# Patient Record
Sex: MALE | Race: WHITE | Employment: OTHER | URBAN - METROPOLITAN AREA
[De-identification: names, ages, dates, MRNs, and addresses within clinical notes are randomized per-mention and may not be internally consistent; named-entity substitution may affect disease eponyms.]

---

## 2017-01-02 ENCOUNTER — TELEPHONE (OUTPATIENT)
Dept: INTERNAL MEDICINE CLINIC | Age: 62
End: 2017-01-02

## 2017-01-02 NOTE — TELEPHONE ENCOUNTER
Daughter called to say that another lesion on his back is appearuing and is painful- pt is in [place]- they have started Bactrim today.   If no improvement he will see me Dyana Ochoa

## 2017-01-05 ENCOUNTER — HOSPITAL ENCOUNTER (OUTPATIENT)
Dept: LAB | Age: 62
Discharge: HOME | End: 2017-01-05
Payer: MEDICARE

## 2017-01-05 ENCOUNTER — OFFICE VISIT (OUTPATIENT)
Dept: SURGERY | Age: 62
End: 2017-01-05

## 2017-01-05 ENCOUNTER — OFFICE VISIT (OUTPATIENT)
Dept: INTERNAL MEDICINE CLINIC | Age: 62
End: 2017-01-05

## 2017-01-05 VITALS
HEIGHT: 70 IN | RESPIRATION RATE: 20 BRPM | BODY MASS INDEX: 27.63 KG/M2 | HEART RATE: 68 BPM | SYSTOLIC BLOOD PRESSURE: 128 MMHG | DIASTOLIC BLOOD PRESSURE: 74 MMHG | TEMPERATURE: 98.2 F | WEIGHT: 193 LBS | OXYGEN SATURATION: 93 %

## 2017-01-05 VITALS
HEIGHT: 70 IN | DIASTOLIC BLOOD PRESSURE: 76 MMHG | HEART RATE: 85 BPM | RESPIRATION RATE: 24 BRPM | BODY MASS INDEX: 27.63 KG/M2 | SYSTOLIC BLOOD PRESSURE: 117 MMHG | WEIGHT: 193 LBS | TEMPERATURE: 98.9 F | OXYGEN SATURATION: 98 %

## 2017-01-05 DIAGNOSIS — L02.91 ABSCESS: Primary | ICD-10-CM

## 2017-01-05 DIAGNOSIS — L02.212 BACK ABSCESS: Primary | ICD-10-CM

## 2017-01-05 PROCEDURE — 82784 ASSAY IGA/IGD/IGG/IGM EACH: CPT

## 2017-01-05 PROCEDURE — 36415 COLL VENOUS BLD VENIPUNCTURE: CPT

## 2017-01-05 PROCEDURE — 80053 COMPREHEN METABOLIC PANEL: CPT

## 2017-01-05 PROCEDURE — 85025 COMPLETE CBC W/AUTO DIFF WBC: CPT

## 2017-01-05 NOTE — PROGRESS NOTES
Chief Complaint   Patient presents with    Skin Problem     1. Have you been to the ER, urgent care clinic since your last visit? Hospitalized since your last visit? [facility]- ER--for seizures    2. Have you seen or consulted any other health care providers outside of the [facility] since your last visit? Include any pap smears or colon screening.  Neuro--    Used 2 patient I. [facility]
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
## 2017-01-05 NOTE — PROGRESS NOTES
1. Have you been to the ER, urgent care clinic since your last visit? Hospitalized since your last visit?  no    2. Have you seen or consulted any other health care providers outside of the [address] [place] [address] since your last visit? Include any pap smears or colon screening. No      Culture pick-up number for [facility] is .
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
## 2017-01-05 NOTE — PROCEDURES
Procedure Note    Date - 1/5/17    Preoperative Diagnosis - Back abscess    Postoperative Diagnosis - Back abscess    Operation Performed - incision and drainage of Back abscess    Indication - Cleveland Phalen. is a 60 y. o.yr old male with Back abscess    Surgeon - Felicia Padilla M.D. Description of operation - The patient was consented prior to starting the procedure. All risks and benefits were explained in full detail. A time out was performed to identify the patient location and procedure. I then prepped and draped the area in standard sterile fashion. I then injected lidocaine into the operative field to anesthetize the area. I then made a 1cm incision over the abscess and drainage out purulent material.  The wound was washed out with saline irrigation and packed with 1/2 in packing strip. A dry sterile dressing was applied. Dr Yesenia Harley performed the entire procedure and was present the entire time.       Anesthesia - 1% lidocaine    Findings - Back abscess    Blood loss - minimal    Complications - none    Specimens/cultures - yes, sent    Disposition - SD home

## 2017-01-06 LAB
ALBUMIN SERPL-MCNC: 3.6 G/DL (ref 3.6–4.8)
ALBUMIN/GLOB SERPL: 1.2 {RATIO} (ref 1.1–2.5)
ALP SERPL-CCNC: 75 IU/L (ref 39–117)
ALT SERPL-CCNC: 14 IU/L (ref 0–44)
AST SERPL-CCNC: 12 IU/L (ref 0–40)
BASOPHILS # BLD AUTO: 0 X10E3/UL (ref 0–0.2)
BASOPHILS NFR BLD AUTO: 0 %
BILIRUB SERPL-MCNC: 0.5 MG/DL (ref 0–1.2)
BUN SERPL-MCNC: 22 MG/DL (ref 8–27)
BUN/CREAT SERPL: 13 (ref 10–22)
CALCIUM SERPL-MCNC: 8.7 MG/DL (ref 8.6–10.2)
CHLORIDE SERPL-SCNC: 96 MMOL/L (ref 96–106)
CO2 SERPL-SCNC: 21 MMOL/L (ref 18–29)
CREAT SERPL-MCNC: 1.69 MG/DL (ref 0.76–1.27)
EOSINOPHIL # BLD AUTO: 0.3 X10E3/UL (ref 0–0.4)
EOSINOPHIL NFR BLD AUTO: 2 %
ERYTHROCYTE [DISTWIDTH] IN BLOOD BY AUTOMATED COUNT: 14.8 % (ref 12.3–15.4)
GLOBULIN SER CALC-MCNC: 3.1 G/DL (ref 1.5–4.5)
GLUCOSE SERPL-MCNC: 105 MG/DL (ref 65–99)
HCT VFR BLD AUTO: 37 % (ref 37.5–51)
HGB BLD-MCNC: 12.2 G/DL (ref 12.6–17.7)
IGA SERPL-MCNC: 333 MG/DL (ref 61–437)
IGG SERPL-MCNC: 916 MG/DL (ref 700–1600)
IGM SERPL-MCNC: 85 MG/DL (ref 20–172)
IMM GRANULOCYTES # BLD: 0 X10E3/UL (ref 0–0.1)
IMM GRANULOCYTES NFR BLD: 0 %
INTERPRETATION: NORMAL
LYMPHOCYTES # BLD AUTO: 1.5 X10E3/UL (ref 0.7–3.1)
LYMPHOCYTES NFR BLD AUTO: 11 %
MCH RBC QN AUTO: 27.2 PG (ref 26.6–33)
MCHC RBC AUTO-ENTMCNC: 33 G/DL (ref 31.5–35.7)
MCV RBC AUTO: 83 FL (ref 79–97)
MONOCYTES # BLD AUTO: 0.8 X10E3/UL (ref 0.1–0.9)
MONOCYTES NFR BLD AUTO: 6 %
NEUTROPHILS # BLD AUTO: 11.1 X10E3/UL (ref 1.4–7)
NEUTROPHILS NFR BLD AUTO: 81 %
PLATELET # BLD AUTO: 196 X10E3/UL (ref 150–379)
POTASSIUM SERPL-SCNC: 5.5 MMOL/L (ref 3.5–5.2)
PROT SERPL-MCNC: 6.7 G/DL (ref 6–8.5)
RBC # BLD AUTO: 4.48 X10E6/UL (ref 4.14–5.8)
SODIUM SERPL-SCNC: 134 MMOL/L (ref 134–144)
WBC # BLD AUTO: 13.8 X10E3/UL (ref 3.4–10.8)

## 2017-01-07 LAB — BACTERIA SPEC AEROBE CULT: ABNORMAL

## 2017-01-10 ENCOUNTER — OFFICE VISIT (OUTPATIENT)
Dept: SURGERY | Age: 62
End: 2017-01-10

## 2017-01-10 VITALS
SYSTOLIC BLOOD PRESSURE: 124 MMHG | OXYGEN SATURATION: 94 % | RESPIRATION RATE: 18 BRPM | TEMPERATURE: 98.6 F | BODY MASS INDEX: 26.77 KG/M2 | DIASTOLIC BLOOD PRESSURE: 78 MMHG | HEIGHT: 70 IN | WEIGHT: 187 LBS | HEART RATE: 73 BPM

## 2017-01-10 DIAGNOSIS — L02.91 ABSCESS: Primary | ICD-10-CM

## 2017-01-10 NOTE — PROGRESS NOTES
1. Have you been to the ER, urgent care clinic since your last visit? Hospitalized since your last visit?  no    2. Have you seen or consulted any other health care providers outside of the [address] [place] [address] since your last visit? Include any pap smears or colon screening.    no

## 2017-01-11 NOTE — PROGRESS NOTES
Subjective:      Owen Chowdhury is a 60 y.o. male presents for postop care 5 days following  Incision and drainage of back abscess. He has a Hx of multiple back abscesses with MRSA. He is at a rehab currently and is getting the wound packed daily there. He is having much less pain currently. Cultures - MRSA    Objective:     Visit Vitals    /78    Pulse 73    Temp 98.6 °F (37 °C)    Resp 18    Ht 5' 10\" (1.778 m)    Wt 187 lb (84.8 kg)    SpO2 94%    BMI 26.83 kg/m2       General:  alert, cooperative, no distress, appears stated age       Back wound :  I+D site on the right upper back with decreased erythema, minimal purulence, mild TTP, decreased induration. Other back wounds in various stages of healing but healing well     Assessment:     1. Owen Chowdhury is a 60 y.o. male who is s/p I+D of right upper back abscess      Plan:     1. The wound is is healing and infection clearing. He will continue packing the wound at rehab and home. 2. Cont abx per ID and follow up with Dr Debi Roland as well  3. Follow-up in 2 week(s) for wound check    Mr. Shirlene Whelan has a reminder for a \"due or due soon\" health maintenance. I have asked that he contact his primary care provider for follow-up on this health maintenance.

## 2017-01-16 ENCOUNTER — TELEPHONE (OUTPATIENT)
Dept: INTERNAL MEDICINE CLINIC | Age: 62
End: 2017-01-16

## 2017-01-16 RX ORDER — DOXYCYCLINE HYCLATE 100 MG
100 TABLET ORAL 2 TIMES DAILY
Qty: 30 TAB | Refills: 1 | Status: SHIPPED | OUTPATIENT
Start: 2017-01-16 | End: 2017-02-17 | Stop reason: SDUPTHER

## 2017-01-19 ENCOUNTER — TELEPHONE (OUTPATIENT)
Dept: NEUROLOGY | Age: 62
End: 2017-01-19

## 2017-01-19 NOTE — TELEPHONE ENCOUNTER
Spoke with wife, states patient has enough Vimpat to last until tomorrow. Would to come  samples for until the end of the month, she says refill would cost over $400.

## 2017-01-26 DIAGNOSIS — G40.209 PARTIAL SYMPTOMATIC EPILEPSY WITH COMPLEX PARTIAL SEIZURES, NOT INTRACTABLE, WITHOUT STATUS EPILEPTICUS (HCC): ICD-10-CM

## 2017-02-01 ENCOUNTER — OFFICE VISIT (OUTPATIENT)
Dept: SURGERY | Age: 62
End: 2017-02-01

## 2017-02-01 ENCOUNTER — TELEPHONE (OUTPATIENT)
Dept: NEUROLOGY | Age: 62
End: 2017-02-01

## 2017-02-01 ENCOUNTER — OFFICE VISIT (OUTPATIENT)
Dept: NEUROLOGY | Age: 62
End: 2017-02-01

## 2017-02-01 VITALS
HEART RATE: 84 BPM | RESPIRATION RATE: 18 BRPM | OXYGEN SATURATION: 97 % | SYSTOLIC BLOOD PRESSURE: 140 MMHG | DIASTOLIC BLOOD PRESSURE: 80 MMHG

## 2017-02-01 VITALS
DIASTOLIC BLOOD PRESSURE: 80 MMHG | RESPIRATION RATE: 18 BRPM | BODY MASS INDEX: 28.06 KG/M2 | HEART RATE: 82 BPM | SYSTOLIC BLOOD PRESSURE: 130 MMHG | HEIGHT: 70 IN | TEMPERATURE: 98.6 F | WEIGHT: 196 LBS | OXYGEN SATURATION: 98 %

## 2017-02-01 DIAGNOSIS — G47.33 OSA ON CPAP: ICD-10-CM

## 2017-02-01 DIAGNOSIS — Z87.820 HISTORY OF MULTIPLE CONCUSSIONS: ICD-10-CM

## 2017-02-01 DIAGNOSIS — Z99.89 OSA ON CPAP: ICD-10-CM

## 2017-02-01 DIAGNOSIS — G40.209 PARTIAL SYMPTOMATIC EPILEPSY WITH COMPLEX PARTIAL SEIZURES, NOT INTRACTABLE, WITHOUT STATUS EPILEPTICUS (HCC): Primary | ICD-10-CM

## 2017-02-01 DIAGNOSIS — Z86.14 HX MRSA INFECTION: ICD-10-CM

## 2017-02-01 DIAGNOSIS — L02.91 ABSCESS: Primary | ICD-10-CM

## 2017-02-01 RX ORDER — LACOSAMIDE 100 MG/1
100 TABLET ORAL 2 TIMES DAILY
Qty: 60 TAB | Refills: 3 | Status: SHIPPED | OUTPATIENT
Start: 2017-02-01 | End: 2017-06-23 | Stop reason: SDUPTHER

## 2017-02-01 RX ORDER — LEVETIRACETAM 750 MG/1
750 TABLET ORAL 2 TIMES DAILY
Qty: 60 TAB | Refills: 3 | Status: SHIPPED | OUTPATIENT
Start: 2017-02-01 | End: 2017-09-28 | Stop reason: SDUPTHER

## 2017-02-01 NOTE — PROGRESS NOTES
1. Have you been to the ER, urgent care clinic since your last visit? Hospitalized since your last visit? no    2. Have you seen or consulted any other health care providers outside of the [facility] since your last visit? Include any pap smears or colon screening.    PCP
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
## 2017-02-01 NOTE — PATIENT INSTRUCTIONS
[facility]   Statement to Patients  April 1, 2014      In an effort to ensure the large volume of patient prescription refills is processed in the most efficient and expeditious manner, we are asking our patients to assist us by calling your Pharmacy for all prescription refills, this will include also your  Mail Order Pharmacy. The pharmacy will contact our office electronically to continue the refill process. Please do not wait until the last minute to call your pharmacy. We need at least 48 hours (2days) to fill prescriptions. We also encourage you to call your pharmacy before going to  your prescription to make sure it is ready. With regard to controlled substance prescription refill requests (narcotic refills) that need to be picked up at our office, we ask your cooperation by providing us with at least 72 hours (3days) notice that you will need a refill. We will not refill narcotic prescription refill requests after 4:00pm on any weekday, Monday through Thursday, or after 2:00pm on Fridays, or on the weekends. We encourage everyone to explore another way of getting your prescription refill request processed using [employer], our patient web portal through our electronic medical record system. [employer] is an efficient and effective way to communicate your medication request directly to the office and  downloadable as an waldo on your smart phone . [employer] also features a review functionality that allows you to view your medication list as well as leave messages for your physician. Are you ready to get connected? If so please review the attatched instructions or speak to any of our staff to get you set up right away! Thank you so much for your cooperation. Should you have any questions please contact our Practice Administrator.     The Physicians and Staff,  [facility]     Thank you for choosing [facility] and [facility] for your     care. You may receive a survey about your visit. We appreciate you taking time     to complete this survey as we use your feedback to improve our services. We     realize we are not perfect, but we strive to provide excellent care.
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
## 2017-02-01 NOTE — MR AVS SNAPSHOT
Visit Information Date & Time Provider Department Dept. Phone Encounter #  
 2/1/2017  1:40 PM DO Michell [facility] [phone] Follow-up Instructions Return in about 3 months (around 5/1/2017). Your Appointments 2/1/2017  3:00 PM  
ESTABLISHED PATIENT with MD Walter Hamm [phone] ([facility]) Appt Note: follow up [address] [id number]  
[address] Upcoming Health Maintenance Date Due  
 LIPID PANEL Q1 1955 FOOT EXAM Q1 1/2/1964 MICROALBUMIN Q1 1/2/1964 EYE EXAM RETINAL OR DILATED Q1 1/2/1964 Pneumococcal 19-64 Medium Risk (1 of 1 - PPSV23) 1/2/1973 DTaP/Tdap/Td series (1 - Tdap) 1/2/1975 FOBT Q 1 YEAR AGE 50-75 1/2/2004 ZOSTER VACCINE AGE 60> 1/2/2014 INFLUENZA AGE 9 TO ADULT 8/1/2016 HEMOGLOBIN A1C Q6M 5/8/2017 Allergies as of 2/1/2017  Review Complete On: 2/1/2017 By: Kalyan Pineda LPN Severity Noted Reaction Type Reactions Codeine High 09/13/2010    Anaphylaxis Tolerated fentanyl previously Demerol [Meperidine] High 09/13/2010    Anaphylaxis Tolerated fentanyl previously Wellbutrin [Bupropion Hcl]  09/13/2010    Anaphylaxis Current Immunizations  Never Reviewed No immunizations on file. Not reviewed this visit You Were Diagnosed With   
  
 Codes Comments Partial symptomatic epilepsy with complex partial seizures, not intractable, without status epilepticus ([facility]    -  Primary ICD-10-CM: N09.295 ICD-9-CM: 345.40 History of multiple concussions     ICD-10-CM: Z87.820 ICD-9-CM: V15.52   
 ROSELIA on CPAP     ICD-10-CM: G47.33 
ICD-9-CM: 327.23 Hx MRSA infection     ICD-10-CM: Z86.14 
ICD-9-CM: V12.04 Vitals BP Pulse Resp SpO2 Smoking Status 140/80 84 18 97% Former Smoker Vitals History Preferred Pharmacy Pharmacy Name Phone Katelyn Vargas [phone] Your Updated Medication List  
  
   
This list is accurate as of: 2/1/17  1:57 PM.  Always use your most recent med list.  
  
  
  
  
 AMARYL 4 mg tablet Generic drug:  glimepiride Take 4 mg by mouth two (2) times a day. aspirin 81 mg chewable tablet Take 1 Tab by mouth daily. atorvastatin 80 mg tablet Commonly known as:  LIPITOR Take 1 Tab by mouth Daily (before dinner). * carvedilol 12.5 mg tablet Commonly known as:  Kathlean Due Take 1 Tab by mouth daily (with breakfast). * carvedilol 6.25 mg tablet Commonly known as:  Kathlean Due Take 1 Tab by mouth daily (with dinner). clonazePAM 0.5 mg tablet Commonly known as:  Urbano Flores Take 1 tablet by mouth twice daily as needed for anxiety or agitation  
  
 doxycycline 100 mg tablet Commonly known as:  VIBRA-TABS Take 1 Tab by mouth two (2) times a day. Indications: SKIN AND SKIN STRUCTURE INFECTION  
  
 eplerenone 25 mg tablet Commonly known as:  Minh  Take 1 Tab by mouth daily. lacosamide 100 mg Tab tablet Commonly known as:  VIMPAT Take 1 Tab by mouth two (2) times a day. Max Daily Amount: 200 mg.  
  
 lactulose 10 gram/15 mL solution Commonly known as:  Jim Meagan Take 45 mL by mouth three (3) times daily. Adjust dosage so that you have 2-3 bowel movements per day. levETIRAcetam 750 mg tablet Commonly known as:  KEPPRA Take 1 Tab by mouth two (2) times a day. pantoprazole 40 mg tablet Commonly known as:  PROTONIX Take 1 Tab by mouth Before breakfast and dinner. Before Breakfast and in the afternoon (also takes Zantac at same time) potassium chloride 10 mEq tablet Commonly known as:  K-DUR, KLOR-CON Take 1 Tab by mouth Daily (before dinner). 20 mEq in AM, 10 mEq in afternoon pregabalin 50 mg capsule Commonly known as:  González Flash Take 1 Cap by mouth three (3) times daily. Max Daily Amount: 150 mg.  
  
 promethazine 25 mg tablet Commonly known as:  PHENERGAN Take 1 Tab by mouth every six (6) hours as needed. QUEtiapine 50 mg tablet Commonly known as:  SEROquel Take 3 Tabs by mouth nightly. tamsulosin 0.4 mg capsule Commonly known as:  FLOMAX Take 1 Cap by mouth Before breakfast and dinner. Before Breakfast and in the afternoon * venlafaxine-SR 75 mg capsule Commonly known as:  EFFEXOR XR Take 2 Caps by mouth daily. * venlafaxine-SR 75 mg capsule Commonly known as:  EFFEXOR XR Take 1 Cap by mouth Daily (before dinner). ZANTAC 150 mg tablet Generic drug:  raNITIdine Take 150 mg by mouth two (2) times a day. * Notice: This list has 4 medication(s) that are the same as other medications prescribed for you. Read the directions carefully, and ask your doctor or other care provider to review them with you. Prescriptions Printed Refills  
 lacosamide (VIMPAT) 100 mg tab tablet 3 Sig: Take 1 Tab by mouth two (2) times a day. Max Daily Amount: 200 mg. Class: Print Route: Oral  
  
Prescriptions Sent to Pharmacy Refills  
 levETIRAcetam (KEPPRA) 750 mg tablet 3 Sig: Take 1 Tab by mouth two (2) times a day. Class: Normal  
 Pharmacy: [facility] [place] [address],[address] #: [phone] Route: Oral  
  
Follow-up Instructions Return in about 3 months (around 5/1/2017). Patient Instructions PRESCRIPTION REFILL POLICY [facility] Statement to Patients April 1, 2014 In an effort to ensure the large volume of patient prescription refills is processed in the most efficient and expeditious manner, we are asking our patients to assist us by calling your Pharmacy for all prescription refills, this will include also your  Mail Order Pharmacy. The pharmacy will contact our office electronically to continue the refill process. Please do not wait until the last minute to call your pharmacy. We need at least 48 hours (2days) to fill prescriptions. We also encourage you to call your pharmacy before going to  your prescription to make sure it is ready. With regard to controlled substance prescription refill requests (narcotic refills) that need to be picked up at our office, we ask your cooperation by providing us with at least 72 hours (3days) notice that you will need a refill. We will not refill narcotic prescription refill requests after 4:00pm on any weekday, Monday through Thursday, or after 2:00pm on Fridays, or on the weekends. We encourage everyone to explore another way of getting your prescription refill request processed using [employer], our patient web portal through our electronic medical record system. [employer] is an efficient and effective way to communicate your medication request directly to the office and  downloadable as an waldo on your smart phone . [employer] also features a review functionality that allows you to view your medication list as well as leave messages for your physician. Are you ready to get connected? If so please review the attatched instructions or speak to any of our staff to get you set up right away! Thank you so much for your cooperation. Should you have any questions please contact our Practice Administrator. The Physicians and Staff,  [facility] [place] Thank you for choosing [facility] [place] and [facility] [place] for your  
 
care. You may receive a survey about your visit. We appreciate you taking time  
 
to complete this survey as we use your feedback to improve our services. We  
 
realize we are not perfect, but we strive to provide excellent care. Please provide this summary of care documentation to your next provider. Your primary care clinician is listed as Lauro Pierre. If you have any questions after today's visit, please call [phone].

## 2017-02-03 ENCOUNTER — HOSPITAL ENCOUNTER (EMERGENCY)
Age: 62
Discharge: HOME | End: 2017-02-03
Attending: EMERGENCY MEDICINE
Payer: MEDICARE

## 2017-02-03 VITALS
HEART RATE: 74 BPM | SYSTOLIC BLOOD PRESSURE: 149 MMHG | RESPIRATION RATE: 14 BRPM | HEIGHT: 70 IN | DIASTOLIC BLOOD PRESSURE: 79 MMHG | BODY MASS INDEX: 29.2 KG/M2 | OXYGEN SATURATION: 98 % | WEIGHT: 204 LBS | TEMPERATURE: 97.5 F

## 2017-02-03 DIAGNOSIS — R40.4 TRANSIENT ALTERATION OF AWARENESS: Primary | ICD-10-CM

## 2017-02-03 LAB
ALBUMIN SERPL BCP-MCNC: 3.3 G/DL (ref 3.5–5)
ALBUMIN/GLOB SERPL: 0.9 {RATIO} (ref 1.1–2.2)
ALP SERPL-CCNC: 92 U/L (ref 45–117)
ALT SERPL-CCNC: 18 U/L (ref 12–78)
AMMONIA PLAS-SCNC: 14 UMOL/L
ANION GAP BLD CALC-SCNC: 9 MMOL/L (ref 5–15)
AST SERPL W P-5'-P-CCNC: 12 U/L (ref 15–37)
BASOPHILS # BLD AUTO: 0 K/UL (ref 0–0.1)
BASOPHILS # BLD: 0 % (ref 0–1)
BILIRUB SERPL-MCNC: 0.5 MG/DL (ref 0.2–1)
BUN SERPL-MCNC: 20 MG/DL (ref 6–20)
BUN/CREAT SERPL: 16 (ref 12–20)
CALCIUM SERPL-MCNC: 8.5 MG/DL (ref 8.5–10.1)
CHLORIDE SERPL-SCNC: 108 MMOL/L (ref 97–108)
CO2 SERPL-SCNC: 25 MMOL/L (ref 21–32)
CREAT SERPL-MCNC: 1.29 MG/DL (ref 0.7–1.3)
EOSINOPHIL # BLD: 0.3 K/UL (ref 0–0.4)
EOSINOPHIL NFR BLD: 5 % (ref 0–7)
ERYTHROCYTE [DISTWIDTH] IN BLOOD BY AUTOMATED COUNT: 15.7 % (ref 11.5–14.5)
GLOBULIN SER CALC-MCNC: 3.7 G/DL (ref 2–4)
GLUCOSE SERPL-MCNC: 190 MG/DL (ref 65–100)
HCT VFR BLD AUTO: 39.4 % (ref 36.6–50.3)
HGB BLD-MCNC: 12.8 G/DL (ref 12.1–17)
LYMPHOCYTES # BLD AUTO: 24 % (ref 12–49)
LYMPHOCYTES # BLD: 1.8 K/UL (ref 0.8–3.5)
MCH RBC QN AUTO: 26.1 PG (ref 26–34)
MCHC RBC AUTO-ENTMCNC: 32.5 G/DL (ref 30–36.5)
MCV RBC AUTO: 80.4 FL (ref 80–99)
MONOCYTES # BLD: 0.2 K/UL (ref 0–1)
MONOCYTES NFR BLD AUTO: 3 % (ref 5–13)
NEUTS SEG # BLD: 5.1 K/UL (ref 1.8–8)
NEUTS SEG NFR BLD AUTO: 68 % (ref 32–75)
PLATELET # BLD AUTO: 166 K/UL (ref 150–400)
POTASSIUM SERPL-SCNC: 4.1 MMOL/L (ref 3.5–5.1)
PROT SERPL-MCNC: 7 G/DL (ref 6.4–8.2)
RBC # BLD AUTO: 4.9 M/UL (ref 4.1–5.7)
SODIUM SERPL-SCNC: 142 MMOL/L (ref 136–145)
TROPONIN I SERPL-MCNC: <0.04 NG/ML
WBC # BLD AUTO: 7.5 K/UL (ref 4.1–11.1)

## 2017-02-03 PROCEDURE — 85025 COMPLETE CBC W/AUTO DIFF WBC: CPT | Performed by: EMERGENCY MEDICINE

## 2017-02-03 PROCEDURE — 87040 BLOOD CULTURE FOR BACTERIA: CPT | Performed by: EMERGENCY MEDICINE

## 2017-02-03 PROCEDURE — 36415 COLL VENOUS BLD VENIPUNCTURE: CPT | Performed by: EMERGENCY MEDICINE

## 2017-02-03 PROCEDURE — 99284 EMERGENCY DEPT VISIT MOD MDM: CPT

## 2017-02-03 PROCEDURE — 93005 ELECTROCARDIOGRAM TRACING: CPT

## 2017-02-03 PROCEDURE — 84484 ASSAY OF TROPONIN QUANT: CPT | Performed by: EMERGENCY MEDICINE

## 2017-02-03 PROCEDURE — 80053 COMPREHEN METABOLIC PANEL: CPT | Performed by: EMERGENCY MEDICINE

## 2017-02-03 PROCEDURE — 82140 ASSAY OF AMMONIA: CPT | Performed by: EMERGENCY MEDICINE

## 2017-02-04 LAB
ATRIAL RATE: 69 BPM
CALCULATED P AXIS, ECG09: 40 DEGREES
CALCULATED R AXIS, ECG10: 38 DEGREES
CALCULATED T AXIS, ECG11: 122 DEGREES
DIAGNOSIS, 93000: NORMAL
P-R INTERVAL, ECG05: 166 MS
Q-T INTERVAL, ECG07: 422 MS
QRS DURATION, ECG06: 112 MS
QTC CALCULATION (BEZET), ECG08: 452 MS
VENTRICULAR RATE, ECG03: 69 BPM

## 2017-02-04 NOTE — DISCHARGE INSTRUCTIONS
We hope that we have addressed all of your medical concerns. The examination and treatment you received in the Emergency Department were for an emergent problem and were not intended as complete care. It is important that you follow up with your healthcare provider(s) for ongoing care. If your symptoms worsen or do not improve as expected, and you are unable to reach your usual health care provider(s), you should return to the Emergency Department. Today's healthcare is undergoing tremendous change, and patient satisfaction surveys are one of the many tools to assess the quality of medical care. You may receive a survey from the [employer] regarding your experience in the Emergency Department. I hope that your experience has been completely positive, particularly the medical care that I provided. As such, please participate in the survey; anything less than excellent does not meet my expectations or intentions. Formerly [facility], later [facility] and [facility] participate in nationally recognized quality of care measures. If your blood pressure is greater than 120/80, as reported below, we urge that you seek medical care to address the potential of high blood pressure, commonly known as hypertension. Hypertension can be hereditary or can be caused by certain medical conditions, pain, stress, or \"white coat syndrome. \"       Please make an appointment with your health care provider(s) for follow up of your Emergency Department visit. VITALS:   Patient Vitals for the past 8 hrs:   Temp Pulse Resp BP SpO2   02/03/17 2015 - 74 14 149/79 98 %   02/03/17 1833 97.5 °F (36.4 °C) 62 16 (!) 187/92 100 %          Thank you for allowing us to provide you with medical care today. We realize that you have many choices for your emergency care needs. Please choose us in the future for any continued health care needs. Kye Stringer, MD ANUSHKA [address]: [phone]            Recent Results (from the past 24 hour(s))   CBC WITH AUTOMATED DIFF    Collection Time: 02/03/17  8:12 PM   Result Value Ref Range    WBC 7.5 4.1 - 11.1 K/uL    RBC 4.90 4. 10 - 5.70 M/uL    HGB 12.8 12.1 - 17.0 g/dL    HCT 39.4 36.6 - 50.3 %    MCV 80.4 80.0 - 99.0 FL    MCH 26.1 26.0 - 34.0 PG    MCHC 32.5 30.0 - 36.5 g/dL    RDW 15.7 (H) 11.5 - 14.5 %    PLATELET 268 399 - 772 K/uL    NEUTROPHILS 68 32 - 75 %    LYMPHOCYTES 24 12 - 49 %    MONOCYTES 3 (L) 5 - 13 %    EOSINOPHILS 5 0 - 7 %    BASOPHILS 0 0 - 1 %    ABS. NEUTROPHILS 5.1 1.8 - 8.0 K/UL    ABS. LYMPHOCYTES 1.8 0.8 - 3.5 K/UL    ABS. MONOCYTES 0.2 0.0 - 1.0 K/UL    ABS. EOSINOPHILS 0.3 0.0 - 0.4 K/UL    ABS. BASOPHILS 0.0 0.0 - 0.1 K/UL   METABOLIC PANEL, COMPREHENSIVE    Collection Time: 02/03/17  8:12 PM   Result Value Ref Range    Sodium 142 136 - 145 mmol/L    Potassium 4.1 3.5 - 5.1 mmol/L    Chloride 108 97 - 108 mmol/L    CO2 25 21 - 32 mmol/L    Anion gap 9 5 - 15 mmol/L    Glucose 190 (H) 65 - 100 mg/dL    BUN 20 6 - 20 MG/DL    Creatinine 1.29 0.70 - 1.30 MG/DL    BUN/Creatinine ratio 16 12 - 20      GFR est AA >60 >60 ml/min/1.73m2    GFR est non-AA 56 (L) >60 ml/min/1.73m2    Calcium 8.5 8.5 - 10.1 MG/DL    Bilirubin, total 0.5 0.2 - 1.0 MG/DL    ALT (SGPT) 18 12 - 78 U/L    AST (SGOT) 12 (L) 15 - 37 U/L    Alk. phosphatase 92 45 - 117 U/L    Protein, total 7.0 6.4 - 8.2 g/dL    Albumin 3.3 (L) 3.5 - 5.0 g/dL    Globulin 3.7 2.0 - 4.0 g/dL    A-G Ratio 0.9 (L) 1.1 - 2.2     TROPONIN I    Collection Time: 02/03/17  8:12 PM   Result Value Ref Range    Troponin-I, Qt. <0.04 <0.05 ng/mL   AMMONIA    Collection Time: 02/03/17  8:12 PM   Result Value Ref Range    Ammonia 14 <32 UMOL/L       No results found.
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
## 2017-02-04 NOTE — ED NOTES
Pt A&O, not vomiting, respirations unlabored, skin warm and dry. No apparent distress.  Discharge instructions explained

## 2017-02-07 RX ORDER — LACOSAMIDE 200 MG/1
200 TABLET ORAL 2 TIMES DAILY
Qty: 28 TAB | Refills: 0 | Status: SHIPPED | COMMUNITY
Start: 2017-02-07 | End: 2017-03-02

## 2017-02-07 NOTE — PROGRESS NOTES
Subjective:      Nadja Garcia is a 60 y.o. male presents for postop care  following  I+D of abscess of the back. The wound has been packed by his daughter daily at home and is healing well. The wound is healing well. He has no drainage and minimal pain from the wound. Objective:     Visit Vitals    /80    Pulse 82    Temp 98.6 °F (37 °C)    Resp 18    Ht 5' 10\" (1.778 m)    Wt 196 lb (88.9 kg)    SpO2 98%    BMI 28.12 kg/m2       General:  alert, cooperative, no distress, appears stated age       Back Incision:   healing well, no drainage, no erythema, open wound on R upper back 1x2cm shallow, good granulation tissue present     Assessment:     1. Nadja Garcia is a 60 y.o. male who is s/p I+D of back abscess      Plan:     1. The wound is healing well and much smaller than before. 2. Continue to pack the wound till it is small enough the packing will not fit in  3. Follow-up prn    Mr. Anup High has a reminder for a \"due or due soon\" health maintenance. I have asked that he contact his primary care provider for follow-up on this health maintenance.

## 2017-02-08 LAB
BACTERIA SPEC CULT: NORMAL
SERVICE CMNT-IMP: NORMAL

## 2017-02-10 RX ORDER — LACOSAMIDE 100 MG/1
100 TABLET ORAL 2 TIMES DAILY
Qty: 21 TAB | Refills: 0 | Status: SHIPPED | COMMUNITY
Start: 2017-02-10 | End: 2017-03-02

## 2017-02-23 RX ORDER — DOXYCYCLINE HYCLATE 100 MG
TABLET ORAL
Qty: 30 TAB | Refills: 1 | Status: SHIPPED | OUTPATIENT
Start: 2017-02-23 | End: 2017-07-24

## 2017-03-01 ENCOUNTER — APPOINTMENT (OUTPATIENT)
Dept: GENERAL RADIOLOGY | Age: 62
DRG: 194 | End: 2017-03-01
Attending: PHYSICIAN ASSISTANT
Payer: MEDICARE

## 2017-03-01 ENCOUNTER — HOSPITAL ENCOUNTER (INPATIENT)
Age: 62
LOS: 1 days | Discharge: HOME | DRG: 194 | End: 2017-03-03
Attending: EMERGENCY MEDICINE | Admitting: FAMILY MEDICINE
Payer: MEDICARE

## 2017-03-01 ENCOUNTER — APPOINTMENT (OUTPATIENT)
Dept: CT IMAGING | Age: 62
DRG: 194 | End: 2017-03-01
Attending: PHYSICIAN ASSISTANT
Payer: MEDICARE

## 2017-03-01 DIAGNOSIS — E86.0 DEHYDRATION: ICD-10-CM

## 2017-03-01 DIAGNOSIS — R50.9 FEVER OF UNKNOWN ORIGIN: Primary | ICD-10-CM

## 2017-03-01 DIAGNOSIS — E83.42 HYPOMAGNESEMIA: ICD-10-CM

## 2017-03-01 LAB
ALBUMIN SERPL BCP-MCNC: 3.4 G/DL (ref 3.5–5)
ALBUMIN/GLOB SERPL: 0.9 {RATIO} (ref 1.1–2.2)
ALP SERPL-CCNC: 90 U/L (ref 45–117)
ALT SERPL-CCNC: 17 U/L (ref 12–78)
AMMONIA PLAS-SCNC: 13 UMOL/L
ANION GAP BLD CALC-SCNC: 8 MMOL/L (ref 5–15)
APPEARANCE UR: CLEAR
AST SERPL W P-5'-P-CCNC: 8 U/L (ref 15–37)
BACTERIA URNS QL MICRO: NEGATIVE /HPF
BASOPHILS # BLD AUTO: 0 K/UL (ref 0–0.1)
BASOPHILS # BLD: 0 % (ref 0–1)
BILIRUB SERPL-MCNC: 1 MG/DL (ref 0.2–1)
BILIRUB UR QL: NEGATIVE
BUN SERPL-MCNC: 15 MG/DL (ref 6–20)
BUN/CREAT SERPL: 9 (ref 12–20)
CALCIUM SERPL-MCNC: 8.2 MG/DL (ref 8.5–10.1)
CHLORIDE SERPL-SCNC: 102 MMOL/L (ref 97–108)
CO2 SERPL-SCNC: 25 MMOL/L (ref 21–32)
COLOR UR: ABNORMAL
CREAT SERPL-MCNC: 1.58 MG/DL (ref 0.7–1.3)
EOSINOPHIL # BLD: 0.1 K/UL (ref 0–0.4)
EOSINOPHIL NFR BLD: 1 % (ref 0–7)
EPITH CASTS URNS QL MICRO: ABNORMAL /LPF
ERYTHROCYTE [DISTWIDTH] IN BLOOD BY AUTOMATED COUNT: 15.9 % (ref 11.5–14.5)
FLUAV AG NPH QL IA: NEGATIVE
FLUBV AG NOSE QL IA: NEGATIVE
GLOBULIN SER CALC-MCNC: 4 G/DL (ref 2–4)
GLUCOSE BLD STRIP.AUTO-MCNC: 120 MG/DL (ref 65–100)
GLUCOSE SERPL-MCNC: 128 MG/DL (ref 65–100)
GLUCOSE UR STRIP.AUTO-MCNC: NEGATIVE MG/DL
HCT VFR BLD AUTO: 38.6 % (ref 36.6–50.3)
HGB BLD-MCNC: 12.8 G/DL (ref 12.1–17)
HGB UR QL STRIP: NEGATIVE
HYALINE CASTS URNS QL MICRO: ABNORMAL /LPF (ref 0–5)
KETONES UR QL STRIP.AUTO: NEGATIVE MG/DL
LACTATE SERPL-SCNC: 1.4 MMOL/L (ref 0.4–2)
LEUKOCYTE ESTERASE UR QL STRIP.AUTO: NEGATIVE
LYMPHOCYTES # BLD AUTO: 6 % (ref 12–49)
LYMPHOCYTES # BLD: 0.9 K/UL (ref 0.8–3.5)
MAGNESIUM SERPL-MCNC: 1.1 MG/DL (ref 1.6–2.4)
MCH RBC QN AUTO: 25.5 PG (ref 26–34)
MCHC RBC AUTO-ENTMCNC: 33.2 G/DL (ref 30–36.5)
MCV RBC AUTO: 77 FL (ref 80–99)
MONOCYTES # BLD: 0.5 K/UL (ref 0–1)
MONOCYTES NFR BLD AUTO: 4 % (ref 5–13)
NEUTS SEG # BLD: 12.8 K/UL (ref 1.8–8)
NEUTS SEG NFR BLD AUTO: 89 % (ref 32–75)
NITRITE UR QL STRIP.AUTO: NEGATIVE
PH UR STRIP: 6 [PH] (ref 5–8)
PHOSPHATE SERPL-MCNC: 1.7 MG/DL (ref 2.6–4.7)
PLATELET # BLD AUTO: 147 K/UL (ref 150–400)
POTASSIUM SERPL-SCNC: 3.5 MMOL/L (ref 3.5–5.1)
PROT SERPL-MCNC: 7.4 G/DL (ref 6.4–8.2)
PROT UR STRIP-MCNC: ABNORMAL MG/DL
RBC # BLD AUTO: 5.01 M/UL (ref 4.1–5.7)
RBC #/AREA URNS HPF: ABNORMAL /HPF (ref 0–5)
SERVICE CMNT-IMP: ABNORMAL
SODIUM SERPL-SCNC: 135 MMOL/L (ref 136–145)
SP GR UR REFRACTOMETRY: 1.01 (ref 1–1.03)
TROPONIN I SERPL-MCNC: <0.04 NG/ML
UA: UC IF INDICATED,UAUC: ABNORMAL
UROBILINOGEN UR QL STRIP.AUTO: 1 EU/DL (ref 0.2–1)
WBC # BLD AUTO: 14.3 K/UL (ref 4.1–11.1)
WBC URNS QL MICRO: ABNORMAL /HPF (ref 0–4)

## 2017-03-01 PROCEDURE — 80053 COMPREHEN METABOLIC PANEL: CPT | Performed by: PHYSICIAN ASSISTANT

## 2017-03-01 PROCEDURE — 96361 HYDRATE IV INFUSION ADD-ON: CPT

## 2017-03-01 PROCEDURE — 85025 COMPLETE CBC W/AUTO DIFF WBC: CPT | Performed by: PHYSICIAN ASSISTANT

## 2017-03-01 PROCEDURE — 74011250636 HC RX REV CODE- 250/636: Performed by: PHYSICIAN ASSISTANT

## 2017-03-01 PROCEDURE — 71020 XR CHEST PA LAT: CPT

## 2017-03-01 PROCEDURE — 82962 GLUCOSE BLOOD TEST: CPT

## 2017-03-01 PROCEDURE — 70450 CT HEAD/BRAIN W/O DYE: CPT

## 2017-03-01 PROCEDURE — 84100 ASSAY OF PHOSPHORUS: CPT | Performed by: PHYSICIAN ASSISTANT

## 2017-03-01 PROCEDURE — 87040 BLOOD CULTURE FOR BACTERIA: CPT | Performed by: PHYSICIAN ASSISTANT

## 2017-03-01 PROCEDURE — 96365 THER/PROPH/DIAG IV INF INIT: CPT

## 2017-03-01 PROCEDURE — 87804 INFLUENZA ASSAY W/OPTIC: CPT | Performed by: PHYSICIAN ASSISTANT

## 2017-03-01 PROCEDURE — 99285 EMERGENCY DEPT VISIT HI MDM: CPT

## 2017-03-01 PROCEDURE — 74011250637 HC RX REV CODE- 250/637: Performed by: PHYSICIAN ASSISTANT

## 2017-03-01 PROCEDURE — 83735 ASSAY OF MAGNESIUM: CPT | Performed by: PHYSICIAN ASSISTANT

## 2017-03-01 PROCEDURE — 87086 URINE CULTURE/COLONY COUNT: CPT | Performed by: PHYSICIAN ASSISTANT

## 2017-03-01 PROCEDURE — 36415 COLL VENOUS BLD VENIPUNCTURE: CPT | Performed by: PHYSICIAN ASSISTANT

## 2017-03-01 PROCEDURE — 83605 ASSAY OF LACTIC ACID: CPT | Performed by: PHYSICIAN ASSISTANT

## 2017-03-01 PROCEDURE — 81001 URINALYSIS AUTO W/SCOPE: CPT | Performed by: PHYSICIAN ASSISTANT

## 2017-03-01 PROCEDURE — 82140 ASSAY OF AMMONIA: CPT | Performed by: PHYSICIAN ASSISTANT

## 2017-03-01 PROCEDURE — 84484 ASSAY OF TROPONIN QUANT: CPT | Performed by: PHYSICIAN ASSISTANT

## 2017-03-01 RX ORDER — ACETAMINOPHEN 325 MG/1
975 TABLET ORAL
Status: COMPLETED | OUTPATIENT
Start: 2017-03-01 | End: 2017-03-01

## 2017-03-01 RX ORDER — MAGNESIUM SULFATE 1 G/100ML
1 INJECTION INTRAVENOUS
Status: COMPLETED | OUTPATIENT
Start: 2017-03-01 | End: 2017-03-02

## 2017-03-01 RX ADMIN — ACETAMINOPHEN 975 MG: 325 TABLET, FILM COATED ORAL at 21:46

## 2017-03-01 RX ADMIN — SODIUM CHLORIDE 1000 ML: 900 INJECTION, SOLUTION INTRAVENOUS at 21:43

## 2017-03-01 NOTE — IP AVS SNAPSHOT
[address] 
[phone] Patient: Harjit Dior. MRN: [id number] VANESA:0/0[birth year]0817 You are allergic to the following Allergen Reactions Codeine Anaphylaxis Tolerated fentanyl previously Demerol (Meperidine) Anaphylaxis Tolerated fentanyl previously Wellbutrin (Bupropion Hcl) Anaphylaxis Recent Documentation Weight BMI Smoking Status 90.2 kg 28.53 kg/m2 Former Smoker Unresulted Labs Order Current Status CULTURE, BLOOD, PAIRED Preliminary result CULTURE, MRSA Preliminary result CULTURE, THROAT Preliminary result Emergency Contacts  (Rel.) Home Phone Work Phone Mobile Phone Ludwin Hoffman (Daughter) [phone] -- [phone] About your hospitalization You were admitted on:  March 2, 2017 You last received care in the:  [facility] You were discharged on:  March 3, 2017 Why you were hospitalized Your primary diagnosis was:  Fever Your diagnoses also included:  Pneumonia Providers Seen During Your Hospitalizations Provider Role Specialty Primary office phone Kristi Holloway MD Attending Provider Emergency Medicine [phone] Ginger Diaz MD Attending Provider Emergency Medicine [phone] Gabe Durbin MD Attending Provider [facility] [phone] Rocío Gunn MD Attending Provider Internal Medicine [phone] Your Primary Care Physician (PCP) Primary Care Physician Office Phone Office Fax [facility], [address] [place] [address], [address] [phone] Follow-up Information Follow up With Details Comments Contact Info Julia Holguin MD In 1 week discharge follow up  Kellen Duron [address] [place] [address] [place] [address] 
[phone] Current Discharge Medication List  
  
START taking these medications Dose & Instructions Dispensing Information Comments Morning Noon Evening Bedtime  
 amoxicillin-clavulanate 875-125 mg per tablet Commonly known as:  AUGMENTIN Your next dose is: Today, Tomorrow Other:  _________ Dose:  1 Tab Take 1 Tab by mouth every twelve (12) hours. Quantity:  13 Tab Refills:  0 CONTINUE these medications which have NOT CHANGED Dose & Instructions Dispensing Information Comments Morning Noon Evening Bedtime AMARYL 4 mg tablet Generic drug:  glimepiride Your next dose is: Today, Tomorrow Other:  _________ Dose:  4 mg Take 4 mg by mouth two (2) times a day. Refills:  0  
     
   
   
   
  
 aspirin 81 mg chewable tablet Your next dose is: Today, Tomorrow Other:  _________ Dose:  81 mg Take 1 Tab by mouth daily. Quantity:  30 Tab Refills:  0  
     
   
   
   
  
 atorvastatin 80 mg tablet Commonly known as:  LIPITOR Your next dose is: Today, Tomorrow Other:  _________ Dose:  80 mg Take 1 Tab by mouth Daily (before dinner). Quantity:  30 Tab Refills:  0  
     
   
   
   
  
 * carvedilol 12.5 mg tablet Commonly known as:  Cori Sheela Your next dose is: Today, Tomorrow Other:  _________ Dose:  12.5 mg Take 1 Tab by mouth daily (with breakfast). Quantity:  30 Tab Refills:  0  
     
   
   
   
  
 * carvedilol 6.25 mg tablet Commonly known as:  Cori Sheela Your next dose is: Today, Tomorrow Other:  _________ Dose:  6.25 mg Take 1 Tab by mouth daily (with dinner). Quantity:  30 Tab Refills:  0  
     
   
   
   
  
 clonazePAM 0.5 mg tablet Commonly known as:  Suman Aver Your next dose is: Today, Tomorrow Other:  _________ Take 1 tablet by mouth twice daily as needed for anxiety or agitation Quantity:  30 Tab Refills:  0 doxycycline 100 mg tablet Commonly known as:  VIBRA-TABS Your next dose is: Today, Tomorrow Other:  _________  
   
   
 take 1 tablet by mouth twice a day Quantity:  30 Tab Refills:  1  
     
   
   
   
  
 eplerenone 25 mg tablet Commonly known as:  Alisa Li Your next dose is: Today, Tomorrow Other:  _________ Dose:  25 mg Take 1 Tab by mouth daily. Quantity:  30 Tab Refills:  0  
     
   
   
   
  
 lacosamide 100 mg Tab tablet Commonly known as:  VIMPAT Your next dose is: Today, Tomorrow Other:  _________ Dose:  100 mg Take 1 Tab by mouth two (2) times a day. Max Daily Amount: 200 mg. Quantity:  60 Tab Refills:  3  
     
   
   
   
  
 lactulose 10 gram/15 mL solution Commonly known as:  Storm [place] Your next dose is: Today, Tomorrow Other:  _________ Dose:  30 g Take 45 mL by mouth three (3) times daily. Adjust dosage so that you have 2-3 bowel movements per day. Quantity:  1 Bottle Refills:  0  
     
   
   
   
  
 levETIRAcetam 750 mg tablet Commonly known as:  KEPPRA Your next dose is: Today, Tomorrow Other:  _________ Dose:  750 mg Take 1 Tab by mouth two (2) times a day. Quantity:  60 Tab Refills:  3  
     
   
   
   
  
 pantoprazole 40 mg tablet Commonly known as:  PROTONIX Your next dose is: Today, Tomorrow Other:  _________ Dose:  40 mg Take 1 Tab by mouth Before breakfast and dinner. Before Breakfast and in the afternoon (also takes Zantac at same time) Quantity:  60 Tab Refills:  0  
     
   
   
   
  
 potassium chloride 10 mEq tablet Commonly known as:  K-DUR, KLOR-CON Your next dose is: Today, Tomorrow Other:  _________ Dose:  10 mEq Take 1 Tab by mouth Daily (before dinner). 20 mEq in AM, 10 mEq in afternoon Quantity:  30 Tab Refills:  0 pregabalin 50 mg capsule Commonly known as:  Herb Bustle Your next dose is: Today, Tomorrow Other:  _________ Dose:  50 mg Take 1 Cap by mouth three (3) times daily. Max Daily Amount: 150 mg.  
 Quantity:  90 Cap Refills:  0  
     
   
   
   
  
 promethazine 25 mg tablet Commonly known as:  PHENERGAN Your next dose is: Today, Tomorrow Other:  _________ Dose:  25 mg Take 1 Tab by mouth every six (6) hours as needed. Quantity:  30 Tab Refills:  0 QUEtiapine 50 mg tablet Commonly known as:  SEROquel Your next dose is: Today, Tomorrow Other:  _________ Dose:  150 mg Take 3 Tabs by mouth nightly. Quantity:  90 Tab Refills:  0  
     
   
   
   
  
 tamsulosin 0.4 mg capsule Commonly known as:  FLOMAX Your next dose is: Today, Tomorrow Other:  _________ Dose:  0.4 mg Take 1 Cap by mouth Before breakfast and dinner. Before Breakfast and in the afternoon Quantity:  30 Cap Refills:  0  
     
   
   
   
  
 * venlafaxine-SR 75 mg capsule Commonly known as:  EFFEXOR XR Your next dose is: Today, Tomorrow Other:  _________ Dose:  150 mg Take 2 Caps by mouth daily. Quantity:  30 Cap Refills:  0  
     
   
   
   
  
 * venlafaxine-SR 75 mg capsule Commonly known as:  EFFEXOR XR Your next dose is: Today, Tomorrow Other:  _________ Dose:  75 mg Take 1 Cap by mouth Daily (before dinner). Quantity:  30 Cap Refills:  0  
     
   
   
   
  
 ZANTAC 150 mg tablet Generic drug:  raNITIdine Your next dose is: Today, Tomorrow Other:  _________ Dose:  150 mg Take 150 mg by mouth two (2) times a day. Refills:  0  
     
   
   
   
  
 * Notice: This list has 4 medication(s) that are the same as other medications prescribed for you.  Read the directions carefully, and ask your doctor or other care provider to review them with you. Where to Get Your Medications Information on where to get these meds will be given to you by the nurse or doctor. ! Ask your nurse or doctor about these medications  
  amoxicillin-clavulanate 875-125 mg per tablet Discharge Instructions Please bring this form with you to show your primary care provider at your follow-up appointment. Primary care provider:  Dr. Russ Johnston MD 
 
Discharging provider:  Jarrod Ortega MD 
 
You have been admitted to the hospital with the following diagnoses: · Fever · Pneumonia FOLLOW-UP CARE RECOMMENDATIONS: 
 
APPOINTMENTS: 
Follow-up Information Follow up With Details Comments Contact Info Russ Johnston MD In 1 week discharge follow up  [place] [address] [address] [place] [address] 
[phone] FOLLOW-UP TESTS recommended: none PENDING TEST RESULTS: 
At the time of your discharge the following test results are still pending: none Please make sure you review these results with your outpatient follow-up provider(s). SYMPTOMS to watch for: chest pain, shortness of breath, fever, chills, nausea, vomiting, diarrhea, change in mentation, falling, weakness, bleeding. DIET/what to eat:  Cardiac Diet ACTIVITY:  Activity as tolerated WOUND CARE: none EQUIPMENT needed:  none What to do if new or unexpected symptoms occur? If you experience any of the above symptoms (or should other concerns or questions arise after discharge) please call your primary care physician. Return to the emergency room if you cannot get hold of your doctor. · It is very important that you keep your follow-up appointment(s). · Please bring discharge papers, medication list (and/or medication bottles) to your follow-up appointments for review by your outpatient provider(s).  
· Please check the list of medications and be sure it includes every medication (even non-prescription medications) that your provider wants you to take. · It is important that you take the medication exactly as they are prescribed. · Keep your medication in the bottles provided by the pharmacist and keep a list of the medication names, dosages, and times to be taken in your wallet. · Do not take other medications without consulting your doctor. · If you have any questions about your medications or other instructions, please talk to your nurse or care provider before you leave the hospital. 
 
I understand that if any problems occur once I am at home I am to contact my physician. These instructions were explained to me and I had the opportunity to ask questions. Discharge Orders None AGELON ?hart Announcement We are excited to announce that we are making your provider's discharge notes available to you in bitmovin. You will see these notes when they are completed and signed by the physician that discharged you from your recent hospital stay. If you have any questions or concerns about any information you see in AGELON ?hart, please call the Health Information Department where you were seen or reach out to your Primary Care Provider for more information about your plan of care. General Information Please provide this summary of care documentation to your next provider. Patient Signature:  ____________________________________________________________ Date:  ____________________________________________________________  
  
Cheryl Lila Provider Signature:  ____________________________________________________________ Date:  ____________________________________________________________

## 2017-03-02 PROBLEM — R50.9 FEVER: Status: ACTIVE | Noted: 2017-03-02

## 2017-03-02 LAB
ANION GAP BLD CALC-SCNC: 12 MMOL/L (ref 5–15)
BASOPHILS # BLD AUTO: 0 K/UL (ref 0–0.1)
BASOPHILS # BLD: 0 % (ref 0–1)
BUN SERPL-MCNC: 13 MG/DL (ref 6–20)
BUN/CREAT SERPL: 10 (ref 12–20)
CALCIUM SERPL-MCNC: 7.2 MG/DL (ref 8.5–10.1)
CHLORIDE SERPL-SCNC: 106 MMOL/L (ref 97–108)
CO2 SERPL-SCNC: 20 MMOL/L (ref 21–32)
CREAT SERPL-MCNC: 1.26 MG/DL (ref 0.7–1.3)
DEPRECATED S PYO AG THROAT QL EIA: NEGATIVE
DIFFERENTIAL METHOD BLD: ABNORMAL
EOSINOPHIL # BLD: 0.1 K/UL (ref 0–0.4)
EOSINOPHIL NFR BLD: 1 % (ref 0–7)
ERYTHROCYTE [DISTWIDTH] IN BLOOD BY AUTOMATED COUNT: 15.9 % (ref 11.5–14.5)
EST. AVERAGE GLUCOSE BLD GHB EST-MCNC: 140 MG/DL
GLUCOSE BLD STRIP.AUTO-MCNC: 70 MG/DL (ref 65–100)
GLUCOSE BLD STRIP.AUTO-MCNC: 74 MG/DL (ref 65–100)
GLUCOSE BLD STRIP.AUTO-MCNC: 77 MG/DL (ref 65–100)
GLUCOSE BLD STRIP.AUTO-MCNC: 93 MG/DL (ref 65–100)
GLUCOSE BLD STRIP.AUTO-MCNC: 93 MG/DL (ref 65–100)
GLUCOSE BLD STRIP.AUTO-MCNC: 94 MG/DL (ref 65–100)
GLUCOSE BLD STRIP.AUTO-MCNC: 94 MG/DL (ref 65–100)
GLUCOSE SERPL-MCNC: 79 MG/DL (ref 65–100)
HBA1C MFR BLD: 6.5 % (ref 4.2–6.3)
HCT VFR BLD AUTO: 34.2 % (ref 36.6–50.3)
HGB BLD-MCNC: 11.4 G/DL (ref 12.1–17)
LYMPHOCYTES # BLD AUTO: 6 % (ref 12–49)
LYMPHOCYTES # BLD: 0.6 K/UL (ref 0.8–3.5)
MCH RBC QN AUTO: 25.6 PG (ref 26–34)
MCHC RBC AUTO-ENTMCNC: 33.3 G/DL (ref 30–36.5)
MCV RBC AUTO: 76.7 FL (ref 80–99)
MONOCYTES # BLD: 0.4 K/UL (ref 0–1)
MONOCYTES NFR BLD AUTO: 4 % (ref 5–13)
NEUTS SEG # BLD: 9.4 K/UL (ref 1.8–8)
NEUTS SEG NFR BLD AUTO: 89 % (ref 32–75)
PLATELET # BLD AUTO: 116 K/UL (ref 150–400)
POTASSIUM SERPL-SCNC: 3.1 MMOL/L (ref 3.5–5.1)
RBC # BLD AUTO: 4.46 M/UL (ref 4.1–5.7)
RBC MORPH BLD: ABNORMAL
SERVICE CMNT-IMP: NORMAL
SODIUM SERPL-SCNC: 138 MMOL/L (ref 136–145)
WBC # BLD AUTO: 10.5 K/UL (ref 4.1–11.1)

## 2017-03-02 PROCEDURE — 74011250637 HC RX REV CODE- 250/637: Performed by: HOSPITALIST

## 2017-03-02 PROCEDURE — 65270000029 HC RM PRIVATE

## 2017-03-02 PROCEDURE — 87070 CULTURE OTHR SPECIMN AEROBIC: CPT | Performed by: HOSPITALIST

## 2017-03-02 PROCEDURE — 74011250636 HC RX REV CODE- 250/636: Performed by: HOSPITALIST

## 2017-03-02 PROCEDURE — 74011250637 HC RX REV CODE- 250/637: Performed by: FAMILY MEDICINE

## 2017-03-02 PROCEDURE — 85025 COMPLETE CBC W/AUTO DIFF WBC: CPT | Performed by: FAMILY MEDICINE

## 2017-03-02 PROCEDURE — 74011250636 HC RX REV CODE- 250/636: Performed by: PHYSICIAN ASSISTANT

## 2017-03-02 PROCEDURE — 82962 GLUCOSE BLOOD TEST: CPT

## 2017-03-02 PROCEDURE — 87880 STREP A ASSAY W/OPTIC: CPT | Performed by: HOSPITALIST

## 2017-03-02 PROCEDURE — 83036 HEMOGLOBIN GLYCOSYLATED A1C: CPT | Performed by: FAMILY MEDICINE

## 2017-03-02 PROCEDURE — 80048 BASIC METABOLIC PNL TOTAL CA: CPT | Performed by: FAMILY MEDICINE

## 2017-03-02 PROCEDURE — 36415 COLL VENOUS BLD VENIPUNCTURE: CPT | Performed by: FAMILY MEDICINE

## 2017-03-02 PROCEDURE — 74011250636 HC RX REV CODE- 250/636: Performed by: FAMILY MEDICINE

## 2017-03-02 RX ORDER — SODIUM CHLORIDE 0.9 % (FLUSH) 0.9 %
5-10 SYRINGE (ML) INJECTION EVERY 8 HOURS
Status: DISCONTINUED | OUTPATIENT
Start: 2017-03-02 | End: 2017-03-03 | Stop reason: HOSPADM

## 2017-03-02 RX ORDER — CARVEDILOL 6.25 MG/1
6.25 TABLET ORAL
Status: DISCONTINUED | OUTPATIENT
Start: 2017-03-02 | End: 2017-03-03 | Stop reason: HOSPADM

## 2017-03-02 RX ORDER — VENLAFAXINE HYDROCHLORIDE 37.5 MG/1
75 CAPSULE, EXTENDED RELEASE ORAL
Status: DISCONTINUED | OUTPATIENT
Start: 2017-03-02 | End: 2017-03-03 | Stop reason: HOSPADM

## 2017-03-02 RX ORDER — VENLAFAXINE HYDROCHLORIDE 37.5 MG/1
150 CAPSULE, EXTENDED RELEASE ORAL DAILY
Status: DISCONTINUED | OUTPATIENT
Start: 2017-03-02 | End: 2017-03-03 | Stop reason: HOSPADM

## 2017-03-02 RX ORDER — CLONAZEPAM 1 MG/1
0.5 TABLET ORAL
Status: DISCONTINUED | OUTPATIENT
Start: 2017-03-02 | End: 2017-03-03 | Stop reason: HOSPADM

## 2017-03-02 RX ORDER — ATORVASTATIN CALCIUM 40 MG/1
80 TABLET, FILM COATED ORAL
Status: DISCONTINUED | OUTPATIENT
Start: 2017-03-02 | End: 2017-03-03 | Stop reason: HOSPADM

## 2017-03-02 RX ORDER — MAGNESIUM SULFATE 100 %
4 CRYSTALS MISCELLANEOUS AS NEEDED
Status: DISCONTINUED | OUTPATIENT
Start: 2017-03-02 | End: 2017-03-03 | Stop reason: HOSPADM

## 2017-03-02 RX ORDER — DEXTROSE 50 % IN WATER (D50W) INTRAVENOUS SYRINGE
12.5-25 AS NEEDED
Status: DISCONTINUED | OUTPATIENT
Start: 2017-03-02 | End: 2017-03-03 | Stop reason: HOSPADM

## 2017-03-02 RX ORDER — CARVEDILOL 12.5 MG/1
12.5 TABLET ORAL
Status: DISCONTINUED | OUTPATIENT
Start: 2017-03-02 | End: 2017-03-03 | Stop reason: HOSPADM

## 2017-03-02 RX ORDER — FAMOTIDINE 20 MG/1
20 TABLET, FILM COATED ORAL 2 TIMES DAILY
Status: DISCONTINUED | OUTPATIENT
Start: 2017-03-02 | End: 2017-03-03 | Stop reason: HOSPADM

## 2017-03-02 RX ORDER — INSULIN LISPRO 100 [IU]/ML
INJECTION, SOLUTION INTRAVENOUS; SUBCUTANEOUS
Status: DISCONTINUED | OUTPATIENT
Start: 2017-03-02 | End: 2017-03-03 | Stop reason: HOSPADM

## 2017-03-02 RX ORDER — LACOSAMIDE 50 MG/1
100 TABLET ORAL 2 TIMES DAILY
Status: DISCONTINUED | OUTPATIENT
Start: 2017-03-02 | End: 2017-03-03 | Stop reason: HOSPADM

## 2017-03-02 RX ORDER — ONDANSETRON 2 MG/ML
4 INJECTION INTRAMUSCULAR; INTRAVENOUS
Status: DISCONTINUED | OUTPATIENT
Start: 2017-03-02 | End: 2017-03-03 | Stop reason: HOSPADM

## 2017-03-02 RX ORDER — POTASSIUM CHLORIDE 750 MG/1
40 TABLET, FILM COATED, EXTENDED RELEASE ORAL EVERY 6 HOURS
Status: COMPLETED | OUTPATIENT
Start: 2017-03-02 | End: 2017-03-02

## 2017-03-02 RX ORDER — PREGABALIN 25 MG/1
50 CAPSULE ORAL 3 TIMES DAILY
Status: DISCONTINUED | OUTPATIENT
Start: 2017-03-02 | End: 2017-03-03 | Stop reason: HOSPADM

## 2017-03-02 RX ORDER — DOXYCYCLINE HYCLATE 100 MG
100 TABLET ORAL EVERY 12 HOURS
Status: DISCONTINUED | OUTPATIENT
Start: 2017-03-02 | End: 2017-03-03 | Stop reason: HOSPADM

## 2017-03-02 RX ORDER — SODIUM CHLORIDE 9 MG/ML
125 INJECTION, SOLUTION INTRAVENOUS CONTINUOUS
Status: DISCONTINUED | OUTPATIENT
Start: 2017-03-02 | End: 2017-03-03

## 2017-03-02 RX ORDER — SODIUM CHLORIDE 0.9 % (FLUSH) 0.9 %
5-10 SYRINGE (ML) INJECTION AS NEEDED
Status: DISCONTINUED | OUTPATIENT
Start: 2017-03-02 | End: 2017-03-03 | Stop reason: HOSPADM

## 2017-03-02 RX ORDER — PANTOPRAZOLE SODIUM 40 MG/1
40 TABLET, DELAYED RELEASE ORAL
Status: DISCONTINUED | OUTPATIENT
Start: 2017-03-02 | End: 2017-03-03 | Stop reason: HOSPADM

## 2017-03-02 RX ORDER — GUAIFENESIN 100 MG/5ML
81 LIQUID (ML) ORAL DAILY
Status: DISCONTINUED | OUTPATIENT
Start: 2017-03-02 | End: 2017-03-03 | Stop reason: HOSPADM

## 2017-03-02 RX ORDER — ACETAMINOPHEN 325 MG/1
650 TABLET ORAL
Status: DISCONTINUED | OUTPATIENT
Start: 2017-03-02 | End: 2017-03-03 | Stop reason: HOSPADM

## 2017-03-02 RX ORDER — TAMSULOSIN HYDROCHLORIDE 0.4 MG/1
0.4 CAPSULE ORAL DAILY
Status: DISCONTINUED | OUTPATIENT
Start: 2017-03-02 | End: 2017-03-03 | Stop reason: HOSPADM

## 2017-03-02 RX ADMIN — LEVETIRACETAM 750 MG: 500 TABLET ORAL at 18:00

## 2017-03-02 RX ADMIN — ONDANSETRON 4 MG: 2 INJECTION INTRAMUSCULAR; INTRAVENOUS at 09:05

## 2017-03-02 RX ADMIN — MAGNESIUM SULFATE HEPTAHYDRATE 1 G: 1 INJECTION, SOLUTION INTRAVENOUS at 00:20

## 2017-03-02 RX ADMIN — POTASSIUM CHLORIDE 40 MEQ: 750 TABLET, FILM COATED, EXTENDED RELEASE ORAL at 14:35

## 2017-03-02 RX ADMIN — PREGABALIN 50 MG: 25 CAPSULE ORAL at 21:59

## 2017-03-02 RX ADMIN — LEVETIRACETAM 750 MG: 500 TABLET ORAL at 09:05

## 2017-03-02 RX ADMIN — DOXYCYCLINE HYCLATE 100 MG: 100 TABLET, COATED ORAL at 22:51

## 2017-03-02 RX ADMIN — LACOSAMIDE 100 MG: 50 TABLET, FILM COATED ORAL at 09:05

## 2017-03-02 RX ADMIN — FAMOTIDINE 20 MG: 20 TABLET ORAL at 09:05

## 2017-03-02 RX ADMIN — VENLAFAXINE HYDROCHLORIDE 150 MG: 150 CAPSULE, EXTENDED RELEASE ORAL at 09:05

## 2017-03-02 RX ADMIN — CARVEDILOL 6.25 MG: 6.25 TABLET, FILM COATED ORAL at 18:00

## 2017-03-02 RX ADMIN — ATORVASTATIN CALCIUM 80 MG: 40 TABLET, FILM COATED ORAL at 18:00

## 2017-03-02 RX ADMIN — ONDANSETRON 4 MG: 2 INJECTION INTRAMUSCULAR; INTRAVENOUS at 14:35

## 2017-03-02 RX ADMIN — CARVEDILOL 12.5 MG: 12.5 TABLET, FILM COATED ORAL at 07:33

## 2017-03-02 RX ADMIN — POTASSIUM CHLORIDE 40 MEQ: 750 TABLET, FILM COATED, EXTENDED RELEASE ORAL at 09:05

## 2017-03-02 RX ADMIN — SODIUM CHLORIDE 125 ML/HR: 900 INJECTION, SOLUTION INTRAVENOUS at 22:51

## 2017-03-02 RX ADMIN — QUETIAPINE FUMARATE 150 MG: 25 TABLET, FILM COATED ORAL at 22:00

## 2017-03-02 RX ADMIN — Medication 10 ML: at 22:01

## 2017-03-02 RX ADMIN — ACETAMINOPHEN 650 MG: 325 TABLET, FILM COATED ORAL at 18:03

## 2017-03-02 RX ADMIN — PANTOPRAZOLE SODIUM 40 MG: 40 TABLET, DELAYED RELEASE ORAL at 07:33

## 2017-03-02 RX ADMIN — ASPIRIN 81 MG CHEWABLE TABLET 81 MG: 81 TABLET CHEWABLE at 09:05

## 2017-03-02 RX ADMIN — ACETAMINOPHEN 650 MG: 325 TABLET, FILM COATED ORAL at 09:11

## 2017-03-02 RX ADMIN — VENLAFAXINE HYDROCHLORIDE 75 MG: 37.5 CAPSULE, EXTENDED RELEASE ORAL at 18:00

## 2017-03-02 RX ADMIN — TAMSULOSIN HYDROCHLORIDE 0.4 MG: 0.4 CAPSULE ORAL at 09:05

## 2017-03-02 RX ADMIN — CLONAZEPAM 0.5 MG: 1 TABLET ORAL at 07:33

## 2017-03-02 RX ADMIN — PREGABALIN 50 MG: 25 CAPSULE ORAL at 18:00

## 2017-03-02 RX ADMIN — Medication 10 ML: at 14:00

## 2017-03-02 RX ADMIN — PANTOPRAZOLE SODIUM 40 MG: 40 TABLET, DELAYED RELEASE ORAL at 18:00

## 2017-03-02 RX ADMIN — PREGABALIN 50 MG: 25 CAPSULE ORAL at 09:05

## 2017-03-02 RX ADMIN — LACTULOSE 30 G: 10 SOLUTION ORAL at 09:05

## 2017-03-02 RX ADMIN — LACOSAMIDE 100 MG: 50 TABLET, FILM COATED ORAL at 18:00

## 2017-03-02 RX ADMIN — FAMOTIDINE 20 MG: 20 TABLET ORAL at 18:00

## 2017-03-02 RX ADMIN — SODIUM CHLORIDE 1000 ML: 900 INJECTION, SOLUTION INTRAVENOUS at 00:19

## 2017-03-02 RX ADMIN — LACTULOSE 30 G: 10 SOLUTION ORAL at 17:59

## 2017-03-02 NOTE — ED TRIAGE NOTES
TRIAGE NOTE:  Patient arrives with c/o head injury after falling out of bed. Patient reports \"I was reaching for something\".

## 2017-03-02 NOTE — ED NOTES
Pt's daughter came out to nurses station to report that she would like to take pt home if we do not have hospital beds available for pt. PA aware.

## 2017-03-02 NOTE — PROGRESS NOTES
TRANSFER - IN REPORT:    Verbal report received from Sonja Jamie RN(name) on Kenneth Ten.  being received from ED(unit) for routine progression of care      Report consisted of patients Situation, Background, Assessment and   Recommendations(SBAR). Information from the following report(s) SBAR, MAR, Recent Results and Med Rec Status was reviewed with the receiving nurse. Opportunity for questions and clarification was provided. Assessment completed upon patients arrival to unit and care assumed.
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
## 2017-03-02 NOTE — PROGRESS NOTES
Hospitalist    Pt seen and examined  Admitted by Pearlene Merlin earlier today    59 yo male with PMHx of DM, GERD, CAD s/p CABG, HTN, ROSELIA, Seizures, Peripheral Neuropathy, MRSA skin infection, admitted for fall and cough. Pt's fall was mechanical and fell out of bed.  In Er pt with fever 103, cough, sore throat    A/p  S/p  fall  -PT eval    Fever, r/o strep   - flu negative  - likely Viral illness  - Supportive care    Seizure d/o : c/w Keppra and Vimpat    Chronic Systolic CHF NYHA I on admission  - c/w coreg,   - not on ACEI/ARBs 2/2 CKD     CKD 3 - stable    SIRS, due to URI POA  - no sign of sepsis noted  - no abx   - supportive care    Domo Guaman MD  Internal Medicine  Mobile/text: [phone]
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
## 2017-03-02 NOTE — ED NOTES
This nurse to bedside to check pt's BG. Pt is noted to be shaking and uncomfortable. Pt's BG noted to be 77. Pt given juice. Will recheck pt shortly.

## 2017-03-02 NOTE — ED NOTES
Patient had an episode of incontinence of stool \" I was sleeping and unable to make it to the bathroom, my stomach is upset\".

## 2017-03-02 NOTE — ED NOTES
Pt asking about something for his neuropathy and AM meds. Dr Isabelle Mcfarland paged as pt has no orders for daily medications.

## 2017-03-02 NOTE — ROUTINE PROCESS
TRANSFER - OUT REPORT:    Verbal report given to Nani RN(name) on Tiffanie Weinstein.  being transferred to [facility](unit) for routine progression of care       Report consisted of patients Situation, Background, Assessment and   Recommendations(SBAR). Information from the following report(s) ED Summary was reviewed with the receiving nurse. Lines:   Peripheral IV 03/01/17 Left Antecubital (Active)   Site Assessment Clean, dry, & intact 3/2/2017 12:23 PM   Phlebitis Assessment 0 3/2/2017 12:23 PM   Infiltration Assessment 0 3/2/2017 12:23 PM   Dressing Status Clean, dry, & intact 3/2/2017 12:23 PM   Dressing Type Transparent 3/2/2017 12:23 PM   Hub Color/Line Status Pink;Capped 3/2/2017 12:23 PM       Peripheral IV 03/01/17 Right Antecubital (Active)   Site Assessment Clean, dry, & intact 3/2/2017 12:23 PM   Phlebitis Assessment 0 3/2/2017 12:23 PM   Infiltration Assessment 0 3/2/2017 12:23 PM   Dressing Status Clean, dry, & intact 3/2/2017 12:23 PM   Dressing Type Transparent 3/2/2017 12:23 PM   Hub Color/Line Status Pink;Capped 3/2/2017 12:23 PM        Opportunity for questions and clarification was provided.       Patient transported with:   [facility] [employer]

## 2017-03-02 NOTE — ED NOTES
Hospitalist at bedside with patient. Patient has complaints of nausea verbal orders received for Zofran 4mg IV every 4hrs. Patient also complaining of sore throat verbal orders received for Cepacol Lozenges PRN.

## 2017-03-02 NOTE — ED NOTES
Rechecked patient's glucose and it was 74 4 more oz of juice given along with peanut better crackers.

## 2017-03-03 ENCOUNTER — APPOINTMENT (OUTPATIENT)
Dept: GENERAL RADIOLOGY | Age: 62
DRG: 194 | End: 2017-03-03
Attending: HOSPITALIST
Payer: MEDICARE

## 2017-03-03 VITALS
WEIGHT: 198.85 LBS | SYSTOLIC BLOOD PRESSURE: 130 MMHG | RESPIRATION RATE: 18 BRPM | TEMPERATURE: 98.8 F | DIASTOLIC BLOOD PRESSURE: 86 MMHG | BODY MASS INDEX: 28.53 KG/M2 | OXYGEN SATURATION: 95 % | HEART RATE: 73 BPM

## 2017-03-03 PROBLEM — J18.9 PNEUMONIA: Status: ACTIVE | Noted: 2017-03-03

## 2017-03-03 LAB
ANION GAP BLD CALC-SCNC: 10 MMOL/L (ref 5–15)
ATRIAL RATE: 80 BPM
BACTERIA SPEC CULT: NORMAL
BUN SERPL-MCNC: 12 MG/DL (ref 6–20)
BUN/CREAT SERPL: 9 (ref 12–20)
CALCIUM SERPL-MCNC: 8 MG/DL (ref 8.5–10.1)
CALCULATED P AXIS, ECG09: 48 DEGREES
CALCULATED R AXIS, ECG10: 60 DEGREES
CALCULATED T AXIS, ECG11: 107 DEGREES
CC UR VC: NORMAL
CHLORIDE SERPL-SCNC: 105 MMOL/L (ref 97–108)
CO2 SERPL-SCNC: 19 MMOL/L (ref 21–32)
CREAT SERPL-MCNC: 1.29 MG/DL (ref 0.7–1.3)
DIAGNOSIS, 93000: NORMAL
GLUCOSE BLD STRIP.AUTO-MCNC: 105 MG/DL (ref 65–100)
GLUCOSE BLD STRIP.AUTO-MCNC: 125 MG/DL (ref 65–100)
GLUCOSE BLD STRIP.AUTO-MCNC: 98 MG/DL (ref 65–100)
GLUCOSE SERPL-MCNC: 90 MG/DL (ref 65–100)
P-R INTERVAL, ECG05: 178 MS
POTASSIUM SERPL-SCNC: 3.7 MMOL/L (ref 3.5–5.1)
Q-T INTERVAL, ECG07: 386 MS
QRS DURATION, ECG06: 110 MS
QTC CALCULATION (BEZET), ECG08: 445 MS
SERVICE CMNT-IMP: ABNORMAL
SERVICE CMNT-IMP: ABNORMAL
SERVICE CMNT-IMP: NORMAL
SODIUM SERPL-SCNC: 134 MMOL/L (ref 136–145)
VENTRICULAR RATE, ECG03: 80 BPM

## 2017-03-03 PROCEDURE — 74011250637 HC RX REV CODE- 250/637: Performed by: FAMILY MEDICINE

## 2017-03-03 PROCEDURE — 97161 PT EVAL LOW COMPLEX 20 MIN: CPT

## 2017-03-03 PROCEDURE — 74011250636 HC RX REV CODE- 250/636: Performed by: FAMILY MEDICINE

## 2017-03-03 PROCEDURE — 93005 ELECTROCARDIOGRAM TRACING: CPT

## 2017-03-03 PROCEDURE — 71020 XR CHEST PA LAT: CPT

## 2017-03-03 PROCEDURE — 74011250637 HC RX REV CODE- 250/637: Performed by: HOSPITALIST

## 2017-03-03 PROCEDURE — 82962 GLUCOSE BLOOD TEST: CPT

## 2017-03-03 RX ORDER — AMOXICILLIN AND CLAVULANATE POTASSIUM 875; 125 MG/1; MG/1
1 TABLET, FILM COATED ORAL EVERY 12 HOURS
Qty: 13 TAB | Refills: 0 | Status: SHIPPED | OUTPATIENT
Start: 2017-03-03 | End: 2017-04-03

## 2017-03-03 RX ORDER — AMOXICILLIN AND CLAVULANATE POTASSIUM 875; 125 MG/1; MG/1
1 TABLET, FILM COATED ORAL EVERY 12 HOURS
Status: DISCONTINUED | OUTPATIENT
Start: 2017-03-03 | End: 2017-03-03 | Stop reason: HOSPADM

## 2017-03-03 RX ADMIN — LACOSAMIDE 100 MG: 50 TABLET, FILM COATED ORAL at 09:37

## 2017-03-03 RX ADMIN — ATORVASTATIN CALCIUM 80 MG: 40 TABLET, FILM COATED ORAL at 16:31

## 2017-03-03 RX ADMIN — Medication 10 ML: at 15:03

## 2017-03-03 RX ADMIN — PANTOPRAZOLE SODIUM 40 MG: 40 TABLET, DELAYED RELEASE ORAL at 07:09

## 2017-03-03 RX ADMIN — AMOXICILLIN AND CLAVULANATE POTASSIUM 1 TABLET: 875; 125 TABLET, FILM COATED ORAL at 16:31

## 2017-03-03 RX ADMIN — LACTULOSE 30 G: 10 SOLUTION ORAL at 15:02

## 2017-03-03 RX ADMIN — PANTOPRAZOLE SODIUM 40 MG: 40 TABLET, DELAYED RELEASE ORAL at 16:32

## 2017-03-03 RX ADMIN — PREGABALIN 50 MG: 25 CAPSULE ORAL at 09:38

## 2017-03-03 RX ADMIN — ASPIRIN 81 MG CHEWABLE TABLET 81 MG: 81 TABLET CHEWABLE at 09:39

## 2017-03-03 RX ADMIN — DOXYCYCLINE HYCLATE 100 MG: 100 TABLET, COATED ORAL at 09:39

## 2017-03-03 RX ADMIN — VENLAFAXINE HYDROCHLORIDE 150 MG: 150 CAPSULE, EXTENDED RELEASE ORAL at 09:38

## 2017-03-03 RX ADMIN — TAMSULOSIN HYDROCHLORIDE 0.4 MG: 0.4 CAPSULE ORAL at 09:38

## 2017-03-03 RX ADMIN — Medication 10 ML: at 07:34

## 2017-03-03 RX ADMIN — FAMOTIDINE 20 MG: 20 TABLET ORAL at 09:39

## 2017-03-03 RX ADMIN — CARVEDILOL 12.5 MG: 12.5 TABLET, FILM COATED ORAL at 07:34

## 2017-03-03 RX ADMIN — VENLAFAXINE HYDROCHLORIDE 75 MG: 37.5 CAPSULE, EXTENDED RELEASE ORAL at 16:32

## 2017-03-03 RX ADMIN — PREGABALIN 50 MG: 25 CAPSULE ORAL at 15:02

## 2017-03-03 RX ADMIN — LEVETIRACETAM 750 MG: 500 TABLET ORAL at 09:38

## 2017-03-03 RX ADMIN — SODIUM CHLORIDE 125 ML/HR: 900 INJECTION, SOLUTION INTRAVENOUS at 07:09

## 2017-03-03 RX ADMIN — CARVEDILOL 6.25 MG: 6.25 TABLET, FILM COATED ORAL at 16:31

## 2017-03-03 NOTE — DISCHARGE INSTRUCTIONS
Please bring this form with you to show your primary care provider at your follow-up appointment. Primary care provider:  Dr. Smith Rose MD    Discharging provider:  Kia Montgomery MD    You have been admitted to the hospital with the following diagnoses:  · Fever   · Pneumonia  FOLLOW-UP CARE RECOMMENDATIONS:    APPOINTMENTS:  Follow-up Information     Follow up With Details Comments Jony Gaitan MD In 1 week discharge follow up  [address] [facility] [place] [facility]  [phone]              FOLLOW-UP TESTS recommended: none    PENDING TEST RESULTS:  At the time of your discharge the following test results are still pending: none  Please make sure you review these results with your outpatient follow-up provider(s). SYMPTOMS to watch for: chest pain, shortness of breath, fever, chills, nausea, vomiting, diarrhea, change in mentation, falling, weakness, bleeding. DIET/what to eat:  Cardiac Diet    ACTIVITY:  Activity as tolerated    WOUND CARE: none    EQUIPMENT needed:  none      What to do if new or unexpected symptoms occur? If you experience any of the above symptoms (or should other concerns or questions arise after discharge) please call your primary care physician. Return to the emergency room if you cannot get hold of your doctor. · It is very important that you keep your follow-up appointment(s). · Please bring discharge papers, medication list (and/or medication bottles) to your follow-up appointments for review by your outpatient provider(s). · Please check the list of medications and be sure it includes every medication (even non-prescription medications) that your provider wants you to take. · It is important that you take the medication exactly as they are prescribed. · Keep your medication in the bottles provided by the pharmacist and keep a list of the medication names, dosages, and times to be taken in your wallet.    · Do not take other medications without consulting your doctor. · If you have any questions about your medications or other instructions, please talk to your nurse or care provider before you leave the hospital.    I understand that if any problems occur once I am at home I am to contact my physician. These instructions were explained to me and I had the opportunity to ask questions.

## 2017-03-03 NOTE — DISCHARGE SUMMARY
Discharge Summary     Patient:  Trina Morales MRN: [id number]       YOB: 1955       Age: 60 y.o. Date of admission:  3/1/2017    Date of discharge:  3/3/2017    Primary care provider: Dr. Hyacinth Prater MD    Admitting provider:  Kerri Lee MD    Discharging provider:  Annika Roberts MD - [phone]  If unavailable, call [phone] and ask the  to page the triage hospitalist.    Consultations  · IP CONSULT TO HOSPITALIST    Procedures  · * No surgery found *    Discharge destination: HOME. The patient is stable for discharge. Admission diagnosis  · Fever    Current Discharge Medication List      START taking these medications    Details   amoxicillin-clavulanate (AUGMENTIN) 875-125 mg per tablet Take 1 Tab by mouth every twelve (12) hours. Qty: 13 Tab, Refills: 0         CONTINUE these medications which have NOT CHANGED    Details   doxycycline (VIBRA-TABS) 100 mg tablet take 1 tablet by mouth twice a day  Qty: 30 Tab, Refills: 1      levETIRAcetam (KEPPRA) 750 mg tablet Take 1 Tab by mouth two (2) times a day. Qty: 60 Tab, Refills: 3      lacosamide (VIMPAT) 100 mg tab tablet Take 1 Tab by mouth two (2) times a day. Max Daily Amount: 200 mg. Qty: 60 Tab, Refills: 3      clonazePAM (KLONOPIN) 0.5 mg tablet Take 1 tablet by mouth twice daily as needed for anxiety or agitation  Qty: 30 Tab, Refills: 0      raNITIdine (ZANTAC) 150 mg tablet Take 150 mg by mouth two (2) times a day. glimepiride (AMARYL) 4 mg tablet Take 4 mg by mouth two (2) times a day. aspirin 81 mg chewable tablet Take 1 Tab by mouth daily. Qty: 30 Tab, Refills: 0      !! carvedilol (COREG) 12.5 mg tablet Take 1 Tab by mouth daily (with breakfast). Qty: 30 Tab, Refills: 0      !! carvedilol (COREG) 6.25 mg tablet Take 1 Tab by mouth daily (with dinner).   Qty: 30 Tab, Refills: 0      eplerenone (INSPRA) 25 mg tablet Take 1 Tab by mouth daily. Qty: 30 Tab, Refills: 0      atorvastatin (LIPITOR) 80 mg tablet Take 1 Tab by mouth Daily (before dinner). Qty: 30 Tab, Refills: 0      pantoprazole (PROTONIX) 40 mg tablet Take 1 Tab by mouth Before breakfast and dinner. Before Breakfast and in the afternoon (also takes Zantac at same time)  Qty: 60 Tab, Refills: 0      potassium chloride (K-DUR, KLOR-CON) 10 mEq tablet Take 1 Tab by mouth Daily (before dinner). 20 mEq in AM, 10 mEq in afternoon  Qty: 30 Tab, Refills: 0      pregabalin (LYRICA) 50 mg capsule Take 1 Cap by mouth three (3) times daily. Max Daily Amount: 150 mg.  Qty: 90 Cap, Refills: 0      promethazine (PHENERGAN) 25 mg tablet Take 1 Tab by mouth every six (6) hours as needed. Qty: 30 Tab, Refills: 0      QUEtiapine (SEROQUEL) 50 mg tablet Take 3 Tabs by mouth nightly. Qty: 90 Tab, Refills: 0      tamsulosin (FLOMAX) 0.4 mg capsule Take 1 Cap by mouth Before breakfast and dinner. Before Breakfast and in the afternoon  Qty: 30 Cap, Refills: 0      !! venlafaxine-SR (EFFEXOR XR) 75 mg capsule Take 2 Caps by mouth daily. Qty: 30 Cap, Refills: 0      !! venlafaxine-SR (EFFEXOR XR) 75 mg capsule Take 1 Cap by mouth Daily (before dinner). Qty: 30 Cap, Refills: 0      lactulose (CHRONULAC) 10 gram/15 mL solution Take 45 mL by mouth three (3) times daily. Adjust dosage so that you have 2-3 bowel movements per day. Qty: 1 Bottle, Refills: 0       !! - Potential duplicate medications found. Please discuss with provider.           Follow-up Information     Follow up With Details Comments Jony Gaitan MD In 1 week discharge follow up  [address] [place] [address]  [phone]            Final discharge diagnoses and brief hospital course  Please also refer to the admission H&P for details on the presenting problem.       61 yo male with PMHx of DM, GERD, CAD s/p CABG, HTN, ROSELIA, Seizures, Peripheral Neuropathy, MRSA skin infection, admitted for fall and cough. Pt's fall was mechanical and fell out of bed. In Er pt with fever 103, cough. S/p  fall  -PT eval    SIRS, due to Pneumopnia  - no sign of sepsis noted  - Augmentin 7 days, c/w Doxy for skin infection  - supportive care     Seizure d/o : c/w Keppra and Vimpat     Chronic Systolic CHF NYHA I on admission  - c/w coreg,   - not on ACEI/ARBs 2/2 CKD      CKD 3 - stable     Elevated Cr on CKD 3, due to dehydration  - Improved with IVF  - back to baseline        FOLLOW-UP TESTS recommended: none    PENDING TEST RESULTS:  At the time of your discharge the following test results are still pending: none  Please make sure you review these results with your outpatient follow-up provider(s). SYMPTOMS to watch for: chest pain, shortness of breath, fever, chills, nausea, vomiting, diarrhea, change in mentation, falling, weakness, bleeding. DIET/what to eat:  Cardiac Diet    ACTIVITY:  Activity as tolerated    WOUND CARE: none    EQUIPMENT needed:  none    Physical examination at discharge  Visit Vitals    /79 (BP 1 Location: Right arm, BP Patient Position: At rest)    Pulse 60    Temp 98.8 °F (37.1 °C)    Resp 18    Wt 90.2 kg (198 lb 13.7 oz)    SpO2 95%    BMI 28.53 kg/m2     AO3  PERRLA  EOMI  Lung: CTA  CVS: RRR  ABd: soft NT ND  Ext: no edema      Pertinent imaging studies:    CXR:IMPRESSION:   Patch of left infrahilar airspace disease.       Recent Labs      03/02/17 0449 03/01/17 2142   WBC  10.5  14.3*   HGB  11.4*  12.8   HCT  34.2*  38.6   PLT  116*  147*     Recent Labs      03/02/17   2323  03/02/17 0449 03/01/17 2142   NA  134*  138  135*   K  3.7  3.1*  3.5   CL  105  106  102   CO2  19*  20*  25   BUN  12  13  15   CREA  1.29  1.26  1.58*   GLU  90  79  128*   CA  8.0*  7.2*  8.2*   MG   --    --   1.1*   PHOS   --    --   1.7*     Recent Labs      03/01/17   2142   SGOT  8*   AP  90   TP  7.4   ALB  3.4*   GLOB  4.0     No results for input(s): INR, PTP, APTT in the last 72 hours. No lab exists for component: INREXT   No results for input(s): FE, TIBC, PSAT, FERR in the last 72 hours. No results for input(s): PH, PCO2, PO2 in the last 72 hours. No results for input(s): CPK, CKMB in the last 72 hours.     No lab exists for component: TROPONINI  No components found for: [place]    Chronic Diagnoses:    Problem List as of 3/3/2017  Date Reviewed: 3/3/2017          Codes Class Noted - Resolved    Pneumonia ICD-10-CM: J18.9  ICD-9-CM: 486  3/3/2017 - Present        * (Principal)Fever ICD-10-CM: R50.9  ICD-9-CM: 780.60  3/2/2017 - Present        Abscess ICD-10-CM: L02.91  ICD-9-CM: 682.9  1/5/2017 - Present        Altered mental status ICD-10-CM: R41.82  ICD-9-CM: 780.97  12/19/2016 - Present        Metabolic encephalopathy CIQ-07-CG: G93.41  ICD-9-CM: 348.31  12/19/2016 - Present        Debility ICD-10-CM: R53.81  ICD-9-CM: 799.3  11/22/2016 - Present        Seizure ([address] ICD-10-CM: R56.9  ICD-9-CM: 780.39  11/21/2016 - Present        Cellulitis ICD-10-CM: L03.90  ICD-9-CM: 682.9  11/7/2016 - Present        Snoring ICD-10-CM: R06.83  ICD-9-CM: 786.09  Unknown - Present        Sinusitis ICD-10-CM: J32.9  ICD-9-CM: 473.9  Unknown - Present        Night sweats ICD-10-CM: R61  ICD-9-CM: 780.8  Unknown - Present        Chest pain ICD-10-CM: 786.5  ICD-9-CM: 786.5  Unknown - Present        Sore throat ICD-10-CM: J02.9  ICD-9-CM: 903  Unknown - Present        Dysphagia ICD-10-CM: 787.2  ICD-9-CM: 787.2  Unknown - Present        Joint pain ICD-10-CM: M25.50  ICD-9-CM: 719.40  Unknown - Present        Tingling sensation ICD-10-CM: R20.2  ICD-9-CM: 782.0  Unknown - Present        Low back pain ICD-10-CM: M54.5  ICD-9-CM: 724.2  Unknown - Present        Nausea ICD-10-CM: R11.0  ICD-9-CM: 787.02  Unknown - Present        Diarrhea ICD-10-CM: R19.7  ICD-9-CM: 787.91  Unknown - Present        Heartburn ICD-10-CM: R12  ICD-9-CM: 787.1  Unknown - Present Snoring ICD-10-CM: R06.83  ICD-9-CM: 786.09  Unknown - Present        Diabetes ([facility] [address] [facility] ICD-10-CM: E11.9  ICD-9-CM: 250.00  Unknown - Present        Abdominal pain, epigastric ICD-10-CM: R10.13  ICD-9-CM: 789.06  9/13/2010 - Present              Time spent on discharge related activities today greater than 30 minutes.       Signed:  Lyn Ibrahim MD                 Hospitalist, Internal Medicine      Cc: Russ Johnston MD

## 2017-03-03 NOTE — PROGRESS NOTES
NUTRITION       Nutrition screening referral was triggered based on results obtained during nursing admission assessment. Chart reviewed, discussed with RN and team during interdisciplinary rounds. Nutrition assessment is not indicated at this time. Minimal nutrition risk identified at this time. Will plan to see patient for rescreen as indicated. Thank you.      Wt Readings from Last 10 Encounters:   03/03/17 90.2 kg (198 lb 13.7 oz)   02/03/17 92.5 kg (204 lb)   02/01/17 88.9 kg (196 lb)   01/10/17 84.8 kg (187 lb)   01/05/17 87.5 kg (193 lb)   01/05/17 87.5 kg (193 lb)   12/21/16 84.5 kg (186 lb 3.2 oz)   12/05/16 76.7 kg (169 lb)   12/01/16 88 kg (194 lb 0.1 oz)   11/29/16 86.6 kg (191 lb)     Cherie De León, [address]

## 2017-03-03 NOTE — CDMP QUERY
Dear Hospitalists,    Please clarify if this patient is being treated/managed for:    =>SIRS (POA) due to non-infectious process requiring IVF resuscitation and lab monitoring  =>Other Explanation of clinical findings  =>Unable to Determine (no explanation of clinical findings)    The medical record reflects the following clinical findings, treatment, and risk factors:    Risk Factors: 61yo with fall, sore throat and cough  Clinical Indicators: wbc 14.3, neuts 12.8, temp 99.3--103, HR 90, dehydrated  Treatment: NS bolus, IV Mag sulfate, tylenol, lab monitoring    Please clarify and document your clinical opinion in the progress notes and discharge summary including the definitive and/or presumptive diagnosis, (suspected or probable), related to the above clinical findings. Please include clinical findings supporting your diagnosis.     Thank You  Abhilash Apodaca,MSN,BSN,RN,[facility]  [phone]

## 2017-03-03 NOTE — PROGRESS NOTES
Primary Nurse Arlene Flores RN performed a dual skin assessment on this patient No impairment noted  Iván score is 20    - Scars noted to back (old) POA

## 2017-03-03 NOTE — PROGRESS NOTES
Bedside shift change report [facility] (oncoming nurse) by Kel Casillas (offgoing nurse). Report included the following information SBAR and Kardex.

## 2017-03-03 NOTE — PROGRESS NOTES
Discharge instructions given to patient and daughter. Patient A&Ox4. Daughter POA. Prescriptions given to daughter. Follow up appointment noted with PCP. Hard copy of discharge instructions signed and placed on chart.
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
## 2017-03-03 NOTE — DIABETES MGMT
DTC Progress Note    Recommendations/ Comments: Chart note for hypoglycemia; on 3/2/2017 BG's 77 mg/dL at 0704 and then 70 mg/dL at 1225. May have been due to residual effects of Amaryl taken at home. Also, PO intake is poor. If appropriate, please consider  Encourage po intake  Offer nutrition supplements  A1c is 6.5%. May be having hypoglycemia at home - consider decrease in Amaryl upon discharge     Chart reviewed on [address] .    Patient is a 60 y.o. male with known DM on Amaryl 4 mg BID PTA. A1c:   Lab Results   Component Value Date/Time    Hemoglobin A1c 6.5 03/02/2017 04:49 AM    Hemoglobin A1c 5.5 11/08/2016 03:04 AM       Recent Glucose Results: Lab Results   Component Value Date/Time    GLU 90 03/02/2017 11:23 PM    GLUCPOC 125 (H) 03/03/2017 11:35 AM    GLUCPOC 98 03/03/2017 07:27 AM    GLUCPOC 93 03/02/2017 10:02 PM        Lab Results   Component Value Date/Time    Creatinine 1.29 03/02/2017 11:23 PM       Active Orders   Diet    DIET DIABETIC WITH OPTIONS Consistent Carb 2000kcal; Regular; AHA-LOW-CHOL FAT        PO intake: Patient Vitals for the past 72 hrs:   % Diet Eaten   03/02/17 1831 20 %       Current hospital DM medication: None    Will continue to follow as needed.     Thank you  Kye Berry RN, CDE

## 2017-03-03 NOTE — PROGRESS NOTES
Bedside shift change report given to Charley Winter RN (oncoming nurse) by Charley Thompson RN (offgoing nurse). Report included the following information SBAR and MAR.     12- Notified Dr. Martir Alberto that chest xray, ekg had resulted. Per Dr. Mcmahan Logdave, would discuss with patient's daughter around 64 and enter discharge instructions after discussion.

## 2017-03-04 LAB
BACTERIA SPEC CULT: NORMAL
SERVICE CMNT-IMP: NORMAL

## 2017-03-06 LAB
BACTERIA SPEC CULT: NORMAL
SERVICE CMNT-IMP: NORMAL

## 2017-03-21 ENCOUNTER — APPOINTMENT (OUTPATIENT)
Dept: GENERAL RADIOLOGY | Age: 62
End: 2017-03-21
Attending: EMERGENCY MEDICINE
Payer: MEDICARE

## 2017-03-21 ENCOUNTER — HOSPITAL ENCOUNTER (EMERGENCY)
Age: 62
Discharge: HOME | End: 2017-03-22
Attending: EMERGENCY MEDICINE
Payer: MEDICARE

## 2017-03-21 ENCOUNTER — APPOINTMENT (OUTPATIENT)
Dept: CT IMAGING | Age: 62
End: 2017-03-21
Attending: EMERGENCY MEDICINE
Payer: MEDICARE

## 2017-03-21 VITALS
BODY MASS INDEX: 28.2 KG/M2 | HEART RATE: 72 BPM | SYSTOLIC BLOOD PRESSURE: 150 MMHG | TEMPERATURE: 98.7 F | WEIGHT: 197 LBS | HEIGHT: 70 IN | DIASTOLIC BLOOD PRESSURE: 77 MMHG | OXYGEN SATURATION: 99 % | RESPIRATION RATE: 16 BRPM

## 2017-03-21 DIAGNOSIS — F03.90 DEMENTIA WITHOUT BEHAVIORAL DISTURBANCE, UNSPECIFIED DEMENTIA TYPE: ICD-10-CM

## 2017-03-21 DIAGNOSIS — E86.0 DEHYDRATION: Primary | ICD-10-CM

## 2017-03-21 LAB
ALBUMIN SERPL BCP-MCNC: 3.6 G/DL (ref 3.5–5)
ALBUMIN/GLOB SERPL: 1 {RATIO} (ref 1.1–2.2)
ALP SERPL-CCNC: 92 U/L (ref 45–117)
ALT SERPL-CCNC: 18 U/L (ref 12–78)
AMMONIA PLAS-SCNC: 34 UMOL/L
ANION GAP BLD CALC-SCNC: 7 MMOL/L (ref 5–15)
APPEARANCE UR: CLEAR
AST SERPL W P-5'-P-CCNC: 9 U/L (ref 15–37)
BACTERIA URNS QL MICRO: NEGATIVE /HPF
BILIRUB SERPL-MCNC: 0.4 MG/DL (ref 0.2–1)
BILIRUB UR QL: NEGATIVE
BUN SERPL-MCNC: 22 MG/DL (ref 6–20)
BUN/CREAT SERPL: 15 (ref 12–20)
CALCIUM SERPL-MCNC: 8.2 MG/DL (ref 8.5–10.1)
CHLORIDE SERPL-SCNC: 110 MMOL/L (ref 97–108)
CO2 SERPL-SCNC: 24 MMOL/L (ref 21–32)
COLOR UR: ABNORMAL
CREAT SERPL-MCNC: 1.47 MG/DL (ref 0.7–1.3)
EPITH CASTS URNS QL MICRO: ABNORMAL /LPF
ERYTHROCYTE [DISTWIDTH] IN BLOOD BY AUTOMATED COUNT: 16.4 % (ref 11.5–14.5)
ETHANOL SERPL-MCNC: <10 MG/DL
GLOBULIN SER CALC-MCNC: 3.5 G/DL (ref 2–4)
GLUCOSE SERPL-MCNC: 143 MG/DL (ref 65–100)
GLUCOSE UR STRIP.AUTO-MCNC: NEGATIVE MG/DL
HCT VFR BLD AUTO: 41 % (ref 36.6–50.3)
HGB BLD-MCNC: 13.2 G/DL (ref 12.1–17)
HGB UR QL STRIP: NEGATIVE
HYALINE CASTS URNS QL MICRO: ABNORMAL /LPF (ref 0–5)
KETONES UR QL STRIP.AUTO: NEGATIVE MG/DL
LEUKOCYTE ESTERASE UR QL STRIP.AUTO: NEGATIVE
MCH RBC QN AUTO: 25.1 PG (ref 26–34)
MCHC RBC AUTO-ENTMCNC: 32.2 G/DL (ref 30–36.5)
MCV RBC AUTO: 78.1 FL (ref 80–99)
NITRITE UR QL STRIP.AUTO: NEGATIVE
PH UR STRIP: 5.5 [PH] (ref 5–8)
PLATELET # BLD AUTO: 144 K/UL (ref 150–400)
POTASSIUM SERPL-SCNC: 4.3 MMOL/L (ref 3.5–5.1)
PROT SERPL-MCNC: 7.1 G/DL (ref 6.4–8.2)
PROT UR STRIP-MCNC: ABNORMAL MG/DL
RBC # BLD AUTO: 5.25 M/UL (ref 4.1–5.7)
RBC #/AREA URNS HPF: ABNORMAL /HPF (ref 0–5)
SODIUM SERPL-SCNC: 141 MMOL/L (ref 136–145)
SP GR UR REFRACTOMETRY: 1.02 (ref 1–1.03)
UROBILINOGEN UR QL STRIP.AUTO: 0.2 EU/DL (ref 0.2–1)
WBC # BLD AUTO: 6.7 K/UL (ref 4.1–11.1)
WBC URNS QL MICRO: ABNORMAL /HPF (ref 0–4)

## 2017-03-21 PROCEDURE — 80053 COMPREHEN METABOLIC PANEL: CPT | Performed by: STUDENT IN AN ORGANIZED HEALTH CARE EDUCATION/TRAINING PROGRAM

## 2017-03-21 PROCEDURE — 82140 ASSAY OF AMMONIA: CPT | Performed by: STUDENT IN AN ORGANIZED HEALTH CARE EDUCATION/TRAINING PROGRAM

## 2017-03-21 PROCEDURE — 85027 COMPLETE CBC AUTOMATED: CPT | Performed by: STUDENT IN AN ORGANIZED HEALTH CARE EDUCATION/TRAINING PROGRAM

## 2017-03-21 PROCEDURE — 74011250636 HC RX REV CODE- 250/636: Performed by: EMERGENCY MEDICINE

## 2017-03-21 PROCEDURE — 70450 CT HEAD/BRAIN W/O DYE: CPT

## 2017-03-21 PROCEDURE — 80177 DRUG SCRN QUAN LEVETIRACETAM: CPT | Performed by: EMERGENCY MEDICINE

## 2017-03-21 PROCEDURE — 36415 COLL VENOUS BLD VENIPUNCTURE: CPT | Performed by: STUDENT IN AN ORGANIZED HEALTH CARE EDUCATION/TRAINING PROGRAM

## 2017-03-21 PROCEDURE — 96360 HYDRATION IV INFUSION INIT: CPT

## 2017-03-21 PROCEDURE — 99284 EMERGENCY DEPT VISIT MOD MDM: CPT

## 2017-03-21 PROCEDURE — 81001 URINALYSIS AUTO W/SCOPE: CPT | Performed by: EMERGENCY MEDICINE

## 2017-03-21 PROCEDURE — 80307 DRUG TEST PRSMV CHEM ANLYZR: CPT | Performed by: STUDENT IN AN ORGANIZED HEALTH CARE EDUCATION/TRAINING PROGRAM

## 2017-03-21 PROCEDURE — 71010 XR CHEST PORT: CPT

## 2017-03-21 RX ADMIN — SODIUM CHLORIDE 1000 ML: 900 INJECTION, SOLUTION INTRAVENOUS at 21:11

## 2017-03-21 NOTE — ED TRIAGE NOTES
C/o increased confusion that started last week. Was prescribed Lactulos but doesn't want to take it after 1 dose r/t diarrhea.

## 2017-03-22 NOTE — ED NOTES
Patient updated on status verbalized understanding. Denies any needs at this time. Will continue to monitor.

## 2017-03-22 NOTE — ED NOTES
Pt resting comfortably on stretcher. Family at bedside. No s/s of acute distress. Call bell within reach. Will continue to monitor.
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
## 2017-03-22 NOTE — DISCHARGE INSTRUCTIONS
Dehydration: Care Instructions  Your Care Instructions  Dehydration happens when your body loses too much fluid. This might happen when you do not drink enough water or you lose large amounts of fluids from your body because of diarrhea, vomiting, or sweating. Severe dehydration can be life-threatening. Water and minerals called electrolytes help put your body fluids back in balance. Learn the early signs of fluid loss, and drink more fluids to prevent dehydration. Follow-up care is a key part of your treatment and safety. Be sure to make and go to all appointments, and call your doctor if you are having problems. It's also a good idea to know your test results and keep a list of the medicines you take. How can you care for yourself at home? · To prevent dehydration, drink plenty of fluids, enough so that your urine is light yellow or clear like water. Choose water and other caffeine-free clear liquids until you feel better. If you have kidney, heart, or liver disease and have to limit fluids, talk with your doctor before you increase the amount of fluids you drink. · If you do not feel like eating or drinking, try taking small sips of water, sports drinks, or other rehydration drinks. · Get plenty of rest.  To prevent dehydration  · Add more fluids to your diet and daily routine, unless your doctor has told you not to. · During hot weather, drink more fluids. Drink even more fluids if you exercise a lot. Stay away from drinks with alcohol or caffeine. · Watch for the symptoms of dehydration. These include:  ¨ A dry, sticky mouth. ¨ Dark yellow urine, and not much of it. ¨ Dry and sunken eyes. ¨ Feeling very tired. · Learn what problems can lead to dehydration. These include:  ¨ Diarrhea, fever, and vomiting. ¨ Any illness with a fever, such as pneumonia or the flu. ¨ Activities that cause heavy sweating, such as endurance races and heavy outdoor work in hot or humid weather.   ¨ Alcohol or drug abuse or withdrawal.  ¨ Certain medicines, such as cold and allergy pills (antihistamines), diet pills (diuretics), and laxatives. ¨ Certain diseases, such as diabetes, cancer, and heart or kidney disease. When should you call for help? Call 911 anytime you think you may need emergency care. For example, call if:  · You passed out (lost consciousness). Call your doctor now or seek immediate medical care if:  · You are confused and cannot think clearly. · You are dizzy or lightheaded, or you feel like you may faint. · You have signs of needing more fluids. You have sunken eyes and a dry mouth, and you pass only a little dark urine. · You cannot keep fluids down. Watch closely for changes in your health, and be sure to contact your doctor if:  · You are not making tears. · Your skin is very dry and sags slowly back into place after you pinch it. · Your mouth and eyes are very dry. Where can you learn more? Go to http://gordo-melinda.info/. Enter H996 in the search box to learn more about \"Dehydration: Care Instructions. \"  Current as of: May 27, 2016  Content Version: 11.1  © 6966-2927 [employer]. Care instructions adapted under license by [employer] (which disclaims liability or warranty for this information). If you have questions about a medical condition or this instruction, always ask your healthcare professional. [place] [address] any warranty or liability for your use of this information. We hope that we have addressed all of your medical concerns. The examination and treatment you received in the Emergency Department were for an emergent problem and were not intended as complete care. It is important that you follow up with your healthcare provider(s) for ongoing care.  If your symptoms worsen or do not improve as expected, and you are unable to reach your usual health care provider(s), you should return to the Emergency Department. Today's healthcare is undergoing tremendous change, and patient satisfaction surveys are one of the many tools to assess the quality of medical care. You may receive a survey from the [employer] regarding your experience in the Emergency Department. I hope that your experience has been completely positive, particularly the medical care that I provided. As such, please participate in the survey; anything less than excellent does not meet my expectations or intentions. Thank you for allowing us to provide you with medical care today. We realize that you have many choices for your emergency care needs. Please choose us in the future for any continued health care needs. Tressa Zacariass, [address] [place], [place] [address]y [address].   Office: [phone]            Recent Results (from the past 24 hour(s))   CBC W/O DIFF    Collection Time: 03/21/17  5:34 PM   Result Value Ref Range    WBC 6.7 4.1 - 11.1 K/uL    RBC 5.25 4. 10 - 5.70 M/uL    HGB 13.2 12.1 - 17.0 g/dL    HCT 41.0 36.6 - 50.3 %    MCV 78.1 (L) 80.0 - 99.0 FL    MCH 25.1 (L) 26.0 - 34.0 PG    MCHC 32.2 30.0 - 36.5 g/dL    RDW 16.4 (H) 11.5 - 14.5 %    PLATELET 281 (L) 190 - 744 K/uL   METABOLIC PANEL, COMPREHENSIVE    Collection Time: 03/21/17  5:34 PM   Result Value Ref Range    Sodium 141 136 - 145 mmol/L    Potassium 4.3 3.5 - 5.1 mmol/L    Chloride 110 (H) 97 - 108 mmol/L    CO2 24 21 - 32 mmol/L    Anion gap 7 5 - 15 mmol/L    Glucose 143 (H) 65 - 100 mg/dL    BUN 22 (H) 6 - 20 MG/DL    Creatinine 1.47 (H) 0.70 - 1.30 MG/DL    BUN/Creatinine ratio 15 12 - 20      GFR est AA 59 (L) >60 ml/min/1.73m2    GFR est non-AA 48 (L) >60 ml/min/1.73m2    Calcium 8.2 (L) 8.5 - 10.1 MG/DL    Bilirubin, total 0.4 0.2 - 1.0 MG/DL    ALT (SGPT) 18 12 - 78 U/L    AST (SGOT) 9 (L) 15 - 37 U/L    Alk.  phosphatase 92 45 - 117 U/L    Protein, total 7.1 6.4 - 8.2 g/dL    Albumin 3.6 3.5 - 5.0 g/dL    Globulin 3.5 2.0 - 4.0 g/dL    A-G Ratio 1.0 (L) 1.1 - 2.2     AMMONIA    Collection Time: 03/21/17  5:34 PM   Result Value Ref Range    Ammonia 34 (H) <32 UMOL/L   ETHYL ALCOHOL    Collection Time: 03/21/17  5:34 PM   Result Value Ref Range    ALCOHOL(ETHYL),SERUM <10 <10 MG/DL   URINALYSIS W/MICROSCOPIC    Collection Time: 03/21/17 10:08 PM   Result Value Ref Range    Color YELLOW/STRAW      Appearance CLEAR CLEAR      Specific gravity 1.020 1.003 - 1.030      pH (UA) 5.5 5.0 - 8.0      Protein TRACE (A) NEG mg/dL    Glucose NEGATIVE  NEG mg/dL    Ketone NEGATIVE  NEG mg/dL    Bilirubin NEGATIVE  NEG      Blood NEGATIVE  NEG      Urobilinogen 0.2 0.2 - 1.0 EU/dL    Nitrites NEGATIVE  NEG      Leukocyte Esterase NEGATIVE  NEG      WBC 0-4 0 - 4 /hpf    RBC 0-5 0 - 5 /hpf    Epithelial cells FEW FEW /lpf    Bacteria NEGATIVE  NEG /hpf    Hyaline cast 0-2 0 - 5 /lpf       Xr Chest Port    Result Date: 3/21/2017  EXAM:  XR CHEST PORT INDICATION:  Cough COMPARISON:  March 3, 2017 FINDINGS: A portable AP radiograph of the chest was obtained at 2106 hours. The patient is on a cardiac monitor. The patient is status post median sternotomy and CABG. The lungs are clear. The cardiac and mediastinal contours and pulmonary vascularity are normal.  The bones and soft tissues are grossly within normal limits. IMPRESSION: No acute findings.

## 2017-03-22 NOTE — ED NOTES
The documentation for this period is being entered following the guidelines as defined in the [facility] policy by Mary Stoner RN.

## 2017-03-23 LAB — LEVETIRACETAM SERPL-MCNC: 39.6 UG/ML (ref 10–40)

## 2017-03-28 ENCOUNTER — TELEPHONE (OUTPATIENT)
Dept: INTERNAL MEDICINE CLINIC | Age: 62
End: 2017-03-28

## 2017-03-28 NOTE — TELEPHONE ENCOUNTER
Spoke to patient's daughter and asked her to take him to his PCP for possible increase in ammonia and confusion- pt was already seen in ED on 3/21  He has not had any new skin lesions due to MRSA since jan 2017 so asked her to stop Doxy which he has been taking for the past 2 months

## 2017-04-03 ENCOUNTER — HOSPITAL ENCOUNTER (EMERGENCY)
Age: 62
Discharge: HOME | End: 2017-04-03
Attending: EMERGENCY MEDICINE
Payer: MEDICARE

## 2017-04-03 ENCOUNTER — APPOINTMENT (OUTPATIENT)
Dept: GENERAL RADIOLOGY | Age: 62
End: 2017-04-03
Attending: EMERGENCY MEDICINE
Payer: MEDICARE

## 2017-04-03 ENCOUNTER — APPOINTMENT (OUTPATIENT)
Dept: CT IMAGING | Age: 62
End: 2017-04-03
Attending: EMERGENCY MEDICINE
Payer: MEDICARE

## 2017-04-03 VITALS
RESPIRATION RATE: 16 BRPM | WEIGHT: 197 LBS | SYSTOLIC BLOOD PRESSURE: 171 MMHG | HEART RATE: 76 BPM | BODY MASS INDEX: 28.2 KG/M2 | OXYGEN SATURATION: 98 % | DIASTOLIC BLOOD PRESSURE: 77 MMHG | TEMPERATURE: 98.2 F | HEIGHT: 70 IN

## 2017-04-03 DIAGNOSIS — R41.82 MENTAL STATUS, DECREASED: Primary | ICD-10-CM

## 2017-04-03 LAB
ALBUMIN SERPL BCP-MCNC: 3 G/DL (ref 3.5–5)
ALBUMIN/GLOB SERPL: 0.8 {RATIO} (ref 1.1–2.2)
ALP SERPL-CCNC: 81 U/L (ref 45–117)
ALT SERPL-CCNC: 18 U/L (ref 12–78)
AMMONIA PLAS-SCNC: 37 UMOL/L
ANION GAP BLD CALC-SCNC: 8 MMOL/L (ref 5–15)
APPEARANCE UR: CLEAR
AST SERPL W P-5'-P-CCNC: 10 U/L (ref 15–37)
BACTERIA URNS QL MICRO: NEGATIVE /HPF
BASOPHILS # BLD AUTO: 0 K/UL (ref 0–0.1)
BASOPHILS # BLD: 0 % (ref 0–1)
BILIRUB SERPL-MCNC: 0.6 MG/DL (ref 0.2–1)
BILIRUB UR QL: NEGATIVE
BUN SERPL-MCNC: 18 MG/DL (ref 6–20)
BUN/CREAT SERPL: 14 (ref 12–20)
CALCIUM SERPL-MCNC: 8.2 MG/DL (ref 8.5–10.1)
CHLORIDE SERPL-SCNC: 110 MMOL/L (ref 97–108)
CO2 SERPL-SCNC: 24 MMOL/L (ref 21–32)
COLOR UR: NORMAL
CREAT SERPL-MCNC: 1.33 MG/DL (ref 0.7–1.3)
EOSINOPHIL # BLD: 0.3 K/UL (ref 0–0.4)
EOSINOPHIL NFR BLD: 4 % (ref 0–7)
EPITH CASTS URNS QL MICRO: NORMAL /LPF
ERYTHROCYTE [DISTWIDTH] IN BLOOD BY AUTOMATED COUNT: 15.8 % (ref 11.5–14.5)
GLOBULIN SER CALC-MCNC: 3.6 G/DL (ref 2–4)
GLUCOSE SERPL-MCNC: 134 MG/DL (ref 65–100)
GLUCOSE UR STRIP.AUTO-MCNC: NEGATIVE MG/DL
HCT VFR BLD AUTO: 37.8 % (ref 36.6–50.3)
HGB BLD-MCNC: 12.6 G/DL (ref 12.1–17)
HGB UR QL STRIP: NEGATIVE
HYALINE CASTS URNS QL MICRO: NORMAL /LPF (ref 0–5)
KETONES UR QL STRIP.AUTO: NEGATIVE MG/DL
LEUKOCYTE ESTERASE UR QL STRIP.AUTO: NEGATIVE
LYMPHOCYTES # BLD AUTO: 25 % (ref 12–49)
LYMPHOCYTES # BLD: 1.9 K/UL (ref 0.8–3.5)
MCH RBC QN AUTO: 25.7 PG (ref 26–34)
MCHC RBC AUTO-ENTMCNC: 33.3 G/DL (ref 30–36.5)
MCV RBC AUTO: 77.1 FL (ref 80–99)
MONOCYTES # BLD: 0.5 K/UL (ref 0–1)
MONOCYTES NFR BLD AUTO: 7 % (ref 5–13)
NEUTS SEG # BLD: 4.8 K/UL (ref 1.8–8)
NEUTS SEG NFR BLD AUTO: 64 % (ref 32–75)
NITRITE UR QL STRIP.AUTO: NEGATIVE
PH UR STRIP: 6 [PH] (ref 5–8)
PLATELET # BLD AUTO: 127 K/UL (ref 150–400)
POTASSIUM SERPL-SCNC: 3.6 MMOL/L (ref 3.5–5.1)
PROT SERPL-MCNC: 6.6 G/DL (ref 6.4–8.2)
PROT UR STRIP-MCNC: NEGATIVE MG/DL
RBC # BLD AUTO: 4.9 M/UL (ref 4.1–5.7)
RBC #/AREA URNS HPF: NORMAL /HPF (ref 0–5)
SODIUM SERPL-SCNC: 142 MMOL/L (ref 136–145)
SP GR UR REFRACTOMETRY: 1.01 (ref 1–1.03)
UA: UC IF INDICATED,UAUC: NORMAL
UROBILINOGEN UR QL STRIP.AUTO: 1 EU/DL (ref 0.2–1)
WBC # BLD AUTO: 7.5 K/UL (ref 4.1–11.1)
WBC URNS QL MICRO: NORMAL /HPF (ref 0–4)

## 2017-04-03 PROCEDURE — 70450 CT HEAD/BRAIN W/O DYE: CPT

## 2017-04-03 PROCEDURE — 85025 COMPLETE CBC W/AUTO DIFF WBC: CPT | Performed by: EMERGENCY MEDICINE

## 2017-04-03 PROCEDURE — 36415 COLL VENOUS BLD VENIPUNCTURE: CPT | Performed by: EMERGENCY MEDICINE

## 2017-04-03 PROCEDURE — G8980 MOBILITY D/C STATUS: HCPCS

## 2017-04-03 PROCEDURE — G8979 MOBILITY GOAL STATUS: HCPCS

## 2017-04-03 PROCEDURE — 97161 PT EVAL LOW COMPLEX 20 MIN: CPT

## 2017-04-03 PROCEDURE — 74011250636 HC RX REV CODE- 250/636: Performed by: EMERGENCY MEDICINE

## 2017-04-03 PROCEDURE — G8978 MOBILITY CURRENT STATUS: HCPCS

## 2017-04-03 PROCEDURE — 81001 URINALYSIS AUTO W/SCOPE: CPT | Performed by: EMERGENCY MEDICINE

## 2017-04-03 PROCEDURE — 80053 COMPREHEN METABOLIC PANEL: CPT | Performed by: EMERGENCY MEDICINE

## 2017-04-03 PROCEDURE — 93005 ELECTROCARDIOGRAM TRACING: CPT

## 2017-04-03 PROCEDURE — 97116 GAIT TRAINING THERAPY: CPT

## 2017-04-03 PROCEDURE — 96360 HYDRATION IV INFUSION INIT: CPT

## 2017-04-03 PROCEDURE — 82140 ASSAY OF AMMONIA: CPT | Performed by: EMERGENCY MEDICINE

## 2017-04-03 PROCEDURE — 99285 EMERGENCY DEPT VISIT HI MDM: CPT

## 2017-04-03 PROCEDURE — 71010 XR CHEST PORT: CPT

## 2017-04-03 RX ORDER — IBUPROFEN 800 MG/1
800 TABLET ORAL 3 TIMES DAILY
COMMUNITY
End: 2017-07-24

## 2017-04-03 RX ORDER — FLUOXETINE 20 MG/1
20 TABLET ORAL DAILY
COMMUNITY
End: 2018-01-10 | Stop reason: SDUPTHER

## 2017-04-03 RX ORDER — ALPRAZOLAM 1 MG/1
1 TABLET ORAL 2 TIMES DAILY
COMMUNITY
End: 2019-06-17

## 2017-04-03 RX ADMIN — SODIUM CHLORIDE 1000 ML: 900 INJECTION, SOLUTION INTRAVENOUS at 13:23

## 2017-04-03 NOTE — ED NOTES
Patient has received discharge instructions from ER MD, verbalizes understanding. Discharge instructions explained to daughter, all questions and concerns addressed.   Discharged via wheelchair with daughter

## 2017-04-03 NOTE — DISCHARGE INSTRUCTIONS
Altered Mental Status: Care Instructions  Your Care Instructions  Altered mental status is a change in how well your brain is working. As a result, you may be confused, be less alert than usual, or act in odd ways. This may include seeing or hearing things that aren't really there (hallucinations). A mental status change has many possible causes. For example, it may be the result of an infection, an imbalance of chemicals in the body, or a chronic disease such as diabetes or COPD. It can also be caused by things such as a head injury, taking certain medicines, or using alcohol or drugs. The doctor may do tests to look for the cause. These tests may include urine tests, blood tests, and imaging tests such as a CT scan. Sometimes a clear cause isn't found. But tests can help the doctor rule out a serious cause of your symptoms. A change in mental status can be scary. But mental status will often return to normal when the cause is treated. So it is important to get any follow-up testing or treatment the doctor has suggested. The doctor has checked you carefully, but problems can develop later. If you notice any problems or new symptoms, get medical treatment right away. Follow-up care is a key part of your treatment and safety. Be sure to make and go to all appointments, and call your doctor if you are having problems. It's also a good idea to know your test results and keep a list of the medicines you take. How can you care for yourself at home? · Be safe with medicines. Take your medicines exactly as prescribed. Call your doctor if you think you are having a problem with your medicine. · Have another adult stay with you until you are better. This can help keep you safe. Ask that person to watch for signs that your mental status is getting worse. When should you call for help? Call 911 anytime you think you may need emergency care. For example, call if:  · You passed out (lost consciousness).   Call your doctor now or seek immediate medical care if:  · Your mental status is getting worse. · You have new symptoms, such as a fever, chills, or shortness of breath. · You do not feel safe. Watch closely for changes in your health, and be sure to contact your doctor if:  · You do not get better as expected. Where can you learn more? Go to http://gordo-melinda.info/. Enter W214 in the search box to learn more about \"Altered Mental Status: Care Instructions. \"  Current as of: October 14, 2016  Content Version: 11.2  © 3398-9075 [employer]. Care instructions adapted under license by [employer] (which disclaims liability or warranty for this information). If you have questions about a medical condition or this instruction, always ask your healthcare professional. [address] any warranty or liability for your use of this information. We hope that we have addressed all of your medical concerns. The examination and treatment you received in the Emergency Department were for an emergent problem and were not intended as complete care. It is important that you follow up with your healthcare provider(s) for ongoing care. If your symptoms worsen or do not improve as expected, and you are unable to reach your usual health care provider(s), you should return to the Emergency Department. Today's healthcare is undergoing tremendous change, and patient satisfaction surveys are one of the many tools to assess the quality of medical care. You may receive a survey from the [employer] organization regarding your experience in the Emergency Department. I hope that your experience has been completely positive, particularly the medical care that I provided. As such, please participate in the survey; anything less than excellent does not meet my expectations or intentions.         [address] and [facility] participate in nationally recognized quality of care measures. If your blood pressure is greater than 120/80, as reported below, we urge that you seek medical care to address the potential of high blood pressure, commonly known as hypertension. Hypertension can be hereditary or can be caused by certain medical conditions, pain, stress, or \"white coat syndrome. \"       Please make an appointment with your health care provider(s) for follow up of your Emergency Department visit. VITALS:   Patient Vitals for the past 8 hrs:   Temp Pulse Resp BP SpO2   04/03/17 1400 - 76 16 150/73 95 %   04/03/17 1312 97.8 °F (36.6 °C) 82 15 146/75 96 %          Thank you for allowing us to provide you with medical care today. We realize that you have many choices for your emergency care needs. Please choose us in the future for any continued health care needs. [facility], [address] [place] [address] 20.   Office: [phone]            Recent Results (from the past 24 hour(s))   CBC WITH AUTOMATED DIFF    Collection Time: 04/03/17  1:21 PM   Result Value Ref Range    WBC 7.5 4.1 - 11.1 K/uL    RBC 4.90 4. 10 - 5.70 M/uL    HGB 12.6 12.1 - 17.0 g/dL    HCT 37.8 36.6 - 50.3 %    MCV 77.1 (L) 80.0 - 99.0 FL    MCH 25.7 (L) 26.0 - 34.0 PG    MCHC 33.3 30.0 - 36.5 g/dL    RDW 15.8 (H) 11.5 - 14.5 %    PLATELET 655 (L) 343 - 400 K/uL    NEUTROPHILS 64 32 - 75 %    LYMPHOCYTES 25 12 - 49 %    MONOCYTES 7 5 - 13 %    EOSINOPHILS 4 0 - 7 %    BASOPHILS 0 0 - 1 %    ABS. NEUTROPHILS 4.8 1.8 - 8.0 K/UL    ABS. LYMPHOCYTES 1.9 0.8 - 3.5 K/UL    ABS. MONOCYTES 0.5 0.0 - 1.0 K/UL    ABS. EOSINOPHILS 0.3 0.0 - 0.4 K/UL    ABS.  BASOPHILS 0.0 0.0 - 0.1 K/UL   METABOLIC PANEL, COMPREHENSIVE    Collection Time: 04/03/17  1:21 PM   Result Value Ref Range    Sodium 142 136 - 145 mmol/L    Potassium 3.6 3.5 - 5.1 mmol/L    Chloride 110 (H) 97 - 108 mmol/L    CO2 24 21 - 32 mmol/L    Anion gap 8 5 - 15 mmol/L    Glucose 134 (H) 65 - 100 mg/dL    BUN 18 6 - 20 MG/DL    Creatinine 1.33 (H) 0.70 - 1.30 MG/DL    BUN/Creatinine ratio 14 12 - 20      GFR est AA >60 >60 ml/min/1.73m2    GFR est non-AA 54 (L) >60 ml/min/1.73m2    Calcium 8.2 (L) 8.5 - 10.1 MG/DL    Bilirubin, total 0.6 0.2 - 1.0 MG/DL    ALT (SGPT) 18 12 - 78 U/L    AST (SGOT) 10 (L) 15 - 37 U/L    Alk. phosphatase 81 45 - 117 U/L    Protein, total 6.6 6.4 - 8.2 g/dL    Albumin 3.0 (L) 3.5 - 5.0 g/dL    Globulin 3.6 2.0 - 4.0 g/dL    A-G Ratio 0.8 (L) 1.1 - 2.2     AMMONIA    Collection Time: 04/03/17  2:11 PM   Result Value Ref Range    Ammonia 37 (H) <32 UMOL/L   URINALYSIS W/ REFLEX CULTURE    Collection Time: 04/03/17  3:08 PM   Result Value Ref Range    Color YELLOW/STRAW      Appearance CLEAR CLEAR      Specific gravity 1.015 1.003 - 1.030      pH (UA) 6.0 5.0 - 8.0      Protein NEGATIVE  NEG mg/dL    Glucose NEGATIVE  NEG mg/dL    Ketone NEGATIVE  NEG mg/dL    Bilirubin NEGATIVE  NEG      Blood NEGATIVE  NEG      Urobilinogen 1.0 0.2 - 1.0 EU/dL    Nitrites NEGATIVE  NEG      Leukocyte Esterase NEGATIVE  NEG      WBC 0-4 0 - 4 /hpf    RBC 0-5 0 - 5 /hpf    Epithelial cells FEW FEW /lpf    Bacteria NEGATIVE  NEG /hpf    UA:UC IF INDICATED CULTURE NOT INDICATED BY UA RESULT CNI      Hyaline cast 0-2 0 - 5 /lpf       Ct Head Wo Cont    Result Date: 4/3/2017  EXAM:  CT HEAD WO CONT INDICATION:   Decreased alertness; Dementia, Alzheimer's probable; recent fall with head injury. Confusion for 2 weeks. COMPARISON: 3/21/2017. TECHNIQUE: Unenhanced CT of the head was performed using 5 mm images. Brain and bone windows were generated. CT dose reduction was achieved through use of a standardized protocol tailored for this examination and automatic exposure control for dose modulation. FINDINGS: The ventricles and sulci are normal in size, shape and configuration and midline. There is no significant white matter disease.  There is no intracranial hemorrhage, extra-axial collection, mass, mass effect or midline shift. The basilar cisterns are open. No acute infarct is identified. The bone windows demonstrate no abnormalities. The visualized portions of the paranasal sinuses and mastoid air cells are clear. Bilateral vertebral artery calcification is seen. IMPRESSION: No acute intracranial process identified     Chest X-ray, 2 view:  Chest xray, PA and Lat, shows no signs of acute disease. This CXR was interpreted by Adolfo Winters MD, ED Provider.
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
## 2017-04-03 NOTE — ED TRIAGE NOTES
Patient arrives via EMS from home, daughter called 911 due to increased confusion x2 weeks, hx dementia.   VS stable en route per EMS, denies CP, denies fever

## 2017-04-04 LAB
ATRIAL RATE: 76 BPM
CALCULATED P AXIS, ECG09: 66 DEGREES
CALCULATED R AXIS, ECG10: 46 DEGREES
CALCULATED T AXIS, ECG11: 125 DEGREES
DIAGNOSIS, 93000: NORMAL
P-R INTERVAL, ECG05: 186 MS
Q-T INTERVAL, ECG07: 418 MS
QRS DURATION, ECG06: 116 MS
QTC CALCULATION (BEZET), ECG08: 470 MS
VENTRICULAR RATE, ECG03: 76 BPM

## 2017-04-11 ENCOUNTER — TELEPHONE (OUTPATIENT)
Dept: HEMATOLOGY | Age: 62
End: 2017-04-11

## 2017-04-11 NOTE — TELEPHONE ENCOUNTER
Berto Mcghee called in concerning Mr. Preston state right now. She stated their is severe change in patients mobility. Adv patient has a new patient appt on 4/14/17. Transferred call to Cj Alfaro (Dr. Yuridia Park) nurse in [facility].

## 2017-04-14 ENCOUNTER — OFFICE VISIT (OUTPATIENT)
Dept: HEMATOLOGY | Age: 62
End: 2017-04-14

## 2017-04-14 VITALS
BODY MASS INDEX: 29.26 KG/M2 | HEIGHT: 70 IN | SYSTOLIC BLOOD PRESSURE: 128 MMHG | HEART RATE: 59 BPM | DIASTOLIC BLOOD PRESSURE: 75 MMHG | WEIGHT: 204.38 LBS | TEMPERATURE: 95.4 F | OXYGEN SATURATION: 100 %

## 2017-04-14 DIAGNOSIS — K74.60 CIRRHOSIS OF LIVER WITHOUT ASCITES, UNSPECIFIED HEPATIC CIRRHOSIS TYPE (HCC): Primary | ICD-10-CM

## 2017-04-14 PROBLEM — I25.10 CAD (CORONARY ARTERY DISEASE): Status: ACTIVE | Noted: 2017-04-14

## 2017-04-14 PROBLEM — G62.9 NEUROPATHY: Status: ACTIVE | Noted: 2017-04-14

## 2017-04-14 PROBLEM — Z90.49 S/P CHOLECYSTECTOMY: Status: ACTIVE | Noted: 2017-04-14

## 2017-04-14 PROBLEM — D69.6 THROMBOCYTOPENIA (HCC): Status: ACTIVE | Noted: 2017-04-14

## 2017-04-14 PROBLEM — L02.91 ABSCESS: Status: RESOLVED | Noted: 2017-01-05 | Resolved: 2017-04-14

## 2017-04-14 PROBLEM — Z95.5 S/P CORONARY ARTERY STENT PLACEMENT: Status: ACTIVE | Noted: 2017-04-14

## 2017-04-14 PROBLEM — J18.9 PNEUMONIA: Status: RESOLVED | Noted: 2017-03-03 | Resolved: 2017-04-14

## 2017-04-14 PROBLEM — A49.02 MRSA INFECTION: Status: ACTIVE | Noted: 2017-04-14

## 2017-04-14 PROBLEM — Z95.1 S/P CABG (CORONARY ARTERY BYPASS GRAFT): Status: ACTIVE | Noted: 2017-04-14

## 2017-04-14 PROBLEM — R50.9 FEVER: Status: RESOLVED | Noted: 2017-03-02 | Resolved: 2017-04-14

## 2017-04-14 NOTE — MR AVS SNAPSHOT
Visit Information Date & Time Provider Department Dept. Phone Encounter #  
 4/14/2017 10:45 AM Tiff Carreon MD [facility] [address] [place] [address] [id number] Follow-up Instructions Return for FS with NP. Your Appointments 5/9/2017  2:15 PM  
Follow Up with 2 [facility] DO Gaye Brito [facility] at [facility] [address] [place], [place] [id number]  
[phone]  
  
   
 [address] [place] [address] Upcoming Health Maintenance Date Due  
 LIPID PANEL Q1 1955 FOOT EXAM Q1 1/2/1964 MICROALBUMIN Q1 1/2/1964 EYE EXAM RETINAL OR DILATED Q1 1/2/1964 Pneumococcal 19-64 Medium Risk (1 of 1 - PPSV23) 1/2/1973 DTaP/Tdap/Td series (1 - Tdap) 1/2/1975 FOBT Q 1 YEAR AGE 50-75 1/2/2004 ZOSTER VACCINE AGE 60> 1/2/2014 INFLUENZA AGE 9 TO ADULT 8/1/2016 HEMOGLOBIN A1C Q6M 9/2/2017 Allergies as of 4/14/2017  Review Complete On: 4/14/2017 By: Gabe Downey LPN Severity Noted Reaction Type Reactions Codeine High 09/13/2010    Anaphylaxis Tolerated fentanyl previously Demerol [Meperidine] High 09/13/2010    Anaphylaxis Tolerated fentanyl previously Metformin  04/14/2017    Diarrhea And dehydration Wellbutrin [Bupropion Hcl]  09/13/2010    Anaphylaxis Current Immunizations  Never Reviewed No immunizations on file. Not reviewed this visit You Were Diagnosed With   
  
 Codes Comments Cirrhosis of liver without ascites, unspecified hepatic cirrhosis type ([facility]    -  Primary ICD-10-CM: K74.60 ICD-9-CM: 571.5 Vitals BP Pulse Temp Height(growth percentile) Weight(growth percentile) SpO2  
 128/75 (!) 59 95.4 °F (35.2 °C) (Tympanic) 5' 9.5\" (1.765 m) 204 lb 6 oz (92.7 kg) 100% BMI Smoking Status 29.75 kg/m2 Former Smoker BMI and BSA Data Body Mass Index Body Surface Area  
 29.75 kg/m 2 2.13 m 2 Preferred Pharmacy Pharmacy Name [facility] [phone] Your Updated Medication List  
  
   
This list is accurate as of: 4/14/17 12:01 PM.  Always use your most recent med list.  
  
  
  
  
 ALPRAZolam 1 mg tablet Commonly known as:  Honey Gallery Take 1 mg by mouth every six (6) hours as needed for Anxiety. AMARYL 4 mg tablet Generic drug:  glimepiride Take 4 mg by mouth two (2) times a day. aspirin 81 mg chewable tablet Take 1 Tab by mouth daily. atorvastatin 80 mg tablet Commonly known as:  LIPITOR Take 1 Tab by mouth Daily (before dinner). * carvedilol 12.5 mg tablet Commonly known as:  Jeanella Meline Take 1 Tab by mouth daily (with breakfast). * carvedilol 6.25 mg tablet Commonly known as:  Jeanella Meline Take 1 Tab by mouth daily (with dinner). clonazePAM 0.5 mg tablet Commonly known as:  Paulina Puff Take 1 tablet by mouth twice daily as needed for anxiety or agitation  
  
 doxycycline 100 mg tablet Commonly known as:  VIBRA-TABS  
take 1 tablet by mouth twice a day  
  
 eplerenone 25 mg tablet Commonly known as:  Milton Like Take 1 Tab by mouth daily. FLUoxetine 20 mg tablet Commonly known as:  PROzac Take 20 mg by mouth daily. ibuprofen 800 mg tablet Commonly known as:  MOTRIN Take 800 mg by mouth three (3) times daily. lacosamide 100 mg Tab tablet Commonly known as:  VIMPAT Take 1 Tab by mouth two (2) times a day. Max Daily Amount: 200 mg.  
  
 lactulose 10 gram/15 mL solution Commonly known as:  Jorge Breann Take 45 mL by mouth three (3) times daily. Adjust dosage so that you have 2-3 bowel movements per day. levETIRAcetam 750 mg tablet Commonly known as:  KEPPRA Take 1 Tab by mouth two (2) times a day. pantoprazole 40 mg tablet Commonly known as:  PROTONIX Take 1 Tab by mouth Before breakfast and dinner. Before Breakfast and in the afternoon (also takes Zantac at same time) potassium chloride 10 mEq tablet Commonly known as:  K-DUR, KLOR-CON Take 1 Tab by mouth Daily (before dinner). 20 mEq in AM, 10 mEq in afternoon  
  
 pregabalin 50 mg capsule Commonly known as:  Garcia Diesel Take 1 Cap by mouth three (3) times daily. Max Daily Amount: 150 mg.  
  
 promethazine 25 mg tablet Commonly known as:  PHENERGAN Take 1 Tab by mouth every six (6) hours as needed. QUEtiapine 50 mg tablet Commonly known as:  SEROquel Take 3 Tabs by mouth nightly. rifAXIMin 550 mg tablet Commonly known as:  Carliss Gambles Take 1 Tab by mouth two (2) times a day. tamsulosin 0.4 mg capsule Commonly known as:  FLOMAX Take 1 Cap by mouth Before breakfast and dinner. Before Breakfast and in the afternoon * venlafaxine-SR 75 mg capsule Commonly known as:  EFFEXOR XR Take 2 Caps by mouth daily. * venlafaxine-SR 75 mg capsule Commonly known as:  EFFEXOR XR Take 1 Cap by mouth Daily (before dinner). ZANTAC 150 mg tablet Generic drug:  raNITIdine Take 150 mg by mouth two (2) times a day. * Notice: This list has 4 medication(s) that are the same as other medications prescribed for you. Read the directions carefully, and ask your doctor or other care provider to review them with you. Prescriptions Sent to Pharmacy Refills  
 rifAXIMin (XIFAXAN) 550 mg tablet 3 Sig: Take 1 Tab by mouth two (2) times a day. Class: Normal  
 Pharmacy: [facility] [id number] [address] [place] [address],[address], [address] Ph #: [phone] Route: Oral  
  
We Performed the Following AFP WITH AFP-L3% [[id number] Custom] ALPHA-1-ANTITRYPSIN, TOTAL [81104 CPT(R)] AMMONIA [id number] CPT(R)] CBC WITH AUTOMATED DIFF [05467 CPT(R)] FERRITIN [66999 CPT(R)] HEP A AB, TOTAL T800390 CPT(R)] HEP B SURFACE AB [id number] CPT(R)] HEP B SURFACE AG [id number] CPT(R)] HEPATIC FUNCTION PANEL [87491 CPT(R)] HEPATITIS B CORE AB, TOTAL [id number] CPT(R)] IRON PROFILE [id number] CPT(R)] METABOLIC PANEL, BASIC [22051 CPT(R)] PROTHROMBIN TIME + INR [70821 CPT(R)] Follow-up Instructions Return for FS with NP. To-Do List   
 04/14/2017 Imaging:  US ABD COMP   
  
 05/14/2017 GI:  EGD   
  
 05/16/2017 11:00 AM  
  Appointment with Chioma Palacios NP at [address] ([phone]) Please provide this summary of care documentation to your next provider. Your primary care clinician is listed as Libertad Campos. If you have any questions after today's visit, please call [phone].

## 2017-04-14 NOTE — PROGRESS NOTES
93 Thu Brito MD, Benito Brittle, NP Nicanor Dikes, PA-C Karma Ready, MD, MD Delores Feliz, NP     Ivan Johnson, NP        [facility], [address] [address]  22.     [phone]     FAX: [address], [address] [address]     [facility], [address]     [phone]     FAX: [phone]       Patient Care Team:  William Fajardo MD as PCP - General (Family Practice)  Kristin Colin MD as Surgeon (General Surgery)  Luis Eduardo Rodriguez MD (Infectious Diseases)  Carranza Caroline,  (Neurology)      Problem List  Date Reviewed: 4/14/2017          Codes Class Noted    Neuropathy ICD-10-CM: G62.9  ICD-9-CM: 355.9  4/14/2017        Cirrhosis ([facility] ICD-10-CM: K74.60  ICD-9-CM: 571.5  4/14/2017        CAD (coronary artery disease) ICD-10-CM: I25.10  ICD-9-CM: 414.00  4/14/2017        S/P coronary artery stent placement ICD-10-CM: Z95.5  ICD-9-CM: V45.82  4/14/2017        S/P CABG (coronary artery bypass graft) ICD-10-CM: Z95.1  ICD-9-CM: V45.81  4/14/2017    Overview Signed 4/14/2017 11:27 AM by Isatu Ayala MD     2002 and 2013             [facility]) ICD-10-CM: D69.6  ICD-9-CM: 287.5  4/14/2017        MRSA infection ICD-10-CM: A49.02  ICD-9-CM: 041.12  4/14/2017    Overview Signed 4/14/2017 11:28 AM by Isatu Ayala MD     2016             S/P cholecystectomy ICD-10-CM: Z90.49  ICD-9-CM: V45.79  7/12/6547        Metabolic encephalopathy CDK-56-HJ: G93.41  ICD-9-CM: 348.31  12/19/2016        Seizure ([facility] ICD-10-CM: R56.9  ICD-9-CM: 780.39  11/21/2016        Sinusitis ICD-10-CM: J32.9  ICD-9-CM: 473.9  Unknown        Joint pain ICD-10-CM: M25.50  ICD-9-CM: 719.40  Unknown        Low back pain ICD-10-CM: M54.5  ICD-9-CM: 724.2  Unknown        GERD (gastroesophageal reflux disease) ICD-10-CM: K21.9  ICD-9-CM: 530.81  Unknown        Diabetes mellitus, type 2 ([facility] ICD-10-CM: E11.9  ICD-9-CM: 250.00  Unknown                The physicians listed above have asked me to see Toussaint BibianaMonica in consultation regarding management of cirrhosis of undefined cause. All medical records sent by the referring physicians were reviewed including imaging studies     The patient is a 60 y.o.  male who was first found to have chronic liver disease and cirrhosis when he was noted to have thrombocytopenia in 2016. An assessment of liver fibrosis with biopsy or elastography has not been performed. The patient has not developed ascites. The patient has not developed edema. Hepatic encephalopathy was first noted in 3/2016 when he was treated for MRSA. Hepatic encephalopathy persists despite treatment with lactulose   The patient is having significant diarrhea when taking lactulose. The patient has not had been evaluated for varices. The most recent laboratory studies indicate that the liver transaminases are normal, ALP is normal, total bilirubin is normal, albumin is depressed, and the platelet count is depressed. The patient notes fatigue, problems concentrating,     The patient has limitations in functional activities secondary to these symptoms. The patient has not experienced swelling of the abdomen, swelling of the lower extremities, hematemesis, hematochezia. ALLERGIES  Allergies   Allergen Reactions    Codeine Anaphylaxis     Tolerated fentanyl previously      Demerol [Meperidine] Anaphylaxis     Tolerated fentanyl previously      Metformin Diarrhea     And dehydration    Wellbutrin [Bupropion Hcl] Anaphylaxis       MEDICATIONS  Current Outpatient Prescriptions   Medication Sig    ALPRAZolam (XANAX) 1 mg tablet Take 1 mg by mouth every six (6) hours as needed for Anxiety.  FLUoxetine (PROZAC) 20 mg tablet Take 20 mg by mouth daily.     ibuprofen (MOTRIN) 800 mg tablet Take 800 mg by mouth three (3) times daily.  doxycycline (VIBRA-TABS) 100 mg tablet take 1 tablet by mouth twice a day    levETIRAcetam (KEPPRA) 750 mg tablet Take 1 Tab by mouth two (2) times a day.  lacosamide (VIMPAT) 100 mg tab tablet Take 1 Tab by mouth two (2) times a day. Max Daily Amount: 200 mg.  clonazePAM (KLONOPIN) 0.5 mg tablet Take 1 tablet by mouth twice daily as needed for anxiety or agitation    lactulose (CHRONULAC) 10 gram/15 mL solution Take 45 mL by mouth three (3) times daily. Adjust dosage so that you have 2-3 bowel movements per day.  raNITIdine (ZANTAC) 150 mg tablet Take 150 mg by mouth two (2) times a day.  glimepiride (AMARYL) 4 mg tablet Take 4 mg by mouth two (2) times a day.  aspirin 81 mg chewable tablet Take 1 Tab by mouth daily.  carvedilol (COREG) 12.5 mg tablet Take 1 Tab by mouth daily (with breakfast).  carvedilol (COREG) 6.25 mg tablet Take 1 Tab by mouth daily (with dinner).  eplerenone (INSPRA) 25 mg tablet Take 1 Tab by mouth daily.  atorvastatin (LIPITOR) 80 mg tablet Take 1 Tab by mouth Daily (before dinner).  pantoprazole (PROTONIX) 40 mg tablet Take 1 Tab by mouth Before breakfast and dinner. Before Breakfast and in the afternoon (also takes Zantac at same time)    potassium chloride (K-DUR, KLOR-CON) 10 mEq tablet Take 1 Tab by mouth Daily (before dinner). 20 mEq in AM, 10 mEq in afternoon    pregabalin (LYRICA) 50 mg capsule Take 1 Cap by mouth three (3) times daily. Max Daily Amount: 150 mg.  promethazine (PHENERGAN) 25 mg tablet Take 1 Tab by mouth every six (6) hours as needed.  QUEtiapine (SEROQUEL) 50 mg tablet Take 3 Tabs by mouth nightly.  tamsulosin (FLOMAX) 0.4 mg capsule Take 1 Cap by mouth Before breakfast and dinner. Before Breakfast and in the afternoon    venlafaxine-SR (EFFEXOR XR) 75 mg capsule Take 2 Caps by mouth daily.     venlafaxine-SR (EFFEXOR XR) 75 mg capsule Take 1 Cap by mouth Daily (before dinner). No current facility-administered medications for this visit. SYSTEM REVIEW NOT RELATED TO LIVER DISEASE OR REVIEWED ABOVE:  Constitution systems: Negative for fever, chills, weight gain, weight loss. Eyes: Negative for visual changes. ENT: Negative for sore throat, painful swallowing. Respiratory: Negative for cough, hemoptysis, SOB. Cardiology: Negative for chest pain, palpitations. GI:  Negative for constipation or diarrhea. : Negative for urinary frequency, dysuria, hematuria, nocturia. Skin: Negative for rash. Hematology: Negative for easy bruising, blood clots. Musculo-skelatal: Negative for back pain, muscle pain, weakness. Neurologic: Negative for headaches, dizziness, vertigo, memory problems not related to HE. Psychology: Negative for anxiety, depression. FAMILY HISTORY:  The father  of heart disease. The mother has the following chronic diseases: dementia. There is no family history of liver disease. SOCIAL HISTORY:  The patient is . The patient has 2 children,   The patient stopped using tobacco products in 2016. The patient has never consumed significant amounts of alcohol. The patient used to work [employer]. PHYSICAL EXAMINATION:  /75  Pulse (!) 59  Temp 95.4 °F (35.2 °C) (Tympanic)   Ht 5' 9.5\" (1.765 m)  Wt 204 lb 6 oz (92.7 kg)  SpO2 100%  BMI 29.75 kg/m2  General: No acute distress. Eyes: Sclera anicteric. ENT: No oral lesions. Thyroid normal.  Nodes: No adenopathy. Skin: No spider angiomata. No jaundice. No palmar erythema. Respiratory: Lungs clear to auscultation. Cardiovascular: Regular heart rate. No murmurs. No JVD. Abdomen: Soft non-tender. Liver size normal to percussion/palpation. Spleen not palpable. No obvious ascites. Extremities: No edema. No muscle wasting. No gross arthritic changes. Neurologic: Alert and oriented.   Cranial nerves grossly intact. No asterixis. LABORATORY STUDIES:  [facility] Ref Rng & Units 4/3/2017 3/21/2017   WBC 4.1 - 11.1 K/uL 7.5 6.7   ANC 1.8 - 8.0 K/UL 4.8    HGB 12.1 - 17.0 g/dL 12.6 13.2    - 400 K/uL 127 (L) 144 (L)   AST 15 - 37 U/L 10 (L) 9 (L)   ALT 12 - 78 U/L 18 18   Alk Phos 45 - 117 U/L 81 92   Bili, Total 0.2 - 1.0 MG/DL 0.6 0.4   Albumin 3.5 - 5.0 g/dL 3.0 (L) 3.6   BUN 6 - 20 MG/DL 18 22 (H)   Creat 0.70 - 1.30 MG/DL 1.33 (H) 1.47 (H)   Na 136 - 145 mmol/L 142 141   K 3.5 - 5.1 mmol/L 3.6 4.3   Cl 97 - 108 mmol/L 110 (H) 110 (H)   CO2 21 - 32 mmol/L 24 24   Glucose 65 - 100 mg/dL 134 (H) 143 (H)   Magnesium 1.6 - 2.4 mg/dL     Ammonia <32 UMOL/L 37 (H) 34 (H)     SEROLOGIES:  Serologies Latest Ref Rng & Units 11/17/2016   Hep C Ab NR   NONREACTIVE     LIVER HISTOLOGY:  Not available or performed    ENDOSCOPIC PROCEDURES:  Not available or performed    RADIOLOGY:  Not available or performed    OTHER TESTING:  Not available or performed    ASSESSMENT AND PLAN:  Cirrhosis of unclear etiology    Factors favoring cirrhosis are the low platelet count and HE. The patient has depressed but stable liver function. The patient has never developed any complications of cirrhosis to date. The CTP is 5. Child class A. The MELD score is 16. The patient is not a candidate for liver transplantation because of significant cardiac disease. Hepatic encephalopathy is not well controlled on current doses of lactulose. Will add Xifaxan 550 mg BID. The patient is on several psychiatric medications with similar modes of action. This may be contributing to lethargy and confusion. He is to talk to his psychiatrist to se if he can come off some of these medications which may now have a prolonged half-life in the setting of cirrhosis. The patient was instructed not to operate a motor vehicle because of HE which poses an increased risk for MVA.       Will schedule for EGD to assess for esopahgeal varices and need for banding. The need to perform an assessment of liver fibrosis was discussed with the patient. The Fibroscan can assess liver fibrosis and determine if a patient has advanced fibrosis or cirrhosis without the need for liver biopsy. The Fibroscan is currently available at [facility] [place]. This will be performed at the next office visit. I suspect the Fibroscan will show increased liver stiffness and confirm he has cirrhosis. If the EGD shows esophageal varices he would then be a candidate for enrolling in a [employer] clinical trial for treatment of cirrhosis with portal hypertension. The patient was directed to continue all current medications at the current dosages. There are no contraindications for the patient to take any medications that are necessary for treatment of other medical issues. The patient was counseled regarding alcohol consumption. Thrombocytopenia secondary to cirrhosis. There is no evidence of overt bleeding. There is no indication for platelet transfusions or pharmacologic treatment to increase the platelet count. The need for vaccination against viral hepatitis A and B will be assessed with serologic and instituted as appropriate. [facility] screening will be performed. AFP was ordered today. Ultrasound will be scheduled prior to or the same day as the next office visit. All of the above issues were discussed with the patient. All questions were answered. The patient expressed a clear understanding of the above.     [address] [place] [address] in 4 weeks to review data and determine if he is a candidate for enrollment into the [employer] clincial trial.    Chivo Mcbride MD  [facility] [place] of [address] [place] [address] [address] 7  [address] [facility] 22.  [phone]

## 2017-04-15 LAB
A1AT SERPL-MCNC: 164 MG/DL (ref 90–200)
ALBUMIN SERPL-MCNC: 4.2 G/DL (ref 3.6–4.8)
ALP SERPL-CCNC: 92 IU/L (ref 39–117)
ALT SERPL-CCNC: 15 IU/L (ref 0–44)
AMMONIA PLAS-MCNC: 50 UG/DL (ref 27–102)
AST SERPL-CCNC: 18 IU/L (ref 0–40)
BASOPHILS # BLD AUTO: 0 X10E3/UL (ref 0–0.2)
BASOPHILS NFR BLD AUTO: 0 %
BILIRUB DIRECT SERPL-MCNC: 0.15 MG/DL (ref 0–0.4)
BILIRUB SERPL-MCNC: 0.5 MG/DL (ref 0–1.2)
BUN SERPL-MCNC: 15 MG/DL (ref 8–27)
BUN/CREAT SERPL: 11 (ref 10–24)
CALCIUM SERPL-MCNC: 9.4 MG/DL (ref 8.6–10.2)
CHLORIDE SERPL-SCNC: 99 MMOL/L (ref 96–106)
CO2 SERPL-SCNC: 24 MMOL/L (ref 18–29)
CREAT SERPL-MCNC: 1.31 MG/DL (ref 0.76–1.27)
EOSINOPHIL # BLD AUTO: 0.4 X10E3/UL (ref 0–0.4)
EOSINOPHIL NFR BLD AUTO: 4 %
ERYTHROCYTE [DISTWIDTH] IN BLOOD BY AUTOMATED COUNT: 16.7 % (ref 12.3–15.4)
FERRITIN SERPL-MCNC: 17 NG/ML (ref 30–400)
GLUCOSE SERPL-MCNC: 148 MG/DL (ref 65–99)
HAV AB SER QL IA: NEGATIVE
HBV CORE AB SERPL QL IA: NEGATIVE
HBV SURFACE AB SER QL: NON REACTIVE
HBV SURFACE AG SERPL QL IA: NEGATIVE
HCT VFR BLD AUTO: 41.3 % (ref 37.5–51)
HGB BLD-MCNC: 13.5 G/DL (ref 12.6–17.7)
IMM GRANULOCYTES # BLD: 0 X10E3/UL (ref 0–0.1)
IMM GRANULOCYTES NFR BLD: 0 %
INR PPP: 1 (ref 0.8–1.2)
IRON SATN MFR SERPL: 12 % (ref 15–55)
IRON SERPL-MCNC: 45 UG/DL (ref 38–169)
LYMPHOCYTES # BLD AUTO: 1.7 X10E3/UL (ref 0.7–3.1)
LYMPHOCYTES NFR BLD AUTO: 18 %
MCH RBC QN AUTO: 25 PG (ref 26.6–33)
MCHC RBC AUTO-ENTMCNC: 32.7 G/DL (ref 31.5–35.7)
MCV RBC AUTO: 76 FL (ref 79–97)
MONOCYTES # BLD AUTO: 0.7 X10E3/UL (ref 0.1–0.9)
MONOCYTES NFR BLD AUTO: 7 %
NEUTROPHILS # BLD AUTO: 7 X10E3/UL (ref 1.4–7)
NEUTROPHILS NFR BLD AUTO: 71 %
PLATELET # BLD AUTO: 149 X10E3/UL (ref 150–379)
POTASSIUM SERPL-SCNC: 5 MMOL/L (ref 3.5–5.2)
PROT SERPL-MCNC: 7 G/DL (ref 6–8.5)
PROTHROMBIN TIME: 10.8 SEC (ref 9.1–12)
RBC # BLD AUTO: 5.41 X10E6/UL (ref 4.14–5.8)
SODIUM SERPL-SCNC: 138 MMOL/L (ref 134–144)
TIBC SERPL-MCNC: 370 UG/DL (ref 250–450)
UIBC SERPL-MCNC: 325 UG/DL (ref 111–343)
WBC # BLD AUTO: 9.8 X10E3/UL (ref 3.4–10.8)

## 2017-04-17 LAB
AFP L3 MFR SERPL: NORMAL % (ref 0–9.9)
AFP SERPL-MCNC: 0.7 NG/ML (ref 0–8)

## 2017-05-01 ENCOUNTER — HOSPITAL ENCOUNTER (OUTPATIENT)
Dept: ULTRASOUND IMAGING | Age: 62
Discharge: HOME | End: 2017-05-01
Attending: INTERNAL MEDICINE
Payer: MEDICARE

## 2017-05-01 DIAGNOSIS — K74.60 CIRRHOSIS OF LIVER WITHOUT ASCITES, UNSPECIFIED HEPATIC CIRRHOSIS TYPE (HCC): ICD-10-CM

## 2017-05-01 PROCEDURE — 76700 US EXAM ABDOM COMPLETE: CPT

## 2017-05-12 ENCOUNTER — ANESTHESIA EVENT (OUTPATIENT)
Dept: ENDOSCOPY | Age: 62
End: 2017-05-12
Payer: MEDICARE

## 2017-05-12 ENCOUNTER — ANESTHESIA (OUTPATIENT)
Dept: ENDOSCOPY | Age: 62
End: 2017-05-12
Payer: MEDICARE

## 2017-05-12 ENCOUNTER — HOSPITAL ENCOUNTER (OUTPATIENT)
Age: 62
Setting detail: OUTPATIENT SURGERY
Discharge: HOME | End: 2017-05-12
Attending: INTERNAL MEDICINE | Admitting: INTERNAL MEDICINE
Payer: MEDICARE

## 2017-05-12 VITALS
TEMPERATURE: 98 F | DIASTOLIC BLOOD PRESSURE: 84 MMHG | HEIGHT: 70 IN | WEIGHT: 208 LBS | SYSTOLIC BLOOD PRESSURE: 164 MMHG | RESPIRATION RATE: 14 BRPM | BODY MASS INDEX: 29.78 KG/M2 | HEART RATE: 77 BPM | OXYGEN SATURATION: 99 %

## 2017-05-12 LAB
GLUCOSE BLD STRIP.AUTO-MCNC: 164 MG/DL (ref 65–100)
SERVICE CMNT-IMP: ABNORMAL

## 2017-05-12 PROCEDURE — 82962 GLUCOSE BLOOD TEST: CPT

## 2017-05-12 PROCEDURE — 74011250636 HC RX REV CODE- 250/636

## 2017-05-12 PROCEDURE — 76040000019: Performed by: INTERNAL MEDICINE

## 2017-05-12 PROCEDURE — 74011000250 HC RX REV CODE- 250

## 2017-05-12 PROCEDURE — 76060000031 HC ANESTHESIA FIRST 0.5 HR: Performed by: INTERNAL MEDICINE

## 2017-05-12 RX ORDER — SODIUM CHLORIDE 0.9 % (FLUSH) 0.9 %
5-10 SYRINGE (ML) INJECTION EVERY 8 HOURS
Status: DISCONTINUED | OUTPATIENT
Start: 2017-05-12 | End: 2017-05-12 | Stop reason: HOSPADM

## 2017-05-12 RX ORDER — PROPOFOL 10 MG/ML
INJECTION, EMULSION INTRAVENOUS AS NEEDED
Status: DISCONTINUED | OUTPATIENT
Start: 2017-05-12 | End: 2017-05-12 | Stop reason: HOSPADM

## 2017-05-12 RX ORDER — SODIUM CHLORIDE 9 MG/ML
INJECTION, SOLUTION INTRAVENOUS
Status: DISCONTINUED | OUTPATIENT
Start: 2017-05-12 | End: 2017-05-12 | Stop reason: HOSPADM

## 2017-05-12 RX ORDER — LIDOCAINE HYDROCHLORIDE 20 MG/ML
INJECTION, SOLUTION EPIDURAL; INFILTRATION; INTRACAUDAL; PERINEURAL AS NEEDED
Status: DISCONTINUED | OUTPATIENT
Start: 2017-05-12 | End: 2017-05-12 | Stop reason: HOSPADM

## 2017-05-12 RX ORDER — FENTANYL CITRATE 50 UG/ML
50-200 INJECTION, SOLUTION INTRAMUSCULAR; INTRAVENOUS
Status: DISCONTINUED | OUTPATIENT
Start: 2017-05-12 | End: 2017-05-12 | Stop reason: HOSPADM

## 2017-05-12 RX ORDER — NALOXONE HYDROCHLORIDE 0.4 MG/ML
0.4 INJECTION, SOLUTION INTRAMUSCULAR; INTRAVENOUS; SUBCUTANEOUS
Status: DISCONTINUED | OUTPATIENT
Start: 2017-05-12 | End: 2017-05-12 | Stop reason: HOSPADM

## 2017-05-12 RX ORDER — SODIUM CHLORIDE 9 MG/ML
50 INJECTION, SOLUTION INTRAVENOUS CONTINUOUS
Status: DISCONTINUED | OUTPATIENT
Start: 2017-05-12 | End: 2017-05-12 | Stop reason: HOSPADM

## 2017-05-12 RX ORDER — SODIUM CHLORIDE 0.9 % (FLUSH) 0.9 %
5-10 SYRINGE (ML) INJECTION AS NEEDED
Status: DISCONTINUED | OUTPATIENT
Start: 2017-05-12 | End: 2017-05-12 | Stop reason: HOSPADM

## 2017-05-12 RX ORDER — ATROPINE SULFATE 0.1 MG/ML
0.5 INJECTION INTRAVENOUS
Status: DISCONTINUED | OUTPATIENT
Start: 2017-05-12 | End: 2017-05-12 | Stop reason: HOSPADM

## 2017-05-12 RX ORDER — DEXTROMETHORPHAN/PSEUDOEPHED 2.5-7.5/.8
1.2 DROPS ORAL
Status: DISCONTINUED | OUTPATIENT
Start: 2017-05-12 | End: 2017-05-12 | Stop reason: HOSPADM

## 2017-05-12 RX ORDER — EPINEPHRINE 0.1 MG/ML
1 INJECTION INTRACARDIAC; INTRAVENOUS
Status: DISCONTINUED | OUTPATIENT
Start: 2017-05-12 | End: 2017-05-12 | Stop reason: HOSPADM

## 2017-05-12 RX ORDER — FLUMAZENIL 0.1 MG/ML
0.2 INJECTION INTRAVENOUS
Status: DISCONTINUED | OUTPATIENT
Start: 2017-05-12 | End: 2017-05-12 | Stop reason: HOSPADM

## 2017-05-12 RX ORDER — MIDAZOLAM HYDROCHLORIDE 1 MG/ML
5-10 INJECTION, SOLUTION INTRAMUSCULAR; INTRAVENOUS
Status: DISCONTINUED | OUTPATIENT
Start: 2017-05-12 | End: 2017-05-12 | Stop reason: HOSPADM

## 2017-05-12 RX ADMIN — LIDOCAINE HYDROCHLORIDE 60 MG: 20 INJECTION, SOLUTION EPIDURAL; INFILTRATION; INTRACAUDAL; PERINEURAL at 07:46

## 2017-05-12 RX ADMIN — SODIUM CHLORIDE: 9 INJECTION, SOLUTION INTRAVENOUS at 07:30

## 2017-05-12 RX ADMIN — PROPOFOL 100 MG: 10 INJECTION, EMULSION INTRAVENOUS at 07:46

## 2017-05-12 RX ADMIN — PROPOFOL 30 MG: 10 INJECTION, EMULSION INTRAVENOUS at 07:48

## 2017-05-12 NOTE — DISCHARGE INSTRUCTIONS
93 Thu Brito MD, [address]     April Goodridge Yojana, NING Bueno MD, MD Delores Mora, AKOSUA Olguin NP        [address] [facility], [address] [address]  22.     [phone]     FAX: [address] [place] [address], [address] [facility], [address]     [phone]     FAX: [phone]         ENDOSCOPY DISCHARGE INSTRUCTIONS      Domitila Avery.  1955  Date: 5/12/2017    DISCOMFORT:  Use lozenges or warm salt water gargle for sore thoat  Apply warm compress to IV site if red. If redness or soreness persists call the office. You may experience gas and bloating. Walking and belching will help relieve this. You may experience chest discomfort or pressure especially if banding of esophageal varices was performed. DIET:  You may resume your normal diet. ACTIVITY:  Spend the remainder of the day resting. Avoid any strenuous activity. You may not operate a vehicle for 12 hours. You may not engage in an occupation involving machinery or appliances for rest of today. Avoid making any critical or financial decisions for at least 24 hour. Call the [address] [employer] [employer] if you have any of the following:  Increasing chest or abdominal pain, nausea, vomiting, vomiting blood, abdominal distension or swelling, fever or chills, bloody discharge from nose or mouth or shortness of breath. Follow-up Instructions:  Call Dr. Chadwick Brown for any questions or problems at the phone number listed above. If a biopsy was performed, you will be contacted by the office staff or Dr Chadwick Brown within 1 week. If you have not heard from us by then you may call the office at the phone number listed above to inquire about the results.     ENDOSCOPY FINDINGS:  Your endoscopy demonstrated no findings related to cirrhosis. Evidence of esophageal reflux    DISCHARGE SUMMARY from the Nurse:   The following personal items collected during your admission are returned to you:   Dental Appliance: Dental Appliances: None  Vision: Visual Aid: Glasses  Hearing Aid:    Jewelry:    Clothing:    Other Valuables:    Valuables sent to safe:

## 2017-05-12 NOTE — ANESTHESIA PREPROCEDURE EVALUATION
Anesthetic History   No history of anesthetic complications            Review of Systems / Medical History  Patient summary reviewed, nursing notes reviewed and pertinent labs reviewed    Pulmonary  Within defined limits                 Neuro/Psych     seizures         Cardiovascular    Hypertension          CAD and cardiac stents    Exercise tolerance: <4 METS     GI/Hepatic/Renal     GERD      Liver disease     Endo/Other    Diabetes: type 2         Other Findings   Comments: Cirrhosis ([facility]     CAD (coronary artery disease)     S/P coronary artery stent placement  S/P CABG (coronary artery bypass graft)       Thrombocytopenia (HCC)     MRSA infection           S/P cholecystectomy    Metabolic encephalopathy     Seizure (HCC)            GERD (gastroesophageal reflux disease)     Diabetes mellitus, type 2 (HCC)                Physical Exam    Airway  Mallampati: II  TM Distance: > 6 cm  Neck ROM: normal range of motion   Mouth opening: Normal     Cardiovascular          Murmur: Grade 2, Mitral area     Dental    Dentition: Edentulous     Pulmonary  Breath sounds clear to auscultation               Abdominal  GI exam deferred       Other Findings            Anesthetic Plan    ASA: 3  Anesthesia type: general          Induction: Intravenous  Anesthetic plan and risks discussed with: Patient

## 2017-05-12 NOTE — ROUTINE PROCESS
[facility].  1955  [id number]    Situation:  Verbal report received from: Goldy Sharp  Procedure: Procedure(s):  ESOPHAGOGASTRODUODENOSCOPY (EGD)    Background:    Preoperative diagnosis: CIRRHOSIS  Postoperative diagnosis: 1. Normal EGD      :  Dr. Adrienne Galindo  Assistant(s): Endoscopy Technician-1: Gabrielle Queen  Endoscopy RN-1: Suly Arvizu RN    Specimens: * No specimens in log *  H. Pylori  no    Assessment:  Intra-procedure medications     Anesthesia gave intra-procedure sedation and medications, see anesthesia flow sheet yes    Intravenous fluids: NS@ KVO     Vital signs stable yes    Abdominal assessment: round and soft yes    Recommendation:  Discharge patient per MD order yes .   Return to floor na   Family or Friend fam  Permission to share finding with family or friend yes

## 2017-05-12 NOTE — PROCEDURES
93 Thu Brito MD, AKOSUA Selby PA-C Nada Finger, MD, Cornell Aragon, MD Delores Welch NP Wray Ogle, NP        [address] [facility], [address] [address]  22.     [phone]     FAX: [address] [place] [address], [address] [place] [address], [address] [facility] [address]     [phone]     FAX: [phone]         UPPER ENDOSCOPY PROCEDURE NOTE    Nir Beltre.  1955    INDICATION: Cirrhosis. Screening for esophageal varices with variceal ligation if  indicated. : Tina Giron MD    ANESTHESIA/SEDATION: propofol per anesthesia    PROCEDURE DESCRIPTION:  [employer] consent was obtained from the patient for the procedure. All risks and benefits of the procedure explained. The patient was taken to the endoscopy suite and placed in the left lateral decubitus position. Following sequential administration of sedation to doses as indicated above the endoscope was inserted into the mouth and advanced under direct vision to the second portion of the duodenum. Careful inspection of upper gastrointestinal tract was made as the endoscope was inserted and withdrawn. Retroflexion of the endoscope to view of the cardia of the stomach was performed. The findings and interventions are described below. FINDINGS:  Esophagus:    Normal mucosa. No esophageal varices. Active reflux of material in stomach. Stomach:   Retained material in stomach from taking medications this AM.  No apparent portal gastropathy. No gastric varices identified. Duodenum:   Normal bulb and second portion    INTERVENTION:   None    COMPLICATIONS: None. The patient tolerated the procedure well. EBL: Negligible. RECOMMENDATIONS:  Observe until discharge parameters are achieved.   May resume previous diet.  Repeat endoscopy to reassess varices and need for banding in 2 years. Follow-up [facility] office as scheduled.       Jerry Escamilla MD  5/12/2017  8:01 AM

## 2017-05-12 NOTE — ANESTHESIA POSTPROCEDURE EVALUATION
Post-Anesthesia Evaluation and Assessment    Patient: Maria Isabel Mills MRN: [id number]  SSN: [id number]    YOB: 1955  Age: 60 y.o. Sex: male       Cardiovascular Function/Vital Signs  Visit Vitals    /84    Pulse 77    Temp 36.7 °C (98 °F)    Resp 14    Ht 5' 9.5\" (1.765 m)    Wt 94.3 kg (208 lb)    SpO2 99%    BMI 30.28 kg/m2       Patient is status post general anesthesia for Procedure(s):  ESOPHAGOGASTRODUODENOSCOPY (EGD). Nausea/Vomiting: None    Postoperative hydration reviewed and adequate. Pain:  Pain Scale 1: Numeric (0 - 10) (05/12/17 0840)  Pain Intensity 1: 0 (05/12/17 0840)   Managed    Neurological Status: At baseline    Mental Status and Level of Consciousness: Arousable    Pulmonary Status:   O2 Device: Room air (05/12/17 0840)   Adequate oxygenation and airway patent    Complications related to anesthesia: None    Post-anesthesia assessment completed.  No concerns    Signed By: Marlon Loaiza MD     May 12, 2017

## 2017-05-12 NOTE — H&P
[address], MD, FACP, [facility]     April Ranjana Eugene, NING Kim MD, MD Delores Ozuna NP Joyice Daring, NP        [address] [facility], [address] [address]     [phone]     FAX: [address] [place] [address], [address] [place] [address]     [phone]     FAX: [phone]         PRE-PROCEDURE NOTE - EGD    H and P from last office visit reviewed. Allergies reviewed. Out-patient medicaton list reviewed. Patient Active Problem List   Diagnosis Code    Sinusitis J32.9    Joint pain M25.50    Low back pain M54.5    GERD (gastroesophageal reflux disease) K21.9    Diabetes mellitus, type 2 ([address] E11.9    Seizure ([facility] [address] C63.7    Metabolic encephalopathy H58.45    Neuropathy G62.9    Cirrhosis ([facility] [address] K74.60    CAD (coronary artery disease) I25.10    S/P coronary artery stent placement Z95.5    S/P CABG (coronary artery bypass graft) Z95.1    Thrombocytopenia (HCC) D69.6    MRSA infection A49.02    S/P cholecystectomy Z90.49       Allergies   Allergen Reactions    Codeine Anaphylaxis     Tolerated fentanyl previously      Demerol [Meperidine] Anaphylaxis     Tolerated fentanyl previously      Metformin Diarrhea     And dehydration    Wellbutrin [Bupropion Hcl] Anaphylaxis       No current facility-administered medications on file prior to encounter. Current Outpatient Prescriptions on File Prior to Encounter   Medication Sig Dispense Refill    rifAXIMin (XIFAXAN) 550 mg tablet Take 1 Tab by mouth two (2) times a day. 180 Tab 3    ALPRAZolam (XANAX) 1 mg tablet Take 1 mg by mouth every six (6) hours as needed for Anxiety.  FLUoxetine (PROZAC) 20 mg tablet Take 20 mg by mouth daily.  levETIRAcetam (KEPPRA) 750 mg tablet Take 1 Tab by mouth two (2) times a day. 60 Tab 3    lacosamide (VIMPAT) 100 mg tab tablet Take 1 Tab by mouth two (2) times a day. Max Daily Amount: 200 mg. 60 Tab 3    clonazePAM (KLONOPIN) 0.5 mg tablet Take 1 tablet by mouth twice daily as needed for anxiety or agitation 30 Tab 0    lactulose (CHRONULAC) 10 gram/15 mL solution Take 45 mL by mouth three (3) times daily. Adjust dosage so that you have 2-3 bowel movements per day. 1 Bottle 0    raNITIdine (ZANTAC) 150 mg tablet Take 150 mg by mouth two (2) times a day.  glimepiride (AMARYL) 4 mg tablet Take 4 mg by mouth two (2) times a day.  aspirin 81 mg chewable tablet Take 1 Tab by mouth daily. 30 Tab 0    carvedilol (COREG) 12.5 mg tablet Take 1 Tab by mouth daily (with breakfast). 30 Tab 0    carvedilol (COREG) 6.25 mg tablet Take 1 Tab by mouth daily (with dinner). 30 Tab 0    eplerenone (INSPRA) 25 mg tablet Take 1 Tab by mouth daily. 30 Tab 0    atorvastatin (LIPITOR) 80 mg tablet Take 1 Tab by mouth Daily (before dinner). 30 Tab 0    pantoprazole (PROTONIX) 40 mg tablet Take 1 Tab by mouth Before breakfast and dinner. Before Breakfast and in the afternoon (also takes Zantac at same time) 60 Tab 0    potassium chloride (K-DUR, KLOR-CON) 10 mEq tablet Take 1 Tab by mouth Daily (before dinner). 20 mEq in AM, 10 mEq in afternoon 30 Tab 0    pregabalin (LYRICA) 50 mg capsule Take 1 Cap by mouth three (3) times daily. Max Daily Amount: 150 mg. 90 Cap 0    QUEtiapine (SEROQUEL) 50 mg tablet Take 3 Tabs by mouth nightly. 90 Tab 0    tamsulosin (FLOMAX) 0.4 mg capsule Take 1 Cap by mouth Before breakfast and dinner. Before Breakfast and in the afternoon 30 Cap 0    venlafaxine-SR (EFFEXOR XR) 75 mg capsule Take 2 Caps by mouth daily. 30 Cap 0    venlafaxine-SR (EFFEXOR XR) 75 mg capsule Take 1 Cap by mouth Daily (before dinner). 30 Cap 0    ibuprofen (MOTRIN) 800 mg tablet Take 800 mg by mouth three (3) times daily.       doxycycline (VIBRA-TABS) 100 mg tablet take 1 tablet by mouth twice a day 30 Tab 1    promethazine (PHENERGAN) 25 mg tablet Take 1 Tab by mouth every six (6) hours as needed. 30 Tab 0       For EGD to assess for esophageal and gastric varices. Plan to perform banding if indicated based upon variceal size and appearance. The risks of the procedure were discussed with the patient. These included reaction to anesthesia, pain, perforation and bleeding. All questions were answered. The patient wishes to proceed with the procedure. PHYSICAL EXAMINATION:  VS: per nursing note  General: No acute distress. Eyes: Sclera anicteric. ENT: No oral lesions. Thyroid normal.  Nodes: No adenopathy. Skin: No spider angiomata. No jaundice. No palmar erythema. Respiratory: Lungs clear to auscultation. Cardiovascular: Regular heart rate. No murmurs. No JVD. Abdomen: Soft non-tender, liver size normal to percussion/palpation. Spleen not palpable. No obvious ascites. Extremities: No edema. No muscle wasting. No gross arthritic changes. Neurologic: Alert and oriented. Cranial nerves grossly intact. No asterixis. MOST RECENT LABORATORY STUDIES:  Lab Results   Component Value Date/Time    WBC 9.8 04/14/2017 12:00 PM    HGB 13.5 04/14/2017 12:00 PM    HCT 41.3 04/14/2017 12:00 PM    PLATELET 899 35/71/9882 12:00 PM    MCV 76 04/14/2017 12:00 PM     Lab Results   Component Value Date/Time    INR 1.0 04/14/2017 12:00 PM    Prothrombin time 10.8 04/14/2017 12:00 PM       ASSESSMENT AND PLAN:  EGD to assess for esophageal and/or gastric varices. Conscious sedation with fentanyl and versed.     Michael Singh MD  [facility] [address]  [phone]

## 2017-05-12 NOTE — PROGRESS NOTES
Assumed care of the patient at the time of this note from Whitley Alaniz CRNA. Patient transported to recovery by Endoscopy Procedure RN. SBAR report given to recovery RN.

## 2017-05-12 NOTE — IP AVS SNAPSHOT
[address] [facility] [address] 
[phone] Patient: Domitila Coto. MRN: [id number] AFY:1/7/1846 You are allergic to the following Allergen Reactions Codeine Anaphylaxis Tolerated fentanyl previously Demerol (Meperidine) Anaphylaxis Tolerated fentanyl previously Metformin Diarrhea And dehydration Wellbutrin (Bupropion Hcl) Anaphylaxis Recent Documentation Height Weight BMI Smoking Status 1.765 m 94.3 kg 30.28 kg/m2 Former Smoker Emergency Contacts Name Discharge Info Relation Home Work Mobile Karolyn Thomas DISCHARGE CAREGIVER [3] Daughter [21]   [phone] About your hospitalization You were admitted on:  May 12, 2017 You last received care in the:  [facility] ENDOSCOPY You were discharged on:  May 12, 2017 Unit phone number:  [phone] Why you were hospitalized Your primary diagnosis was:  Not on File Providers Seen During Your Hospitalizations Provider Role Specialty Primary office phone Maria Elena Bryant MD Attending Provider Hepatology [phone] Your Primary Care Physician (PCP) Primary Care Physician Office Phone Office Fax [facility], [address] [place] [address], [address] [phone] Follow-up Information Follow up With Details Comments Contact Info Vlad Metzger MD   [facility] [address] 
[phone] Your Appointments Monday May 15, 2017  2:15 PM EDT Follow Up with DO Edil [facility] at [address]  
[phone] Tuesday May 16, 2017 11:00 AM EDT Fibroscan with Chioma Tse NP [facility] ([address]) [address] [place] [address] [phone] [address]  
[phone] Current Discharge Medication List  
  
CONTINUE these medications which have NOT CHANGED Dose & Instructions Dispensing Information Comments Morning Noon Evening Bedtime ALPRAZolam 1 mg tablet Commonly known as:  Ilana Carranza Your last dose was: Your next dose is:    
   
   
 Dose:  1 mg Take 1 mg by mouth every six (6) hours as needed for Anxiety. Refills:  0 AMARYL 4 mg tablet Generic drug:  glimepiride Your last dose was: Your next dose is:    
   
   
 Dose:  4 mg Take 4 mg by mouth two (2) times a day. Refills:  0  
     
   
   
   
  
 aspirin 81 mg chewable tablet Your last dose was: Your next dose is:    
   
   
 Dose:  81 mg Take 1 Tab by mouth daily. Quantity:  30 Tab Refills:  0  
     
   
   
   
  
 atorvastatin 80 mg tablet Commonly known as:  LIPITOR Your last dose was: Your next dose is:    
   
   
 Dose:  80 mg Take 1 Tab by mouth Daily (before dinner). Quantity:  30 Tab Refills:  0  
     
   
   
   
  
 * carvedilol 12.5 mg tablet Commonly known as:  Rolanda Arjun Your last dose was: Your next dose is:    
   
   
 Dose:  12.5 mg Take 1 Tab by mouth daily (with breakfast). Quantity:  30 Tab Refills:  0  
     
   
   
   
  
 * carvedilol 6.25 mg tablet Commonly known as:  Rolanda Arjun Your last dose was: Your next dose is:    
   
   
 Dose:  6.25 mg Take 1 Tab by mouth daily (with dinner). Quantity:  30 Tab Refills:  0  
     
   
   
   
  
 clonazePAM 0.5 mg tablet Commonly known as:  Dayanarabess Alberto Your last dose was: Your next dose is: Take 1 tablet by mouth twice daily as needed for anxiety or agitation Quantity:  30 Tab Refills:  0  
     
   
   
   
  
 doxycycline 100 mg tablet Commonly known as:  VIBRA-TABS Your last dose was: Your next dose is: take 1 tablet by mouth twice a day Quantity:  30 Tab Refills:  1  
     
   
   
   
  
 eplerenone 25 mg tablet Commonly known as:  Gayjhon Leory Your last dose was: Your next dose is:    
   
   
 Dose:  25 mg Take 1 Tab by mouth daily. Quantity:  30 Tab Refills:  0 FLUoxetine 20 mg tablet Commonly known as:  PROzac Your last dose was: Your next dose is:    
   
   
 Dose:  20 mg Take 20 mg by mouth daily. Refills:  0  
     
   
   
   
  
 ibuprofen 800 mg tablet Commonly known as:  MOTRIN Your last dose was: Your next dose is:    
   
   
 Dose:  800 mg Take 800 mg by mouth three (3) times daily. Refills:  0  
     
   
   
   
  
 lacosamide 100 mg Tab tablet Commonly known as:  VIMPAT Your last dose was: Your next dose is:    
   
   
 Dose:  100 mg Take 1 Tab by mouth two (2) times a day. Max Daily Amount: 200 mg. Quantity:  60 Tab Refills:  3  
     
   
   
   
  
 lactulose 10 gram/15 mL solution Commonly known as:  Jacinda Chen Your last dose was: Your next dose is:    
   
   
 Dose:  30 g Take 45 mL by mouth three (3) times daily. Adjust dosage so that you have 2-3 bowel movements per day. Quantity:  1 Bottle Refills:  0  
     
   
   
   
  
 levETIRAcetam 750 mg tablet Commonly known as:  KEPPRA Your last dose was: Your next dose is:    
   
   
 Dose:  750 mg Take 1 Tab by mouth two (2) times a day. Quantity:  60 Tab Refills:  3  
     
   
   
   
  
 pantoprazole 40 mg tablet Commonly known as:  PROTONIX Your last dose was: Your next dose is:    
   
   
 Dose:  40 mg Take 1 Tab by mouth Before breakfast and dinner. Before Breakfast and in the afternoon (also takes Zantac at same time) Quantity:  60 Tab Refills:  0  
     
   
   
   
  
 potassium chloride 10 mEq tablet Commonly known as:  KLOR-CON  
   
 Your last dose was: Your next dose is:    
   
   
 Dose:  10 mEq Take 1 Tab by mouth Daily (before dinner). 20 mEq in AM, 10 mEq in afternoon Quantity:  30 Tab Refills:  0  
     
   
   
   
  
 pregabalin 50 mg capsule Commonly known as:  Wilnette Elizabeth Your last dose was: Your next dose is:    
   
   
 Dose:  50 mg Take 1 Cap by mouth three (3) times daily. Max Daily Amount: 150 mg.  
 Quantity:  90 Cap Refills:  0  
     
   
   
   
  
 promethazine 25 mg tablet Commonly known as:  PHENERGAN Your last dose was: Your next dose is:    
   
   
 Dose:  25 mg Take 1 Tab by mouth every six (6) hours as needed. Quantity:  30 Tab Refills:  0 QUEtiapine 50 mg tablet Commonly known as:  SEROquel Your last dose was: Your next dose is:    
   
   
 Dose:  150 mg Take 3 Tabs by mouth nightly. Quantity:  90 Tab Refills:  0  
     
   
   
   
  
 rifAXIMin 550 mg tablet Commonly known as:  Felix Lion Your last dose was: Your next dose is:    
   
   
 Dose:  550 mg Take 1 Tab by mouth two (2) times a day. Quantity:  180 Tab Refills:  3  
     
   
   
   
  
 tamsulosin 0.4 mg capsule Commonly known as:  FLOMAX Your last dose was: Your next dose is:    
   
   
 Dose:  0.4 mg Take 1 Cap by mouth Before breakfast and dinner. Before Breakfast and in the afternoon Quantity:  30 Cap Refills:  0  
     
   
   
   
  
 * venlafaxine-SR 75 mg capsule Commonly known as:  EFFEXOR XR Your last dose was: Your next dose is:    
   
   
 Dose:  150 mg Take 2 Caps by mouth daily. Quantity:  30 Cap Refills:  0  
     
   
   
   
  
 * venlafaxine-SR 75 mg capsule Commonly known as:  EFFEXOR XR Your last dose was: Your next dose is:    
   
   
 Dose:  75 mg Take 1 Cap by mouth Daily (before dinner). Quantity:  30 Cap Refills:  0 ZANTAC 150 mg tablet Generic drug:  raNITIdine Your last dose was: Your next dose is:    
   
   
 Dose:  150 mg Take 150 mg by mouth two (2) times a day. Refills:  0  
     
   
   
   
  
 * Notice: This list has 4 medication(s) that are the same as other medications prescribed for you. Read the directions carefully, and ask your doctor or other care provider to review them with you. Discharge Instructions [address] [place] [address] Bette Sutton MD, AKOSUA Fonseca PA-C Florina Rooks, MD, Jaime Berrios, AKOSUA Jimenez NP [address] [facility], [address]  [address]. 
   [phone] FAX: [address] [place] [address], [address] [facility], [address] [place] [address] Street [address] 
   [phone] FAX: [phone] ENDOSCOPY DISCHARGE INSTRUCTIONS Donnamae Show. 
1955 Date: 5/12/2017 DISCOMFORT: 
Use lozenges or warm salt water gargle for sore thoat Apply warm compress to IV site if red. If redness or soreness persists call the office. You may experience gas and bloating. Walking and belching will help relieve this. You may experience chest discomfort or pressure especially if banding of esophageal varices was performed. DIET: 
You may resume your normal diet. ACTIVITY: 
Spend the remainder of the day resting. Avoid any strenuous activity. You may not operate a vehicle for 12 hours. You may not engage in an occupation involving machinery or appliances for rest of today. Avoid making any critical or financial decisions for at least 24 hour.  
 
Call the [address] [address] of [facility] if you have any of the following: 
Increasing chest or abdominal pain, nausea, vomiting, vomiting blood, abdominal distension or swelling, fever or chills, bloody discharge from nose or mouth or shortness of breath. Follow-up Instructions: 
Call Dr. Donna Chong for any questions or problems at the phone number listed above. If a biopsy was performed, you will be contacted by the office staff or Dr Donna Chong within 1 week. If you have not heard from us by then you may call the office at the phone number listed above to inquire about the results. ENDOSCOPY FINDINGS: 
Your endoscopy demonstrated no findings related to cirrhosis. Evidence of esophageal reflux DISCHARGE SUMMARY from the Nurse: The following personal items collected during your admission are returned to you:  
Dental Appliance: Dental Appliances: None Vision: Visual Aid: Glasses Hearing Aid:   
Jewelry:   
Clothing:   
Other Valuables:   
Valuables sent to safe:   
 
 
Discharge Orders None General Information Please provide this summary of care documentation to your next provider. Patient Signature:  ____________________________________________________________ Date:  ____________________________________________________________  
  
[place] Provider Signature:  ____________________________________________________________ Date:  ____________________________________________________________

## 2017-06-23 RX ORDER — LACOSAMIDE 100 MG/1
TABLET, FILM COATED ORAL
Qty: 60 TAB | Refills: 0 | Status: SHIPPED | OUTPATIENT
Start: 2017-06-23 | End: 2017-11-27 | Stop reason: SDUPTHER

## 2017-06-27 NOTE — TELEPHONE ENCOUNTER
Attempted to contact patient for follow up appointment, no answer, left message to return call to office.

## 2017-07-17 ENCOUNTER — APPOINTMENT (OUTPATIENT)
Dept: CT IMAGING | Age: 62
DRG: 442 | End: 2017-07-17
Attending: EMERGENCY MEDICINE
Payer: MEDICARE

## 2017-07-17 ENCOUNTER — HOSPITAL ENCOUNTER (INPATIENT)
Age: 62
LOS: 7 days | Discharge: NURSING, REHAB OR LONG-TERM CARE | DRG: 442 | End: 2017-07-24
Attending: EMERGENCY MEDICINE | Admitting: HOSPITALIST
Payer: MEDICARE

## 2017-07-17 DIAGNOSIS — K74.60 CIRRHOSIS OF LIVER WITHOUT ASCITES, UNSPECIFIED HEPATIC CIRRHOSIS TYPE (HCC): ICD-10-CM

## 2017-07-17 DIAGNOSIS — D69.6 THROMBOCYTOPENIA (HCC): ICD-10-CM

## 2017-07-17 DIAGNOSIS — K76.82 HEPATIC ENCEPHALOPATHY: ICD-10-CM

## 2017-07-17 DIAGNOSIS — R56.9 SEIZURE (HCC): ICD-10-CM

## 2017-07-17 DIAGNOSIS — R41.82 ALTERED MENTAL STATUS, UNSPECIFIED ALTERED MENTAL STATUS TYPE: Primary | ICD-10-CM

## 2017-07-17 LAB
ALBUMIN SERPL BCP-MCNC: 3.5 G/DL (ref 3.5–5)
ALBUMIN/GLOB SERPL: 0.9 {RATIO} (ref 1.1–2.2)
ALP SERPL-CCNC: 119 U/L (ref 45–117)
ALT SERPL-CCNC: 24 U/L (ref 12–78)
AMMONIA PLAS-SCNC: 41 UMOL/L
ANION GAP BLD CALC-SCNC: 8 MMOL/L (ref 5–15)
APPEARANCE UR: CLEAR
AST SERPL W P-5'-P-CCNC: 33 U/L (ref 15–37)
BACTERIA URNS QL MICRO: NEGATIVE /HPF
BASOPHILS # BLD AUTO: 0 K/UL (ref 0–0.1)
BASOPHILS # BLD: 0 % (ref 0–1)
BILIRUB SERPL-MCNC: 0.7 MG/DL (ref 0.2–1)
BILIRUB UR QL: NEGATIVE
BUN SERPL-MCNC: 29 MG/DL (ref 6–20)
BUN/CREAT SERPL: 17 (ref 12–20)
CALCIUM SERPL-MCNC: 8.4 MG/DL (ref 8.5–10.1)
CHLORIDE SERPL-SCNC: 107 MMOL/L (ref 97–108)
CO2 SERPL-SCNC: 19 MMOL/L (ref 21–32)
COLOR UR: NORMAL
CREAT SERPL-MCNC: 1.71 MG/DL (ref 0.7–1.3)
EOSINOPHIL # BLD: 0.3 K/UL (ref 0–0.4)
EOSINOPHIL NFR BLD: 3 % (ref 0–7)
EPITH CASTS URNS QL MICRO: NORMAL /LPF
ERYTHROCYTE [DISTWIDTH] IN BLOOD BY AUTOMATED COUNT: 16 % (ref 11.5–14.5)
GLOBULIN SER CALC-MCNC: 4 G/DL (ref 2–4)
GLUCOSE SERPL-MCNC: 89 MG/DL (ref 65–100)
GLUCOSE UR STRIP.AUTO-MCNC: NEGATIVE MG/DL
HCT VFR BLD AUTO: 39.8 % (ref 36.6–50.3)
HGB BLD-MCNC: 12.9 G/DL (ref 12.1–17)
HGB UR QL STRIP: NEGATIVE
HYALINE CASTS URNS QL MICRO: NORMAL /LPF (ref 0–5)
KETONES UR QL STRIP.AUTO: NEGATIVE MG/DL
LACTATE SERPL-SCNC: 1.1 MMOL/L (ref 0.4–2)
LEUKOCYTE ESTERASE UR QL STRIP.AUTO: NEGATIVE
LYMPHOCYTES # BLD AUTO: 22 % (ref 12–49)
LYMPHOCYTES # BLD: 1.7 K/UL (ref 0.8–3.5)
MCH RBC QN AUTO: 24.7 PG (ref 26–34)
MCHC RBC AUTO-ENTMCNC: 32.4 G/DL (ref 30–36.5)
MCV RBC AUTO: 76.1 FL (ref 80–99)
MONOCYTES # BLD: 0.6 K/UL (ref 0–1)
MONOCYTES NFR BLD AUTO: 8 % (ref 5–13)
NEUTS SEG # BLD: 5.4 K/UL (ref 1.8–8)
NEUTS SEG NFR BLD AUTO: 67 % (ref 32–75)
NITRITE UR QL STRIP.AUTO: NEGATIVE
PH UR STRIP: 5.5 [PH] (ref 5–8)
PLATELET # BLD AUTO: 140 K/UL (ref 150–400)
POTASSIUM SERPL-SCNC: 4.6 MMOL/L (ref 3.5–5.1)
PROT SERPL-MCNC: 7.5 G/DL (ref 6.4–8.2)
PROT UR STRIP-MCNC: NEGATIVE MG/DL
RBC # BLD AUTO: 5.23 M/UL (ref 4.1–5.7)
RBC #/AREA URNS HPF: NORMAL /HPF (ref 0–5)
SODIUM SERPL-SCNC: 134 MMOL/L (ref 136–145)
SP GR UR REFRACTOMETRY: 1.02 (ref 1–1.03)
TROPONIN I SERPL-MCNC: <0.04 NG/ML
UROBILINOGEN UR QL STRIP.AUTO: 1 EU/DL (ref 0.2–1)
WBC # BLD AUTO: 8 K/UL (ref 4.1–11.1)
WBC URNS QL MICRO: NORMAL /HPF (ref 0–4)

## 2017-07-17 PROCEDURE — 36415 COLL VENOUS BLD VENIPUNCTURE: CPT | Performed by: EMERGENCY MEDICINE

## 2017-07-17 PROCEDURE — 83735 ASSAY OF MAGNESIUM: CPT | Performed by: HOSPITALIST

## 2017-07-17 PROCEDURE — 85025 COMPLETE CBC W/AUTO DIFF WBC: CPT | Performed by: EMERGENCY MEDICINE

## 2017-07-17 PROCEDURE — 74011250637 HC RX REV CODE- 250/637: Performed by: EMERGENCY MEDICINE

## 2017-07-17 PROCEDURE — 84484 ASSAY OF TROPONIN QUANT: CPT | Performed by: EMERGENCY MEDICINE

## 2017-07-17 PROCEDURE — 83605 ASSAY OF LACTIC ACID: CPT | Performed by: EMERGENCY MEDICINE

## 2017-07-17 PROCEDURE — 84100 ASSAY OF PHOSPHORUS: CPT | Performed by: HOSPITALIST

## 2017-07-17 PROCEDURE — 87040 BLOOD CULTURE FOR BACTERIA: CPT | Performed by: EMERGENCY MEDICINE

## 2017-07-17 PROCEDURE — 93005 ELECTROCARDIOGRAM TRACING: CPT

## 2017-07-17 PROCEDURE — 80053 COMPREHEN METABOLIC PANEL: CPT | Performed by: EMERGENCY MEDICINE

## 2017-07-17 PROCEDURE — 70450 CT HEAD/BRAIN W/O DYE: CPT

## 2017-07-17 PROCEDURE — 82140 ASSAY OF AMMONIA: CPT | Performed by: EMERGENCY MEDICINE

## 2017-07-17 PROCEDURE — 65660000000 HC RM CCU STEPDOWN

## 2017-07-17 PROCEDURE — 81001 URINALYSIS AUTO W/SCOPE: CPT | Performed by: EMERGENCY MEDICINE

## 2017-07-17 PROCEDURE — 99285 EMERGENCY DEPT VISIT HI MDM: CPT

## 2017-07-17 RX ADMIN — LACTULOSE 10 G: 20 SOLUTION ORAL at 23:21

## 2017-07-17 NOTE — IP AVS SNAPSHOT
[address] [facility] [address] [place] [address] 
[phone] Patient: Mahendra Jules MRN: [id number] DLM:6/8/2145 You are allergic to the following Allergen Reactions Codeine Anaphylaxis Tolerated fentanyl previously Demerol (Meperidine) Anaphylaxis Tolerated fentanyl previously Metformin Diarrhea And dehydration Wellbutrin (Bupropion Hcl) Anaphylaxis Recent Documentation Height Weight BMI Smoking Status 1.765 m 94.3 kg 30.26 kg/m2 Former Smoker Emergency Contacts Name Discharge Info Relation Home [facility] [place] DISCHARGE CAREGIVER [3] Daughter [21]   [phone] About your hospitalization You were admitted on:  July 17, 2017 You last received care in the:  [place] [address] You were discharged on:  July 24, 2017 Unit phone number:  [phone] Why you were hospitalized Your primary diagnosis was:  Hepatic Encephalopathy (Hcc) Providers Seen During Your Hospitalizations Provider Role Specialty Primary office phone Jaime Dominguez MD Attending Provider Emergency Medicine [phone] Raven Kumar MD Attending Provider Internal Medicine [phone] Des Bella MD Attending Provider Internal Medicine [phone] Your Primary Care Physician (PCP) Primary Care Physician Office Phone Office Fax [facility], [address] [place] [address] Road, [address] [phone] Follow-up Information Follow up With Details Comments Contact Info MD Kellen George [address] 
[phone] [facility] 1 day [address] 
[phone] Current Discharge Medication List  
  
CONTINUE these medications which have CHANGED Dose & Instructions Dispensing Information Comments Morning Noon Evening Bedtime  
 carvedilol 12.5 mg tablet Commonly known as:  Carrolyn Peabody What changed:  Another medication with the same name was removed. Continue taking this medication, and follow the directions you see here. Your last dose was: Your next dose is:    
   
   
 Dose:  12.5 mg Take 1 Tab by mouth daily (with breakfast). Quantity:  30 Tab Refills:  0  
     
   
   
   
  
 glimepiride 4 mg tablet Commonly known as:  AMARYL What changed:   
- how much to take - when to take this Your last dose was: Your next dose is:    
   
   
 Dose:  2 mg Take 0.5 Tabs by mouth every morning. Quantity:  60 Tab Refills:  0 CONTINUE these medications which have NOT CHANGED Dose & Instructions Dispensing Information Comments Morning Noon Evening Bedtime ALPRAZolam 1 mg tablet Commonly known as:  Emerson Giless Your last dose was: Your next dose is:    
   
   
 Dose:  1 mg Take 1 mg by mouth every six (6) hours as needed for Anxiety. Refills:  0  
     
   
   
   
  
 aspirin 81 mg chewable tablet Your last dose was: Your next dose is:    
   
   
 Dose:  81 mg Take 1 Tab by mouth daily. Quantity:  30 Tab Refills:  0  
     
   
   
   
  
 atorvastatin 80 mg tablet Commonly known as:  LIPITOR Your last dose was: Your next dose is:    
   
   
 Dose:  80 mg Take 1 Tab by mouth Daily (before dinner). Quantity:  30 Tab Refills:  0  
     
   
   
   
  
 clonazePAM 0.5 mg tablet Commonly known as:  Emilio Rajput Your last dose was: Your next dose is: Take 1 tablet by mouth twice daily as needed for anxiety or agitation Quantity:  30 Tab Refills:  0  
     
   
   
   
  
 eplerenone 25 mg tablet Commonly known as:  Daviddave Marshall Your last dose was: Your next dose is:    
   
   
 Dose:  25 mg Take 1 Tab by mouth daily. Quantity:  30 Tab Refills:  0 FLUoxetine 20 mg tablet Commonly known as:  PROzac Your last dose was: Your next dose is:    
   
   
 Dose:  20 mg Take 20 mg by mouth daily. Refills:  0  
     
   
   
   
  
 lactulose 10 gram/15 mL solution Commonly known as:  Richrd Shadow Your last dose was: Your next dose is:    
   
   
 Dose:  30 g Take 45 mL by mouth three (3) times daily. Adjust dosage so that you have 2-3 bowel movements per day. Quantity:  1 Bottle Refills:  0  
     
   
   
   
  
 levETIRAcetam 750 mg tablet Commonly known as:  KEPPRA Your last dose was: Your next dose is:    
   
   
 Dose:  750 mg Take 1 Tab by mouth two (2) times a day. Quantity:  60 Tab Refills:  3  
     
   
   
   
  
 pantoprazole 40 mg tablet Commonly known as:  PROTONIX Your last dose was: Your next dose is:    
   
   
 Dose:  40 mg Take 1 Tab by mouth Before breakfast and dinner. Before Breakfast and in the afternoon (also takes Zantac at same time) Quantity:  60 Tab Refills:  0  
     
   
   
   
  
 pregabalin 50 mg capsule Commonly known as:  Smith Majestic Your last dose was: Your next dose is:    
   
   
 Dose:  50 mg Take 1 Cap by mouth three (3) times daily. Max Daily Amount: 150 mg.  
 Quantity:  90 Cap Refills:  0 QUEtiapine 50 mg tablet Commonly known as:  SEROquel Your last dose was: Your next dose is:    
   
   
 Dose:  150 mg Take 3 Tabs by mouth nightly. Quantity:  90 Tab Refills:  0  
     
   
   
   
  
 rifAXIMin 550 mg tablet Commonly known as:  Velta Canavan Your last dose was: Your next dose is:    
   
   
 Dose:  550 mg Take 1 Tab by mouth two (2) times a day. Quantity:  180 Tab Refills:  3  
     
   
   
   
  
 tamsulosin 0.4 mg capsule Commonly known as:  FLOMAX Your last dose was: Your next dose is:    
   
   
 Dose:  0.4 mg Take 1 Cap by mouth Before breakfast and dinner. Before Breakfast and in the afternoon Quantity:  30 Cap Refills:  0  
     
   
   
   
  
 * venlafaxine-SR 75 mg capsule Commonly known as:  EFFEXOR XR Your last dose was: Your next dose is:    
   
   
 Dose:  150 mg Take 2 Caps by mouth daily. Quantity:  30 Cap Refills:  0  
     
   
   
   
  
 * venlafaxine-SR 75 mg capsule Commonly known as:  EFFEXOR XR Your last dose was: Your next dose is:    
   
   
 Dose:  75 mg Take 1 Cap by mouth Daily (before dinner). Quantity:  30 Cap Refills:  0 VIMPAT 100 mg Tab tablet Generic drug:  lacosamide Your last dose was: Your next dose is:    
   
   
 take 1 tablet by mouth twice a day . MAX DAILY AMOUNT: 200MG Quantity:  60 Tab Refills:  0  
     
   
   
   
  
 ZANTAC 150 mg tablet Generic drug:  raNITIdine Your last dose was: Your next dose is:    
   
   
 Dose:  150 mg Take 150 mg by mouth two (2) times a day. Refills:  0  
     
   
   
   
  
 * Notice: This list has 2 medication(s) that are the same as other medications prescribed for you. Read the directions carefully, and ask your doctor or other care provider to review them with you. STOP taking these medications   
 doxycycline 100 mg tablet Commonly known as:  VIBRA-TABS  
   
  
 ibuprofen 800 mg tablet Commonly known as:  MOTRIN  
   
  
 potassium chloride 10 mEq tablet Commonly known as:  KLOR-CON  
   
  
 promethazine 25 mg tablet Commonly known as:  PHENERGAN Where to Get Your Medications Information on where to get these meds will be given to you by the nurse or doctor. ! Ask your nurse or doctor about these medications  
  glimepiride 4 mg tablet Discharge Instructions DISCHARGE SUMMARY from Nurse The following personal items are in your possession at time of discharge: 
 
  
Visual Aid: None Clothing: Michelle Lob PATIENT INSTRUCTIONS: 
 
 
F-face looks uneven A-arms unable to move or move unevenly S-speech slurred or non-existent T-time-call 911 as soon as signs and symptoms begin-DO NOT go Back to bed or wait to see if you get better-TIME IS BRAIN. Warning Signs of HEART ATTACK Call 911 if you have these symptoms: 
? Chest discomfort. Most heart attacks involve discomfort in the center of the chest that lasts more than a few minutes, or that goes away and comes back. It can feel like uncomfortable pressure, squeezing, fullness, or pain. ? Discomfort in other areas of the upper body. Symptoms can include pain or discomfort in one or both arms, the back, neck, jaw, or stomach. ? Shortness of breath with or without chest discomfort. ? Other signs may include breaking out in a cold sweat, nausea, or lightheadedness. Don't wait more than five minutes to call [address]! Fast action can save your life. Calling 911 is almost always the fastest way to get lifesaving treatment. Emergency Medical Services staff can begin treatment when they arrive  up to an hour sooner than if someone gets to the hospital by car. The discharge information has been reviewed with the daughter. The daughter verbalized understanding. Discharge medications reviewed with the daughter and appropriate educational materials and side effects teaching were provided. Discharge SNF/Rehab Instructions/LTAC  
 
 
PATIENT ID: Jossie Muñoz. MRN: [id number] YOB: 1955 DATE OF ADMISSION: 7/17/2017  8:08 PM   
DATE OF DISCHARGE: 7/24/2017 PRIMARY CARE PROVIDER: Jered Benitez MD  
 
 
ATTENDING PHYSICIAN: Jono Myers MD 
DISCHARGING PROVIDER: Jono Myers MD    
To contact this individual call [phone] and ask the  to page. If unavailable ask to be transferred the Adult Hospitalist Department. CONSULTATIONS: IP CONSULT TO HEPATOLOGY 
IP CONSULT TO NEUROLOGY PROCEDURES/SURGERIES: * No surgery found * [address] COURSE:  
 
A 60year old male with history of BARGER cirrhosis, HTN, CAD s/p CABG, chronic systolic heart failure, GERD, DM-2, seizure disorder, peripheral neuropathy presented on 7/17/17 with reported confusion. Concetta Thompson had previously been on a regimen of lactulose and rifaximin for hepatic encephalopathy, but had only been able to afford his lactulose. Seizure disorder  
-had generalized tonic clonic seizure lasted 1 minute x 2 on 7/21 with post ictal phase, did not received his keppra or vimpat in the morning, no seizure over 48 hours 
-patient conscious and alert, continue po Keppra 750 mg bid and Vimpat 100 mg po bid 
-CT head without contrast 7/21 no acute intracranial processs, EEG pending, ammonia level 32 
-seen by neurologist 
Altered mental status secondary to hepatic encephalopathy 
-suspect secondary to his inability / non-adherence to rifaxamin; also possibly polypharmacy in regards to his psychiatric medications contributing, though seems to be reviewed previously by his psychiatrist 
-CT head 7/17 - No acute intracranial abnormality and no change. 
-continue lactulose and rifaxamin 
-mental status has been improving, continue neuro check and supportive care 
-seen by hepatology Cirrhosis 
-underlying etiology unclear, probable BARGER, child class A,CTP 5, MELD score previously 16 
-not a candidate for liver transplant due to cardiac disease -had undergone endoscopy on 5/12/17 - no esophageal varices, no apparent portal gastropathy, no gastric varices 
-CT abdomen and pelvis show splenomegaly. No varices. Small liver is otherwise within normal limits. Noascites. Has there been tissue diagnosis of cirrhosis? No bowel obstruction. Prostatomegaly. No hydronephrosis. Evidence of osteoporosis.    
Hyponatremia,  
-now resolved 
-continue gentle IV fluids  
CAD s/p CABG 
-no chest pain 
-continue home aspirin, atorvastatin and carvedilol Chronic systolic heart failure NYHA Class I  
-does not appear in acute exacerbation of this diagnosis currently 
-Echo 11/2016 - EF 30% 
-continue home carvedilol, eplerenone, not on ACEi/ARB due to chronic kidney disease  
DM-II with peripheral neuropathy 
-finger stick glucose 122-216/24 hrs, continue sliding scale insulin coverage 
-continue home pregabalin 
   
Chronic thrombocytopenia 
 - likely secondary to cirrhosis  - platelet stable, no evident bleeding on examination 
 - continue to monitor  
CKD stage III 
-likely some slight dehydration as he is above baseline, now improving 
-d/c IVF 
   
  
Code status: Full Code DVT prophylaxis: SCD 
  
 
DISCHARGE DIAGNOSES / PLAN:   
 
Seizure disorder  
-continue po Keppra 750 mg bid and Vimpat 100 mg po bid 
-follow up with neurologist as an outpatient Altered mental status secondary to hepatic encephalopathy 
-mental status has been improving, continue neuro check and supportive care 
-continue lactulose and rifaxamin 
-out patient follow up with hepatology 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 due to BARGER 
-continue lactulose and rifaximin, outpatient follow up with hepatologist 
Hyponatremia,  
-now resolved CAD s/p CABG 
-continue home aspirin, atorvastatin and carvedilol Chronic systolic heart failure NYHA Class I  
-continue home carvedilol, eplerenone, not on ACEi/ARB due to chronic kidney disease  
-monitor I/O and daily weight DM-II with peripheral neuropathy -Dose for Glipizide adjusted to 2 mg before breakfast, continue sliding scale and monitor finger stick glucose Chronic thrombocytopenia secondary to cirrhosis 
 - continue to monitor  
CKD stage III 
-monitor renal functions PENDING TEST RESULTS:  
At the time of discharge the following test results are still pending: none FOLLOW UP APPOINTMENTS:   
Follow-up Information Follow up With Details Comments Contact Info 1. Amor Simons MD In one week  [place] [address] [address] [place] [address] 
[phone] 2. Maryann Cantu MD, Neurologist in 2 weeks 3. Todd Pereira MD, Hepatologist in 2 weeks ADDITIONAL CARE RECOMMENDATIONS:  
 
DIET: Diabetic Diet TUBE FEEDING INSTRUCTIONS: none OXYGEN / BiPAP SETTINGS: as needed oxygen 2 l/m via nasal canula if SaO2 <90% ACTIVITY: Activity as tolerated WOUND CARE: none EQUIPMENT needed:  
 
 
DISCHARGE MEDICATIONS: 
 See Medication Reconciliation Form NOTIFY YOUR PHYSICIAN FOR ANY OF THE FOLLOWING:  
Fever over 101 degrees for 24 hours. Chest pain, shortness of breath, fever, chills, nausea, vomiting, diarrhea, change in mentation, falling, weakness, bleeding. Severe pain or pain not relieved by medications. Or, any other signs or symptoms that you may have questions about. DISPOSITION: 
  Home With: 
 OT  PT  HH  RN  
  
x SNF/Inpatient Rehab/LTAC Independent/assisted living Hospice Other:  
 
 
PATIENT CONDITION AT DISCHARGE:  
 
Functional status  
x Poor Deconditioned Independent Cognition Lucid   
x Forgetful Dementia Catheters/lines (plus indication) [facility] PICC   
 PEG   
x None Code status  
 x Full code DNR   
 
PHYSICAL EXAMINATION AT DISCHARGE: 
 Refer to Progress Note CHRONIC MEDICAL DIAGNOSES: 
Problem List as of 7/24/2017  Date Reviewed: 5/12/2017 Codes Class Noted - Resolved * (Principal)Hepatic encephalopathy ([facility] [address] ICD-10-CM: K72.90 ICD-9-CM: 572.2  7/17/2017 - Present Neuropathy ([place] [address].) ICD-10-CM: G62.9 ICD-9-CM: 355.9  4/14/2017 - Present Cirrhosis ([place] [address].) ICD-10-CM: K74.60 ICD-9-CM: 571.5  4/14/2017 - Present CAD (coronary artery disease) ICD-10-CM: I25.10 ICD-9-CM: 414.00  4/14/2017 - Present S/P coronary artery stent placement ICD-10-CM: Z95.5 ICD-9-CM: V45.82  4/14/2017 - Present S/P CABG (coronary artery bypass graft) ICD-10-CM: Z95.1 ICD-9-CM: V45.81  4/14/2017 - Present Overview Signed 4/14/2017 11:27 AM by Jasper Sosa MD  
  2002 and 2013 Thrombocytopenia ([place] [address].) ICD-10-CM: D69.6 ICD-9-CM: 287.5  4/14/2017 - Present MRSA infection ICD-10-CM: A49.02 
ICD-9-CM: 041.12  4/14/2017 - Present Overview Signed 4/14/2017 11:28 AM by Jasper Sosa MD  
  1654 S/P cholecystectomy ICD-10-CM: Z90.49 ICD-9-CM: V45.79  4/14/2017 - Present Metabolic encephalopathy JYH-69-CP: G93.41 
ICD-9-CM: 348.31  12/19/2016 - Present Seizure ([place] [address].) ICD-10-CM: R56.9 ICD-9-CM: 780.39  11/21/2016 - Present Sinusitis ICD-10-CM: J32.9 ICD-9-CM: 473.9  Unknown - Present Joint pain ICD-10-CM: M25.50 ICD-9-CM: 719.40  Unknown - Present Low back pain ICD-10-CM: M54.5 ICD-9-CM: 724.2  Unknown - Present GERD (gastroesophageal reflux disease) ICD-10-CM: K21.9 ICD-9-CM: 530.81  Unknown - Present Diabetes mellitus, type 2 ([place] [address].) ICD-10-CM: E11.9 ICD-9-CM: 250.00  Unknown - Present RESOLVED: Pneumonia ICD-10-CM: J18.9 ICD-9-CM: 247  3/3/2017 - 4/14/2017 RESOLVED: Fever ICD-10-CM: R50.9 ICD-9-CM: 780.60  3/2/2017 - 4/14/2017 RESOLVED: Abscess ICD-10-CM: L02.91 
ICD-9-CM: 682.9  1/5/2017 - 4/14/2017 RESOLVED: Altered mental status ICD-10-CM: R41.82 
ICD-9-CM: 780.97  12/19/2016 - 4/14/2017 RESOLVED: Debility ICD-10-CM: R53.81 ICD-9-CM: 799.3  11/22/2016 - 4/14/2017 RESOLVED: Cellulitis ICD-10-CM: L03.90 ICD-9-CM: 682.9  11/7/2016 - 4/14/2017 RESOLVED: Snoring ICD-10-CM: R06.83 
ICD-9-CM: 786.09  Unknown - 4/14/2017 RESOLVED: Night sweats ICD-10-CM: R61 
ICD-9-CM: 780.8  Unknown - 4/14/2017 RESOLVED: Chest pain ICD-10-CM: 786.5 ICD-9-CM: 786.5  Unknown - 4/14/2017 RESOLVED: Sore throat ICD-10-CM: J02.9 ICD-9-CM: 330  Unknown - 4/14/2017 RESOLVED: Dysphagia ICD-10-CM: 787.2 ICD-9-CM: 787.2  Unknown - 4/14/2017 RESOLVED: Tingling sensation ICD-10-CM: R20.2 ICD-9-CM: 782.0  Unknown - 4/14/2017 RESOLVED: Nausea ICD-10-CM: R11.0 ICD-9-CM: 787.02  Unknown - 4/14/2017 RESOLVED: Diarrhea ICD-10-CM: R19.7 ICD-9-CM: 787.91  Unknown - 4/14/2017 RESOLVED: Snoring ICD-10-CM: R06.83 
ICD-9-CM: 786.09  Unknown - 4/14/2017 RESOLVED: Abdominal pain, epigastric ICD-10-CM: R10.13 ICD-9-CM: 789.06  9/13/2010 - 4/14/2017 CDMP Checked:  
Yes x PROBLEM LIST Updated: 
Yes x Signed:  
Kory Schwartz MD 
7/24/2017 
11:04 AM 
 
  
 
Discharge Orders None [facility] Announcement We are excited to announce that we are making your provider's discharge notes available to you in [facility] [facility]. You will see these notes when they are completed and signed by the physician that discharged you from your recent hospital stay. If you have any questions or concerns about any information you see in [employer], please call the Health Information Department where you were seen or reach out to your Primary Care Provider for more information about your plan of care. General Information Please provide this summary of care documentation to your next provider. Patient Signature:  ____________________________________________________________ Date:  ____________________________________________________________  
  
Rhiannon Armstrong  Provider Signature: ____________________________________________________________ Date:  ____________________________________________________________

## 2017-07-17 NOTE — IP AVS SNAPSHOT
[address] [place] [address] 
[phone] Patient: Talha Anne. MRN: [id number] CHRISSY:[birth year] Current Discharge Medication List  
  
CONTINUE these medications which have CHANGED Dose & Instructions Dispensing Information Comments Morning Noon Evening Bedtime  
 carvedilol 12.5 mg tablet Commonly known as:  Alida Salts What changed:  Another medication with the same name was removed. Continue taking this medication, and follow the directions you see here. Your last dose was: Your next dose is:    
   
   
 Dose:  12.5 mg Take 1 Tab by mouth daily (with breakfast). Quantity:  30 Tab Refills:  0  
     
   
   
   
  
 glimepiride 4 mg tablet Commonly known as:  AMARYL What changed:   
- how much to take - when to take this Your last dose was: Your next dose is:    
   
   
 Dose:  2 mg Take 0.5 Tabs by mouth every morning. Quantity:  60 Tab Refills:  0 CONTINUE these medications which have NOT CHANGED Dose & Instructions Dispensing Information Comments Morning Noon Evening Bedtime ALPRAZolam 1 mg tablet Commonly known as:  Will Dilshad Your last dose was: Your next dose is:    
   
   
 Dose:  1 mg Take 1 mg by mouth every six (6) hours as needed for Anxiety. Refills:  0  
     
   
   
   
  
 aspirin 81 mg chewable tablet Your last dose was: Your next dose is:    
   
   
 Dose:  81 mg Take 1 Tab by mouth daily. Quantity:  30 Tab Refills:  0  
     
   
   
   
  
 atorvastatin 80 mg tablet Commonly known as:  LIPITOR Your last dose was: Your next dose is:    
   
   
 Dose:  80 mg Take 1 Tab by mouth Daily (before dinner). Quantity:  30 Tab Refills:  0  
     
   
   
   
  
 clonazePAM 0.5 mg tablet Commonly known as:  Nahomy Owens Your last dose was: Your next dose is: Take 1 tablet by mouth twice daily as needed for anxiety or agitation Quantity:  30 Tab Refills:  0  
     
   
   
   
  
 eplerenone 25 mg tablet Commonly known as:  Efrem Coto Your last dose was: Your next dose is:    
   
   
 Dose:  25 mg Take 1 Tab by mouth daily. Quantity:  30 Tab Refills:  0 FLUoxetine 20 mg tablet Commonly known as:  PROzac Your last dose was: Your next dose is:    
   
   
 Dose:  20 mg Take 20 mg by mouth daily. Refills:  0  
     
   
   
   
  
 lactulose 10 gram/15 mL solution Commonly known as:  Loletha Dapper Your last dose was: Your next dose is:    
   
   
 Dose:  30 g Take 45 mL by mouth three (3) times daily. Adjust dosage so that you have 2-3 bowel movements per day. Quantity:  1 Bottle Refills:  0  
     
   
   
   
  
 levETIRAcetam 750 mg tablet Commonly known as:  KEPPRA Your last dose was: Your next dose is:    
   
   
 Dose:  750 mg Take 1 Tab by mouth two (2) times a day. Quantity:  60 Tab Refills:  3  
     
   
   
   
  
 pantoprazole 40 mg tablet Commonly known as:  PROTONIX Your last dose was: Your next dose is:    
   
   
 Dose:  40 mg Take 1 Tab by mouth Before breakfast and dinner. Before Breakfast and in the afternoon (also takes Zantac at same time) Quantity:  60 Tab Refills:  0  
     
   
   
   
  
 pregabalin 50 mg capsule Commonly known as:  Antonio Genin Your last dose was: Your next dose is:    
   
   
 Dose:  50 mg Take 1 Cap by mouth three (3) times daily. Max Daily Amount: 150 mg.  
 Quantity:  90 Cap Refills:  0 QUEtiapine 50 mg tablet Commonly known as:  SEROquel Your last dose was: Your next dose is:    
   
   
 Dose:  150 mg Take 3 Tabs by mouth nightly. Quantity:  90 Tab Refills:  0  
     
   
   
   
  
 rifAXIMin 550 mg tablet Commonly known as:  Da Foy Your last dose was: Your next dose is:    
   
   
 Dose:  550 mg Take 1 Tab by mouth two (2) times a day. Quantity:  180 Tab Refills:  3  
     
   
   
   
  
 tamsulosin 0.4 mg capsule Commonly known as:  FLOMAX Your last dose was: Your next dose is:    
   
   
 Dose:  0.4 mg Take 1 Cap by mouth Before breakfast and dinner. Before Breakfast and in the afternoon Quantity:  30 Cap Refills:  0  
     
   
   
   
  
 * venlafaxine-SR 75 mg capsule Commonly known as:  EFFEXOR XR Your last dose was: Your next dose is:    
   
   
 Dose:  150 mg Take 2 Caps by mouth daily. Quantity:  30 Cap Refills:  0  
     
   
   
   
  
 * venlafaxine-SR 75 mg capsule Commonly known as:  EFFEXOR XR Your last dose was: Your next dose is:    
   
   
 Dose:  75 mg Take 1 Cap by mouth Daily (before dinner). Quantity:  30 Cap Refills:  0 VIMPAT 100 mg Tab tablet Generic drug:  lacosamide Your last dose was: Your next dose is:    
   
   
 take 1 tablet by mouth twice a day . MAX DAILY AMOUNT: 200MG Quantity:  60 Tab Refills:  0  
     
   
   
   
  
 ZANTAC 150 mg tablet Generic drug:  raNITIdine Your last dose was: Your next dose is:    
   
   
 Dose:  150 mg Take 150 mg by mouth two (2) times a day. Refills:  0  
     
   
   
   
  
 * Notice: This list has 2 medication(s) that are the same as other medications prescribed for you. Read the directions carefully, and ask your doctor or other care provider to review them with you. STOP taking these medications   
 doxycycline 100 mg tablet Commonly known as:  VIBRA-TABS  
   
  
 ibuprofen 800 mg tablet Commonly known as:  MOTRIN  
   
  
 potassium chloride 10 mEq tablet Commonly known as:  KLOR-CON  
   
  
 promethazine 25 mg tablet Commonly known as:  PHENERGAN  
   
  
  
  
 Where to Get Your Medications Information on where to get these meds will be given to you by the nurse or doctor. ! Ask your nurse or doctor about these medications  
  glimepiride 4 mg tablet

## 2017-07-18 LAB
ALBUMIN SERPL BCP-MCNC: 3.2 G/DL (ref 3.5–5)
ALBUMIN/GLOB SERPL: 0.9 {RATIO} (ref 1.1–2.2)
ALP SERPL-CCNC: 107 U/L (ref 45–117)
ALT SERPL-CCNC: 21 U/L (ref 12–78)
AMMONIA PLAS-SCNC: 42 UMOL/L
ANION GAP BLD CALC-SCNC: 9 MMOL/L (ref 5–15)
AST SERPL W P-5'-P-CCNC: 20 U/L (ref 15–37)
ATRIAL RATE: 71 BPM
BILIRUB SERPL-MCNC: 0.6 MG/DL (ref 0.2–1)
BUN SERPL-MCNC: 24 MG/DL (ref 6–20)
BUN/CREAT SERPL: 15 (ref 12–20)
CALCIUM SERPL-MCNC: 8.5 MG/DL (ref 8.5–10.1)
CALCULATED P AXIS, ECG09: 13 DEGREES
CALCULATED R AXIS, ECG10: 16 DEGREES
CALCULATED T AXIS, ECG11: 127 DEGREES
CHLORIDE SERPL-SCNC: 109 MMOL/L (ref 97–108)
CO2 SERPL-SCNC: 20 MMOL/L (ref 21–32)
CREAT SERPL-MCNC: 1.57 MG/DL (ref 0.7–1.3)
DIAGNOSIS, 93000: NORMAL
ERYTHROCYTE [DISTWIDTH] IN BLOOD BY AUTOMATED COUNT: 16 % (ref 11.5–14.5)
EST. AVERAGE GLUCOSE BLD GHB EST-MCNC: 146 MG/DL
GLOBULIN SER CALC-MCNC: 3.6 G/DL (ref 2–4)
GLUCOSE BLD STRIP.AUTO-MCNC: 115 MG/DL (ref 65–100)
GLUCOSE BLD STRIP.AUTO-MCNC: 137 MG/DL (ref 65–100)
GLUCOSE BLD STRIP.AUTO-MCNC: 140 MG/DL (ref 65–100)
GLUCOSE BLD STRIP.AUTO-MCNC: 146 MG/DL (ref 65–100)
GLUCOSE BLD STRIP.AUTO-MCNC: 64 MG/DL (ref 65–100)
GLUCOSE SERPL-MCNC: 74 MG/DL (ref 65–100)
HBA1C MFR BLD: 6.7 % (ref 4.2–6.3)
HCT VFR BLD AUTO: 39.2 % (ref 36.6–50.3)
HGB BLD-MCNC: 12.6 G/DL (ref 12.1–17)
MAGNESIUM SERPL-MCNC: 1.8 MG/DL (ref 1.6–2.4)
MCH RBC QN AUTO: 24.1 PG (ref 26–34)
MCHC RBC AUTO-ENTMCNC: 32.1 G/DL (ref 30–36.5)
MCV RBC AUTO: 75.1 FL (ref 80–99)
P-R INTERVAL, ECG05: 180 MS
PHOSPHATE SERPL-MCNC: 2.9 MG/DL (ref 2.6–4.7)
PLATELET # BLD AUTO: 133 K/UL (ref 150–400)
POTASSIUM SERPL-SCNC: 3.8 MMOL/L (ref 3.5–5.1)
PROT SERPL-MCNC: 6.8 G/DL (ref 6.4–8.2)
Q-T INTERVAL, ECG07: 414 MS
QRS DURATION, ECG06: 130 MS
QTC CALCULATION (BEZET), ECG08: 449 MS
RBC # BLD AUTO: 5.22 M/UL (ref 4.1–5.7)
SERVICE CMNT-IMP: ABNORMAL
SODIUM SERPL-SCNC: 138 MMOL/L (ref 136–145)
VENTRICULAR RATE, ECG03: 71 BPM
WBC # BLD AUTO: 8 K/UL (ref 4.1–11.1)

## 2017-07-18 PROCEDURE — 74011250636 HC RX REV CODE- 250/636: Performed by: HOSPITALIST

## 2017-07-18 PROCEDURE — 74011250637 HC RX REV CODE- 250/637: Performed by: HOSPITALIST

## 2017-07-18 PROCEDURE — 65660000000 HC RM CCU STEPDOWN

## 2017-07-18 PROCEDURE — 92610 EVALUATE SWALLOWING FUNCTION: CPT | Performed by: SPEECH-LANGUAGE PATHOLOGIST

## 2017-07-18 PROCEDURE — 74011000250 HC RX REV CODE- 250: Performed by: HOSPITALIST

## 2017-07-18 PROCEDURE — 82962 GLUCOSE BLOOD TEST: CPT

## 2017-07-18 PROCEDURE — 74011636637 HC RX REV CODE- 636/637: Performed by: HOSPITALIST

## 2017-07-18 PROCEDURE — 83036 HEMOGLOBIN GLYCOSYLATED A1C: CPT | Performed by: HOSPITALIST

## 2017-07-18 PROCEDURE — 82140 ASSAY OF AMMONIA: CPT | Performed by: HOSPITALIST

## 2017-07-18 PROCEDURE — 36415 COLL VENOUS BLD VENIPUNCTURE: CPT | Performed by: HOSPITALIST

## 2017-07-18 PROCEDURE — 80053 COMPREHEN METABOLIC PANEL: CPT | Performed by: HOSPITALIST

## 2017-07-18 PROCEDURE — 85027 COMPLETE CBC AUTOMATED: CPT | Performed by: HOSPITALIST

## 2017-07-18 PROCEDURE — 77030032490 HC SLV COMPR SCD KNE COVD -B

## 2017-07-18 RX ORDER — MAGNESIUM SULFATE 100 %
4 CRYSTALS MISCELLANEOUS AS NEEDED
Status: DISCONTINUED | OUTPATIENT
Start: 2017-07-18 | End: 2017-07-24 | Stop reason: HOSPADM

## 2017-07-18 RX ORDER — TAMSULOSIN HYDROCHLORIDE 0.4 MG/1
0.4 CAPSULE ORAL
Status: DISCONTINUED | OUTPATIENT
Start: 2017-07-18 | End: 2017-07-24 | Stop reason: HOSPADM

## 2017-07-18 RX ORDER — QUETIAPINE FUMARATE 100 MG/1
150 TABLET, FILM COATED ORAL
Status: DISCONTINUED | OUTPATIENT
Start: 2017-07-18 | End: 2017-07-24 | Stop reason: HOSPADM

## 2017-07-18 RX ORDER — VENLAFAXINE HYDROCHLORIDE 150 MG/1
150 CAPSULE, EXTENDED RELEASE ORAL DAILY
Status: DISCONTINUED | OUTPATIENT
Start: 2017-07-18 | End: 2017-07-24 | Stop reason: HOSPADM

## 2017-07-18 RX ORDER — CLONAZEPAM 0.5 MG/1
0.5 TABLET ORAL
Status: DISCONTINUED | OUTPATIENT
Start: 2017-07-18 | End: 2017-07-24 | Stop reason: HOSPADM

## 2017-07-18 RX ORDER — LACOSAMIDE 50 MG/1
100 TABLET ORAL 2 TIMES DAILY
Status: DISCONTINUED | OUTPATIENT
Start: 2017-07-18 | End: 2017-07-21

## 2017-07-18 RX ORDER — PREGABALIN 25 MG/1
50 CAPSULE ORAL 3 TIMES DAILY
Status: DISCONTINUED | OUTPATIENT
Start: 2017-07-18 | End: 2017-07-24 | Stop reason: HOSPADM

## 2017-07-18 RX ORDER — DEXTROSE 50 % IN WATER (D50W) INTRAVENOUS SYRINGE
12.5-25 AS NEEDED
Status: DISCONTINUED | OUTPATIENT
Start: 2017-07-18 | End: 2017-07-18 | Stop reason: CLARIF

## 2017-07-18 RX ORDER — SODIUM CHLORIDE 0.9 % (FLUSH) 0.9 %
5-10 SYRINGE (ML) INJECTION EVERY 8 HOURS
Status: DISCONTINUED | OUTPATIENT
Start: 2017-07-18 | End: 2017-07-24 | Stop reason: HOSPADM

## 2017-07-18 RX ORDER — ALPRAZOLAM 1 MG/1
1 TABLET ORAL
Status: DISCONTINUED | OUTPATIENT
Start: 2017-07-18 | End: 2017-07-24 | Stop reason: HOSPADM

## 2017-07-18 RX ORDER — GUAIFENESIN 100 MG/5ML
81 LIQUID (ML) ORAL DAILY
Status: DISCONTINUED | OUTPATIENT
Start: 2017-07-18 | End: 2017-07-24 | Stop reason: HOSPADM

## 2017-07-18 RX ORDER — SODIUM CHLORIDE 9 MG/ML
50 INJECTION, SOLUTION INTRAVENOUS CONTINUOUS
Status: DISCONTINUED | OUTPATIENT
Start: 2017-07-18 | End: 2017-07-20

## 2017-07-18 RX ORDER — FLUOXETINE HYDROCHLORIDE 20 MG/1
20 CAPSULE ORAL DAILY
Status: DISCONTINUED | OUTPATIENT
Start: 2017-07-18 | End: 2017-07-24 | Stop reason: HOSPADM

## 2017-07-18 RX ORDER — ATORVASTATIN CALCIUM 40 MG/1
80 TABLET, FILM COATED ORAL
Status: DISCONTINUED | OUTPATIENT
Start: 2017-07-18 | End: 2017-07-24 | Stop reason: HOSPADM

## 2017-07-18 RX ORDER — VENLAFAXINE HYDROCHLORIDE 37.5 MG/1
75 CAPSULE, EXTENDED RELEASE ORAL
Status: DISCONTINUED | OUTPATIENT
Start: 2017-07-18 | End: 2017-07-24 | Stop reason: HOSPADM

## 2017-07-18 RX ORDER — SODIUM CHLORIDE 0.9 % (FLUSH) 0.9 %
5-10 SYRINGE (ML) INJECTION AS NEEDED
Status: DISCONTINUED | OUTPATIENT
Start: 2017-07-18 | End: 2017-07-24 | Stop reason: HOSPADM

## 2017-07-18 RX ORDER — EPLERENONE 25 MG/1
25 TABLET, FILM COATED ORAL DAILY
Status: DISCONTINUED | OUTPATIENT
Start: 2017-07-18 | End: 2017-07-24 | Stop reason: HOSPADM

## 2017-07-18 RX ORDER — PANTOPRAZOLE SODIUM 40 MG/1
40 TABLET, DELAYED RELEASE ORAL
Status: DISCONTINUED | OUTPATIENT
Start: 2017-07-18 | End: 2017-07-24 | Stop reason: HOSPADM

## 2017-07-18 RX ORDER — CARVEDILOL 12.5 MG/1
12.5 TABLET ORAL
Status: DISCONTINUED | OUTPATIENT
Start: 2017-07-18 | End: 2017-07-24 | Stop reason: HOSPADM

## 2017-07-18 RX ORDER — INSULIN LISPRO 100 [IU]/ML
INJECTION, SOLUTION INTRAVENOUS; SUBCUTANEOUS
Status: DISCONTINUED | OUTPATIENT
Start: 2017-07-18 | End: 2017-07-24 | Stop reason: HOSPADM

## 2017-07-18 RX ADMIN — TAMSULOSIN HYDROCHLORIDE 0.4 MG: 0.4 CAPSULE ORAL at 07:00

## 2017-07-18 RX ADMIN — Medication 10 ML: at 22:05

## 2017-07-18 RX ADMIN — PANTOPRAZOLE SODIUM 40 MG: 40 TABLET, DELAYED RELEASE ORAL at 07:00

## 2017-07-18 RX ADMIN — Medication 10 ML: at 14:00

## 2017-07-18 RX ADMIN — DEXTROSE MONOHYDRATE 125 ML: 10 INJECTION, SOLUTION INTRAVENOUS at 06:55

## 2017-07-18 RX ADMIN — PREGABALIN 50 MG: 25 CAPSULE ORAL at 22:05

## 2017-07-18 RX ADMIN — PREGABALIN 50 MG: 25 CAPSULE ORAL at 16:31

## 2017-07-18 RX ADMIN — FLUOXETINE 20 MG: 20 CAPSULE ORAL at 08:28

## 2017-07-18 RX ADMIN — PANTOPRAZOLE SODIUM 40 MG: 40 TABLET, DELAYED RELEASE ORAL at 16:31

## 2017-07-18 RX ADMIN — ATORVASTATIN CALCIUM 80 MG: 40 TABLET, FILM COATED ORAL at 16:32

## 2017-07-18 RX ADMIN — PREGABALIN 50 MG: 25 CAPSULE ORAL at 08:28

## 2017-07-18 RX ADMIN — LACTULOSE 30 G: 10 SOLUTION ORAL at 22:04

## 2017-07-18 RX ADMIN — LACOSAMIDE 100 MG: 50 TABLET, FILM COATED ORAL at 18:28

## 2017-07-18 RX ADMIN — CARVEDILOL 12.5 MG: 12.5 TABLET, FILM COATED ORAL at 08:28

## 2017-07-18 RX ADMIN — LACOSAMIDE 100 MG: 50 TABLET, FILM COATED ORAL at 08:28

## 2017-07-18 RX ADMIN — LACTULOSE 30 G: 10 SOLUTION ORAL at 16:33

## 2017-07-18 RX ADMIN — LEVETIRACETAM 750 MG: 250 TABLET, FILM COATED ORAL at 18:28

## 2017-07-18 RX ADMIN — QUETIAPINE FUMARATE 150 MG: 25 TABLET, FILM COATED ORAL at 22:05

## 2017-07-18 RX ADMIN — VENLAFAXINE HYDROCHLORIDE 75 MG: 37.5 CAPSULE, EXTENDED RELEASE ORAL at 16:32

## 2017-07-18 RX ADMIN — LACTULOSE 30 G: 10 SOLUTION ORAL at 08:31

## 2017-07-18 RX ADMIN — QUETIAPINE FUMARATE 150 MG: 25 TABLET, FILM COATED ORAL at 00:59

## 2017-07-18 RX ADMIN — TAMSULOSIN HYDROCHLORIDE 0.4 MG: 0.4 CAPSULE ORAL at 16:32

## 2017-07-18 RX ADMIN — ASPIRIN 81 MG 81 MG: 81 TABLET ORAL at 08:27

## 2017-07-18 RX ADMIN — LEVETIRACETAM 750 MG: 250 TABLET, FILM COATED ORAL at 08:28

## 2017-07-18 RX ADMIN — EPLERENONE 25 MG: 25 TABLET ORAL at 08:28

## 2017-07-18 RX ADMIN — RIFAXIMIN 550 MG: 550 TABLET ORAL at 08:27

## 2017-07-18 RX ADMIN — SODIUM CHLORIDE 50 ML/HR: 900 INJECTION, SOLUTION INTRAVENOUS at 00:23

## 2017-07-18 RX ADMIN — Medication 10 ML: at 00:59

## 2017-07-18 RX ADMIN — INSULIN LISPRO 2 UNITS: 100 INJECTION, SOLUTION INTRAVENOUS; SUBCUTANEOUS at 16:34

## 2017-07-18 RX ADMIN — RIFAXIMIN 550 MG: 550 TABLET ORAL at 18:28

## 2017-07-18 RX ADMIN — VENLAFAXINE HYDROCHLORIDE 150 MG: 150 CAPSULE, EXTENDED RELEASE ORAL at 08:28

## 2017-07-18 NOTE — ROUTINE PROCESS
TRANSFER - OUT REPORT:    Verbal report given to Emmett Soriano RN(name) on Priscilla Danielle.  being transferred to 439(unit) for routine progression of care       Report consisted of patients Situation, Background, Assessment and   Recommendations(SBAR). Information from the following report(s) SBAR was reviewed with the receiving nurse. Lines:   Peripheral IV 07/17/17 Left Wrist (Active)   Site Assessment Clean, dry, & intact 7/17/2017  8:24 PM   Phlebitis Assessment 0 7/17/2017  8:24 PM   Infiltration Assessment 0 7/17/2017  8:24 PM   Dressing Status Clean, dry, & intact 7/17/2017  8:24 PM   Dressing Type Transparent 7/17/2017  8:24 PM        Opportunity for questions and clarification was provided.       Patient transported with:   Monitor

## 2017-07-18 NOTE — PROGRESS NOTES
Problem: Falls - Risk of  Goal: *Absence of falls  Outcome: Progressing Towards Goal  Patient free of falls during shift. Goal: *Knowledge of fall prevention  Outcome: Progressing Towards Goal  Patient educated to call when in need of assistance.

## 2017-07-18 NOTE — DIABETES MGMT
DTC Progress Note    Recommendations/ Comments: Chart reviewed due to hypoglycemia. Pt with a blood sugar of 64 mg/dl at 0643 this morning. Pt's diet advanced and patient consumed 100% of tray this morning. Noted liver cirrhosis secondary to fatty liver. Blood sugars are stable at this time. DTC to continue to follow. Chart reviewed on Jossie Conradarun. .    Patient is a 60 y.o. male with known diabetes on Amaryl 4 mg BID at home. A1c:   Lab Results   Component Value Date/Time    Hemoglobin A1c 6.7 07/18/2017 02:22 AM    Hemoglobin A1c 6.5 03/02/2017 04:49 AM       Recent Glucose Results: Lab Results   Component Value Date/Time    GLU 74 07/18/2017 02:22 AM    GLU 89 07/17/2017 09:01 PM    GLUCPOC 146 (H) 07/18/2017 07:18 AM    GLUCPOC 64 (L) 07/18/2017 06:43 AM        Lab Results   Component Value Date/Time    Creatinine 1.57 07/18/2017 02:22 AM     Estimated Creatinine Clearance: 54.2 mL/min (based on Cr of 1.57). Active Orders   Diet    DIET DIABETIC CONSISTENT CARB Regular        PO intake: Patient Vitals for the past 72 hrs:   % Diet Eaten   07/18/17 0833 100 %       Current hospital DM medication: correction scale Humalog, normal sensitivity. Will continue to follow as needed.     Thank you  Ade Martínez RD, CDE

## 2017-07-18 NOTE — PROGRESS NOTES
Hospitalist Progress Note  Philippe Escobar MD  Office: [phone]  Cell: [phone]      Date of Service:  2017  NAME:  Felicitas Parish. :  1955  MRN:  [id number]      Admission Summary:   60year old male with history of BARGER cirrhosis, HTN, CAD s/p CABG, chronic systolic heart failure, GERD, DM-2, seizure disorder, peripheral neuropathy presented on 17 with reported confusion. He had previously been on a regimen of lactulose and rifaximin for hepatic encephalopathy, but had only been able to afford his lactulose. Interval history / Subjective:    Pleasant, but oriented only to self. When asked if he knew where he was or what year it was, he states \"I couldn't even begin to tell you. \"  He knows his children's names. He denies any pain currently. Assessment & Plan:     1. Altered mental status, likely secondary to hepatic encephalopathy   - suspect secondary to his inability / non-adherence to rifaxamin; also possibly polypharmacy in regards to his psychiatric medications contributing, though seems to be reviewed previously by his psychiatrist   - CT head  - No acute intracranial abnormality and no change. - resume home rifaxamin   - hepatology consulted    2. Cirrhosis   - underlying etiology unclear, child class A, CTP 5, MELD score previously 16   - follows with [facility], most recently seen 17   - not a candidate for liver transplant due to cardiac disease   - noted hepatic encephalopathy not well controlled on lactulose alone   - had undergone endoscopy on 17 - no esophageal varices, no apparent portal gastropathy, no gastric varices    3. Hyponatremia, now resolved   - continue gentle IV fluids    4. CAD s/p CABG   - no acute chest complaints   - continue home aspirin, atorvastatin, carvedilol    5.   Chronic systolic heart failure   - does not appear in acute exacerbation of this diagnosis currently   - NYHA class I upon admission   - Echo 11/2016 - EF 30%   - continue home carvedilol, eplerenone    6. DM-2 with peripheral neuropathy   - sliding scale insulin coverage   - continue home pregabalin    7. Seizure disorder   - no reported seizures since December 2016   - continue home Keppra, Vimpat    8. Chronic thrombocytopenia   - likely secondary to cirrhosis   - no evident bleeding on examination   - continue to monitor    9. CKD stage III   - likely some slight dehydration as he is above baseline   - continue gentle IV fluid hydration    Code status: FULL  DVT prophylaxis: SCDs    Care Plan discussed with: Patient/Family and Nurse  Disposition: TBD     Hospital Problems  Date Reviewed: 5/12/2017          Codes Class Noted POA    * (Principal)[facility] ICD-10-CM: K72.90  ICD-9-CM: 572.2  7/17/2017 Unknown                Review of Systems:   A comprehensive review of systems was negative except for that written in the HPI. Vital Signs:    Last 24hrs VS reviewed since prior progress note. Most recent are:  Visit Vitals    /66    Pulse 69    Temp 97.8 °F (36.6 °C)    Resp 12    Ht 5' 9.5\" (1.765 m)    Wt 91.1 kg (200 lb 12.8 oz)    SpO2 97%    BMI 29.23 kg/m2         Intake/Output Summary (Last 24 hours) at 07/18/17 3586  Last data filed at 07/18/17 0445   Gross per 24 hour   Intake           218.33 ml   Output                0 ml   Net           218.33 ml        Physical Examination:             Constitutional:  No acute distress, cooperative, pleasant    ENT:  Oral mucous moist, oropharynx benign. Neck supple,    Resp:  CTA bilaterally. No wheezing/rhonchi/rales. No accessory muscle use   CV:  Regular rhythm, normal rate, no murmurs, gallops, rubs    GI:  Soft, non distended, non tender. normoactive bowel sounds, no hepatosplenomegaly     Musculoskeletal:  No edema, warm, 2+ pulses throughout    Neurologic:  Moves all extremities.   AAOx1-2 (self, partially place after repeated efforts), CN II-XII reviewed     Psych:  Good insight, Not anxious nor agitated. Skin:  Good turgor, no rashes or ulcers       Data Review:    Review and/or order of clinical lab test      Labs:     Recent Labs      07/18/17 0222 07/17/17 2101   WBC  8.0  8.0   HGB  12.6  12.9   HCT  39.2  39.8   PLT  133*  140*     Recent Labs      07/18/17 0222 07/17/17 2101   NA  138  134*   K  3.8  4.6   CL  109*  107   CO2  20*  19*   BUN  24*  29*   CREA  1.57*  1.71*   GLU  74  89   CA  8.5  8.4*   MG   --   1.8   PHOS   --   2.9     Recent Labs      07/18/17 0222 07/17/17 2101   SGOT  20  33   ALT  21  24   AP  107  119*   TBILI  0.6  0.7   TP  6.8  7.5   ALB  3.2*  3.5   GLOB  3.6  4.0     No results for input(s): INR, PTP, APTT in the last 72 hours. No lab exists for component: INREXT   No results for input(s): FE, TIBC, PSAT, FERR in the last 72 hours. No results found for: FOL, RBCF   No results for input(s): PH, PCO2, PO2 in the last 72 hours.   Recent Labs      07/17/17 2101   TROIQ  <0.04     No results found for: CHOL, CHOLX, CHLST, CHOLV, HDL, LDL, LDLC, DLDLP, TGLX, TRIGL, TRIGP, CHHD, CHHDX  Lab Results   Component Value Date/Time    Glucose (POC) 146 07/18/2017 07:18 AM    Glucose (POC) 64 07/18/2017 06:43 AM    Glucose (POC) 164 05/12/2017 07:33 AM    Glucose (POC) 105 03/03/2017 04:59 PM    Glucose (POC) 125 03/03/2017 11:35 AM     Lab Results   Component Value Date/Time    Color YELLOW/STRAW 07/17/2017 09:25 PM    Appearance CLEAR 07/17/2017 09:25 PM    Specific gravity 1.016 07/17/2017 09:25 PM    pH (UA) 5.5 07/17/2017 09:25 PM    Protein NEGATIVE  07/17/2017 09:25 PM    Glucose NEGATIVE  07/17/2017 09:25 PM    Ketone NEGATIVE  07/17/2017 09:25 PM    Bilirubin NEGATIVE  07/17/2017 09:25 PM    Urobilinogen 1.0 07/17/2017 09:25 PM    Nitrites NEGATIVE  07/17/2017 09:25 PM    Leukocyte Esterase NEGATIVE  07/17/2017 09:25 PM    Epithelial cells FEW 07/17/2017 09:25 PM    Bacteria NEGATIVE  07/17/2017 09:25 PM    WBC 0-4 07/17/2017 09:25 PM    RBC 0-5 07/17/2017 09:25 PM         Medications Reviewed:     Current Facility-Administered Medications   Medication Dose Route Frequency    ALPRAZolam (XANAX) tablet 1 mg  1 mg Oral Q6H PRN    aspirin chewable tablet 81 mg  81 mg Oral DAILY    atorvastatin (LIPITOR) tablet 80 mg  80 mg Oral ACD    carvedilol (COREG) tablet 12.5 mg  12.5 mg Oral DAILY WITH BREAKFAST    clonazePAM (KlonoPIN) tablet 0.5 mg  0.5 mg Oral BID PRN    eplerenone (INSPRA) tablet 25 mg  25 mg Oral DAILY    FLUoxetine (PROzac) capsule 20 mg  20 mg Oral DAILY    lactulose (CHRONULAC) solution 30 g  30 g Oral TID    levETIRAcetam (KEPPRA) tablet 750 mg  750 mg Oral BID    pantoprazole (PROTONIX) tablet 40 mg  40 mg Oral ACB&D    pregabalin (LYRICA) capsule 50 mg  50 mg Oral TID    QUEtiapine (SEROquel) tablet 150 mg  150 mg Oral QHS    rifAXIMin (XIFAXAN) tablet 550 mg  550 mg Oral BID    tamsulosin (FLOMAX) capsule 0.4 mg  0.4 mg Oral ACB&D    venlafaxine-SR (EFFEXOR-XR) capsule 150 mg  150 mg Oral DAILY    venlafaxine-SR (EFFEXOR-XR) capsule 75 mg  75 mg Oral ACD    lacosamide (VIMPAT) tablet 100 mg  100 mg Oral BID    0.9% sodium chloride infusion  50 mL/hr IntraVENous CONTINUOUS    insulin lispro (HUMALOG) injection   SubCUTAneous AC&HS    glucose chewable tablet 16 g  4 Tab Oral PRN    glucagon (GLUCAGEN) injection 1 mg  1 mg IntraMUSCular PRN    sodium chloride (NS) flush 5-10 mL  5-10 mL IntraVENous Q8H    sodium chloride (NS) flush 5-10 mL  5-10 mL IntraVENous PRN    dextrose 10 % infusion 125-250 mL  125-250 mL IntraVENous PRN     ______________________________________________________________________  EXPECTED LENGTH OF STAY: - - -  ACTUAL LENGTH OF STAY:          1                 Clarisse Hilliard MD
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
## 2017-07-18 NOTE — ED TRIAGE NOTES
Patient arrives via EMS from home, ambulatory. Since earlier today, patient disoriented, knows name only. Patient hx of incontinence in last 2 days intermittently. Afib en route per EMS, BG76, denies c/o CP/SOB/n/v.  VS stable en route per EMS.

## 2017-07-18 NOTE — PROGRESS NOTES
Bedside shift change report given to Luis Enrique (oncoming nurse) by Cristy Randall (offgoing nurse). Report included the following information SBAR, Intake/Output, MAR, Recent Results and Cardiac Rhythm NSR.

## 2017-07-18 NOTE — PROGRESS NOTES
Primary Nurse Sugey Rubio and Jaclyn Trevino RN performed a dual skin assessment on this patient No impairment noted  Iván score is 22

## 2017-07-18 NOTE — PROGRESS NOTES
Problem: Falls - Risk of  Goal: *Absence of falls  Outcome: Progressing Towards Goal  Bed alarm in place. Pt in room next to nurses station. Call bell and belongings within reach. Problem: Diabetes Self-Management  Goal: *Disease process and treatment process  Define diabetes and identify own type of diabetes; list 3 options for treating diabetes. Outcome: Progressing Towards Goal  Blood sugars checked AC/HS.

## 2017-07-18 NOTE — CONSULTS
[facility] MD Js, AKOSUA Villalobos PA-C Durene Gambler, NP Harrell Duos, NP        at [facility]     [address] [facility], [address] [address]i [address]  [address].     [phone]     FAX: [phone]    at [address], [address] [place] [address], [address]     [phone]     FAX: [phone]       HEPATOLOGY CONSULT NOTE  The patient is well known to me and regularly cared for at [address] [address]. The patient is a 60 y.o.  male who was first found to have chronic liver disease and cirrhosis when he was noted to have thrombocytopenia in 2016. The patient has not developed ascites. The patient has not developed edema. Hepatic encephalopathy was poorly controlled with lactulose but improved with xifaxan. He stopped taking xifaxan because of cost.  He developed confusion and then was brought to ED. He has multiple complications and metabolic syndrome. I have reviewed the Emergency room note, Hospital admission note, Notes by all other physicians who have seen the patient during this hospitalization to date. I have reviewed the problem list and the reason for this hospitalization. I have reviewed the allergies and the medications the patient was taking at home prior to this hospitalization. In the hospital room this AM he is lethargic but awake and responds to simple questions. ASSESSMENT AND PLAN:  Cirrhosis  This is probably secondary to BARGER although this was never formally defined. CTP score is 8, CHild class B, MELD score 15. Hepatic encephalopathy  Restarted on lactulose and xifaxan. Better this AM.  Continue both medications. Thrombocytopenia  This is secondary to cirrhosis. NO bleeding. NO treatment needed. SYSTEM REVIEW:  The patient is confused and this cannot be obtained. FAMILY HISTORY:  The father  of heart disease. The mother has the following chronic diseases: dementia. There is no family history of liver disease.       SOCIAL HISTORY:  The patient is . The patient has 2 children,   The patient stopped using tobacco products in 9/2016. The patient has never consumed significant amounts of alcohol. 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       PHYSICAL EXAMINATION:  VS: per nursing note  General:  No acute distress. Eyes:  Sclera anicteric. ENT:  No oral lesions. Thyroid normal.  Nodes:  No adenopathy. Skin:  Spider angiomata. No jaundice. Respiratory:  Lungs clear to auscultation. Cardiovascular:  Regular heart rate. Abdomen:  Soft non-tender, No obvious ascites. Extremities:  No lower extremity edema. Neurologic:  Lethargic but awake. Cranial nerves grossly intact. Asterixis present. LABORATORY:  Results for Savannah Maxwell (MRN [id number]) as of 7/18/2017 07:09   Ref. Range 7/17/2017 21:01 7/18/2017 02:22   WBC Latest Ref Range: 4.1 - 11.1 K/uL 8.0 8.0   HGB Latest Ref Range: 12.1 - 17.0 g/dL 12.9 12.6   PLATELET Latest Ref Range: 150 - 400 K/uL 140 (L) 133 (L)   Sodium Latest Ref Range: 136 - 145 mmol/L 134 (L) 138   Potassium Latest Ref Range: 3.5 - 5.1 mmol/L 4.6 3.8   Chloride Latest Ref Range: 97 - 108 mmol/L 107 109 (H)   CO2 Latest Ref Range: 21 - 32 mmol/L 19 (L) 20 (L)   Glucose Latest Ref Range: 65 - 100 mg/dL 89 74   BUN Latest Ref Range: 6 - 20 MG/DL 29 (H) 24 (H)   Creatinine Latest Ref Range: 0.70 - 1.30 MG/DL 1.71 (H) 1.57 (H)   Bilirubin, total Latest Ref Range: 0.2 - 1.0 MG/DL 0.7 0.6   Albumin Latest Ref Range: 3.5 - 5.0 g/dL 3.5 3.2 (L)   ALT (SGPT) Latest Ref Range: 12 - 78 U/L 24 21   AST Latest Ref Range: 15 - 37 U/L 33 20   Alk. phosphatase Latest Ref Range: 45 - 117 U/L 119 (H) 107   Ammonia Latest Ref Range: <32 UMOL/L 41 (H) 42 (H)     RADIOLOGY:  5/2017. Ultrasound of liver. Echogenic consistent with cirrhosis. No liver mass lesions.   No dilated bile ducts. No ascites. 7/2017. Head CT engative for acute process.       Ruben Cash MD  [facility] of [address] [place] [address] [address] [facility]  [address] [facility] 22.  [phone]

## 2017-07-18 NOTE — H&P
[address]   [address], [address]   HISTORY AND PHYSICAL       Name:  Luis Miguel Mendiola   MR#:  [id number]   :  1955   Account #:  [de-identified]        Date of Adm:  2017       PRIMARY CARE PHYSICIAN: Jamari Burgess. Anne Valentin MD    SOURCE OF INFORMATION: Daughter at the bedside. CHIEF COMPLAINT: Altered mental status since this morning. HISTORY OF PRESENT ILLNESS: This is a 76-year-old    male with past medical history significant for liver cirrhosis secondary   to fatty liver, hypertension, coronary artery disease, chronic systolic   heart failure, New York Heart Association class 1, coronary artery   disease status post CABG, gastroesophageal reflux disease, diabetes   mellitus type 2, seizure and peripheral neuropathy who is brought to [facility] emergency department for altered mental status. The patient is   a poor historian, he is confused and disoriented, and all of the   information is obtained from his daughter at the bedside. She stated   that since after he visited his cardiologist, his mental status   progressively worsened, and he is unable to talk. The only word he   says \"I don't know. \" Otherwise, no fever, vomiting, or cough. He is   incontinent of urine and bowel once in a week for the last one month. He takes his lactulose 3 times daily, but he could not afford to take his   rifaximin. On questioning, he denied any left sided chest pain,   palpitations, shortness of breath, abdominal pain, right or lower   extremity weakness, headache or neck stiffness. In ER, his vital signs were blood pressure 156/67,   pulse 72, temperature 98.1, saturation of oxygen 99% on room air. CT   scan of the head without contrast was done, no acute intracranial   abnormality, and no change. His chemistry, ammonia level was 51. The patient was 10 grams of Lactulose, and referred to hospitalist   service.      REVIEW OF SYSTEMS: Pertinent positive findings mentioned in HPI. The patient is disoriented, and unable to obtain full information from   the patient. PAST MEDICAL HISTORY    1. Liver cirrhosis secondary to fatty liver. 2. Hypertension. 3. Coronary artery disease, status post CABG. 4. History of chronic systolic heart failure, New York Heart Association   class 1.    5. Diabetes mellitus type 2.    6. Gastroesophageal reflux disease. 7. Seizure. 8. Neuropathy. PRIOR TO ADMISSION MEDICATION INCLUDE    1. Vimpat 100 mg p.o. b.i.d.   2. Rifaximin 550 mg p.o. b.i.d.   3. Xanax 1 mg every 6 hours as needed. 4. Prozac 20 mg p.o. daily. 5. Morphine 800 mg three times a day. 6. Doxycycline 100 mg p.o. b.i.d.    7. Keppra 750 mg p.o. b.i.d.    8. Clonazepam 0.5 mg p.o. daily as needed. 9. Lactulose 45 mL three times a day. 10. Zantac 150 mg p.o. daily. 11. Glimepiride 4 mg p.o. b.i.d.    12. Aspirin 81 mg p.o. daily. 13. Coreg 12.5 mg p.o. daily. 14. Protonix 40 mg p.o. daily. 15. Phenergan 25 mg every 6 hours as needed. 16. Seroquel 50 mg p.o. at bedtime. 17. Flomax 0.4 mg p.o. before breakfast.    18. Effexor 75 mg p.o. daily. ALLERGIES    1. CODEINE. 2. DEMEROL. 3. METFORMIN. [facility]. SOCIAL HISTORY: He lives with his daughter. Stopped smoking 6   months ago. He used to smoke 2-3 packs per day. He ambulates with   a walker. CODE STATUS: FULL CODE. FAMILY HISTORY: Father had history of pancreatic cancer, and heart   disease, and neuropathy. Mother had history of stroke. PHYSICAL EXAMINATION   VITAL SIGNS: Blood pressure 150/84, pulse 70, temperature 98.1,   respiratory rate 16, saturation of oxygen 99%. GENERAL: The patient is alert, cooperative, not in distress. He   appears his stated age. HEENT: Pink conjunctivae. Sclerae anicteric. Moist tongue and buccal   mucosa. LUNGS: Clear to auscultation bilaterally. CHEST WALL: No tenderness or deformity. CARDIOVASCULAR: Regular rate and rhythm, S1 and S2 normal. No   murmur or gallop. ABDOMEN: Soft, nontender, bowel sounds normal, no mass or   organomegaly. EXTREMITIES: No cyanosis or edema. SKIN: No rash or lesions. CENTRAL NERVOUS SYSTEM: The patient is conscious, alert,   disoriented to place and time. Motor 5/5. Sensation intact. Cranial   nerves 2 through 12 grossly intact. LABORATORY DATA: EKG, normal sinus rhythm, ventricular rate 71   beats per minute. Nonspecific ST-T wave abnormalities. Comparing to   EKG of 06/03/2017, no significant change. White blood cell count 8, hemoglobin 12.9, MCV 76.1, platelet count   654. Urinalysis unremarkable. Chemistry: Sodium 134, potassium 4.3,   chloride 107, CO2 19, anion gap 8, glucose 89, BUN 38, creatinine   1.74. Total bilateral 0.7, total protein 7.5, ALT 24, AST 36, alkaline   phosphatase 190. Lactic acid 1.1. Troponin less than 0.04. Ammonia   level 41. CT scan of the head without contrast, no acute intracranial   abnormalities. ASSESSMENT AND PLAN: This is a 79-year-old  male with   past medical history significant for hypertension, coronary artery   disease, liver cirrhosis, chronic systolic heart failure, coronary artery   disease status post coronary artery bypass graft, gastroesophageal   reflux disease, diabetes mellitus type 2, history of seizure, history of   neuropathy, who is brought to [address] [place] [address] emergency department for   altered mental status. 1. Hepatic encephalopathy. 2. Chronic thrombocytopenia. 3. Hyponatremia. 4. Chronic kidney disease stage III. 5. NonanionGap metabolic acidosis. 6. Coronary artery disease, status post coronary artery bypass graft. 7. Diabetes mellitus type 2.    8. History of chronic systolic congestive heart failure, New York Heart   Association class 1.    9. History of seizure. 10. History of depression. PLAN    1.  Hepatic encephalopathy: Admit the patient to telemetry service. Will give lactulose 30 mg three times a day, rifaximin 550 b.i.d. Fall   precaution, seizure precautions, neuro checks, and consult   hepatologist.    2. Chronic thrombocytopenia. Monitor platelet count,  no indication   for platelet transfusion at this time. 3. Hyponatremia, unclear etiology. Will give him normal saline at 50 mL   per hour for 12 hours, check TSH, and repeat BMP in a.m.    4. Chronic kidney disease stage III. Monitor renal function. 5. Nonanion gap metabolic acidosis, likely due to chronic kidney   disease. Repeat BMP in a.m.    6. History of coronary artery disease, status post CABG. No chest   pain, troponin less than 0.04. Continue aspirin, Lipitor, and Coreg. 7. Diabetes mellitus type 2. Will hold his home glimepiride, check   hemoglobin A1c, and monitor finger stick glucose on sliding scale. When is stable, will resume his home glimepiride  8. Chronic systolic congestive heart failure, New [place] Heart   Association class 1. Compensated. Continue Coreg. No ACE inhibitor or   ARB because of chronic kidney disease. Monitor daily weight,  intake and output   9. Seizure disorder. No seizure activity since December. Continue   home medications, Keppra 500 mg b.i.d. and Vimpat 100 mg b.i.d. Seizure precaution   10. History of depression. Continue home Prozac. Deep venous thrombosis prophylaxis, sequential compression   device.          MD MICHAEL Arroyo / Alexa.Ele   D:  07/17/2017   23:13   T:  07/17/2017   23:59   Job #:  [id number]

## 2017-07-18 NOTE — PROGRESS NOTES
Spiritual Care Partner Volunteer visited patient in [facility] on 7/18/17. Documented by:  Nickie Shafer M.Div.    Paging Service [phone])
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
## 2017-07-18 NOTE — PROGRESS NOTES
CM reviewed chart. CM called patient's daughter Shannon Mccullough ([phone]), explained role and discussed discharge planning. Patient lives with his daughters in their rented house. He is a retired . Patient needs assistance with his ADL's and IADL's, has dementia and uses a cane for safe ambulation. Daughter takes patient to his appointments and manages his medications. Per daughter, patient fell when he forgot to use his cane when he got out of bed in the middle of night a few days ago. Daughter said her sister and some friends also assisted with the care of her father. Patient has been to [facility] in the past. Daughter said if patient needs a SNF placement , she would prefer [facility]. Patient will need PT/OT evaluation to determine appropriate disposition. CM will continue to follow for any discharge needs. Dara Cooper MSA, RN, CRM. Care Management Interventions  PCP Verified by CM: Yes (Dr. Rakan Miller)  Mode of Transport at Discharge:  Other (see comment)  Transition of Care Consult (CM Consult): Discharge Planning  MyChart Signup: No  Discharge Durable Medical Equipment: No  Health Maintenance Reviewed: Yes  Physical Therapy Consult: No  Occupational Therapy Consult: No  Speech Therapy Consult: Yes  Current Support Network: Lives with Caregiver  Confirm Follow Up Transport: Family  Plan discussed with Pt/Family/Caregiver: Yes  Discharge Location  Discharge Placement: Home with family assistance

## 2017-07-19 ENCOUNTER — APPOINTMENT (OUTPATIENT)
Dept: CT IMAGING | Age: 62
DRG: 442 | End: 2017-07-19
Attending: HOSPITALIST
Payer: MEDICARE

## 2017-07-19 LAB
ALBUMIN SERPL BCP-MCNC: 3.2 G/DL (ref 3.5–5)
ALBUMIN/GLOB SERPL: 0.9 {RATIO} (ref 1.1–2.2)
ALP SERPL-CCNC: 110 U/L (ref 45–117)
ALT SERPL-CCNC: 23 U/L (ref 12–78)
ANION GAP BLD CALC-SCNC: 8 MMOL/L (ref 5–15)
AST SERPL W P-5'-P-CCNC: 21 U/L (ref 15–37)
BASOPHILS # BLD AUTO: 0 K/UL (ref 0–0.1)
BASOPHILS # BLD: 0 % (ref 0–1)
BILIRUB SERPL-MCNC: 0.6 MG/DL (ref 0.2–1)
BUN SERPL-MCNC: 17 MG/DL (ref 6–20)
BUN/CREAT SERPL: 11 (ref 12–20)
CALCIUM SERPL-MCNC: 8.2 MG/DL (ref 8.5–10.1)
CHLORIDE SERPL-SCNC: 107 MMOL/L (ref 97–108)
CO2 SERPL-SCNC: 20 MMOL/L (ref 21–32)
CREAT SERPL-MCNC: 1.49 MG/DL (ref 0.7–1.3)
EOSINOPHIL # BLD: 0.2 K/UL (ref 0–0.4)
EOSINOPHIL NFR BLD: 3 % (ref 0–7)
ERYTHROCYTE [DISTWIDTH] IN BLOOD BY AUTOMATED COUNT: 16 % (ref 11.5–14.5)
GLOBULIN SER CALC-MCNC: 3.6 G/DL (ref 2–4)
GLUCOSE BLD STRIP.AUTO-MCNC: 118 MG/DL (ref 65–100)
GLUCOSE BLD STRIP.AUTO-MCNC: 123 MG/DL (ref 65–100)
GLUCOSE BLD STRIP.AUTO-MCNC: 149 MG/DL (ref 65–100)
GLUCOSE BLD STRIP.AUTO-MCNC: 181 MG/DL (ref 65–100)
GLUCOSE SERPL-MCNC: 127 MG/DL (ref 65–100)
HCT VFR BLD AUTO: 39.2 % (ref 36.6–50.3)
HGB BLD-MCNC: 12.9 G/DL (ref 12.1–17)
LYMPHOCYTES # BLD AUTO: 25 % (ref 12–49)
LYMPHOCYTES # BLD: 2 K/UL (ref 0.8–3.5)
MCH RBC QN AUTO: 24.9 PG (ref 26–34)
MCHC RBC AUTO-ENTMCNC: 32.9 G/DL (ref 30–36.5)
MCV RBC AUTO: 75.7 FL (ref 80–99)
MONOCYTES # BLD: 0.5 K/UL (ref 0–1)
MONOCYTES NFR BLD AUTO: 6 % (ref 5–13)
NEUTS SEG # BLD: 5.5 K/UL (ref 1.8–8)
NEUTS SEG NFR BLD AUTO: 66 % (ref 32–75)
PLATELET # BLD AUTO: 136 K/UL (ref 150–400)
POTASSIUM SERPL-SCNC: 3.9 MMOL/L (ref 3.5–5.1)
PROT SERPL-MCNC: 6.8 G/DL (ref 6.4–8.2)
RBC # BLD AUTO: 5.18 M/UL (ref 4.1–5.7)
SERVICE CMNT-IMP: ABNORMAL
SODIUM SERPL-SCNC: 135 MMOL/L (ref 136–145)
WBC # BLD AUTO: 8.3 K/UL (ref 4.1–11.1)

## 2017-07-19 PROCEDURE — 74011250637 HC RX REV CODE- 250/637: Performed by: HOSPITALIST

## 2017-07-19 PROCEDURE — 74011636637 HC RX REV CODE- 636/637: Performed by: HOSPITALIST

## 2017-07-19 PROCEDURE — 97161 PT EVAL LOW COMPLEX 20 MIN: CPT

## 2017-07-19 PROCEDURE — 74177 CT ABD & PELVIS W/CONTRAST: CPT

## 2017-07-19 PROCEDURE — 97116 GAIT TRAINING THERAPY: CPT

## 2017-07-19 PROCEDURE — 80053 COMPREHEN METABOLIC PANEL: CPT | Performed by: STUDENT IN AN ORGANIZED HEALTH CARE EDUCATION/TRAINING PROGRAM

## 2017-07-19 PROCEDURE — 74011636320 HC RX REV CODE- 636/320: Performed by: HOSPITALIST

## 2017-07-19 PROCEDURE — 74011250636 HC RX REV CODE- 250/636: Performed by: HOSPITALIST

## 2017-07-19 PROCEDURE — 65660000000 HC RM CCU STEPDOWN

## 2017-07-19 PROCEDURE — 74011000258 HC RX REV CODE- 258: Performed by: HOSPITALIST

## 2017-07-19 PROCEDURE — 82962 GLUCOSE BLOOD TEST: CPT

## 2017-07-19 PROCEDURE — 85025 COMPLETE CBC W/AUTO DIFF WBC: CPT | Performed by: STUDENT IN AN ORGANIZED HEALTH CARE EDUCATION/TRAINING PROGRAM

## 2017-07-19 PROCEDURE — 36415 COLL VENOUS BLD VENIPUNCTURE: CPT | Performed by: STUDENT IN AN ORGANIZED HEALTH CARE EDUCATION/TRAINING PROGRAM

## 2017-07-19 RX ORDER — SODIUM CHLORIDE 0.9 % (FLUSH) 0.9 %
10 SYRINGE (ML) INJECTION
Status: COMPLETED | OUTPATIENT
Start: 2017-07-19 | End: 2017-07-19

## 2017-07-19 RX ORDER — ONDANSETRON 2 MG/ML
4 INJECTION INTRAMUSCULAR; INTRAVENOUS
Status: DISCONTINUED | OUTPATIENT
Start: 2017-07-19 | End: 2017-07-24 | Stop reason: HOSPADM

## 2017-07-19 RX ADMIN — EPLERENONE 25 MG: 25 TABLET ORAL at 09:29

## 2017-07-19 RX ADMIN — PREGABALIN 50 MG: 25 CAPSULE ORAL at 09:28

## 2017-07-19 RX ADMIN — Medication 10 ML: at 21:49

## 2017-07-19 RX ADMIN — LEVETIRACETAM 750 MG: 250 TABLET, FILM COATED ORAL at 18:04

## 2017-07-19 RX ADMIN — VENLAFAXINE HYDROCHLORIDE 75 MG: 37.5 CAPSULE, EXTENDED RELEASE ORAL at 15:35

## 2017-07-19 RX ADMIN — SODIUM CHLORIDE 50 ML/HR: 900 INJECTION, SOLUTION INTRAVENOUS at 17:25

## 2017-07-19 RX ADMIN — PANTOPRAZOLE SODIUM 40 MG: 40 TABLET, DELAYED RELEASE ORAL at 07:29

## 2017-07-19 RX ADMIN — LACTULOSE 30 G: 10 SOLUTION ORAL at 15:36

## 2017-07-19 RX ADMIN — INSULIN LISPRO 2 UNITS: 100 INJECTION, SOLUTION INTRAVENOUS; SUBCUTANEOUS at 17:27

## 2017-07-19 RX ADMIN — ONDANSETRON 4 MG: 2 INJECTION INTRAMUSCULAR; INTRAVENOUS at 12:04

## 2017-07-19 RX ADMIN — LACTULOSE 30 G: 10 SOLUTION ORAL at 09:28

## 2017-07-19 RX ADMIN — IOPAMIDOL 80 ML: 755 INJECTION, SOLUTION INTRAVENOUS at 18:16

## 2017-07-19 RX ADMIN — PREGABALIN 50 MG: 25 CAPSULE ORAL at 21:49

## 2017-07-19 RX ADMIN — PREGABALIN 50 MG: 25 CAPSULE ORAL at 15:35

## 2017-07-19 RX ADMIN — LACTULOSE 30 G: 10 SOLUTION ORAL at 21:48

## 2017-07-19 RX ADMIN — VENLAFAXINE HYDROCHLORIDE 150 MG: 150 CAPSULE, EXTENDED RELEASE ORAL at 09:29

## 2017-07-19 RX ADMIN — FLUOXETINE 20 MG: 20 CAPSULE ORAL at 09:29

## 2017-07-19 RX ADMIN — LACOSAMIDE 100 MG: 50 TABLET, FILM COATED ORAL at 18:04

## 2017-07-19 RX ADMIN — Medication 10 ML: at 14:00

## 2017-07-19 RX ADMIN — RIFAXIMIN 550 MG: 550 TABLET ORAL at 09:28

## 2017-07-19 RX ADMIN — ASPIRIN 81 MG 81 MG: 81 TABLET ORAL at 09:28

## 2017-07-19 RX ADMIN — LEVETIRACETAM 750 MG: 250 TABLET, FILM COATED ORAL at 09:28

## 2017-07-19 RX ADMIN — Medication 10 ML: at 18:16

## 2017-07-19 RX ADMIN — RIFAXIMIN 550 MG: 550 TABLET ORAL at 18:04

## 2017-07-19 RX ADMIN — SODIUM CHLORIDE 100 ML: 900 INJECTION, SOLUTION INTRAVENOUS at 18:16

## 2017-07-19 RX ADMIN — INSULIN LISPRO 2 UNITS: 100 INJECTION, SOLUTION INTRAVENOUS; SUBCUTANEOUS at 12:03

## 2017-07-19 RX ADMIN — QUETIAPINE FUMARATE 150 MG: 25 TABLET, FILM COATED ORAL at 21:49

## 2017-07-19 RX ADMIN — LACOSAMIDE 100 MG: 50 TABLET, FILM COATED ORAL at 09:29

## 2017-07-19 RX ADMIN — PANTOPRAZOLE SODIUM 40 MG: 40 TABLET, DELAYED RELEASE ORAL at 15:34

## 2017-07-19 RX ADMIN — IOHEXOL 50 ML: 240 INJECTION, SOLUTION INTRATHECAL; INTRAVASCULAR; INTRAVENOUS; ORAL at 15:59

## 2017-07-19 RX ADMIN — ATORVASTATIN CALCIUM 80 MG: 40 TABLET, FILM COATED ORAL at 15:34

## 2017-07-19 RX ADMIN — TAMSULOSIN HYDROCHLORIDE 0.4 MG: 0.4 CAPSULE ORAL at 15:34

## 2017-07-19 RX ADMIN — TAMSULOSIN HYDROCHLORIDE 0.4 MG: 0.4 CAPSULE ORAL at 07:29

## 2017-07-19 RX ADMIN — CARVEDILOL 12.5 MG: 12.5 TABLET, FILM COATED ORAL at 07:29

## 2017-07-19 NOTE — PROGRESS NOTES
Problem: Falls - Risk of  Goal: *Absence of falls  Outcome: Progressing Towards Goal  Patient free of falls during shift. Goal: *Knowledge of fall prevention  Outcome: Progressing Towards Goal  Patient educated to call when in need of assistance. Problem: Pressure Injury - Risk of  Goal: *Prevention of pressure ulcer  Outcome: Progressing Towards Goal  Patient repositioned from bed to chair to prevent skin breakdown.

## 2017-07-19 NOTE — PROGRESS NOTES
Bedside and Verbal shift change report given to bill (oncoming nurse) by Tanvir Durant (offgoing nurse). Report included the following information SBAR, Kardex, Intake/Output, Recent Results and Cardiac Rhythm sinus arrhythmia.

## 2017-07-19 NOTE — PROGRESS NOTES
11:45 Called Dr. Dyana Villarreal to inform him of patient stating he feels bad, nauseous, and thinks he stomach is going to explore. Patient has normal bowel sounds and his stomach is distended. MD ordered prn zofran and stated he will come up soon to assess him before adding anymore orders. 20:00 Bedside shift change report given to Varun Combs RN (oncoming nurse) by Maribell Barraza RN (offgoing nurse). Report included the following information SBAR, Kardex and Recent Results.
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
## 2017-07-19 NOTE — PROGRESS NOTES
Hospitalist Progress Note  Audra Thayer MD  Office: [phone]  Cell: [phone]      Date of Service:  2017  NAME:  Christian Best. :  1955  MRN:  [id number]      Admission Summary:   A 60year old male with history of BARGER cirrhosis, HTN, CAD s/p CABG, chronic systolic heart failure, GERD, DM-2, seizure disorder, peripheral neuropathy presented on 17 with reported confusion. He had previously been on a regimen of lactulose and rifaximin for hepatic encephalopathy, but had only been able to afford his lactulose. Interval history / Subjective:   Abdominal discomfort, he said he had loss stool bowel movement today     Assessment & Plan:      Altered mental status secondary to hepatic encephalopathy  -suspect secondary to his inability / non-adherence to rifaxamin; also possibly polypharmacy in regards to his psychiatric medications contributing, though seems to be reviewed previously by his psychiatrist  -CT head  - No acute intracranial abnormality and no change.  -continue lactulose and rifaxamin  -continue neuro check and supportive care  -seen by hepatology     Cirrhosis  -probable secondary to BARGER  -underlying etiology unclear, child class A, CTP 5, MELD score previously 16  -not a candidate for liver transplant due to cardiac disease  -noted hepatic encephalopathy not well controlled on lactulose alone  -had undergone endoscopy on 17 - no esophageal varices, no apparent portal gastropathy, no gastric varices     Hyponatremia, now resolved   - continue gentle IV fluids     CAD s/p CABG  -no chest  -continue home aspirin, atorvastatin and carvedilol     Chronic systolic heart failure NYHA Class I   -does not appear in acute exacerbation of this diagnosis currently  -NYHA class I upon admission  -Echo 2016 - EF 30%  -continue home carvedilol, eplerenone     DM-II with peripheral neuropathy  -sliding scale insulin coverage  -continue home pregabalin     Seizure disorder  -no reported seizures since December 2016  -continue home Keppra and Vimpat  -seizure precaution     Chronic thrombocytopenia   - likely secondary to cirrhosis   - no evident bleeding on examination   - continue to monitor     CKD stage III  -likely some slight dehydration as he is above baseline  -continue gentle IV fluid hydration       Code status: Full Code  DVT prophylaxis: SCD    Care Plan discussed with: Patient/Family, Nurse and   Disposition: TBD     Hospital Problems  Date Reviewed: 5/12/2017          Codes Class Noted POA    * (Principal)Hepatic encephalopathy ([facility] ICD-10-CM: K72.90  ICD-9-CM: 572.2  7/17/2017 Unknown                Vital Signs:    Last 24hrs VS reviewed since prior progress note. Most recent are:  Visit Vitals    /75 (BP 1 Location: Left arm, BP Patient Position: At rest)    Pulse 82    Temp 98.2 °F (36.8 °C)    Resp 18    Ht 5' 9.5\" (1.765 m)    Wt 90.5 kg (199 lb 8 oz)    SpO2 98%    BMI 29.04 kg/m2         Intake/Output Summary (Last 24 hours) at 07/19/17 1452  Last data filed at 07/19/17 4897   Gross per 24 hour   Intake              805 ml   Output              375 ml   Net              430 ml        Physical Examination:             Constitutional:  No acute distress, cooperative, pleasant    ENT:  Oral mucous moist, oropharynx benign. Neck supple,    Resp:  CTA bilaterally. No wheezing/rhonchi/rales. No accessory muscle use   CV:  Regular rhythm, normal rate, no murmurs, gallops, rubs    GI:  Soft, abdomen distended, non tender. normoactive bowel sounds, no hepatosplenomegaly     Musculoskeletal:  No edema    Neurologic:  Conscious and alert, sitting on the side of bed, oriented to person, moves all extremities.   CN II-XII reviewed     Skin:  Good turgor, no rashes or ulcers       Data Review:    Review and/or order of clinical lab test      Labs:     Recent Labs 07/19/17 0055 07/18/17 0222   WBC  8.3  8.0   HGB  12.9  12.6   HCT  39.2  39.2   PLT  136*  133*     Recent Labs      07/19/17 0055 07/18/17 0222 07/17/17 2101   NA  135*  138  134*   K  3.9  3.8  4.6   CL  107  109*  107   CO2  20*  20*  19*   BUN  17  24*  29*   CREA  1.49*  1.57*  1.71*   GLU  127*  74  89   CA  8.2*  8.5  8.4*   MG   --    --   1.8   PHOS   --    --   2.9     Recent Labs      07/19/17 0055 07/18/17 0222 07/17/17 2101   SGOT  21  20  33   ALT  23  21  24   AP  110  107  119*   TBILI  0.6  0.6  0.7   TP  6.8  6.8  7.5   ALB  3.2*  3.2*  3.5   GLOB  3.6  3.6  4.0     No results for input(s): INR, PTP, APTT in the last 72 hours. No lab exists for component: INREXT   No results for input(s): FE, TIBC, PSAT, FERR in the last 72 hours. No results found for: FOL, RBCF   No results for input(s): PH, PCO2, PO2 in the last 72 hours.   Recent Labs      07/17/17 2101   TROIQ  <0.04     No results found for: CHOL, CHOLX, CHLST, CHOLV, HDL, LDL, LDLC, DLDLP, TGLX, TRIGL, TRIGP, CHHD, CHHDX  Lab Results   Component Value Date/Time    Glucose (POC) 181 07/19/2017 11:48 AM    Glucose (POC) 118 07/19/2017 07:06 AM    Glucose (POC) 137 07/18/2017 09:31 PM    Glucose (POC) 140 07/18/2017 04:23 PM    Glucose (POC) 115 07/18/2017 11:01 AM     Lab Results   Component Value Date/Time    Color YELLOW/STRAW 07/17/2017 09:25 PM    Appearance CLEAR 07/17/2017 09:25 PM    Specific gravity 1.016 07/17/2017 09:25 PM    pH (UA) 5.5 07/17/2017 09:25 PM    Protein NEGATIVE  07/17/2017 09:25 PM    Glucose NEGATIVE  07/17/2017 09:25 PM    Ketone NEGATIVE  07/17/2017 09:25 PM    Bilirubin NEGATIVE  07/17/2017 09:25 PM    Urobilinogen 1.0 07/17/2017 09:25 PM    Nitrites NEGATIVE  07/17/2017 09:25 PM    Leukocyte Esterase NEGATIVE  07/17/2017 09:25 PM    Epithelial cells FEW 07/17/2017 09:25 PM    Bacteria NEGATIVE  07/17/2017 09:25 PM    WBC 0-4 07/17/2017 09:25 PM    RBC 0-5 07/17/2017 09:25 PM Medications Reviewed:     Current Facility-Administered Medications   Medication Dose Route Frequency    ondansetron (ZOFRAN) injection 4 mg  4 mg IntraVENous Q6H PRN    ALPRAZolam (XANAX) tablet 1 mg  1 mg Oral Q6H PRN    aspirin chewable tablet 81 mg  81 mg Oral DAILY    atorvastatin (LIPITOR) tablet 80 mg  80 mg Oral ACD    carvedilol (COREG) tablet 12.5 mg  12.5 mg Oral DAILY WITH BREAKFAST    clonazePAM (KlonoPIN) tablet 0.5 mg  0.5 mg Oral BID PRN    eplerenone (INSPRA) tablet 25 mg  25 mg Oral DAILY    FLUoxetine (PROzac) capsule 20 mg  20 mg Oral DAILY    lactulose (CHRONULAC) solution 30 g  30 g Oral TID    levETIRAcetam (KEPPRA) tablet 750 mg  750 mg Oral BID    pantoprazole (PROTONIX) tablet 40 mg  40 mg Oral ACB&D    pregabalin (LYRICA) capsule 50 mg  50 mg Oral TID    QUEtiapine (SEROquel) tablet 150 mg  150 mg Oral QHS    rifAXIMin (XIFAXAN) tablet 550 mg  550 mg Oral BID    tamsulosin (FLOMAX) capsule 0.4 mg  0.4 mg Oral ACB&D    venlafaxine-SR (EFFEXOR-XR) capsule 150 mg  150 mg Oral DAILY    venlafaxine-SR (EFFEXOR-XR) capsule 75 mg  75 mg Oral ACD    lacosamide (VIMPAT) tablet 100 mg  100 mg Oral BID    0.9% sodium chloride infusion  50 mL/hr IntraVENous CONTINUOUS    insulin lispro (HUMALOG) injection   SubCUTAneous AC&HS    glucose chewable tablet 16 g  4 Tab Oral PRN    glucagon (GLUCAGEN) injection 1 mg  1 mg IntraMUSCular PRN    sodium chloride (NS) flush 5-10 mL  5-10 mL IntraVENous Q8H    sodium chloride (NS) flush 5-10 mL  5-10 mL IntraVENous PRN    dextrose 10 % infusion 125-250 mL  125-250 mL IntraVENous PRN     ______________________________________________________________________  EXPECTED LENGTH OF STAY: 3d 7h  ACTUAL LENGTH OF STAY:          2                 Beti Reyes MD

## 2017-07-19 NOTE — PROGRESS NOTES
Bedside shift change report given to [facility] (oncoming nurse) by Maria Elena Harrell RN (offgoing nurse). Report included the following information SBAR, Kardex and Recent Results.

## 2017-07-20 LAB
ALBUMIN SERPL BCP-MCNC: 3 G/DL (ref 3.5–5)
ALBUMIN/GLOB SERPL: 0.8 {RATIO} (ref 1.1–2.2)
ALP SERPL-CCNC: 109 U/L (ref 45–117)
ALT SERPL-CCNC: 21 U/L (ref 12–78)
ANION GAP BLD CALC-SCNC: 9 MMOL/L (ref 5–15)
AST SERPL W P-5'-P-CCNC: 18 U/L (ref 15–37)
BILIRUB SERPL-MCNC: 0.8 MG/DL (ref 0.2–1)
BUN SERPL-MCNC: 14 MG/DL (ref 6–20)
BUN/CREAT SERPL: 10 (ref 12–20)
CALCIUM SERPL-MCNC: 8.3 MG/DL (ref 8.5–10.1)
CHLORIDE SERPL-SCNC: 107 MMOL/L (ref 97–108)
CO2 SERPL-SCNC: 21 MMOL/L (ref 21–32)
CREAT SERPL-MCNC: 1.35 MG/DL (ref 0.7–1.3)
ERYTHROCYTE [DISTWIDTH] IN BLOOD BY AUTOMATED COUNT: 16.2 % (ref 11.5–14.5)
GLOBULIN SER CALC-MCNC: 3.7 G/DL (ref 2–4)
GLUCOSE BLD STRIP.AUTO-MCNC: 123 MG/DL (ref 65–100)
GLUCOSE BLD STRIP.AUTO-MCNC: 137 MG/DL (ref 65–100)
GLUCOSE BLD STRIP.AUTO-MCNC: 139 MG/DL (ref 65–100)
GLUCOSE BLD STRIP.AUTO-MCNC: 216 MG/DL (ref 65–100)
GLUCOSE SERPL-MCNC: 99 MG/DL (ref 65–100)
HCT VFR BLD AUTO: 39.4 % (ref 36.6–50.3)
HGB BLD-MCNC: 12.8 G/DL (ref 12.1–17)
MCH RBC QN AUTO: 24.5 PG (ref 26–34)
MCHC RBC AUTO-ENTMCNC: 32.5 G/DL (ref 30–36.5)
MCV RBC AUTO: 75.3 FL (ref 80–99)
PLATELET # BLD AUTO: 131 K/UL (ref 150–400)
POTASSIUM SERPL-SCNC: 3.8 MMOL/L (ref 3.5–5.1)
PROT SERPL-MCNC: 6.7 G/DL (ref 6.4–8.2)
RBC # BLD AUTO: 5.23 M/UL (ref 4.1–5.7)
SERVICE CMNT-IMP: ABNORMAL
SODIUM SERPL-SCNC: 137 MMOL/L (ref 136–145)
WBC # BLD AUTO: 9.1 K/UL (ref 4.1–11.1)

## 2017-07-20 PROCEDURE — 36415 COLL VENOUS BLD VENIPUNCTURE: CPT | Performed by: HOSPITALIST

## 2017-07-20 PROCEDURE — 74011250637 HC RX REV CODE- 250/637: Performed by: HOSPITALIST

## 2017-07-20 PROCEDURE — 74011636637 HC RX REV CODE- 636/637: Performed by: HOSPITALIST

## 2017-07-20 PROCEDURE — 80053 COMPREHEN METABOLIC PANEL: CPT | Performed by: HOSPITALIST

## 2017-07-20 PROCEDURE — 97116 GAIT TRAINING THERAPY: CPT

## 2017-07-20 PROCEDURE — 82962 GLUCOSE BLOOD TEST: CPT

## 2017-07-20 PROCEDURE — 85027 COMPLETE CBC AUTOMATED: CPT | Performed by: HOSPITALIST

## 2017-07-20 PROCEDURE — 65270000032 HC RM SEMIPRIVATE

## 2017-07-20 RX ORDER — SODIUM CHLORIDE 0.9 % (FLUSH) 0.9 %
5-10 SYRINGE (ML) INJECTION EVERY 8 HOURS
Status: DISCONTINUED | OUTPATIENT
Start: 2017-07-20 | End: 2017-07-24 | Stop reason: HOSPADM

## 2017-07-20 RX ORDER — SODIUM CHLORIDE 0.9 % (FLUSH) 0.9 %
5-10 SYRINGE (ML) INJECTION AS NEEDED
Status: DISCONTINUED | OUTPATIENT
Start: 2017-07-20 | End: 2017-07-24 | Stop reason: HOSPADM

## 2017-07-20 RX ADMIN — PREGABALIN 50 MG: 25 CAPSULE ORAL at 15:13

## 2017-07-20 RX ADMIN — ASPIRIN 81 MG 81 MG: 81 TABLET ORAL at 10:50

## 2017-07-20 RX ADMIN — RIFAXIMIN 550 MG: 550 TABLET ORAL at 17:27

## 2017-07-20 RX ADMIN — LACTULOSE 30 G: 10 SOLUTION ORAL at 22:45

## 2017-07-20 RX ADMIN — TAMSULOSIN HYDROCHLORIDE 0.4 MG: 0.4 CAPSULE ORAL at 07:02

## 2017-07-20 RX ADMIN — TAMSULOSIN HYDROCHLORIDE 0.4 MG: 0.4 CAPSULE ORAL at 17:27

## 2017-07-20 RX ADMIN — PREGABALIN 50 MG: 25 CAPSULE ORAL at 22:45

## 2017-07-20 RX ADMIN — RIFAXIMIN 550 MG: 550 TABLET ORAL at 10:48

## 2017-07-20 RX ADMIN — VENLAFAXINE HYDROCHLORIDE 150 MG: 150 CAPSULE, EXTENDED RELEASE ORAL at 10:50

## 2017-07-20 RX ADMIN — CARVEDILOL 12.5 MG: 12.5 TABLET, FILM COATED ORAL at 10:50

## 2017-07-20 RX ADMIN — PREGABALIN 50 MG: 25 CAPSULE ORAL at 10:48

## 2017-07-20 RX ADMIN — LACTULOSE 30 G: 10 SOLUTION ORAL at 10:51

## 2017-07-20 RX ADMIN — FLUOXETINE 20 MG: 20 CAPSULE ORAL at 10:49

## 2017-07-20 RX ADMIN — Medication 10 ML: at 07:01

## 2017-07-20 RX ADMIN — INSULIN LISPRO 3 UNITS: 100 INJECTION, SOLUTION INTRAVENOUS; SUBCUTANEOUS at 12:57

## 2017-07-20 RX ADMIN — PANTOPRAZOLE SODIUM 40 MG: 40 TABLET, DELAYED RELEASE ORAL at 17:26

## 2017-07-20 RX ADMIN — ATORVASTATIN CALCIUM 80 MG: 40 TABLET, FILM COATED ORAL at 17:25

## 2017-07-20 RX ADMIN — LEVETIRACETAM 750 MG: 250 TABLET, FILM COATED ORAL at 17:26

## 2017-07-20 RX ADMIN — Medication 10 ML: at 22:46

## 2017-07-20 RX ADMIN — VENLAFAXINE HYDROCHLORIDE 75 MG: 37.5 CAPSULE, EXTENDED RELEASE ORAL at 17:27

## 2017-07-20 RX ADMIN — QUETIAPINE FUMARATE 150 MG: 25 TABLET, FILM COATED ORAL at 22:45

## 2017-07-20 RX ADMIN — EPLERENONE 25 MG: 25 TABLET ORAL at 10:48

## 2017-07-20 RX ADMIN — LACTULOSE 30 G: 10 SOLUTION ORAL at 15:13

## 2017-07-20 RX ADMIN — LACOSAMIDE 100 MG: 50 TABLET, FILM COATED ORAL at 10:48

## 2017-07-20 RX ADMIN — LACOSAMIDE 100 MG: 50 TABLET, FILM COATED ORAL at 17:26

## 2017-07-20 RX ADMIN — LEVETIRACETAM 750 MG: 250 TABLET, FILM COATED ORAL at 10:49

## 2017-07-20 RX ADMIN — PANTOPRAZOLE SODIUM 40 MG: 40 TABLET, DELAYED RELEASE ORAL at 07:02

## 2017-07-20 NOTE — PROGRESS NOTES
Bedside and Verbal shift change report given to [employer] (oncoming nurse) by FAWN Orellana RN (offgoing nurse). Report given with SBAR, Kardex, Intake/Output, MAR and Recent Results.

## 2017-07-20 NOTE — PROGRESS NOTES
[address] Anil Weinstein MD, AKOSUA Villalobos PA-C Durene Gambler, NP Harrell Duos, NP         at [address], [address] Kristi Shukla  22.     [phone]     FAX: [phone]    at [address], [address] [facility], [address]     [phone]     FAX: [phone]         HEPATOLOGY PROGRESS NOTE  The patient is a 60 y.o.  male who was first found to have chronic liver disease and cirrhosis when he was noted to have thrombocytopenia in 2016. The patient has not developed ascites. The patient has not developed edema.  Hepatic encephalopathy was poorly controlled with lactulose but improved with xifaxan. He stopped taking xifaxan because of cost.  He developed confusion and then was brought to ED. Mental status appears to be back to baseline. I am okay with him going to rehab in AM.    ASSESSMENT AND PLAN:  Cirrhosis  This is probably secondary to BARGER although this was never formally defined. CTP score is 8, CHild class B, MELD score 15.     Hepatic encephalopathy  Restarted on lactulose and xifaxan. Better this AM.  Continue both medications.     Thrombocytopenia  This is secondary to cirrhosis. NO bleeding. NO treatment needed.      PHYSICAL EXAMINATION:  VS: per nursing note  General:  No acute distress. Eyes:  Sclera anicteric. ENT:  No oral lesions. Thyroid normal.  Nodes:  No adenopathy. Skin:  Spider angiomata. No jaundice. Respiratory:  Lungs clear to auscultation. Cardiovascular:  Regular heart rate. Abdomen:  Soft non-tender, No obvious ascites. Extremities:  No lower extremity edema. Neurologic:  Lethargic but awake. Cranial nerves grossly intact. Asterixis present.     LABORATORY:  Results for Dinorah Sigala (MRN [id number]) as of 7/20/2017 18:50   Ref.  Range 7/17/2017 21:01 7/18/2017 02:22 7/19/2017 00:55 7/20/2017 04:14   WBC Latest Ref Range: 4.1 - 11.1 K/uL 8.0 8.0 8.3 9.1   HGB Latest Ref Range: 12.1 - 17.0 g/dL 12.9 12.6 12.9 12.8   PLATELET Latest Ref Range: 150 - 400 K/uL 140 (L) 133 (L) 136 (L) 131 (L)   Sodium Latest Ref Range: 136 - 145 mmol/L 134 (L) 138 135 (L) 137   Potassium Latest Ref Range: 3.5 - 5.1 mmol/L 4.6 3.8 3.9 3.8   Chloride Latest Ref Range: 97 - 108 mmol/L 107 109 (H) 107 107   CO2 Latest Ref Range: 21 - 32 mmol/L 19 (L) 20 (L) 20 (L) 21   Glucose Latest Ref Range: 65 - 100 mg/dL 89 74 127 (H) 99   BUN Latest Ref Range: 6 - 20 MG/DL 29 (H) 24 (H) 17 14   Creatinine Latest Ref Range: 0.70 - 1.30 MG/DL 1.71 (H) 1.57 (H) 1.49 (H) 1.35 (H)   Bilirubin, total Latest Ref Range: 0.2 - 1.0 MG/DL 0.7 0.6 0.6 0.8   Albumin Latest Ref Range: 3.5 - 5.0 g/dL 3.5 3.2 (L) 3.2 (L) 3.0 (L)   ALT (SGPT) Latest Ref Range: 12 - 78 U/L 24 21 23 21   AST Latest Ref Range: 15 - 37 U/L 33 20 21 18   Alk.  phosphatase Latest Ref Range: 45 - 117 U/L 119 (H) 107 110 109   Ammonia Latest Ref Range: <32 UMOL/L 41 (H) 42 (H)       Vi Fry MD  [facility] [place] of [address] [place] [address] Drive [address] [address] [place] [address], [facility] 22. [phone]

## 2017-07-20 NOTE — PROGRESS NOTES
CM noted PT recommendation for SNF placement for rehab. CM called daughter Ramonita Mora ([phone]) to discuss transition of care, she told this CM that she preferred [facility] from her past experience. Referral sent via [facility] to [facility]. Patient's daughter or a family friend who assists with patient's care will provide transportation to SNF. Daughter would like to be informed of discharge as soon as possible tomorrow because she has two medical appointments for her sister from 15 pm-3:30 pm. CM will continue to follow for transition of care and awaiting to hear from [facility].  Mahsa Whittaker MSA, RN, CRM

## 2017-07-20 NOTE — PROGRESS NOTES
Interdisciplinary team rounds were held 7/20/2017 with the following team members:Care Management, Nurse Practitioner, Occupational Therapy and Physician's Assistant. Needs OT evaluation in prep for SNF discharge. Plan of care discussed. See clinical pathway and/or care plan for interventions and desired outcomes. Per rounds: baseline dementia, O x2, RW, daughter A pm, wife passed in Dec. From ALS. Daughter stating patient with increased weakness recently, stating rehab to increase independence and safety in prep for discharge back home.

## 2017-07-20 NOTE — ROUTINE PROCESS
TRANSFER - IN REPORT:    Verbal report received from [facility] RN(name) on Eric Nielsen.  being received from [facility](unit) for routine progression of care      Report consisted of patients Situation, Background, Assessment and   Recommendations(SBAR). Information from the following report(s) SBAR was reviewed with the receiving nurse. Opportunity for questions and clarification was provided. Assessment completed upon patients arrival to unit and care assumed.

## 2017-07-20 NOTE — PROGRESS NOTES
Problem: Mobility Impaired (Adult and Pediatric)  Goal: *Acute Goals and Plan of Care (Insert Text)  Physical Therapy Goals  Initiated 7/19/2017  1. Patient will move from supine to sit and sit to supine in bed with supervision/set-up within 5 day(s). 2. Patient will transfer from bed to chair and chair to bed with supervision/set-up using the least restrictive device within 5 day(s). 3. Patient will perform sit to stand with supervision/set-up within 5 day(s). 4. Patient will ambulate with supervision/set-up for 125 feet with the least restrictive device within 5 day(s). 5. Patient will ascend/descend 3 stairs with handrail(s) per home needs with minimal assistance/contact guard assist within 5 day(s). PHYSICAL THERAPY TREATMENT  Patient: Cinthia Weeks (63 y.o. male)  Date: 7/20/2017  Diagnosis: Hepatic encephalopathy (HCC) Hepatic encephalopathy (HCC)       Precautions: Fall, Bed Alarm (AMS)      ASSESSMENT:  Pt received in bed pending transfer to general medical floor. He came to sitting at EOB with supervision and came to standing to a rolling walker with contact guard. He amb using the rolling walker 100 feet initially with contact guard ( for about first 20 feet) and then min assist, generally unsteady. At this point would recommend snf for rehab, especially considering his lack of self awareness, not aware of balance impairments during amb. Progression toward goals:  [ ]    Improving appropriately and progressing toward goals  [X]    Improving slowly and progressing toward goals  [ ]    Not making progress toward goals and plan of care will be adjusted       PLAN:  Patient continues to benefit from skilled intervention to address the above impairments. Continue treatment per established plan of care. Discharge Recommendations:  Skilled Nursing Facility  Further Equipment Recommendations for Discharge:  None if snf       SUBJECTIVE:   Patient stated It's been a rough weekend.  OBJECTIVE DATA SUMMARY:   Chart checked, pt cleared by nursing. Critical Behavior:  Neurologic State: Alert  Orientation Level: Oriented to person, Oriented to place  Cognition: Decreased attention/concentration, Follows commands, Memory loss, Recognition of people/places  Safety/Judgement: Fall prevention  Functional Mobility Training:  Bed Mobility:     Supine to Sit: Supervision  Sit to Supine: Supervision           Transfers:  Sit to Stand: Contact guard assistance  Stand to Sit: Contact guard assistance                             Balance:  Sitting: Intact; Without support  Standing: Impaired; With support  Ambulation/Gait Training:  Distance (ft): 100 Feet (ft)  Assistive Device: Gait belt;Walker, rolling  Ambulation - Level of Assistance: Minimal assistance        Gait Abnormalities: Decreased step clearance;Trunk sway increased              Speed/Usha: Slow  Step Length: Left shortened;Right shortened                               Stairs:                       Neuro Re-Education:     Therapeutic Exercises:      Pain:  Pain Scale 1: Numeric (0 - 10)  Pain Intensity 1: 0              Activity Tolerance:   Pt in NAD     After treatment:   [ ]    Patient left in no apparent distress sitting up in chair  [X]    Patient left in no apparent distress in bed  [X]    Call bell left within reach  [X]    Nursing notified  [ ]    Caregiver present  [X]    Bed alarm activated      COMMUNICATION/COLLABORATION:   The patients plan of care was discussed with: Registered Nurse     Elva Young   Time Calculation: 9 mins

## 2017-07-20 NOTE — PROGRESS NOTES
Problem: Pressure Injury - Risk of  Goal: *Prevention of pressure ulcer  Outcome: Progressing Towards Goal  Patient will not acquire any hospital related skin breakdown. Patient encouraged to change positions frequently to alleviate pressure to sacrum. Patient turns self. Patient ambulatory. No signs of skin breakdown present. Patient skin will be monitored throughout shift for changes. Patient skin remains clean, dry, and intact. Patient continent of bowel and bladder. Color appropriate for ethnicity.

## 2017-07-20 NOTE — PROGRESS NOTES
Problem: Falls - Risk of  Goal: *Absence of falls  Outcome: Progressing Towards Goal  Patient will not fall during hospitalization. Patient encouraged to call for needed assistance. Vitals signs remain stable and telemetry intact. Bed in lowest locked position. Bed alarm active and audible. Call bell within reach. Will continue to monitor.

## 2017-07-20 NOTE — PROGRESS NOTES
Hospitalist Progress Note  Monica Grier MD  Office: [phone]  Cell: [phone]      Date of Service:  2017  NAME:  Maty Jimenez. :  1955  MRN:  [id number]      Admission Summary:   A 60year old male with history of BARGER cirrhosis, HTN, CAD s/p CABG, chronic systolic heart failure, GERD, DM-2, seizure disorder, peripheral neuropathy presented on 17 with reported confusion. He had previously been on a regimen of lactulose and rifaximin for hepatic encephalopathy, but had only been able to afford his lactulose. Interval history / Subjective:   Abdominal discomfort, he said he had loss stool bowel movement      Assessment & Plan: Altered mental status secondary to hepatic encephalopathy  -suspect secondary to his inability / non-adherence to rifaxamin; also possibly polypharmacy in regards to his psychiatric medications contributing, though seems to be reviewed previously by his psychiatrist  -CT head  - No acute intracranial abnormality and no change.  -continue lactulose and rifaxamin  -continue neuro check and supportive care  -seen by hepatology     Cirrhosis  -probable secondary to BARGER  -underlying etiology unclear, child class A, CTP 5, MELD score previously 16  -not a candidate for liver transplant due to cardiac disease  -noted hepatic encephalopathy not well controlled on lactulose alone  -had undergone endoscopy on 17 - no esophageal varices, no apparent portal gastropathy, no gastric varices  -CT abdomen and pelvis show splenomegaly. No varices. Small liver is otherwise within normal limits. Noascites. Has there been tissue diagnosis of cirrhosis? No bowel obstruction. Prostatomegaly. No hydronephrosis. Evidence of osteoporosis.     Hyponatremia,   -now resolved  -continue gentle IV fluids     CAD s/p CABG  -no chest  -continue home aspirin, atorvastatin and carvedilol     Chronic systolic heart failure NYHA Class I   -does not appear in acute exacerbation of this diagnosis currently  -Echo 11/2016 - EF 30%  -continue home carvedilol, eplerenone     DM-II with peripheral neuropathy  -sliding scale insulin coverage  -continue home pregabalin     Seizure disorder  -no reported seizures since December 2016  -continue home Keppra and Vimpat  -seizure precaution     Chronic thrombocytopenia   - likely secondary to cirrhosis   - no evident bleeding on examination   - continue to monitor     CKD stage III  -likely some slight dehydration as he is above baseline, now improving  -d/c IVF       Code status: Full Code  DVT prophylaxis: SCD    Care Plan discussed with: Patient/Family, Nurse and   Disposition: TBD   Transfer to medical floor     Hospital Problems  Date Reviewed: 5/12/2017          Codes Class Noted POA    * (Principal)Hepatic encephalopathy ([facility] [address] ICD-10-CM: K72.90  ICD-9-CM: 572.2  7/17/2017 Unknown                Vital Signs:    Last 24hrs VS reviewed since prior progress note. Most recent are:  Visit Vitals    /69 (BP 1 Location: Right arm, BP Patient Position: Supine)    Pulse 68    Temp 97.6 °F (36.4 °C)    Resp 16    Ht 5' 9.5\" (1.765 m)    Wt 92.9 kg (204 lb 12.9 oz)    SpO2 96%    BMI 29.81 kg/m2         Intake/Output Summary (Last 24 hours) at 07/20/17 1037  Last data filed at 07/20/17 0404   Gross per 24 hour   Intake          1924.17 ml   Output              450 ml   Net          1474.17 ml        Physical Examination:             Constitutional:  No acute distress, cooperative, pleasant    ENT:  Oral mucous moist, oropharynx benign. Neck supple,    Resp:  CTA bilaterally. No wheezing/rhonchi/rales. No accessory muscle use   CV:  Regular rhythm, normal rate, no murmurs, gallops, rubs    GI:  Soft, abdomen distended, non tender.  normoactive bowel sounds, no hepatosplenomegaly     Musculoskeletal:  No edema    Neurologic:  Conscious and alert, well oriented,  moves all extremities, CN II-XII grossly intact. CN II-XII reviewed     Skin:  Good turgor, no rashes or ulcers       Data Review:    Review and/or order of clinical lab test      Labs:     Recent Labs      07/20/17 0414 07/19/17 0055   WBC  9.1  8.3   HGB  12.8  12.9   HCT  39.4  39.2   PLT  131*  136*     Recent Labs      07/20/17 0414 07/19/17 0055 07/18/17 0222 07/17/17 2101   NA  137  135*  138  134*   K  3.8  3.9  3.8  4.6   CL  107  107  109*  107   CO2  21  20*  20*  19*   BUN  14  17  24*  29*   CREA  1.35*  1.49*  1.57*  1.71*   GLU  99  127*  74  89   CA  8.3*  8.2*  8.5  8.4*   MG   --    --    --   1.8   PHOS   --    --    --   2.9     Recent Labs      07/20/17 0414 07/19/17 0055 07/18/17 0222   SGOT  18  21  20   ALT  21  23  21   AP  109  110  107   TBILI  0.8  0.6  0.6   TP  6.7  6.8  6.8   ALB  3.0*  3.2*  3.2*   GLOB  3.7  3.6  3.6     No results for input(s): INR, PTP, APTT in the last 72 hours. No lab exists for component: INREXT, INREXT   No results for input(s): FE, TIBC, PSAT, FERR in the last 72 hours. No results found for: FOL, RBCF   No results for input(s): PH, PCO2, PO2 in the last 72 hours.   Recent Labs      07/17/17   2101   TROIQ  <0.04     No results found for: CHOL, CHOLX, CHLST, CHOLV, HDL, LDL, LDLC, DLDLP, TGLX, TRIGL, TRIGP, CHHD, CHHDX  Lab Results   Component Value Date/Time    Glucose (POC) 123 07/20/2017 06:51 AM    Glucose (POC) 123 07/19/2017 09:33 PM    Glucose (POC) 149 07/19/2017 04:32 PM    Glucose (POC) 181 07/19/2017 11:48 AM    Glucose (POC) 118 07/19/2017 07:06 AM     Lab Results   Component Value Date/Time    Color YELLOW/STRAW 07/17/2017 09:25 PM    Appearance CLEAR 07/17/2017 09:25 PM    Specific gravity 1.016 07/17/2017 09:25 PM    pH (UA) 5.5 07/17/2017 09:25 PM    Protein NEGATIVE  07/17/2017 09:25 PM    Glucose NEGATIVE  07/17/2017 09:25 PM    Ketone NEGATIVE  07/17/2017 09:25 PM    Bilirubin NEGATIVE  07/17/2017 09:25 PM    Urobilinogen 1.0 07/17/2017 09:25 PM    Nitrites NEGATIVE  07/17/2017 09:25 PM    Leukocyte Esterase NEGATIVE  07/17/2017 09:25 PM    Epithelial cells FEW 07/17/2017 09:25 PM    Bacteria NEGATIVE  07/17/2017 09:25 PM    WBC 0-4 07/17/2017 09:25 PM    RBC 0-5 07/17/2017 09:25 PM         Medications Reviewed:     Current Facility-Administered Medications   Medication Dose Route Frequency    ondansetron (ZOFRAN) injection 4 mg  4 mg IntraVENous Q6H PRN    ALPRAZolam (XANAX) tablet 1 mg  1 mg Oral Q6H PRN    aspirin chewable tablet 81 mg  81 mg Oral DAILY    atorvastatin (LIPITOR) tablet 80 mg  80 mg Oral ACD    carvedilol (COREG) tablet 12.5 mg  12.5 mg Oral DAILY WITH BREAKFAST    clonazePAM (KlonoPIN) tablet 0.5 mg  0.5 mg Oral BID PRN    eplerenone (INSPRA) tablet 25 mg  25 mg Oral DAILY    FLUoxetine (PROzac) capsule 20 mg  20 mg Oral DAILY    lactulose (CHRONULAC) solution 30 g  30 g Oral TID    levETIRAcetam (KEPPRA) tablet 750 mg  750 mg Oral BID    pantoprazole (PROTONIX) tablet 40 mg  40 mg Oral ACB&D    pregabalin (LYRICA) capsule 50 mg  50 mg Oral TID    QUEtiapine (SEROquel) tablet 150 mg  150 mg Oral QHS    rifAXIMin (XIFAXAN) tablet 550 mg  550 mg Oral BID    tamsulosin (FLOMAX) capsule 0.4 mg  0.4 mg Oral ACB&D    venlafaxine-SR (EFFEXOR-XR) capsule 150 mg  150 mg Oral DAILY    venlafaxine-SR (EFFEXOR-XR) capsule 75 mg  75 mg Oral ACD    lacosamide (VIMPAT) tablet 100 mg  100 mg Oral BID    0.9% sodium chloride infusion  50 mL/hr IntraVENous CONTINUOUS    insulin lispro (HUMALOG) injection   SubCUTAneous AC&HS    glucose chewable tablet 16 g  4 Tab Oral PRN    glucagon (GLUCAGEN) injection 1 mg  1 mg IntraMUSCular PRN    sodium chloride (NS) flush 5-10 mL  5-10 mL IntraVENous Q8H    sodium chloride (NS) flush 5-10 mL  5-10 mL IntraVENous PRN    dextrose 10 % infusion 125-250 mL  125-250 mL IntraVENous PRN ______________________________________________________________________  EXPECTED LENGTH OF STAY: 3d 7h  ACTUAL LENGTH OF STAY:          3                 Alexandra Koch MD

## 2017-07-20 NOTE — INTERDISCIPLINARY ROUNDS
Interdisciplinary team rounds were held 7/20/2017 with the following team members:Care Management, Nursing, Nutrition and Physical Therapy and the patient. Plan of care discussed. See clinical pathway and/or care plan for interventions and desired outcomes.

## 2017-07-20 NOTE — PROGRESS NOTES
Primary Nurse Behzad Whyte RN and Debbie Godfrey RN performed a dual skin assessment on this patient Impairment noted- see wound doc flow sheet  Iván score is 20

## 2017-07-20 NOTE — PROGRESS NOTES
TRANSFER - OUT REPORT:    Verbal report given to Aby Nguyen on Evelyn Cowan.  being transferred to [facility] for routine progression of care       Report consisted of patients Situation, Background, Assessment and   Recommendations(SBAR). Information from the following report(s) SBAR, Kardex, Intake/Output, MAR and Cardiac Rhythm NSR BBB was reviewed with the receiving nurse. Lines:   Peripheral IV 07/17/17 Left Wrist (Active)   Site Assessment Clean, dry, & intact 7/20/2017 12:00 PM   Phlebitis Assessment 0 7/20/2017 12:00 PM   Infiltration Assessment 0 7/20/2017 12:00 PM   Dressing Status Clean, dry, & intact 7/20/2017 12:00 PM   Dressing Type Transparent;Tape 7/20/2017 12:00 PM   Hub Color/Line Status Pink;Capped 7/20/2017 12:00 PM   Action Taken Open ports on tubing capped 7/20/2017 12:00 PM   Alcohol Cap Used Yes 7/20/2017 12:00 PM        Opportunity for questions and clarification was provided.

## 2017-07-20 NOTE — ROUTINE PROCESS
Bedside and Verbal shift change report given to [address] (oncoming nurse) by Janie Phillips (offgoing nurse). Report included the following information SBAR, Kardex, Intake/Output, MAR, Accordion and Recent Results. All opportunity for clarification/questions given.

## 2017-07-21 ENCOUNTER — APPOINTMENT (OUTPATIENT)
Dept: CT IMAGING | Age: 62
DRG: 442 | End: 2017-07-21
Attending: HOSPITALIST
Payer: MEDICARE

## 2017-07-21 ENCOUNTER — TELEPHONE (OUTPATIENT)
Dept: NEUROLOGY | Age: 62
End: 2017-07-21

## 2017-07-21 LAB
ALBUMIN SERPL BCP-MCNC: 3.2 G/DL (ref 3.5–5)
ALBUMIN/GLOB SERPL: 0.8 {RATIO} (ref 1.1–2.2)
ALP SERPL-CCNC: 108 U/L (ref 45–117)
ALT SERPL-CCNC: 20 U/L (ref 12–78)
AMMONIA PLAS-SCNC: 32 UMOL/L
ANION GAP BLD CALC-SCNC: 14 MMOL/L (ref 5–15)
AST SERPL W P-5'-P-CCNC: 13 U/L (ref 15–37)
BILIRUB SERPL-MCNC: 0.6 MG/DL (ref 0.2–1)
BUN SERPL-MCNC: 19 MG/DL (ref 6–20)
BUN/CREAT SERPL: 11 (ref 12–20)
CALCIUM SERPL-MCNC: 8.6 MG/DL (ref 8.5–10.1)
CHLORIDE SERPL-SCNC: 105 MMOL/L (ref 97–108)
CO2 SERPL-SCNC: 17 MMOL/L (ref 21–32)
CREAT SERPL-MCNC: 1.78 MG/DL (ref 0.7–1.3)
ERYTHROCYTE [DISTWIDTH] IN BLOOD BY AUTOMATED COUNT: 16.6 % (ref 11.5–14.5)
GLOBULIN SER CALC-MCNC: 4 G/DL (ref 2–4)
GLUCOSE BLD STRIP.AUTO-MCNC: 115 MG/DL (ref 65–100)
GLUCOSE BLD STRIP.AUTO-MCNC: 122 MG/DL (ref 65–100)
GLUCOSE BLD STRIP.AUTO-MCNC: 135 MG/DL (ref 65–100)
GLUCOSE BLD STRIP.AUTO-MCNC: 139 MG/DL (ref 65–100)
GLUCOSE SERPL-MCNC: 181 MG/DL (ref 65–100)
HCT VFR BLD AUTO: 41.6 % (ref 36.6–50.3)
HGB BLD-MCNC: 13.4 G/DL (ref 12.1–17)
MAGNESIUM SERPL-MCNC: 2.1 MG/DL (ref 1.6–2.4)
MCH RBC QN AUTO: 24.6 PG (ref 26–34)
MCHC RBC AUTO-ENTMCNC: 32.2 G/DL (ref 30–36.5)
MCV RBC AUTO: 76.3 FL (ref 80–99)
PHOSPHATE SERPL-MCNC: 1.9 MG/DL (ref 2.6–4.7)
PLATELET # BLD AUTO: 140 K/UL (ref 150–400)
POTASSIUM SERPL-SCNC: 4.3 MMOL/L (ref 3.5–5.1)
PROT SERPL-MCNC: 7.2 G/DL (ref 6.4–8.2)
RBC # BLD AUTO: 5.45 M/UL (ref 4.1–5.7)
SERVICE CMNT-IMP: ABNORMAL
SODIUM SERPL-SCNC: 136 MMOL/L (ref 136–145)
WBC # BLD AUTO: 13.8 K/UL (ref 4.1–11.1)

## 2017-07-21 PROCEDURE — 82962 GLUCOSE BLOOD TEST: CPT

## 2017-07-21 PROCEDURE — 95816 EEG AWAKE AND DROWSY: CPT | Performed by: HOSPITALIST

## 2017-07-21 PROCEDURE — 85027 COMPLETE CBC AUTOMATED: CPT | Performed by: HOSPITALIST

## 2017-07-21 PROCEDURE — 74011250636 HC RX REV CODE- 250/636

## 2017-07-21 PROCEDURE — 80053 COMPREHEN METABOLIC PANEL: CPT | Performed by: HOSPITALIST

## 2017-07-21 PROCEDURE — 51798 US URINE CAPACITY MEASURE: CPT

## 2017-07-21 PROCEDURE — 74011250636 HC RX REV CODE- 250/636: Performed by: HOSPITALIST

## 2017-07-21 PROCEDURE — 82140 ASSAY OF AMMONIA: CPT | Performed by: HOSPITALIST

## 2017-07-21 PROCEDURE — 65270000032 HC RM SEMIPRIVATE

## 2017-07-21 PROCEDURE — 74011250637 HC RX REV CODE- 250/637: Performed by: HOSPITALIST

## 2017-07-21 PROCEDURE — 74011000258 HC RX REV CODE- 258: Performed by: HOSPITALIST

## 2017-07-21 PROCEDURE — 83735 ASSAY OF MAGNESIUM: CPT | Performed by: HOSPITALIST

## 2017-07-21 PROCEDURE — 84100 ASSAY OF PHOSPHORUS: CPT | Performed by: HOSPITALIST

## 2017-07-21 PROCEDURE — 36415 COLL VENOUS BLD VENIPUNCTURE: CPT | Performed by: HOSPITALIST

## 2017-07-21 PROCEDURE — C9254 INJECTION, LACOSAMIDE: HCPCS | Performed by: HOSPITALIST

## 2017-07-21 PROCEDURE — 70450 CT HEAD/BRAIN W/O DYE: CPT

## 2017-07-21 RX ORDER — HYDRALAZINE HYDROCHLORIDE 20 MG/ML
20 INJECTION INTRAMUSCULAR; INTRAVENOUS
Status: DISCONTINUED | OUTPATIENT
Start: 2017-07-21 | End: 2017-07-24 | Stop reason: HOSPADM

## 2017-07-21 RX ORDER — LORAZEPAM 2 MG/ML
INJECTION INTRAMUSCULAR
Status: COMPLETED
Start: 2017-07-21 | End: 2017-07-21

## 2017-07-21 RX ORDER — LORAZEPAM 2 MG/ML
2 INJECTION INTRAMUSCULAR AS NEEDED
Status: DISCONTINUED | OUTPATIENT
Start: 2017-07-21 | End: 2017-07-24 | Stop reason: HOSPADM

## 2017-07-21 RX ADMIN — QUETIAPINE FUMARATE 150 MG: 25 TABLET, FILM COATED ORAL at 22:00

## 2017-07-21 RX ADMIN — ATORVASTATIN CALCIUM 80 MG: 40 TABLET, FILM COATED ORAL at 17:38

## 2017-07-21 RX ADMIN — VENLAFAXINE HYDROCHLORIDE 75 MG: 37.5 CAPSULE, EXTENDED RELEASE ORAL at 17:42

## 2017-07-21 RX ADMIN — SODIUM CHLORIDE 100 MG: 900 INJECTION, SOLUTION INTRAVENOUS at 20:33

## 2017-07-21 RX ADMIN — SODIUM CHLORIDE 750 MG: 900 INJECTION, SOLUTION INTRAVENOUS at 09:46

## 2017-07-21 RX ADMIN — SODIUM CHLORIDE 750 MG: 900 INJECTION, SOLUTION INTRAVENOUS at 21:58

## 2017-07-21 RX ADMIN — PANTOPRAZOLE SODIUM 40 MG: 40 TABLET, DELAYED RELEASE ORAL at 07:05

## 2017-07-21 RX ADMIN — LORAZEPAM 2 MG: 2 INJECTION INTRAMUSCULAR; INTRAVENOUS at 10:10

## 2017-07-21 RX ADMIN — LACTULOSE 30 G: 10 SOLUTION ORAL at 17:40

## 2017-07-21 RX ADMIN — CARVEDILOL 12.5 MG: 12.5 TABLET, FILM COATED ORAL at 07:06

## 2017-07-21 RX ADMIN — Medication 2 MG: at 10:10

## 2017-07-21 RX ADMIN — PANTOPRAZOLE SODIUM 40 MG: 40 TABLET, DELAYED RELEASE ORAL at 17:40

## 2017-07-21 RX ADMIN — LACTULOSE 30 G: 10 SOLUTION ORAL at 22:01

## 2017-07-21 RX ADMIN — Medication 10 ML: at 17:41

## 2017-07-21 RX ADMIN — TAMSULOSIN HYDROCHLORIDE 0.4 MG: 0.4 CAPSULE ORAL at 17:41

## 2017-07-21 RX ADMIN — Medication 10 ML: at 22:02

## 2017-07-21 RX ADMIN — PREGABALIN 50 MG: 25 CAPSULE ORAL at 17:40

## 2017-07-21 RX ADMIN — TAMSULOSIN HYDROCHLORIDE 0.4 MG: 0.4 CAPSULE ORAL at 07:06

## 2017-07-21 RX ADMIN — PREGABALIN 50 MG: 25 CAPSULE ORAL at 22:00

## 2017-07-21 RX ADMIN — Medication 10 ML: at 07:06

## 2017-07-21 RX ADMIN — SODIUM CHLORIDE 100 MG: 900 INJECTION, SOLUTION INTRAVENOUS at 10:38

## 2017-07-21 RX ADMIN — RIFAXIMIN 550 MG: 550 TABLET ORAL at 17:41

## 2017-07-21 NOTE — CONSULTS
[address] [facility] [place] [address]   [facility]       Name:  Ap Langley   MR#:  [id number]   :  1955   Account #:  [de-identified]    Date of Consultation:  2017   Date of Adm:  2017       REQUESTING PHYSICIAN: Dr. Fabiana Rhoades. REASON FOR EVALUATION: Seizure. HISTORY OF PRESENT ILLNESS: The patient is a 62-year-old male   with a history of seizures that were diagnosed in young adulthood. He   sees Dr. Candi Torres as an outpatient. He has been doing well on   Keppra and Vimpat. His last seizure was about 7 months ago. He is   currently admitted with altered mental status. This was believed to be   related to hepatic encephalopathy. He was being treated with lactulose   and his mentation was improving. This morning at shift change he had   a brief 1 minute generalized tonic-clonic seizure. He had   another one about an hour ago. This lasted about 2 minutes. He   is now coming around and is starting to follow commands. He is   supposed to be on Keppra and Vimpat, but apparently has only been   getting Keppra. Vimpat has now been restarted. REVIEW OF SYSTEMS   Difficult due to postictal state. PAST MEDICAL HISTORY: As mentioned above. He also has history   of liver cirrhosis, hypertension, type 2 diabetes. HOME MEDICATIONS:    1. Vimpat 100 mg twice a day. 2. Keppra 750 mg twice a day. 3. Prozac. 4. Morphine. 5. Lactulose. 6. Glimepiride. 7. Aspirin. 8. Coreg. 9. Seroquel. 10. Flomax. 11. Effexor. ALLERGIES:   1. CODEINE. 2. DEMEROL. 3. METFORMIN. [address] SOCIAL HISTORY: Lives with his daughter, stopped smoking about 6   months ago, ambulates with a walker. FAMILY HISTORY: Noncontributory. PHYSICAL EXAMINATION   GENERAL: The patient is lying in bed in no acute distress. VITAL SIGNS: Blood pressure 147/75, temperature 97.5, pulse is 89.6. NEUROLOGIC: He is drowsy but easily aroused.  He nods appropriately. When asked is he in the hospital, he nods yes. He was   able to follow simple commands. Pupils are equal and reactive. Face is   symmetric. Tongue is midline. Muscle tone and bulk is normal. He was   able to squeeze with both hands and moves both legs when asked. Deep tendon reflexes are hypoactive and symmetric. Toes are   downgoing. Sensory, cerebellar and gait exam was deferred. HEART: Regular rate. CHEST: Clear. ABDOMEN: Soft. EXTREMITIES: No edema. LABORATORY DATA: CBC with WBC 13.8, hemoglobin 13.4,   hematocrit 41.6, platelets 954. Chemistries: Sodium 136, potassium   4.3, BUN 19, creatinine 1.78. Ammonia 32. CT scan of the brain done   earlier today did not show any acute abnormality. ASSESSMENT AND PLAN: A 48-year-old male with known history of   seizure disorder, who is currently admitted with hepatic   encephalopathy. He had 2 brief witnessed generalized seizures earlier   this morning. I believe this is most likely from him not getting his   routine doses of Vimpat. This has been now restarted. Continue   Keppra and Vimpat as both of these can be given IV even if the patient   is n.p.o. May use Ativan as needed if there is a seizure recurrence. No   additional workup indicated at this time. Thank you for this consultation.          Kenzie Perez MD      AS / AMI   D:  07/21/2017   12:12   T:  07/21/2017   14:22   Job #:  [id number]

## 2017-07-21 NOTE — PROGRESS NOTES
Hospitalist Progress Note  Jennifer Lawrence MD  Office: [phone]  Cell: [phone]      Date of Service:  2017  NAME:  Chelsie Weinstein. :  1955  MRN:  [id number]      Admission Summary:   A 60year old male with history of BARGER cirrhosis, HTN, CAD s/p CABG, chronic systolic heart failure, GERD, DM-2, seizure disorder, peripheral neuropathy presented on 17 with reported confusion. He had previously been on a regimen of lactulose and rifaximin for hepatic encephalopathy, but had only been able to afford his lactulose. Interval history / Subjective:   Seizure lasted for about 1 and 1/2 minutes      Assessment & Plan:     RRT for seizure on  at 7:54 am, with hx of Seizure disorder on antiepileptic meds  -had generalized tonic clonic seizure lasted 1 minute,   -post ictal state  -not received his keppra or vimpat this morning, change Keppra and Vimpat to IV  -CT head without contrast, EEG, cmp, cbc and ammonia level  -no reported seizure since 2016  -consult neurologist    Altered mental status secondary to hepatic encephalopathy  -suspect secondary to his inability / non-adherence to rifaxamin; also possibly polypharmacy in regards to his psychiatric medications contributing, though seems to be reviewed previously by his psychiatrist  -CT head  - No acute intracranial abnormality and no change.  -continue lactulose and rifaxamin  -mental status has been improving until this morning when he had seizure, continue neuro check and supportive care  -seen by hepatology     Cirrhosis  -underlying etiology unclear, probable BARGER, child class A,CTP 5, MELD score previously 16  -not a candidate for liver transplant due to cardiac disease  -had undergone endoscopy on 17 - no esophageal varices, no apparent portal gastropathy, no gastric varices  -CT abdomen and pelvis show splenomegaly. No varices.  Small liver is otherwise within normal limits. Noascites. Has there been tissue diagnosis of cirrhosis? No bowel obstruction. Prostatomegaly. No hydronephrosis. Evidence of osteoporosis.     Hyponatremia,   -now resolved  -continue gentle IV fluids     CAD s/p CABG  -no chest pain  -continue home aspirin, atorvastatin and carvedilol     Chronic systolic heart failure NYHA Class I   -does not appear in acute exacerbation of this diagnosis currently  -Echo 11/2016 - EF 30%  -continue home carvedilol, eplerenone     DM-II with peripheral neuropathy  -finger stick glucose 122-216/24 hrs, continue sliding scale insulin coverage  -continue home pregabalin     Chronic thrombocytopenia   - likely secondary to cirrhosis   - platelet stable, no evident bleeding on examination   - continue to monitor     CKD stage III  -likely some slight dehydration as he is above baseline, now improving  -d/c IVF       Code status: Full Code  DVT prophylaxis: SCD    Care Plan discussed with: Patient/Family, Nurse and   Disposition: TBD   Transfer to medical floor     Hospital Problems  Date Reviewed: 5/12/2017          Codes Class Noted POA    * (Principal)Hepatic encephalopathy ([facility] ICD-10-CM: K72.90  ICD-9-CM: 572.2  7/17/2017 Unknown                Vital Signs:    Last 24hrs VS reviewed since prior progress note. Most recent are:  Visit Vitals    BP (!) 199/96    Pulse (!) 116    Temp 98.2 °F (36.8 °C)    Resp 20    Ht 5' 9.5\" (1.765 m)    Wt 91.6 kg (202 lb)    SpO2 100%    BMI 29.4 kg/m2         Intake/Output Summary (Last 24 hours) at 07/21/17 0803  Last data filed at 07/21/17 0454   Gross per 24 hour   Intake              750 ml   Output             1850 ml   Net            -1100 ml        Physical Examination:             Constitutional:  No acute distress, cooperative, pleasant    ENT:  Oral mucous moist, oropharynx benign. Neck supple,    Resp:  CTA bilaterally. No wheezing/rhonchi/rales.  No accessory muscle use   CV: Regular rhythm, normal rate, no murmurs, gallops, rubs    GI:  Soft, abdomen distended, non tender. normoactive bowel sounds, no hepatosplenomegaly     Musculoskeletal:  No edema    Neurologic:  unresponsive, post ictal state,     Skin:  Good turgor, no rashes or ulcers       Data Review:    Review and/or order of clinical lab test      Labs:     Recent Labs      07/20/17 0414 07/19/17 0055   WBC  9.1  8.3   HGB  12.8  12.9   HCT  39.4  39.2   PLT  131*  136*     Recent Labs      07/20/17 0414 07/19/17 0055   NA  137  135*   K  3.8  3.9   CL  107  107   CO2  21  20*   BUN  14  17   CREA  1.35*  1.49*   GLU  99  127*   CA  8.3*  8.2*     Recent Labs      07/20/17 0414 07/19/17 0055   SGOT  18  21   ALT  21  23   AP  109  110   TBILI  0.8  0.6   TP  6.7  6.8   ALB  3.0*  3.2*   GLOB  3.7  3.6     No results for input(s): INR, PTP, APTT in the last 72 hours. No lab exists for component: INREXT, INREXT   No results for input(s): FE, TIBC, PSAT, FERR in the last 72 hours. No results found for: FOL, RBCF   No results for input(s): PH, PCO2, PO2 in the last 72 hours. No results for input(s): CPK, CKNDX, TROIQ in the last 72 hours.     No lab exists for component: CPKMB  No results found for: CHOL, CHOLX, CHLST, CHOLV, HDL, LDL, LDLC, DLDLP, TGLX, TRIGL, TRIGP, CHHD, CHHDX  Lab Results   Component Value Date/Time    Glucose (POC) 122 07/21/2017 06:32 AM    Glucose (POC) 137 07/20/2017 09:19 PM    Glucose (POC) 139 07/20/2017 04:43 PM    Glucose (POC) 216 07/20/2017 12:10 PM    Glucose (POC) 123 07/20/2017 06:51 AM     Lab Results   Component Value Date/Time    Color YELLOW/STRAW 07/17/2017 09:25 PM    Appearance CLEAR 07/17/2017 09:25 PM    Specific gravity 1.016 07/17/2017 09:25 PM    pH (UA) 5.5 07/17/2017 09:25 PM    Protein NEGATIVE  07/17/2017 09:25 PM    Glucose NEGATIVE  07/17/2017 09:25 PM    Ketone NEGATIVE  07/17/2017 09:25 PM    Bilirubin NEGATIVE  07/17/2017 09:25 PM    Urobilinogen 1.0 07/17/2017 09:25 PM    Nitrites NEGATIVE  07/17/2017 09:25 PM    Leukocyte Esterase NEGATIVE  07/17/2017 09:25 PM    Epithelial cells FEW 07/17/2017 09:25 PM    Bacteria NEGATIVE  07/17/2017 09:25 PM    WBC 0-4 07/17/2017 09:25 PM    RBC 0-5 07/17/2017 09:25 PM         Medications Reviewed:     Current Facility-Administered Medications   Medication Dose Route Frequency    levETIRAcetam (KEPPRA) 750 mg in 0.9% sodium chloride IVPB  750 mg IntraVENous Q12H    lacosamide (VIMPAT) 100 mg in 0.9% sodium chloride IVPB  100 mg IntraVENous Q12H    hydrALAZINE (APRESOLINE) 20 mg/mL injection 20 mg  20 mg IntraVENous Q6H PRN    sodium chloride (NS) flush 5-10 mL  5-10 mL IntraVENous Q8H    sodium chloride (NS) flush 5-10 mL  5-10 mL IntraVENous PRN    ondansetron (ZOFRAN) injection 4 mg  4 mg IntraVENous Q6H PRN    ALPRAZolam (XANAX) tablet 1 mg  1 mg Oral Q6H PRN    aspirin chewable tablet 81 mg  81 mg Oral DAILY    atorvastatin (LIPITOR) tablet 80 mg  80 mg Oral ACD    carvedilol (COREG) tablet 12.5 mg  12.5 mg Oral DAILY WITH BREAKFAST    clonazePAM (KlonoPIN) tablet 0.5 mg  0.5 mg Oral BID PRN    eplerenone (INSPRA) tablet 25 mg  25 mg Oral DAILY    FLUoxetine (PROzac) capsule 20 mg  20 mg Oral DAILY    lactulose (CHRONULAC) solution 30 g  30 g Oral TID    pantoprazole (PROTONIX) tablet 40 mg  40 mg Oral ACB&D    pregabalin (LYRICA) capsule 50 mg  50 mg Oral TID    QUEtiapine (SEROquel) tablet 150 mg  150 mg Oral QHS    rifAXIMin (XIFAXAN) tablet 550 mg  550 mg Oral BID    tamsulosin (FLOMAX) capsule 0.4 mg  0.4 mg Oral ACB&D    venlafaxine-SR (EFFEXOR-XR) capsule 150 mg  150 mg Oral DAILY    venlafaxine-SR (EFFEXOR-XR) capsule 75 mg  75 mg Oral ACD    insulin lispro (HUMALOG) injection   SubCUTAneous AC&HS    glucose chewable tablet 16 g  4 Tab Oral PRN    glucagon (GLUCAGEN) injection 1 mg  1 mg IntraMUSCular PRN    sodium chloride (NS) flush 5-10 mL  5-10 mL IntraVENous Q8H    sodium chloride (NS) flush 5-10 mL  5-10 mL IntraVENous PRN    dextrose 10 % infusion 125-250 mL  125-250 mL IntraVENous PRN     ______________________________________________________________________  EXPECTED LENGTH OF STAY: 3d 7h  ACTUAL LENGTH OF STAY:          4                 Ibeth Borjas MD

## 2017-07-21 NOTE — PROGRESS NOTES
Patient observed seizing, patient turned on side and rapid response called. MD at bedside, orders given/received. Patient to STAT Head CT.

## 2017-07-21 NOTE — CONSULTS
Consult dictated. Had 2 brief witnessed GTC seizure this morning. Has not been receiving Vimpat. Only Keppra. Now both restarted.   Chava Puckett MD

## 2017-07-21 NOTE — TELEPHONE ENCOUNTER
Pt wife calling to let you know that pt is currently in hospital. Informed pt wife that Dr. Lamont Robles is currently the neurologist on call and would be consulted if needed.

## 2017-07-21 NOTE — PROGRESS NOTES
Occupational Therapy 1246    Chart reviewed in preparation for OT evaluation. Noted pt with two seizures this morning (first seizure at 7:54 then second seizure at 1010). Neurology has been consulted and plan to adjust medications noted; also noted EEG completed but not read. Will defer patient at this time and follow up as appropriate for evaluation. Noted PT recommendation for SNF placement.        Tamika Busby OTR/L

## 2017-07-21 NOTE — PROGRESS NOTES
Patient had an episode of seizure again while RN was drawing blood. Started at 10:10 which lasted for about 2 min. MD called, Ativan given.

## 2017-07-21 NOTE — PROGRESS NOTES
Spiritual Care Assessment/Progress Notes    Fulton Yomi [id number]  [id number]    1955  60 y.o.  male    Patient Telephone Number: [phone] (home)   Episcopal Affiliation: [employer]   Language: English   Extended Emergency Contact Information  Primary Emergency Contact: [address]  Mobile Phone: [phone]  Relation: Daughter   Patient Active Problem List    Diagnosis Date Noted    Hepatic encephalopathy ([facility] 07/17/2017    Neuropathy ([facility] 04/14/2017    Cirrhosis ([facility] 04/14/2017    CAD (coronary artery disease) 04/14/2017    S/P coronary artery stent placement 04/14/2017    S/P CABG (coronary artery bypass graft) 04/14/2017    Thrombocytopenia ([facility] 04/14/2017    MRSA infection 04/14/2017    S/P cholecystectomy 24/96/7889    Metabolic encephalopathy 15/26/0402    Seizure ([facility] 11/21/2016    Sinusitis     Joint pain     Low back pain     GERD (gastroesophageal reflux disease)     Diabetes mellitus, type 2 ([facility]         Date: 7/21/2017       Level of Episcopal/Spiritual Activity:  []         Involved in dipika tradition/spiritual practice    []         Not involved in dipika tradition/spiritual practice  []         Spiritually oriented    []         Claims no spiritual orientation    []         seeking spiritual identity  []         Feels alienated from Protestant practice/tradition  []         Feels angry about Protestant practice/tradition  []         Spirituality/Protestant tradition a resource for coping at this time.   [x]         Not able to assess due to medical condition    Services Provided Today:  []         crisis intervention    []         reading Scriptures  []         spiritual assessment    []         prayer  []         empathic listening/emotional support  []         rites and rituals (cite in comments)  []         life review     []         Protestant support  []         theological development   []         advocacy  [] ethical dialog     []         blessing  []         bereavement support    []         support to family  []         anticipatory grief support   []         help with AMD  []         spiritual guidance    []         meditation      Spiritual Care Needs  []         Emotional Support  []         Spiritual/Restorationism Care  []         Loss/Adjustment  []         Advocacy/Referral                /Ethics  []         No needs expressed at               this time  []         Other: (note in               comments)  [address]  []         Follow up visits with               pt/family  []         Provide materials  []         Schedule sacraments  []         Contact Community               Clergy  [x]         Follow up as needed  []         Other: (note in               comments)   Responded to RRT in 508/2. Unable to assess due to clinical activity. No family present at this time. Will follow as needed. AARON Mccurdy. Juan Miguel Berg

## 2017-07-21 NOTE — PROGRESS NOTES
Bedside shift change report given to Lizeth Mahoney RN (oncoming nurse) by Allie Roy RN (offgoing nurse). Report included the following information SBAR, Kardex, Intake/Output, MAR and Recent Results.

## 2017-07-22 LAB
ALBUMIN SERPL BCP-MCNC: 3.1 G/DL (ref 3.5–5)
ALBUMIN/GLOB SERPL: 0.9 {RATIO} (ref 1.1–2.2)
ALP SERPL-CCNC: 101 U/L (ref 45–117)
ALT SERPL-CCNC: 17 U/L (ref 12–78)
ANION GAP BLD CALC-SCNC: 7 MMOL/L (ref 5–15)
AST SERPL W P-5'-P-CCNC: 10 U/L (ref 15–37)
BACTERIA SPEC CULT: NORMAL
BILIRUB SERPL-MCNC: 0.8 MG/DL (ref 0.2–1)
BUN SERPL-MCNC: 18 MG/DL (ref 6–20)
BUN/CREAT SERPL: 13 (ref 12–20)
CALCIUM SERPL-MCNC: 7.9 MG/DL (ref 8.5–10.1)
CHLORIDE SERPL-SCNC: 108 MMOL/L (ref 97–108)
CO2 SERPL-SCNC: 24 MMOL/L (ref 21–32)
CREAT SERPL-MCNC: 1.43 MG/DL (ref 0.7–1.3)
ERYTHROCYTE [DISTWIDTH] IN BLOOD BY AUTOMATED COUNT: 16.3 % (ref 11.5–14.5)
GLOBULIN SER CALC-MCNC: 3.5 G/DL (ref 2–4)
GLUCOSE BLD STRIP.AUTO-MCNC: 141 MG/DL (ref 65–100)
GLUCOSE BLD STRIP.AUTO-MCNC: 158 MG/DL (ref 65–100)
GLUCOSE BLD STRIP.AUTO-MCNC: 171 MG/DL (ref 65–100)
GLUCOSE BLD STRIP.AUTO-MCNC: 90 MG/DL (ref 65–100)
GLUCOSE SERPL-MCNC: 89 MG/DL (ref 65–100)
HCT VFR BLD AUTO: 39.9 % (ref 36.6–50.3)
HGB BLD-MCNC: 13.1 G/DL (ref 12.1–17)
MCH RBC QN AUTO: 25 PG (ref 26–34)
MCHC RBC AUTO-ENTMCNC: 32.8 G/DL (ref 30–36.5)
MCV RBC AUTO: 76.3 FL (ref 80–99)
PLATELET # BLD AUTO: 132 K/UL (ref 150–400)
POTASSIUM SERPL-SCNC: 4 MMOL/L (ref 3.5–5.1)
PROT SERPL-MCNC: 6.6 G/DL (ref 6.4–8.2)
RBC # BLD AUTO: 5.23 M/UL (ref 4.1–5.7)
SERVICE CMNT-IMP: ABNORMAL
SERVICE CMNT-IMP: NORMAL
SERVICE CMNT-IMP: NORMAL
SODIUM SERPL-SCNC: 139 MMOL/L (ref 136–145)
WBC # BLD AUTO: 13.3 K/UL (ref 4.1–11.1)

## 2017-07-22 PROCEDURE — 65270000032 HC RM SEMIPRIVATE

## 2017-07-22 PROCEDURE — 36415 COLL VENOUS BLD VENIPUNCTURE: CPT | Performed by: HOSPITALIST

## 2017-07-22 PROCEDURE — 74011250637 HC RX REV CODE- 250/637: Performed by: HOSPITALIST

## 2017-07-22 PROCEDURE — 82962 GLUCOSE BLOOD TEST: CPT

## 2017-07-22 PROCEDURE — 85027 COMPLETE CBC AUTOMATED: CPT | Performed by: HOSPITALIST

## 2017-07-22 PROCEDURE — 51798 US URINE CAPACITY MEASURE: CPT

## 2017-07-22 PROCEDURE — 74011000258 HC RX REV CODE- 258: Performed by: HOSPITALIST

## 2017-07-22 PROCEDURE — 74011250636 HC RX REV CODE- 250/636: Performed by: HOSPITALIST

## 2017-07-22 PROCEDURE — 74011636637 HC RX REV CODE- 636/637: Performed by: HOSPITALIST

## 2017-07-22 PROCEDURE — C9254 INJECTION, LACOSAMIDE: HCPCS | Performed by: HOSPITALIST

## 2017-07-22 PROCEDURE — 80053 COMPREHEN METABOLIC PANEL: CPT | Performed by: HOSPITALIST

## 2017-07-22 RX ORDER — LACOSAMIDE 50 MG/1
100 TABLET ORAL EVERY 12 HOURS
Status: DISCONTINUED | OUTPATIENT
Start: 2017-07-22 | End: 2017-07-24 | Stop reason: HOSPADM

## 2017-07-22 RX ADMIN — EPLERENONE 25 MG: 25 TABLET ORAL at 09:11

## 2017-07-22 RX ADMIN — PANTOPRAZOLE SODIUM 40 MG: 40 TABLET, DELAYED RELEASE ORAL at 15:54

## 2017-07-22 RX ADMIN — INSULIN LISPRO 2 UNITS: 100 INJECTION, SOLUTION INTRAVENOUS; SUBCUTANEOUS at 17:56

## 2017-07-22 RX ADMIN — RIFAXIMIN 550 MG: 550 TABLET ORAL at 09:12

## 2017-07-22 RX ADMIN — ASPIRIN 81 MG 81 MG: 81 TABLET ORAL at 09:12

## 2017-07-22 RX ADMIN — SODIUM CHLORIDE 750 MG: 900 INJECTION, SOLUTION INTRAVENOUS at 21:12

## 2017-07-22 RX ADMIN — SODIUM CHLORIDE 750 MG: 900 INJECTION, SOLUTION INTRAVENOUS at 10:54

## 2017-07-22 RX ADMIN — RIFAXIMIN 550 MG: 550 TABLET ORAL at 17:56

## 2017-07-22 RX ADMIN — SODIUM CHLORIDE 100 MG: 900 INJECTION, SOLUTION INTRAVENOUS at 09:13

## 2017-07-22 RX ADMIN — CARVEDILOL 12.5 MG: 12.5 TABLET, FILM COATED ORAL at 07:18

## 2017-07-22 RX ADMIN — ALPRAZOLAM 1 MG: 1 TABLET ORAL at 15:55

## 2017-07-22 RX ADMIN — FLUOXETINE 20 MG: 20 CAPSULE ORAL at 09:11

## 2017-07-22 RX ADMIN — PREGABALIN 50 MG: 25 CAPSULE ORAL at 21:04

## 2017-07-22 RX ADMIN — Medication 10 ML: at 21:00

## 2017-07-22 RX ADMIN — LEVETIRACETAM 750 MG: 500 TABLET, FILM COATED ORAL at 11:00

## 2017-07-22 RX ADMIN — PREGABALIN 50 MG: 25 CAPSULE ORAL at 09:12

## 2017-07-22 RX ADMIN — ATORVASTATIN CALCIUM 80 MG: 40 TABLET, FILM COATED ORAL at 15:55

## 2017-07-22 RX ADMIN — LACTULOSE 30 G: 10 SOLUTION ORAL at 15:08

## 2017-07-22 RX ADMIN — VENLAFAXINE HYDROCHLORIDE 150 MG: 150 CAPSULE, EXTENDED RELEASE ORAL at 09:12

## 2017-07-22 RX ADMIN — TAMSULOSIN HYDROCHLORIDE 0.4 MG: 0.4 CAPSULE ORAL at 07:18

## 2017-07-22 RX ADMIN — INSULIN LISPRO 4 UNITS: 100 INJECTION, SOLUTION INTRAVENOUS; SUBCUTANEOUS at 12:36

## 2017-07-22 RX ADMIN — PREGABALIN 50 MG: 25 CAPSULE ORAL at 15:08

## 2017-07-22 RX ADMIN — VENLAFAXINE HYDROCHLORIDE 75 MG: 37.5 CAPSULE, EXTENDED RELEASE ORAL at 15:54

## 2017-07-22 RX ADMIN — CLONAZEPAM 0.5 MG: 0.5 TABLET ORAL at 17:56

## 2017-07-22 RX ADMIN — QUETIAPINE FUMARATE 150 MG: 25 TABLET, FILM COATED ORAL at 21:02

## 2017-07-22 RX ADMIN — TAMSULOSIN HYDROCHLORIDE 0.4 MG: 0.4 CAPSULE ORAL at 15:55

## 2017-07-22 RX ADMIN — LEVETIRACETAM 750 MG: 500 TABLET, FILM COATED ORAL at 20:59

## 2017-07-22 RX ADMIN — Medication 10 ML: at 07:19

## 2017-07-22 RX ADMIN — PANTOPRAZOLE SODIUM 40 MG: 40 TABLET, DELAYED RELEASE ORAL at 07:19

## 2017-07-22 RX ADMIN — LACTULOSE 30 G: 10 SOLUTION ORAL at 21:06

## 2017-07-22 RX ADMIN — LACOSAMIDE 100 MG: 50 TABLET, FILM COATED ORAL at 20:57

## 2017-07-22 RX ADMIN — LACTULOSE 30 G: 10 SOLUTION ORAL at 09:12

## 2017-07-22 NOTE — PROGRESS NOTES
Bedside and Verbal shift change report given to Diaz Gonzalez (oncoming nurse) by Murali Amaya (offgoing nurse). Report included the following information SBAR, Meds, Night shift issues.

## 2017-07-22 NOTE — PROGRESS NOTES
Bedside shift change report given to Diaz Mares RN  (oncoming nurse) by Alex Lyles RN  (offgoing nurse). Report included the following information SBAR and MAR.

## 2017-07-22 NOTE — PROGRESS NOTES
Hospitalist Progress Note  Salma Caal MD  Office: [phone]  Cell: [phone]      Date of Service:  2017  NAME:  Whit Peña. :  1955  MRN:  [id number]      Admission Summary:   A 60year old male with history of BARGER cirrhosis, HTN, CAD s/p CABG, chronic systolic heart failure, GERD, DM-2, seizure disorder, peripheral neuropathy presented on 17 with reported confusion. He had previously been on a regimen of lactulose and rifaximin for hepatic encephalopathy, but had only been able to afford his lactulose.     Interval history / Subjective:   He feels nausea, no seizure overnight, no headache, chest pain or vomiting     Assessment & Plan:     RRT for seizure on  at 7:54 am, with hx of Seizure disorder on antiepileptic meds  -had generalized tonic clonic seizure lasted 1 minute x 2 on  with post ictal phase  -not received his keppra or vimpat in the morning,  -patient conscious and alert, switch IV Keppra and Vimpat to po   -CT head without contrast  no acute intracranial processs, EEG pending, ammonia level 32  -no reported seizure since 2016  -seen by neurologist    Altered mental status secondary to hepatic encephalopathy  -suspect secondary to his inability / non-adherence to rifaxamin; also possibly polypharmacy in regards to his psychiatric medications contributing, though seems to be reviewed previously by his psychiatrist  -CT head  - No acute intracranial abnormality and no change.  -continue lactulose and rifaxamin  -mental status has been improving, continue neuro check and supportive care  -seen by hepatology     Cirrhosis  -underlying etiology unclear, probable BARGER, child class A,CTP 5, MELD score previously 16  -not a candidate for liver transplant due to cardiac disease  -had undergone endoscopy on 17 - no esophageal varices, no apparent portal gastropathy, no gastric varices  -CT abdomen and pelvis show splenomegaly. No varices. Small liver is otherwise within normal limits. Noascites. Has there been tissue diagnosis of cirrhosis? No bowel obstruction. Prostatomegaly. No hydronephrosis. Evidence of osteoporosis.     Hyponatremia,   -now resolved  -continue gentle IV fluids     CAD s/p CABG  -no chest pain  -continue home aspirin, atorvastatin and carvedilol     Chronic systolic heart failure NYHA Class I   -does not appear in acute exacerbation of this diagnosis currently  -Echo 11/2016 - EF 30%  -continue home carvedilol, eplerenone     DM-II with peripheral neuropathy  -finger stick glucose 122-216/24 hrs, continue sliding scale insulin coverage  -continue home pregabalin     Chronic thrombocytopenia   - likely secondary to cirrhosis   - platelet stable, no evident bleeding on examination   - continue to monitor     CKD stage III  -likely some slight dehydration as he is above baseline, now improving  -d/c IVF       Code status: Full Code  DVT prophylaxis: SCD    Care Plan discussed with: Patient/Family, Nurse and   Disposition: SNF  Tried to update his daughter, Nisa Wayne on the phone , it was a voice mail, will try later     Hospital Problems  Date Reviewed: 5/12/2017          Codes Class Noted POA    * (Principal)Hepatic encephalopathy ([facility] ICD-10-CM: K72.90  ICD-9-CM: 572.2  7/17/2017 Unknown                Vital Signs:    Last 24hrs VS reviewed since prior progress note.  Most recent are:  Visit Vitals    /76 (BP 1 Location: Left arm, BP Patient Position: At rest)    Pulse 80    Temp 98.7 °F (37.1 °C)    Resp 18    Ht 5' 9.5\" (1.765 m)    Wt 94.3 kg (207 lb 14.3 oz)    SpO2 97%    BMI 30.26 kg/m2         Intake/Output Summary (Last 24 hours) at 07/22/17 1006  Last data filed at 07/22/17 0622   Gross per 24 hour   Intake              760 ml   Output             1000 ml   Net             -240 ml        Physical Examination: Constitutional:  No acute distress, cooperative, pleasant    ENT:  Oral mucous moist, oropharynx benign. Neck supple,    Resp:  CTA bilaterally. No wheezing/rhonchi/rales. No accessory muscle use   CV:  Regular rhythm, normal rate, no murmurs, gallops, rubs    GI:  Soft, abdomen distended, non tender. normoactive bowel sounds, no hepatosplenomegaly     Musculoskeletal:  No edema    Neurologic:  conscious and alert, answer simple questions, orient to person, moves all extremities     Skin:  Good turgor, no rashes or ulcers       Data Review:    Review and/or order of clinical lab test      Labs:     Recent Labs      07/22/17   0608 07/21/17   1030   WBC  13.3*  13.8*   HGB  13.1  13.4   HCT  39.9  41.6   PLT  132*  140*     Recent Labs      07/22/17   0608  07/21/17   1030  07/20/17   0414   NA  139  136  137   K  4.0  4.3  3.8   CL  108  105  107   CO2  24  17*  21   BUN  18  19  14   CREA  1.43*  1.78*  1.35*   GLU  89  181*  99   CA  7.9*  8.6  8.3*   MG   --   2.1   --    PHOS   --   1.9*   --      Recent Labs      07/22/17   0608  07/21/17   1030  07/20/17   0414   SGOT  10*  13*  18   ALT  17  20  21   AP  101  108  109   TBILI  0.8  0.6  0.8   TP  6.6  7.2  6.7   ALB  3.1*  3.2*  3.0*   GLOB  3.5  4.0  3.7     No results for input(s): INR, PTP, APTT in the last 72 hours. No lab exists for component: INREXT, INREXT   No results for input(s): FE, TIBC, PSAT, FERR in the last 72 hours. No results found for: FOL, RBCF   No results for input(s): PH, PCO2, PO2 in the last 72 hours. No results for input(s): CPK, CKNDX, TROIQ in the last 72 hours.     No lab exists for component: CPKMB  No results found for: CHOL, CHOLX, CHLST, CHOLV, HDL, LDL, LDLC, DLDLP, TGLX, TRIGL, TRIGP, CHHD, CHHDX  Lab Results   Component Value Date/Time    Glucose (POC) 90 07/22/2017 06:12 AM    Glucose (POC) 135 07/21/2017 09:43 PM    Glucose (POC) 115 07/21/2017 04:52 PM    Glucose (POC) 139 07/21/2017 01:22 PM    Glucose (POC) 122 07/21/2017 06:32 AM     Lab Results   Component Value Date/Time    Color YELLOW/STRAW 07/17/2017 09:25 PM    Appearance CLEAR 07/17/2017 09:25 PM    Specific gravity 1.016 07/17/2017 09:25 PM    pH (UA) 5.5 07/17/2017 09:25 PM    Protein NEGATIVE  07/17/2017 09:25 PM    Glucose NEGATIVE  07/17/2017 09:25 PM    Ketone NEGATIVE  07/17/2017 09:25 PM    Bilirubin NEGATIVE  07/17/2017 09:25 PM    Urobilinogen 1.0 07/17/2017 09:25 PM    Nitrites NEGATIVE  07/17/2017 09:25 PM    Leukocyte Esterase NEGATIVE  07/17/2017 09:25 PM    Epithelial cells FEW 07/17/2017 09:25 PM    Bacteria NEGATIVE  07/17/2017 09:25 PM    WBC 0-4 07/17/2017 09:25 PM    RBC 0-5 07/17/2017 09:25 PM         Medications Reviewed:     Current Facility-Administered Medications   Medication Dose Route Frequency    levETIRAcetam (KEPPRA) 750 mg in 0.9% sodium chloride IVPB  750 mg IntraVENous Q12H    lacosamide (VIMPAT) 100 mg in 0.9% sodium chloride IVPB  100 mg IntraVENous Q12H    hydrALAZINE (APRESOLINE) 20 mg/mL injection 20 mg  20 mg IntraVENous Q6H PRN    LORazepam (ATIVAN) injection 2 mg  2 mg IntraVENous PRN    sodium chloride (NS) flush 5-10 mL  5-10 mL IntraVENous Q8H    sodium chloride (NS) flush 5-10 mL  5-10 mL IntraVENous PRN    ondansetron (ZOFRAN) injection 4 mg  4 mg IntraVENous Q6H PRN    ALPRAZolam (XANAX) tablet 1 mg  1 mg Oral Q6H PRN    aspirin chewable tablet 81 mg  81 mg Oral DAILY    atorvastatin (LIPITOR) tablet 80 mg  80 mg Oral ACD    carvedilol (COREG) tablet 12.5 mg  12.5 mg Oral DAILY WITH BREAKFAST    clonazePAM (KlonoPIN) tablet 0.5 mg  0.5 mg Oral BID PRN    eplerenone (INSPRA) tablet 25 mg  25 mg Oral DAILY    FLUoxetine (PROzac) capsule 20 mg  20 mg Oral DAILY    lactulose (CHRONULAC) solution 30 g  30 g Oral TID    pantoprazole (PROTONIX) tablet 40 mg  40 mg Oral ACB&D    pregabalin (LYRICA) capsule 50 mg  50 mg Oral TID    QUEtiapine (SEROquel) tablet 150 mg  150 mg Oral QHS    rifAXIMin Ayo Hernandez) tablet 550 mg  550 mg Oral BID    tamsulosin (FLOMAX) capsule 0.4 mg  0.4 mg Oral ACB&D    venlafaxine-SR (EFFEXOR-XR) capsule 150 mg  150 mg Oral DAILY    venlafaxine-SR (EFFEXOR-XR) capsule 75 mg  75 mg Oral ACD    insulin lispro (HUMALOG) injection   SubCUTAneous AC&HS    glucose chewable tablet 16 g  4 Tab Oral PRN    glucagon (GLUCAGEN) injection 1 mg  1 mg IntraMUSCular PRN    sodium chloride (NS) flush 5-10 mL  5-10 mL IntraVENous Q8H    sodium chloride (NS) flush 5-10 mL  5-10 mL IntraVENous PRN    dextrose 10 % infusion 125-250 mL  125-250 mL IntraVENous PRN     ______________________________________________________________________  EXPECTED LENGTH OF STAY: 3d 7h  ACTUAL LENGTH OF STAY:          5                 Jennifer Lawrence MD

## 2017-07-22 NOTE — PROGRESS NOTES
Spiritual Care Partner Volunteer visited patient in [address] [place] [address] on 7/22/17. Documented by:  CURRY Sanchez

## 2017-07-22 NOTE — PROGRESS NOTES
Bedside shift change report given to Jill Sims RN (oncoming nurse) by Mirella Landa RN (offgoing nurse). Report included the following information SBAR, Kardex, Intake/Output and MAR.

## 2017-07-23 LAB
GLUCOSE BLD STRIP.AUTO-MCNC: 101 MG/DL (ref 65–100)
GLUCOSE BLD STRIP.AUTO-MCNC: 144 MG/DL (ref 65–100)
GLUCOSE BLD STRIP.AUTO-MCNC: 156 MG/DL (ref 65–100)
GLUCOSE BLD STRIP.AUTO-MCNC: 159 MG/DL (ref 65–100)
SERVICE CMNT-IMP: ABNORMAL

## 2017-07-23 PROCEDURE — 74011250637 HC RX REV CODE- 250/637: Performed by: NURSE PRACTITIONER

## 2017-07-23 PROCEDURE — 74011250637 HC RX REV CODE- 250/637: Performed by: HOSPITALIST

## 2017-07-23 PROCEDURE — 77010033678 HC OXYGEN DAILY

## 2017-07-23 PROCEDURE — 65270000032 HC RM SEMIPRIVATE

## 2017-07-23 PROCEDURE — 82962 GLUCOSE BLOOD TEST: CPT

## 2017-07-23 PROCEDURE — 74011636637 HC RX REV CODE- 636/637: Performed by: HOSPITALIST

## 2017-07-23 RX ORDER — ACETAMINOPHEN 325 MG/1
650 TABLET ORAL
Status: DISCONTINUED | OUTPATIENT
Start: 2017-07-23 | End: 2017-07-24 | Stop reason: HOSPADM

## 2017-07-23 RX ADMIN — Medication 10 ML: at 06:23

## 2017-07-23 RX ADMIN — PANTOPRAZOLE SODIUM 40 MG: 40 TABLET, DELAYED RELEASE ORAL at 16:34

## 2017-07-23 RX ADMIN — INSULIN LISPRO 2 UNITS: 100 INJECTION, SOLUTION INTRAVENOUS; SUBCUTANEOUS at 07:10

## 2017-07-23 RX ADMIN — PREGABALIN 50 MG: 25 CAPSULE ORAL at 15:00

## 2017-07-23 RX ADMIN — FLUOXETINE 20 MG: 20 CAPSULE ORAL at 08:34

## 2017-07-23 RX ADMIN — ALPRAZOLAM 1 MG: 1 TABLET ORAL at 14:51

## 2017-07-23 RX ADMIN — Medication 10 ML: at 12:16

## 2017-07-23 RX ADMIN — VENLAFAXINE HYDROCHLORIDE 75 MG: 37.5 CAPSULE, EXTENDED RELEASE ORAL at 17:20

## 2017-07-23 RX ADMIN — ASPIRIN 81 MG 81 MG: 81 TABLET ORAL at 08:33

## 2017-07-23 RX ADMIN — INSULIN LISPRO 2 UNITS: 100 INJECTION, SOLUTION INTRAVENOUS; SUBCUTANEOUS at 12:14

## 2017-07-23 RX ADMIN — ATORVASTATIN CALCIUM 40 MG: 40 TABLET, FILM COATED ORAL at 16:33

## 2017-07-23 RX ADMIN — RIFAXIMIN 550 MG: 550 TABLET ORAL at 17:26

## 2017-07-23 RX ADMIN — LACOSAMIDE 100 MG: 50 TABLET, FILM COATED ORAL at 08:34

## 2017-07-23 RX ADMIN — VENLAFAXINE HYDROCHLORIDE 150 MG: 150 CAPSULE, EXTENDED RELEASE ORAL at 08:32

## 2017-07-23 RX ADMIN — PANTOPRAZOLE SODIUM 40 MG: 40 TABLET, DELAYED RELEASE ORAL at 06:51

## 2017-07-23 RX ADMIN — PREGABALIN 50 MG: 25 CAPSULE ORAL at 21:46

## 2017-07-23 RX ADMIN — TAMSULOSIN HYDROCHLORIDE 0.4 MG: 0.4 CAPSULE ORAL at 17:20

## 2017-07-23 RX ADMIN — LACTULOSE 30 G: 10 SOLUTION ORAL at 21:45

## 2017-07-23 RX ADMIN — TAMSULOSIN HYDROCHLORIDE 0.4 MG: 0.4 CAPSULE ORAL at 06:54

## 2017-07-23 RX ADMIN — LEVETIRACETAM 750 MG: 500 TABLET, FILM COATED ORAL at 21:46

## 2017-07-23 RX ADMIN — INSULIN LISPRO 2 UNITS: 100 INJECTION, SOLUTION INTRAVENOUS; SUBCUTANEOUS at 17:18

## 2017-07-23 RX ADMIN — LEVETIRACETAM 750 MG: 500 TABLET, FILM COATED ORAL at 08:32

## 2017-07-23 RX ADMIN — ACETAMINOPHEN 650 MG: 325 TABLET, FILM COATED ORAL at 20:30

## 2017-07-23 RX ADMIN — EPLERENONE 25 MG: 25 TABLET ORAL at 08:33

## 2017-07-23 RX ADMIN — QUETIAPINE FUMARATE 150 MG: 25 TABLET, FILM COATED ORAL at 21:46

## 2017-07-23 RX ADMIN — CARVEDILOL 12.5 MG: 12.5 TABLET, FILM COATED ORAL at 07:08

## 2017-07-23 RX ADMIN — LACOSAMIDE 100 MG: 50 TABLET, FILM COATED ORAL at 21:47

## 2017-07-23 RX ADMIN — RIFAXIMIN 550 MG: 550 TABLET ORAL at 08:33

## 2017-07-23 RX ADMIN — CLONAZEPAM 0.5 MG: 0.5 TABLET ORAL at 10:53

## 2017-07-23 RX ADMIN — ALPRAZOLAM 1 MG: 1 TABLET ORAL at 08:32

## 2017-07-23 RX ADMIN — PREGABALIN 50 MG: 25 CAPSULE ORAL at 08:33

## 2017-07-23 RX ADMIN — LACTULOSE 30 G: 10 SOLUTION ORAL at 08:34

## 2017-07-23 RX ADMIN — LACTULOSE 30 G: 10 SOLUTION ORAL at 15:00

## 2017-07-23 NOTE — ROUTINE PROCESS
Bedside and Verbal shift change report given to Tobi (oncoming nurse) by An Smiley (offgoing nurse). Report included the following information SBAR, Kardex, ED Summary, Intake/Output, MAR, Accordion and Recent Results.

## 2017-07-23 NOTE — PROGRESS NOTES
Hospitalist Progress Note  Marc Smith MD  Office: [phone]  Cell: [phone]      Date of Service:  2017  NAME:  Evelyn Cowan. :  1955  MRN:  [id number]      Admission Summary:   A 60year old male with history of BARGER cirrhosis, HTN, CAD s/p CABG, chronic systolic heart failure, GERD, DM-2, seizure disorder, peripheral neuropathy presented on 17 with reported confusion. He had previously been on a regimen of lactulose and rifaximin for hepatic encephalopathy, but had only been able to afford his lactulose.     Interval history / Subjective:   He feels nausea, no seizure overnight, no headache, chest pain or vomiting     Assessment & Plan:     RRT for seizure on  at 7:54 am, with hx of Seizure disorder on antiepileptic meds  -had generalized tonic clonic seizure lasted 1 minute x 2 on  with post ictal phase, not received his keppra or vimpat in the morning,  -improved, patient conscious and alert, switch IV Keppra and Vimpat to po   -CT head without contrast  no acute intracranial processs, EEG pending, ammonia level 32  -no reported seizure since 2016  -seen by neurologist    Altered mental status secondary to hepatic encephalopathy  -suspect secondary to his inability / non-adherence to rifaxamin; also possibly polypharmacy in regards to his psychiatric medications contributing, though seems to be reviewed previously by his psychiatrist  -CT head  - No acute intracranial abnormality and no change.  -continue lactulose and rifaxamin  -mental status has been improving, continue neuro check and supportive care  -seen by hepatology     Cirrhosis  -underlying etiology unclear, probable BARGER, child class A,CTP 5, MELD score previously 16  -not a candidate for liver transplant due to cardiac disease  -had undergone endoscopy on 17 - no esophageal varices, no apparent portal gastropathy, no gastric varices  -CT abdomen and pelvis show splenomegaly. No varices. Small liver is otherwise within normal limits. Noascites. Has there been tissue diagnosis of cirrhosis? No bowel obstruction. Prostatomegaly. No hydronephrosis. Evidence of osteoporosis.     Hyponatremia,   -now resolved  -continue gentle IV fluids     CAD s/p CABG  -no chest pain  -continue home aspirin, atorvastatin and carvedilol     Chronic systolic heart failure NYHA Class I   -does not appear in acute exacerbation of this diagnosis currently  -Echo 11/2016 - EF 30%  -continue home carvedilol, eplerenone, not on ACEi/ARB due to chronic kidney disease     DM-II with peripheral neuropathy  -finger stick glucose 122-216/24 hrs, continue sliding scale insulin coverage  -continue home pregabalin     Chronic thrombocytopenia   - likely secondary to cirrhosis   - platelet stable, no evident bleeding on examination   - continue to monitor     CKD stage III  -likely some slight dehydration as he is above baseline, now improving  -d/c IVF       Code status: Full Code  DVT prophylaxis: SCD    Care Plan discussed with: Patient/Family, Nurse and   Disposition: SNF     Hospital Problems  Date Reviewed: 5/12/2017          Codes Class Noted POA    * (Principal)Hepatic encephalopathy ([facility] [address] ICD-10-CM: K72.90  ICD-9-CM: 572.2  7/17/2017 Unknown                Vital Signs:    Last 24hrs VS reviewed since prior progress note.  Most recent are:  Visit Vitals    /70 (BP 1 Location: Right arm, BP Patient Position: At rest)    Pulse 80    Temp 98.6 °F (37 °C)    Resp 18    Ht 5' 9.5\" (1.765 m)    Wt 94.3 kg (207 lb 14.3 oz)    SpO2 97%    BMI 30.26 kg/m2         Intake/Output Summary (Last 24 hours) at 07/23/17 1600  Last data filed at 07/23/17 0924   Gross per 24 hour   Intake              240 ml   Output              300 ml   Net              -60 ml        Physical Examination:             Constitutional:  No acute distress, cooperative, pleasant    ENT:  Oral mucous moist, oropharynx benign. Neck supple,    Resp:  CTA bilaterally. No wheezing/rhonchi/rales. No accessory muscle use   CV:  Regular rhythm, normal rate, no murmurs, gallops, rubs    GI:  Soft, abdomen distended, non tender. normoactive bowel sounds, no hepatosplenomegaly     Musculoskeletal:  No edema    Neurologic:  conscious and alert, answer simple questions, orient to person, moves all extremities     Skin:  Good turgor, no rashes or ulcers       Data Review:    Review and/or order of clinical lab test      Labs:     Recent Labs      07/22/17   0608  07/21/17   1030   WBC  13.3*  13.8*   HGB  13.1  13.4   HCT  39.9  41.6   PLT  132*  140*     Recent Labs      07/22/17   0608  07/21/17   1030   NA  139  136   K  4.0  4.3   CL  108  105   CO2  24  17*   BUN  18  19   CREA  1.43*  1.78*   GLU  89  181*   CA  7.9*  8.6   MG   --   2.1   PHOS   --   1.9*     Recent Labs      07/22/17   0608  07/21/17   1030   SGOT  10*  13*   ALT  17  20   AP  101  108   TBILI  0.8  0.6   TP  6.6  7.2   ALB  3.1*  3.2*   GLOB  3.5  4.0     No results for input(s): INR, PTP, APTT in the last 72 hours. No lab exists for component: INREXT, INREXT   No results for input(s): FE, TIBC, PSAT, FERR in the last 72 hours. No results found for: FOL, RBCF   No results for input(s): PH, PCO2, PO2 in the last 72 hours. No results for input(s): CPK, CKNDX, TROIQ in the last 72 hours.     No lab exists for component: CPKMB  No results found for: CHOL, CHOLX, CHLST, CHOLV, HDL, LDL, LDLC, DLDLP, TGLX, TRIGL, TRIGP, CHHD, CHHDX  Lab Results   Component Value Date/Time    Glucose (POC) 144 07/23/2017 11:45 AM    Glucose (POC) 159 07/23/2017 06:33 AM    Glucose (POC) 141 07/22/2017 10:15 PM    Glucose (POC) 158 07/22/2017 04:55 PM    Glucose (POC) 171 07/22/2017 12:01 PM     Lab Results   Component Value Date/Time    Color YELLOW/STRAW 07/17/2017 09:25 PM    Appearance CLEAR 07/17/2017 09:25 PM    Specific gravity 1.016 07/17/2017 09:25 PM    pH (UA) 5.5 07/17/2017 09:25 PM    Protein NEGATIVE  07/17/2017 09:25 PM    Glucose NEGATIVE  07/17/2017 09:25 PM    Ketone NEGATIVE  07/17/2017 09:25 PM    Bilirubin NEGATIVE  07/17/2017 09:25 PM    Urobilinogen 1.0 07/17/2017 09:25 PM    Nitrites NEGATIVE  07/17/2017 09:25 PM    Leukocyte Esterase NEGATIVE  07/17/2017 09:25 PM    Epithelial cells FEW 07/17/2017 09:25 PM    Bacteria NEGATIVE  07/17/2017 09:25 PM    WBC 0-4 07/17/2017 09:25 PM    RBC 0-5 07/17/2017 09:25 PM         Medications Reviewed:     Current Facility-Administered Medications   Medication Dose Route Frequency    lacosamide (VIMPAT) tablet 100 mg  100 mg Oral Q12H    levETIRAcetam (KEPPRA) tablet 750 mg  750 mg Oral Q12H    hydrALAZINE (APRESOLINE) 20 mg/mL injection 20 mg  20 mg IntraVENous Q6H PRN    LORazepam (ATIVAN) injection 2 mg  2 mg IntraVENous PRN    sodium chloride (NS) flush 5-10 mL  5-10 mL IntraVENous Q8H    sodium chloride (NS) flush 5-10 mL  5-10 mL IntraVENous PRN    ondansetron (ZOFRAN) injection 4 mg  4 mg IntraVENous Q6H PRN    ALPRAZolam (XANAX) tablet 1 mg  1 mg Oral Q6H PRN    aspirin chewable tablet 81 mg  81 mg Oral DAILY    atorvastatin (LIPITOR) tablet 80 mg  80 mg Oral ACD    carvedilol (COREG) tablet 12.5 mg  12.5 mg Oral DAILY WITH BREAKFAST    clonazePAM (KlonoPIN) tablet 0.5 mg  0.5 mg Oral BID PRN    eplerenone (INSPRA) tablet 25 mg  25 mg Oral DAILY    FLUoxetine (PROzac) capsule 20 mg  20 mg Oral DAILY    lactulose (CHRONULAC) solution 30 g  30 g Oral TID    pantoprazole (PROTONIX) tablet 40 mg  40 mg Oral ACB&D    pregabalin (LYRICA) capsule 50 mg  50 mg Oral TID    QUEtiapine (SEROquel) tablet 150 mg  150 mg Oral QHS    rifAXIMin (XIFAXAN) tablet 550 mg  550 mg Oral BID    tamsulosin (FLOMAX) capsule 0.4 mg  0.4 mg Oral ACB&D    venlafaxine-SR (EFFEXOR-XR) capsule 150 mg  150 mg Oral DAILY    venlafaxine-SR (EFFEXOR-XR) capsule 75 mg  75 mg Oral ACD  insulin lispro (HUMALOG) injection   SubCUTAneous AC&HS    glucose chewable tablet 16 g  4 Tab Oral PRN    glucagon (GLUCAGEN) injection 1 mg  1 mg IntraMUSCular PRN    sodium chloride (NS) flush 5-10 mL  5-10 mL IntraVENous Q8H    sodium chloride (NS) flush 5-10 mL  5-10 mL IntraVENous PRN    dextrose 10 % infusion 125-250 mL  125-250 mL IntraVENous PRN     ______________________________________________________________________  EXPECTED LENGTH OF STAY: 3d 7h  ACTUAL LENGTH OF STAY:          6                 Qiana Carr MD

## 2017-07-24 VITALS
RESPIRATION RATE: 20 BRPM | SYSTOLIC BLOOD PRESSURE: 105 MMHG | WEIGHT: 207.89 LBS | DIASTOLIC BLOOD PRESSURE: 64 MMHG | HEART RATE: 81 BPM | OXYGEN SATURATION: 97 % | TEMPERATURE: 98.7 F | HEIGHT: 70 IN | BODY MASS INDEX: 29.76 KG/M2

## 2017-07-24 LAB
GLUCOSE BLD STRIP.AUTO-MCNC: 134 MG/DL (ref 65–100)
GLUCOSE BLD STRIP.AUTO-MCNC: 213 MG/DL (ref 65–100)
SERVICE CMNT-IMP: ABNORMAL
SERVICE CMNT-IMP: ABNORMAL

## 2017-07-24 PROCEDURE — 97165 OT EVAL LOW COMPLEX 30 MIN: CPT

## 2017-07-24 PROCEDURE — 74011250637 HC RX REV CODE- 250/637: Performed by: HOSPITALIST

## 2017-07-24 PROCEDURE — G8988 SELF CARE GOAL STATUS: HCPCS

## 2017-07-24 PROCEDURE — 97530 THERAPEUTIC ACTIVITIES: CPT

## 2017-07-24 PROCEDURE — G8987 SELF CARE CURRENT STATUS: HCPCS

## 2017-07-24 PROCEDURE — 82962 GLUCOSE BLOOD TEST: CPT

## 2017-07-24 PROCEDURE — 77010033678 HC OXYGEN DAILY

## 2017-07-24 PROCEDURE — 74011636637 HC RX REV CODE- 636/637: Performed by: HOSPITALIST

## 2017-07-24 RX ORDER — GLIMEPIRIDE 4 MG/1
2 TABLET ORAL
Qty: 60 TAB | Refills: 0 | Status: SHIPPED
Start: 2017-07-24 | End: 2018-01-10 | Stop reason: DRUGHIGH

## 2017-07-24 RX ADMIN — PANTOPRAZOLE SODIUM 40 MG: 40 TABLET, DELAYED RELEASE ORAL at 07:21

## 2017-07-24 RX ADMIN — VENLAFAXINE HYDROCHLORIDE 150 MG: 150 CAPSULE, EXTENDED RELEASE ORAL at 09:09

## 2017-07-24 RX ADMIN — TAMSULOSIN HYDROCHLORIDE 0.4 MG: 0.4 CAPSULE ORAL at 07:21

## 2017-07-24 RX ADMIN — INSULIN LISPRO 3 UNITS: 100 INJECTION, SOLUTION INTRAVENOUS; SUBCUTANEOUS at 13:19

## 2017-07-24 RX ADMIN — Medication 10 ML: at 06:00

## 2017-07-24 RX ADMIN — RIFAXIMIN 550 MG: 550 TABLET ORAL at 09:08

## 2017-07-24 RX ADMIN — LACOSAMIDE 100 MG: 50 TABLET, FILM COATED ORAL at 09:09

## 2017-07-24 RX ADMIN — CARVEDILOL 12.5 MG: 12.5 TABLET, FILM COATED ORAL at 07:21

## 2017-07-24 RX ADMIN — ASPIRIN 81 MG 81 MG: 81 TABLET ORAL at 09:09

## 2017-07-24 RX ADMIN — PREGABALIN 50 MG: 25 CAPSULE ORAL at 09:08

## 2017-07-24 RX ADMIN — LACTULOSE 30 G: 10 SOLUTION ORAL at 09:19

## 2017-07-24 RX ADMIN — FLUOXETINE 20 MG: 20 CAPSULE ORAL at 09:09

## 2017-07-24 RX ADMIN — EPLERENONE 25 MG: 25 TABLET ORAL at 09:10

## 2017-07-24 RX ADMIN — LEVETIRACETAM 750 MG: 500 TABLET, FILM COATED ORAL at 09:08

## 2017-07-24 NOTE — PROGRESS NOTES
Problem: Mobility Impaired (Adult and Pediatric)  Goal: *Acute Goals and Plan of Care (Insert Text)  Physical Therapy Goals  Initiated 7/19/2017  1. Patient will move from supine to sit and sit to supine in bed with supervision/set-up within 5 day(s). 2. Patient will transfer from bed to chair and chair to bed with supervision/set-up using the least restrictive device within 5 day(s). 3. Patient will perform sit to stand with supervision/set-up within 5 day(s). 4. Patient will ambulate with supervision/set-up for 125 feet with the least restrictive device within 5 day(s). 5. Patient will ascend/descend 3 stairs with handrail(s) per home needs with minimal assistance/contact guard assist within 5 day(s). PHYSICAL THERAPY TREATMENT  Patient: Jay Vargas (63 y.o. male)  Date: 7/24/2017  Diagnosis: Hepatic encephalopathy (HCC) Hepatic encephalopathy (HCC)       Precautions: Fall, Bed Alarm (AMS)  Chart, physical therapy assessment, plan of care and goals were reviewed. ASSESSMENT:  RN reported pt preparing for d/c to SNF (daughter to transport pt). RN asking for assist to  transfer pt to transport w/c. Pt received sitting in chair w/ daughter and older grandchildren present. Pt reported need to void, able to stand from chair w/ CGA (verbal cues for hand placement on arm rest vs pull to stand w/RW). Pt demonstrated slow, shuffled like gait using RW (flexed posture, rounded shoulders cues to keep head up). Pt able stand to void w/ Min A, pt w/ one hand on sink for additional support. Pt able to take steps backwards (RW) to exit bathroom however significant posterior lean noted and walker too far from pt BERNARDO (Mod A/verbal cues required to maintain upright posture and for RW management). Pt reported fatigue in LE's as pt remained standing for donning of brief prior to sitting in transport chair (Mod A for steps backward and RW management). Pt remained in RN care.    Progression toward goals:  [X] Improving appropriately and progressing toward goals  [ ]      Improving slowly and progressing toward goals  [ ]      Not making progress toward goals and plan of care will be adjusted       PLAN:  Patient continues to benefit from skilled intervention to address the above impairments. Continue treatment per established plan of care. Discharge Recommendations:  Morgan Florian  Further Equipment Recommendations for Discharge:  TBD       SUBJECTIVE:   Patient stated My legs are tired.       OBJECTIVE DATA SUMMARY:   Critical Behavior:  Neurologic State: Alert  Orientation Level: Oriented to person, Oriented to place, Oriented to time, Disoriented to situation  Cognition: Decreased attention/concentration  Safety/Judgement: Fall prevention  Functional Mobility Training:  Bed Mobility:     Supine to Sit:  (pt received in chair)  Sit to Supine:  (pt returned to transport w/c)                       Transfers:  Sit to Stand: Contact guard assistance (verbal cues x1 for proper hand placement)  Stand to Sit: Minimum assistance; Additional time;Assist x2 (RW management, backing up to transport chair)        Bed to Chair: Contact guard assistance                    Balance:  Sitting: Intact  Standing: Intact; With support  Ambulation/Gait Training:  Distance (ft): 10 Feet (ft)  Assistive Device: Gait belt;Walker, rolling  Ambulation - Level of Assistance: Contact guard assistance;Minimal assistance;Assist x1        Gait Abnormalities: Decreased step clearance;Shuffling gait; Other (flexed posture, rounded shoulders; walker far from BERNARDO)              Speed/Usha: Shuffled; Slow  Step Length: Left shortened;Right shortened                                              Pain:  Pain Scale 1: Numeric (0 - 10)  Pain Intensity 1: 0 (denies pain)              Activity Tolerance:   Fair  Please refer to the flowsheet for vital signs taken during this treatment.   After treatment:   [X] Patient left in no apparent distress sitting up in chair (ttransport w/c)  [ ] Patient left in no apparent distress in bed  [X] Call bell left within reach  [X] Nursing notified  [ ] Caregiver present  [ ] Bed alarm activated      COMMUNICATION/COLLABORATION:   The patients plan of care was discussed with: Registered Nurse Cristal Tai PTA    Time Calculation: 15 mins

## 2017-07-24 NOTE — PROGRESS NOTES
TRANSFER - OUT REPORT:    Verbal report given to  Jenny(name) on Eric Nielsen.  being transferred to [facility](unit) for routine progression of care       Report consisted of patients Situation, Background, Assessment and   Recommendations(SBAR). Information from the following report(s) SBAR was reviewed with the receiving nurse. Lines:       Opportunity for questions and clarification was provided. Patient transported with: Belongings. Packed by family).

## 2017-07-24 NOTE — DISCHARGE SUMMARY
Discharge Summary       PATIENT ID: Jay Vargas MRN: [id number]   YOB: 1955    DATE OF ADMISSION: 7/17/2017  8:08 PM    DATE OF DISCHARGE: 7/24/2017   PRIMARY CARE PROVIDER: Yamile Knight MD     ATTENDING PHYSICIAN: Yonathan Scherer mD  DISCHARGING PROVIDER: Ruben Baig MD    To contact this individual call [phone] and ask the  to page. If unavailable ask to be transferred the Adult Hospitalist Department. CONSULTATIONS: IP CONSULT TO HEPATOLOGY  IP CONSULT TO NEUROLOGY    PROCEDURES/SURGERIES: * No surgery found *    ADMITTING DIAGNOSES & HOSPITAL COURSE:     A 60year old male with history of BARGER cirrhosis, HTN, CAD s/p CABG, chronic systolic heart failure, GERD, DM-2, seizure disorder, peripheral neuropathy presented on 7/17/17 with reported confusion. Mann Bhatt had previously been on a regimen of lactulose and rifaximin for hepatic encephalopathy, but had only been able to afford his lactulose.     Seizure disorder   -had generalized tonic clonic seizure lasted 1 minute x 2 on 7/21 with post ictal phase, did not received his keppra or vimpat in the morning, no seizure over 48 hours  -patient conscious and alert, continue po Keppra 750 mg bid and Vimpat 100 mg po bid  -CT head without contrast 7/21 no acute intracranial processs, EEG pending, ammonia level 32  -seen by neurologist  Altered mental status secondary to hepatic encephalopathy  -suspect secondary to his inability / non-adherence to rifaxamin; also possibly polypharmacy in regards to his psychiatric medications contributing, though seems to be reviewed previously by his psychiatrist  -CT head 7/17 - No acute intracranial abnormality and no change.  -continue lactulose and rifaxamin  -mental status has been improving, continue neuro check and supportive care  -seen by hepatology   Cirrhosis  -underlying etiology unclear, probable BARGER, child class A,CTP 5, MELD score previously 16  -not a candidate for liver transplant due to cardiac disease  -had undergone endoscopy on 5/12/17 - no esophageal varices, no apparent portal gastropathy, no gastric varices  -CT abdomen and pelvis show splenomegaly. No varices. Small liver is otherwise within normal limits. Noascites. Has there been tissue diagnosis of cirrhosis? No bowel obstruction. Prostatomegaly. No hydronephrosis. Evidence of osteoporosis.     Hyponatremia,   -now resolved  -continue gentle IV fluids   CAD s/p CABG  -no chest pain  -continue home aspirin, atorvastatin and carvedilol  Chronic systolic heart failure NYHA Class I   -does not appear in acute exacerbation of this diagnosis currently  -Echo 11/2016 - EF 30%  -continue home carvedilol, eplerenone, not on ACEi/ARB due to chronic kidney disease   DM-II with peripheral neuropathy  -finger stick glucose 122-216/24 hrs, continue sliding scale insulin coverage  -continue home pregabalin      Chronic thrombocytopenia   - likely secondary to cirrhosis   - platelet stable, no evident bleeding on examination   - continue to monitor   CKD stage III  -likely some slight dehydration as he is above baseline, now improving  -d/c IVF         Code status: Full Code  DVT prophylaxis: SCD       DISCHARGE DIAGNOSES / PLAN:      Seizure disorder   -continue po Keppra 750 mg bid and Vimpat 100 mg po bid  -follow up with neurologist as an outpatient  Altered mental status secondary to hepatic encephalopathy  -mental status has been improving, continue neuro check and supportive care  -continue lactulose and rifaxamin  -out patient follow up with hepatology   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 due to BARGER  -continue lactulose and rifaximin, outpatient follow up with hepatologist  Hyponatremia,   -now resolved  CAD s/p CABG  -continue home aspirin, atorvastatin and carvedilol  Chronic systolic heart failure NYHA Class I   -continue home carvedilol, eplerenone, not on ACEi/ARB due to chronic kidney disease   -monitor I/O and daily weight  DM-II with peripheral neuropathy  -Dose for Glipizide adjusted to 2 mg before breakfast, continue sliding scale and monitor finger stick glucose  Chronic thrombocytopenia secondary to cirrhosis   - continue to monitor   CKD stage III  -monitor renal functions      PENDING TEST RESULTS:   At the time of discharge the following test results are still pending:     FOLLOW UP APPOINTMENTS:    Follow-up Information     Follow up With Details Comments Contact Info    Jodie Lee MD In one week   [address]  [phone]       2. Chris Hare MD, Neurologist in 2 weeks  3. Katt Holly MD, Hepatologist in 2 weeks    ADDITIONAL CARE RECOMMENDATIONS:     DIET: Diabetic Diet    ACTIVITY: Activity as tolerated    WOUND CARE: none    EQUIPMENT needed: none      DISCHARGE MEDICATIONS:  Current Discharge Medication List      CONTINUE these medications which have CHANGED    Details   glimepiride (AMARYL) 4 mg tablet Take 0.5 Tabs by mouth every morning. Qty: 60 Tab, Refills: 0         CONTINUE these medications which have NOT CHANGED    Details   VIMPAT 100 mg tab tablet take 1 tablet by mouth twice a day . MAX DAILY AMOUNT: 200MG  Qty: 60 Tab, Refills: 0      rifAXIMin (XIFAXAN) 550 mg tablet Take 1 Tab by mouth two (2) times a day. Qty: 180 Tab, Refills: 3      ALPRAZolam (XANAX) 1 mg tablet Take 1 mg by mouth every six (6) hours as needed for Anxiety. FLUoxetine (PROZAC) 20 mg tablet Take 20 mg by mouth daily. levETIRAcetam (KEPPRA) 750 mg tablet Take 1 Tab by mouth two (2) times a day. Qty: 60 Tab, Refills: 3      clonazePAM (KLONOPIN) 0.5 mg tablet Take 1 tablet by mouth twice daily as needed for anxiety or agitation  Qty: 30 Tab, Refills: 0      lactulose (CHRONULAC) 10 gram/15 mL solution Take 45 mL by mouth three (3) times daily. Adjust dosage so that you have 2-3 bowel movements per day.   Qty: 1 Bottle, Refills: 0      aspirin 81 mg chewable tablet Take 1 Tab by mouth daily. Qty: 30 Tab, Refills: 0      carvedilol (COREG) 12.5 mg tablet Take 1 Tab by mouth daily (with breakfast). Qty: 30 Tab, Refills: 0      eplerenone (INSPRA) 25 mg tablet Take 1 Tab by mouth daily. Qty: 30 Tab, Refills: 0      atorvastatin (LIPITOR) 80 mg tablet Take 1 Tab by mouth Daily (before dinner). Qty: 30 Tab, Refills: 0      pantoprazole (PROTONIX) 40 mg tablet Take 1 Tab by mouth Before breakfast and dinner. Before Breakfast and in the afternoon (also takes Zantac at same time)  Qty: 60 Tab, Refills: 0      pregabalin (LYRICA) 50 mg capsule Take 1 Cap by mouth three (3) times daily. Max Daily Amount: 150 mg.  Qty: 90 Cap, Refills: 0      QUEtiapine (SEROQUEL) 50 mg tablet Take 3 Tabs by mouth nightly. Qty: 90 Tab, Refills: 0      tamsulosin (FLOMAX) 0.4 mg capsule Take 1 Cap by mouth Before breakfast and dinner. Before Breakfast and in the afternoon  Qty: 30 Cap, Refills: 0      !! venlafaxine-SR (EFFEXOR XR) 75 mg capsule Take 2 Caps by mouth daily. Qty: 30 Cap, Refills: 0      !! venlafaxine-SR (EFFEXOR XR) 75 mg capsule Take 1 Cap by mouth Daily (before dinner). Qty: 30 Cap, Refills: 0       !! - Potential duplicate medications found. Please discuss with provider. STOP taking these medications       ibuprofen (MOTRIN) 800 mg tablet Comments:   Reason for Stopping:         doxycycline (VIBRA-TABS) 100 mg tablet Comments:   Reason for Stopping:         raNITIdine (ZANTAC) 150 mg tablet Comments:   Reason for Stopping:         potassium chloride (K-DUR, KLOR-CON) 10 mEq tablet Comments:   Reason for Stopping:         promethazine (PHENERGAN) 25 mg tablet Comments:   Reason for Stopping:                 NOTIFY YOUR PHYSICIAN FOR ANY OF THE FOLLOWING:   Fever over 101 degrees for 24 hours. Chest pain, shortness of breath, fever, chills, nausea, vomiting, diarrhea, change in mentation, falling, weakness, bleeding. Severe pain or pain not relieved by medications.   Or, any other signs or symptoms that you may have questions about.     DISPOSITION:    Home With:   OT  PT  HH  RN      x Long term SNF/Inpatient Rehab    Independent/assisted living    Hospice    Other:       PATIENT CONDITION AT DISCHARGE:     Functional status   x Poor     Deconditioned     Independent      Cognition     Lucid    x Forgetful     Dementia      Catheters/lines (plus indication)    [place]     PICC     PEG    x None      Code status   x  Full code     DNR      PHYSICAL EXAMINATION AT DISCHARGE:   Refer to Progress Note      CHRONIC MEDICAL DIAGNOSES:  Problem List as of 7/24/2017  Date Reviewed: 5/12/2017          Codes Class Noted - Resolved    * (Principal)Hepatic encephalopathy ([facility] ICD-10-CM: K72.90  ICD-9-CM: 572.2  7/17/2017 - Present        Neuropathy ([facility] ICD-10-CM: G62.9  ICD-9-CM: 355.9  4/14/2017 - Present        Cirrhosis ([facility] ICD-10-CM: K74.60  ICD-9-CM: 571.5  4/14/2017 - Present        CAD (coronary artery disease) ICD-10-CM: I25.10  ICD-9-CM: 414.00  4/14/2017 - Present        S/P coronary artery stent placement ICD-10-CM: Z95.5  ICD-9-CM: V45.82  4/14/2017 - Present        S/P CABG (coronary artery bypass graft) ICD-10-CM: Z95.1  ICD-9-CM: V45.81  4/14/2017 - Present    Overview Signed 4/14/2017 11:27 AM by Breanne Sequeira MD     2002 and 2013             Thrombocytopenia ([facility] ICD-10-CM: D69.6  ICD-9-CM: 287.5  4/14/2017 - Present        MRSA infection ICD-10-CM: H46.53  ICD-9-CM: 041.12  4/14/2017 - Present    Overview Signed 4/14/2017 11:28 AM by Breanne Sequeira MD     2016             S/P cholecystectomy ICD-10-CM: Z90.49  ICD-9-CM: V45.79  4/14/2017 - Present        Metabolic encephalopathy PRS-62-GR: G93.41  ICD-9-CM: 348.31  12/19/2016 - Present        Seizure ([facility] ICD-10-CM: R56.9  ICD-9-CM: 780.39  11/21/2016 - Present        Sinusitis ICD-10-CM: J32.9  ICD-9-CM: 473.9  Unknown - Present        Joint pain ICD-10-CM: M25.50  ICD-9-CM: 719.40  Unknown - Present        Low back pain ICD-10-CM: M54.5  ICD-9-CM: 724.2  Unknown - Present        GERD (gastroesophageal reflux disease) ICD-10-CM: K21.9  ICD-9-CM: 530.81  Unknown - Present        Diabetes mellitus, type 2 ([facility] ICD-10-CM: E11.9  ICD-9-CM: 250.00  Unknown - Present        RESOLVED: Pneumonia ICD-10-CM: J18.9  ICD-9-CM: 232  3/3/2017 - 4/14/2017        RESOLVED: Fever ICD-10-CM: R50.9  ICD-9-CM: 780.60  3/2/2017 - 4/14/2017        RESOLVED: Abscess ICD-10-CM: L02.91  ICD-9-CM: 682.9  1/5/2017 - 4/14/2017        RESOLVED: Altered mental status ICD-10-CM: R41.82  ICD-9-CM: 780.97  12/19/2016 - 4/14/2017        RESOLVED: Debility ICD-10-CM: R53.81  ICD-9-CM: 799.3  11/22/2016 - 4/14/2017        RESOLVED: Cellulitis ICD-10-CM: L03.90  ICD-9-CM: 682.9  11/7/2016 - 4/14/2017        RESOLVED: Snoring ICD-10-CM: R06.83  ICD-9-CM: 786.09  Unknown - 4/14/2017        RESOLVED: Night sweats ICD-10-CM: R61  ICD-9-CM: 780.8  Unknown - 4/14/2017        RESOLVED: Chest pain ICD-10-CM: 786.5  ICD-9-CM: 786.5  Unknown - 4/14/2017        RESOLVED: Sore throat ICD-10-CM: J02.9  ICD-9-CM: 207  Unknown - 4/14/2017        RESOLVED: Dysphagia ICD-10-CM: 787.2  ICD-9-CM: 787.2  Unknown - 4/14/2017        RESOLVED: Tingling sensation ICD-10-CM: R20.2  ICD-9-CM: 782.0  Unknown - 4/14/2017        RESOLVED: Nausea ICD-10-CM: R11.0  ICD-9-CM: 787.02  Unknown - 4/14/2017        RESOLVED: Diarrhea ICD-10-CM: R19.7  ICD-9-CM: 787.91  Unknown - 4/14/2017        RESOLVED: Snoring ICD-10-CM: R06.83  ICD-9-CM: 786.09  Unknown - 4/14/2017        RESOLVED: Abdominal pain, epigastric ICD-10-CM: R10.13  ICD-9-CM: 789.06  9/13/2010 - 4/14/2017              Greater than 35 minutes were spent with the patient on counseling and coordination of care    Signed:   Mandi Conde MD  7/24/2017  11:17 AM

## 2017-07-24 NOTE — PROCEDURES
[address], [address] [place] [address] Av   EEG       Name:  Reggie Cohen   MR#:  [id number]   :  1955   Account #:  [de-identified]    Date of Procedure:  2017   Date of Adm:  2017       REQUESTING PHYSICIAN: Maribeth Reed MD    HISTORY: The patient is a 29-year-old male who is being evaluated   after recent seizure-like activity. DESCRIPTION: This is an 18-channel EEG performed on a   predominantly drowsy and asleep patient. There is no clear dominant   background rhythm. The background activity consists of medium   voltage rhythms in the 5 to 7 Hz frequency range. There were medium   to high voltage, at times rhythmic appearing, delta frequencies seen   during most of this recording. Drowsiness and sleep architecture was   not clearly seen. Photic stimulation did not elicit a driving response. Hyperventilation was not performed. ELECTROENCEPHALOGRAM SUMMARY: Abnormal EEG due to   moderate slowing of the background rhythms. CLINICAL INTERPRETATION: This electroencephalogram is   suggestive of a mild, moderate generalized encephalopathic process,   nonspecific in type. This may be due to a post-ictal state, paroxysmal   metabolic abnormality or a structural brain injury. No ongoing   electroclincal seizures were record. Please correlate clinically.         Timothy Savage MD      AS / Kai Vilchis   D:  2017   15:35   T:  2017   12:56   Job #:  [id number]

## 2017-07-24 NOTE — PROGRESS NOTES
1:12 AM  Patient's daughter Tamara Galvan called asking for updates, stated she would like an ammonia level drawn before discharge. 8:03 AM  Bedside shift change report given to Pedro Garner (oncoming nurse) by Maye Goltz RN (offgoing nurse). Report included the following information SBAR, Kardex, Intake/Output, MAR, Accordion and Recent Results.

## 2017-07-24 NOTE — PROGRESS NOTES
Reviewed medical chart; met with the patient at the bedside. Patient remains confused. Spoke with his daughter, Yumiko Nielson ([phone]), via phone; she will pick him up at 1:30PM.  Discharge envelope is on the hard chart. The nurse will need to add the [facility], Kardex, hosptosnf note, and call report P#[phone]. Discharge instructions were sent via [employer]. Care Management will continue to follow his disposition.    CHRISTIANE Puckett

## 2017-07-24 NOTE — PROGRESS NOTES
Hospitalist Progress Note  Jennifer Lawrence MD  Office: [phone]  Cell: [phone]      Date of Service:  2017  NAME:  Chelsie Weinstein. :  1955  MRN:  [id number]      Admission Summary:   A 60year old male with history of BARGER cirrhosis, HTN, CAD s/p CABG, chronic systolic heart failure, GERD, DM-2, seizure disorder, peripheral neuropathy presented on 17 with reported confusion. He had previously been on a regimen of lactulose and rifaximin for hepatic encephalopathy, but had only been able to afford his lactulose.     Interval history / Subjective:   He feels nausea, no seizure overnight, no headache, chest pain or vomiting     Assessment & Plan:     RRT for seizure on  at 7:54 am, with hx of Seizure disorder on antiepileptic meds  -had generalized tonic clonic seizure lasted 1 minute x 2 on  with post ictal phase, not received his keppra or vimpat in the morning,  -improved, patient conscious and alert, switch IV Keppra and Vimpat to po   -CT head without contrast  no acute intracranial processs, EEG pending, ammonia level 32  -no reported seizure since 2016  -seen by neurologist    Altered mental status secondary to hepatic encephalopathy  -suspect secondary to his inability / non-adherence to rifaxamin; also possibly polypharmacy in regards to his psychiatric medications contributing, though seems to be reviewed previously by his psychiatrist  -CT head  - No acute intracranial abnormality and no change.  -continue lactulose and rifaxamin  -mental status has been improving, continue neuro check and supportive care  -seen by hepatology     Cirrhosis  -underlying etiology unclear, probable BARGER, child class A,CTP 5, MELD score previously 16  -not a candidate for liver transplant due to cardiac disease  -had undergone endoscopy on 17 - no esophageal varices, no apparent portal gastropathy, no gastric varices  -CT abdomen and pelvis show splenomegaly. No varices. Small liver is otherwise within normal limits. Noascites. Has there been tissue diagnosis of cirrhosis? No bowel obstruction. Prostatomegaly. No hydronephrosis. Evidence of osteoporosis.     Hyponatremia,   -now resolved  -continue gentle IV fluids     CAD s/p CABG  -no chest pain  -continue home aspirin, atorvastatin and carvedilol     Chronic systolic heart failure NYHA Class I   -does not appear in acute exacerbation of this diagnosis currently  -Echo 11/2016 - EF 30%  -continue home carvedilol, eplerenone, not on ACEi/ARB due to chronic kidney disease     DM-II with peripheral neuropathy  -finger stick glucose 122-216/24 hrs, continue sliding scale insulin coverage  -continue home pregabalin     Chronic thrombocytopenia   - likely secondary to cirrhosis   - platelet stable, no evident bleeding on examination   - continue to monitor     CKD stage III  -likely some slight dehydration as he is above baseline, now improving  -d/c IVF       Code status: Full Code  DVT prophylaxis: SCD    Care Plan discussed with: Patient/Family, Nurse and   Disposition: SNF     Hospital Problems  Date Reviewed: 5/12/2017          Codes Class Noted POA    * (Principal)Hepatic encephalopathy ([facility] ICD-10-CM: K72.90  ICD-9-CM: 572.2  7/17/2017 Unknown                Vital Signs:    Last 24hrs VS reviewed since prior progress note.  Most recent are:  Visit Vitals    /63    Pulse 78    Temp 98.3 °F (36.8 °C)    Resp 20    Ht 5' 9.5\" (1.765 m)    Wt 94.3 kg (207 lb 14.3 oz)    SpO2 95%    BMI 30.26 kg/m2         Intake/Output Summary (Last 24 hours) at 07/24/17 1033  Last data filed at 07/23/17 1735   Gross per 24 hour   Intake              120 ml   Output                0 ml   Net              120 ml        Physical Examination:             Constitutional:  No acute distress, cooperative, pleasant    ENT:  Oral mucous moist, oropharynx benign. Neck supple,    Resp:  CTA bilaterally. No wheezing/rhonchi/rales. No accessory muscle use   CV:  Regular rhythm, normal rate, no murmurs, gallops, rubs    GI:  Soft, abdomen distended, non tender. normoactive bowel sounds, no hepatosplenomegaly     Musculoskeletal:  No edema    Neurologic:  conscious and alert, answer simple questions, orient to person, moves all extremities     Skin:  Good turgor, no rashes or ulcers       Data Review:    Review and/or order of clinical lab test      Labs:     Recent Labs      07/22/17   0608   WBC  13.3*   HGB  13.1   HCT  39.9   PLT  132*     Recent Labs      07/22/17   0608   NA  139   K  4.0   CL  108   CO2  24   BUN  18   CREA  1.43*   GLU  89   CA  7.9*     Recent Labs      07/22/17   0608   SGOT  10*   ALT  17   AP  101   TBILI  0.8   TP  6.6   ALB  3.1*   GLOB  3.5     No results for input(s): INR, PTP, APTT in the last 72 hours. No lab exists for component: INREXT, INREXT   No results for input(s): FE, TIBC, PSAT, FERR in the last 72 hours. No results found for: FOL, RBCF   No results for input(s): PH, PCO2, PO2 in the last 72 hours. No results for input(s): CPK, CKNDX, TROIQ in the last 72 hours.     No lab exists for component: CPKMB  No results found for: CHOL, CHOLX, CHLST, CHOLV, HDL, LDL, LDLC, DLDLP, TGLX, TRIGL, TRIGP, CHHD, CHHDX  Lab Results   Component Value Date/Time    Glucose (POC) 134 07/24/2017 05:58 AM    Glucose (POC) 101 07/23/2017 09:38 PM    Glucose (POC) 156 07/23/2017 04:13 PM    Glucose (POC) 144 07/23/2017 11:45 AM    Glucose (POC) 159 07/23/2017 06:33 AM     Lab Results   Component Value Date/Time    Color YELLOW/STRAW 07/17/2017 09:25 PM    Appearance CLEAR 07/17/2017 09:25 PM    Specific gravity 1.016 07/17/2017 09:25 PM    pH (UA) 5.5 07/17/2017 09:25 PM    Protein NEGATIVE  07/17/2017 09:25 PM    Glucose NEGATIVE  07/17/2017 09:25 PM    Ketone NEGATIVE  07/17/2017 09:25 PM    Bilirubin NEGATIVE  07/17/2017 09:25 PM Urobilinogen 1.0 07/17/2017 09:25 PM    Nitrites NEGATIVE  07/17/2017 09:25 PM    Leukocyte Esterase NEGATIVE  07/17/2017 09:25 PM    Epithelial cells FEW 07/17/2017 09:25 PM    Bacteria NEGATIVE  07/17/2017 09:25 PM    WBC 0-4 07/17/2017 09:25 PM    RBC 0-5 07/17/2017 09:25 PM         Medications Reviewed:     Current Facility-Administered Medications   Medication Dose Route Frequency    acetaminophen (TYLENOL) tablet 650 mg  650 mg Oral Q6H PRN    lacosamide (VIMPAT) tablet 100 mg  100 mg Oral Q12H    levETIRAcetam (KEPPRA) tablet 750 mg  750 mg Oral Q12H    hydrALAZINE (APRESOLINE) 20 mg/mL injection 20 mg  20 mg IntraVENous Q6H PRN    LORazepam (ATIVAN) injection 2 mg  2 mg IntraVENous PRN    sodium chloride (NS) flush 5-10 mL  5-10 mL IntraVENous Q8H    sodium chloride (NS) flush 5-10 mL  5-10 mL IntraVENous PRN    ondansetron (ZOFRAN) injection 4 mg  4 mg IntraVENous Q6H PRN    ALPRAZolam (XANAX) tablet 1 mg  1 mg Oral Q6H PRN    aspirin chewable tablet 81 mg  81 mg Oral DAILY    atorvastatin (LIPITOR) tablet 80 mg  80 mg Oral ACD    carvedilol (COREG) tablet 12.5 mg  12.5 mg Oral DAILY WITH BREAKFAST    clonazePAM (KlonoPIN) tablet 0.5 mg  0.5 mg Oral BID PRN    eplerenone (INSPRA) tablet 25 mg  25 mg Oral DAILY    FLUoxetine (PROzac) capsule 20 mg  20 mg Oral DAILY    lactulose (CHRONULAC) solution 30 g  30 g Oral TID    pantoprazole (PROTONIX) tablet 40 mg  40 mg Oral ACB&D    pregabalin (LYRICA) capsule 50 mg  50 mg Oral TID    QUEtiapine (SEROquel) tablet 150 mg  150 mg Oral QHS    rifAXIMin (XIFAXAN) tablet 550 mg  550 mg Oral BID    tamsulosin (FLOMAX) capsule 0.4 mg  0.4 mg Oral ACB&D    venlafaxine-SR (EFFEXOR-XR) capsule 150 mg  150 mg Oral DAILY    venlafaxine-SR (EFFEXOR-XR) capsule 75 mg  75 mg Oral ACD    insulin lispro (HUMALOG) injection   SubCUTAneous AC&HS    glucose chewable tablet 16 g  4 Tab Oral PRN    glucagon (GLUCAGEN) injection 1 mg  1 mg IntraMUSCular PRN  sodium chloride (NS) flush 5-10 mL  5-10 mL IntraVENous Q8H    sodium chloride (NS) flush 5-10 mL  5-10 mL IntraVENous PRN    dextrose 10 % infusion 125-250 mL  125-250 mL IntraVENous PRN     ______________________________________________________________________  EXPECTED LENGTH OF STAY: 3d 7h  ACTUAL LENGTH OF STAY:          7                 Magaly Toure MD

## 2017-07-24 NOTE — DISCHARGE INSTRUCTIONS
DISCHARGE SUMMARY from Nurse    The following personal items are in your possession at time of discharge:       Visual Aid: None           Clothing: Pants, Shirt                PATIENT INSTRUCTIONS:    After general anesthesia or intravenous sedation, for 24 hours or while taking prescription Narcotics:  · Limit your activities  · Do not drive and operate hazardous machinery  · Do not make important personal or business decisions  · Do  not drink alcoholic beverages  · If you have not urinated within 8 hours after discharge, please contact your surgeon on call. Report the following to your surgeon:  · Excessive pain, swelling, redness or odor of or around the surgical area  · Temperature over 100.5  · Nausea and vomiting lasting longer than 4 hours or if unable to take medications  · Any signs of decreased circulation or nerve impairment to extremity: change in color, persistent  numbness, tingling, coldness or increase pain  · Any questions        What to do at Home:  Recommended activity: per instructions    If you experience any of the following symptoms per instructions, please follow up with per instructions. *  Please give a list of your current medications to your Primary Care Provider. *  Please update this list whenever your medications are discontinued, doses are      changed, or new medications (including over-the-counter products) are added. *  Please carry medication information at all times in case of emergency situations. These are general instructions for a healthy lifestyle:    No smoking/ No tobacco products/ Avoid exposure to second hand smoke    Surgeon General's Warning:  Quitting smoking now greatly reduces serious risk to your health.     Obesity, smoking, and sedentary lifestyle greatly increases your risk for illness    A healthy diet, regular physical exercise & weight monitoring are important for maintaining a healthy lifestyle    You may be retaining fluid if you have a history of heart failure or if you experience any of the following symptoms:  Weight gain of 3 pounds or more overnight or 5 pounds in a week, increased swelling in our hands or feet or shortness of breath while lying flat in bed. Please call your doctor as soon as you notice any of these symptoms; do not wait until your next office visit. Recognize signs and symptoms of STROKE:    F-face looks uneven    A-arms unable to move or move unevenly    S-speech slurred or non-existent    T-time-call 911 as soon as signs and symptoms begin-DO NOT go       Back to bed or wait to see if you get better-TIME IS BRAIN. Warning Signs of HEART ATTACK     Call 911 if you have these symptoms:   Chest discomfort. Most heart attacks involve discomfort in the center of the chest that lasts more than a few minutes, or that goes away and comes back. It can feel like uncomfortable pressure, squeezing, fullness, or pain.  Discomfort in other areas of the upper body. Symptoms can include pain or discomfort in one or both arms, the back, neck, jaw, or stomach.  Shortness of breath with or without chest discomfort.  Other signs may include breaking out in a cold sweat, nausea, or lightheadedness. Don't wait more than five minutes to call 911 - MINUTES MATTER! Fast action can save your life. Calling 911 is almost always the fastest way to get lifesaving treatment. Emergency Medical Services staff can begin treatment when they arrive -- up to an hour sooner than if someone gets to the hospital by car. The discharge information has been reviewed with the daughter. The daughter verbalized understanding. Discharge medications reviewed with the daughter and appropriate educational materials and side effects teaching were provided. Discharge SNF/Rehab Instructions/LTAC       PATIENT ID: Evelia Mckeon   MRN: [id number]   YOB: 1955    DATE OF ADMISSION: 7/17/2017  8:08 PM    DATE OF DISCHARGE: 7/24/2017    PRIMARY CARE PROVIDER: Richard Dela Cruz MD       ATTENDING PHYSICIAN: Lorna Reyes MD  DISCHARGING PROVIDER: Lorna Reyes MD     To contact this individual call [phone] and ask the  to page. If unavailable ask to be transferred the Adult Hospitalist Department. CONSULTATIONS: IP CONSULT TO HEPATOLOGY  IP CONSULT TO NEUROLOGY    PROCEDURES/SURGERIES: * No surgery found *    ADMITTING DIAGNOSES & HOSPITAL COURSE:     A 60year old male with history of BARGER cirrhosis, HTN, CAD s/p CABG, chronic systolic heart failure, GERD, DM-2, seizure disorder, peripheral neuropathy presented on 7/17/17 with reported confusion. Estephania Mantilla had previously been on a regimen of lactulose and rifaximin for hepatic encephalopathy, but had only been able to afford his lactulose.     Seizure disorder   -had generalized tonic clonic seizure lasted 1 minute x 2 on 7/21 with post ictal phase, did not received his keppra or vimpat in the morning, no seizure over 48 hours  -patient conscious and alert, continue po Keppra 750 mg bid and Vimpat 100 mg po bid  -CT head without contrast 7/21 no acute intracranial processs, EEG pending, ammonia level 32  -seen by neurologist  Altered mental status secondary to hepatic encephalopathy  -suspect secondary to his inability / non-adherence to rifaxamin; also possibly polypharmacy in regards to his psychiatric medications contributing, though seems to be reviewed previously by his psychiatrist  -CT head 7/17 - No acute intracranial abnormality and no change.  -continue lactulose and rifaxamin  -mental status has been improving, continue neuro check and supportive care  -seen by hepatology   Cirrhosis  -underlying etiology unclear, probable BARGER, child class A,CTP 5, MELD score previously 16  -not a candidate for liver transplant due to cardiac disease  -had undergone endoscopy on 5/12/17 - no esophageal varices, no apparent portal gastropathy, no gastric varices  -CT abdomen and pelvis show splenomegaly. No varices. Small liver is otherwise within normal limits. Noascites. Has there been tissue diagnosis of cirrhosis? No bowel obstruction. Prostatomegaly. No hydronephrosis. Evidence of osteoporosis.     Hyponatremia,   -now resolved  -continue gentle IV fluids   CAD s/p CABG  -no chest pain  -continue home aspirin, atorvastatin and carvedilol  Chronic systolic heart failure NYHA Class I   -does not appear in acute exacerbation of this diagnosis currently  -Echo 11/2016 - EF 30%  -continue home carvedilol, eplerenone, not on ACEi/ARB due to chronic kidney disease   DM-II with peripheral neuropathy  -finger stick glucose 122-216/24 hrs, continue sliding scale insulin coverage  -continue home pregabalin      Chronic thrombocytopenia   - likely secondary to cirrhosis   - platelet stable, no evident bleeding on examination   - continue to monitor   CKD stage III  -likely some slight dehydration as he is above baseline, now improving  -d/c IVF         Code status: Full Code  DVT prophylaxis: SCD       DISCHARGE DIAGNOSES / PLAN:      Seizure disorder   -continue po Keppra 750 mg bid and Vimpat 100 mg po bid  -follow up with neurologist as an outpatient  Altered mental status secondary to hepatic encephalopathy  -mental status has been improving, continue neuro check and supportive care  -continue lactulose and rifaxamin  -out patient follow up with hepatology   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 due to BARGER  -continue lactulose and rifaximin, outpatient follow up with hepatologist  Hyponatremia,   -now resolved  CAD s/p CABG  -continue home aspirin, atorvastatin and carvedilol  Chronic systolic heart failure NYHA Class I   -continue home carvedilol, eplerenone, not on ACEi/ARB due to chronic kidney disease   -monitor I/O and daily weight  DM-II with peripheral neuropathy  -Dose for Glipizide adjusted to 2 mg before breakfast, continue sliding scale and monitor finger stick glucose  Chronic thrombocytopenia secondary to cirrhosis   - continue to monitor   CKD stage III  -monitor renal functions    PENDING TEST RESULTS:   At the time of discharge the following test results are still pending: none     FOLLOW UP APPOINTMENTS:    Follow-up Information     Follow up With Details Comments Contact Info   1. Chivo Gould MD In one week  [address] [facility]  [phone]       2. Kole Fairbanks MD, Neurologist in 2 weeks  3. Hansa Taylor MD, Hepatologist in 2 weeks    ADDITIONAL CARE RECOMMENDATIONS:     DIET: Diabetic Diet    TUBE FEEDING INSTRUCTIONS: none    OXYGEN / BiPAP SETTINGS: as needed oxygen 2 l/m via nasal canula if SaO2 <90%    ACTIVITY: Activity as tolerated    WOUND CARE: none    EQUIPMENT needed:       DISCHARGE MEDICATIONS:   See Medication Reconciliation Form      NOTIFY YOUR PHYSICIAN FOR ANY OF THE FOLLOWING:   Fever over 101 degrees for 24 hours. Chest pain, shortness of breath, fever, chills, nausea, vomiting, diarrhea, change in mentation, falling, weakness, bleeding. Severe pain or pain not relieved by medications. Or, any other signs or symptoms that you may have questions about.     DISPOSITION:    Home With:   OT  PT  HH  RN      x SNF/Inpatient Rehab/LTAC    Independent/assisted living    Hospice    Other:       PATIENT CONDITION AT DISCHARGE:     Functional status   x Poor     Deconditioned     Independent      Cognition     Lucid    x Forgetful     Dementia      Catheters/lines (plus indication)    [place]     PICC     PEG    x None      Code status    x Full code     DNR      PHYSICAL EXAMINATION AT DISCHARGE:   Refer to Progress Note      CHRONIC MEDICAL DIAGNOSES:  Problem List as of 7/24/2017  Date Reviewed: 5/12/2017          Codes Class Noted - Resolved    * (Principal)Hepatic encephalopathy ([facility] ICD-10-CM: K72.90  ICD-9-CM: 572.2  7/17/2017 - Present        Neuropathy ([facility] ICD-10-CM: G62.9  ICD-9-CM: 355.9  4/14/2017 - Present Cirrhosis ([facility] ICD-10-CM: K74.60  ICD-9-CM: 571.5  4/14/2017 - Present        CAD (coronary artery disease) ICD-10-CM: I25.10  ICD-9-CM: 414.00  4/14/2017 - Present        S/P coronary artery stent placement ICD-10-CM: Z95.5  ICD-9-CM: V45.82  4/14/2017 - Present        S/P CABG (coronary artery bypass graft) ICD-10-CM: Z95.1  ICD-9-CM: V45.81  4/14/2017 - Present    Overview Signed 4/14/2017 11:27 AM by Aura Romberg, MD     2002 and 2013             Thrombocytopenia [facility]) ICD-10-CM: D69.6  ICD-9-CM: 287.5  4/14/2017 - Present        MRSA infection ICD-10-CM: A49.02  ICD-9-CM: 041.12  4/14/2017 - Present    Overview Signed 4/14/2017 11:28 AM by Aura Romberg, MD     2016             S/P cholecystectomy ICD-10-CM: Z90.49  ICD-9-CM: V45.79  4/14/2017 - Present        Metabolic encephalopathy RQX-75-DB: G93.41  ICD-9-CM: 348.31  12/19/2016 - Present        Seizure ([facility] ICD-10-CM: R56.9  ICD-9-CM: 780.39  11/21/2016 - Present        Sinusitis ICD-10-CM: J32.9  ICD-9-CM: 473.9  Unknown - Present        Joint pain ICD-10-CM: M25.50  ICD-9-CM: 719.40  Unknown - Present        Low back pain ICD-10-CM: M54.5  ICD-9-CM: 724.2  Unknown - Present        GERD (gastroesophageal reflux disease) ICD-10-CM: K21.9  ICD-9-CM: 530.81  Unknown - Present        Diabetes mellitus, type 2 ([facility] ICD-10-CM: E11.9  ICD-9-CM: 250.00  Unknown - Present        RESOLVED: Pneumonia ICD-10-CM: J18.9  ICD-9-CM: 133  3/3/2017 - 4/14/2017        RESOLVED: Fever ICD-10-CM: R50.9  ICD-9-CM: 780.60  3/2/2017 - 4/14/2017        RESOLVED: Abscess ICD-10-CM: L02.91  ICD-9-CM: 682.9  1/5/2017 - 4/14/2017        RESOLVED: Altered mental status ICD-10-CM: R41.82  ICD-9-CM: 780.97  12/19/2016 - 4/14/2017        RESOLVED: Debility ICD-10-CM: R53.81  ICD-9-CM: 799.3  11/22/2016 - 4/14/2017        RESOLVED: Cellulitis ICD-10-CM: L03.90  ICD-9-CM: 682.9  11/7/2016 - 4/14/2017        RESOLVED: Snoring ICD-10-CM: R06.83  ICD-9-CM: 786.09  Unknown - 4/14/2017        RESOLVED: Night sweats ICD-10-CM: R61  ICD-9-CM: 780.8  Unknown - 4/14/2017        RESOLVED: Chest pain ICD-10-CM: 786.5  ICD-9-CM: 786.5  Unknown - 4/14/2017        RESOLVED: Sore throat ICD-10-CM: J02.9  ICD-9-CM: 462  Unknown - 4/14/2017        RESOLVED: Dysphagia ICD-10-CM: 787.2  ICD-9-CM: 787.2  Unknown - 4/14/2017        RESOLVED: Tingling sensation ICD-10-CM: R20.2  ICD-9-CM: 782.0  Unknown - 4/14/2017        RESOLVED: Nausea ICD-10-CM: R11.0  ICD-9-CM: 787.02  Unknown - 4/14/2017        RESOLVED: Diarrhea ICD-10-CM: R19.7  ICD-9-CM: 787.91  Unknown - 4/14/2017        RESOLVED: Snoring ICD-10-CM: R06.83  ICD-9-CM: 786.09  Unknown - 4/14/2017        RESOLVED: Abdominal pain, epigastric ICD-10-CM: R10.13  ICD-9-CM: 789.06  9/13/2010 - 4/14/2017                CDMP Checked:   Yes x     PROBLEM LIST Updated:  Yes x         Signed:   Allison Milton MD  7/24/2017  11:04 AM

## 2017-07-24 NOTE — PROGRESS NOTES
2000 Patient temp 100.1 oral. Danish ARMIREZ to notify. 2005 Danial Vallejo NP returned page and gave order for tylenol 650mg PO Q6H PRN for fever.
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
## 2017-07-31 ENCOUNTER — HOSPITAL ENCOUNTER (EMERGENCY)
Age: 62
Discharge: HOME | End: 2017-08-01
Attending: EMERGENCY MEDICINE | Admitting: EMERGENCY MEDICINE
Payer: MEDICARE

## 2017-07-31 VITALS
HEART RATE: 77 BPM | TEMPERATURE: 98.2 F | BODY MASS INDEX: 31.39 KG/M2 | RESPIRATION RATE: 16 BRPM | WEIGHT: 200 LBS | OXYGEN SATURATION: 97 % | SYSTOLIC BLOOD PRESSURE: 128 MMHG | HEIGHT: 67 IN | DIASTOLIC BLOOD PRESSURE: 70 MMHG

## 2017-07-31 DIAGNOSIS — K76.82 HEPATIC ENCEPHALOPATHY: Primary | ICD-10-CM

## 2017-07-31 DIAGNOSIS — N39.0 URINARY TRACT INFECTION WITHOUT HEMATURIA, SITE UNSPECIFIED: ICD-10-CM

## 2017-07-31 DIAGNOSIS — R41.3 MEMORY CHANGES: ICD-10-CM

## 2017-07-31 LAB
ALBUMIN SERPL BCP-MCNC: 2.9 G/DL (ref 3.5–5)
ALBUMIN/GLOB SERPL: 0.7 {RATIO} (ref 1.1–2.2)
ALP SERPL-CCNC: 78 U/L (ref 45–117)
ALT SERPL-CCNC: 29 U/L (ref 12–78)
AMMONIA PLAS-SCNC: 38 UMOL/L
ANION GAP BLD CALC-SCNC: 7 MMOL/L (ref 5–15)
APPEARANCE UR: CLEAR
AST SERPL W P-5'-P-CCNC: 16 U/L (ref 15–37)
BACTERIA URNS QL MICRO: NEGATIVE /HPF
BASOPHILS # BLD AUTO: 0 K/UL (ref 0–0.1)
BASOPHILS # BLD: 0 % (ref 0–1)
BILIRUB SERPL-MCNC: 0.3 MG/DL (ref 0.2–1)
BILIRUB UR QL: NEGATIVE
BUN SERPL-MCNC: 25 MG/DL (ref 6–20)
BUN/CREAT SERPL: 18 (ref 12–20)
CALCIUM SERPL-MCNC: 8.5 MG/DL (ref 8.5–10.1)
CHLORIDE SERPL-SCNC: 105 MMOL/L (ref 97–108)
CO2 SERPL-SCNC: 24 MMOL/L (ref 21–32)
COLOR UR: ABNORMAL
CREAT SERPL-MCNC: 1.42 MG/DL (ref 0.7–1.3)
DIFFERENTIAL METHOD BLD: ABNORMAL
EOSINOPHIL # BLD: 0.3 K/UL (ref 0–0.4)
EOSINOPHIL NFR BLD: 3 % (ref 0–7)
EPITH CASTS URNS QL MICRO: ABNORMAL /LPF
ERYTHROCYTE [DISTWIDTH] IN BLOOD BY AUTOMATED COUNT: 16.3 % (ref 11.5–14.5)
GLOBULIN SER CALC-MCNC: 4.4 G/DL (ref 2–4)
GLUCOSE SERPL-MCNC: 139 MG/DL (ref 65–100)
GLUCOSE UR STRIP.AUTO-MCNC: NEGATIVE MG/DL
HCT VFR BLD AUTO: 39.3 % (ref 36.6–50.3)
HGB BLD-MCNC: 12.7 G/DL (ref 12.1–17)
HGB UR QL STRIP: NEGATIVE
HYALINE CASTS URNS QL MICRO: ABNORMAL /LPF (ref 0–5)
KETONES UR QL STRIP.AUTO: NEGATIVE MG/DL
LEUKOCYTE ESTERASE UR QL STRIP.AUTO: NEGATIVE
LYMPHOCYTES # BLD AUTO: 8 % (ref 12–49)
LYMPHOCYTES # BLD: 0.7 K/UL (ref 0.8–3.5)
MCH RBC QN AUTO: 24.6 PG (ref 26–34)
MCHC RBC AUTO-ENTMCNC: 32.3 G/DL (ref 30–36.5)
MCV RBC AUTO: 76.2 FL (ref 80–99)
MONOCYTES # BLD: 0.5 K/UL (ref 0–1)
MONOCYTES NFR BLD AUTO: 5 % (ref 5–13)
NEUTS SEG # BLD: 7.8 K/UL (ref 1.8–8)
NEUTS SEG NFR BLD AUTO: 84 % (ref 32–75)
NITRITE UR QL STRIP.AUTO: POSITIVE
PH UR STRIP: 6 [PH] (ref 5–8)
PLATELET # BLD AUTO: 247 K/UL (ref 150–400)
POTASSIUM SERPL-SCNC: 4.2 MMOL/L (ref 3.5–5.1)
PROT SERPL-MCNC: 7.3 G/DL (ref 6.4–8.2)
PROT UR STRIP-MCNC: NEGATIVE MG/DL
RBC # BLD AUTO: 5.16 M/UL (ref 4.1–5.7)
RBC #/AREA URNS HPF: ABNORMAL /HPF (ref 0–5)
RBC MORPH BLD: ABNORMAL
SODIUM SERPL-SCNC: 136 MMOL/L (ref 136–145)
SP GR UR REFRACTOMETRY: 1.02 (ref 1–1.03)
UROBILINOGEN UR QL STRIP.AUTO: 1 EU/DL (ref 0.2–1)
WBC # BLD AUTO: 9.3 K/UL (ref 4.1–11.1)
WBC URNS QL MICRO: ABNORMAL /HPF (ref 0–4)

## 2017-07-31 PROCEDURE — 99285 EMERGENCY DEPT VISIT HI MDM: CPT

## 2017-07-31 PROCEDURE — 85025 COMPLETE CBC W/AUTO DIFF WBC: CPT | Performed by: EMERGENCY MEDICINE

## 2017-07-31 PROCEDURE — 80177 DRUG SCRN QUAN LEVETIRACETAM: CPT | Performed by: EMERGENCY MEDICINE

## 2017-07-31 PROCEDURE — 80339 ANTIEPILEPTICS NOS 1-3: CPT | Performed by: EMERGENCY MEDICINE

## 2017-07-31 PROCEDURE — 82140 ASSAY OF AMMONIA: CPT | Performed by: EMERGENCY MEDICINE

## 2017-07-31 PROCEDURE — 36415 COLL VENOUS BLD VENIPUNCTURE: CPT | Performed by: EMERGENCY MEDICINE

## 2017-07-31 PROCEDURE — 80053 COMPREHEN METABOLIC PANEL: CPT | Performed by: EMERGENCY MEDICINE

## 2017-07-31 PROCEDURE — 81001 URINALYSIS AUTO W/SCOPE: CPT | Performed by: EMERGENCY MEDICINE
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
## 2017-08-01 NOTE — ED NOTES
Report called to Mickey Ortiz at [facility] about plan of care, and discharge instructions as well as pending results. Transport set up with [facility]. Discharge instructions given to patient by MD and nurse. Pt has been given counseling on medication use and verbalizes understanding. IV d/c.

## 2017-08-01 NOTE — DISCHARGE INSTRUCTIONS
Urinary Tract Infections in Men: Care Instructions  Your Care Instructions    A urinary tract infection, or UTI, is a general term for an infection anywhere between the kidneys and the tip of the penis. UTIs can also be a result of a prostate problem. Most cause pain or burning when you urinate. Most UTIs are caused by bacteria and can be cured with antibiotics. It is important to complete your treatment so that the infection does not get worse. Follow-up care is a key part of your treatment and safety. Be sure to make and go to all appointments, and call your doctor if you are having problems. It's also a good idea to know your test results and keep a list of the medicines you take. How can you care for yourself at home? · Take your antibiotics as prescribed. Do not stop taking them just because you feel better. You need to take the full course of antibiotics. · Take your medicines exactly as prescribed. Your doctor may have prescribed a medicine, such as phenazopyridine (Pyridium), to help relieve pain when you urinate. This turns your urine orange. You may stop taking it when your symptoms get better. But be sure to take all of your antibiotics, which treat the infection. · Drink extra water for the next day or two. This will help make the urine less concentrated and help wash out the bacteria causing the infection. (If you have kidney, heart, or liver disease and have to limit your fluids, talk with your doctor before you increase your fluid intake.)  · Avoid drinks that are carbonated or have caffeine. They can irritate the bladder. · Urinate often. Try to empty your bladder each time. · To relieve pain, take a hot bath or lay a heating pad (set on low) over your lower belly or genital area. Never go to sleep with a heating pad in place. To help prevent UTIs  · Drink plenty of fluids, enough so that your urine is light yellow or clear like water.  If you have kidney, heart, or liver disease and have to limit fluids, talk with your doctor before you increase the amount of fluids you drink. · Urinate when you have the urge. Do not hold your urine for a long time. Urinate before you go to sleep. · Keep your penis clean. Catheter care  If you have a drainage tube (catheter) in place, the following steps will help you care for it. · Always wash your hands before and after touching your catheter. · Check the area around the urethra for inflammation or signs of infection. Signs of infection include irritated, swollen, red, or tender skin, or pus around the catheter. · Clean the area around the catheter with soap and water two times a day. Dry with a clean towel afterward. · Do not apply powder or lotion to the skin around the catheter. To empty the urine collection bag  · Wash your hands with soap and water. · Without touching the drain spout, remove the spout from its sleeve at the bottom of the collection bag. Open the valve on the spout. · Let the urine flow out of the bag and into the toilet or a container. Do not let the tubing or drain spout touch anything. · After you empty the bag, clean the end of the drain spout with tissue and water. Close the valve and put the drain spout back into its sleeve at the bottom of the collection bag. · Wash your hands with soap and water. When should you call for help? Call your doctor now or seek immediate medical care if:  · Symptoms such as a fever, chills, nausea, or vomiting get worse or happen for the first time. · You have new pain in your back just below your rib cage. This is called flank pain. · There is new blood or pus in your urine. · You are not able to take or keep down your antibiotics. Watch closely for changes in your health, and be sure to contact your doctor if:  · You are not getting better after taking an antibiotic for 2 days. · Your symptoms go away but then come back. Where can you learn more?   Go to http://gordo-melinda.info/. Enter Q004 in the search box to learn more about \"Urinary Tract Infections in Men: Care Instructions. \"  Current as of: November 28, 2016  Content Version: 11.3  © 6742-3357 [employer], [employer]. Care instructions adapted under license by [employer] (which disclaims liability or warranty for this information). If you have questions about a medical condition or this instruction, always ask your healthcare professional. [address] any warranty or liability for your use of this information.

## 2017-08-01 NOTE — ED NOTES
Went over discharge instructions with pt. Pt verbalized understanding. Waiting for EMS transport back to [facility]. VSS.

## 2017-08-01 NOTE — ED TRIAGE NOTES
Pt came in by EMS with complaints of seizure activity that was not witnessed. Daughter said that when pt get \"forgetful\" this means he has had a seizure.

## 2017-08-02 LAB
LACOSAMIDE SERPL-MCNC: 5.4 UG/ML (ref 5–10)
LEVETIRACETAM SERPL-MCNC: 41.1 UG/ML (ref 10–40)

## 2017-08-09 ENCOUNTER — TELEPHONE (OUTPATIENT)
Dept: HEMATOLOGY | Age: 62
End: 2017-08-09

## 2017-08-09 NOTE — TELEPHONE ENCOUNTER
The patients xifaxan is not covered and the med is $400.00 monthly and they are trying to get another option for medication.  Or a PA if possible

## 2017-08-17 ENCOUNTER — OFFICE VISIT (OUTPATIENT)
Dept: HEMATOLOGY | Age: 62
End: 2017-08-17

## 2017-08-17 VITALS
BODY MASS INDEX: 31.92 KG/M2 | OXYGEN SATURATION: 96 % | SYSTOLIC BLOOD PRESSURE: 122 MMHG | DIASTOLIC BLOOD PRESSURE: 61 MMHG | TEMPERATURE: 98.9 F | HEART RATE: 77 BPM | WEIGHT: 203.8 LBS

## 2017-08-17 DIAGNOSIS — K74.60 CIRRHOSIS OF LIVER WITHOUT ASCITES, UNSPECIFIED HEPATIC CIRRHOSIS TYPE (HCC): Primary | ICD-10-CM

## 2017-08-17 RX ORDER — SUCRALFATE 1 G/10ML
SUSPENSION ORAL 4 TIMES DAILY
COMMUNITY
End: 2019-01-10

## 2017-08-17 NOTE — MR AVS SNAPSHOT
Visit Information Date & Time Provider Department Dept. Phone Encounter #  
 8/17/2017  4:30 PM Todd Pereira MD [facility] [address] [place] [address] [id number] Follow-up Instructions Return in about 3 months (around 11/17/2017) for NP. Your Appointments 11/16/2017  2:00 PM  
Follow Up with HUNTER Ackerman [facility] Appt Note: Follow up [facility] [phone] [facility] [id number]  
[address] [facility] [address] Upcoming Health Maintenance Date Due  
 LIPID PANEL Q1 1955 FOOT EXAM Q1 1/2/1964 MICROALBUMIN Q1 1/2/1964 EYE EXAM RETINAL OR DILATED Q1 1/2/1964 Pneumococcal 19-64 Medium Risk (1 of 1 - PPSV23) 1/2/1973 DTaP/Tdap/Td series (1 - Tdap) 1/2/1975 FOBT Q 1 YEAR AGE 50-75 1/2/2004 ZOSTER VACCINE AGE 60> 11/2/2013 INFLUENZA AGE 9 TO ADULT 8/1/2017 HEMOGLOBIN A1C Q6M 1/18/2018 Allergies as of 8/17/2017  Review Complete On: 8/17/2017 By: Marcelina Gifford Severity Noted Reaction Type Reactions Codeine High 09/13/2010    Anaphylaxis Tolerated fentanyl previously Demerol [Meperidine] High 09/13/2010    Anaphylaxis Tolerated fentanyl previously Metformin  04/14/2017    Diarrhea And dehydration Wellbutrin [Bupropion Hcl]  09/13/2010    Anaphylaxis Current Immunizations  Never Reviewed No immunizations on file. Not reviewed this visit Vitals BP Pulse Temp Weight(growth percentile) SpO2 BMI  
 122/61 77 98.9 °F (37.2 °C) (Oral) 203 lb 12.8 oz (92.4 kg) 96% 31.92 kg/m2 Smoking Status Former Smoker Vitals History BMI and BSA Data Body Mass Index Body Surface Area 31.92 kg/m 2 2.09 m 2 Preferred Pharmacy Pharmacy Name Phone [phone] Your Updated Medication List  
  
   
This list is accurate as of: 8/17/17  5:09 PM.  Always use your most recent med list.  
  
  
  
  
 ALPRAZolam 1 mg tablet Commonly known as:  Shermon Collie Take 1 mg by mouth every six (6) hours as needed for Anxiety. aspirin 81 mg chewable tablet Take 1 Tab by mouth daily. atorvastatin 80 mg tablet Commonly known as:  LIPITOR Take 1 Tab by mouth Daily (before dinner). CARAFATE 100 mg/mL suspension Generic drug:  sucralfate Take  by mouth four (4) times daily. carvedilol 12.5 mg tablet Commonly known as:  Gonzalez Mood Take 1 Tab by mouth daily (with breakfast). clonazePAM 0.5 mg tablet Commonly known as:  Don Gibes Take 1 tablet by mouth twice daily as needed for anxiety or agitation  
  
 eplerenone 25 mg tablet Commonly known as:  Nery File Take 1 Tab by mouth daily. FLUoxetine 20 mg tablet Commonly known as:  PROzac Take 20 mg by mouth daily. glimepiride 4 mg tablet Commonly known as:  AMARYL Take 0.5 Tabs by mouth every morning. lactulose 10 gram/15 mL solution Commonly known as:  Emilia Basket Take 45 mL by mouth three (3) times daily. Adjust dosage so that you have 2-3 bowel movements per day. levETIRAcetam 750 mg tablet Commonly known as:  KEPPRA Take 1 Tab by mouth two (2) times a day. pantoprazole 40 mg tablet Commonly known as:  PROTONIX Take 1 Tab by mouth Before breakfast and dinner. Before Breakfast and in the afternoon (also takes Zantac at same time) pregabalin 50 mg capsule Commonly known as:  Dia Carmencita Take 1 Cap by mouth three (3) times daily. Max Daily Amount: 150 mg. QUEtiapine 50 mg tablet Commonly known as:  SEROquel Take 3 Tabs by mouth nightly. rifAXIMin 550 mg tablet Commonly known as:  Adaline Ask Take 1 Tab by mouth two (2) times a day. tamsulosin 0.4 mg capsule Commonly known as:  FLOMAX Take 1 Cap by mouth Before breakfast and dinner. Before Breakfast and in the afternoon  
  
 venlafaxine-SR 75 mg capsule Commonly known as:  EFFEXOR XR Take 2 Caps by mouth daily. VIMPAT 100 mg Tab tablet Generic drug:  lacosamide  
take 1 tablet by mouth twice a day . MAX DAILY AMOUNT: 200MG ZANTAC 150 mg tablet Generic drug:  raNITIdine Take 150 mg by mouth two (2) times a day. Follow-up Instructions Return in about 3 months (around 11/17/2017) for NP. Please provide this summary of care documentation to your next provider. Your primary care clinician is listed as Nena Leblanc. If you have any questions after today's visit, please call [phone].
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
## 2017-08-23 ENCOUNTER — TELEPHONE (OUTPATIENT)
Dept: HEMATOLOGY | Age: 62
End: 2017-08-23

## 2017-08-23 NOTE — TELEPHONE ENCOUNTER
The patients daughter advised home health that she wants the patient to have his ammonia checked on a regular basis. If you are okay with this they will need a order. Also the office provided the family with a card to pay for the patients xifaxan and the discounted co pay card was exp over 1 year ago and the pt is not able to afford the medication. Even with a PA the pt would still have to come out of pocket roughly 400.

## 2017-08-25 NOTE — TELEPHONE ENCOUNTER
Left voice mail for patient to call about discount for Xifaxan and to obtain more information about  ammonia levels.

## 2017-09-14 ENCOUNTER — TELEPHONE (OUTPATIENT)
Dept: HEMATOLOGY | Age: 62
End: 2017-09-14

## 2017-09-29 RX ORDER — LEVETIRACETAM 750 MG/1
TABLET ORAL
Qty: 60 TAB | Refills: 1 | Status: SHIPPED | OUTPATIENT
Start: 2017-09-29 | End: 2017-11-27 | Stop reason: SDUPTHER

## 2017-11-27 ENCOUNTER — OFFICE VISIT (OUTPATIENT)
Dept: NEUROLOGY | Age: 62
End: 2017-11-27

## 2017-11-27 VITALS
OXYGEN SATURATION: 98 % | HEART RATE: 69 BPM | WEIGHT: 208 LBS | TEMPERATURE: 97 F | BODY MASS INDEX: 32.65 KG/M2 | SYSTOLIC BLOOD PRESSURE: 141 MMHG | HEIGHT: 67 IN | DIASTOLIC BLOOD PRESSURE: 75 MMHG | RESPIRATION RATE: 16 BRPM

## 2017-11-27 DIAGNOSIS — G40.209 PARTIAL SYMPTOMATIC EPILEPSY WITH COMPLEX PARTIAL SEIZURES, NOT INTRACTABLE, WITHOUT STATUS EPILEPTICUS (HCC): Primary | ICD-10-CM

## 2017-11-27 DIAGNOSIS — Z87.820 HISTORY OF MULTIPLE CONCUSSIONS: ICD-10-CM

## 2017-11-27 DIAGNOSIS — K76.82 HEPATIC ENCEPHALOPATHY: ICD-10-CM

## 2017-11-27 RX ORDER — LACOSAMIDE 100 MG/1
TABLET ORAL
Qty: 60 TAB | Refills: 6 | Status: SHIPPED | OUTPATIENT
Start: 2017-11-27 | End: 2018-01-10

## 2017-11-27 RX ORDER — LEVETIRACETAM 750 MG/1
TABLET ORAL
Qty: 60 TAB | Refills: 6 | Status: SHIPPED | OUTPATIENT
Start: 2017-11-27 | End: 2018-01-04

## 2017-11-27 NOTE — PROGRESS NOTES
Chief Complaint   Patient presents with    Epilepsy     follow up    Cirrhosis Of Liver    Dementia
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
## 2017-11-27 NOTE — MR AVS SNAPSHOT
Visit Information Date & Time Provider Department Dept. Phone Encounter #  
 11/27/2017  2:40  [facility], [address] [facility] at [address] [id number] Follow-up Instructions Return in about 6 months (around 5/27/2018). Your Appointments 1/24/2018  2:30 PM  
Follow Up with HUN[address] ([facility]) Appt Note: Follow up; R/S FU 11.16.17 [address] [place] [id number] [facility] [id number]  
[address] Upcoming Health Maintenance Date Due  
 LIPID PANEL Q1 1955 FOOT EXAM Q1 1/2/1964 MICROALBUMIN Q1 1/2/1964 EYE EXAM RETINAL OR DILATED Q1 1/2/1964 Pneumococcal 19-64 Medium Risk (1 of 1 - PPSV23) 1/2/1973 DTaP/Tdap/Td series (1 - Tdap) 1/2/1975 FOBT Q 1 YEAR AGE 50-75 1/2/2004 ZOSTER VACCINE AGE 60> 11/2/2013 Influenza Age 5 to Adult 8/1/2017 HEMOGLOBIN A1C Q6M 1/18/2018 Allergies as of 11/27/2017  Review Complete On: 11/27/2017 By: Sendy Willard LPN Severity Noted Reaction Type Reactions Latex, Natural Rubber  11/27/2017    Hives Codeine High 09/13/2010    Anaphylaxis Tolerated fentanyl previously Demerol [Meperidine] High 09/13/2010    Anaphylaxis Tolerated fentanyl previously Mushroom High 11/27/2017    Anaphylaxis Metformin  04/14/2017    Diarrhea And dehydration Wellbutrin [Bupropion Hcl]  09/13/2010    Anaphylaxis Current Immunizations  Never Reviewed No immunizations on file. Not reviewed this visit You Were Diagnosed With   
  
 Codes Comments Partial symptomatic epilepsy with complex partial seizures, not intractable, without status epilepticus ([facility]    -  Primary ICD-10-CM: Y15.650 ICD-9-CM: 345.40 History of multiple concussions     ICD-10-CM: Z87.820 ICD-9-CM: V15.52 Hepatic encephalopathy ([facility]     ICD-10-CM: K72.90 ICD-9-CM: 572.2 Vitals BP Pulse Temp Resp Height(growth percentile) Weight(growth percentile) 141/75 69 97 °F (36.1 °C) (Oral) 16 5' 7\" (1.702 m) 208 lb (94.3 kg) SpO2 BMI Smoking Status 98% 32.58 kg/m2 Former Smoker Vitals History BMI and BSA Data Body Mass Index Body Surface Area 32.58 kg/m 2 2.11 m 2 Preferred Pharmacy Pharmacy Name Phone [phone] [phone] Your Updated Medication List  
  
   
This list is accurate as of: 11/27/17  3:04 PM.  Always use your most recent med list.  
  
  
  
  
 ALPRAZolam 1 mg tablet Commonly known as:  Mosetta Harp Take 1 mg by mouth every six (6) hours as needed for Anxiety. aspirin 81 mg chewable tablet Take 1 Tab by mouth daily. atorvastatin 80 mg tablet Commonly known as:  LIPITOR Take 1 Tab by mouth Daily (before dinner). CARAFATE 100 mg/mL suspension Generic drug:  sucralfate Take  by mouth four (4) times daily. carvedilol 12.5 mg tablet Commonly known as:  Belle Golas Take 1 Tab by mouth daily (with breakfast). clonazePAM 0.5 mg tablet Commonly known as:  Guzman Altes Take 1 tablet by mouth twice daily as needed for anxiety or agitation  
  
 eplerenone 25 mg tablet Commonly known as:  Theadore Daily Take 1 Tab by mouth daily. FLUoxetine 20 mg tablet Commonly known as:  PROzac Take 20 mg by mouth daily. glimepiride 4 mg tablet Commonly known as:  AMARYL Take 0.5 Tabs by mouth every morning. lacosamide 100 mg Tab tablet Commonly known as:  VIMPAT  
take 1 tablet by mouth twice a day . MAX DAILY AMOUNT: 200MG  
  
 lactulose 10 gram/15 mL solution Commonly known as:  Cortez Minks Take 45 mL by mouth three (3) times daily. Adjust dosage so that you have 2-3 bowel movements per day. levETIRAcetam 750 mg tablet Commonly known as:  KEPPRA  
take 1 tablet by mouth twice a day pantoprazole 40 mg tablet Commonly known as:  PROTONIX Take 1 Tab by mouth Before breakfast and dinner. Before Breakfast and in the afternoon (also takes Zantac at same time) pregabalin 50 mg capsule Commonly known as:  Edman Speaker Take 1 Cap by mouth three (3) times daily. Max Daily Amount: 150 mg. QUEtiapine 50 mg tablet Commonly known as:  SEROquel Take 3 Tabs by mouth nightly. rifAXIMin 550 mg tablet Commonly known as:  Laray Nanas Take 1 Tab by mouth two (2) times a day. tamsulosin 0.4 mg capsule Commonly known as:  FLOMAX Take 1 Cap by mouth Before breakfast and dinner. Before Breakfast and in the afternoon  
  
 venlafaxine-SR 75 mg capsule Commonly known as:  EFFEXOR XR Take 2 Caps by mouth daily. ZANTAC 150 mg tablet Generic drug:  raNITIdine Take 150 mg by mouth two (2) times a day. Prescriptions Printed Refills  
 lacosamide (VIMPAT) 100 mg tab tablet 6 Sig: take 1 tablet by mouth twice a day . MAX DAILY AMOUNT: 200MG Class: Print Prescriptions Sent to Pharmacy Refills  
 levETIRAcetam (KEPPRA) 750 mg tablet 6 Sig: take 1 tablet by mouth twice a day Class: Normal  
 Pharmacy: [id number] [address] [place] [address],[address] #: [phone] Follow-up Instructions Return in about 6 months (around 5/27/2018). Please provide this summary of care documentation to your next provider. Your primary care clinician is listed as Amina Arreola. If you have any questions after today's visit, please call [phone].

## 2018-01-03 NOTE — TELEPHONE ENCOUNTER
Jetty Antes calling, is having a problem with patient's medication. Pt stated that it is imparative that nurse returns the phone calls soon. No further details given to [facility].

## 2018-01-04 RX ORDER — DIVALPROEX SODIUM 500 MG/1
500 TABLET, DELAYED RELEASE ORAL 2 TIMES DAILY
Qty: 60 TAB | Refills: 3 | Status: SHIPPED | OUTPATIENT
Start: 2018-01-04 | End: 2018-05-02 | Stop reason: SDUPTHER

## 2018-01-10 ENCOUNTER — APPOINTMENT (OUTPATIENT)
Dept: GENERAL RADIOLOGY | Age: 63
DRG: 281 | End: 2018-01-10
Attending: EMERGENCY MEDICINE
Payer: MEDICARE

## 2018-01-10 ENCOUNTER — APPOINTMENT (OUTPATIENT)
Dept: NUCLEAR MEDICINE | Age: 63
DRG: 281 | End: 2018-01-10
Attending: FAMILY MEDICINE
Payer: MEDICARE

## 2018-01-10 ENCOUNTER — HOSPITAL ENCOUNTER (INPATIENT)
Age: 63
LOS: 1 days | Discharge: HOME | DRG: 281 | End: 2018-01-12
Attending: EMERGENCY MEDICINE | Admitting: INTERNAL MEDICINE
Payer: MEDICARE

## 2018-01-10 DIAGNOSIS — R07.9 ACUTE CHEST PAIN: Primary | ICD-10-CM

## 2018-01-10 LAB
ALBUMIN SERPL-MCNC: 2.8 G/DL (ref 3.5–5)
ALBUMIN/GLOB SERPL: 0.8 {RATIO} (ref 1.1–2.2)
ALP SERPL-CCNC: 94 U/L (ref 45–117)
ALT SERPL-CCNC: 13 U/L (ref 12–78)
AMMONIA PLAS-SCNC: 47 UMOL/L
ANION GAP SERPL CALC-SCNC: 9 MMOL/L (ref 5–15)
APTT PPP: 34 SEC (ref 22.1–32.5)
AST SERPL-CCNC: 10 U/L (ref 15–37)
ATRIAL RATE: 76 BPM
BASOPHILS # BLD: 0 K/UL (ref 0–0.1)
BASOPHILS NFR BLD: 0 % (ref 0–1)
BILIRUB SERPL-MCNC: 0.4 MG/DL (ref 0.2–1)
BNP SERPL-MCNC: 1382 PG/ML (ref 0–125)
BUN SERPL-MCNC: 13 MG/DL (ref 6–20)
BUN/CREAT SERPL: 10 (ref 12–20)
CALCIUM SERPL-MCNC: 7.8 MG/DL (ref 8.5–10.1)
CALCULATED P AXIS, ECG09: 64 DEGREES
CALCULATED R AXIS, ECG10: 38 DEGREES
CALCULATED T AXIS, ECG11: 120 DEGREES
CHLORIDE SERPL-SCNC: 104 MMOL/L (ref 97–108)
CK SERPL-CCNC: 82 U/L (ref 39–308)
CK SERPL-CCNC: 82 U/L (ref 39–308)
CK SERPL-CCNC: 83 U/L (ref 39–308)
CK SERPL-CCNC: 84 U/L (ref 39–308)
CK SERPL-CCNC: 84 U/L (ref 39–308)
CO2 SERPL-SCNC: 25 MMOL/L (ref 21–32)
CREAT SERPL-MCNC: 1.33 MG/DL (ref 0.7–1.3)
DIAGNOSIS, 93000: NORMAL
DIFFERENTIAL METHOD BLD: ABNORMAL
EOSINOPHIL # BLD: 0.3 K/UL (ref 0–0.4)
EOSINOPHIL NFR BLD: 4 % (ref 0–7)
ERYTHROCYTE [DISTWIDTH] IN BLOOD BY AUTOMATED COUNT: 15 % (ref 11.5–14.5)
GLOBULIN SER CALC-MCNC: 3.3 G/DL (ref 2–4)
GLUCOSE BLD STRIP.AUTO-MCNC: 156 MG/DL (ref 65–100)
GLUCOSE BLD STRIP.AUTO-MCNC: 174 MG/DL (ref 65–100)
GLUCOSE SERPL-MCNC: 219 MG/DL (ref 65–100)
HCT VFR BLD AUTO: 37.8 % (ref 36.6–50.3)
HGB BLD-MCNC: 12.1 G/DL (ref 12.1–17)
INR PPP: 1.1 (ref 0.9–1.1)
LYMPHOCYTES # BLD: 1.7 K/UL (ref 0.8–3.5)
LYMPHOCYTES NFR BLD: 23 % (ref 12–49)
MAGNESIUM SERPL-MCNC: 1.6 MG/DL (ref 1.6–2.4)
MCH RBC QN AUTO: 23.4 PG (ref 26–34)
MCHC RBC AUTO-ENTMCNC: 32 G/DL (ref 30–36.5)
MCV RBC AUTO: 73.1 FL (ref 80–99)
MONOCYTES # BLD: 0.4 K/UL (ref 0–1)
MONOCYTES NFR BLD: 5 % (ref 5–13)
NEUTS SEG # BLD: 4.8 K/UL (ref 1.8–8)
NEUTS SEG NFR BLD: 68 % (ref 32–75)
P-R INTERVAL, ECG05: 176 MS
PHOSPHATE SERPL-MCNC: 2.3 MG/DL (ref 2.6–4.7)
PLATELET # BLD AUTO: 139 K/UL (ref 150–400)
POTASSIUM SERPL-SCNC: 3.1 MMOL/L (ref 3.5–5.1)
PROT SERPL-MCNC: 6.1 G/DL (ref 6.4–8.2)
PROTHROMBIN TIME: 11.2 SEC (ref 9–11.1)
Q-T INTERVAL, ECG07: 424 MS
QRS DURATION, ECG06: 118 MS
QTC CALCULATION (BEZET), ECG08: 477 MS
RBC # BLD AUTO: 5.17 M/UL (ref 4.1–5.7)
RBC MORPH BLD: ABNORMAL
SERVICE CMNT-IMP: ABNORMAL
SERVICE CMNT-IMP: ABNORMAL
SODIUM SERPL-SCNC: 138 MMOL/L (ref 136–145)
THERAPEUTIC RANGE,PTTT: ABNORMAL SECS (ref 58–77)
TROPONIN I SERPL-MCNC: 0.05 NG/ML
TROPONIN I SERPL-MCNC: 0.7 NG/ML
TROPONIN I SERPL-MCNC: 0.72 NG/ML
VENTRICULAR RATE, ECG03: 76 BPM
WBC # BLD AUTO: 7.2 K/UL (ref 4.1–11.1)
WBC MORPH BLD: ABNORMAL

## 2018-01-10 PROCEDURE — 83880 ASSAY OF NATRIURETIC PEPTIDE: CPT | Performed by: EMERGENCY MEDICINE

## 2018-01-10 PROCEDURE — 85025 COMPLETE CBC W/AUTO DIFF WBC: CPT | Performed by: EMERGENCY MEDICINE

## 2018-01-10 PROCEDURE — 82550 ASSAY OF CK (CPK): CPT | Performed by: EMERGENCY MEDICINE

## 2018-01-10 PROCEDURE — 78451 HT MUSCLE IMAGE SPECT SING: CPT

## 2018-01-10 PROCEDURE — 80053 COMPREHEN METABOLIC PANEL: CPT | Performed by: EMERGENCY MEDICINE

## 2018-01-10 PROCEDURE — 82140 ASSAY OF AMMONIA: CPT | Performed by: FAMILY MEDICINE

## 2018-01-10 PROCEDURE — 74011636637 HC RX REV CODE- 636/637: Performed by: NURSE PRACTITIONER

## 2018-01-10 PROCEDURE — 74011250637 HC RX REV CODE- 250/637: Performed by: FAMILY MEDICINE

## 2018-01-10 PROCEDURE — 99218 HC RM OBSERVATION: CPT

## 2018-01-10 PROCEDURE — 83735 ASSAY OF MAGNESIUM: CPT | Performed by: EMERGENCY MEDICINE

## 2018-01-10 PROCEDURE — 74011250637 HC RX REV CODE- 250/637: Performed by: INTERNAL MEDICINE

## 2018-01-10 PROCEDURE — 84484 ASSAY OF TROPONIN QUANT: CPT | Performed by: EMERGENCY MEDICINE

## 2018-01-10 PROCEDURE — 82962 GLUCOSE BLOOD TEST: CPT

## 2018-01-10 PROCEDURE — 93005 ELECTROCARDIOGRAM TRACING: CPT

## 2018-01-10 PROCEDURE — 74011250637 HC RX REV CODE- 250/637: Performed by: NURSE PRACTITIONER

## 2018-01-10 PROCEDURE — 71045 X-RAY EXAM CHEST 1 VIEW: CPT

## 2018-01-10 PROCEDURE — 99284 EMERGENCY DEPT VISIT MOD MDM: CPT

## 2018-01-10 PROCEDURE — 36415 COLL VENOUS BLD VENIPUNCTURE: CPT | Performed by: FAMILY MEDICINE

## 2018-01-10 PROCEDURE — 74011250636 HC RX REV CODE- 250/636: Performed by: INTERNAL MEDICINE

## 2018-01-10 PROCEDURE — 85610 PROTHROMBIN TIME: CPT | Performed by: EMERGENCY MEDICINE

## 2018-01-10 PROCEDURE — 85730 THROMBOPLASTIN TIME PARTIAL: CPT | Performed by: EMERGENCY MEDICINE

## 2018-01-10 PROCEDURE — 93306 TTE W/DOPPLER COMPLETE: CPT

## 2018-01-10 PROCEDURE — 84100 ASSAY OF PHOSPHORUS: CPT | Performed by: EMERGENCY MEDICINE

## 2018-01-10 RX ORDER — CLOPIDOGREL BISULFATE 75 MG/1
75 TABLET ORAL DAILY
Status: DISCONTINUED | OUTPATIENT
Start: 2018-01-11 | End: 2018-01-12 | Stop reason: HOSPADM

## 2018-01-10 RX ORDER — CLOPIDOGREL 300 MG/1
600 TABLET, FILM COATED ORAL
Status: COMPLETED | OUTPATIENT
Start: 2018-01-10 | End: 2018-01-10

## 2018-01-10 RX ORDER — GLIMEPIRIDE 4 MG/1
4 TABLET ORAL
COMMUNITY
End: 2018-01-12

## 2018-01-10 RX ORDER — VENLAFAXINE HYDROCHLORIDE 75 MG/1
75 CAPSULE, EXTENDED RELEASE ORAL DAILY
COMMUNITY
End: 2019-01-10

## 2018-01-10 RX ORDER — TAMSULOSIN HYDROCHLORIDE 0.4 MG/1
0.4 CAPSULE ORAL
Status: DISCONTINUED | OUTPATIENT
Start: 2018-01-10 | End: 2018-01-12 | Stop reason: HOSPADM

## 2018-01-10 RX ORDER — SODIUM CHLORIDE 0.9 % (FLUSH) 0.9 %
5-10 SYRINGE (ML) INJECTION EVERY 8 HOURS
Status: DISCONTINUED | OUTPATIENT
Start: 2018-01-10 | End: 2018-01-12 | Stop reason: HOSPADM

## 2018-01-10 RX ORDER — SODIUM CHLORIDE 0.9 % (FLUSH) 0.9 %
5-10 SYRINGE (ML) INJECTION AS NEEDED
Status: DISCONTINUED | OUTPATIENT
Start: 2018-01-10 | End: 2018-01-12 | Stop reason: HOSPADM

## 2018-01-10 RX ORDER — INSULIN LISPRO 100 [IU]/ML
INJECTION, SOLUTION INTRAVENOUS; SUBCUTANEOUS
Status: DISCONTINUED | OUTPATIENT
Start: 2018-01-10 | End: 2018-01-12 | Stop reason: HOSPADM

## 2018-01-10 RX ORDER — GUAIFENESIN 100 MG/5ML
81 LIQUID (ML) ORAL DAILY
Status: DISCONTINUED | OUTPATIENT
Start: 2018-01-10 | End: 2018-01-10 | Stop reason: SDUPTHER

## 2018-01-10 RX ORDER — DEXTROSE 50 % IN WATER (D50W) INTRAVENOUS SYRINGE
12.5-25 AS NEEDED
Status: DISCONTINUED | OUTPATIENT
Start: 2018-01-10 | End: 2018-01-12 | Stop reason: HOSPADM

## 2018-01-10 RX ORDER — FAMOTIDINE 20 MG/1
20 TABLET, FILM COATED ORAL 2 TIMES DAILY
Status: DISCONTINUED | OUTPATIENT
Start: 2018-01-10 | End: 2018-01-12 | Stop reason: HOSPADM

## 2018-01-10 RX ORDER — FLUOXETINE HYDROCHLORIDE 20 MG/1
20 CAPSULE ORAL DAILY
Status: DISCONTINUED | OUTPATIENT
Start: 2018-01-10 | End: 2018-01-12 | Stop reason: HOSPADM

## 2018-01-10 RX ORDER — FLUOXETINE HYDROCHLORIDE 20 MG/1
20 CAPSULE ORAL DAILY
COMMUNITY
End: 2019-09-03

## 2018-01-10 RX ORDER — DIVALPROEX SODIUM 500 MG/1
500 TABLET, DELAYED RELEASE ORAL 2 TIMES DAILY
Status: DISCONTINUED | OUTPATIENT
Start: 2018-01-10 | End: 2018-01-12 | Stop reason: HOSPADM

## 2018-01-10 RX ORDER — LEVETIRACETAM 750 MG/1
750 TABLET ORAL 2 TIMES DAILY
COMMUNITY
End: 2019-01-10

## 2018-01-10 RX ORDER — ATORVASTATIN CALCIUM 40 MG/1
80 TABLET, FILM COATED ORAL
Status: DISCONTINUED | OUTPATIENT
Start: 2018-01-10 | End: 2018-01-12 | Stop reason: HOSPADM

## 2018-01-10 RX ORDER — PREGABALIN 50 MG/1
50 CAPSULE ORAL 3 TIMES DAILY
Status: DISCONTINUED | OUTPATIENT
Start: 2018-01-10 | End: 2018-01-12 | Stop reason: HOSPADM

## 2018-01-10 RX ORDER — ENOXAPARIN SODIUM 100 MG/ML
1 INJECTION SUBCUTANEOUS EVERY 12 HOURS
Status: COMPLETED | OUTPATIENT
Start: 2018-01-10 | End: 2018-01-12

## 2018-01-10 RX ORDER — CLONAZEPAM 1 MG/1
0.5 TABLET ORAL
Status: ON HOLD | COMMUNITY
End: 2019-01-16 | Stop reason: SDUPTHER

## 2018-01-10 RX ORDER — MAGNESIUM SULFATE 100 %
4 CRYSTALS MISCELLANEOUS AS NEEDED
Status: DISCONTINUED | OUTPATIENT
Start: 2018-01-10 | End: 2018-01-12 | Stop reason: HOSPADM

## 2018-01-10 RX ORDER — NITROGLYCERIN 0.4 MG/1
0.4 TABLET SUBLINGUAL
COMMUNITY

## 2018-01-10 RX ORDER — POTASSIUM CHLORIDE 750 MG/1
40 TABLET, FILM COATED, EXTENDED RELEASE ORAL
Status: COMPLETED | OUTPATIENT
Start: 2018-01-10 | End: 2018-01-10

## 2018-01-10 RX ORDER — PANTOPRAZOLE SODIUM 40 MG/1
40 TABLET, DELAYED RELEASE ORAL
Status: DISCONTINUED | OUTPATIENT
Start: 2018-01-10 | End: 2018-01-12 | Stop reason: HOSPADM

## 2018-01-10 RX ORDER — CARVEDILOL 12.5 MG/1
12.5 TABLET ORAL
Status: DISCONTINUED | OUTPATIENT
Start: 2018-01-10 | End: 2018-01-12 | Stop reason: HOSPADM

## 2018-01-10 RX ORDER — QUETIAPINE FUMARATE 100 MG/1
200 TABLET, FILM COATED ORAL
Status: DISCONTINUED | OUTPATIENT
Start: 2018-01-10 | End: 2018-01-12 | Stop reason: HOSPADM

## 2018-01-10 RX ORDER — ISOSORBIDE MONONITRATE 30 MG/1
30 TABLET, EXTENDED RELEASE ORAL DAILY
Status: DISCONTINUED | OUTPATIENT
Start: 2018-01-10 | End: 2018-01-12 | Stop reason: HOSPADM

## 2018-01-10 RX ORDER — SUCRALFATE 1 G/10ML
1 SUSPENSION ORAL
Status: DISCONTINUED | OUTPATIENT
Start: 2018-01-10 | End: 2018-01-12 | Stop reason: HOSPADM

## 2018-01-10 RX ORDER — QUETIAPINE FUMARATE 100 MG/1
25 TABLET, FILM COATED ORAL
Status: ON HOLD | COMMUNITY
End: 2020-07-14

## 2018-01-10 RX ORDER — CLONAZEPAM 0.5 MG/1
0.5 TABLET ORAL 2 TIMES DAILY
Status: DISCONTINUED | OUTPATIENT
Start: 2018-01-10 | End: 2018-01-12 | Stop reason: HOSPADM

## 2018-01-10 RX ORDER — ASPIRIN 81 MG/1
81 TABLET ORAL DAILY
Status: DISCONTINUED | OUTPATIENT
Start: 2018-01-10 | End: 2018-01-12 | Stop reason: HOSPADM

## 2018-01-10 RX ORDER — SODIUM CHLORIDE 0.9 % (FLUSH) 0.9 %
20 SYRINGE (ML) INJECTION
Status: ACTIVE | OUTPATIENT
Start: 2018-01-10 | End: 2018-01-10

## 2018-01-10 RX ADMIN — SUCRALFATE 1 G: 1 SUSPENSION ORAL at 21:40

## 2018-01-10 RX ADMIN — RIFAXIMIN 550 MG: 550 TABLET ORAL at 17:52

## 2018-01-10 RX ADMIN — DIVALPROEX SODIUM 500 MG: 500 TABLET, DELAYED RELEASE ORAL at 10:22

## 2018-01-10 RX ADMIN — CARVEDILOL 12.5 MG: 12.5 TABLET, FILM COATED ORAL at 10:21

## 2018-01-10 RX ADMIN — ATORVASTATIN CALCIUM 80 MG: 40 TABLET, FILM COATED ORAL at 17:53

## 2018-01-10 RX ADMIN — FAMOTIDINE 20 MG: 20 TABLET, FILM COATED ORAL at 10:22

## 2018-01-10 RX ADMIN — CLONAZEPAM 0.5 MG: 1 TABLET ORAL at 10:22

## 2018-01-10 RX ADMIN — LACTULOSE 30 G: 20 SOLUTION ORAL at 10:23

## 2018-01-10 RX ADMIN — LEVETIRACETAM 750 MG: 250 TABLET, FILM COATED ORAL at 17:52

## 2018-01-10 RX ADMIN — PANTOPRAZOLE SODIUM 40 MG: 40 TABLET, DELAYED RELEASE ORAL at 17:53

## 2018-01-10 RX ADMIN — SUCRALFATE 1 G: 1 SUSPENSION ORAL at 20:53

## 2018-01-10 RX ADMIN — CLONAZEPAM 0.5 MG: 1 TABLET ORAL at 17:52

## 2018-01-10 RX ADMIN — QUETIAPINE FUMARATE 200 MG: 100 TABLET ORAL at 21:40

## 2018-01-10 RX ADMIN — FAMOTIDINE 20 MG: 20 TABLET, FILM COATED ORAL at 17:53

## 2018-01-10 RX ADMIN — FLUOXETINE 20 MG: 20 CAPSULE ORAL at 10:21

## 2018-01-10 RX ADMIN — ISOSORBIDE MONONITRATE 30 MG: 30 TABLET, EXTENDED RELEASE ORAL at 13:31

## 2018-01-10 RX ADMIN — INSULIN LISPRO 2 UNITS: 100 INJECTION, SOLUTION INTRAVENOUS; SUBCUTANEOUS at 21:40

## 2018-01-10 RX ADMIN — Medication 10 ML: at 21:41

## 2018-01-10 RX ADMIN — SUCRALFATE 1 G: 1 SUSPENSION ORAL at 13:31

## 2018-01-10 RX ADMIN — POTASSIUM CHLORIDE 40 MEQ: 750 TABLET, FILM COATED, EXTENDED RELEASE ORAL at 09:30

## 2018-01-10 RX ADMIN — PREGABALIN 50 MG: 50 CAPSULE ORAL at 17:52

## 2018-01-10 RX ADMIN — PREGABALIN 50 MG: 50 CAPSULE ORAL at 10:21

## 2018-01-10 RX ADMIN — LEVETIRACETAM 750 MG: 250 TABLET, FILM COATED ORAL at 10:20

## 2018-01-10 RX ADMIN — Medication 10 ML: at 13:32

## 2018-01-10 RX ADMIN — DIVALPROEX SODIUM 500 MG: 500 TABLET, DELAYED RELEASE ORAL at 18:00

## 2018-01-10 RX ADMIN — VENLAFAXINE HYDROCHLORIDE 225 MG: 150 CAPSULE, EXTENDED RELEASE ORAL at 10:21

## 2018-01-10 RX ADMIN — PREGABALIN 50 MG: 50 CAPSULE ORAL at 21:40

## 2018-01-10 RX ADMIN — LACTULOSE 30 G: 20 SOLUTION ORAL at 18:02

## 2018-01-10 RX ADMIN — CLOPIDOGREL BISULFATE 600 MG: 300 TABLET, FILM COATED ORAL at 14:09

## 2018-01-10 RX ADMIN — TAMSULOSIN HYDROCHLORIDE 0.4 MG: 0.4 CAPSULE ORAL at 17:53

## 2018-01-10 RX ADMIN — RIFAXIMIN 550 MG: 550 TABLET ORAL at 10:21

## 2018-01-10 RX ADMIN — ASPIRIN 81 MG: 81 TABLET, COATED ORAL at 10:22

## 2018-01-10 RX ADMIN — INSULIN LISPRO 2 UNITS: 100 INJECTION, SOLUTION INTRAVENOUS; SUBCUTANEOUS at 17:54

## 2018-01-10 RX ADMIN — LACTULOSE 30 G: 20 SOLUTION ORAL at 21:49

## 2018-01-10 RX ADMIN — ENOXAPARIN SODIUM 90 MG: 100 INJECTION SUBCUTANEOUS at 13:30
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
## 2018-01-10 NOTE — ED TRIAGE NOTES
Patient arrived via EMS from home reporting chest pain onset about an hour ago. Patient self-administered one nitro prior to EMS arrival, and EMS administered 325 of ASA en route. Patient is complaining of no pain at this time. EMS reports patient was in a-fib en route, but patient and family have never heard of a-fib.

## 2018-01-10 NOTE — IP AVS SNAPSHOT
2700 [facility] [address] [place] [address] 
[phone] Patient: Dayan Hernandez. MRN: [id number] LFQ:[id number] About your hospitalization You were admitted on:  January 10, 2018 You last received care in the:  [facility] [place] 5 OBSERVATION You were discharged on:  January 12, 2018 Why you were hospitalized Your primary diagnosis was:  Not on File Your diagnoses also included:  Acute Chest Pain, Chest Pain Follow-up Information Follow up With Details Comments Contact Info Carrol Ponce MD In 3 days  [address] [facility] 57 
[phone] Lendia Eisenmenger, MD On 1/16/2018 Heart failure follow up [facility] [address] [place] [address] 
[phone] Your Scheduled Appointments Wednesday January 24, 2018  2:30 PM EST Follow Up with HUNTER [address] ([facility]) [address] [place] [address] At [facility] [phone] [address] [place] [address]  
[phone] Discharge Orders None A check hilary indicates which time of day the medication should be taken. My Medications START taking these medications Instructions Each Dose to Equal  
 Morning Noon Evening Bedtime  
 clopidogrel 75 mg Tab Commonly known as:  PLAVIX Start taking on:  1/13/2018 Your last dose was: Your next dose is: Take 1 Tab by mouth daily. 75 mg  
    
   
   
   
  
 isosorbide mononitrate ER 30 mg tablet Commonly known as:  IMDUR Start taking on:  1/13/2018 Your last dose was: Your next dose is: Take 1 Tab by mouth daily. 30 mg  
    
   
   
   
  
 lisinopril 10 mg tablet Commonly known as:  Rennis Douse Start taking on:  1/13/2018 Your last dose was: Your next dose is: Take 1 Tab by mouth daily. 10 mg CHANGE how you take these medications Instructions Each Dose to Equal  
 Morning Noon Evening Bedtime  
 carvedilol 6.25 mg tablet Commonly known as:  Rosangela Steen What changed:   
- medication strength 
- how much to take - when to take this Your last dose was: Your next dose is: Take 1 Tab by mouth daily (with dinner). 6.25 mg  
    
   
   
   
  
 clonazePAM 1 mg tablet Commonly known as:  Kesha Bear What changed:  Another medication with the same name was removed. Continue taking this medication, and follow the directions you see here. Your last dose was: Your next dose is: Take 0.5 mg by mouth two (2) times a day. 0.5 mg  
    
   
   
   
  
 EFFEXOR XR 75 mg capsule Generic drug:  venlafaxine-SR What changed:  Another medication with the same name was removed. Continue taking this medication, and follow the directions you see here. Your last dose was: Your next dose is: Take 225 mg by mouth daily. 225 mg FLUoxetine 20 mg capsule Commonly known as:  PROzac What changed:  Another medication with the same name was removed. Continue taking this medication, and follow the directions you see here. Your last dose was: Your next dose is: Take 20 mg by mouth daily. 20 mg  
    
   
   
   
  
 QUEtiapine 200 mg tablet Commonly known as:  SEROquel What changed:  Another medication with the same name was removed. Continue taking this medication, and follow the directions you see here. Your last dose was: Your next dose is: Take 200 mg by mouth nightly. 200 mg CONTINUE taking these medications Instructions Each Dose to Equal  
 Morning Noon Evening Bedtime ALPRAZolam 1 mg tablet Commonly known as:  Nery Ralston Your last dose was: Your next dose is: Take 1 mg by mouth every six (6) hours as needed for Anxiety. 1 mg  
    
   
   
   
  
 aspirin 81 mg chewable tablet Your last dose was: Your next dose is: Take 1 Tab by mouth daily. 81 mg  
    
   
   
   
  
 atorvastatin 80 mg tablet Commonly known as:  LIPITOR Your last dose was: Your next dose is: Take 1 Tab by mouth Daily (before dinner). 80 mg  
    
   
   
   
  
 CARAFATE 100 mg/mL suspension Generic drug:  sucralfate Your last dose was: Your next dose is: Take  by mouth four (4) times daily. divalproex  mg tablet Commonly known as:  DEPAKOTE Your last dose was: Your next dose is: Take 1 Tab by mouth two (2) times a day. 500 mg  
    
   
   
   
  
 eplerenone 25 mg tablet Commonly known as:  Baron Danay Your last dose was: Your next dose is: Take 1 Tab by mouth daily. 25 mg  
    
   
   
   
  
 KEPPRA 750 mg tablet Generic drug:  levETIRAcetam  
   
Your last dose was: Your next dose is: Take 750 mg by mouth two (2) times a day. Replaces Vimpat per patient's daughter 750 mg  
    
   
   
   
  
 lactulose 10 gram/15 mL solution Commonly known as:  Hardy Pallas Your last dose was: Your next dose is: Take 45 mL by mouth three (3) times daily. Adjust dosage so that you have 2-3 bowel movements per day. 30 g  
    
   
   
   
  
 nitroglycerin 0.4 mg SL tablet Commonly known as:  NITROSTAT Your last dose was: Your next dose is: 0.4 mg by SubLINGual route every five (5) minutes as needed for Chest Pain. May repeat every 5 minutes for a maximum of 3 doses if chest pain not relieved call MD  
 0.4 mg  
    
   
   
   
  
 pantoprazole 40 mg tablet Commonly known as:  PROTONIX Your last dose was: Your next dose is: Take 1 Tab by mouth Before breakfast and dinner. Before Breakfast and in the afternoon (also takes Zantac at same time) 40 mg  
    
   
   
   
  
 pregabalin 50 mg capsule Commonly known as:  Ronnie Hathaway Pines Your last dose was: Your next dose is: Take 1 Cap by mouth three (3) times daily. Max Daily Amount: 150 mg.  
 50 mg  
    
   
   
   
  
 rifAXIMin 550 mg tablet Commonly known as:  Tawnya Dennis Your last dose was: Your next dose is: Take 1 Tab by mouth two (2) times a day. 550 mg  
    
   
   
   
  
 tamsulosin 0.4 mg capsule Commonly known as:  FLOMAX Your last dose was: Your next dose is: Take 1 Cap by mouth Before breakfast and dinner. Before Breakfast and in the afternoon 0.4 mg  
    
   
   
   
  
 ZANTAC 150 mg tablet Generic drug:  raNITIdine Your last dose was: Your next dose is: Take 150 mg by mouth two (2) times a day. Before Breakfast and in the afternoon (also takes Protonix at same time) 150 mg  
    
   
   
   
  
  
STOP taking these medications   
 glimepiride 4 mg tablet Commonly known as:  AMARYL Where to Get Your Medications Information on where to get these meds will be given to you by the nurse or doctor. ! Ask your nurse or doctor about these medications  
  carvedilol 6.25 mg tablet  
 clopidogrel 75 mg Tab  
 isosorbide mononitrate ER 30 mg tablet  
 lisinopril 10 mg tablet Discharge Instructions Discharge Instructions PATIENT ID: Beverely Shone. MRN: [id number] YOB: 1955 DATE OF ADMISSION: 1/10/2018  3:17 AM   
DATE OF DISCHARGE: 1/12/2018 PRIMARY CARE PROVIDER: Lizzy Hamm MD  
 
ATTENDING PHYSICIAN: Jesus Alberto Stephens MD 
DISCHARGING PROVIDER: Jeni Griffith NP To contact this individual call [phone] and ask the  to page. If unavailable ask to be transferred the Adult Hospitalist Department. DISCHARGE DIAGNOSES Chest Pain Uncontrolled Blood Pressure CONSULTATIONS: IP CONSULT TO HOSPITALIST 
IP CONSULT TO CARDIOLOGY PROCEDURES/SURGERIES: * No surgery found * PENDING TEST RESULTS:  
At the time of discharge the following test results are still pending: none FOLLOW UP APPOINTMENTS:  
Follow-up Information Follow up With Details Comments Contact Info Phylicia Mayes MD In 3 days  [address] [place] 
[phone] Ace Chaudhary MD In 2 weeks please call to schedule follow up appointment  [facility] [address] [place] 
[phone] ADDITIONAL CARE RECOMMENDATIONS:  
 
1. Please follow up at scheduled appointment as indicated above. 2. I recommend that you stop taking Amaryl based on your hemoglobin A1C of 6.7 and you have been having some blood sugar \"lows\" here in the hospital. Please discuss restarting this medication with your primary care provider prior starting it. 3. Decrease your Coreg dose to 6.25 mg once daily as prescribed. 4. Start taking Imdur 30 mg daily for blood pressure control. Start taking Lisinopril 10 mg daily for blood pressure control. 5. Start taking Plavix 75 mg daily in addition to your daily aspirin dose for treatment of heart attack/heart failure as prescribed by Dr. Michelle Ray. 6. Adhere to a heart healthy diet , low sodium and watch how much fluid you are taking in every day. You should not be drinking more than 2L daily in all fluids. Weigh yourself daily, if you gain more than 3 lbs in 2 days or 5 pounds in a week please call the heart doctor. DIET: Cardiac Diet ACTIVITY: Activity as tolerated DISCHARGE MEDICATIONS: 
 See Medication Reconciliation Form · It is important that you take the medication exactly as they are prescribed. · Keep your medication in the bottles provided by the pharmacist and keep a list of the medication names, dosages, and times to be taken in your wallet. · Do not take other medications without consulting your doctor. NOTIFY YOUR PHYSICIAN FOR ANY OF THE FOLLOWING:  
Fever over 101 degrees for 24 hours. Chest pain, shortness of breath, fever, chills, nausea, vomiting, diarrhea, change in mentation, falling, weakness, bleeding. Severe pain or pain not relieved by medications. Or, any other signs or symptoms that you may have questions about. DISPOSITION: 
 X Home With: none OT  PT  [facility]  RN  
  
 SNF/Inpatient Rehab/LTAC Independent/assisted living Hospice Other: CDMP Checked: Yes X PROBLEM LIST Updated: Yes X Signed:  
Demarcus Boss NP 
1/12/2018 
9:59 AM 
 
 
 
  
  
  
Introducing [facility]! Dear Molly Asher: Thank you for requesting a [facility] account. Our records indicate that you have previously registered for a [facility] account but its currently inactive. Please call our [facility] support line at [phone]. Additional Information If you have questions, please visit the Frequently Asked Questions section of the [facility] website at [email or web address] Remember, [employer] is NOT to be used for urgent needs. For medical emergencies, dial 911. Now available from your iPhone and Android! Providers Seen During Your Hospitalization Provider Specialty Primary office phone Senait Mackenzie MD Emergency Medicine [phone] Tressa Sosa MD Family Practice [phone] Víctor Dixon MD Internal Medicine [phone] Your Primary Care Physician (PCP) Primary Care Physician Office Phone Office Fax [facility], [address], [address] [phone] You are allergic to the following Allergen Reactions Latex, Natural Rubber Hives Codeine Anaphylaxis Tolerated fentanyl previously Demerol (Meperidine) Anaphylaxis Tolerated fentanyl previously Mushroom Anaphylaxis Metformin Diarrhea And dehydration Wellbutrin (Bupropion Hcl) Anaphylaxis Recent Documentation Height Weight BMI Smoking Status 1.753 m 89.6 kg 29.17 kg/m2 Former Smoker Emergency Contacts Name Discharge Info Relation Home Work Mobile Real Dela Cruz DISCHARGE CAREGIVER [3] Daughter [21]   [phone] Anais Ross  Child [2] [phone] Patient Belongings The following personal items are in your possession at time of discharge: 
     Visual Aid: None Discharge Instructions Attachments/References ACE INHIBITORS: HEART FAILURE: GENERAL INFO (ENGLISH) HEART FAILURE (ENGLISH) Patient Handouts Learning About ACE Inhibitors for Heart Failure Introduction ACE (angiotensin-converting enzyme) inhibitors block an enzyme that makes blood vessels narrow. As a result, the blood vessels relax and widen. This lowers blood pressure. These medicines also put more water and salt into the urine. This also lowers blood pressure. In heart failure, your heart does not pump as much blood as your body needs. These medicines: · Make it easier for your heart to pump. · Help reduce symptoms. · Make a heart attack or stroke less likely. Examples These medicines include: · Benazepril (Lotensin). · Lisinopril (Prinivil, Zestril). · Ramipril (Altace). This is not a complete list. 
Possible side effects Side effects may include: · Cough. · Low blood pressure. You may feel dizzy and weak. · High potassium levels. · An allergic reaction. You may have other side effects or reactions not listed here. Check the information that comes with your medicine. What to know about taking this medicine · ACE inhibitors: ¨ Are often used to treat heart failure.  They may be the only medicine used if your only symptoms are feeling tired and mildly short of breath with no fluid buildup (edema). But in most other cases, they are used with other medicines. ¨ Can cause a dry cough. If the cough is bad, talk to your doctor. You may need to try a different medicine. ¨ Can relieve symptoms, improve how you feel, and make it less likely you will need to stay in a hospital. And they can reduce the risk of early death. ¨ Can cause an allergic reaction of the skin. Symptoms may range from mild swelling to painful welts. It is rare to have severe swelling that makes it hard to breathe. · Take your medicines exactly as prescribed. Call your doctor if you think you are having a problem with your medicine. · Check with your doctor or pharmacist before you use any other medicines. This includes over-the-counter medicines. Make sure your doctor knows all of the medicines, vitamins, herbal products, and supplements you take. Taking some medicines together can cause problems. · You should not take an ACE inhibitor if you are pregnant or planning to become pregnant. · You may need regular blood tests. Where can you learn more? Go to http://gordo-melinda.info/. Enter W408 in the search box to learn more about \"Learning About ACE Inhibitors for Heart Failure. \" Current as of: September 21, 2016 Content Version: 11.4 © 8644-7919 [employer]. Care instructions adapted under license by [employer] (which disclaims liability or warranty for this information). If you have questions about a medical condition or this instruction, always ask your healthcare professional. [place] [address] any warranty or liability for your use of this information. Heart Failure: Care Instructions Your Care Instructions Heart failure occurs when your heart does not pump as much blood as the body needs.  Failure does not mean that the heart has stopped pumping but rather that it is not pumping as well as it should. Over time, this causes fluid buildup in your lungs and other parts of your body. Fluid buildup can cause shortness of breath, fatigue, swollen ankles, and other problems. By taking medicines regularly, reducing sodium (salt) in your diet, checking your weight every day, and making lifestyle changes, you can feel better and live longer. Follow-up care is a key part of your treatment and safety. Be sure to make and go to all appointments, and call your doctor if you are having problems. It's also a good idea to know your test results and keep a list of the medicines you take. How can you care for yourself at home? Medicines ? · Be safe with medicines. Take your medicines exactly as prescribed. Call your doctor if you think you are having a problem with your medicine. ? · Do not take any vitamins, over-the-counter medicine, or herbal products without talking to your doctor first. [place] not take ibuprofen (Advil or Motrin) and naproxen (Aleve) without talking to your doctor first. They could make your heart failure worse. ? · You may be taking some of the following medicine. ¨ Beta-blockers can slow heart rate, decrease blood pressure, and improve your condition. Taking a beta-blocker may lower your chance of needing to be hospitalized. ¨ Angiotensin-converting enzyme inhibitors (ACEIs) reduce the heart's workload, lower blood pressure, and reduce swelling. Taking an ACEI may lower your chance of needing to be hospitalized again. ¨ Angiotensin II receptor blockers (ARBs) work like ACEIs. Your doctor may prescribe them instead of ACEIs. ¨ Diuretics, also called water pills, reduce swelling. ¨ Potassium supplements replace this important mineral, which is sometimes lost with diuretics. ¨ Aspirin and other blood thinners prevent blood clots, which can cause a stroke or heart attack. ? You will get more details on the specific medicines your doctor prescribes. Diet 
? · Your doctor may suggest that you limit sodium to 2,000 milligrams (mg) a day or less. That is less than 1 teaspoon of salt a day, including all the salt you eat in cooking or in packaged foods. People get most of their sodium from processed foods. Fast food and restaurant meals also tend to be very high in sodium. ? · Ask your doctor how much liquid you can drink each day. You may have to limit liquids. ?Weight ? · Weigh yourself without clothing at the same time each day. Record your weight. Call your doctor if you have a sudden weight gain, such as more than 2 to 3 pounds in a day or 5 pounds in a week. (Your doctor may suggest a different range of weight gain.) A sudden weight gain may mean that your heart failure is getting worse. ? Activity level ? · Start light exercise (if your doctor says it is okay). Even if you can only do a small amount, exercise will help you get stronger, have more energy, and manage your weight and your stress. Walking is an easy way to get exercise. Start out by walking a little more than you did before. Bit by bit, increase the amount you walk. ? · When you exercise, watch for signs that your heart is working too hard. You are pushing yourself too hard if you cannot talk while you are exercising. If you become short of breath or dizzy or have chest pain, stop, sit down, and rest.  
? · If you feel \"wiped out\" the day after you exercise, walk slower or for a shorter distance until you can work up to a better pace. ? · Get enough rest at night. Sleeping with 1 or 2 pillows under your upper body and head may help you breathe easier. ? Lifestyle changes ? · Do not smoke. Smoking can make a heart condition worse. If you need help quitting, talk to your doctor about stop-smoking programs and medicines. These can increase your chances of quitting for good. Quitting smoking may be the most important step you can take to protect your heart. ? · Limit alcohol to 2 drinks a day for men and 1 drink a day for women. Too much alcohol can cause health problems. ? · Avoid getting sick from colds and the flu. Get a pneumococcal vaccine shot. If you have had one before, ask your doctor whether you need another dose. Get a flu shot each year. If you must be around people with colds or the flu, wash your hands often. When should you call for help? Call 911 if you have symptoms of sudden heart failure such as: 
? · You have severe trouble breathing. ? · You cough up pink, foamy mucus. ? · You have a new irregular or rapid heartbeat. ?Call your doctor now or seek immediate medical care if: 
? · You have new or increased shortness of breath. ? · You are dizzy or lightheaded, or you feel like you may faint. ? · You have sudden weight gain, such as more than 2 to 3 pounds in a day or 5 pounds in a week. (Your doctor may suggest a different range of weight gain.) ? · You have increased swelling in your legs, ankles, or feet. ? · You are suddenly so tired or weak that you cannot do your usual activities. ? Watch closely for changes in your health, and be sure to contact your doctor if you develop new symptoms. Where can you learn more? Go to http://gordo-melinda.info/. Enter G974 in the search box to learn more about \"Heart Failure: Care Instructions. \" Current as of: September 21, 2016 Content Version: 11.4 © 7962-0131 [employer]. Care instructions adapted under license by [employer] (which disclaims liability or warranty for this information). If you have questions about a medical condition or this instruction, always ask your healthcare professional. [address] any warranty or liability for your use of this information. Please provide this summary of care documentation to your next provider. Signatures-by signing, you are acknowledging that this After Visit Summary has been reviewed with you and you have received a copy. Patient Signature:  ____________________________________________________________ Date:  ____________________________________________________________  
  
Cliffton Du Provider Signature:  ____________________________________________________________ Date:  ____________________________________________________________

## 2018-01-10 NOTE — PROGRESS NOTES
Hospitalist Progress Note  [place] NP  Answering service: [phone] from in house phone  Cell: [phone]      Date of Service:  1/10/2018  NAME:  Bernice Whelan. :  1955  MRN:  [id number]      Admission Summary:   58year old male with PMH for CAD with CABG, GERD, Liver Disease, and Peripheral Neuropathy who presented with chief complaint of chest pain. He had an onset of symptoms last evening which included radiating arm pain bilaterally. He received Nitro which improved his symptoms but with his extensive history came in for further evaluation. Interval history / Subjective:     He had troponin peak today and Cards feels based on his dementia that we should medically manage  He is a poor historian and family is not at bedside. I discussed with his RN Eral Case.      Assessment & Plan:     NSTEMI in the setting of CAD and previous CABG(POA)  ASA, BB, Lipitor, Plavix and therapeutic Lovenox  Cards cancelled stress and feels medical management is most appropriate with dementia  Echo repeated and pending  EKG reviewed  Troponin peaked and trending down    Type 2 DM with complications  Hgb S4L ordered, last 6.7  Holding oral Amaryl  SSI    Hypertension  Stable on current meds    Hypokalemia  repleted  BMP    Chronic Liver Disease and Cirrhosis  Patient of [facility]  Continue home meds    Dementia  Lives with two daughters  Need to addressCode Status at some point    Seizure Disorder  Stopped Vimpat due to cost  Keppra    Code status: Full  DVT prophylaxis: therapeutic Lovenox    Care Plan discussed with: Patient/Family and Nurse  Disposition: Home w/Family and TBD     Hospital Problems  Date Reviewed: 2017          Codes Class Noted POA    Acute chest pain ICD-10-CM: R07.9  ICD-9-CM: 786.50  1/10/2018 Unknown                Review of Systems:   A comprehensive review of systems was negative except for that written in the HPI. Vital Signs:    Last 24hrs VS reviewed since prior progress note. Most recent are:  Visit Vitals    /85 (BP 1 Location: Right arm)    Pulse 67    Temp 97.5 °F (36.4 °C)    Resp 17    Ht 5' 9\" (1.753 m)    Wt 89.6 kg (197 lb 8.5 oz)    SpO2 97%    BMI 29.17 kg/m2         Intake/Output Summary (Last 24 hours) at 01/10/18 1601  Last data filed at 01/10/18 1515   Gross per 24 hour   Intake                0 ml   Output              450 ml   Net             -450 ml        Physical Examination:             Constitutional:  No acute distress, cooperative, pleasant    ENT:  Oral mucous moist, oropharynx benign. Neck supple,    Resp:  CTA bilaterally. No wheezing/rhonchi/rales. No accessory muscle use   CV:  Regular rhythm, normal rate, no murmurs, gallops, rubs    GI:  Soft, non distended, non tender. normoactive bowel sounds, no hepatosplenomegaly     Musculoskeletal:  No edema, warm, 2+ pulses throughout    Neurologic:  Moves all extremities. Alert and oriented to name and some current situations, baseline dementia     Psych:  poor insight, Not anxious nor agitated. Skin:  Good turgor, no rashes or ulcers  Eyes:  EOMI. Anicteric sclerae,       Data Review:    Review and/or order of clinical lab test  Review and/or order of tests in the radiology section of CPT      Labs:     Recent Labs      01/10/18   0336   WBC  7.2   HGB  12.1   HCT  37.8   PLT  139*     Recent Labs      01/10/18   0336   NA  138   K  3.1*   CL  104   CO2  25   BUN  13   CREA  1.33*   GLU  219*   CA  7.8*   MG  1.6   PHOS  2.3*     Recent Labs      01/10/18   0336   SGOT  10*   ALT  13   AP  94   TBILI  0.4   TP  6.1*   ALB  2.8*   GLOB  3.3     Recent Labs      01/10/18   0336   INR  1.1   PTP  11.2*   APTT  34.0*      No results for input(s): FE, TIBC, PSAT, FERR in the last 72 hours. No results found for: FOL, RBCF   No results for input(s): PH, PCO2, PO2 in the last 72 hours.   Recent Labs      01/10/18   1301 01/10/18   0934  01/10/18   0336   CPK  84  82   --    TROIQ  0.70*  0.72*  0.05*     No results found for: CHOL, CHOLX, CHLST, CHOLV, HDL, LDL, LDLC, DLDLP, TGLX, TRIGL, TRIGP, CHHD, CHHDX  Lab Results   Component Value Date/Time    Glucose (POC) 213 07/24/2017 12:01 PM    Glucose (POC) 134 07/24/2017 05:58 AM    Glucose (POC) 101 07/23/2017 09:38 PM    Glucose (POC) 156 07/23/2017 04:13 PM    Glucose (POC) 144 07/23/2017 11:45 AM     Lab Results   Component Value Date/Time    Color DARK YELLOW 07/31/2017 09:59 PM    Appearance CLEAR 07/31/2017 09:59 PM    Specific gravity 1.017 07/31/2017 09:59 PM    pH (UA) 6.0 07/31/2017 09:59 PM    Protein NEGATIVE  07/31/2017 09:59 PM    Glucose NEGATIVE  07/31/2017 09:59 PM    Ketone NEGATIVE  07/31/2017 09:59 PM    Bilirubin NEGATIVE  07/31/2017 09:59 PM    Urobilinogen 1.0 07/31/2017 09:59 PM    Nitrites POSITIVE 07/31/2017 09:59 PM    Leukocyte Esterase NEGATIVE  07/31/2017 09:59 PM    Epithelial cells FEW 07/31/2017 09:59 PM    Bacteria NEGATIVE  07/31/2017 09:59 PM    WBC 0-4 07/31/2017 09:59 PM    RBC 0-5 07/31/2017 09:59 PM         Medications Reviewed:     Current Facility-Administered Medications   Medication Dose Route Frequency    sodium chloride (NS) flush 5-10 mL  5-10 mL IntraVENous Q8H    sodium chloride (NS) flush 5-10 mL  5-10 mL IntraVENous PRN    aspirin delayed-release tablet 81 mg  81 mg Oral DAILY    atorvastatin (LIPITOR) tablet 80 mg  80 mg Oral ACD    carvedilol (COREG) tablet 12.5 mg  12.5 mg Oral DAILY WITH BREAKFAST    clonazePAM (KlonoPIN) tablet 0.5 mg  0.5 mg Oral BID    divalproex DR (DEPAKOTE) tablet 500 mg  500 mg Oral BID    FLUoxetine (PROzac) capsule 20 mg  20 mg Oral DAILY    lactulose (CHRONULAC) solution 30 g  30 g Oral TID    levETIRAcetam (KEPPRA) tablet 750 mg  750 mg Oral BID    tamsulosin (FLOMAX) capsule 0.4 mg  0.4 mg Oral ACB&D    sucralfate (CARAFATE) 100 mg/mL oral suspension 1 g  1 g Oral AC&HS    rifAXIMin (XIFAXAN) tablet 550 mg  550 mg Oral BID    pantoprazole (PROTONIX) tablet 40 mg  40 mg Oral ACB&D    pregabalin (LYRICA) capsule 50 mg  50 mg Oral TID    QUEtiapine (SEROquel) tablet 200 mg  200 mg Oral QHS    famotidine (PEPCID) tablet 20 mg  20 mg Oral BID    venlafaxine-SR (EFFEXOR-XR) capsule 225 mg  225 mg Oral DAILY    regadenoson (LEXISCAN) injection 0.4 mg  0.4 mg IntraVENous RAD ONCE    saline peripheral flush soln 20 mL  20 mL InterCATHeter RAD ONCE    [START ON 1/11/2018] clopidogrel (PLAVIX) tablet 75 mg  75 mg Oral DAILY    enoxaparin (LOVENOX) injection 90 mg  1 mg/kg SubCUTAneous Q12H    isosorbide mononitrate ER (IMDUR) tablet 30 mg  30 mg Oral DAILY     ______________________________________________________________________  EXPECTED LENGTH OF STAY: - - -  ACTUAL LENGTH OF STAY:          0                 Yakov Johnson, NP

## 2018-01-10 NOTE — ED NOTES
TRANSFER - OUT REPORT:    Verbal report given to Chiquis(name) on Leesa Mcburney.  being transferred to [facility](unit) for routine progression of care       Report consisted of patients Situation, Background, Assessment and   Recommendations(SBAR). Information from the following report(s) SBAR, Kardex, ED Summary, [facility] and Recent Results was reviewed with the receiving nurse. Lines:   Peripheral IV 01/10/18 Left Antecubital (Active)   Site Assessment Clean 1/10/2018  3:46 AM   Dressing Status Clean 1/10/2018  3:46 AM   Dressing Type Transparent 1/10/2018  3:46 AM   Hub Color/Line Status Green 1/10/2018  3:46 AM        Opportunity for questions and clarification was provided.       Patient transported with:   Jeana Parker RN

## 2018-01-10 NOTE — IP AVS SNAPSHOT
[address] 
[phone] Patient: Esequiel Lujan. MRN: [id number] CCD:0/6/1989 A check hilary indicates which time of day the medication should be taken. My Medications START taking these medications Instructions Each Dose to Equal  
 Morning Noon Evening Bedtime  
 clopidogrel 75 mg Tab Commonly known as:  PLAVIX Start taking on:  1/13/2018 Your last dose was: Your next dose is: Take 1 Tab by mouth daily. 75 mg  
    
   
   
   
  
 isosorbide mononitrate ER 30 mg tablet Commonly known as:  IMDUR Start taking on:  1/13/2018 Your last dose was: Your next dose is: Take 1 Tab by mouth daily. 30 mg  
    
   
   
   
  
 lisinopril 10 mg tablet Commonly known as:  Darline Cockayne Start taking on:  1/13/2018 Your last dose was: Your next dose is: Take 1 Tab by mouth daily. 10 mg CHANGE how you take these medications Instructions Each Dose to Equal  
 Morning Noon Evening Bedtime  
 carvedilol 6.25 mg tablet Commonly known as:  Jose Manuel Forbes What changed:   
- medication strength 
- how much to take - when to take this Your last dose was: Your next dose is: Take 1 Tab by mouth daily (with dinner). 6.25 mg  
    
   
   
   
  
 clonazePAM 1 mg tablet Commonly known as:  Gabbie Mobley What changed:  Another medication with the same name was removed. Continue taking this medication, and follow the directions you see here. Your last dose was: Your next dose is: Take 0.5 mg by mouth two (2) times a day. 0.5 mg  
    
   
   
   
  
 EFFEXOR XR 75 mg capsule Generic drug:  venlafaxine-SR What changed:  Another medication with the same name was removed. Continue taking this medication, and follow the directions you see here. Your last dose was: Your next dose is: Take 225 mg by mouth daily. 225 mg FLUoxetine 20 mg capsule Commonly known as:  PROzac What changed:  Another medication with the same name was removed. Continue taking this medication, and follow the directions you see here. Your last dose was: Your next dose is: Take 20 mg by mouth daily. 20 mg  
    
   
   
   
  
 QUEtiapine 200 mg tablet Commonly known as:  SEROquel What changed:  Another medication with the same name was removed. Continue taking this medication, and follow the directions you see here. Your last dose was: Your next dose is: Take 200 mg by mouth nightly. 200 mg CONTINUE taking these medications Instructions Each Dose to Equal  
 Morning Noon Evening Bedtime ALPRAZolam 1 mg tablet Commonly known as:  Luvenia Both Your last dose was: Your next dose is: Take 1 mg by mouth every six (6) hours as needed for Anxiety. 1 mg  
    
   
   
   
  
 aspirin 81 mg chewable tablet Your last dose was: Your next dose is: Take 1 Tab by mouth daily. 81 mg  
    
   
   
   
  
 atorvastatin 80 mg tablet Commonly known as:  LIPITOR Your last dose was: Your next dose is: Take 1 Tab by mouth Daily (before dinner). 80 mg  
    
   
   
   
  
 CARAFATE 100 mg/mL suspension Generic drug:  sucralfate Your last dose was: Your next dose is: Take  by mouth four (4) times daily. divalproex  mg tablet Commonly known as:  DEPAKOTE Your last dose was: Your next dose is: Take 1 Tab by mouth two (2) times a day. 500 mg  
    
   
   
   
  
 eplerenone 25 mg tablet Commonly known as:  Roderick Palomino Your last dose was: Your next dose is: Take 1 Tab by mouth daily. 25 mg  
    
   
   
   
  
 KEPPRA 750 mg tablet Generic drug:  levETIRAcetam  
   
Your last dose was: Your next dose is: Take 750 mg by mouth two (2) times a day. Replaces Vimpat per patient's daughter 750 mg  
    
   
   
   
  
 lactulose 10 gram/15 mL solution Commonly known as:  Druscilla Maker Your last dose was: Your next dose is: Take 45 mL by mouth three (3) times daily. Adjust dosage so that you have 2-3 bowel movements per day. 30 g  
    
   
   
   
  
 nitroglycerin 0.4 mg SL tablet Commonly known as:  NITROSTAT Your last dose was: Your next dose is: 0.4 mg by SubLINGual route every five (5) minutes as needed for Chest Pain. May repeat every 5 minutes for a maximum of 3 doses if chest pain not relieved call MD  
 0.4 mg  
    
   
   
   
  
 pantoprazole 40 mg tablet Commonly known as:  PROTONIX Your last dose was: Your next dose is: Take 1 Tab by mouth Before breakfast and dinner. Before Breakfast and in the afternoon (also takes Zantac at same time) 40 mg  
    
   
   
   
  
 pregabalin 50 mg capsule Commonly known as:  Yisel Hobart Your last dose was: Your next dose is: Take 1 Cap by mouth three (3) times daily. Max Daily Amount: 150 mg.  
 50 mg  
    
   
   
   
  
 rifAXIMin 550 mg tablet Commonly known as:  Garcia Lemmings Your last dose was: Your next dose is: Take 1 Tab by mouth two (2) times a day. 550 mg  
    
   
   
   
  
 tamsulosin 0.4 mg capsule Commonly known as:  FLOMAX Your last dose was: Your next dose is: Take 1 Cap by mouth Before breakfast and dinner. Before Breakfast and in the afternoon 0.4 mg  
    
   
   
   
  
 ZANTAC 150 mg tablet Generic drug:  raNITIdine Your last dose was: Your next dose is: Take 150 mg by mouth two (2) times a day. Before Breakfast and in the afternoon (also takes Protonix at same time) 150 mg  
    
   
   
   
  
  
STOP taking these medications   
 glimepiride 4 mg tablet Commonly known as:  AMARYL Where to Get Your Medications Information on where to get these meds will be given to you by the nurse or doctor. ! Ask your nurse or doctor about these medications  
  carvedilol 6.25 mg tablet  
 clopidogrel 75 mg Tab  
 isosorbide mononitrate ER 30 mg tablet  
 lisinopril 10 mg tablet
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
## 2018-01-10 NOTE — ED NOTES
Bedside shift change report given to Deedee Buitrago RN (oncoming nurse) by Zoila Cobb RN (offgoing nurse). Report included the following information SBAR, ED Summary, MAR and Recent Results.

## 2018-01-11 PROBLEM — R07.9 CHEST PAIN: Status: ACTIVE | Noted: 2018-01-11

## 2018-01-11 LAB
ANION GAP SERPL CALC-SCNC: 9 MMOL/L (ref 5–15)
BUN SERPL-MCNC: 12 MG/DL (ref 6–20)
BUN/CREAT SERPL: 9 (ref 12–20)
CALCIUM SERPL-MCNC: 7.9 MG/DL (ref 8.5–10.1)
CHLORIDE SERPL-SCNC: 107 MMOL/L (ref 97–108)
CO2 SERPL-SCNC: 24 MMOL/L (ref 21–32)
CREAT SERPL-MCNC: 1.34 MG/DL (ref 0.7–1.3)
EST. AVERAGE GLUCOSE BLD GHB EST-MCNC: 146 MG/DL
GLUCOSE BLD STRIP.AUTO-MCNC: 110 MG/DL (ref 65–100)
GLUCOSE BLD STRIP.AUTO-MCNC: 112 MG/DL (ref 65–100)
GLUCOSE BLD STRIP.AUTO-MCNC: 199 MG/DL (ref 65–100)
GLUCOSE BLD STRIP.AUTO-MCNC: 65 MG/DL (ref 65–100)
GLUCOSE BLD STRIP.AUTO-MCNC: 75 MG/DL (ref 65–100)
GLUCOSE BLD STRIP.AUTO-MCNC: 78 MG/DL (ref 65–100)
GLUCOSE BLD STRIP.AUTO-MCNC: 84 MG/DL (ref 65–100)
GLUCOSE SERPL-MCNC: 74 MG/DL (ref 65–100)
HBA1C MFR BLD: 6.7 % (ref 4.2–6.3)
MAGNESIUM SERPL-MCNC: 1.7 MG/DL (ref 1.6–2.4)
POTASSIUM SERPL-SCNC: 3.1 MMOL/L (ref 3.5–5.1)
SERVICE CMNT-IMP: ABNORMAL
SERVICE CMNT-IMP: NORMAL
SODIUM SERPL-SCNC: 140 MMOL/L (ref 136–145)
TROPONIN I SERPL-MCNC: 0.36 NG/ML

## 2018-01-11 PROCEDURE — 80048 BASIC METABOLIC PNL TOTAL CA: CPT | Performed by: NURSE PRACTITIONER

## 2018-01-11 PROCEDURE — 99218 HC RM OBSERVATION: CPT

## 2018-01-11 PROCEDURE — 82962 GLUCOSE BLOOD TEST: CPT

## 2018-01-11 PROCEDURE — 74011250637 HC RX REV CODE- 250/637: Performed by: INTERNAL MEDICINE

## 2018-01-11 PROCEDURE — 83735 ASSAY OF MAGNESIUM: CPT | Performed by: NURSE PRACTITIONER

## 2018-01-11 PROCEDURE — 74011636637 HC RX REV CODE- 636/637: Performed by: NURSE PRACTITIONER

## 2018-01-11 PROCEDURE — 74011250637 HC RX REV CODE- 250/637: Performed by: FAMILY MEDICINE

## 2018-01-11 PROCEDURE — 65660000000 HC RM CCU STEPDOWN

## 2018-01-11 PROCEDURE — 84484 ASSAY OF TROPONIN QUANT: CPT | Performed by: NURSE PRACTITIONER

## 2018-01-11 PROCEDURE — 74011250636 HC RX REV CODE- 250/636: Performed by: INTERNAL MEDICINE

## 2018-01-11 PROCEDURE — 83036 HEMOGLOBIN GLYCOSYLATED A1C: CPT | Performed by: NURSE PRACTITIONER

## 2018-01-11 PROCEDURE — 36415 COLL VENOUS BLD VENIPUNCTURE: CPT | Performed by: NURSE PRACTITIONER

## 2018-01-11 PROCEDURE — 74011250637 HC RX REV CODE- 250/637: Performed by: NURSE PRACTITIONER

## 2018-01-11 RX ORDER — HYDROCODONE BITARTRATE AND ACETAMINOPHEN 5; 325 MG/1; MG/1
1 TABLET ORAL
Status: DISCONTINUED | OUTPATIENT
Start: 2018-01-11 | End: 2018-01-12

## 2018-01-11 RX ORDER — LISINOPRIL 5 MG/1
5 TABLET ORAL DAILY
Status: DISCONTINUED | OUTPATIENT
Start: 2018-01-11 | End: 2018-01-12

## 2018-01-11 RX ORDER — POTASSIUM CHLORIDE 750 MG/1
40 TABLET, FILM COATED, EXTENDED RELEASE ORAL
Status: COMPLETED | OUTPATIENT
Start: 2018-01-11 | End: 2018-01-11

## 2018-01-11 RX ORDER — CARVEDILOL 6.25 MG/1
6.25 TABLET ORAL
Status: DISCONTINUED | OUTPATIENT
Start: 2018-01-11 | End: 2018-01-12 | Stop reason: HOSPADM

## 2018-01-11 RX ADMIN — PREGABALIN 50 MG: 50 CAPSULE ORAL at 22:01

## 2018-01-11 RX ADMIN — INSULIN LISPRO 2 UNITS: 100 INJECTION, SOLUTION INTRAVENOUS; SUBCUTANEOUS at 11:57

## 2018-01-11 RX ADMIN — FAMOTIDINE 20 MG: 20 TABLET, FILM COATED ORAL at 17:15

## 2018-01-11 RX ADMIN — Medication 5 ML: at 16:00

## 2018-01-11 RX ADMIN — PREGABALIN 50 MG: 50 CAPSULE ORAL at 08:47

## 2018-01-11 RX ADMIN — PREGABALIN 50 MG: 50 CAPSULE ORAL at 17:15

## 2018-01-11 RX ADMIN — SUCRALFATE 1 G: 1 SUSPENSION ORAL at 11:57

## 2018-01-11 RX ADMIN — DIVALPROEX SODIUM 500 MG: 500 TABLET, DELAYED RELEASE ORAL at 17:16

## 2018-01-11 RX ADMIN — ISOSORBIDE MONONITRATE 30 MG: 30 TABLET, EXTENDED RELEASE ORAL at 08:47

## 2018-01-11 RX ADMIN — CLOPIDOGREL BISULFATE 75 MG: 75 TABLET ORAL at 08:48

## 2018-01-11 RX ADMIN — POTASSIUM CHLORIDE 40 MEQ: 750 TABLET, FILM COATED, EXTENDED RELEASE ORAL at 06:50

## 2018-01-11 RX ADMIN — RIFAXIMIN 550 MG: 550 TABLET ORAL at 08:48

## 2018-01-11 RX ADMIN — Medication 16 G: at 07:12

## 2018-01-11 RX ADMIN — TAMSULOSIN HYDROCHLORIDE 0.4 MG: 0.4 CAPSULE ORAL at 17:15

## 2018-01-11 RX ADMIN — SUCRALFATE 1 G: 1 SUSPENSION ORAL at 17:16

## 2018-01-11 RX ADMIN — VENLAFAXINE HYDROCHLORIDE 225 MG: 150 CAPSULE, EXTENDED RELEASE ORAL at 08:45

## 2018-01-11 RX ADMIN — LISINOPRIL 5 MG: 5 TABLET ORAL at 08:49

## 2018-01-11 RX ADMIN — ATORVASTATIN CALCIUM 80 MG: 40 TABLET, FILM COATED ORAL at 17:16

## 2018-01-11 RX ADMIN — CLONAZEPAM 0.5 MG: 1 TABLET ORAL at 17:16

## 2018-01-11 RX ADMIN — RIFAXIMIN 550 MG: 550 TABLET ORAL at 17:15

## 2018-01-11 RX ADMIN — Medication 16 G: at 21:30

## 2018-01-11 RX ADMIN — ENOXAPARIN SODIUM 90 MG: 100 INJECTION SUBCUTANEOUS at 13:36

## 2018-01-11 RX ADMIN — SUCRALFATE 1 G: 1 SUSPENSION ORAL at 06:50

## 2018-01-11 RX ADMIN — PANTOPRAZOLE SODIUM 40 MG: 40 TABLET, DELAYED RELEASE ORAL at 17:15

## 2018-01-11 RX ADMIN — CLONAZEPAM 0.5 MG: 1 TABLET ORAL at 08:48

## 2018-01-11 RX ADMIN — QUETIAPINE FUMARATE 200 MG: 100 TABLET ORAL at 22:01

## 2018-01-11 RX ADMIN — FLUOXETINE 20 MG: 20 CAPSULE ORAL at 08:48

## 2018-01-11 RX ADMIN — ENOXAPARIN SODIUM 90 MG: 100 INJECTION SUBCUTANEOUS at 02:45

## 2018-01-11 RX ADMIN — Medication 10 ML: at 22:01

## 2018-01-11 RX ADMIN — PANTOPRAZOLE SODIUM 40 MG: 40 TABLET, DELAYED RELEASE ORAL at 06:51

## 2018-01-11 RX ADMIN — SUCRALFATE 1 G: 1 SUSPENSION ORAL at 22:00

## 2018-01-11 RX ADMIN — FAMOTIDINE 20 MG: 20 TABLET, FILM COATED ORAL at 08:48

## 2018-01-11 RX ADMIN — LACTULOSE 30 G: 20 SOLUTION ORAL at 08:50

## 2018-01-11 RX ADMIN — LACTULOSE 30 G: 20 SOLUTION ORAL at 17:16

## 2018-01-11 RX ADMIN — LEVETIRACETAM 750 MG: 250 TABLET, FILM COATED ORAL at 08:48

## 2018-01-11 RX ADMIN — ASPIRIN 81 MG: 81 TABLET, COATED ORAL at 08:44

## 2018-01-11 RX ADMIN — Medication 5 ML: at 06:00

## 2018-01-11 RX ADMIN — CARVEDILOL 6.25 MG: 6.25 TABLET, FILM COATED ORAL at 17:16

## 2018-01-11 RX ADMIN — LACTULOSE 30 G: 20 SOLUTION ORAL at 22:01

## 2018-01-11 RX ADMIN — DIVALPROEX SODIUM 500 MG: 500 TABLET, DELAYED RELEASE ORAL at 09:00

## 2018-01-11 RX ADMIN — TAMSULOSIN HYDROCHLORIDE 0.4 MG: 0.4 CAPSULE ORAL at 06:51

## 2018-01-11 RX ADMIN — LEVETIRACETAM 750 MG: 250 TABLET, FILM COATED ORAL at 17:16

## 2018-01-11 NOTE — PHYSICIAN ADVISORY
Letter of Status Determination:   Recommend hospitalization status upgraded from   OBSERVATION  to INPATIENT  Status     Pt Name:  Franck Way MR#   MICHAEL # [id number] /  [id number]   [facility]#  [id number]   Room and Hospital  525/01  @ [facility]   Hospitalization date  1/10/2018  3:17 AM   Current Attending Physician  Brightonasif Muller MD   Principal diagnosis  <principal problem not specified>   Chest pain    Clinicals  58 y.o. y.o  male hospitalized with above diagnosis   The pt is known to have complex cardiac history. He presented with chest pain and workup here has revealed a rise and fall of troponin. No significant EKG changes noted. Medical management of his condition is being done. On the basis of latest ACC MI criteria this pt is suffering from T2MI (NSTEMI)      [facility] criteria   Does  NOT apply    STATUS DETERMINATION  This patient is at high risk of adverse events and deterioration based on documented clinical data, comorbid conditions and current acute care course. Mr. Franck Way is expected to meet Inpatient Admission status criteria in accordance with CMS regulation Section 43 .3. Specifically, due to medical necessity the patient's stay is expected to exceed Two Midnights. It is our recommendation that this patient's hospitalization status should be upgraded from  OBSERVATION to INPATIENT status. The final decision of the patient's hospitalization status depends on the attending physician's judgment.          Additional comments     Payor: [employer] / Plan: [facility] / Product Type: Medicare /         Ashlie Toure MD MPH [id number] [facility]    [address] [facility]   President Medical Staff, [address] [place] [address] [facility]  [phone]        [phone]    .

## 2018-01-11 NOTE — DIABETES MGMT
DTC Progress Note    Recommendations/ Comments:Chart reviewed due to variable blood sugars. Pt with hypoglycemia this morning but was 199 mg/dl pre-lunch. If appropriate, please consider:  1. Changing correction scale to high sensitivity  2. Adding carb consistent to diet order and documenting po intake. Chart reviewed on Windy Caceres .    Patient is a 58 y.o. male with known diabetes on Amaryl 4 mg BID at home. A1c:   Lab Results   Component Value Date/Time    Hemoglobin A1c 6.7 01/11/2018 05:04 AM    Hemoglobin A1c 6.7 07/18/2017 02:22 AM       Recent Glucose Results:   Lab Results   Component Value Date/Time    GLU 74 01/11/2018 05:04 AM    GLUCPOC 199 (H) 01/11/2018 11:18 AM    GLUCPOC 112 (H) 01/11/2018 07:25 AM    GLUCPOC 75 01/11/2018 07:09 AM        Lab Results   Component Value Date/Time    Creatinine 1.34 01/11/2018 05:04 AM     Estimated Creatinine Clearance: 61.7 mL/min (based on Cr of 1.34). Active Orders   Diet    DIET CARDIAC Regular        PO intake: No data found. Current hospital DM medication: correction scale Humalog, normal sensitivity. Will continue to follow as needed.     Thank you  Jasbir Marcum RD, CDE

## 2018-01-11 NOTE — PROGRESS NOTES
HYPOGLYCEMIC EPISODE DOCUMENTATION    Patient with hypoglycemic episode(s) at 0652(time) on 01-11-18(date). BG value(s) pre-treatment 72    Was patient symptomatic?  [] yes, [x] no  Patient was treated with the following rescue medications/treatments: [] D50                [] Glucose tablets                [] Glucagon                [x] 4oz juice                [] 6oz reg soda                [] 8oz low fat milk  BG value post-treatment: 75  Once BG treated and value greater than 80mg/dl, pt was provided with the following:  [] snack  [] meal  Name of MD notified: Hospitalist   The following orders were received: Hypoglycemic Protocol

## 2018-01-11 NOTE — PROGRESS NOTES
Bedside shift change report given to [facility] GENET (oncoming nurse) by Brea Riojas (offgoing nurse). Report included the following information SBAR.

## 2018-01-11 NOTE — CARDIO/PULMONARY
Cardiac Rehab: MI education folder, with catheterization brochure, to bedside of Franck Yip. Tamiko Gilmore He has a long standing history of CAD. This MI to be treated medically per cardiology. Educated using teach back method. Reviewed MI diagnosis definition and purpose of intervention. Discussed risk factors for CAD to include the following: family history, elevated BMI, hyperlipidemia, hypertension, diabetes, stress, and smoking. Discussed Heart Healthy/Low Sodium (2000 mg) diet. Reviewed the importance of medication compliance, the purpose of coreg, and potential side effects. Discussed follow up appointments with cardiologist, signs and symptoms of angina, and what to report to physician after discharge. Emphasized the value of cardiac rehab. Discussed Cardiac Rehab Program format, benefits, and encouraged enrollment to assist with risk modification and management. Franck Yip. has history of early onset dementia but is interested in the program. His daughter will need to be involved in driving him to the appointments so he would like us to call after discharge to schedule. Added HF education folder and discussed this diagnosis. Franck Yip. is unaware of \"weak heart muscle\" but previous echocardiogram here at [facility] show EF 30%. Cardiac Rehab: Heart Failure education folder, with Low Sodium brochure, given to Franck Yip. .      Educated using teach back method. Discussed diagnosis definition and assessed patient understanding. Reviewed importance of daily weight monitoring and Low Sodium diet (8913-7119 mg. Daily). Stressed the need to check weight daily and to read all food labels. Franck Yip lives with his daughter who does the cooking so he will share education with her. Encouraged activity and rest periods within symptom limitations and as ordered by physician.     Discussed importance of reporting signs and symptoms of exacerbation, and when to report them to the doctor, to prevent re-hospitalization. Tere Rowe was encouraged to keep all appointments with doctor. Smoking history assessed. Patient is a FORMER smoker. Smoking Cessation Program link added to the AVS.         JANELL Obrien could benefit from further education on the following HF topics: reinforcement of all HF concepts. coco Rowe verbalized understanding with questions answered.

## 2018-01-11 NOTE — PROGRESS NOTES
Cardiology Progress Note  2018     Admit Date: 1/10/2018  Admit Diagnosis: Acute chest pain  CC: none currently    Assessment:   Active Problems:    Acute chest pain (1/10/2018)      Plan:     Agree with medical management  Lovenox for total of x4 doses, cont plavix, ASA, ISMN  TTE noted -> added low-dose lisinopril  Corrected PM dose of carvedilol which was supposed to be 6.25 for PM dose  BMP tomorrow    Subjective:      Elena Owen has no c/o this AM. No issues per RN staff. Objective:    Physical Exam:  Overall VSSAF;    Visit Vitals    /63 (BP 1 Location: Right arm, BP Patient Position: At rest)    Pulse 75    Temp 97.9 °F (36.6 °C)    Resp 18    Ht 5' 9\" (1.753 m)    Wt 89.6 kg (197 lb 8.5 oz)    SpO2 95%    BMI 29.17 kg/m2     Temp (24hrs), Av.7 °F (36.5 °C), Min:97.4 °F (36.3 °C), Max:98.1 °F (36.7 °C)    Patient Vitals for the past 8 hrs:   Pulse   18 0816 75   18 0447 68    Patient Vitals for the past 8 hrs:   Resp   18 0816 18   187 18    Patient Vitals for the past 8 hrs:   BP   18 0816 129/63   18 0447 138/75      / 1901 -  0700  In: 0   Out: 450 [Urine:450]      General Appearance: Well developed, well nourished, no acute distress. Ears/Nose/Mouth/Throat:   Normal MM; anicteric. JVP: WNL   Resp:   Lungs clear to auscultation bilaterally. Nl resp effort. Cardiovascular:  RRR, S1, S2 normal, no new murmur. No gallop or rub. Abdomen:   Soft, non-tender, bowel sounds are present. Extremities: No edema bilaterally. Skin:  Neuro: Warm and dry.   Alert                         Data Review:     Telemetry independently reviewed :   normal sinus rhythm       ECG independently reviewed: NSR  Labs:   Recent Results (from the past 24 hour(s))   AMMONIA    Collection Time: 01/10/18  9:34 AM   Result Value Ref Range    Ammonia 47 (H) <32 UMOL/L   TROPONIN I    Collection Time: 01/10/18  9:34 AM   Result Value Ref Range    Troponin-I, Qt. 0.72 (H) <0.05 ng/mL   CK W/ REFLX CKMB    Collection Time: 01/10/18  9:34 AM   Result Value Ref Range    CK 82 39 - 308 U/L   CK    Collection Time: 01/10/18  9:34 AM   Result Value Ref Range    CK 82 39 - 308 U/L   CK W/ REFLX CKMB    Collection Time: 01/10/18  1:01 PM   Result Value Ref Range    CK 84 39 - 308 U/L   CK    Collection Time: 01/10/18  1:01 PM   Result Value Ref Range    CK 84 39 - 308 U/L   TROPONIN I    Collection Time: 01/10/18  1:01 PM   Result Value Ref Range    Troponin-I, Qt. 0.70 (H) <0.05 ng/mL   GLUCOSE, POC    Collection Time: 01/10/18  5:05 PM   Result Value Ref Range    Glucose (POC) 156 (H) 65 - 100 mg/dL    Performed by [address] [place] [address], POC    Collection Time: 01/10/18  9:33 PM   Result Value Ref Range    Glucose (POC) 174 (H) 65 - 100 mg/dL    Performed by Abigail Ruiz    HEMOGLOBIN A1C WITH EAG    Collection Time: 01/11/18  5:04 AM   Result Value Ref Range    Hemoglobin A1c 6.7 (H) 4.2 - 6.3 %    Est. average glucose 148 mg/dL   METABOLIC PANEL, BASIC    Collection Time: 01/11/18  5:04 AM   Result Value Ref Range    Sodium 140 136 - 145 mmol/L    Potassium 3.1 (L) 3.5 - 5.1 mmol/L    Chloride 107 97 - 108 mmol/L    CO2 24 21 - 32 mmol/L    Anion gap 9 5 - 15 mmol/L    Glucose 74 65 - 100 mg/dL    BUN 12 6 - 20 MG/DL    Creatinine 1.34 (H) 0.70 - 1.30 MG/DL    BUN/Creatinine ratio 9 (L) 12 - 20      GFR est AA >60 >60 ml/min/1.73m2    GFR est non-AA 54 (L) >60 ml/min/1.73m2    Calcium 7.9 (L) 8.5 - 10.1 MG/DL   MAGNESIUM    Collection Time: 01/11/18  5:04 AM   Result Value Ref Range    Magnesium 1.7 1.6 - 2.4 mg/dL   TROPONIN I    Collection Time: 01/11/18  5:04 AM   Result Value Ref Range    Troponin-I, Qt. 0.36 (H) <0.05 ng/mL   GLUCOSE, POC    Collection Time: 01/11/18  6:52 AM   Result Value Ref Range    Glucose (POC) 65 65 - 100 mg/dL    Performed by [employer] [facility], POC    Collection Time: 01/11/18  7:09 AM   Result Value Ref Range Glucose (POC) 75 65 - 100 mg/dL    Performed by Krystyna Aviles    GLUCOSE, POC    Collection Time: 01/11/18  7:25 AM   Result Value Ref Range    Glucose (POC) 112 (H) 65 - 100 mg/dL    Performed by Krystyna Aviles       Current medications reviewed       Nelson Shah MD

## 2018-01-11 NOTE — PROGRESS NOTES
CM reviewed chart. Met with patient, explained role and discussed discharge planning. Patient, alert, oriented answered most of the questions. Patient said he lives with his 2 daughters and that Bhavna Cornejo ([phone]) is the primary contact. He said his daughters assisted him with his ADL's and IADL's and transported him to his appointments. He said he uses a walker outside of the home for safe ambulation. Patient has been to [facility] in the past. His daughter will provide transportation home and he did not voice any discharge barriers. CM discussed Observation Status with patient, he did not ask any questions. Patient signed the Medicare and State Obs Letters, original letters placed in patient's chart and copies given to patient. CM also called patient's daughter Bhavna Cornejo to discuss Obs. Status, she agreed. Daughter confirmed the above information provided by patient but said she uses [facility] for his prescriptions. CM will follow as needed. Melecio Jacobson MSA, RN.

## 2018-01-11 NOTE — PROGRESS NOTES
HYPOGLYCEMIC EPISODE DOCUMENTATION    Patient with hypoglycemic episode(s) at 0709(time) on 1-11-18(date). BG value(s) pre-treatment 76    Was patient symptomatic?  [] yes, [x] no  Patient was treated with the following rescue medications/treatments: [] D50                [x] Glucose tablets                [] Glucagon                [] 4oz juice                [] 6oz reg soda                [] 8oz low fat milk  BG value post-treatment: 112  Once BG treated and value greater than 80mg/dl, pt was provided with the following:  [] snack  [] meal  Name of MD notified:Hospitalist   The following orders were received: None

## 2018-01-11 NOTE — PROGRESS NOTES
Hospitalist Progress Note  Nick Nolan NP  Answering service: [phone] OR 36 from in house phone  Cell: [phone]      Date of Service:  2018  NAME:  Beverely Shone. :  1955  MRN:  [id number]      Admission Summary:   58year old male with PMH for CAD with CABG, GERD, Liver Disease, and Peripheral Neuropathy who presented with chief complaint of chest pain. He had an onset of symptoms last evening which included radiating arm pain bilaterally. He received Nitro which improved his symptoms but with his extensive history came in for further evaluation. Interval history / Subjective:     Mr. Rosy Yuen is doing well.  I rounded with RN and discussed plan     Assessment & Plan:     NSTEMI in the setting of CAD and previous CABG(POA)  ASA, BB, Lipitor, Plavix and therapeutic Lovenox  Cards cancelled stress and feels medical management is most appropriate with dementia  Echo repeated and EF 25-30%, severe hypokinesis, mild MR  EKG reviewed  Troponin peaked and trending down  Discussed with Dr. Joan Villa, will dc in AM if continues to do well    Chronic Systolic Heart Failure  No acute exacerbation  Lisinopril added by Cards  Weight up    Type 2 DM with complications  Hgb T8Y ordered, last 6.7  Holding oral Amaryl  SSI    Hypertension  Stable on current meds    Hypokalemia  repleted  BMP    Chronic Liver Disease and Cirrhosis  Patient of [facility]  Continue home meds    Dementia  Lives with two daughters  Need to addressCode Status at some point    Seizure Disorder  Stopped Vimpat due to cost  Keppra    Code status: Full  DVT prophylaxis: therapeutic Lovenox    Care Plan discussed with: Patient/Family and Nurse  Disposition: Home w/Family and TBD/ Dc in AM     Hospital Problems  Date Reviewed: 2017          Codes Class Noted POA    Acute chest pain ICD-10-CM: R07.9  ICD-9-CM: 786.50  1/10/2018 Unknown Review of Systems:   A comprehensive review of systems was negative except for that written in the HPI. Vital Signs:    Last 24hrs VS reviewed since prior progress note. Most recent are:  Visit Vitals    /63 (BP 1 Location: Right arm, BP Patient Position: At rest)    Pulse 75    Temp 97.9 °F (36.6 °C)    Resp 18    Ht 5' 9\" (1.753 m)    Wt 89.6 kg (197 lb 8.5 oz)    SpO2 95%    BMI 29.17 kg/m2         Intake/Output Summary (Last 24 hours) at 01/11/18 0904  Last data filed at 01/10/18 1515   Gross per 24 hour   Intake                0 ml   Output              450 ml   Net             -450 ml        Physical Examination:             Constitutional:  No acute distress, cooperative, pleasant    ENT:  Oral mucous moist, oropharynx benign. Neck supple,    Resp:  CTA bilaterally. No wheezing/rhonchi/rales. No accessory muscle use   CV:  Regular rhythm, normal rate, no murmurs, gallops, rubs    GI:  Soft, non distended, non tender. normoactive bowel sounds, no hepatosplenomegaly     Musculoskeletal:  No edema, warm, 2+ pulses throughout    Neurologic:  Moves all extremities. Alert and oriented to name and some current situations, baseline dementia     Psych:  poor insight, Not anxious nor agitated. Skin:  Good turgor, no rashes or ulcers  Eyes:  EOMI.  Anicteric sclerae,       Data Review:    Review and/or order of clinical lab test  Review and/or order of tests in the radiology section of CPT      Labs:     Recent Labs      01/10/18   0336   WBC  7.2   HGB  12.1   HCT  37.8   PLT  139*     Recent Labs      01/11/18   0504  01/10/18   0336   NA  140  138   K  3.1*  3.1*   CL  107  104   CO2  24  25   BUN  12  13   CREA  1.34*  1.33*   GLU  74  219*   CA  7.9*  7.8*   MG  1.7  1.6   PHOS   --   2.3*     Recent Labs      01/10/18   0336   SGOT  10*   ALT  13   AP  94   TBILI  0.4   TP  6.1*   ALB  2.8*   GLOB  3.3     Recent Labs      01/10/18   0336   INR  1.1   PTP  11.2*   APTT  34.0*      No results for input(s): FE, TIBC, PSAT, FERR in the last 72 hours. No results found for: FOL, RBCF   No results for input(s): PH, PCO2, PO2 in the last 72 hours.   Recent Labs      01/11/18   0504  01/10/18   1301  01/10/18   0934   CPK   --   84  82   TROIQ  0.36*  0.70*  0.72*     No results found for: CHOL, CHOLX, CHLST, CHOLV, HDL, LDL, LDLC, DLDLP, TGLX, TRIGL, TRIGP, CHHD, CHHDX  Lab Results   Component Value Date/Time    Glucose (POC) 112 01/11/2018 07:25 AM    Glucose (POC) 75 01/11/2018 07:09 AM    Glucose (POC) 65 01/11/2018 06:52 AM    Glucose (POC) 174 01/10/2018 09:33 PM    Glucose (POC) 156 01/10/2018 05:05 PM     Lab Results   Component Value Date/Time    Color DARK YELLOW 07/31/2017 09:59 PM    Appearance CLEAR 07/31/2017 09:59 PM    Specific gravity 1.017 07/31/2017 09:59 PM    pH (UA) 6.0 07/31/2017 09:59 PM    Protein NEGATIVE  07/31/2017 09:59 PM    Glucose NEGATIVE  07/31/2017 09:59 PM    Ketone NEGATIVE  07/31/2017 09:59 PM    Bilirubin NEGATIVE  07/31/2017 09:59 PM    Urobilinogen 1.0 07/31/2017 09:59 PM    Nitrites POSITIVE 07/31/2017 09:59 PM    Leukocyte Esterase NEGATIVE  07/31/2017 09:59 PM    Epithelial cells FEW 07/31/2017 09:59 PM    Bacteria NEGATIVE  07/31/2017 09:59 PM    WBC 0-4 07/31/2017 09:59 PM    RBC 0-5 07/31/2017 09:59 PM         Medications Reviewed:     Current Facility-Administered Medications   Medication Dose Route Frequency    lisinopril (PRINIVIL, ZESTRIL) tablet 5 mg  5 mg Oral DAILY    carvedilol (COREG) tablet 6.25 mg  6.25 mg Oral DAILY WITH DINNER    sodium chloride (NS) flush 5-10 mL  5-10 mL IntraVENous Q8H    sodium chloride (NS) flush 5-10 mL  5-10 mL IntraVENous PRN    aspirin delayed-release tablet 81 mg  81 mg Oral DAILY    atorvastatin (LIPITOR) tablet 80 mg  80 mg Oral ACD    carvedilol (COREG) tablet 12.5 mg  12.5 mg Oral DAILY WITH BREAKFAST    clonazePAM (KlonoPIN) tablet 0.5 mg  0.5 mg Oral BID    divalproex DR (DEPAKOTE) tablet 500 mg  500 mg Oral BID    FLUoxetine (PROzac) capsule 20 mg  20 mg Oral DAILY    lactulose (CHRONULAC) solution 30 g  30 g Oral TID    levETIRAcetam (KEPPRA) tablet 750 mg  750 mg Oral BID    tamsulosin (FLOMAX) capsule 0.4 mg  0.4 mg Oral ACB&D    sucralfate (CARAFATE) 100 mg/mL oral suspension 1 g  1 g Oral AC&HS    rifAXIMin (XIFAXAN) tablet 550 mg  550 mg Oral BID    pantoprazole (PROTONIX) tablet 40 mg  40 mg Oral ACB&D    pregabalin (LYRICA) capsule 50 mg  50 mg Oral TID    QUEtiapine (SEROquel) tablet 200 mg  200 mg Oral QHS    famotidine (PEPCID) tablet 20 mg  20 mg Oral BID    venlafaxine-SR (EFFEXOR-XR) capsule 225 mg  225 mg Oral DAILY    clopidogrel (PLAVIX) tablet 75 mg  75 mg Oral DAILY    enoxaparin (LOVENOX) injection 90 mg  1 mg/kg SubCUTAneous Q12H    isosorbide mononitrate ER (IMDUR) tablet 30 mg  30 mg Oral DAILY    insulin lispro (HUMALOG) injection   SubCUTAneous AC&HS    glucose chewable tablet 16 g  4 Tab Oral PRN    dextrose (D50W) injection syrg 12.5-25 g  12.5-25 g IntraVENous PRN    glucagon (GLUCAGEN) injection 1 mg  1 mg IntraMUSCular PRN     ______________________________________________________________________  EXPECTED LENGTH OF STAY: - - -  ACTUAL LENGTH OF STAY:          0                 Sapphire Duran NP

## 2018-01-12 VITALS
WEIGHT: 197.53 LBS | TEMPERATURE: 97.2 F | HEART RATE: 97 BPM | BODY MASS INDEX: 29.26 KG/M2 | DIASTOLIC BLOOD PRESSURE: 82 MMHG | RESPIRATION RATE: 16 BRPM | HEIGHT: 69 IN | OXYGEN SATURATION: 97 % | SYSTOLIC BLOOD PRESSURE: 146 MMHG

## 2018-01-12 LAB
AMMONIA PLAS-SCNC: 60 UMOL/L
ANION GAP SERPL CALC-SCNC: 8 MMOL/L (ref 5–15)
BUN SERPL-MCNC: 11 MG/DL (ref 6–20)
BUN/CREAT SERPL: 8 (ref 12–20)
CALCIUM SERPL-MCNC: 8.1 MG/DL (ref 8.5–10.1)
CHLORIDE SERPL-SCNC: 105 MMOL/L (ref 97–108)
CO2 SERPL-SCNC: 25 MMOL/L (ref 21–32)
CREAT SERPL-MCNC: 1.33 MG/DL (ref 0.7–1.3)
GLUCOSE BLD STRIP.AUTO-MCNC: 87 MG/DL (ref 65–100)
GLUCOSE SERPL-MCNC: 102 MG/DL (ref 65–100)
POTASSIUM SERPL-SCNC: 3.1 MMOL/L (ref 3.5–5.1)
SERVICE CMNT-IMP: NORMAL
SODIUM SERPL-SCNC: 138 MMOL/L (ref 136–145)

## 2018-01-12 PROCEDURE — 82140 ASSAY OF AMMONIA: CPT | Performed by: NURSE PRACTITIONER

## 2018-01-12 PROCEDURE — 74011250637 HC RX REV CODE- 250/637: Performed by: INTERNAL MEDICINE

## 2018-01-12 PROCEDURE — 82962 GLUCOSE BLOOD TEST: CPT

## 2018-01-12 PROCEDURE — 74011250636 HC RX REV CODE- 250/636: Performed by: INTERNAL MEDICINE

## 2018-01-12 PROCEDURE — 80048 BASIC METABOLIC PNL TOTAL CA: CPT | Performed by: INTERNAL MEDICINE

## 2018-01-12 PROCEDURE — 36415 COLL VENOUS BLD VENIPUNCTURE: CPT | Performed by: INTERNAL MEDICINE

## 2018-01-12 PROCEDURE — 74011250637 HC RX REV CODE- 250/637: Performed by: FAMILY MEDICINE

## 2018-01-12 RX ORDER — CARVEDILOL 6.25 MG/1
6.25 TABLET ORAL
Qty: 30 TAB | Refills: 0 | Status: SHIPPED | OUTPATIENT
Start: 2018-01-12 | End: 2019-01-10

## 2018-01-12 RX ORDER — ISOSORBIDE MONONITRATE 30 MG/1
30 TABLET, EXTENDED RELEASE ORAL DAILY
Qty: 30 TAB | Refills: 2 | Status: SHIPPED | OUTPATIENT
Start: 2018-01-13 | End: 2020-05-24

## 2018-01-12 RX ORDER — CLOPIDOGREL BISULFATE 75 MG/1
75 TABLET ORAL DAILY
Qty: 30 TAB | Refills: 2 | Status: SHIPPED | OUTPATIENT
Start: 2018-01-13

## 2018-01-12 RX ORDER — LISINOPRIL 10 MG/1
10 TABLET ORAL DAILY
Qty: 30 TAB | Refills: 2 | Status: SHIPPED | OUTPATIENT
Start: 2018-01-13 | End: 2019-09-03

## 2018-01-12 RX ORDER — LISINOPRIL 10 MG/1
10 TABLET ORAL DAILY
Status: DISCONTINUED | OUTPATIENT
Start: 2018-01-12 | End: 2018-01-12 | Stop reason: HOSPADM

## 2018-01-12 RX ADMIN — CLONAZEPAM 0.5 MG: 1 TABLET ORAL at 08:27

## 2018-01-12 RX ADMIN — DIVALPROEX SODIUM 500 MG: 500 TABLET, DELAYED RELEASE ORAL at 08:27

## 2018-01-12 RX ADMIN — TAMSULOSIN HYDROCHLORIDE 0.4 MG: 0.4 CAPSULE ORAL at 06:42

## 2018-01-12 RX ADMIN — VENLAFAXINE HYDROCHLORIDE 225 MG: 150 CAPSULE, EXTENDED RELEASE ORAL at 08:27

## 2018-01-12 RX ADMIN — Medication 10 ML: at 06:42

## 2018-01-12 RX ADMIN — FLUOXETINE 20 MG: 20 CAPSULE ORAL at 08:27

## 2018-01-12 RX ADMIN — PANTOPRAZOLE SODIUM 40 MG: 40 TABLET, DELAYED RELEASE ORAL at 06:42

## 2018-01-12 RX ADMIN — ISOSORBIDE MONONITRATE 30 MG: 30 TABLET, EXTENDED RELEASE ORAL at 08:27

## 2018-01-12 RX ADMIN — ASPIRIN 81 MG: 81 TABLET, COATED ORAL at 08:28

## 2018-01-12 RX ADMIN — LEVETIRACETAM 750 MG: 250 TABLET, FILM COATED ORAL at 08:27

## 2018-01-12 RX ADMIN — ENOXAPARIN SODIUM 90 MG: 100 INJECTION SUBCUTANEOUS at 01:19

## 2018-01-12 RX ADMIN — PREGABALIN 50 MG: 50 CAPSULE ORAL at 08:27

## 2018-01-12 RX ADMIN — FAMOTIDINE 20 MG: 20 TABLET, FILM COATED ORAL at 08:27

## 2018-01-12 RX ADMIN — CARVEDILOL 12.5 MG: 12.5 TABLET, FILM COATED ORAL at 08:28

## 2018-01-12 RX ADMIN — RIFAXIMIN 550 MG: 550 TABLET ORAL at 08:27

## 2018-01-12 RX ADMIN — LISINOPRIL 10 MG: 10 TABLET ORAL at 08:53

## 2018-01-12 RX ADMIN — LACTULOSE 30 G: 20 SOLUTION ORAL at 08:27

## 2018-01-12 RX ADMIN — CLOPIDOGREL BISULFATE 75 MG: 75 TABLET ORAL at 08:27

## 2018-01-12 RX ADMIN — SUCRALFATE 1 G: 1 SUSPENSION ORAL at 06:42

## 2018-01-12 NOTE — PROGRESS NOTES
Cardiology Progress Note  2018     Admit Date: 1/10/2018  Admit Diagnosis: Acute chest pain  Chest pain  CC: none currently    Assessment:   Active Problems:    Acute chest pain (1/10/2018)      Chest pain (2018)      Plan:     Increased lisinopril dose to 10 mg  Cont other cardiac meds, including current regimen of carvedilol and added medications  Can DC from cardiac perspective with follow up    Subjective:      Esequiel Apontetle. doing well this AM. No more CP. JAVED o/n. Objective:    Physical Exam:  Overall VSSAF;    Visit Vitals    /83 (BP 1 Location: Right arm, BP Patient Position: At rest)    Pulse 65    Temp 98.1 °F (36.7 °C)    Resp 18    Ht 5' 9\" (1.753 m)    Wt 89.6 kg (197 lb 8.5 oz)    SpO2 96%    BMI 29.17 kg/m2     Temp (24hrs), Av.7 °F (36.5 °C), Min:97.4 °F (36.3 °C), Max:98.1 °F (36.7 °C)    Patient Vitals for the past 8 hrs:   Pulse   18 65    Patient Vitals for the past 8 hrs:   Resp   186 18    Patient Vitals for the past 8 hrs:   BP   18 0426 148/83             General Appearance: Well developed, well nourished, no acute distress. Ears/Nose/Mouth/Throat:   Normal MM; anicteric. JVP: WNL   Resp:   Lungs clear to auscultation bilaterally. Nl resp effort. Cardiovascular:  RRR, S1, S2 normal, no new murmur. No gallop or rub. Abdomen:   Soft, non-tender, bowel sounds are present. Extremities: No edema bilaterally. Skin:  Neuro: Warm and dry.   Alert                         Data Review:     Telemetry independently reviewed :   normal sinus rhythm       ECG independently reviewed: NSR  Labs:   Recent Results (from the past 24 hour(s))   GLUCOSE, POC    Collection Time: 18 11:18 AM   Result Value Ref Range    Glucose (POC) 199 (H) 65 - 100 mg/dL    Performed by Grady Stevens, POC    Collection Time: 18  4:38 PM   Result Value Ref Range    Glucose (POC) 110 (H) 65 - 100 mg/dL    Performed by Christian Yousif    GLUCOSE, POC    Collection Time: 01/11/18  9:28 PM   Result Value Ref Range    Glucose (POC) 78 65 - 100 mg/dL    Performed by Christian Yousif    GLUCOSE, POC    Collection Time: 01/11/18  9:51 PM   Result Value Ref Range    Glucose (POC) 84 65 - 100 mg/dL    Performed by Christian Yousif    METABOLIC PANEL, BASIC    Collection Time: 01/12/18  4:33 AM   Result Value Ref Range    Sodium 138 136 - 145 mmol/L    Potassium 3.1 (L) 3.5 - 5.1 mmol/L    Chloride 105 97 - 108 mmol/L    CO2 25 21 - 32 mmol/L    Anion gap 8 5 - 15 mmol/L    Glucose 102 (H) 65 - 100 mg/dL    BUN 11 6 - 20 MG/DL    Creatinine 1.33 (H) 0.70 - 1.30 MG/DL    BUN/Creatinine ratio 8 (L) 12 - 20      GFR est AA >60 >60 ml/min/1.73m2    GFR est non-AA 54 (L) >60 ml/min/1.73m2    Calcium 8.1 (L) 8.5 - 10.1 MG/DL   AMMONIA    Collection Time: 01/12/18  4:33 AM   Result Value Ref Range    Ammonia 60 (H) <32 UMOL/L   GLUCOSE, POC    Collection Time: 01/12/18  6:46 AM   Result Value Ref Range    Glucose (POC) 87 65 - 100 mg/dL    Performed by Kayleen Portillo       Current medications reviewed       Melonie Harmon MD

## 2018-01-12 NOTE — DISCHARGE INSTRUCTIONS
Discharge Instructions       PATIENT ID: Ronel Danielle MRN: [id number]   YOB: 1955    DATE OF ADMISSION: 1/10/2018  3:17 AM    DATE OF DISCHARGE: 1/12/2018    PRIMARY CARE PROVIDER: Jaci Jean-Baptiste MD     ATTENDING PHYSICIAN: Windy Lantigua MD  DISCHARGING PROVIDER: Fitz Parra NP    To contact this individual call [phone] and ask the  to page. If unavailable ask to be transferred the Adult Hospitalist Department. DISCHARGE DIAGNOSES     Chest Pain   Uncontrolled Blood Pressure     CONSULTATIONS: IP CONSULT TO HOSPITALIST  IP CONSULT TO CARDIOLOGY    PROCEDURES/SURGERIES: * No surgery found *    PENDING TEST RESULTS:   At the time of discharge the following test results are still pending: none    FOLLOW UP APPOINTMENTS:   Follow-up Information     Follow up With Details Comments Contact Info    Jaci Jean-Baptiste MD In 3 days  [address] [facility]  Javier Mitchell MD In 2 weeks please call to schedule follow up appointment  [address] [place] [address]  [phone]             ADDITIONAL CARE RECOMMENDATIONS:     1. Please follow up at scheduled appointment as indicated above. 2. I recommend that you stop taking Amaryl based on your hemoglobin A1C of 6.7 and you have been having some blood sugar \"lows\" here in the hospital. Please discuss restarting this medication with your primary care provider prior starting it. 3. Decrease your Coreg dose to 6.25 mg once daily as prescribed. 4. Start taking Imdur 30 mg daily for blood pressure control. Start taking Lisinopril 10 mg daily for blood pressure control. 5. Start taking Plavix 75 mg daily in addition to your daily aspirin dose for treatment of heart attack/heart failure as prescribed by Dr. Karel Sidhu. 6. Adhere to a heart healthy diet , low sodium and watch how much fluid you are taking in every day.  You should not be drinking more than 2L daily in all fluids. Weigh yourself daily, if you gain more than 3 lbs in 2 days or 5 pounds in a week please call the heart doctor. DIET: Cardiac Diet      ACTIVITY: Activity as tolerated      DISCHARGE MEDICATIONS:   See Medication Reconciliation Form    · It is important that you take the medication exactly as they are prescribed. · Keep your medication in the bottles provided by the pharmacist and keep a list of the medication names, dosages, and times to be taken in your wallet. · Do not take other medications without consulting your doctor. NOTIFY YOUR PHYSICIAN FOR ANY OF THE FOLLOWING:   Fever over 101 degrees for 24 hours. Chest pain, shortness of breath, fever, chills, nausea, vomiting, diarrhea, change in mentation, falling, weakness, bleeding. Severe pain or pain not relieved by medications. Or, any other signs or symptoms that you may have questions about. DISPOSITION:   X Home With: none   OT  PT  HH  RN       SNF/Inpatient Rehab/LTAC    Independent/assisted living    Hospice    Other:     CDMP Checked: Yes X     PROBLEM LIST Updated:   Yes X       Signed:   Padilla Gallardo NP  1/12/2018  9:59 AM

## 2018-01-12 NOTE — PROGRESS NOTES
Bedside shift change report given to Hai(oncoming nurse) by Jerardo Wu (offgoing nurse). Report included the following information SBAR, Kardex, Intake/Output, Recent Results and Cardiac Rhythm .

## 2018-01-12 NOTE — PROGRESS NOTES
0000: Bedside and Verbal shift change report given to Germania Schwartz (oncoming nurse) by Steven Moreno (offgoing nurse). Report included the following information SBAR, Kardex, Intake/Output and Recent Results. 0745: Bedside and Verbal shift change report given to Miguel (oncoming nurse) by Germania Schwartz (offgoing nurse). Report included the following information SBAR.

## 2018-01-12 NOTE — DISCHARGE SUMMARY
Discharge Summary       PATIENT ID: Sebastien March MRN: [id number]   YOB: 1955    DATE OF ADMISSION: 1/10/2018  3:17 AM    DATE OF DISCHARGE: 1/11/2017  PRIMARY CARE PROVIDER: Monie Franco MD       DISCHARGING PHYSICIAN: Kaylen Angulo NP    To contact this individual call [phone] and ask the  to page. If unavailable ask to be transferred the Adult Hospitalist Department. CONSULTATIONS: IP CONSULT TO HOSPITALIST  IP CONSULT TO CARDIOLOGY    PROCEDURES/SURGERIES: * No surgery found *    ADMITTING DIAGNOSES & HOSPITAL COURSE:     58year old male with PMH for CAD with CABG, GERD, Liver Disease, and Peripheral Neuropathy who presented with chief complaint of chest pain. He had an onset of symptoms last evening which included radiating arm pain bilaterally. He received Nitro which improved his symptoms but with his extensive history came in for further evaluation. Patient with last cath in 2015 showing almost all occluded grafts, he has had CABG redo in the past as well, known severe multivessel CAD, medical management at this time, would not likely benefit from cath or stress. I discussed this at length with his daughter who is also his caretaker.      See below for further discharge plans     DISCHARGE DIAGNOSES / PLAN:      NSTEMI in the setting of CAD and previous CABG(POA)  ASA, BB, Lipitor, Plavix and therapeutic Lovenox  Cards cancelled stress and feels medical management is most appropriate with dementia  Echo repeated and EF 25-30%, severe hypokinesis, mild MR  EKG reviewed  Troponin peaked and trending down       Chronic Systolic Heart Failure  No acute exacerbation  Lisinopril added by Cards- increased to 10 mg today per Dr. Iva Hawley  Added Imdur 30 mg daily   Decreased Coreg to 6.25 mg daily   Added 75 mg Plavix to baby aspirin daily   Weight up     Type 2 DM with complications  Hgb B2M ordered, last 6.7  Holding oral Amaryl- restart at discharge as patients daughter said he will eat a large amount of carbs and sweets when at home  SSI     Hypertension  Stable on current meds     Hypokalemia  repleted  BMP     Chronic Liver Disease and Cirrhosis  Patient of Mario  Continue home meds     Dementia  Lives with two daughters  Need to addressCode Status at some point     Seizure Disorder  Stopped Vimpat due to cost  Keppra       PENDING TEST RESULTS:   At the time of discharge the following test results are still pending: none    FOLLOW UP APPOINTMENTS:    Follow-up Information     Follow up With Details Comments Contact Info    Ashanti Payne MD In 3 days  [address] [facility]      Tristan Bradshaw MD On 1/16/2018 Heart failure follow up 2020 [address] [place] [address] and [place] [address]  [phone]             ADDITIONAL CARE RECOMMENDATIONS:     1. Please follow up at scheduled appointment as indicated above. 2. I recommend that you stop taking Amaryl based on your hemoglobin A1C of 6.7 and you have been having some blood sugar \"lows\" here in the hospital. Please discuss restarting this medication with your primary care provider prior starting it. 3. Decrease your Coreg dose to 6.25 mg once daily as prescribed. 4. Start taking Imdur 30 mg daily for blood pressure control. Start taking Lisinopril 10 mg daily for blood pressure control. 5. Start taking Plavix 75 mg daily in addition to your daily aspirin dose for treatment of heart attack/heart failure as prescribed by Dr. Kaylie Sainz. 6. Adhere to a heart healthy diet , low sodium and watch how much fluid you are taking in every day. You should not be drinking more than 2L daily in all fluids. Weigh yourself daily, if you gain more than 3 lbs in 2 days or 5 pounds in a week please call the heart doctor.       DIET: Regular Diet    ACTIVITY: Activity as tolerated        DISCHARGE MEDICATIONS:  Current Discharge Medication List      START taking these medications    Details   clopidogrel (PLAVIX) 75 mg tab Take 1 Tab by mouth daily. Qty: 30 Tab, Refills: 2      isosorbide mononitrate ER (IMDUR) 30 mg tablet Take 1 Tab by mouth daily. Qty: 30 Tab, Refills: 2      lisinopril (PRINIVIL, ZESTRIL) 10 mg tablet Take 1 Tab by mouth daily. Qty: 30 Tab, Refills: 2         CONTINUE these medications which have CHANGED    Details   carvedilol (COREG) 6.25 mg tablet Take 1 Tab by mouth daily (with dinner). Qty: 30 Tab, Refills: 0         CONTINUE these medications which have NOT CHANGED    Details   FLUoxetine (PROZAC) 20 mg capsule Take 20 mg by mouth daily. QUEtiapine (SEROQUEL) 200 mg tablet Take 200 mg by mouth nightly. venlafaxine-SR (EFFEXOR XR) 75 mg capsule Take 225 mg by mouth daily. clonazePAM (KLONOPIN) 1 mg tablet Take 0.5 mg by mouth two (2) times a day. levETIRAcetam (KEPPRA) 750 mg tablet Take 750 mg by mouth two (2) times a day. Replaces Vimpat per patient's daughter      nitroglycerin (NITROSTAT) 0.4 mg SL tablet 0.4 mg by SubLINGual route every five (5) minutes as needed for Chest Pain. May repeat every 5 minutes for a maximum of 3 doses if chest pain not relieved call MD belinda DE LA PAZ (DEPAKOTE) 500 mg tablet Take 1 Tab by mouth two (2) times a day. Qty: 60 Tab, Refills: 3      sucralfate (CARAFATE) 100 mg/mL suspension Take  by mouth four (4) times daily. rifAXIMin (XIFAXAN) 550 mg tablet Take 1 Tab by mouth two (2) times a day. Qty: 180 Tab, Refills: 3      ALPRAZolam (XANAX) 1 mg tablet Take 1 mg by mouth every six (6) hours as needed for Anxiety. lactulose (CHRONULAC) 10 gram/15 mL solution Take 45 mL by mouth three (3) times daily. Adjust dosage so that you have 2-3 bowel movements per day. Qty: 1 Bottle, Refills: 0      raNITIdine (ZANTAC) 150 mg tablet Take 150 mg by mouth two (2) times a day.  Before Breakfast and in the afternoon (also takes Protonix at same time)      aspirin 81 mg chewable tablet Take 1 Tab by mouth daily. Qty: 30 Tab, Refills: 0      eplerenone (INSPRA) 25 mg tablet Take 1 Tab by mouth daily. Qty: 30 Tab, Refills: 0      atorvastatin (LIPITOR) 80 mg tablet Take 1 Tab by mouth Daily (before dinner). Qty: 30 Tab, Refills: 0      pantoprazole (PROTONIX) 40 mg tablet Take 1 Tab by mouth Before breakfast and dinner. Before Breakfast and in the afternoon (also takes Zantac at same time)  Qty: 60 Tab, Refills: 0      pregabalin (LYRICA) 50 mg capsule Take 1 Cap by mouth three (3) times daily. Max Daily Amount: 150 mg.  Qty: 90 Cap, Refills: 0      tamsulosin (FLOMAX) 0.4 mg capsule Take 1 Cap by mouth Before breakfast and dinner. Before Breakfast and in the afternoon  Qty: 30 Cap, Refills: 0         STOP taking these medications       glimepiride (AMARYL) 4 mg tablet Comments:   Reason for Stopping:         glimepiride (AMARYL) 4 mg tablet Comments:   Reason for Stopping:         FLUoxetine (PROZAC) 20 mg tablet Comments:   Reason for Stopping:                 NOTIFY YOUR PHYSICIAN FOR ANY OF THE FOLLOWING:   Fever over 101 degrees for 24 hours. Chest pain, shortness of breath, fever, chills, nausea, vomiting, diarrhea, change in mentation, falling, weakness, bleeding. Severe pain or pain not relieved by medications. Or, any other signs or symptoms that you may have questions about.     DISPOSITION:   X Home With: none   OT  PT  HH  RN       Long term SNF/Inpatient Rehab    Independent/assisted living    Hospice    Other:       PATIENT CONDITION AT DISCHARGE:     Functional status    Poor    X Deconditioned     Independent      Cognition     Lucid    X Forgetful     Dementia      Catheters/lines (plus indication)    [place]     PICC     PEG    X None      Code status   X  Full code     DNR        CHRONIC MEDICAL DIAGNOSES:  Problem List as of 1/12/2018  Date Reviewed: 11/27/2017          Codes Class Noted - Resolved    Chest pain ICD-10-CM: R07.9  ICD-9-CM: 786.50  1/11/2018 - Present        Acute chest pain ICD-10-CM: R07.9  ICD-9-CM: 786.50  1/10/2018 - Present        Hepatic encephalopathy ([facility] ICD-10-CM: K72.90  ICD-9-CM: 572.2  7/17/2017 - Present        Neuropathy ICD-10-CM: G62.9  ICD-9-CM: 355.9  4/14/2017 - Present        Cirrhosis ([facility] ICD-10-CM: K74.60  ICD-9-CM: 571.5  4/14/2017 - Present        CAD (coronary artery disease) ICD-10-CM: I25.10  ICD-9-CM: 414.00  4/14/2017 - Present        S/P coronary artery stent placement ICD-10-CM: Z95.5  ICD-9-CM: V45.82  4/14/2017 - Present        S/P CABG (coronary artery bypass graft) ICD-10-CM: Z95.1  ICD-9-CM: V45.81  4/14/2017 - Present    Overview Signed 4/14/2017 11:27 AM by Shamika Murray MD     2002 and 2013             Thrombocytopenia ([facility] ICD-10-CM: D69.6  ICD-9-CM: 287.5  4/14/2017 - Present        MRSA infection ICD-10-CM: A49.02  ICD-9-CM: 041.12  4/14/2017 - Present    Overview Signed 4/14/2017 11:28 AM by Shamika Murray MD     2016             S/P cholecystectomy ICD-10-CM: Z90.49  ICD-9-CM: V45.79  4/14/2017 - Present        Metabolic encephalopathy [facility]-AO: G93.41  ICD-9-CM: 348.31  12/19/2016 - Present        Seizure ([facility] ICD-10-CM: R56.9  ICD-9-CM: 780.39  11/21/2016 - Present        Sinusitis ICD-10-CM: J32.9  ICD-9-CM: 473.9  Unknown - Present        Joint pain ICD-10-CM: M25.50  ICD-9-CM: 719.40  Unknown - Present        Low back pain ICD-10-CM: M54.5  ICD-9-CM: 724.2  Unknown - Present        GERD (gastroesophageal reflux disease) ICD-10-CM: K21.9  ICD-9-CM: 530.81  Unknown - Present        Diabetes mellitus, type 2 ([facility] ICD-10-CM: E11.9  ICD-9-CM: 250.00  Unknown - Present        RESOLVED: Pneumonia ICD-10-CM: J18.9  ICD-9-CM: 375  3/3/2017 - 4/14/2017        RESOLVED: Fever ICD-10-CM: R50.9  ICD-9-CM: 780.60  3/2/2017 - 4/14/2017        RESOLVED: Abscess ICD-10-CM: L02.91  ICD-9-CM: 682.9  1/5/2017 - 4/14/2017        RESOLVED: Altered mental status ICD-10-CM: R41.82  ICD-9-CM: 780.97  12/19/2016 - 4/14/2017 RESOLVED: Debility ICD-10-CM: R53.81  ICD-9-CM: 799.3  11/22/2016 - 4/14/2017        RESOLVED: Cellulitis ICD-10-CM: L03.90  ICD-9-CM: 682.9  11/7/2016 - 4/14/2017        RESOLVED: Snoring ICD-10-CM: R06.83  ICD-9-CM: 786.09  Unknown - 4/14/2017        RESOLVED: Night sweats ICD-10-CM: R61  ICD-9-CM: 780.8  Unknown - 4/14/2017        RESOLVED: Chest pain ICD-10-CM: 786.5  ICD-9-CM: 786.5  Unknown - 4/14/2017        RESOLVED: Sore throat ICD-10-CM: J02.9  ICD-9-CM: 924  Unknown - 4/14/2017        RESOLVED: Dysphagia ICD-10-CM: 787.2  ICD-9-CM: 787.2  Unknown - 4/14/2017        RESOLVED: Tingling sensation ICD-10-CM: R20.2  ICD-9-CM: 782.0  Unknown - 4/14/2017        RESOLVED: Nausea ICD-10-CM: R11.0  ICD-9-CM: 787.02  Unknown - 4/14/2017        RESOLVED: Diarrhea ICD-10-CM: R19.7  ICD-9-CM: 787.91  Unknown - 4/14/2017        RESOLVED: Snoring ICD-10-CM: R06.83  ICD-9-CM: 786.09  Unknown - 4/14/2017        RESOLVED: Abdominal pain, epigastric ICD-10-CM: R10.13  ICD-9-CM: 789.06  9/13/2010 - 4/14/2017              Greater than 30 minutes were spent with the patient on counseling and coordination of care    Signed:   Jeni Griffith NP  1/12/2018  11:10 AM

## 2018-01-12 NOTE — PROGRESS NOTES
Patient discharged to home. Follow up Heart Failure appointment made with Dr. Elspeth Saint for next week. Reviewed discharge instruction and prescriptions with both patient and his daughter Cas Wyatt. PIV removed. Volunteer took patient down to discharge in wheelchair.

## 2018-01-15 ENCOUNTER — PATIENT OUTREACH (OUTPATIENT)
Dept: INTERNAL MEDICINE CLINIC | Age: 63
End: 2018-01-15

## 2018-01-15 ENCOUNTER — TELEPHONE (OUTPATIENT)
Dept: CARDIAC REHAB | Age: 63
End: 2018-01-15
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
## 2018-01-16 ENCOUNTER — TELEPHONE (OUTPATIENT)
Dept: CARDIAC REHAB | Age: 63
End: 2018-01-16

## 2018-01-16 NOTE — TELEPHONE ENCOUNTER
1/16/2018 Cardiac Rehab: Called Mr. Wyatt Rowe.  to discuss participation in the Cardiac Rehab Program . Spoke with his  Daughter and intake appt. is scheduled. Maddi Lui RN      1/15/2018 Cardiac Rehab: Alvin Cardoso Mr. Wyatt Rowe.  to discuss participation in the Cardiac Rehab Program following NSTEMI on 1/10/18. Spoke with daughter, Cecil Mays. She is currently at [address] [place] [address] and needs to call us back today or tomorrow.  Annalisa Ruiz

## 2018-01-24 ENCOUNTER — DOCUMENTATION ONLY (OUTPATIENT)
Dept: INTERNAL MEDICINE CLINIC | Age: 63
End: 2018-01-24

## 2018-01-24 ENCOUNTER — PATIENT OUTREACH (OUTPATIENT)
Dept: INTERNAL MEDICINE CLINIC | Age: 63
End: 2018-01-24

## 2018-01-24 ENCOUNTER — OFFICE VISIT (OUTPATIENT)
Dept: HEMATOLOGY | Age: 63
End: 2018-01-24

## 2018-01-24 VITALS
DIASTOLIC BLOOD PRESSURE: 74 MMHG | HEART RATE: 80 BPM | BODY MASS INDEX: 31.01 KG/M2 | TEMPERATURE: 96 F | OXYGEN SATURATION: 100 % | WEIGHT: 210 LBS | SYSTOLIC BLOOD PRESSURE: 128 MMHG

## 2018-01-24 DIAGNOSIS — K74.60 CIRRHOSIS OF LIVER WITHOUT ASCITES, UNSPECIFIED HEPATIC CIRRHOSIS TYPE (HCC): Primary | ICD-10-CM

## 2018-01-24 RX ORDER — GLIMEPIRIDE 4 MG/1
TABLET ORAL
COMMUNITY
Start: 2018-01-12 | End: 2019-01-10

## 2018-01-24 NOTE — PROGRESS NOTES
[address] Bernstein MD, [place], [place], [place]       AKOSUA Jones PA-C Nonie Pancake, AKOSUA Bass NP        at [address], [address], [address]     [phone]     FAX: [phone]    at [place] [address], [address] [place] [address], [address]     [phone]     FAX: [phone]       Patient Care Team:  Carrol Ponce MD as PCP - General (Family Practice)  Candi Harrison MD as Surgeon (General Surgery)  Willy Cooper MD (Infectious Diseases)  Denisse Castillo DO (Neurology)  Kayy Howard MD (Psychiatry)  Roderick Lilly, JANELL as Ambulatory Care Navigator (Internal Medicine)      Problem List  Date Reviewed: 11/27/2017          Codes Class Noted    Chest pain ICD-10-CM: R07.9  ICD-9-CM: 786.50  1/11/2018        Acute chest pain ICD-10-CM: R07.9  ICD-9-CM: 786.50  1/10/2018        Hepatic encephalopathy ([facility] ICD-10-CM: K72.90  ICD-9-CM: 572.2  7/17/2017        Neuropathy ICD-10-CM: G62.9  ICD-9-CM: 355.9  4/14/2017        Cirrhosis ([facility] ICD-10-CM: K74.60  ICD-9-CM: 571.5  4/14/2017        CAD (coronary artery disease) ICD-10-CM: I25.10  ICD-9-CM: 414.00  4/14/2017        S/P coronary artery stent placement ICD-10-CM: Z95.5  ICD-9-CM: V45.82  4/14/2017        S/P CABG (coronary artery bypass graft) ICD-10-CM: Z95.1  ICD-9-CM: V45.81  4/14/2017    Overview Signed 4/14/2017 11:27 AM by Emily Hudson MD     2002 and 2013             Thrombocytopenia ([facility] ICD-10-CM: D69.6  ICD-9-CM: 287.5  4/14/2017        MRSA infection ICD-10-CM: Z16.70  ICD-9-CM: 041.12  4/14/2017    Overview Signed 4/14/2017 11:28 AM by Emily Hudson MD     2016             S/P cholecystectomy ICD-10-CM: Z90.49  ICD-9-CM: V45.79  0/41/9818        Metabolic encephalopathy HFF-41-AE: G93.41  ICD-9-CM: 348.31  12/19/2016        Seizure ([facility] ICD-10-CM: R56.9  ICD-9-CM: 780.39  11/21/2016        Sinusitis ICD-10-CM: J32.9  ICD-9-CM: 473.9  Unknown        Joint pain ICD-10-CM: M25.50  ICD-9-CM: 719.40  Unknown        Low back pain ICD-10-CM: M54.5  ICD-9-CM: 724.2  Unknown        GERD (gastroesophageal reflux disease) ICD-10-CM: K21.9  ICD-9-CM: 530.81  Unknown        Diabetes mellitus, type 2 ([facility] ICD-10-CM: E11.9  ICD-9-CM: 250.00  Unknown              Bernice Jackson returns to the [address] for management of cirrhosis likely secondary to non-alcoholic steatohepatitis (BARGER). The active problem list, all pertinent past medical history, medications, endoscopic studies, radiologic findings and laboratory findings related to the liver disorder were reviewed with the patient. The patient is a 58 y.o.  male who was first found to have chronic liver disease and cirrhosis when he was noted to have thrombocytopenia in 2016. Hepatic encephalopathy was first noted in 3/2016 when he was treated for MRSA. Hepatic encephalopathy persists despite treatment with lactulose and Xifaxan. The patient had been having significant diarrhea when taking lactulose, he is regulating better now, generally having 3-4 BM daily. He is taking 45 mL lactulose 3 time daily. His daughter has noted better stabilization with the start of Xifaxin. The patient has had some worsening of confusion since discharge from hospital 2 weeks ago for chest pain. Patient has most recently been hospitalized for chest pain and NSTEMI. He was released 2 weeks ago and is doing reasonably well at home under the care of his daughter. The patient has not had EGD in 5/2017, there was no evidence of varices. He denies evidence of GI bleeding. The most recent laboratory studies indicate that the liver transaminases are normal, ALP is normal, total bilirubin is normal, albumin is depressed, and the platelet count is depressed.     The patient notes fatigue, problems concentrating, imbalance and weakness. The patient has limitations in functional activities secondary to these symptoms. ALLERGIES  Allergies   Allergen Reactions    Latex, Natural Rubber Hives    Codeine Anaphylaxis     Tolerated fentanyl previously      Demerol [Meperidine] Anaphylaxis     Tolerated fentanyl previously      Mushroom Anaphylaxis    Metformin Diarrhea     And dehydration    Wellbutrin [Bupropion Hcl] Anaphylaxis       MEDICATIONS  Current Outpatient Prescriptions   Medication Sig    carvedilol (COREG) 6.25 mg tablet Take 1 Tab by mouth daily (with dinner).  clopidogrel (PLAVIX) 75 mg tab Take 1 Tab by mouth daily.  isosorbide mononitrate ER (IMDUR) 30 mg tablet Take 1 Tab by mouth daily.  lisinopril (PRINIVIL, ZESTRIL) 10 mg tablet Take 1 Tab by mouth daily.  FLUoxetine (PROZAC) 20 mg capsule Take 20 mg by mouth daily.  QUEtiapine (SEROQUEL) 200 mg tablet Take 50 mg by mouth nightly.  venlafaxine-SR (EFFEXOR XR) 75 mg capsule Take 225 mg by mouth daily.  clonazePAM (KLONOPIN) 1 mg tablet Take 0.5 mg by mouth nightly.  levETIRAcetam (KEPPRA) 750 mg tablet Take 750 mg by mouth two (2) times a day. Replaces Vimpat per patient's daughter    nitroglycerin (NITROSTAT) 0.4 mg SL tablet 0.4 mg by SubLINGual route every five (5) minutes as needed for Chest Pain. May repeat every 5 minutes for a maximum of 3 doses if chest pain not relieved call MD    divalproex DR (DEPAKOTE) 500 mg tablet Take 1 Tab by mouth two (2) times a day.  sucralfate (CARAFATE) 100 mg/mL suspension Take  by mouth four (4) times daily.  rifAXIMin (XIFAXAN) 550 mg tablet Take 1 Tab by mouth two (2) times a day.  ALPRAZolam (XANAX) 1 mg tablet Take 1 mg by mouth every six (6) hours as needed for Anxiety.  lactulose (CHRONULAC) 10 gram/15 mL solution Take 45 mL by mouth three (3) times daily. Adjust dosage so that you have 2-3 bowel movements per day.     raNITIdine (ZANTAC) 150 mg tablet Take 150 mg by mouth two (2) times a day. Before Breakfast and in the afternoon (also takes Protonix at same time)    aspirin 81 mg chewable tablet Take 1 Tab by mouth daily.  eplerenone (INSPRA) 25 mg tablet Take 1 Tab by mouth daily.  atorvastatin (LIPITOR) 80 mg tablet Take 1 Tab by mouth Daily (before dinner).  pantoprazole (PROTONIX) 40 mg tablet Take 1 Tab by mouth Before breakfast and dinner. Before Breakfast and in the afternoon (also takes Zantac at same time)    pregabalin (LYRICA) 50 mg capsule Take 1 Cap by mouth three (3) times daily. Max Daily Amount: 150 mg.    tamsulosin (FLOMAX) 0.4 mg capsule Take 1 Cap by mouth Before breakfast and dinner. Before Breakfast and in the afternoon    glimepiride (AMARYL) 4 mg tablet      No current facility-administered medications for this visit. SYSTEM REVIEW NOT RELATED TO LIVER DISEASE OR REVIEWED ABOVE:  Constitution systems: Negative for fever, chills, weight gain, weight loss. Eyes: Negative for visual changes. ENT: Negative for sore throat, painful swallowing. Respiratory: Negative for cough, hemoptysis, SOB. Cardiology: Negative for chest pain, palpitations. Recent NSTEMI 2018. GI:  Negative for constipation or diarrhea. : Negative for urinary frequency, dysuria, hematuria, nocturia. Skin: Negative for rash. Hematology: Negative for easy bruising, blood clots. Musculo-skeletal: Negative for back pain, muscle pain, weakness. Neurologic: Negative for headaches, dizziness, vertigo, memory problems not related to HE. Psychology: Negative for anxiety, depression. FAMILY HISTORY:  The father  of heart disease. The mother has the following chronic diseases: dementia. There is no family history of liver disease. SOCIAL HISTORY:  The patient is . The patient has 2 children. The patient stopped using tobacco products in 2016.     The patient has never consumed significant amounts of alcohol. He denies current alcohol use. The patient used to work [employer]. Medical senior care in [address] [place] [address]. PHYSICAL EXAMINATION:  /74 (BP 1 Location: Left arm, BP Patient Position: Sitting)  Pulse 80  Temp 96 °F (35.6 °C) (Oral)   Wt 210 lb (95.3 kg)  SpO2 100%  BMI 31.01 kg/m2  General: No acute distress. Somnolent. Eyes: Sclera anicteric. ENT: No oral lesions. Thyroid normal.  Nodes: No adenopathy. Skin: No spider angiomata. No jaundice. No palmar erythema. Small erosion in upper shoulder overlying scar. No surrounding erythema. Respiratory: Lungs clear to auscultation. Cardiovascular: Regular heart rate. No murmurs. No JVD. Abdomen: Soft non-tender. Liver size normal to percussion/palpation. Spleen not palpable. No obvious ascites. Extremities: No edema. No muscle wasting. No gross arthritic changes. Neurologic: Alert and oriented. Cranial nerves grossly intact. No asterixis.     LABORATORY STUDIES:  [facility] of [address] [place] [address] & Units 1/10/2018 7/31/2017   WBC 4.1 - 11.1 K/uL 7.2 9.3   ANC 1.8 - 8.0 K/UL 4.8 7.8   HGB 12.1 - 17.0 g/dL 12.1 12.7    - 400 K/uL 139 (L) 247   INR 0.9 - 1.1   1.1    AST 15 - 37 U/L 10 (L) 16   ALT 12 - 78 U/L 13 29   Alk Phos 45 - 117 U/L 94 78   Bili, Total 0.2 - 1.0 MG/DL 0.4 0.3   Bili, Direct 0.00 - 0.40 mg/dL     Albumin 3.5 - 5.0 g/dL 2.8 (L) 2.9 (L)   BUN 6 - 20 MG/DL 13 25 (H)   Creat 0.70 - 1.30 MG/DL 1.33 (H) 1.42 (H)   Na 136 - 145 mmol/L 138 136   K 3.5 - 5.1 mmol/L 3.1 (L) 4.2   Cl 97 - 108 mmol/L 104 105   CO2 21 - 32 mmol/L 25 24   Glucose 65 - 100 mg/dL 219 (H) 139 (H)   Magnesium 1.6 - 2.4 mg/dL 1.6    Ammonia <32 UMOL/L 47 (H) 38 (H)     [facility] Latest Ref Rng & Units 7/22/2017   WBC 4.1 - 11.1 K/uL 13.3 (H)   ANC 1.8 - 8.0 K/UL    HGB 12.1 - 17.0 g/dL 13.1    - 400 K/uL 132 (L)   INR 0.9 - 1.1      AST 15 - 37 U/L 10 (L)   ALT 12 - 78 U/L 17   Alk Phos 45 - 117 U/L 101   Bili, Total 0.2 - 1.0 MG/DL 0.8   Bili, Direct 0.00 - 0.40 mg/dL    Albumin 3.5 - 5.0 g/dL 3.1 (L)   BUN 6 - 20 MG/DL 18   Creat 0.70 - 1.30 MG/DL 1.43 (H)   Na 136 - 145 mmol/L 139   K 3.5 - 5.1 mmol/L 4.0   Cl 97 - 108 mmol/L 108   CO2 21 - 32 mmol/L 24   Glucose 65 - 100 mg/dL 89   Magnesium 1.6 - 2.4 mg/dL    Ammonia <32 UMOL/L      SEROLOGIES:  Serologies Latest Ref Rng & Units 11/17/2016   Hep C Ab NR   NONREACTIVE     LIVER HISTOLOGY:  Not available or performed    ENDOSCOPIC PROCEDURES:  5/2017. EGD by MLS. No esophageal varices. No portal gastropathy. Normal.    RADIOLOGY:  7/2017. CT scan abdomen with IV contrast.  Changes consistent with cirrhosis. No liver mass lesions. No dilated bile ducts. No ascites. OTHER TESTING:  Not available or performed    ASSESSMENT AND PLAN:  Cirrhosis of unclear etiology, likely BARGER given multiple features of metabolic syndrome. Factors favoring cirrhosis are the low platelet count and HE. The patient has depressed but stable liver function. The CTP is 5. Child class A. The MELD score is 16. The patient is not a candidate for liver transplantation because of significant cardiac disease. Hepatic encephalopathy is not well controlled on current doses of lactulose and Xifaxan 550 mg BID. He has done better in the past several months in general, but since his recent discharge from the hospital, he has had worse confusion and some odd/concerning behaviors as noted by his daughter. Patient presents to the office today with a large flask of alcohol in his breast pocket that he states that he has not been drinking from and has no intention of drinking from. He expresses concern about his inability to drive and that he would like to have full time access to his firearms. I have reiterated to the patient that with impairments in reaction and mentation due to ammonia/encephalopathy, that it is not safe for him to engage in these actions.   The patient was instructed not to operate a motor vehicle because of HE which poses an increased risk for MVA. We have reviewed at length means for his caretaker (daughter) to assess his ammonia impairment by assessing asterixis and making adjustments to lactulose dosing. The patient is on several psychiatric medications with similar modes of action. This may be contributing to lethargy and confusion. He is to talk to his psychiatrist to se if he can come off some of these medications which may now have a prolonged half-life in the setting of cirrhosis. EGD to assess for esopahgeal varices and need for banding should be repeated in 2 years, 5/2019. The need to perform an assessment of liver fibrosis was discussed with the patient. The Fibroscan can assess liver fibrosis and determine if a patient has advanced fibrosis or cirrhosis without the need for liver biopsy. The Fibroscan is currently available at liver [place]. This will be performed at the next office visit. The patient was directed to continue all current medications at the current dosages. There are no contraindications for the patient to take any medications that are necessary for treatment of other medical issues. The patient was counseled regarding alcohol consumption. Thrombocytopenia secondary to cirrhosis. There is no evidence of overt bleeding. There is no indication for platelet transfusions or pharmacologic treatment to increase the platelet count. The need for vaccination against viral hepatitis A and B will be assessed with serologic and instituted as appropriate. [facility]. screening will be performed. Ultrasound will be scheduled in the near future. All of the above issues were discussed with the patient. All questions were answered. The patient expressed a clear understanding of the above. [facility] 87 in 3 months with US of the liver in the meantime.     Vlad Dougherty, NING  [address], [address] [address]  [address].  [phone]

## 2018-01-24 NOTE — MR AVS SNAPSHOT
[address] [place] [address] 
[phone] Patient: Ashton Dancer. MRN: [id number] XPY:[id number] Visit Information Date & Time Provider Department Dept. Phone Encounter #  
 1/24/2018  2:30 PM Eugenio Hairston, [address] of [place] [address] [id number] Your Appointments 5/29/2018  2:40 PM  
Follow Up with [address] [facility], [facility] at [facility] Appt Note: 6 month f/u 11/27/17 tj  
 [address] [place] [address]  
[phone]  
  
   
 [address] [facility]  [address] Upcoming Health Maintenance Date Due  
 LIPID PANEL Q1 1955 FOOT EXAM Q1 1/2/1964 MICROALBUMIN Q1 1/2/1964 EYE EXAM RETINAL OR DILATED Q1 1/2/1964 Pneumococcal 19-64 Medium Risk (1 of 1 - PPSV23) 1/2/1973 DTaP/Tdap/Td series (1 - Tdap) 1/2/1975 FOBT Q 1 YEAR AGE 50-75 1/2/2004 ZOSTER VACCINE AGE 60> 11/2/2013 Influenza Age 5 to Adult 8/1/2017 HEMOGLOBIN A1C Q6M 7/11/2018 Allergies as of 1/24/2018  Review Complete On: 1/24/2018 By: Julius Charles Severity Noted Reaction Type Reactions Latex, Natural Rubber  11/27/2017    Hives Codeine High 09/13/2010    Anaphylaxis Tolerated fentanyl previously Demerol [Meperidine] High 09/13/2010    Anaphylaxis Tolerated fentanyl previously Mushroom High 11/27/2017    Anaphylaxis Metformin  04/14/2017    Diarrhea And dehydration Wellbutrin [Bupropion Hcl]  09/13/2010    Anaphylaxis Current Immunizations  Never Reviewed No immunizations on file. Not reviewed this visit You Were Diagnosed With   
  
 Codes Comments Cirrhosis of liver without ascites, unspecified hepatic cirrhosis type ([facility]    -  Primary ICD-10-CM: K74.60 ICD-9-CM: 571.5 Vitals BP Pulse Temp Weight(growth percentile) SpO2 BMI 128/74 (BP 1 Location: Left arm, BP Patient Position: Sitting) 80 96 °F (35.6 °C) (Oral) 210 lb (95.3 kg) 100% 31.01 kg/m2 Smoking Status Former Smoker BMI and BSA Data Body Mass Index Body Surface Area 31.01 kg/m 2 2.15 m 2 Preferred Pharmacy Pharmacy Name Phone [facility] [phone] [phone] Your Updated Medication List  
  
   
This list is accurate as of: 1/24/18  3:33 PM.  Always use your most recent med list.  
  
  
  
  
 ALPRAZolam 1 mg tablet Commonly known as:  Joline Mcburney Take 1 mg by mouth every six (6) hours as needed for Anxiety. aspirin 81 mg chewable tablet Take 1 Tab by mouth daily. atorvastatin 80 mg tablet Commonly known as:  LIPITOR Take 1 Tab by mouth Daily (before dinner). CARAFATE 100 mg/mL suspension Generic drug:  sucralfate Take  by mouth four (4) times daily. carvedilol 6.25 mg tablet Commonly known as:  [place] Members Take 1 Tab by mouth daily (with dinner). clonazePAM 1 mg tablet Commonly known as:  Cheryl Dunaway Take 0.5 mg by mouth nightly. clopidogrel 75 mg Tab Commonly known as:  PLAVIX Take 1 Tab by mouth daily. divalproex  mg tablet Commonly known as:  DEPAKOTE Take 1 Tab by mouth two (2) times a day. EFFEXOR XR 75 mg capsule Generic drug:  venlafaxine-SR Take 225 mg by mouth daily. eplerenone 25 mg tablet Commonly known as:  Pamla Godwin Take 1 Tab by mouth daily. FLUoxetine 20 mg capsule Commonly known as:  PROzac Take 20 mg by mouth daily. glimepiride 4 mg tablet Commonly known as:  AMARYL  
  
 isosorbide mononitrate ER 30 mg tablet Commonly known as:  IMDUR Take 1 Tab by mouth daily. KEPPRA 750 mg tablet Generic drug:  levETIRAcetam  
Take 750 mg by mouth two (2) times a day. Replaces Vimpat per patient's daughter  
  
 lactulose 10 gram/15 mL solution Commonly known as:  Gabriela Pink Take 45 mL by mouth three (3) times daily. Adjust dosage so that you have 2-3 bowel movements per day. lisinopril 10 mg tablet Commonly known as:  Rheta Chacho Take 1 Tab by mouth daily. nitroglycerin 0.4 mg SL tablet Commonly known as:  NITROSTAT  
0.4 mg by SubLINGual route every five (5) minutes as needed for Chest Pain. May repeat every 5 minutes for a maximum of 3 doses if chest pain not relieved call MD  
  
 pantoprazole 40 mg tablet Commonly known as:  PROTONIX Take 1 Tab by mouth Before breakfast and dinner. Before Breakfast and in the afternoon (also takes Zantac at same time) pregabalin 50 mg capsule Commonly known as:  Jodeane Barters Take 1 Cap by mouth three (3) times daily. Max Daily Amount: 150 mg. QUEtiapine 200 mg tablet Commonly known as:  SEROquel Take 50 mg by mouth nightly. rifAXIMin 550 mg tablet Commonly known as:  Anthony Pointer Take 1 Tab by mouth two (2) times a day. tamsulosin 0.4 mg capsule Commonly known as:  FLOMAX Take 1 Cap by mouth Before breakfast and dinner. Before Breakfast and in the afternoon ZANTAC 150 mg tablet Generic drug:  raNITIdine Take 150 mg by mouth two (2) times a day. Before Breakfast and in the afternoon (also takes Protonix at same time) To-Do List   
 02/01/2018 Imaging:  US ABD COMP   
  
 02/12/2018 3:00 PM  
  Appointment with Kori Segovia RN at [address] [place] [address] ([phone]) [facility]! Dear Juliet Alejandro: Thank you for requesting a [employer] account. Our records indicate that you have previously registered for a [employer] account but its currently inactive. Please call our [employer] support line at [phone]. Additional Information If you have questions, please visit the Frequently Asked Questions section of the [employer] website at [email or web address] [employer]. [employer]/. Remember, [employer] is NOT to be used for urgent needs. For medical emergencies, dial 911. Now available from your iPhone and Android! Please provide this summary of care documentation to your next provider. Your primary care clinician is listed as Maile Escobar. If you have any questions after today's visit, please call [phone].

## 2018-01-24 NOTE — PROGRESS NOTES
1. Have you been to the ER, urgent care clinic since your last visit? Hospitalized since your last visit? Yes Where: [facility] for a mild heart attack     2. Have you seen or consulted any other health care providers outside of the [address] [place] [address] since your last visit? Include any pap smears or colon screening.  No   Chief Complaint   Patient presents with    Follow-up     Visit Vitals    /74 (BP 1 Location: Left arm, BP Patient Position: Sitting)    Pulse 80    Temp 96 °F (35.6 °C) (Oral)    Wt 210 lb (95.3 kg)    SpO2 100%    BMI 31.01 kg/m2     PHQ over the last two weeks 1/24/2018   PHQ Not Done -   Little interest or pleasure in doing things Not at all   Feeling down, depressed or hopeless Not at all   Total Score PHQ 2 0     Learning Assessment 8/17/2017   PRIMARY LEARNER Patient   [facility] NAME -   PRIMARY LANGUAGE ENGLISH   LEARNER PREFERENCE PRIMARY LISTENING   ANSWERED BY patient   RELATIONSHIP SELF

## 2018-01-24 NOTE — PROGRESS NOTES
Asked to review psychiatric medications by Bebe Malcolm RN, NN. Patients current medication list includes multiple psychiatric medications for management of bipolar and anxiety which are prescribed by an outside psychiatrist.  These include quetiapine, venlafaxine, fluoxetine, prn alprazolam, and prn clonazepam.  It is unclear how long patient has been taking these medications as there are no notes from his psychiatrist in the chart. However, patient did have these medications listed with the medical record as far back as 2016. Patient has a diagnosis of cirrhosis and in the past year, patient has presented to the ED for altered mental status attributed to hepatic encephalopathy. From chart review is hard to determine if patient is experiencing adverse effects from this combination of psychiatric medications. However, these medications are extensively metabolized by the liver and the doses of these medications may need to be reduced as his hepatic dysfunction progresses. I would recommend that not only the combination of these medications, but also their doses be reassessed by his psychiatrist to decrease the risk of adverse effects, which could include neurologic effects. Patient also has a diagnosis of seizures for which divalproex was recently started due to patient cost issues. When used in patients with hepatic dysfunction, there is reduced elimination of this drug and increased free valproate concentrations. It has also been associated with hepatoxicity and hyperammonemia. I have sent notification to both neurology and hepatology providers to assess risk vs benefit for continued divalproex therapy.     Марина Heath, PharmD, Noni Diez

## 2018-01-24 NOTE — PROGRESS NOTES
NN Follow Up Note:    Called daughter, Nicloe. She was in the process of looking for her dad's wallet prior to going to the [facility] for his appt. Will call back again later this week.

## 2018-01-25 ENCOUNTER — TELEPHONE (OUTPATIENT)
Dept: NEUROLOGY | Age: 63
End: 2018-01-25

## 2018-01-25 NOTE — TELEPHONE ENCOUNTER
----- Message from MARIAM Myers sent at 1/24/2018  2:32 PM EST -----  Regarding: RE: Medication concern  Thank you for reviewing Dr. Laurie Gamboa. If the patient is having problems affording Keppra (unsure if he was taking brand or generic), [employer] will unfortunately be cost prohibitive as well. If he was taking generic keppra there are coupons available on www.Volley that could help (some around $20 for 1 month supply). If he needs to stay on Depakote to control his seizures then I would recommend close monitoring and consider monitoring free valproic acid levels. Starlyn Schirmer          ----- Message -----     From: Mauri Álvarez DO     Sent: 1/24/2018   2:03 PM       To: Ema Joyce MD, #  Subject: RE: Medication concern                           Hi Dr. Lorenza Beyer,    Thanks for the message. I did struggle with the decision of adding Depakote. I prefer he stay on Keppra, but cost became an issue. Unfortunately, were running out of safe options. If the Depakote is a concern, I can try something else like Briviact, but I am not sure about the cost in this case. SKE  ----- Message -----     From: Evelina Martinez PHARMD     Sent: 1/24/2018  10:19 AM       To: Mauri Álvarez DO  Subject: Medication concern                               Dr. Kenna Meckel,    I was asked to review this patient's chart based on his multiple ED for altered mental status attributed to hepatic encephalopathy. In my review, patient is currently followed by Dr. Yves Parrish for cirrhosis and you for his seizures. Based on cost issues, his lacosamide was changed to divalproex this month. When divalproex is used in patients with hepatic dysfunction, there is reduced elimination of this drug and increased free valproate concentrations. It has also been associated with hepatoxicity and hyperammonemia. Please consider if alternative therapy is appropriate for this patient.      Patient has a hepatology appointment this afternoon and I have sent them notification as well in case they want to discuss this with you. Thank you for your consideration. Deven Hunt. [facility]

## 2018-01-25 NOTE — TELEPHONE ENCOUNTER
Please contact the patient or his family that manages his medications and asked them if they have checked on the good Rx website about looking for a better price of Keppra. I would prefer he remain on Keppra and Depakote but if Keppra is completely cost prohibitive will have to stay on Depakote.

## 2018-01-26 ENCOUNTER — PATIENT OUTREACH (OUTPATIENT)
Dept: INTERNAL MEDICINE CLINIC | Age: 63
End: 2018-01-26

## 2018-01-26 NOTE — PROGRESS NOTES
NN Follow Up Note:    Heron, daughter. Verified her dad with 2 identifiers. Pt did f/u with [facility] on 1/24/18. Dad to have US Abd Q 6 months. One is scheduled for 2/1/18. Pt did f/u with nawaf Canela on 1/16/18. He was put back on Lisinopril. No allergy identified with -pril. Pt hasn't f/u with Dr Elba Arambula. Daughter hasn't had a chance to schedule a f/u appt. Pt needs to get a CPE with Dr Curtis Green for upcoming cataract surgery. Goals      Appropriate Diet            1/26/18 - Pt with low albumin 2.8 on 1/10/18. She will need to restrict fluid and incorporate more protein into pt's diet. Ashley Pink RN, BSN, [facility]  Ambulatory Nurse Navigator  [facility]    1/15/18 - Pt will eat low Na: CHF and needs to monitor Na intake. Daughter states she doesn't use any table salt or add salt to any foods. She has gotten pt to put pepper on his food. Also uses garlic. Low carb: Diabetes. Pt likes to eat sweets and asks for them often. She no longer purchases sweets, baked goods. Low Protein diet b/c pts with liver disease can't process proteins causing pt to get to acidic. Don't see any serum protein levels. Almita Kilpatrick has a food guideline that she refers to when cooking. Ashley Pink RN, BSN, [facility]  Ambulatory Nurse Navigator  [facility]         Decrease # of Medications            1/26/18 - Daughter hasn't had a chance to schedule an appt with Dr Elba Arambula yet. Marvin Scruggs did review his meds and consulted with both Liver and Neuro providers. Discussed affordability of meds. The most she pays for one Rx is about $30/month. She was familiar with Goodrx. com  Ashley Pink RN, BSN, [facility]  Ambulatory Nurse Navigator  [facility]    1/16/18: - Pt will follow up with Psych within 1 month. Review with provider the need to be on: Alprazolam, Clonazepam, Seroquel, Effexor. These medications have similar modes of action. This may be contributing to lethargy and confusion.   He is to talk to his psychiatrist to se if he can come off some of these medications which may now have a prolonged half-life in the setting of cirrhosis. NN will have Jo Mitchell, PharmD look into medications as well. Joslyn Hannah RN, BSN, [facility]  Ambulatory Nurse Navigator  [facility]         Risk for Acute Confusion            1/26/18 - Daughter states she knows how to assess for liver flap and uses this to adjust lactulose dosage. Joslyn Hannah RN, BSN, [facility]  Ambulatory Nurse Navigator  [facility]    1/15/18: Pt's mentation will remain stable x 1 month. Family will monitor for inc lethargy, inc confusion, inc irritability. Daughter will reorient dad periodically PRN. Daughter will call provider with any sxs. Joslyn Hannah RN, BSN, [facility]  Ambulatory Nurse Navigator  [facility]         Risk of Falls            1/16/18: Pt will not fall x 1 month. Pt will neuropathy in hands and feet. Will use DME when ambulating. Pt will ask for assistance when needed.   Joslyn Hannah RN, BSN, [facility]  Ambulatory Nurse Navigator  [facility]

## 2018-01-29 NOTE — TELEPHONE ENCOUNTER
I spoke with Gregoria Mcdonald. There is not an issue with the copay for the [address]. They can afford it and will continue it and the Depakote.

## 2018-02-06 ENCOUNTER — TELEPHONE (OUTPATIENT)
Dept: CARDIAC REHAB | Age: 63
End: 2018-02-06

## 2018-02-08 NOTE — TELEPHONE ENCOUNTER
2/8/2018 Cardiac Rehab:2/6/2018 Called Mr. Ashton Dancer. to remind of intake appointment on Thursday, February 8, 2018. Confirmed appointment with patient's daughter. Provided patient with contact information for [facility]. Also, reminded patient to bring a list of current medications, a personal schedule, and to wear comfortable clothes and shoes.  Reema Archibald

## 2018-02-12 ENCOUNTER — APPOINTMENT (OUTPATIENT)
Dept: CARDIAC REHAB | Age: 63
End: 2018-02-12

## 2018-02-12 ENCOUNTER — PATIENT OUTREACH (OUTPATIENT)
Dept: INTERNAL MEDICINE CLINIC | Age: 63
End: 2018-02-12

## 2018-02-12 NOTE — PROGRESS NOTES
Patient has graduated from the Transitions of Care Coordination  program on 2/12/18. Pt assigned to this NN by the [address] [place] [address]. NN followed pt for 30 days as required. Pt has remained out of the hospital >30 days.

## 2018-03-23 ENCOUNTER — OFFICE VISIT (OUTPATIENT)
Dept: HEMATOLOGY | Age: 63
End: 2018-03-23

## 2018-03-23 VITALS
OXYGEN SATURATION: 100 % | WEIGHT: 210 LBS | HEART RATE: 81 BPM | SYSTOLIC BLOOD PRESSURE: 171 MMHG | TEMPERATURE: 98 F | BODY MASS INDEX: 31.01 KG/M2 | DIASTOLIC BLOOD PRESSURE: 100 MMHG

## 2018-03-23 DIAGNOSIS — K74.60 CIRRHOSIS OF LIVER WITHOUT ASCITES, UNSPECIFIED HEPATIC CIRRHOSIS TYPE (HCC): Primary | ICD-10-CM

## 2018-03-23 DIAGNOSIS — K76.82 HEPATIC ENCEPHALOPATHY: ICD-10-CM

## 2018-03-23 NOTE — PROGRESS NOTES
1. Have you been to the ER, urgent care clinic since your last visit? Hospitalized since your last visit? No    2. Have you seen or consulted any other health care providers outside of the [address] [place] [address] since your last visit? Include any pap smears or colon screening.  No   Chief Complaint   Patient presents with    Follow-up     Visit Vitals    BP (!) 171/100 (BP 1 Location: Left arm, BP Patient Position: Sitting)    Pulse 81    Temp 98 °F (36.7 °C) (Tympanic)    Wt 210 lb (95.3 kg)    SpO2 100%    BMI 31.01 kg/m2     PHQ over the last two weeks 3/23/2018   PHQ Not Done -   Little interest or pleasure in doing things Not at all   Feeling down, depressed or hopeless Not at all   Total Score PHQ 2 0     Learning Assessment 3/23/2018   PRIMARY LEARNER Patient   BARRIERS PRIMARY LEARNER NONE   CO-LEARNER CAREGIVER No   CO-LEARNER NAME -   PRIMARY LANGUAGE ENGLISH   LEARNER PREFERENCE PRIMARY LISTENING   ANSWERED BY patient    RELATIONSHIP SELF

## 2018-03-23 NOTE — MR AVS SNAPSHOT
[address] [facility] [place] [phone] [facility] 
[phone] Patient: Alexis Ceron. MRN: [id number] YGD:[birth year] Visit Information Date & Time Provider Department Dept. Phone Encounter #  
 3/23/2018  2:30 PM Gabrielle Quigley [address] [place] [address] [id number] Follow-up Instructions Return in about 3 months (around 6/23/2018) for Carmen Rincon. Your Appointments 5/29/2018  2:40 PM  
Follow Up with [address] [place], [place] [address] AT [facility]) Appt Note: 6 month f/u 11/27/17 tj  
 [address] [place] [id number]  
[address]  [address]  
  
    
 6/22/2018  2:00 PM  
Follow Up with [facility] [address] ([facility]) Appt Note: Follow up [address] [place] [address] At [place] [phone] [facility] [address]  
[address] [facility] [address] Upcoming Health Maintenance Date Due  
 LIPID PANEL Q1 1955 FOOT EXAM Q1 1/2/1964 MICROALBUMIN Q1 1/2/1964 EYE EXAM RETINAL OR DILATED Q1 1/2/1964 Pneumococcal 19-64 Medium Risk (1 of 1 - PPSV23) 1/2/1973 DTaP/Tdap/Td series (1 - Tdap) 1/2/1975 FOBT Q 1 YEAR AGE 50-75 1/2/2004 ZOSTER VACCINE AGE 60> 11/2/2013 Influenza Age 5 to Adult 8/1/2017 MEDICARE YEARLY EXAM 3/14/2018 HEMOGLOBIN A1C Q6M 7/11/2018 Allergies as of 3/23/2018  Review Complete On: 3/23/2018 By: Caitlyn Fuentes Severity Noted Reaction Type Reactions Latex, Natural Rubber  11/27/2017    Hives Codeine High 09/13/2010    Anaphylaxis Tolerated fentanyl previously Demerol [Meperidine] High 09/13/2010    Anaphylaxis Tolerated fentanyl previously Mushroom High 11/27/2017    Anaphylaxis Metformin  04/14/2017    Diarrhea And dehydration Wellbutrin [Bupropion Hcl]  09/13/2010    Anaphylaxis Current Immunizations  Never Reviewed No immunizations on file. Not reviewed this visit You Were Diagnosed With   
  
 Codes Comments Cirrhosis of liver without ascites, unspecified hepatic cirrhosis type ([facility].)    -  Primary ICD-10-CM: K74.60 ICD-9-CM: 571.5 Hepatic encephalopathy ([facility].)     ICD-10-CM: K72.90 ICD-9-CM: 572.2 Vitals BP Pulse Temp Weight(growth percentile) SpO2 BMI  
 (!) 171/100 (BP 1 Location: Left arm, BP Patient Position: Sitting) 81 98 °F (36.7 °C) (Tympanic) 210 lb (95.3 kg) 100% 31.01 kg/m2 Smoking Status Former Smoker BMI and BSA Data Body Mass Index Body Surface Area 31.01 kg/m 2 2.15 m 2 Preferred Pharmacy Pharmacy Name Phone [phone] [phone] Your Updated Medication List  
  
   
This list is accurate as of 3/23/18  3:02 PM.  Always use your most recent med list.  
  
  
  
  
 ALPRAZolam 1 mg tablet Commonly known as:  Elle Hams Take 1 mg by mouth every six (6) hours as needed for Anxiety. aspirin 81 mg chewable tablet Take 1 Tab by mouth daily. atorvastatin 80 mg tablet Commonly known as:  LIPITOR Take 1 Tab by mouth Daily (before dinner). CARAFATE 100 mg/mL suspension Generic drug:  sucralfate Take  by mouth four (4) times daily. carvedilol 6.25 mg tablet Commonly known as:  Dukes Dunker Take 1 Tab by mouth daily (with dinner). clonazePAM 1 mg tablet Commonly known as:  Lina Courser Take 0.5 mg by mouth nightly. clopidogrel 75 mg Tab Commonly known as:  PLAVIX Take 1 Tab by mouth daily. divalproex  mg tablet Commonly known as:  DEPAKOTE Take 1 Tab by mouth two (2) times a day. EFFEXOR XR 75 mg capsule Generic drug:  venlafaxine-SR Take 75 mg by mouth daily.  Take 2 tablets in the morning and 1 tablet in the afternoon  
  
 eplerenone 25 mg tablet Commonly known as:  Enriqueta Gove Take 1 Tab by mouth daily. FLUoxetine 20 mg capsule Commonly known as:  PROzac Take 20 mg by mouth daily. glimepiride 4 mg tablet Commonly known as:  AMARYL  
  
 isosorbide mononitrate ER 30 mg tablet Commonly known as:  IMDUR Take 1 Tab by mouth daily. KEPPRA 750 mg tablet Generic drug:  levETIRAcetam  
Take 750 mg by mouth two (2) times a day. Replaces Vimpat per patient's daughter  
  
 lactulose 10 gram/15 mL solution Commonly known as:  Druscilla Maker Take 45 mL by mouth three (3) times daily. Adjust dosage so that you have 2-3 bowel movements per day. lisinopril 10 mg tablet Commonly known as:  Rennis Douse Take 1 Tab by mouth daily. nitroglycerin 0.4 mg SL tablet Commonly known as:  NITROSTAT  
0.4 mg by SubLINGual route every five (5) minutes as needed for Chest Pain. May repeat every 5 minutes for a maximum of 3 doses if chest pain not relieved call MD  
  
 pantoprazole 40 mg tablet Commonly known as:  PROTONIX Take 1 Tab by mouth Before breakfast and dinner. Before Breakfast and in the afternoon (also takes Zantac at same time) pregabalin 50 mg capsule Commonly known as:  Yisel Darke Take 1 Cap by mouth three (3) times daily. Max Daily Amount: 150 mg. QUEtiapine 200 mg tablet Commonly known as:  SEROquel Take 100 mg by mouth nightly. rifAXIMin 550 mg tablet Commonly known as:  Garcia Lemmings Take 1 Tab by mouth two (2) times a day. tamsulosin 0.4 mg capsule Commonly known as:  FLOMAX Take 1 Cap by mouth Before breakfast and dinner. Before Breakfast and in the afternoon ZANTAC 150 mg tablet Generic drug:  raNITIdine Take 150 mg by mouth two (2) times a day. Before Breakfast and in the afternoon (also takes Protonix at same time) We Performed the Following AFP WITH AFP-L3% [IEQ10660 Custom] AMMONIA Q4719602 CPT(R)] CBC W/O DIFF [10738 CPT(R)] HEPATIC FUNCTION PANEL (6) [[id number] Custom] METABOLIC PANEL, BASIC [16564 CPT(R)] PROTHROMBIN TIME + INR [38086 CPT(R)] Follow-up Instructions Return in about 3 months (around 6/23/2018) for [place] To-Do List   
 04/04/2018 Imaging:  US ABD COMP Introducing [facility]! Adeola Yang introduces [facility] patient portal. Now you can access parts of your medical record, email your doctor's office, and request medication refills online. 1. In your internet browser, go to [email or web address] 2. Click on the First Time User? Click Here link in the Sign In box. You will see the New Member Sign Up page. 3. Enter your [facility] Access Code exactly as it appears below. You will not need to use this code after youve completed the sign-up process. If you do not sign up before the expiration date, you must request a new code. · [facility] Access Code: [email or web address] Expires: 6/8/2018  6:31 AM 
 
4. Enter the last four digits of your Social Security Number (xxxx) and Date of Birth (mm/dd/yyyy) as indicated and click Submit. You will be taken to the next sign-up page. 5. Create a [facility] ID. This will be your [facility] login ID and cannot be changed, so think of one that is secure and easy to remember. 6. Create a [facility] password. You can change your password at any time. 7. Enter your Password Reset Question and Answer. This can be used at a later time if you forget your password. 8. Enter your e-mail address. You will receive e-mail notification when new information is available in [address] 9. Click Sign Up. You can now view and download portions of your medical record. 10. Click the Download Summary menu link to download a portable copy of your medical information. If you have questions, please visit the Frequently Asked Questions section of the [employer] website.  Remember, [facility] is NOT to be used for urgent needs. For medical emergencies, dial 911. Now available from your iPhone and Android! Please provide this summary of care documentation to your next provider. Your primary care clinician is listed as Pablo Reynaga. If you have any questions after today's visit, please call [phone].
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
## 2018-03-24 LAB
ALBUMIN SERPL-MCNC: 3.6 G/DL (ref 3.6–4.8)
ALP SERPL-CCNC: 76 IU/L (ref 39–117)
ALT SERPL-CCNC: 12 IU/L (ref 0–44)
AMMONIA PLAS-MCNC: 51 UG/DL (ref 27–102)
AST SERPL-CCNC: 12 IU/L (ref 0–40)
BILIRUB DIRECT SERPL-MCNC: 0.14 MG/DL (ref 0–0.4)
BILIRUB SERPL-MCNC: 0.5 MG/DL (ref 0–1.2)
BUN SERPL-MCNC: 19 MG/DL (ref 8–27)
BUN/CREAT SERPL: 13 (ref 10–24)
CALCIUM SERPL-MCNC: 8.4 MG/DL (ref 8.6–10.2)
CHLORIDE SERPL-SCNC: 103 MMOL/L (ref 96–106)
CO2 SERPL-SCNC: 26 MMOL/L (ref 18–29)
CREAT SERPL-MCNC: 1.42 MG/DL (ref 0.76–1.27)
ERYTHROCYTE [DISTWIDTH] IN BLOOD BY AUTOMATED COUNT: 17.8 % (ref 12.3–15.4)
GFR SERPLBLD CREATININE-BSD FMLA CKD-EPI: 52 ML/MIN/1.73
GFR SERPLBLD CREATININE-BSD FMLA CKD-EPI: 60 ML/MIN/1.73
GLUCOSE SERPL-MCNC: 123 MG/DL (ref 65–99)
HCT VFR BLD AUTO: 40.5 % (ref 37.5–51)
HGB BLD-MCNC: 12.9 G/DL (ref 13–17.7)
INR PPP: 1 (ref 0.8–1.2)
MCH RBC QN AUTO: 24 PG (ref 26.6–33)
MCHC RBC AUTO-ENTMCNC: 31.9 G/DL (ref 31.5–35.7)
MCV RBC AUTO: 75 FL (ref 79–97)
PLATELET # BLD AUTO: 178 X10E3/UL (ref 150–379)
POTASSIUM SERPL-SCNC: 4 MMOL/L (ref 3.5–5.2)
PROTHROMBIN TIME: 11.1 SEC (ref 9.1–12)
RBC # BLD AUTO: 5.38 X10E6/UL (ref 4.14–5.8)
SODIUM SERPL-SCNC: 142 MMOL/L (ref 134–144)
WBC # BLD AUTO: 6.9 X10E3/UL (ref 3.4–10.8)

## 2018-03-26 LAB
AFP L3 MFR SERPL: NORMAL % (ref 0–9.9)
AFP SERPL-MCNC: 0.6 NG/ML (ref 0–8)

## 2018-03-30 ENCOUNTER — TELEPHONE (OUTPATIENT)
Dept: HEMATOLOGY | Age: 63
End: 2018-03-30

## 2018-03-30 NOTE — TELEPHONE ENCOUNTER
Spoke with the patients daughter and she stated that the ultrasound for the patient has been scheduled for 04/11/2018.

## 2018-04-12 ENCOUNTER — TELEPHONE (OUTPATIENT)
Dept: HEMATOLOGY | Age: 63
End: 2018-04-12

## 2018-04-12 NOTE — TELEPHONE ENCOUNTER
pts daughter Nicole called and stated that the xifaxan is $500 and they are NOT able to afford this medication she would like a call to discuss options as soon as possible.

## 2018-04-23 ENCOUNTER — HOSPITAL ENCOUNTER (OUTPATIENT)
Dept: ULTRASOUND IMAGING | Age: 63
Discharge: HOME | End: 2018-04-23
Attending: PHYSICIAN ASSISTANT
Payer: MEDICARE

## 2018-04-23 DIAGNOSIS — K74.60 CIRRHOSIS OF LIVER WITHOUT ASCITES, UNSPECIFIED HEPATIC CIRRHOSIS TYPE (HCC): ICD-10-CM

## 2018-04-23 DIAGNOSIS — K76.82 HEPATIC ENCEPHALOPATHY: ICD-10-CM

## 2018-04-23 PROCEDURE — 76700 US EXAM ABDOM COMPLETE: CPT

## 2018-04-26 NOTE — PROGRESS NOTES
Pt notified of stable findings on ultrasound, follow-up as scheduled and will try to apply for assistance with cost of Xifaxan.
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
## 2018-05-07 RX ORDER — DIVALPROEX SODIUM 500 MG/1
TABLET, DELAYED RELEASE ORAL
Qty: 60 TAB | Refills: 3 | Status: SHIPPED | OUTPATIENT
Start: 2018-05-07 | End: 2018-09-10 | Stop reason: SDUPTHER

## 2018-05-31 ENCOUNTER — DOCUMENTATION ONLY (OUTPATIENT)
Dept: NEUROLOGY | Age: 63
End: 2018-05-31

## 2018-05-31 ENCOUNTER — OFFICE VISIT (OUTPATIENT)
Dept: NEUROLOGY | Age: 63
End: 2018-05-31

## 2018-05-31 VITALS
WEIGHT: 212 LBS | BODY MASS INDEX: 31.31 KG/M2 | RESPIRATION RATE: 18 BRPM | HEART RATE: 83 BPM | SYSTOLIC BLOOD PRESSURE: 146 MMHG | DIASTOLIC BLOOD PRESSURE: 88 MMHG | OXYGEN SATURATION: 98 %

## 2018-05-31 DIAGNOSIS — G40.209 PARTIAL SYMPTOMATIC EPILEPSY WITH COMPLEX PARTIAL SEIZURES, NOT INTRACTABLE, WITHOUT STATUS EPILEPTICUS (HCC): Primary | ICD-10-CM
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
## 2018-05-31 NOTE — PATIENT INSTRUCTIONS
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
## 2018-05-31 NOTE — MR AVS SNAPSHOT
[facility] [place] [address] [place] [address] 
[phone] Patient: Veronica Bullock. MRN: [id number] Visit Information Date & Time Provider Department Dept. Phone Encounter #  
 5/31/2018  8:40 AM DO [facility] at [address] [id number] Follow-up Instructions Return in about 6 months (around 11/30/2018). Your Appointments 6/22/2018  2:00 PM  
Follow Up with HUNTER Michele 75 ([facility]) Appt Note: Follow up 200 [facility] [place] [phone] [facility] [address]  
59 [facility] [place] [address] Upcoming Health Maintenance Date Due  
 LIPID PANEL Q1 1955 FOOT EXAM Q1 1/2/1964 MICROALBUMIN Q1 1/2/1964 EYE EXAM RETINAL OR DILATED Q1 1/2/1964 Pneumococcal 19-64 Medium Risk (1 of 1 - PPSV23) 1/2/1973 DTaP/Tdap/Td series (1 - Tdap) 1/2/1975 FOBT Q 1 YEAR AGE 50-75 1/2/2004 ZOSTER VACCINE AGE 60> 11/2/2013 MEDICARE YEARLY EXAM 3/14/2018 HEMOGLOBIN A1C Q6M 7/11/2018 Influenza Age 5 to Adult 8/1/2018 Allergies as of 5/31/2018  Review Complete On: 3/23/2018 By: HUNTER Michele Severity Noted Reaction Type Reactions Latex, Natural Rubber  11/27/2017    Hives Codeine High 09/13/2010    Anaphylaxis Tolerated fentanyl previously Demerol [Meperidine] High 09/13/2010    Anaphylaxis Tolerated fentanyl previously Mushroom High 11/27/2017    Anaphylaxis Metformin  04/14/2017    Diarrhea And dehydration Wellbutrin [Bupropion Hcl]  09/13/2010    Anaphylaxis Current Immunizations  Never Reviewed No immunizations on file. Not reviewed this visit You Were Diagnosed With   
  
 Codes Comments  Partial symptomatic epilepsy with complex partial seizures, not intractable, without status epilepticus ([facility]    -  Primary ICD-10-CM: X13.668 ICD-9-CM: 345.40 Vitals BP Pulse Resp Weight(growth percentile) SpO2 BMI  
 146/88 83 18 212 lb (96.2 kg) 98% 31.31 kg/m2 Smoking Status Former Smoker BMI and BSA Data Body Mass Index Body Surface Area  
 31.31 kg/m 2 2.16 m 2 Preferred Pharmacy Pharmacy Name Phone [place] [address], [address] [phone] Your Updated Medication List  
  
   
This list is accurate as of 5/31/18  8:56 AM.  Always use your most recent med list.  
  
  
  
  
 ALPRAZolam 1 mg tablet Commonly known as:  Shermon Collie Take 1 mg by mouth every six (6) hours as needed for Anxiety. aspirin 81 mg chewable tablet Take 1 Tab by mouth daily. atorvastatin 80 mg tablet Commonly known as:  LIPITOR Take 1 Tab by mouth Daily (before dinner). CARAFATE 100 mg/mL suspension Generic drug:  sucralfate Take  by mouth four (4) times daily. carvedilol 6.25 mg tablet Commonly known as:  Gonzalez Mood Take 1 Tab by mouth daily (with dinner). clonazePAM 1 mg tablet Commonly known as:  Don Gibes Take 0.5 mg by mouth nightly. clopidogrel 75 mg Tab Commonly known as:  PLAVIX Take 1 Tab by mouth daily. divalproex  mg tablet Commonly known as:  DEPAKOTE  
take 1 tablet by mouth twice a day EFFEXOR XR 75 mg capsule Generic drug:  venlafaxine-SR Take 75 mg by mouth daily. Take 2 tablets in the morning and 1 tablet in the afternoon  
  
 eplerenone 25 mg tablet Commonly known as:  Nery File Take 1 Tab by mouth daily. FLUoxetine 20 mg capsule Commonly known as:  PROzac Take 20 mg by mouth daily. glimepiride 4 mg tablet Commonly known as:  AMARYL  
  
 isosorbide mononitrate ER 30 mg tablet Commonly known as:  IMDUR Take 1 Tab by mouth daily. KEPPRA 750 mg tablet Generic drug:  levETIRAcetam  
Take 750 mg by mouth two (2) times a day. Replaces Vimpat per patient's daughter  
  
 lactulose 10 gram/15 mL solution Commonly known as:  Ashrd Shadow Take 45 mL by mouth three (3) times daily. Adjust dosage so that you have 2-3 bowel movements per day. lisinopril 10 mg tablet Commonly known as:  Dorathy Massed Take 1 Tab by mouth daily. nitroglycerin 0.4 mg SL tablet Commonly known as:  NITROSTAT  
0.4 mg by SubLINGual route every five (5) minutes as needed for Chest Pain. May repeat every 5 minutes for a maximum of 3 doses if chest pain not relieved call MD  
  
 pantoprazole 40 mg tablet Commonly known as:  PROTONIX Take 1 Tab by mouth Before breakfast and dinner. Before Breakfast and in the afternoon (also takes Zantac at same time) pregabalin 50 mg capsule Commonly known as:  Smith Majestic Take 1 Cap by mouth three (3) times daily. Max Daily Amount: 150 mg. QUEtiapine 200 mg tablet Commonly known as:  SEROquel Take 100 mg by mouth nightly. rifAXIMin 550 mg tablet Commonly known as:  Velta Canavan Take 1 Tab by mouth two (2) times a day. tamsulosin 0.4 mg capsule Commonly known as:  FLOMAX Take 1 Cap by mouth Before breakfast and dinner. Before Breakfast and in the afternoon ZANTAC 150 mg tablet Generic drug:  raNITIdine Take 150 mg by mouth two (2) times a day. Before Breakfast and in the afternoon (also takes Protonix at same time) We Performed the Following REFERRAL TO OCCUPATIONAL THERAPY [REF53 Custom] Comments:  
 Driving eval  
 VALPROIC ACID [53594 CPT(R)] Follow-up Instructions Return in about 6 months (around 11/30/2018). Referral Information Referral ID Referred By Referred To  
  
 [id number] [facility] Not Available Visits Status Start Date End Date 1 New Request 5/31/18 5/31/19 If your referral has a status of pending review or denied, additional information will be sent to support the outcome of this decision. Patient Instructions A Healthy Lifestyle: Care Instructions Your Care Instructions A healthy lifestyle can help you feel good, stay at a healthy weight, and have plenty of energy for both work and play. A healthy lifestyle is something you can share with your whole family. A healthy lifestyle also can lower your risk for serious health problems, such as high blood pressure, heart disease, and diabetes. You can follow a few steps listed below to improve your health and the health of your family. Follow-up care is a key part of your treatment and safety. Be sure to make and go to all appointments, and call your doctor if you are having problems. It's also a good idea to know your test results and keep a list of the medicines you take. How can you care for yourself at home? · Do not eat too much sugar, fat, or fast foods. You can still have dessert and treats now and then. The goal is moderation. · Start small to improve your eating habits. Pay attention to portion sizes, drink less juice and soda pop, and eat more fruits and vegetables. ¨ Eat a healthy amount of food. A 3-ounce serving of meat, for example, is about the size of a deck of cards. Fill the rest of your plate with vegetables and whole grains. ¨ Limit the amount of soda and sports drinks you have every day. Drink more water when you are thirsty. ¨ Eat at least 5 servings of fruits and vegetables every day. It may seem like a lot, but it is not hard to reach this goal. A serving or helping is 1 piece of fruit, 1 cup of vegetables, or 2 cups of leafy, raw vegetables. Have an apple or some carrot sticks as an afternoon snack instead of a candy bar. Try to have fruits and/or vegetables at every meal. 
· Make exercise part of your daily routine.  You may want to start with simple activities, such as walking, bicycling, or slow swimming. Try to be active 30 to 60 minutes every day. You do not need to do all 30 to 60 minutes all at once. For example, you can exercise 3 times a day for 10 or 20 minutes. Moderate exercise is safe for most people, but it is always a good idea to talk to your doctor before starting an exercise program. 
· Keep moving. Noemy Bi the lawn, work in the garden, or [employer]. Take the stairs instead of the elevator at work. · If you smoke, quit. People who smoke have an increased risk for heart attack, stroke, cancer, and other lung illnesses. Quitting is hard, but there are ways to boost your chance of quitting tobacco for good. ¨ Use nicotine gum, patches, or lozenges. ¨ Ask your doctor about stop-smoking programs and medicines. ¨ Keep trying. In addition to reducing your risk of diseases in the future, you will notice some benefits soon after you stop using tobacco. If you have shortness of breath or asthma symptoms, they will likely get better within a few weeks after you quit. · Limit how much alcohol you drink. Moderate amounts of alcohol (up to 2 drinks a day for men, 1 drink a day for women) are okay. But drinking too much can lead to liver problems, high blood pressure, and other health problems. Family health If you have a family, there are many things you can do together to improve your health. · Eat meals together as a family as often as possible. · Eat healthy foods. This includes fruits, vegetables, lean meats and dairy, and whole grains. · Include your family in your fitness plan. Most people think of activities such as jogging or tennis as the way to fitness, but there are many ways you and your family can be more active. Anything that makes you breathe hard and gets your heart pumping is exercise. Here are some tips: 
¨ Walk to do errands or to take your child to school or the bus. ¨ Go for a family bike ride after dinner instead of watching TV. Where can you learn more? Go to http://gordo-melinda.info/. Enter N396 in the search box to learn more about \"A Healthy Lifestyle: Care Instructions. \" Current as of: May 12, 2017 Content Version: 11.4 © 1580-3394 [employer]. Care instructions adapted under license by [employer] (which disclaims liability or warranty for this information). If you have questions about a medical condition or this instruction, always ask your healthcare professional. [address] any warranty or liability for your use of this information. Introducing [facility]! [facility] introduces [employer] patient portal. Now you can access parts of your medical record, email your doctor's office, and request medication refills online. 1. In your internet browser, go to [email or web address] 2. Click on the First Time User? Click Here link in the Sign In box. You will see the New Member Sign Up page. 3. Enter your [employer] Access Code exactly as it appears below. You will not need to use this code after youve completed the sign-up process. If you do not sign up before the expiration date, you must request a new code. · [employer] Access Code: [email or web address] Expires: 6/8/2018  6:31 AM 
 
4. Enter the last four digits of your Social Security Number (xxxx) and Date of Birth (mm/dd/yyyy) as indicated and click Submit. You will be taken to the next sign-up page. 5. Create a Zouxiut ID. This will be your [employer] login ID and cannot be changed, so think of one that is secure and easy to remember. 6. Create a Z[employer] password. You can change your password at any time. 7. Enter your Password Reset Question and Answer. This can be used at a later time if you forget your password. 8. Enter your e-mail address.  You will receive e-mail notification when new information is available in Vidly. 9. Click Sign Up. You can now view and download portions of your medical record. 10. Click the Download Summary menu link to download a portable copy of your medical information. If you have questions, please visit the Frequently Asked Questions section of the Vidly website. Remember, [employer] is NOT to be used for urgent needs. For medical emergencies, dial 911. Now available from your iPhone and Android! Please provide this summary of care documentation to your next provider. Your primary care clinician is listed as Fabiene Severin. If you have any questions after today's visit, please call [phone].

## 2018-06-01 LAB — VALPROATE SERPL-MCNC: 37 UG/ML (ref 50–100)

## 2018-07-02 RX ORDER — LEVETIRACETAM 750 MG/1
TABLET ORAL
Qty: 60 TAB | Refills: 6 | Status: SHIPPED | OUTPATIENT
Start: 2018-07-02 | End: 2019-01-16

## 2018-09-07 ENCOUNTER — HOSPITAL ENCOUNTER (EMERGENCY)
Age: 63
Discharge: HOME | End: 2018-09-07
Attending: EMERGENCY MEDICINE
Payer: MEDICARE

## 2018-09-07 VITALS
BODY MASS INDEX: 32.53 KG/M2 | TEMPERATURE: 98 F | HEART RATE: 88 BPM | OXYGEN SATURATION: 98 % | DIASTOLIC BLOOD PRESSURE: 79 MMHG | HEIGHT: 69 IN | WEIGHT: 219.6 LBS | RESPIRATION RATE: 16 BRPM | SYSTOLIC BLOOD PRESSURE: 136 MMHG

## 2018-09-07 DIAGNOSIS — N30.00 ACUTE CYSTITIS WITHOUT HEMATURIA: Primary | ICD-10-CM

## 2018-09-07 DIAGNOSIS — R30.0 DYSURIA: ICD-10-CM

## 2018-09-07 LAB
APPEARANCE UR: CLEAR
BACTERIA URNS QL MICRO: NEGATIVE /HPF
BILIRUB UR QL: NEGATIVE
COLOR UR: ABNORMAL
EPITH CASTS URNS QL MICRO: ABNORMAL /LPF
GLUCOSE UR STRIP.AUTO-MCNC: 100 MG/DL
HGB UR QL STRIP: ABNORMAL
HYALINE CASTS URNS QL MICRO: ABNORMAL /LPF (ref 0–5)
KETONES UR QL STRIP.AUTO: NEGATIVE MG/DL
LEUKOCYTE ESTERASE UR QL STRIP.AUTO: ABNORMAL
NITRITE UR QL STRIP.AUTO: NEGATIVE
PH UR STRIP: 7 [PH] (ref 5–8)
PROT UR STRIP-MCNC: 100 MG/DL
RBC #/AREA URNS HPF: ABNORMAL /HPF (ref 0–5)
SP GR UR REFRACTOMETRY: 1.01 (ref 1–1.03)
UR CULT HOLD, URHOLD: NORMAL
UROBILINOGEN UR QL STRIP.AUTO: 1 EU/DL (ref 0.2–1)
WBC URNS QL MICRO: ABNORMAL /HPF (ref 0–4)

## 2018-09-07 PROCEDURE — 99283 EMERGENCY DEPT VISIT LOW MDM: CPT

## 2018-09-07 PROCEDURE — 87086 URINE CULTURE/COLONY COUNT: CPT | Performed by: EMERGENCY MEDICINE

## 2018-09-07 PROCEDURE — 74011250637 HC RX REV CODE- 250/637: Performed by: EMERGENCY MEDICINE

## 2018-09-07 PROCEDURE — 51798 US URINE CAPACITY MEASURE: CPT

## 2018-09-07 PROCEDURE — 81001 URINALYSIS AUTO W/SCOPE: CPT | Performed by: EMERGENCY MEDICINE

## 2018-09-07 PROCEDURE — 87077 CULTURE AEROBIC IDENTIFY: CPT | Performed by: EMERGENCY MEDICINE

## 2018-09-07 PROCEDURE — 87186 SC STD MICRODIL/AGAR DIL: CPT | Performed by: EMERGENCY MEDICINE

## 2018-09-07 RX ORDER — CEPHALEXIN 500 MG/1
500 CAPSULE ORAL 3 TIMES DAILY
Qty: 21 CAP | Refills: 0 | Status: SHIPPED | OUTPATIENT
Start: 2018-09-07 | End: 2018-09-14

## 2018-09-07 RX ORDER — CEFUROXIME AXETIL 250 MG/1
500 TABLET ORAL
Status: COMPLETED | OUTPATIENT
Start: 2018-09-07 | End: 2018-09-07

## 2018-09-07 RX ORDER — PHENAZOPYRIDINE HYDROCHLORIDE 200 MG/1
200 TABLET, FILM COATED ORAL 3 TIMES DAILY
Qty: 6 TAB | Refills: 0 | Status: SHIPPED | OUTPATIENT
Start: 2018-09-07 | End: 2018-09-09

## 2018-09-07 RX ADMIN — CEFUROXIME AXETIL 500 MG: 250 TABLET ORAL at 22:12

## 2018-09-08 NOTE — DISCHARGE INSTRUCTIONS
Painful Urination (Dysuria): Care Instructions  Your Care Instructions  Burning pain with urination (dysuria) is a common symptom of a urinary tract infection or other urinary problems. The bladder may become inflamed. This can cause pain when the bladder fills and empties. You may also feel pain if the tube that carries urine from the bladder to the outside of the body (urethra) gets irritated or infected. Sexually transmitted infections (STIs) also may cause pain when you urinate. Sometimes the pain can be caused by things other than an infection. The urethra can be irritated by soaps, perfumes, or foreign objects in the urethra. Kidney stones can cause pain when they pass through the urethra. The cause may be hard to find. You may need tests. Treatment for painful urination depends on the cause. Follow-up care is a key part of your treatment and safety. Be sure to make and go to all appointments, and call your doctor if you are having problems. It's also a good idea to know your test results and keep a list of the medicines you take. How can you care for yourself at home? · Drink extra water for the next day or two. This will help make the urine less concentrated. (If you have kidney, heart, or liver disease and have to limit fluids, talk with your doctor before you increase the amount of fluids you drink.)  · Avoid drinks that are carbonated or have caffeine. They can irritate the bladder. · Urinate often. Try to empty your bladder each time. For women:  · Urinate right after you have sex. · After going to the bathroom, wipe from front to back. · Avoid douches, bubble baths, and feminine hygiene sprays. And avoid other feminine hygiene products that have deodorants. When should you call for help? Call your doctor now or seek immediate medical care if:    · You have new symptoms, such as fever, nausea, or vomiting.     · You have new or worse symptoms of a urinary problem.  For example:  Kaitlin Knows have blood or pus in your urine. ¨ You have chills or body aches. ¨ It hurts worse to urinate. ¨ You have groin or belly pain. ¨ You have pain in your back just below your rib cage (the flank area).    Watch closely for changes in your health, and be sure to contact your doctor if you have any problems. Where can you learn more? Go to http://gordo-melinda.info/. Enter R113 in the search box to learn more about \"Painful Urination (Dysuria): Care Instructions. \"  Current as of: May 12, 2017  Content Version: 11.7  © 2731-4321 [employer]. Care instructions adapted under license by [employer] (which disclaims liability or warranty for this information). If you have questions about a medical condition or this instruction, always ask your healthcare professional. [place] [address] any warranty or liability for your use of this information. Urinary Tract Infections in Men: Care Instructions  Your Care Instructions    A urinary tract infection, or UTI, is a general term for an infection anywhere between the kidneys and the tip of the penis. UTIs can also be a result of a prostate problem. Most cause pain or burning when you urinate. Most UTIs are caused by bacteria and can be cured with antibiotics. It is important to complete your treatment so that the infection does not get worse. Follow-up care is a key part of your treatment and safety. Be sure to make and go to all appointments, and call your doctor if you are having problems. It's also a good idea to know your test results and keep a list of the medicines you take. How can you care for yourself at home? · Take your antibiotics as prescribed. Do not stop taking them just because you feel better. You need to take the full course of antibiotics. · Take your medicines exactly as prescribed.  Your doctor may have prescribed a medicine, such as phenazopyridine (Pyridium), to help relieve pain when you urinate. This turns your urine orange. You may stop taking it when your symptoms get better. But be sure to take all of your antibiotics, which treat the infection. · Drink extra water for the next day or two. This will help make the urine less concentrated and help wash out the bacteria causing the infection. (If you have kidney, heart, or liver disease and have to limit your fluids, talk with your doctor before you increase your fluid intake.)  · Avoid drinks that are carbonated or have caffeine. They can irritate the bladder. · Urinate often. Try to empty your bladder each time. · To relieve pain, take a hot bath or lay a heating pad (set on low) over your lower belly or genital area. Never go to sleep with a heating pad in place. To help prevent UTIs  · Drink plenty of fluids, enough so that your urine is light yellow or clear like water. If you have kidney, heart, or liver disease and have to limit fluids, talk with your doctor before you increase the amount of fluids you drink. · Urinate when you have the urge. Do not hold your urine for a long time. Urinate before you go to sleep. · Keep your penis clean. Catheter care  If you have a drainage tube (catheter) in place, the following steps will help you care for it. · Always wash your hands before and after touching your catheter. · Check the area around the urethra for inflammation or signs of infection. Signs of infection include irritated, swollen, red, or tender skin, or pus around the catheter. · Clean the area around the catheter with soap and water two times a day. Dry with a clean towel afterward. · Do not apply powder or lotion to the skin around the catheter. To empty the urine collection bag  · Wash your hands with soap and water. · Without touching the drain spout, remove the spout from its sleeve at the bottom of the collection bag. Open the valve on the spout.   · Let the urine flow out of the bag and into the toilet or a container. Do not let the tubing or drain spout touch anything. · After you empty the bag, clean the end of the drain spout with tissue and water. Close the valve and put the drain spout back into its sleeve at the bottom of the collection bag. · Wash your hands with soap and water. When should you call for help? Call your doctor now or seek immediate medical care if:    · Symptoms such as a fever, chills, nausea, or vomiting get worse or happen for the first time.     · You have new pain in your back just below your rib cage. This is called flank pain.     · There is new blood or pus in your urine.     · You are not able to take or keep down your antibiotics.    Watch closely for changes in your health, and be sure to contact your doctor if:    · You are not getting better after taking an antibiotic for 2 days.     · Your symptoms go away but then come back. Where can you learn more? Go to http://gordo-melinda.info/. Enter Z791 in the search box to learn more about \"Urinary Tract Infections in Men: Care Instructions. \"  Current as of: May 12, 2017  Content Version: 11.7  © 2121-2197 [employer]. Care instructions adapted under license by "[employer] (which disclaims liability or warranty for this information). If you have questions about a medical condition or this instruction, always ask your healthcare professional. [address] any warranty or liability for your use of this information.
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
## 2018-09-08 NOTE — ED NOTES
MD reviewed discharge instructions and options with patient and patient verbalized understanding. RN reviewed discharge instructions using teachback method. Pt ambulated to exit without difficulty and in no signs of acute distress escorted by daughter, and she  will drive home. No complaints or needs expressed at this time. Patient was counseled on medications prescribed at discharge. VSS, verbalized relief from most intense pain. Patient to call pcp in the morning for appointment.

## 2018-09-10 LAB
BACTERIA SPEC CULT: ABNORMAL
CC UR VC: ABNORMAL
SERVICE CMNT-IMP: ABNORMAL

## 2018-09-11 RX ORDER — DIVALPROEX SODIUM 500 MG/1
TABLET, DELAYED RELEASE ORAL
Qty: 60 TAB | Refills: 3 | Status: SHIPPED | OUTPATIENT
Start: 2018-09-11 | End: 2018-11-30 | Stop reason: SDUPTHER

## 2018-11-30 RX ORDER — DIVALPROEX SODIUM 500 MG/1
TABLET, DELAYED RELEASE ORAL
Qty: 60 TAB | Refills: 1 | Status: SHIPPED | OUTPATIENT
Start: 2018-11-30 | End: 2019-01-16

## 2018-11-30 NOTE — TELEPHONE ENCOUNTER
Requested Prescriptions     Pending Prescriptions Disp Refills    divalproex DR (DEPAKOTE) 500 mg tablet 60 Tab 3     Pt also needs a refill on his Keppra. Pt's daughter was unable to get the pt here due to her having a migraine.  He is r/s for the next available 1/2/2019

## 2019-01-02 ENCOUNTER — OFFICE VISIT (OUTPATIENT)
Dept: NEUROLOGY | Age: 64
End: 2019-01-02

## 2019-01-02 VITALS
DIASTOLIC BLOOD PRESSURE: 60 MMHG | WEIGHT: 221 LBS | SYSTOLIC BLOOD PRESSURE: 120 MMHG | HEIGHT: 69 IN | RESPIRATION RATE: 18 BRPM | HEART RATE: 66 BPM | BODY MASS INDEX: 32.73 KG/M2 | OXYGEN SATURATION: 95 %

## 2019-01-02 DIAGNOSIS — G40.209 PARTIAL SYMPTOMATIC EPILEPSY WITH COMPLEX PARTIAL SEIZURES, NOT INTRACTABLE, WITHOUT STATUS EPILEPTICUS (HCC): Primary | ICD-10-CM
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
## 2019-01-03 LAB
ALBUMIN SERPL-MCNC: 3.7 G/DL (ref 3.6–4.8)
ALBUMIN/GLOB SERPL: 1.3 {RATIO} (ref 1.2–2.2)
ALP SERPL-CCNC: 65 IU/L (ref 39–117)
ALT SERPL-CCNC: 8 IU/L (ref 0–44)
AST SERPL-CCNC: 8 IU/L (ref 0–40)
BILIRUB SERPL-MCNC: 0.3 MG/DL (ref 0–1.2)
BUN SERPL-MCNC: 28 MG/DL (ref 8–27)
BUN/CREAT SERPL: 19 (ref 10–24)
CALCIUM SERPL-MCNC: 8.6 MG/DL (ref 8.6–10.2)
CHLORIDE SERPL-SCNC: 103 MMOL/L (ref 96–106)
CO2 SERPL-SCNC: 21 MMOL/L (ref 20–29)
CREAT SERPL-MCNC: 1.45 MG/DL (ref 0.76–1.27)
GLOBULIN SER CALC-MCNC: 2.8 G/DL (ref 1.5–4.5)
GLUCOSE SERPL-MCNC: 112 MG/DL (ref 65–99)
POTASSIUM SERPL-SCNC: 3.7 MMOL/L (ref 3.5–5.2)
PROT SERPL-MCNC: 6.5 G/DL (ref 6–8.5)
SODIUM SERPL-SCNC: 139 MMOL/L (ref 134–144)
VALPROATE SERPL-MCNC: 58 UG/ML (ref 50–100)

## 2019-01-10 ENCOUNTER — APPOINTMENT (OUTPATIENT)
Dept: CT IMAGING | Age: 64
DRG: 682 | End: 2019-01-10
Attending: EMERGENCY MEDICINE
Payer: MEDICARE

## 2019-01-10 ENCOUNTER — APPOINTMENT (OUTPATIENT)
Dept: CT IMAGING | Age: 64
DRG: 682 | End: 2019-01-10
Attending: HOSPITALIST
Payer: MEDICARE

## 2019-01-10 ENCOUNTER — APPOINTMENT (OUTPATIENT)
Dept: GENERAL RADIOLOGY | Age: 64
DRG: 682 | End: 2019-01-10
Attending: EMERGENCY MEDICINE
Payer: MEDICARE

## 2019-01-10 ENCOUNTER — HOSPITAL ENCOUNTER (INPATIENT)
Age: 64
LOS: 5 days | Discharge: NURSING, REHAB OR LONG-TERM CARE | DRG: 682 | End: 2019-01-16
Attending: EMERGENCY MEDICINE | Admitting: INTERNAL MEDICINE
Payer: MEDICARE

## 2019-01-10 DIAGNOSIS — N17.9 ACUTE KIDNEY INJURY (HCC): Primary | ICD-10-CM

## 2019-01-10 DIAGNOSIS — E87.29 INCREASED ANION GAP METABOLIC ACIDOSIS: ICD-10-CM

## 2019-01-10 DIAGNOSIS — R56.9 SEIZURE (HCC): ICD-10-CM

## 2019-01-10 DIAGNOSIS — G93.41 METABOLIC ENCEPHALOPATHY: ICD-10-CM
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
## 2019-01-10 LAB
ALBUMIN SERPL-MCNC: 2.7 G/DL (ref 3.5–5)
ALBUMIN/GLOB SERPL: 0.6 {RATIO} (ref 1.1–2.2)
ALP SERPL-CCNC: 70 U/L (ref 45–117)
ALT SERPL-CCNC: 52 U/L (ref 12–78)
AMMONIA PLAS-SCNC: 27 UMOL/L
ANION GAP SERPL CALC-SCNC: 16 MMOL/L (ref 5–15)
APPEARANCE UR: ABNORMAL
ARTERIAL PATENCY WRIST A: ABNORMAL
AST SERPL-CCNC: 250 U/L (ref 15–37)
BACTERIA URNS QL MICRO: NEGATIVE /HPF
BASE DEFICIT BLDV-SCNC: 6 MMOL/L
BASOPHILS # BLD: 0 K/UL (ref 0–0.1)
BASOPHILS NFR BLD: 0 % (ref 0–1)
BDY SITE: ABNORMAL
BILIRUB SERPL-MCNC: 1 MG/DL (ref 0.2–1)
BILIRUB UR QL CFM: NEGATIVE
BUN SERPL-MCNC: 50 MG/DL (ref 6–20)
BUN/CREAT SERPL: 18 (ref 12–20)
CALCIUM SERPL-MCNC: 7.8 MG/DL (ref 8.5–10.1)
CHLORIDE SERPL-SCNC: 102 MMOL/L (ref 97–108)
CK SERPL-CCNC: 8947 U/L (ref 39–308)
CO2 SERPL-SCNC: 17 MMOL/L (ref 21–32)
COLOR UR: ABNORMAL
COMMENT, HOLDF: NORMAL
CREAT SERPL-MCNC: 2.79 MG/DL (ref 0.7–1.3)
DIFFERENTIAL METHOD BLD: ABNORMAL
EOSINOPHIL # BLD: 0 K/UL (ref 0–0.4)
EOSINOPHIL NFR BLD: 0 % (ref 0–7)
EPITH CASTS URNS QL MICRO: ABNORMAL /LPF
ERYTHROCYTE [DISTWIDTH] IN BLOOD BY AUTOMATED COUNT: 17.2 % (ref 11.5–14.5)
GAS FLOW.O2 O2 DELIVERY SYS: ABNORMAL L/MIN
GLOBULIN SER CALC-MCNC: 4.4 G/DL (ref 2–4)
GLUCOSE BLD STRIP.AUTO-MCNC: 145 MG/DL (ref 65–100)
GLUCOSE SERPL-MCNC: 141 MG/DL (ref 65–100)
GLUCOSE UR STRIP.AUTO-MCNC: NEGATIVE MG/DL
HCO3 BLDV-SCNC: 19.5 MMOL/L (ref 23–28)
HCT VFR BLD AUTO: 44.9 % (ref 36.6–50.3)
HGB BLD-MCNC: 14.4 G/DL (ref 12.1–17)
HGB UR QL STRIP: ABNORMAL
IMM GRANULOCYTES # BLD AUTO: 0.1 K/UL (ref 0–0.04)
IMM GRANULOCYTES NFR BLD AUTO: 0 % (ref 0–0.5)
KETONES UR QL STRIP.AUTO: ABNORMAL MG/DL
LEUKOCYTE ESTERASE UR QL STRIP.AUTO: NEGATIVE
LYMPHOCYTES # BLD: 1 K/UL (ref 0.8–3.5)
LYMPHOCYTES NFR BLD: 8 % (ref 12–49)
MCH RBC QN AUTO: 24.2 PG (ref 26–34)
MCHC RBC AUTO-ENTMCNC: 32.1 G/DL (ref 30–36.5)
MCV RBC AUTO: 75.6 FL (ref 80–99)
MONOCYTES # BLD: 0.8 K/UL (ref 0–1)
MONOCYTES NFR BLD: 7 % (ref 5–13)
NEUTS SEG # BLD: 9.7 K/UL (ref 1.8–8)
NEUTS SEG NFR BLD: 84 % (ref 32–75)
NITRITE UR QL STRIP.AUTO: NEGATIVE
NRBC # BLD: 0 K/UL (ref 0–0.01)
NRBC BLD-RTO: 0 PER 100 WBC
PCO2 BLDV: 34.7 MMHG (ref 41–51)
PH BLDV: 7.36 [PH] (ref 7.32–7.42)
PH UR STRIP: 5.5 [PH] (ref 5–8)
PLATELET # BLD AUTO: 161 K/UL (ref 150–400)
PMV BLD AUTO: 10.6 FL (ref 8.9–12.9)
PO2 BLDV: 31 MMHG (ref 25–40)
POTASSIUM SERPL-SCNC: 3.7 MMOL/L (ref 3.5–5.1)
PROT SERPL-MCNC: 7.1 G/DL (ref 6.4–8.2)
PROT UR STRIP-MCNC: 100 MG/DL
RBC # BLD AUTO: 5.94 M/UL (ref 4.1–5.7)
RBC #/AREA URNS HPF: ABNORMAL /HPF (ref 0–5)
SAMPLES BEING HELD,HOLD: NORMAL
SAO2 % BLDV: 57 % (ref 65–88)
SERVICE CMNT-IMP: ABNORMAL
SODIUM SERPL-SCNC: 135 MMOL/L (ref 136–145)
SP GR UR REFRACTOMETRY: 1.02 (ref 1–1.03)
SPECIMEN TYPE: ABNORMAL
UA: UC IF INDICATED,UAUC: ABNORMAL
UROBILINOGEN UR QL STRIP.AUTO: 1 EU/DL (ref 0.2–1)
WBC # BLD AUTO: 11.6 K/UL (ref 4.1–11.1)
WBC URNS QL MICRO: ABNORMAL /HPF (ref 0–4)

## 2019-01-10 PROCEDURE — 85025 COMPLETE CBC W/AUTO DIFF WBC: CPT

## 2019-01-10 PROCEDURE — 82803 BLOOD GASES ANY COMBINATION: CPT

## 2019-01-10 PROCEDURE — 74011250637 HC RX REV CODE- 250/637: Performed by: HOSPITALIST

## 2019-01-10 PROCEDURE — 94660 CPAP INITIATION&MGMT: CPT

## 2019-01-10 PROCEDURE — 96361 HYDRATE IV INFUSION ADD-ON: CPT

## 2019-01-10 PROCEDURE — 99285 EMERGENCY DEPT VISIT HI MDM: CPT

## 2019-01-10 PROCEDURE — 96365 THER/PROPH/DIAG IV INF INIT: CPT

## 2019-01-10 PROCEDURE — 74011250636 HC RX REV CODE- 250/636: Performed by: HOSPITALIST

## 2019-01-10 PROCEDURE — 5A09357 ASSISTANCE WITH RESPIRATORY VENTILATION, LESS THAN 24 CONSECUTIVE HOURS, CONTINUOUS POSITIVE AIRWAY PRESSURE: ICD-10-PCS | Performed by: HOSPITALIST

## 2019-01-10 PROCEDURE — 82140 ASSAY OF AMMONIA: CPT

## 2019-01-10 PROCEDURE — 71046 X-RAY EXAM CHEST 2 VIEWS: CPT

## 2019-01-10 PROCEDURE — 99218 HC RM OBSERVATION: CPT

## 2019-01-10 PROCEDURE — 72125 CT NECK SPINE W/O DYE: CPT

## 2019-01-10 PROCEDURE — 74011250637 HC RX REV CODE- 250/637: Performed by: EMERGENCY MEDICINE

## 2019-01-10 PROCEDURE — 81001 URINALYSIS AUTO W/SCOPE: CPT

## 2019-01-10 PROCEDURE — 74011000258 HC RX REV CODE- 258: Performed by: HOSPITALIST

## 2019-01-10 PROCEDURE — 95816 EEG AWAKE AND DROWSY: CPT | Performed by: HOSPITALIST

## 2019-01-10 PROCEDURE — 36415 COLL VENOUS BLD VENIPUNCTURE: CPT

## 2019-01-10 PROCEDURE — 77030013033 HC MSK BPAP/CPAP MMKA -B

## 2019-01-10 PROCEDURE — 71250 CT THORAX DX C-: CPT

## 2019-01-10 PROCEDURE — 74011250636 HC RX REV CODE- 250/636: Performed by: EMERGENCY MEDICINE

## 2019-01-10 PROCEDURE — 70450 CT HEAD/BRAIN W/O DYE: CPT

## 2019-01-10 PROCEDURE — 80053 COMPREHEN METABOLIC PANEL: CPT

## 2019-01-10 PROCEDURE — 82550 ASSAY OF CK (CPK): CPT

## 2019-01-10 PROCEDURE — 82962 GLUCOSE BLOOD TEST: CPT

## 2019-01-10 RX ORDER — FAMOTIDINE 20 MG/1
20 TABLET, FILM COATED ORAL
Status: DISCONTINUED | OUTPATIENT
Start: 2019-01-11 | End: 2019-01-12

## 2019-01-10 RX ORDER — SODIUM CHLORIDE 0.9 % (FLUSH) 0.9 %
5-40 SYRINGE (ML) INJECTION EVERY 8 HOURS
Status: DISCONTINUED | OUTPATIENT
Start: 2019-01-10 | End: 2019-01-16 | Stop reason: HOSPADM

## 2019-01-10 RX ORDER — VENLAFAXINE HYDROCHLORIDE 75 MG/1
150 TABLET, EXTENDED RELEASE ORAL DAILY
COMMUNITY
End: 2019-01-16

## 2019-01-10 RX ORDER — CARVEDILOL 6.25 MG/1
6.25 TABLET ORAL 2 TIMES DAILY WITH MEALS
Status: DISCONTINUED | OUTPATIENT
Start: 2019-01-10 | End: 2019-01-15

## 2019-01-10 RX ORDER — ACETAMINOPHEN 325 MG/1
650 TABLET ORAL
Status: DISCONTINUED | OUTPATIENT
Start: 2019-01-10 | End: 2019-01-10

## 2019-01-10 RX ORDER — CARVEDILOL 3.12 MG/1
3.12 TABLET ORAL
Status: DISCONTINUED | OUTPATIENT
Start: 2019-01-10 | End: 2019-01-10 | Stop reason: DRUGHIGH

## 2019-01-10 RX ORDER — AZITHROMYCIN 250 MG/1
500 TABLET, FILM COATED ORAL
Status: COMPLETED | OUTPATIENT
Start: 2019-01-10 | End: 2019-01-10

## 2019-01-10 RX ORDER — SODIUM CHLORIDE 0.9 % (FLUSH) 0.9 %
5-40 SYRINGE (ML) INJECTION AS NEEDED
Status: DISCONTINUED | OUTPATIENT
Start: 2019-01-10 | End: 2019-01-16 | Stop reason: HOSPADM

## 2019-01-10 RX ORDER — CARVEDILOL 12.5 MG/1
6.25 TABLET ORAL 2 TIMES DAILY WITH MEALS
COMMUNITY
End: 2019-01-16

## 2019-01-10 RX ORDER — DEXTROSE 50 % IN WATER (D50W) INTRAVENOUS SYRINGE
12.5-25 AS NEEDED
Status: DISCONTINUED | OUTPATIENT
Start: 2019-01-10 | End: 2019-01-16 | Stop reason: HOSPADM

## 2019-01-10 RX ORDER — ATORVASTATIN CALCIUM 40 MG/1
80 TABLET, FILM COATED ORAL
Status: DISCONTINUED | OUTPATIENT
Start: 2019-01-10 | End: 2019-01-10

## 2019-01-10 RX ORDER — GUAIFENESIN 100 MG/5ML
81 LIQUID (ML) ORAL DAILY
Status: DISCONTINUED | OUTPATIENT
Start: 2019-01-11 | End: 2019-01-16 | Stop reason: HOSPADM

## 2019-01-10 RX ORDER — FENTANYL CITRATE 50 UG/ML
25 INJECTION, SOLUTION INTRAMUSCULAR; INTRAVENOUS
Status: DISCONTINUED | OUTPATIENT
Start: 2019-01-10 | End: 2019-01-16 | Stop reason: HOSPADM

## 2019-01-10 RX ORDER — MAGNESIUM SULFATE 100 %
4 CRYSTALS MISCELLANEOUS AS NEEDED
Status: DISCONTINUED | OUTPATIENT
Start: 2019-01-10 | End: 2019-01-16 | Stop reason: HOSPADM

## 2019-01-10 RX ORDER — FLUOXETINE HYDROCHLORIDE 20 MG/1
20 CAPSULE ORAL DAILY
Status: DISCONTINUED | OUTPATIENT
Start: 2019-01-11 | End: 2019-01-16 | Stop reason: HOSPADM

## 2019-01-10 RX ORDER — ISOSORBIDE MONONITRATE 30 MG/1
30 TABLET, EXTENDED RELEASE ORAL DAILY
Status: DISCONTINUED | OUTPATIENT
Start: 2019-01-11 | End: 2019-01-16 | Stop reason: HOSPADM

## 2019-01-10 RX ORDER — PREGABALIN 25 MG/1
50 CAPSULE ORAL 3 TIMES DAILY
Status: DISCONTINUED | OUTPATIENT
Start: 2019-01-10 | End: 2019-01-16 | Stop reason: HOSPADM

## 2019-01-10 RX ORDER — AZITHROMYCIN 250 MG/1
250 TABLET, FILM COATED ORAL DAILY
Status: COMPLETED | OUTPATIENT
Start: 2019-01-11 | End: 2019-01-14

## 2019-01-10 RX ORDER — SODIUM CHLORIDE 9 MG/ML
100 INJECTION, SOLUTION INTRAVENOUS CONTINUOUS
Status: DISPENSED | OUTPATIENT
Start: 2019-01-10 | End: 2019-01-11

## 2019-01-10 RX ORDER — ALPRAZOLAM 0.5 MG/1
1 TABLET ORAL
Status: DISCONTINUED | OUTPATIENT
Start: 2019-01-10 | End: 2019-01-13

## 2019-01-10 RX ORDER — DIVALPROEX SODIUM 500 MG/1
500 TABLET, DELAYED RELEASE ORAL 2 TIMES DAILY
Status: DISCONTINUED | OUTPATIENT
Start: 2019-01-10 | End: 2019-01-11

## 2019-01-10 RX ORDER — INSULIN LISPRO 100 [IU]/ML
INJECTION, SOLUTION INTRAVENOUS; SUBCUTANEOUS
Status: DISCONTINUED | OUTPATIENT
Start: 2019-01-10 | End: 2019-01-16 | Stop reason: HOSPADM

## 2019-01-10 RX ORDER — TAMSULOSIN HYDROCHLORIDE 0.4 MG/1
0.4 CAPSULE ORAL
Status: DISCONTINUED | OUTPATIENT
Start: 2019-01-10 | End: 2019-01-16 | Stop reason: HOSPADM

## 2019-01-10 RX ORDER — GLIMEPIRIDE 4 MG/1
2 TABLET ORAL 2 TIMES DAILY
COMMUNITY
End: 2019-01-16

## 2019-01-10 RX ORDER — CLONAZEPAM 0.5 MG/1
0.5 TABLET ORAL
Status: DISCONTINUED | OUTPATIENT
Start: 2019-01-10 | End: 2019-01-16 | Stop reason: HOSPADM

## 2019-01-10 RX ORDER — NITROGLYCERIN 0.4 MG/1
0.4 TABLET SUBLINGUAL
Status: DISCONTINUED | OUTPATIENT
Start: 2019-01-10 | End: 2019-01-16 | Stop reason: HOSPADM

## 2019-01-10 RX ORDER — QUETIAPINE FUMARATE 25 MG/1
25 TABLET, FILM COATED ORAL
Status: DISCONTINUED | OUTPATIENT
Start: 2019-01-10 | End: 2019-01-16 | Stop reason: HOSPADM

## 2019-01-10 RX ORDER — PANTOPRAZOLE SODIUM 40 MG/1
40 TABLET, DELAYED RELEASE ORAL
Status: DISCONTINUED | OUTPATIENT
Start: 2019-01-10 | End: 2019-01-16 | Stop reason: HOSPADM

## 2019-01-10 RX ORDER — VENLAFAXINE HYDROCHLORIDE 75 MG/1
75 TABLET, EXTENDED RELEASE ORAL EVERY EVENING
COMMUNITY
End: 2019-01-16

## 2019-01-10 RX ORDER — CLOPIDOGREL BISULFATE 75 MG/1
75 TABLET ORAL DAILY
Status: DISCONTINUED | OUTPATIENT
Start: 2019-01-11 | End: 2019-01-16 | Stop reason: HOSPADM

## 2019-01-10 RX ORDER — VENLAFAXINE HYDROCHLORIDE 37.5 MG/1
75 CAPSULE, EXTENDED RELEASE ORAL
Status: DISCONTINUED | OUTPATIENT
Start: 2019-01-11 | End: 2019-01-16 | Stop reason: HOSPADM

## 2019-01-10 RX ADMIN — AZITHROMYCIN 500 MG: 250 TABLET, FILM COATED ORAL at 17:38

## 2019-01-10 RX ADMIN — SODIUM CHLORIDE 1000 ML: 900 INJECTION, SOLUTION INTRAVENOUS at 17:37

## 2019-01-10 RX ADMIN — PREGABALIN 50 MG: 25 CAPSULE ORAL at 21:23

## 2019-01-10 RX ADMIN — CARVEDILOL 6.25 MG: 6.25 TABLET, FILM COATED ORAL at 18:57

## 2019-01-10 RX ADMIN — ACETAMINOPHEN 650 MG: 325 TABLET ORAL at 18:57

## 2019-01-10 RX ADMIN — CLONAZEPAM 0.5 MG: 0.5 TABLET ORAL at 21:23

## 2019-01-10 RX ADMIN — SODIUM CHLORIDE 100 ML/HR: 900 INJECTION, SOLUTION INTRAVENOUS at 21:32

## 2019-01-10 RX ADMIN — QUETIAPINE FUMARATE 25 MG: 25 TABLET ORAL at 21:22

## 2019-01-10 RX ADMIN — LACTULOSE 30 G: 20 SOLUTION ORAL at 21:23

## 2019-01-10 RX ADMIN — TAMSULOSIN HYDROCHLORIDE 0.4 MG: 0.4 CAPSULE ORAL at 21:23

## 2019-01-10 RX ADMIN — PANTOPRAZOLE SODIUM 40 MG: 40 TABLET, DELAYED RELEASE ORAL at 21:23

## 2019-01-10 RX ADMIN — DIVALPROEX SODIUM 500 MG: 500 TABLET, DELAYED RELEASE ORAL at 21:23

## 2019-01-10 RX ADMIN — CEFTRIAXONE 1 G: 1 INJECTION, POWDER, FOR SOLUTION INTRAMUSCULAR; INTRAVENOUS at 18:57

## 2019-01-10 RX ADMIN — Medication 10 ML: at 22:00

## 2019-01-10 RX ADMIN — LEVETIRACETAM 750 MG: 500 TABLET, FILM COATED ORAL at 21:22
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
## 2019-01-10 NOTE — ED TRIAGE NOTES
Patient arrives from home. Complaining of congestion for 3 days. Daughter reports finding patient on floor laying on left side. Abrasions noted to head, left arm and left knee.

## 2019-01-11 ENCOUNTER — APPOINTMENT (OUTPATIENT)
Dept: GENERAL RADIOLOGY | Age: 64
DRG: 682 | End: 2019-01-11
Attending: HOSPITALIST
Payer: MEDICARE

## 2019-01-11 LAB
ALBUMIN SERPL-MCNC: 2.3 G/DL (ref 3.5–5)
ALBUMIN/GLOB SERPL: 0.6 {RATIO} (ref 1.1–2.2)
ALP SERPL-CCNC: 55 U/L (ref 45–117)
ALT SERPL-CCNC: 65 U/L (ref 12–78)
ANION GAP SERPL CALC-SCNC: 12 MMOL/L (ref 5–15)
AST SERPL-CCNC: 245 U/L (ref 15–37)
BASOPHILS # BLD: 0 K/UL (ref 0–0.1)
BASOPHILS NFR BLD: 0 % (ref 0–1)
BILIRUB SERPL-MCNC: 0.6 MG/DL (ref 0.2–1)
BUN SERPL-MCNC: 52 MG/DL (ref 6–20)
BUN/CREAT SERPL: 24 (ref 12–20)
CALCIUM SERPL-MCNC: 7.5 MG/DL (ref 8.5–10.1)
CHLORIDE SERPL-SCNC: 105 MMOL/L (ref 97–108)
CK SERPL-CCNC: 6986 U/L (ref 39–308)
CO2 SERPL-SCNC: 20 MMOL/L (ref 21–32)
CREAT SERPL-MCNC: 2.17 MG/DL (ref 0.7–1.3)
DIFFERENTIAL METHOD BLD: ABNORMAL
EOSINOPHIL # BLD: 0 K/UL (ref 0–0.4)
EOSINOPHIL NFR BLD: 0 % (ref 0–7)
ERYTHROCYTE [DISTWIDTH] IN BLOOD BY AUTOMATED COUNT: 17 % (ref 11.5–14.5)
GLOBULIN SER CALC-MCNC: 4 G/DL (ref 2–4)
GLUCOSE BLD STRIP.AUTO-MCNC: 114 MG/DL (ref 65–100)
GLUCOSE BLD STRIP.AUTO-MCNC: 114 MG/DL (ref 65–100)
GLUCOSE BLD STRIP.AUTO-MCNC: 116 MG/DL (ref 65–100)
GLUCOSE BLD STRIP.AUTO-MCNC: 95 MG/DL (ref 65–100)
GLUCOSE SERPL-MCNC: 102 MG/DL (ref 65–100)
HCT VFR BLD AUTO: 42.7 % (ref 36.6–50.3)
HGB BLD-MCNC: 13.9 G/DL (ref 12.1–17)
IMM GRANULOCYTES # BLD AUTO: 0 K/UL (ref 0–0.04)
IMM GRANULOCYTES NFR BLD AUTO: 0 % (ref 0–0.5)
LYMPHOCYTES # BLD: 1.1 K/UL (ref 0.8–3.5)
LYMPHOCYTES NFR BLD: 11 % (ref 12–49)
MCH RBC QN AUTO: 24.7 PG (ref 26–34)
MCHC RBC AUTO-ENTMCNC: 32.6 G/DL (ref 30–36.5)
MCV RBC AUTO: 75.8 FL (ref 80–99)
MONOCYTES # BLD: 1 K/UL (ref 0–1)
MONOCYTES NFR BLD: 10 % (ref 5–13)
NEUTS SEG # BLD: 8.2 K/UL (ref 1.8–8)
NEUTS SEG NFR BLD: 79 % (ref 32–75)
NRBC # BLD: 0 K/UL (ref 0–0.01)
NRBC BLD-RTO: 0 PER 100 WBC
PLATELET # BLD AUTO: 161 K/UL (ref 150–400)
PMV BLD AUTO: 10.7 FL (ref 8.9–12.9)
POTASSIUM SERPL-SCNC: 3.5 MMOL/L (ref 3.5–5.1)
PROT SERPL-MCNC: 6.3 G/DL (ref 6.4–8.2)
RBC # BLD AUTO: 5.63 M/UL (ref 4.1–5.7)
SERVICE CMNT-IMP: ABNORMAL
SERVICE CMNT-IMP: NORMAL
SODIUM SERPL-SCNC: 137 MMOL/L (ref 136–145)
WBC # BLD AUTO: 10.4 K/UL (ref 4.1–11.1)

## 2019-01-11 PROCEDURE — 99218 HC RM OBSERVATION: CPT

## 2019-01-11 PROCEDURE — 83874 ASSAY OF MYOGLOBIN: CPT

## 2019-01-11 PROCEDURE — 74011250637 HC RX REV CODE- 250/637: Performed by: HOSPITALIST

## 2019-01-11 PROCEDURE — 65660000000 HC RM CCU STEPDOWN

## 2019-01-11 PROCEDURE — 80053 COMPREHEN METABOLIC PANEL: CPT

## 2019-01-11 PROCEDURE — 82962 GLUCOSE BLOOD TEST: CPT

## 2019-01-11 PROCEDURE — 82550 ASSAY OF CK (CPK): CPT

## 2019-01-11 PROCEDURE — 81002 URINALYSIS NONAUTO W/O SCOPE: CPT

## 2019-01-11 PROCEDURE — 36415 COLL VENOUS BLD VENIPUNCTURE: CPT

## 2019-01-11 PROCEDURE — 74011250637 HC RX REV CODE- 250/637: Performed by: PSYCHIATRY & NEUROLOGY

## 2019-01-11 PROCEDURE — 74011250636 HC RX REV CODE- 250/636: Performed by: HOSPITALIST

## 2019-01-11 PROCEDURE — 85025 COMPLETE CBC W/AUTO DIFF WBC: CPT

## 2019-01-11 PROCEDURE — 77030011256 HC DRSG MEPILEX <16IN NO BORD MOLN -A

## 2019-01-11 PROCEDURE — 73030 X-RAY EXAM OF SHOULDER: CPT

## 2019-01-11 PROCEDURE — 74011000258 HC RX REV CODE- 258: Performed by: HOSPITALIST

## 2019-01-11 RX ORDER — SODIUM CHLORIDE 9 MG/ML
100 INJECTION, SOLUTION INTRAVENOUS CONTINUOUS
Status: DISPENSED | OUTPATIENT
Start: 2019-01-11 | End: 2019-01-11

## 2019-01-11 RX ORDER — VENLAFAXINE HYDROCHLORIDE 150 MG/1
150 CAPSULE, EXTENDED RELEASE ORAL
Status: DISCONTINUED | OUTPATIENT
Start: 2019-01-11 | End: 2019-01-16 | Stop reason: HOSPADM

## 2019-01-11 RX ORDER — LEVETIRACETAM 500 MG/1
1000 TABLET ORAL EVERY 12 HOURS
Status: DISCONTINUED | OUTPATIENT
Start: 2019-01-11 | End: 2019-01-16 | Stop reason: HOSPADM

## 2019-01-11 RX ADMIN — CARVEDILOL 6.25 MG: 6.25 TABLET, FILM COATED ORAL at 16:25

## 2019-01-11 RX ADMIN — CLOPIDOGREL BISULFATE 75 MG: 75 TABLET ORAL at 09:52

## 2019-01-11 RX ADMIN — CEFTRIAXONE 1 G: 1 INJECTION, POWDER, FOR SOLUTION INTRAMUSCULAR; INTRAVENOUS at 18:01

## 2019-01-11 RX ADMIN — TAMSULOSIN HYDROCHLORIDE 0.4 MG: 0.4 CAPSULE ORAL at 09:52

## 2019-01-11 RX ADMIN — PREGABALIN 50 MG: 25 CAPSULE ORAL at 21:07

## 2019-01-11 RX ADMIN — PREGABALIN 50 MG: 25 CAPSULE ORAL at 16:25

## 2019-01-11 RX ADMIN — LEVETIRACETAM 750 MG: 500 TABLET, FILM COATED ORAL at 09:52

## 2019-01-11 RX ADMIN — Medication 10 ML: at 16:11

## 2019-01-11 RX ADMIN — FENTANYL CITRATE 25 MCG: 50 INJECTION, SOLUTION INTRAMUSCULAR; INTRAVENOUS at 21:07

## 2019-01-11 RX ADMIN — VENLAFAXINE HYDROCHLORIDE 75 MG: 37.5 CAPSULE, EXTENDED RELEASE ORAL at 21:07

## 2019-01-11 RX ADMIN — FLUOXETINE 20 MG: 20 CAPSULE ORAL at 09:52

## 2019-01-11 RX ADMIN — PANTOPRAZOLE SODIUM 40 MG: 40 TABLET, DELAYED RELEASE ORAL at 16:25

## 2019-01-11 RX ADMIN — DIVALPROEX SODIUM 500 MG: 500 TABLET, DELAYED RELEASE ORAL at 10:36

## 2019-01-11 RX ADMIN — FAMOTIDINE 20 MG: 20 TABLET ORAL at 09:52

## 2019-01-11 RX ADMIN — AZITHROMYCIN 250 MG: 250 TABLET, FILM COATED ORAL at 16:25

## 2019-01-11 RX ADMIN — ISOSORBIDE MONONITRATE 30 MG: 30 TABLET ORAL at 09:52

## 2019-01-11 RX ADMIN — PREGABALIN 50 MG: 25 CAPSULE ORAL at 09:52

## 2019-01-11 RX ADMIN — Medication 10 ML: at 21:08

## 2019-01-11 RX ADMIN — FENTANYL CITRATE 25 MCG: 50 INJECTION, SOLUTION INTRAMUSCULAR; INTRAVENOUS at 09:35

## 2019-01-11 RX ADMIN — LEVETIRACETAM 1000 MG: 500 TABLET, FILM COATED ORAL at 21:07

## 2019-01-11 RX ADMIN — FENTANYL CITRATE 25 MCG: 50 INJECTION, SOLUTION INTRAMUSCULAR; INTRAVENOUS at 17:11

## 2019-01-11 RX ADMIN — ASPIRIN 81 MG CHEWABLE TABLET 81 MG: 81 TABLET CHEWABLE at 09:51

## 2019-01-11 RX ADMIN — SODIUM CHLORIDE 100 ML/HR: 900 INJECTION, SOLUTION INTRAVENOUS at 18:01

## 2019-01-11 RX ADMIN — PANTOPRAZOLE SODIUM 40 MG: 40 TABLET, DELAYED RELEASE ORAL at 09:51

## 2019-01-11 RX ADMIN — CLONAZEPAM 0.5 MG: 0.5 TABLET ORAL at 21:07

## 2019-01-11 RX ADMIN — TAMSULOSIN HYDROCHLORIDE 0.4 MG: 0.4 CAPSULE ORAL at 16:26

## 2019-01-11 RX ADMIN — VENLAFAXINE HYDROCHLORIDE 150 MG: 150 CAPSULE, EXTENDED RELEASE ORAL at 09:52

## 2019-01-11 RX ADMIN — SODIUM CHLORIDE 100 ML/HR: 900 INJECTION, SOLUTION INTRAVENOUS at 10:37

## 2019-01-11 RX ADMIN — CARVEDILOL 6.25 MG: 6.25 TABLET, FILM COATED ORAL at 09:51

## 2019-01-11 RX ADMIN — QUETIAPINE FUMARATE 25 MG: 25 TABLET ORAL at 21:07

## 2019-01-11 RX ADMIN — Medication 10 ML: at 10:37

## 2019-01-11 NOTE — ED NOTES
Reassessed patient at this time. He was woken up for AM labs. Patient is pleasant and fully oriented this morning. He is able to answer all questions without difficulty. States he has some soreness noted to his LEFT side. Patient was told that he would likely be sore on that side due to his fall. Offered pain medication but he declined at this time. Will continue to monitor closely. CM x 3. Call bell within reach of patient.

## 2019-01-11 NOTE — ED NOTES
Verbal shift change report given to Mike Perkins RN (oncoming nurse) by Isaiah Sam RN (offgoing nurse). Report included the following information SBAR, ED Summary, MAR and Recent Results.
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
## 2019-01-11 NOTE — PROCEDURES
ELECTROENCEPHALOGRAM REPORT    HISTORY: Patient is a 76-year-old male who is being evaluated for possible seizure activity. DESCRIPTION: This is an 18-channel EEG performed on an awake   patient. The dominant posterior background rhythm consists of  medium-voltage rhythms in the 6 Hz frequency range out of the posterior  head region. Drowsiness and sleep architecture were not noted. Photic stimulation does not elicit a significant   driving response. Hyperventilation was not performed. ELECTROENCEPHALOGRAM SUMMARY: Mildly abnormal EEG due to mild slowing of  the background rhythms. CLINICAL INTERPRETATION: This EEG is suggestive of some mild generalized  encephalopathic process, nonspecific in type. This may be related to  underlying structural brain injury and/or toxic/metabolic abnormality. No  clearly lateralizing or epileptiform features were seen. Please correlate  clinically.     Roslyn Alvarez DO  01/11/19
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
## 2019-01-11 NOTE — PROGRESS NOTES
Problem: Falls - Risk of 
Goal: *Absence of Falls Document Jane Primes Fall Risk and appropriate interventions in the flowsheet. Outcome: Progressing Towards Goal 
Fall Risk Interventions: 
Mobility Interventions: Communicate number of staff needed for ambulation/transfer Elimination Interventions: Toileting schedule/hourly rounds History of Falls Interventions: Door open when patient unattended, Room close to nurse's station

## 2019-01-11 NOTE — ED NOTES
Bedside and Verbal shift change report given to Katherin Gross RN (oncoming nurse) by Stuart Mak RN (offgoing nurse). Report included the following information SBAR, Kardex, Intake/Output, MAR and Recent Results.

## 2019-01-11 NOTE — CONSULTS
Imp :    TACOS on CKD   · 2/2 dehydration + Acute Rhabdomyolysis   · Cr trending down w/ IVF   · Continue NS at 100 cc/ hr   · Continue to hold lasix and inspra   · No need for Bicarb gtt at this time     Acute Rhabdomyolysis   · W/ ARF + Acute Liver Injury   · triggered by prolonged immobilization, high dose Statin and dehydration   · IV hydration for now  · CPK trending down      AG metabolic acidosis   · Improving w/ IVF   · No need for bicarb gtt at this time     Acute Liver Injury     PNA   Chronic Systolic CHF   Type II DM   HTN   Seizure disorder  Cirrhosis of Liver       Thank you for the consult   Will follow     [facility]  Office :[phone]  Fax: [phone]

## 2019-01-11 NOTE — ED NOTES
Assumed care of patient at this time. He was assisted off of the bedpan and found to have had a moderate size liquid bowel movement. Patient was cleaned up and subsequently transitioned onto a hospital bed. Patient very drowsy but is waking up to voice and tactile stimulation. Currently able to state his name, birthday and that he is in a hospital. Patient unaware of the year or recent events. Vitals are stable. He has no complaints of pain or discomfort at this time. Noted to have multiple areas of abrasions on his head and the upper part of his LEFT arm. Bruising noted throughout LEFT side. Will continue to monitor. CM x 3. Call bell within reach of patient.

## 2019-01-11 NOTE — PROGRESS NOTES
TRANSFER - IN REPORT: 
 
Verbal report received from [facility] RN(name) on Dangelo Yates.  being received from ED(unit) for routine progression of care Report consisted of patients Situation, Background, Assessment and  
Recommendations(SBAR). Information from the following report(s) SBAR was reviewed with the receiving nurse. Opportunity for questions and clarification was provided. Assessment completed upon patients arrival to unit and care assumed.

## 2019-01-11 NOTE — H&P
[id number] Dx/ddx · Fall with right frontal extracranial injury · ?breakthrough seizure · Dehydration · TACOS on CKD · Elevated LFTs which were normal a week ego ,most probably due to dehydration · H/o chronic systolic chf 
· DM on Amaryl · Cirrhosis · Dementia · Full code · Lives with daughters Marques Orozco MD

## 2019-01-11 NOTE — PROGRESS NOTES
TRANSFER - OUT REPORT: 
 
Verbal report given to Arlet Paul RN(name) on Jessica Nicole  being transferred to [facility](unit) for routine progression of care Report consisted of patients Situation, Background, Assessment and  
Recommendations(SBAR). Information from the following report(s) SBAR was reviewed with the receiving nurse. Lines:  
Peripheral IV 01/10/19 Right Antecubital (Active) Site Assessment Clean, dry, & intact 1/11/2019  8:00 AM  
Phlebitis Assessment 0 1/11/2019  8:00 AM  
Infiltration Assessment 0 1/11/2019  8:00 AM  
Dressing Status Clean, dry, & intact 1/11/2019  8:00 AM  
Dressing Type Transparent 1/11/2019  8:00 AM  
Hub Color/Line Status Pink;Capped 1/11/2019  8:00 AM  
Action Taken Open ports on tubing capped 1/11/2019  8:00 AM  
   
Peripheral IV 01/11/19 Right Wrist (Active) Site Assessment Clean, dry, & intact 1/11/2019  8:00 AM  
Phlebitis Assessment 0 1/11/2019  8:00 AM  
Infiltration Assessment 0 1/11/2019  8:00 AM  
Dressing Status Clean, dry, & intact 1/11/2019  8:00 AM  
Dressing Type Transparent 1/11/2019  8:00 AM  
   
Peripheral IV 01/10/19 Right Antecubital (Active) Opportunity for questions and clarification was provided. Patient transported with: 
 [employer]

## 2019-01-11 NOTE — ED NOTES
Patient is resting in bed comfortably. Remains on CM x 3, all vitals are stable. No apparent distress. Call bell within reach of patient at this time.

## 2019-01-11 NOTE — H&P
[address] HISTORY AND PHYSICAL Curtis Boggs 
MR#: [id number] : 1955 ACCOUNT #: [de-identified] ADMIT DATE: 01/10/2019 CHIEF COMPLAINT:  He was brought by his daughters due to fall. History was obtained from the patient, but primarily from his daughters  as well as review of her records. The patient was brought from home. According to the daughters, they found him lying on the floor when they checked on him around 11:00. The daughters think he might have been down since around 3:00. The daughter suspect possible breakthrough seizure, but they  added usually when he had seizures he has an auditory aura, but did not hear anything last night. The patient has been having some cough and congestion for the last 3 days and all of them had what looked like upper respiratory viral infection. Patient does have any recollection of what happened. He did not know if he seized. No fever, nausea or vomiting. He is usually incontinent. No dysuria, no diarrhea. In the emergency room, he was evaluated and the test results show white cell count of 11.6. Urine examination negative for UTI and serum chemistry showed a BUN of 50, creatinine of 2.76. BUN was 28 and creatinine 1.45 eight days ago. Sodium is 145. Anion gap is 16, blood sugar 141, AST is 250, alk phos is 70, ammonia is 27. Blood gas venous pH 7.356. CAT scan of the head showed no acute intracranial process. It showed old lacunar infarct in the right internal capsule and short right frontal extracranial soft tissue swelling. Cervical spine CAT scan showed no acute fracture or subluxation. It showed patchy airspace disease, ground glass opacities in both lungs that may represent pulmonary edema or pneumonia. A portable chest x-ray showed unchanged cardiomegaly. No evidence of acute cardiopulmonary process. Treatment in the ED thus far include Tylenol, Zithromax oral first dose was administered. PAST MEDICAL HISTORY:  Includes: 1. Coronary artery disease. He has had CABG twice. 2.  Chronic systolic heart failure. 3.  Type 2 diabetes. 4.  Hypertension. 5.  Dementia. 6.  Seizure disorder. 7.  Chronic liver disease and cirrhosis. CURRENT MEDICATIONS:  Confirmed per daughter, also reviewed by pharmacy: 1. Carvedilol 6.25 mg b.i.d. 2.  Amaryl 2 mg twice a day. 3.  Venlafaxine 150 mg daily in the morning and 75 in the evening. 4.  Lisinopril 10 mg daily. 5.  Inspra 1 tablet daily. 6.  Rifaximin. He is not taking this because they cannot afford. 7.  Depakote 500 mg twice a day. 8.  Keppra 750 mg twice a day. 9.  Plavix 75 mg daily. 10.  Imdur 30 mg daily. 11.  Prozac 20 mg daily. 12.  Klonopin 0.5 mg at night. 13.  Xanax 1 mg twice a day. 14.  Ranitidine 150 mg twice a day. 15.  Aspirin 81 mg daily. 16.  Lipitor 80 mg daily. 17.  Protonix 50 mg daily. 18.  Flomax 0.4 mg daily. 19.  Seroquel 100 mg prescribed. 20.  They are using 25 mg nitroglycerin as needed. 21.  Lactulose. ALLERGIES:  CODEINE. THEY SAID HE HAS TOLERATED FENTANYL PREVIOUSLY, DEMEROL, MUSHROOM, METFORMIN, WELLBUTRIN. SOCIAL HISTORY:  . He is lives with his daughters. He walks around the house, otherwise limited. He has dementia. REVIEW OF SYSTEMS:  A recent cough and congestion, now fall. The rest of the review of systems is limited due to patient's cognitive impairment. He has pain on the left side. PHYSICAL EXAMINATION: 
GENERAL:  Patient is lying on the exam table comfortably. He has a C collar on. He is not in any cardiorespiratory distress. VITAL SIGNS:  Temperature 97.4, pulse 79, blood pressure 136/92. HEENT:  He is swollen on the right frontal head with a superficial laceration and hilary of his CPAP machine. Pupils are equal and reactive. No pallor or jaundice. NECK:  He is in a C collar. LUNGS:  Clear air entry. CARDIOVASCULAR:  S1, S2 regular. We heard no gallop, murmur or rub. ABDOMEN:  Soft, obese, nontender. EXTREMITIES:  No edema. He has abrasions on the elbow on the left knee. MUSCULOSKELETAL:  He has intact range of motion. No gross deformity. CENTRAL NERVOUS SYSTEM:  The patient is alert, oriented to person and place and focal exam.  
 
CLINICAL DATA AND LAB:  Reviewed. ASSESSMENT AND PLAN:   
1.  Fall. It was not witnessed. There is concern that he might have breakthrough seizure. The last seizure he had was over a year ago. The patient did not demonstrate neuro signs. He will be observed with seizure precautions. His antiepileptics will be continued. We will obtain an EEG. CAT scan is negative showing laceration on forehead 2. Seizure with suspicion of break through. Management as discussed above. The patient is on psychoactive medications with Prozac. He is on pregabalin, Seroquel, Xanax, Klonopin, which he has been taking for a while. This is confirmed with daughters and also pharmacy as reviewed and thus as such is re-ordered as home admission. 3.Elevated liver enzymes, AST significantly. Denied alcohol. This could be from the dehydration. Continue IV fluid. We will check CMP. Patient has cirrhosis. Patient's daughter states from diabetes. They denied history of hepatitis C or chronic alcohol use. His statin will be stopped. 4.  Anion gap metabolic acidosis due to dehydration. PH is quite unremarkable. We will recheck CMP after IV fluid overnight. 5.Acute kidney injury on chronic kidney disease. We will hold his lisinopril. We will continue with IV hydration,  
5. Possible pneumonia as reported by CT of the neck. Chest  x-ray is unremarkable. We will continue on ceftriaxone and Zithromax. We will obtain a dedicated chest CT. 7. Chronic liver disease. Liver function seems to be stable. Isolated AST elevation. His liver enzyme was normal about 8 days ago.   We will hold Lipitor, hydrate, and recheck tomorrow. Patient has encephalopathy in the past.  Ammonia is normal.  He is stated to have dementia. He is at baseline mental status as per family. 8. Prophylaxis. Gastrointestinal:  He is on therapeutic dose. DVT with SCD. 9.Type 2 diabetes. Blood sugar is in the 140s and that is high. It is not DKA. It is most probably due to the dehydration. We will use sliding scale insulin. We will be holding the oral hypoglycemic agent. 10.  Chronic systolic, congestive heart failure. No congestion. His home medications except lisinopril will be continued for now. Gentle hydration overnight due to dehydration. Last echocardiogram on record is from a year agoshowed an EF of 25-30%. 10.  Advanced plan of care discussed with daughters. Full. Discussed with his daughters at bedside. Addendum 
-After H&P was dictated,CK results that was ordered  to be added on came back high,given TACOS on CKD,this is suggestive of rhabdomyolysis. Continyue hydration and monitor renal function and CK level and may consider nephrology consultation if renal function does not improve or worsens. MD ALONDRA Newby/MAGALI 
D: 01/10/2019 19:24    
T: 01/10/2019 20:33 
JOB #: [id number]

## 2019-01-12 ENCOUNTER — APPOINTMENT (OUTPATIENT)
Dept: CT IMAGING | Age: 64
DRG: 682 | End: 2019-01-12
Attending: FAMILY MEDICINE
Payer: MEDICARE

## 2019-01-12 LAB
ALBUMIN SERPL-MCNC: 2.1 G/DL (ref 3.5–5)
ALBUMIN SERPL-MCNC: 2.2 G/DL (ref 3.5–5)
ALBUMIN/GLOB SERPL: 0.6 {RATIO} (ref 1.1–2.2)
ALBUMIN/GLOB SERPL: 0.6 {RATIO} (ref 1.1–2.2)
ALP SERPL-CCNC: 57 U/L (ref 45–117)
ALP SERPL-CCNC: 58 U/L (ref 45–117)
ALT SERPL-CCNC: 118 U/L (ref 12–78)
ALT SERPL-CCNC: 122 U/L (ref 12–78)
ANION GAP SERPL CALC-SCNC: 10 MMOL/L (ref 5–15)
ANION GAP SERPL CALC-SCNC: 9 MMOL/L (ref 5–15)
AST SERPL-CCNC: 121 U/L (ref 15–37)
AST SERPL-CCNC: 157 U/L (ref 15–37)
BASOPHILS # BLD: 0 K/UL (ref 0–0.1)
BASOPHILS NFR BLD: 0 % (ref 0–1)
BILIRUB DIRECT SERPL-MCNC: 0.2 MG/DL (ref 0–0.2)
BILIRUB SERPL-MCNC: 0.5 MG/DL (ref 0.2–1)
BILIRUB SERPL-MCNC: 0.6 MG/DL (ref 0.2–1)
BUN SERPL-MCNC: 40 MG/DL (ref 6–20)
BUN SERPL-MCNC: 41 MG/DL (ref 6–20)
BUN/CREAT SERPL: 25 (ref 12–20)
BUN/CREAT SERPL: 26 (ref 12–20)
CALCIUM SERPL-MCNC: 7.4 MG/DL (ref 8.5–10.1)
CALCIUM SERPL-MCNC: 7.5 MG/DL (ref 8.5–10.1)
CHLORIDE SERPL-SCNC: 108 MMOL/L (ref 97–108)
CHLORIDE SERPL-SCNC: 109 MMOL/L (ref 97–108)
CK SERPL-CCNC: 1586 U/L (ref 39–308)
CO2 SERPL-SCNC: 20 MMOL/L (ref 21–32)
CO2 SERPL-SCNC: 20 MMOL/L (ref 21–32)
CREAT SERPL-MCNC: 1.57 MG/DL (ref 0.7–1.3)
CREAT SERPL-MCNC: 1.59 MG/DL (ref 0.7–1.3)
DIFFERENTIAL METHOD BLD: ABNORMAL
EOSINOPHIL # BLD: 0.1 K/UL (ref 0–0.4)
EOSINOPHIL NFR BLD: 1 % (ref 0–7)
ERYTHROCYTE [DISTWIDTH] IN BLOOD BY AUTOMATED COUNT: 17.1 % (ref 11.5–14.5)
GLOBULIN SER CALC-MCNC: 3.5 G/DL (ref 2–4)
GLOBULIN SER CALC-MCNC: 3.9 G/DL (ref 2–4)
GLUCOSE BLD STRIP.AUTO-MCNC: 106 MG/DL (ref 65–100)
GLUCOSE BLD STRIP.AUTO-MCNC: 136 MG/DL (ref 65–100)
GLUCOSE BLD STRIP.AUTO-MCNC: 139 MG/DL (ref 65–100)
GLUCOSE BLD STRIP.AUTO-MCNC: 141 MG/DL (ref 65–100)
GLUCOSE SERPL-MCNC: 101 MG/DL (ref 65–100)
GLUCOSE SERPL-MCNC: 103 MG/DL (ref 65–100)
HCT VFR BLD AUTO: 43.6 % (ref 36.6–50.3)
HGB BLD-MCNC: 13.5 G/DL (ref 12.1–17)
IMM GRANULOCYTES # BLD AUTO: 0.1 K/UL (ref 0–0.04)
IMM GRANULOCYTES NFR BLD AUTO: 1 % (ref 0–0.5)
LYMPHOCYTES # BLD: 1 K/UL (ref 0.8–3.5)
LYMPHOCYTES NFR BLD: 10 % (ref 12–49)
MCH RBC QN AUTO: 23.8 PG (ref 26–34)
MCHC RBC AUTO-ENTMCNC: 31 G/DL (ref 30–36.5)
MCV RBC AUTO: 76.8 FL (ref 80–99)
MONOCYTES # BLD: 0.9 K/UL (ref 0–1)
MONOCYTES NFR BLD: 9 % (ref 5–13)
NEUTS SEG # BLD: 7.6 K/UL (ref 1.8–8)
NEUTS SEG NFR BLD: 79 % (ref 32–75)
NRBC # BLD: 0 K/UL (ref 0–0.01)
NRBC BLD-RTO: 0 PER 100 WBC
PLATELET # BLD AUTO: 142 K/UL (ref 150–400)
PMV BLD AUTO: 10.5 FL (ref 8.9–12.9)
POTASSIUM SERPL-SCNC: 4.1 MMOL/L (ref 3.5–5.1)
POTASSIUM SERPL-SCNC: 4.2 MMOL/L (ref 3.5–5.1)
PROT SERPL-MCNC: 5.6 G/DL (ref 6.4–8.2)
PROT SERPL-MCNC: 6.1 G/DL (ref 6.4–8.2)
RBC # BLD AUTO: 5.68 M/UL (ref 4.1–5.7)
SERVICE CMNT-IMP: ABNORMAL
SODIUM SERPL-SCNC: 138 MMOL/L (ref 136–145)
SODIUM SERPL-SCNC: 138 MMOL/L (ref 136–145)
WBC # BLD AUTO: 9.7 K/UL (ref 4.1–11.1)

## 2019-01-12 PROCEDURE — 80053 COMPREHEN METABOLIC PANEL: CPT

## 2019-01-12 PROCEDURE — 85025 COMPLETE CBC W/AUTO DIFF WBC: CPT

## 2019-01-12 PROCEDURE — 74011250637 HC RX REV CODE- 250/637: Performed by: HOSPITALIST

## 2019-01-12 PROCEDURE — 73200 CT UPPER EXTREMITY W/O DYE: CPT

## 2019-01-12 PROCEDURE — 74011000258 HC RX REV CODE- 258: Performed by: HOSPITALIST

## 2019-01-12 PROCEDURE — 93971 EXTREMITY STUDY: CPT

## 2019-01-12 PROCEDURE — 82550 ASSAY OF CK (CPK): CPT

## 2019-01-12 PROCEDURE — 36415 COLL VENOUS BLD VENIPUNCTURE: CPT

## 2019-01-12 PROCEDURE — 74011250636 HC RX REV CODE- 250/636: Performed by: HOSPITALIST

## 2019-01-12 PROCEDURE — 80076 HEPATIC FUNCTION PANEL: CPT

## 2019-01-12 PROCEDURE — 82962 GLUCOSE BLOOD TEST: CPT

## 2019-01-12 PROCEDURE — 65660000000 HC RM CCU STEPDOWN

## 2019-01-12 PROCEDURE — 74011636637 HC RX REV CODE- 636/637: Performed by: HOSPITALIST

## 2019-01-12 PROCEDURE — 74011250637 HC RX REV CODE- 250/637: Performed by: FAMILY MEDICINE

## 2019-01-12 PROCEDURE — 74011250637 HC RX REV CODE- 250/637: Performed by: PSYCHIATRY & NEUROLOGY

## 2019-01-12 PROCEDURE — 74011250636 HC RX REV CODE- 250/636: Performed by: INTERNAL MEDICINE

## 2019-01-12 RX ORDER — AMLODIPINE BESYLATE 5 MG/1
2.5 TABLET ORAL DAILY
Status: DISCONTINUED | OUTPATIENT
Start: 2019-01-12 | End: 2019-01-16 | Stop reason: HOSPADM

## 2019-01-12 RX ORDER — FAMOTIDINE 20 MG/1
20 TABLET, FILM COATED ORAL EVERY 12 HOURS
Status: DISCONTINUED | OUTPATIENT
Start: 2019-01-12 | End: 2019-01-16 | Stop reason: HOSPADM

## 2019-01-12 RX ORDER — SODIUM CHLORIDE 9 MG/ML
75 INJECTION, SOLUTION INTRAVENOUS CONTINUOUS
Status: DISCONTINUED | OUTPATIENT
Start: 2019-01-12 | End: 2019-01-15

## 2019-01-12 RX ADMIN — Medication 10 ML: at 05:45

## 2019-01-12 RX ADMIN — LEVETIRACETAM 1000 MG: 500 TABLET, FILM COATED ORAL at 08:05

## 2019-01-12 RX ADMIN — SODIUM CHLORIDE 75 ML/HR: 900 INJECTION, SOLUTION INTRAVENOUS at 22:08

## 2019-01-12 RX ADMIN — PANTOPRAZOLE SODIUM 40 MG: 40 TABLET, DELAYED RELEASE ORAL at 05:44

## 2019-01-12 RX ADMIN — CARVEDILOL 6.25 MG: 6.25 TABLET, FILM COATED ORAL at 08:05

## 2019-01-12 RX ADMIN — Medication 10 ML: at 12:21

## 2019-01-12 RX ADMIN — FAMOTIDINE 20 MG: 20 TABLET ORAL at 18:24

## 2019-01-12 RX ADMIN — PREGABALIN 50 MG: 25 CAPSULE ORAL at 22:01

## 2019-01-12 RX ADMIN — LACTULOSE 30 G: 20 SOLUTION ORAL at 16:28

## 2019-01-12 RX ADMIN — CARVEDILOL 6.25 MG: 6.25 TABLET, FILM COATED ORAL at 16:29

## 2019-01-12 RX ADMIN — INSULIN LISPRO 2 UNITS: 100 INJECTION, SOLUTION INTRAVENOUS; SUBCUTANEOUS at 12:21

## 2019-01-12 RX ADMIN — TAMSULOSIN HYDROCHLORIDE 0.4 MG: 0.4 CAPSULE ORAL at 05:44

## 2019-01-12 RX ADMIN — LACTULOSE 30 G: 20 SOLUTION ORAL at 22:02

## 2019-01-12 RX ADMIN — PANTOPRAZOLE SODIUM 40 MG: 40 TABLET, DELAYED RELEASE ORAL at 16:29

## 2019-01-12 RX ADMIN — CEFTRIAXONE 1 G: 1 INJECTION, POWDER, FOR SOLUTION INTRAMUSCULAR; INTRAVENOUS at 18:24

## 2019-01-12 RX ADMIN — FAMOTIDINE 20 MG: 20 TABLET ORAL at 05:44

## 2019-01-12 RX ADMIN — ISOSORBIDE MONONITRATE 30 MG: 30 TABLET ORAL at 08:05

## 2019-01-12 RX ADMIN — TAMSULOSIN HYDROCHLORIDE 0.4 MG: 0.4 CAPSULE ORAL at 16:30

## 2019-01-12 RX ADMIN — CLONAZEPAM 0.5 MG: 0.5 TABLET ORAL at 22:01

## 2019-01-12 RX ADMIN — LACTULOSE 30 G: 20 SOLUTION ORAL at 08:04

## 2019-01-12 RX ADMIN — FENTANYL CITRATE 25 MCG: 50 INJECTION, SOLUTION INTRAMUSCULAR; INTRAVENOUS at 13:52

## 2019-01-12 RX ADMIN — FENTANYL CITRATE 25 MCG: 50 INJECTION, SOLUTION INTRAMUSCULAR; INTRAVENOUS at 08:04

## 2019-01-12 RX ADMIN — SODIUM CHLORIDE 75 ML/HR: 900 INJECTION, SOLUTION INTRAVENOUS at 10:56

## 2019-01-12 RX ADMIN — VENLAFAXINE HYDROCHLORIDE 150 MG: 150 CAPSULE, EXTENDED RELEASE ORAL at 08:05

## 2019-01-12 RX ADMIN — PREGABALIN 50 MG: 25 CAPSULE ORAL at 16:29

## 2019-01-12 RX ADMIN — CLOPIDOGREL BISULFATE 75 MG: 75 TABLET ORAL at 08:05

## 2019-01-12 RX ADMIN — Medication 10 ML: at 22:01

## 2019-01-12 RX ADMIN — VENLAFAXINE HYDROCHLORIDE 75 MG: 37.5 CAPSULE, EXTENDED RELEASE ORAL at 22:01

## 2019-01-12 RX ADMIN — LEVETIRACETAM 1000 MG: 500 TABLET, FILM COATED ORAL at 22:01

## 2019-01-12 RX ADMIN — QUETIAPINE FUMARATE 25 MG: 25 TABLET ORAL at 22:01

## 2019-01-12 RX ADMIN — PREGABALIN 50 MG: 25 CAPSULE ORAL at 08:04

## 2019-01-12 RX ADMIN — AZITHROMYCIN 250 MG: 250 TABLET, FILM COATED ORAL at 16:29

## 2019-01-12 RX ADMIN — FLUOXETINE 20 MG: 20 CAPSULE ORAL at 08:05

## 2019-01-12 RX ADMIN — ASPIRIN 81 MG CHEWABLE TABLET 81 MG: 81 TABLET CHEWABLE at 08:05

## 2019-01-12 RX ADMIN — FENTANYL CITRATE 25 MCG: 50 INJECTION, SOLUTION INTRAMUSCULAR; INTRAVENOUS at 22:14

## 2019-01-12 RX ADMIN — AMLODIPINE BESYLATE 2.5 MG: 5 TABLET ORAL at 14:37

## 2019-01-12 NOTE — PROCEDURES
[facility]  *** FINAL REPORT ***    Name: Tracy Ricci  MRN: [id number]    Inpatient  : 1955  HIS Order #: [id number]  [id number] [facility] Visit #: [id number]  Date: 2019    TYPE OF TEST: Peripheral Venous Testing    REASON FOR TEST  Limb swelling    Left Arm:-  Deep venous thrombosis:           No  Superficial venous thrombosis:    No      INTERPRETATION/FINDINGS  PROCEDURE:  Color duplex ultrasound imaging of upper extremity veins. FINDINGS:       Right:  The subclavian vein is patent and without evidence of  thrombus. Phasic/pulsatile flow is observed. This side was not  otherwise evaluated. Left:    The internal jugular, subclavian, axillary, brachial,  basilic, and cephalic veins are patent and without evidence of  thrombus;  each is fully compressible and there is no narrowing of the   flow channel on color Doppler imaging. The internal jugular was not  clearly seen due to small vessel size. Phasic/pulsatile flow is  observed in the subclavian vein. IMPRESSION:  No evidence of left upper extremity vein thrombosis where   visualized. ADDITIONAL COMMENTS    I have personally reviewed the data relevant to the interpretation of  this  study.     TECHNOLOGIST: Pauline Woody RVT  Signed: 2019 02:56 PM    PHYSICIAN: Eneida Rutherford MD  Signed: 2019 11:09 AM

## 2019-01-12 NOTE — PROGRESS NOTES
Problem: Pressure Injury - Risk of 
Goal: *Prevention of pressure injury Document Iván Scale and appropriate interventions in the flowsheet. Outcome: Progressing Towards Goal 
Pressure Injury Interventions: 
  
 
Moisture Interventions: Absorbent underpads, Apply protective barrier, creams and emollients, Minimize layers, Maintain skin hydration (lotion/cream) Activity Interventions: Increase time out of bed Mobility Interventions: Float heels, HOB 30 degrees or less, Pressure redistribution bed/mattress (bed type) Nutrition Interventions: Document food/fluid/supplement intake Friction and Shear Interventions: HOB 30 degrees or less, Minimize layers, Apply protective barrier, creams and emollients

## 2019-01-12 NOTE — PROGRESS NOTES
Hospitalist Progress Note Bonnie Jefferson MD 
Answering service: [phone] OR [phone] from in house phone Cell:   
  
Date of Service:  2019 NAME:  Elian Morales. :  1955 MRN:  [id number] Admission Summary:  
The patient was brought from home. According to the daughters, they found him lying on the floor when they checked on him around 11:00. The daughters think he might have been down since around 3:00. The daughter suspect possible breakthrough seizure, but they  added usually when he had seizures he has an auditory aura, but did not hear anything last night. The patient has been having some cough and congestion for the last 3 days and all of them had what looked like upper respiratory viral infection. Interval history / Subjective:  
  
Feels better Left shoulder not able to lift , pain Assessment & Plan: TACOS due to rhabdomyolysis Due to glf , CK 8k , improving with IVF , nephrology on board Continue to trend bmp, CK Nephrology on board Left shoulder pain and immobility 
cxr left shoulder , no fracture  Rotator cuff pathology Consult ortho before discharge Possible PNA Mild bilateral patchy upper lobe groundglass opacities are nonspecific but could 
represent early pneumonia. On antibiotics ,blood culture neg so far Possible seizure Seen by neuro , increased keppra and stopped lamictal due to abnormal LFT Abnormal lft He has underlying BARGER And superimposed rhabdo Coronary artery disease. He has had CABG twice. Chronic systolic heart failure. .  Type 2 diabetes. accuchecks , SSI Annamary Ripa Hypertension. .  Dementia. .  Seizure disorder. Chronic liver disease and cirrhosis. Code status: full DVT prophylaxis:  
 
Care Plan discussed with: Patient/Family Disposition: TBD Hospital Problems  Date Reviewed: 2019 Codes Class Noted POA Fall ICD-10-CM: W19. Sarah Vaughn ICD-9-CM: H109.0  1/10/2019 Unknown * (Principal) [facility] ICD-10-CM: N17.9 ICD-9-CM: 584.9  1/10/2019 Unknown Review of Systems: A comprehensive review of systems was negative except for that written in the HPI. Vital Signs:  
 Last 24hrs VS reviewed since prior progress note. Most recent are: 
Visit Vitals /71 (BP 1 Location: Right arm, BP Patient Position: At rest) Pulse 78 Temp 98.1 °F (36.7 °C) Resp 17 Ht 5' 9\" (1.753 m) Wt 95.3 kg (210 lb) SpO2 94% BMI 31.01 kg/m² Intake/Output Summary (Last 24 hours) at 1/12/2019 0021 Last data filed at 1/11/2019 1635 Gross per 24 hour Intake  Output 400 ml Net -400 ml Physical Examination:  
 
 
     
Constitutional:  No acute distress, cooperative, pleasant , scalp abrasion ENT:  Oral mucous moist, oropharynx benign. Neck supple, Resp:  CTA bilaterally. No wheezing/rhonchi/rales. No accessory muscle use CV:  Regular rhythm, normal rate, no murmurs, gallops, rubs GI:  Soft, non distended, non tender. normoactive bowel sounds, no hepatosplenomegaly Musculoskeletal:  No edema, warm, 2+ pulses throughout Neurologic:  left shoulder not moving. AAOx3, CN II-XII reviewed Psych:  Good insight, Not anxious nor agitated. Data Review:  
 Review and/or order of clinical lab test 
 
 
Labs:  
 
Recent Labs  
  01/11/19 
0410 01/10/19 
1511 WBC 10.4 11.6* HGB 13.9 14.4 HCT 42.7 44.9  161 Recent Labs  
  01/11/19 
0410 01/10/19 
1511  135* K 3.5 3.7  102 CO2 20* 17* BUN 52* 50* CREA 2.17* 2.79* * 141* CA 7.5* 7.8* Recent Labs  
  01/11/19 
0410 01/10/19 
1511 SGOT 245* 250* ALT 65 52 AP 55 70 TBILI 0.6 1.0 TP 6.3* 7.1 ALB 2.3* 2.7*  
GLOB 4.0 4.4* No results for input(s): INR, PTP, APTT in the last 72 hours. No lab exists for component: INREXT No results for input(s): FE, TIBC, PSAT, FERR in the last 72 hours. No results found for: FOL, RBCF No results for input(s): PH, PCO2, PO2 in the last 72 hours. Recent Labs  
  01/11/19 
0410 01/10/19 
1511 CPK 6,986* 8,947* No results found for: CHOL, CHOLX, CHLST, CHOLV, HDL, LDL, LDLC, DLDLP, TGLX, TRIGL, TRIGP, CHHD, CHHDX Lab Results Component Value Date/Time Glucose (POC) 114 (H) 01/11/2019 10:02 PM  
 Glucose (POC) 116 (H) 01/11/2019 04:09 PM  
 Glucose (POC) 114 (H) 01/11/2019 12:52 PM  
 Glucose (POC) 95 01/11/2019 09:34 AM  
 Glucose (POC) 145 (H) 01/10/2019 09:31 PM  
 
Lab Results Component Value Date/Time Color DARK YELLOW 01/10/2019 03:53 PM  
 Appearance CLOUDY (A) 01/10/2019 03:53 PM  
 Specific gravity 1.020 01/10/2019 03:53 PM  
 pH (UA) 5.5 01/10/2019 03:53 PM  
 Protein 100 (A) 01/10/2019 03:53 PM  
 Glucose NEGATIVE  01/10/2019 03:53 PM  
 Ketone TRACE (A) 01/10/2019 03:53 PM  
 Bilirubin NEGATIVE  09/07/2018 09:16 PM  
 Urobilinogen 1.0 01/10/2019 03:53 PM  
 Nitrites NEGATIVE  01/10/2019 03:53 PM  
 Leukocyte Esterase NEGATIVE  01/10/2019 03:53 PM  
 Epithelial cells FEW 01/10/2019 03:53 PM  
 Bacteria NEGATIVE  01/10/2019 03:53 PM  
 WBC 0-4 01/10/2019 03:53 PM  
 RBC 0-5 01/10/2019 03:53 PM  
 
 
 
Medications Reviewed:  
 
Current Facility-Administered Medications Medication Dose Route Frequency  venlafaxine-SR (EFFEXOR-XR) capsule 150 mg  150 mg Oral DAILY AFTER BREAKFAST  levETIRAcetam (KEPPRA) tablet 1,000 mg  1,000 mg Oral Q12H  
 influenza vaccine 2018-19 (6 mos+)(PF) (FLUARIX QUAD/FLULAVAL QUAD) injection 0.5 mL  0.5 mL IntraMUSCular PRIOR TO DISCHARGE  sodium chloride (NS) flush 5-40 mL  5-40 mL IntraVENous Q8H  
 sodium chloride (NS) flush 5-40 mL  5-40 mL IntraVENous PRN  
 ALPRAZolam (XANAX) tablet 1 mg  1 mg Oral Q6H PRN  
 aspirin chewable tablet 81 mg  81 mg Oral DAILY  clonazePAM (KlonoPIN) tablet 0.5 mg  0.5 mg Oral QHS  clopidogrel (PLAVIX) tablet 75 mg  75 mg Oral DAILY  FLUoxetine (PROzac) capsule 20 mg  20 mg Oral DAILY  isosorbide mononitrate ER (IMDUR) tablet 30 mg  30 mg Oral DAILY  lactulose (CHRONULAC) solution 30 g  30 g Oral TID  nitroglycerin (NITROSTAT) tablet 0.4 mg  0.4 mg SubLINGual Q5MIN PRN  pantoprazole (PROTONIX) tablet 40 mg  40 mg Oral ACB&D  pregabalin (LYRICA) capsule 50 mg  50 mg Oral TID  QUEtiapine (SEROquel) tablet 25 mg  25 mg Oral QHS  famotidine (PEPCID) tablet 20 mg  20 mg Oral ACB  tamsulosin (FLOMAX) capsule 0.4 mg  0.4 mg Oral ACB&D  
 venlafaxine-SR (EFFEXOR-XR) capsule 75 mg  75 mg Oral QHS  cefTRIAXone (ROCEPHIN) 1 g in 0.9% sodium chloride (MBP/ADV) 50 mL  1 g IntraVENous Q24H  
 azithromycin (ZITHROMAX) tablet 250 mg  250 mg Oral DAILY  carvedilol (COREG) tablet 6.25 mg  6.25 mg Oral BID WITH MEALS  insulin lispro (HUMALOG) injection   SubCUTAneous AC&HS  
 glucose chewable tablet 16 g  4 Tab Oral PRN  
 dextrose (D50W) injection syrg 12.5-25 g  12.5-25 g IntraVENous PRN  
 glucagon (GLUCAGEN) injection 1 mg  1 mg IntraMUSCular PRN  
 fentaNYL citrate (PF) injection 25 mcg  25 mcg IntraVENous Q4H PRN  
 
______________________________________________________________________ EXPECTED LENGTH OF STAY: - - - 
ACTUAL LENGTH OF STAY:          1 Bethany Greene MD

## 2019-01-12 NOTE — PROGRESS NOTES
Hospitalist Progress Note Suzie Acosta MD 
Answering service: [phone] OR [phone] from in house phone Cell:   
  
Date of Service:  2019 NAME:  Diann Ames. :  1955 MRN:  [id number] Admission Summary:  
The patient was brought from home. According to the daughters, they found him lying on the floor when they checked on him around 11:00. The daughters think he might have been down since around 3:00. The daughter suspect possible breakthrough seizure, but they  added usually when he had seizures he has an auditory aura, but did not hear anything last night. The patient has been having some cough and congestion for the last 3 days and all of them had what looked like upper respiratory viral infection. Interval history / Subjective:  
  
Feels better Continues to have left arm and shoulder Assessment & Plan: TACOS due to rhabdomyolysis Due to glf , CK trending down , improving with IVF , nephrology on board Continue to trend bmp, CK Nephrology on board Holding lasix and inspra Left shoulder pain and immobility 
cxr left shoulder , no fracture  ? Rotator cuff pathology Continues to have significant pain and discomfort Will order CT shoulder and elbow Ortho consult Consult ortho before discharge Possible PNA Mild bilateral patchy upper lobe groundglass opacities are nonspecific but could 
represent early pneumonia. On antibiotics ,blood culture neg so far Possible seizure Seen by neuro , increased keppra and stopped lamictal due to abnormal LFT Abnormal lft He has underlying BARGER And superimposed rhabdo Repeat LFT's ordered Coronary artery disease. He has had CABG twice. Chronic systolic heart failure. Stable, strict I/O Type 2 diabetes. accuchecks , SSI Mary Moran Hypertension. Suboptimally controlled Will add amlodipine Mary Belch Dementia. .  Seizure disorder. Chronic liver disease and cirrhosis. Code status: full DVT prophylaxis:  
 
Care Plan discussed with: Patient/Family Disposition: TBD Hospital Problems  Date Reviewed: 1/2/2019 Codes Class Noted POA Fall ICD-10-CM: W19. Marlin Gauthier ICD-9-CM: B760.0  1/10/2019 Unknown * (Principal) TACOS (acute kidney injury) ([facility] ICD-10-CM: N17.9 ICD-9-CM: 584.9  1/10/2019 Unknown Review of Systems: A comprehensive review of systems was negative except for that written in the HPI. Vital Signs:  
 Last 24hrs VS reviewed since prior progress note. Most recent are: 
Visit Vitals /56 (BP 1 Location: Right arm, BP Patient Position: At rest) Pulse 81 Temp 97.5 °F (36.4 °C) Resp 16 Ht 5' 9\" (1.753 m) Wt 98 kg (216 lb) SpO2 96% BMI 31.90 kg/m² Intake/Output Summary (Last 24 hours) at 1/12/2019 1344 Last data filed at 1/12/2019 0200 Gross per 24 hour Intake  Output 1000 ml Net -1000 ml Physical Examination:  
 
 
     
Constitutional:  No acute distress, cooperative, pleasant , scalp abrasion ENT:  Oral mucous moist, oropharynx benign. Neck supple, Resp:  decreased basal BS   
CV:  Regular rhythm, normal rate, no murmurs, gallops, rubs GI:  Soft, non distended, non tender. normoactive bowel sounds, no hepatosplenomegaly Musculoskeletal:  No edema, warm, 2+ pulses throughout Neurologic:  left shoulder not moving. AAOx2, Psych:  Good insight, Not anxious nor agitated. Data Review:  
 Review and/or order of clinical lab test 
 
 
Labs:  
 
Recent Labs  
  01/12/19 
0817 01/11/19 
0410 WBC 9.7 10.4 HGB 13.5 13.9 HCT 43.6 42.7 * 161 Recent Labs  
  01/12/19 
0553 01/11/19 
0410 01/10/19 
1511   138 137 135* K 4.1  4.2 3.5 3.7   109* 105 102 CO2 20*  20* 20* 17* BUN 41*  40* 52* 50* CREA 1.57*  1.59* 2.17* 2.79* *  101* 102* 141* CA 7.4*  7.5* 7.5* 7.8* Recent Labs  
  01/12/19 
0553 01/11/19 
0410 01/10/19 
1511 SGOT 157* 245* 250* * 65 52 AP 58 55 70 TBILI 0.6 0.6 1.0 TP 6.1* 6.3* 7.1 ALB 2.2* 2.3* 2.7*  
GLOB 3.9 4.0 4.4* No results for input(s): INR, PTP, APTT in the last 72 hours. No lab exists for component: INREXT, INREXT No results for input(s): FE, TIBC, PSAT, FERR in the last 72 hours. No results found for: FOL, RBCF No results for input(s): PH, PCO2, PO2 in the last 72 hours. Recent Labs  
  01/12/19 
0553 01/11/19 0410 01/10/19 
1511 CPK 1,586* 6,986* 8,947* No results found for: CHOL, CHOLX, CHLST, CHOLV, HDL, LDL, LDLC, DLDLP, TGLX, TRIGL, TRIGP, CHHD, CHHDX Lab Results Component Value Date/Time Glucose (POC) 141 (H) 01/12/2019 11:49 AM  
 Glucose (POC) 106 (H) 01/12/2019 07:57 AM  
 Glucose (POC) 114 (H) 01/11/2019 10:02 PM  
 Glucose (POC) 116 (H) 01/11/2019 04:09 PM  
 Glucose (POC) 114 (H) 01/11/2019 12:52 PM  
 
Lab Results Component Value Date/Time Color DARK YELLOW 01/10/2019 03:53 PM  
 Appearance CLOUDY (A) 01/10/2019 03:53 PM  
 Specific gravity 1.020 01/10/2019 03:53 PM  
 pH (UA) 5.5 01/10/2019 03:53 PM  
 Protein 100 (A) 01/10/2019 03:53 PM  
 Glucose NEGATIVE  01/10/2019 03:53 PM  
 Ketone TRACE (A) 01/10/2019 03:53 PM  
 Bilirubin NEGATIVE  09/07/2018 09:16 PM  
 Urobilinogen 1.0 01/10/2019 03:53 PM  
 Nitrites NEGATIVE  01/10/2019 03:53 PM  
 Leukocyte Esterase NEGATIVE  01/10/2019 03:53 PM  
 Epithelial cells FEW 01/10/2019 03:53 PM  
 Bacteria NEGATIVE  01/10/2019 03:53 PM  
 WBC 0-4 01/10/2019 03:53 PM  
 RBC 0-5 01/10/2019 03:53 PM  
 
 
 
Medications Reviewed:  
 
Current Facility-Administered Medications Medication Dose Route Frequency  0.9% sodium chloride infusion  75 mL/hr IntraVENous CONTINUOUS  
 venlafaxine-SR (EFFEXOR-XR) capsule 150 mg  150 mg Oral DAILY AFTER BREAKFAST  levETIRAcetam (KEPPRA) tablet 1,000 mg  1,000 mg Oral Q12H  influenza vaccine 2018-19 (6 mos+)(PF) (FLUARIX QUAD/FLULAVAL QUAD) injection 0.5 mL  0.5 mL IntraMUSCular PRIOR TO DISCHARGE  sodium chloride (NS) flush 5-40 mL  5-40 mL IntraVENous Q8H  
 sodium chloride (NS) flush 5-40 mL  5-40 mL IntraVENous PRN  
 ALPRAZolam (XANAX) tablet 1 mg  1 mg Oral Q6H PRN  
 aspirin chewable tablet 81 mg  81 mg Oral DAILY  clonazePAM (KlonoPIN) tablet 0.5 mg  0.5 mg Oral QHS  clopidogrel (PLAVIX) tablet 75 mg  75 mg Oral DAILY  FLUoxetine (PROzac) capsule 20 mg  20 mg Oral DAILY  isosorbide mononitrate ER (IMDUR) tablet 30 mg  30 mg Oral DAILY  lactulose (CHRONULAC) solution 30 g  30 g Oral TID  nitroglycerin (NITROSTAT) tablet 0.4 mg  0.4 mg SubLINGual Q5MIN PRN  pantoprazole (PROTONIX) tablet 40 mg  40 mg Oral ACB&D  pregabalin (LYRICA) capsule 50 mg  50 mg Oral TID  QUEtiapine (SEROquel) tablet 25 mg  25 mg Oral QHS  famotidine (PEPCID) tablet 20 mg  20 mg Oral ACB  tamsulosin (FLOMAX) capsule 0.4 mg  0.4 mg Oral ACB&D  
 venlafaxine-SR (EFFEXOR-XR) capsule 75 mg  75 mg Oral QHS  cefTRIAXone (ROCEPHIN) 1 g in 0.9% sodium chloride (MBP/ADV) 50 mL  1 g IntraVENous Q24H  
 azithromycin (ZITHROMAX) tablet 250 mg  250 mg Oral DAILY  carvedilol (COREG) tablet 6.25 mg  6.25 mg Oral BID WITH MEALS  insulin lispro (HUMALOG) injection   SubCUTAneous AC&HS  
 glucose chewable tablet 16 g  4 Tab Oral PRN  
 dextrose (D50W) injection syrg 12.5-25 g  12.5-25 g IntraVENous PRN  
 glucagon (GLUCAGEN) injection 1 mg  1 mg IntraMUSCular PRN  
 fentaNYL citrate (PF) injection 25 mcg  25 mcg IntraVENous Q4H PRN  
 
______________________________________________________________________ EXPECTED LENGTH OF STAY: - - - 
ACTUAL LENGTH OF STAY:          1 Nancy Arcos MD

## 2019-01-12 NOTE — PROGRESS NOTES
[facility] 
 [address] [facility], [facility] [address] [facility] Phone: [phone]   [phone]   
  
Nephrology Progress Note Domitila Valle.     1955     [id number] Date of Admission : 1/10/2019 
01/12/19 CC:  Follow up for TACOS, Rhabdo Assessment and Plan TACOS on CKD · 2/2 dehydration + Acute Rhabdomyolysis · Creat stable · Continue isotonic IVF · Continue to hold lasix and inspra   
  
Acute Rhabdomyolysis · W/ ARF + Acute Liver Injury · triggered by prolonged immobilization, high dose Statin and dehydration · IV hydration for now · Serial CPK  
  
AG metabolic acidosis  
-follow for now  
  
CKD Stage III : 
· Baseline CR 1.4 mg/dl · Likely 2/2 DM, HTN  
  
Acute Liver Injury  
  
PNA Chronic Systolic CHF Type II DM  
HTN Seizure disorder Cirrhosis of Liver Interval History:  Not sob; U/o is excellent; he needs to stay on IVF Review of Systems: Pertinent items are noted in HPI. Current Medications:  
Current Facility-Administered Medications Medication Dose Route Frequency  venlafaxine-SR (EFFEXOR-XR) capsule 150 mg  150 mg Oral DAILY AFTER BREAKFAST  levETIRAcetam (KEPPRA) tablet 1,000 mg  1,000 mg Oral Q12H  
 influenza vaccine 2018-19 (6 mos+)(PF) (FLUARIX QUAD/FLULAVAL QUAD) injection 0.5 mL  0.5 mL IntraMUSCular PRIOR TO DISCHARGE  sodium chloride (NS) flush 5-40 mL  5-40 mL IntraVENous Q8H  
 sodium chloride (NS) flush 5-40 mL  5-40 mL IntraVENous PRN  
 ALPRAZolam (XANAX) tablet 1 mg  1 mg Oral Q6H PRN  
 aspirin chewable tablet 81 mg  81 mg Oral DAILY  clonazePAM (KlonoPIN) tablet 0.5 mg  0.5 mg Oral QHS  clopidogrel (PLAVIX) tablet 75 mg  75 mg Oral DAILY  FLUoxetine (PROzac) capsule 20 mg  20 mg Oral DAILY  isosorbide mononitrate ER (IMDUR) tablet 30 mg  30 mg Oral DAILY  lactulose (CHRONULAC) solution 30 g  30 g Oral TID  nitroglycerin (NITROSTAT) tablet 0.4 mg  0.4 mg SubLINGual Q5MIN PRN  pantoprazole (PROTONIX) tablet 40 mg  40 mg Oral ACB&D  pregabalin (LYRICA) capsule 50 mg  50 mg Oral TID  QUEtiapine (SEROquel) tablet 25 mg  25 mg Oral QHS  famotidine (PEPCID) tablet 20 mg  20 mg Oral ACB  tamsulosin (FLOMAX) capsule 0.4 mg  0.4 mg Oral ACB&D  
 venlafaxine-SR (EFFEXOR-XR) capsule 75 mg  75 mg Oral QHS  cefTRIAXone (ROCEPHIN) 1 g in 0.9% sodium chloride (MBP/ADV) 50 mL  1 g IntraVENous Q24H  
 azithromycin (ZITHROMAX) tablet 250 mg  250 mg Oral DAILY  carvedilol (COREG) tablet 6.25 mg  6.25 mg Oral BID WITH MEALS  insulin lispro (HUMALOG) injection   SubCUTAneous AC&HS  
 glucose chewable tablet 16 g  4 Tab Oral PRN  
 dextrose (D50W) injection syrg 12.5-25 g  12.5-25 g IntraVENous PRN  
 glucagon (GLUCAGEN) injection 1 mg  1 mg IntraMUSCular PRN  
 fentaNYL citrate (PF) injection 25 mcg  25 mcg IntraVENous Q4H PRN Allergies Allergen Reactions  Latex, Natural Rubber Hives  Codeine Anaphylaxis Tolerated fentanyl previously  Demerol [Meperidine] Anaphylaxis Tolerated fentanyl previously  Mushroom Anaphylaxis  Metformin Diarrhea And dehydration  Wellbutrin [Bupropion Hcl] Anaphylaxis Objective: 
Vitals:   
Vitals:  
 01/12/19 0200 01/12/19 0555 01/12/19 0804 01/12/19 1000 BP: 132/85 143/74 (!) 149/99 140/56 Pulse: 81 78 77 81 Resp: 20 19  16 Temp: 97.4 °F (36.3 °C) 97.6 °F (36.4 °C)  97.5 °F (36.4 °C) SpO2: 94% 94%  96% Weight: 98 kg (216 lb) Height:      
 
Intake and Output: 
No intake/output data recorded. 01/10 1901 - 01/12 0700 In: -  
Out: 1000 [Urine:1000] Physical Examination:Pt intubated    No 
General: NAD Neck:  Supple, no JVD Resp:  Lungs CTA B/L, no wheezing , normal respiratory effort CV:  RRR,  no rub, minimal LE edema GI:  Soft, NT, + Bowel sounds Neurologic:  Non focal 
Psych:             Unable to asssess Skin:  No Rash :  bhandari []    High complexity decision making was performed 
[]    Patient is at high-risk of decompensation with multiple organ involvement Lab Data Personally Reviewed: I have reviewed all the pertinent labs, microbiology data and radiology studies during assessment. Recent Labs  
  01/12/19 
0553 01/11/19 
0410 01/10/19 
1511   138 137 135* K 4.1  4.2 3.5 3.7   109* 105 102 CO2 20*  20* 20* 17* *  101* 102* 141* BUN 41*  40* 52* 50* CREA 1.57*  1.59* 2.17* 2.79* CA 7.4*  7.5* 7.5* 7.8* ALB 2.2* 2.3* 2.7* SGOT 157* 245* 250* * 65 52 Recent Labs  
  01/12/19 
0817 01/11/19 
0410 01/10/19 
1511 WBC 9.7 10.4 11.6* HGB 13.5 13.9 14.4 HCT 43.6 42.7 44.9 * 161 161 Lab Results Component Value Date/Time Specimen Description: ESOPHAGEAL BRUSHING 09/14/2010 10:48 AM  
 
Lab Results Component Value Date/Time Culture result: STAPHYLOCOCCUS EPIDERMIDIS (A) 09/07/2018 09:16 PM  
 Culture result: NO GROWTH 5 DAYS 07/17/2017 09:01 PM  
 Culture result: MRSA NOT PRESENT 03/02/2017 04:15 PM  
 Culture result:  03/02/2017 04:15 PM  
      Screening of patient nares for MRSA is for surveillance purposes and, if positive, to facilitate isolation considerations in high risk settings. It is not intended for automatic decolonization interventions per se as regimens are not sufficiently effective to warrant routine use. Culture result: NORMAL RESPIRATORY MART/NO BETA STREP ISOLATED 03/02/2017 12:26 PM  
 
Recent Results (from the past 24 hour(s)) GLUCOSE, POC Collection Time: 01/11/19 12:52 PM  
Result Value Ref Range Glucose (POC) 114 (H) 65 - 100 mg/dL Performed by Kenneth Inman MYOGLOBIN, UR, SCREEN Collection Time: 01/11/19  1:01 PM  
Result Value Ref Range Myoglobin,urine screen (A) NEG URINE POSITIVE FOR BLOOD, MYOGLOBIN SCREEN SENT TO REFERENCE LAB. GLUCOSE, POC  
 Collection Time: 01/11/19  4:09 PM  
Result Value Ref Range Glucose (POC) 116 (H) 65 - 100 mg/dL Performed by SATURNINO Vogel Collection Time: 01/11/19 10:02 PM  
Result Value Ref Range Glucose (POC) 114 (H) 65 - 100 mg/dL Performed by [id number], BASIC Collection Time: 01/12/19  5:53 AM  
Result Value Ref Range Sodium 138 136 - 145 mmol/L Potassium 4.1 3.5 - 5.1 mmol/L Chloride 108 97 - 108 mmol/L  
 CO2 20 (L) 21 - 32 mmol/L Anion gap 10 5 - 15 mmol/L Glucose 103 (H) 65 - 100 mg/dL BUN 41 (H) 6 - 20 MG/DL Creatinine 1.57 (H) 0.70 - 1.30 MG/DL  
 BUN/Creatinine ratio 26 (H) 12 - 20 GFR est AA 54 (L) >60 ml/min/1.73m2 GFR est non-AA 45 (L) >60 ml/min/1.73m2 Calcium 7.4 (L) 8.5 - 10.1 MG/DL  
CK Collection Time: 01/12/19  5:53 AM  
Result Value Ref Range CK 1,586 (H) 39 - 392 U/L  
METABOLIC PANEL, COMPREHENSIVE Collection Time: 01/12/19  5:53 AM  
Result Value Ref Range Sodium 138 136 - 145 mmol/L Potassium 4.2 3.5 - 5.1 mmol/L Chloride 109 (H) 97 - 108 mmol/L  
 CO2 20 (L) 21 - 32 mmol/L Anion gap 9 5 - 15 mmol/L Glucose 101 (H) 65 - 100 mg/dL BUN 40 (H) 6 - 20 MG/DL Creatinine 1.59 (H) 0.70 - 1.30 MG/DL  
 BUN/Creatinine ratio 25 (H) 12 - 20 GFR est AA 53 (L) >60 ml/min/1.73m2 GFR est non-AA 44 (L) >60 ml/min/1.73m2 Calcium 7.5 (L) 8.5 - 10.1 MG/DL Bilirubin, total 0.6 0.2 - 1.0 MG/DL  
 ALT (SGPT) 122 (H) 12 - 78 U/L  
 AST (SGOT) 157 (H) 15 - 37 U/L Alk. phosphatase 58 45 - 117 U/L Protein, total 6.1 (L) 6.4 - 8.2 g/dL Albumin 2.2 (L) 3.5 - 5.0 g/dL Globulin 3.9 2.0 - 4.0 g/dL A-G Ratio 0.6 (L) 1.1 - 2.2 GLUCOSE, POC Collection Time: 01/12/19  7:57 AM  
Result Value Ref Range Glucose (POC) 106 (H) 65 - 100 mg/dL Performed by Linnea Salmeron   
CBC WITH AUTOMATED DIFF Collection Time: 01/12/19  8:17 AM  
Result Value Ref Range WBC 9.7 4.1 - 11.1 K/uL RBC 5.68 4.10 - 5.70 M/uL  
 HGB 13.5 12.1 - 17.0 g/dL HCT 43.6 36.6 - 50.3 % MCV 76.8 (L) 80.0 - 99.0 FL  
 MCH 23.8 (L) 26.0 - 34.0 PG  
 MCHC 31.0 30.0 - 36.5 g/dL  
 RDW 17.1 (H) 11.5 - 14.5 % PLATELET 921 (L) 555 - 400 K/uL MPV 10.5 8.9 - 12.9 FL  
 NRBC 0.0 0  WBC ABSOLUTE NRBC 0.00 0.00 - 0.01 K/uL NEUTROPHILS 79 (H) 32 - 75 % LYMPHOCYTES 10 (L) 12 - 49 % MONOCYTES 9 5 - 13 % EOSINOPHILS 1 0 - 7 % BASOPHILS 0 0 - 1 % IMMATURE GRANULOCYTES 1 (H) 0.0 - 0.5 % ABS. NEUTROPHILS 7.6 1.8 - 8.0 K/UL  
 ABS. LYMPHOCYTES 1.0 0.8 - 3.5 K/UL  
 ABS. MONOCYTES 0.9 0.0 - 1.0 K/UL  
 ABS. EOSINOPHILS 0.1 0.0 - 0.4 K/UL  
 ABS. BASOPHILS 0.0 0.0 - 0.1 K/UL  
 ABS. IMM. GRANS. 0.1 (H) 0.00 - 0.04 K/UL  
 DF AUTOMATED Total time spent with patient:  xxx   min. Care Plan discussed with: 
Patient Family RN Consulting Physician [address] [facility],      
 
I have reviewed the flowsheets. Chart and Pertinent Notes have been reviewed. No change in PMH ,family and social history from Consult note.  
 
 
Lesa Allen MD

## 2019-01-12 NOTE — PROGRESS NOTES
Problem: Falls - Risk of 
Goal: *Absence of Falls Document Nica Ratliff Fall Risk and appropriate interventions in the flowsheet. Outcome: Progressing Towards Goal 
Fall Risk Interventions: 
Mobility Interventions: Communicate number of staff needed for ambulation/transfer, Patient to call before getting OOB, PT Consult for mobility concerns, PT Consult for assist device competence, Strengthening exercises (ROM-active/passive), Utilize walker, cane, or other assistive device Medication Interventions: Evaluate medications/consider consulting pharmacy, Patient to call before getting OOB, Teach patient to arise slowly Elimination Interventions: Call light in reach, Patient to call for help with toileting needs, Toileting schedule/hourly rounds, Urinal in reach History of Falls Interventions: Consult care management for discharge planning, Door open when patient unattended, Evaluate medications/consider consulting pharmacy, Investigate reason for fall, Room close to nurse's station Problem: Pressure Injury - Risk of 
Goal: *Prevention of pressure injury Document Iván Scale and appropriate interventions in the flowsheet. Outcome: Progressing Towards Goal 
Pressure Injury Interventions: 
  
 
Moisture Interventions: Absorbent underpads, Check for incontinence Q2 hours and as needed, Internal/External urinary devices, Minimize layers Activity Interventions: Increase time out of bed, Pressure redistribution bed/mattress(bed type), PT/OT evaluation Mobility Interventions: Float heels, HOB 30 degrees or less, Pressure redistribution bed/mattress (bed type), PT/OT evaluation Nutrition Interventions: Document food/fluid/supplement intake Friction and Shear Interventions: HOB 30 degrees or less, Lift sheet, Minimize layers Problem: Diabetes Self-Management Goal: *Incorporating physical activity into lifestyle State effect of exercise on blood glucose levels. Outcome: Not Progressing Towards Goal 
Bedrest until PT evaluation

## 2019-01-12 NOTE — PROGRESS NOTES
Bedside shift change report given to [address] [facility] [address] (oncoming nurse) by Jesus Melchor (offgoing nurse). Report included the following information SBAR, MAR, Recent Results and Cardiac Rhythm NSR.

## 2019-01-12 NOTE — PROGRESS NOTES
Primary Nurse Say Mayes, RN and Sarah Beth Martínez RN, RN performed a dual skin assessment on this patient Impairment noted- see wound doc flow sheet Iván score is 16 Patient with open laceration on left forehead with blister, left eye bruised and swollen, redness to back of the head, redness to left side of neck, bilateral knee abrasions, left forehead abrasion, multiple scars across back

## 2019-01-13 ENCOUNTER — APPOINTMENT (OUTPATIENT)
Dept: CT IMAGING | Age: 64
DRG: 682 | End: 2019-01-13
Attending: FAMILY MEDICINE
Payer: MEDICARE

## 2019-01-13 ENCOUNTER — APPOINTMENT (OUTPATIENT)
Dept: GENERAL RADIOLOGY | Age: 64
DRG: 682 | End: 2019-01-13
Attending: PHYSICIAN ASSISTANT
Payer: MEDICARE

## 2019-01-13 ENCOUNTER — APPOINTMENT (OUTPATIENT)
Dept: GENERAL RADIOLOGY | Age: 64
DRG: 682 | End: 2019-01-13
Attending: FAMILY MEDICINE
Payer: MEDICARE

## 2019-01-13 ENCOUNTER — APPOINTMENT (OUTPATIENT)
Dept: ULTRASOUND IMAGING | Age: 64
DRG: 682 | End: 2019-01-13
Attending: INTERNAL MEDICINE
Payer: MEDICARE

## 2019-01-13 LAB
ALBUMIN SERPL-MCNC: 2.1 G/DL (ref 3.5–5)
ALBUMIN/GLOB SERPL: 0.6 {RATIO} (ref 1.1–2.2)
ALP SERPL-CCNC: 57 U/L (ref 45–117)
ALT SERPL-CCNC: 116 U/L (ref 12–78)
ANION GAP SERPL CALC-SCNC: 7 MMOL/L (ref 5–15)
ARTERIAL PATENCY WRIST A: YES
AST SERPL-CCNC: 95 U/L (ref 15–37)
BASE DEFICIT BLD-SCNC: 10 MMOL/L
BASOPHILS # BLD: 0 K/UL (ref 0–0.1)
BASOPHILS NFR BLD: 0 % (ref 0–1)
BDY SITE: ABNORMAL
BILIRUB SERPL-MCNC: 0.4 MG/DL (ref 0.2–1)
BUN SERPL-MCNC: 34 MG/DL (ref 6–20)
BUN/CREAT SERPL: 21 (ref 12–20)
CALCIUM SERPL-MCNC: 7.6 MG/DL (ref 8.5–10.1)
CHLORIDE SERPL-SCNC: 109 MMOL/L (ref 97–108)
CK SERPL-CCNC: 805 U/L (ref 39–308)
CO2 SERPL-SCNC: 21 MMOL/L (ref 21–32)
CREAT SERPL-MCNC: 1.61 MG/DL (ref 0.7–1.3)
DIFFERENTIAL METHOD BLD: ABNORMAL
EOSINOPHIL # BLD: 0.1 K/UL (ref 0–0.4)
EOSINOPHIL NFR BLD: 1 % (ref 0–7)
ERYTHROCYTE [DISTWIDTH] IN BLOOD BY AUTOMATED COUNT: 16.7 % (ref 11.5–14.5)
GAS FLOW.O2 O2 DELIVERY SYS: ABNORMAL L/MIN
GLOBULIN SER CALC-MCNC: 3.8 G/DL (ref 2–4)
GLUCOSE BLD STRIP.AUTO-MCNC: 137 MG/DL (ref 65–100)
GLUCOSE BLD STRIP.AUTO-MCNC: 137 MG/DL (ref 65–100)
GLUCOSE BLD STRIP.AUTO-MCNC: 139 MG/DL (ref 65–100)
GLUCOSE BLD STRIP.AUTO-MCNC: 92 MG/DL (ref 65–100)
GLUCOSE SERPL-MCNC: 111 MG/DL (ref 65–100)
HCO3 BLD-SCNC: 16.2 MMOL/L (ref 22–26)
HCT VFR BLD AUTO: 40.2 % (ref 36.6–50.3)
HGB BLD-MCNC: 12.4 G/DL (ref 12.1–17)
IMM GRANULOCYTES # BLD AUTO: 0 K/UL (ref 0–0.04)
IMM GRANULOCYTES NFR BLD AUTO: 1 % (ref 0–0.5)
LYMPHOCYTES # BLD: 1.2 K/UL (ref 0.8–3.5)
LYMPHOCYTES NFR BLD: 15 % (ref 12–49)
MAGNESIUM SERPL-MCNC: 2.1 MG/DL (ref 1.6–2.4)
MCH RBC QN AUTO: 23.8 PG (ref 26–34)
MCHC RBC AUTO-ENTMCNC: 30.8 G/DL (ref 30–36.5)
MCV RBC AUTO: 77.3 FL (ref 80–99)
MONOCYTES # BLD: 0.9 K/UL (ref 0–1)
MONOCYTES NFR BLD: 10 % (ref 5–13)
NEUTS SEG # BLD: 6.1 K/UL (ref 1.8–8)
NEUTS SEG NFR BLD: 73 % (ref 32–75)
NRBC # BLD: 0 K/UL (ref 0–0.01)
NRBC BLD-RTO: 0 PER 100 WBC
O2/TOTAL GAS SETTING VFR VENT: 0.21 %
PCO2 BLD: 31.2 MMHG (ref 35–45)
PH BLD: 7.32 [PH] (ref 7.35–7.45)
PHOSPHATE SERPL-MCNC: 2.8 MG/DL (ref 2.6–4.7)
PLATELET # BLD AUTO: 150 K/UL (ref 150–400)
PMV BLD AUTO: 10.7 FL (ref 8.9–12.9)
PO2 BLD: 79 MMHG (ref 80–100)
POTASSIUM SERPL-SCNC: 3.7 MMOL/L (ref 3.5–5.1)
PROT SERPL-MCNC: 5.9 G/DL (ref 6.4–8.2)
RBC # BLD AUTO: 5.2 M/UL (ref 4.1–5.7)
SAO2 % BLD: 95 % (ref 92–97)
SERVICE CMNT-IMP: ABNORMAL
SERVICE CMNT-IMP: NORMAL
SODIUM SERPL-SCNC: 137 MMOL/L (ref 136–145)
SPECIMEN TYPE: ABNORMAL
TOTAL RESP. RATE, ITRR: 18
WBC # BLD AUTO: 8.3 K/UL (ref 4.1–11.1)

## 2019-01-13 PROCEDURE — 71045 X-RAY EXAM CHEST 1 VIEW: CPT

## 2019-01-13 PROCEDURE — 82962 GLUCOSE BLOOD TEST: CPT

## 2019-01-13 PROCEDURE — 36600 WITHDRAWAL OF ARTERIAL BLOOD: CPT

## 2019-01-13 PROCEDURE — 82550 ASSAY OF CK (CPK): CPT

## 2019-01-13 PROCEDURE — 76705 ECHO EXAM OF ABDOMEN: CPT

## 2019-01-13 PROCEDURE — 83735 ASSAY OF MAGNESIUM: CPT

## 2019-01-13 PROCEDURE — 36415 COLL VENOUS BLD VENIPUNCTURE: CPT

## 2019-01-13 PROCEDURE — 85025 COMPLETE CBC W/AUTO DIFF WBC: CPT

## 2019-01-13 PROCEDURE — 74011250637 HC RX REV CODE- 250/637: Performed by: PSYCHIATRY & NEUROLOGY

## 2019-01-13 PROCEDURE — 70450 CT HEAD/BRAIN W/O DYE: CPT

## 2019-01-13 PROCEDURE — 74011000258 HC RX REV CODE- 258: Performed by: HOSPITALIST

## 2019-01-13 PROCEDURE — 73070 X-RAY EXAM OF ELBOW: CPT

## 2019-01-13 PROCEDURE — 74011250637 HC RX REV CODE- 250/637: Performed by: FAMILY MEDICINE

## 2019-01-13 PROCEDURE — 65660000000 HC RM CCU STEPDOWN

## 2019-01-13 PROCEDURE — 74011250636 HC RX REV CODE- 250/636: Performed by: HOSPITALIST

## 2019-01-13 PROCEDURE — 74011250637 HC RX REV CODE- 250/637: Performed by: HOSPITALIST

## 2019-01-13 PROCEDURE — 95816 EEG AWAKE AND DROWSY: CPT | Performed by: FAMILY MEDICINE

## 2019-01-13 PROCEDURE — 84100 ASSAY OF PHOSPHORUS: CPT

## 2019-01-13 PROCEDURE — 80053 COMPREHEN METABOLIC PANEL: CPT

## 2019-01-13 PROCEDURE — 82803 BLOOD GASES ANY COMBINATION: CPT

## 2019-01-13 RX ADMIN — AMLODIPINE BESYLATE 2.5 MG: 5 TABLET ORAL at 08:14

## 2019-01-13 RX ADMIN — LACTULOSE 30 G: 20 SOLUTION ORAL at 22:25

## 2019-01-13 RX ADMIN — VENLAFAXINE HYDROCHLORIDE 75 MG: 37.5 CAPSULE, EXTENDED RELEASE ORAL at 22:25

## 2019-01-13 RX ADMIN — TAMSULOSIN HYDROCHLORIDE 0.4 MG: 0.4 CAPSULE ORAL at 16:17

## 2019-01-13 RX ADMIN — TAMSULOSIN HYDROCHLORIDE 0.4 MG: 0.4 CAPSULE ORAL at 06:56

## 2019-01-13 RX ADMIN — AZITHROMYCIN 250 MG: 250 TABLET, FILM COATED ORAL at 16:17

## 2019-01-13 RX ADMIN — CEFTRIAXONE 1 G: 1 INJECTION, POWDER, FOR SOLUTION INTRAMUSCULAR; INTRAVENOUS at 17:44

## 2019-01-13 RX ADMIN — ASPIRIN 81 MG CHEWABLE TABLET 81 MG: 81 TABLET CHEWABLE at 08:14

## 2019-01-13 RX ADMIN — LEVETIRACETAM 1000 MG: 500 TABLET, FILM COATED ORAL at 22:25

## 2019-01-13 RX ADMIN — Medication 10 ML: at 12:36

## 2019-01-13 RX ADMIN — FENTANYL CITRATE 25 MCG: 50 INJECTION, SOLUTION INTRAMUSCULAR; INTRAVENOUS at 22:25

## 2019-01-13 RX ADMIN — FAMOTIDINE 20 MG: 20 TABLET ORAL at 06:56

## 2019-01-13 RX ADMIN — PANTOPRAZOLE SODIUM 40 MG: 40 TABLET, DELAYED RELEASE ORAL at 16:17

## 2019-01-13 RX ADMIN — FLUOXETINE 20 MG: 20 CAPSULE ORAL at 08:15

## 2019-01-13 RX ADMIN — LACTULOSE 30 G: 20 SOLUTION ORAL at 16:17

## 2019-01-13 RX ADMIN — Medication 10 ML: at 22:25

## 2019-01-13 RX ADMIN — LACTULOSE 30 G: 20 SOLUTION ORAL at 08:14

## 2019-01-13 RX ADMIN — FAMOTIDINE 20 MG: 20 TABLET ORAL at 18:12

## 2019-01-13 RX ADMIN — CLOPIDOGREL BISULFATE 75 MG: 75 TABLET ORAL at 08:14

## 2019-01-13 RX ADMIN — VENLAFAXINE HYDROCHLORIDE 150 MG: 150 CAPSULE, EXTENDED RELEASE ORAL at 08:15

## 2019-01-13 RX ADMIN — CARVEDILOL 6.25 MG: 6.25 TABLET, FILM COATED ORAL at 08:14

## 2019-01-13 RX ADMIN — CARVEDILOL 6.25 MG: 6.25 TABLET, FILM COATED ORAL at 16:17

## 2019-01-13 RX ADMIN — PREGABALIN 50 MG: 25 CAPSULE ORAL at 08:14

## 2019-01-13 RX ADMIN — ISOSORBIDE MONONITRATE 30 MG: 30 TABLET ORAL at 08:15

## 2019-01-13 RX ADMIN — PANTOPRAZOLE SODIUM 40 MG: 40 TABLET, DELAYED RELEASE ORAL at 06:56

## 2019-01-13 RX ADMIN — Medication 10 ML: at 06:56

## 2019-01-13 RX ADMIN — PREGABALIN 50 MG: 25 CAPSULE ORAL at 22:25

## 2019-01-13 RX ADMIN — LEVETIRACETAM 1000 MG: 500 TABLET, FILM COATED ORAL at 08:14

## 2019-01-13 RX ADMIN — CLONAZEPAM 0.5 MG: 0.5 TABLET ORAL at 22:25

## 2019-01-13 RX ADMIN — QUETIAPINE FUMARATE 25 MG: 25 TABLET ORAL at 22:25

## 2019-01-13 NOTE — PROGRESS NOTES
Hospitalist Progress Note Emeka Veliz MD 
Answering service: [phone] OR [phone] from in house phone Cell:   
  
Date of Service:  2019 NAME:  Dangelo Yates. :  1955 MRN:  [id number] Admission Summary:  
The patient was brought from home. According to the daughters, they found him lying on the floor when they checked on him around 11:00. The daughters think he might have been down since around 3:00. The daughter suspect possible breakthrough seizure, but they  added usually when he had seizures he has an auditory aura, but did not hear anything last night. The patient has been having some cough and congestion for the last 3 days and all of them had what looked like upper respiratory viral infection. Interval history / Subjective:  
 Pt resting in bed, slightly confused but able to answer questions to be alert x 4, no fevers or chills , reports he feels warm and his left shoulder hurts Assessment & Plan: TACOS due to rhabdomyolysis Due to glf , CK trending down , improving with IVF Continue to trend bmp, CK Nephrology on board Holding lasix and inspra Left shoulder pain and immobility - Appreciate ortho input 
- XR elbow with possible radial head Fx, Ct shoulder with no acute abnormality 
- may need sling and immobilization, will defer to ortho 
cxr left shoulder , no fracture  ? Rotator cuff pathology Continues to have significant pain and discomfort Will order CT shoulder and elbow Ortho consult Consult ortho before discharge Possible PNA Mild bilateral patchy upper lobe groundglass opacities are nonspecific but could 
represent early pneumonia. On antibiotics ,blood culture neg so far Possible seizure Seen by neuro , increased keppra and stopped lamictal due to abnormal LFT Will get EEG as patient slightly confused today Will also get CT head to r/o any acute pathology Seizure precautions and monitor Abnormal lft Improving He has underlying BARGER And superimposed rhabdo Repeat LFT's ordered Coronary artery disease. He has had CABG twice. Chronic systolic heart failure. Stable, strict I/O Type 2 diabetes. accuchecks , SSI Chris Miles Hypertension. Suboptimally controlled Will add amlodipine Chris Miles Dementia. .  Seizure disorder. Chronic liver disease and cirrhosis. Code status: full DVT prophylaxis:  
 
Care Plan discussed with: Patient/Family Disposition: TBD Hospital Problems  Date Reviewed: 1/2/2019 Codes Class Noted POA Fall ICD-10-CM: W19. Dennyseaarun [place] ICD-9-CM: B286.3  1/10/2019 Unknown * (Principal) [facility] ICD-10-CM: N17.9 ICD-9-CM: 584.9  1/10/2019 Unknown Review of Systems: A comprehensive review of systems was negative except for that written in the HPI. Vital Signs:  
 Last 24hrs VS reviewed since prior progress note. Most recent are: 
Visit Vitals /65 (BP 1 Location: Right arm, BP Patient Position: At rest) Pulse 94 Temp 97.7 °F (36.5 °C) Resp 18 Ht 5' 9\" (1.753 m) Wt 98 kg (216 lb) SpO2 96% BMI 31.90 kg/m² Intake/Output Summary (Last 24 hours) at 1/13/2019 1233 Last data filed at 1/13/2019 4477 Gross per 24 hour Intake 1178.75 ml Output 2425 ml Net -1246.25 ml Physical Examination:  
 
 
     
Constitutional:  No acute distress, cooperative, pleasant , scalp abrasion, alert x 4  
ENT:  Oral mucous moist, oropharynx benign. Neck supple, Resp:  decreased basal BS   
CV:  Regular rhythm, normal rate, no murmurs, gallops, rubs GI:  Soft, non distended, non tender. normoactive bowel sounds, no hepatosplenomegaly Musculoskeletal:  No edema, warm, 2+ pulses throughout Neurologic:  left shoulder not moving. AAOx2, Psych:  Good insight, Not anxious nor agitated. Data Review:  
 Review and/or order of clinical lab test 
 
 
Labs:  
 
Recent Labs  
  01/13/19 0220 01/12/19 
0817 WBC 8.3 9.7 HGB 12.4 13.5 HCT 40.2 43.6  142* Recent Labs  
  01/13/19 0220 01/12/19 
0553 01/11/19 
0410  138  138 137  
K 3.7 4.1  4.2 3.5 * 108  109* 105 CO2 21 20*  20* 20* BUN 34* 41*  40* 52* CREA 1.61* 1.57*  1.59* 2.17* * 103*  101* 102* CA 7.6* 7.4*  7.5* 7.5* MG 2.1  --   --   
PHOS 2.8  --   --   
 
Recent Labs  
  01/13/19 0220 01/12/19 
1444 01/12/19 
0553 SGOT 95* 121* 157* * 118* 122* AP 57 57 58 TBILI 0.4 0.5 0.6 TP 5.9* 5.6* 6.1* ALB 2.1* 2.1* 2.2*  
GLOB 3.8 3.5 3.9 No results for input(s): INR, PTP, APTT in the last 72 hours. No lab exists for component: INREXT, INREXT No results for input(s): FE, TIBC, PSAT, FERR in the last 72 hours. No results found for: FOL, RBCF No results for input(s): PH, PCO2, PO2 in the last 72 hours. Recent Labs  
  01/13/19 0220 01/12/19 
0553 01/11/19 0410 * 1,586* 6,986* No results found for: CHOL, CHOLX, CHLST, CHOLV, HDL, LDL, LDLC, DLDLP, TGLX, TRIGL, TRIGP, CHHD, CHHDX Lab Results Component Value Date/Time Glucose (POC) 137 (H) 01/13/2019 11:33 AM  
 Glucose (POC) 92 01/13/2019 07:41 AM  
 Glucose (POC) 139 (H) 01/12/2019 09:37 PM  
 Glucose (POC) 136 (H) 01/12/2019 05:18 PM  
 Glucose (POC) 141 (H) 01/12/2019 11:49 AM  
 
Lab Results Component Value Date/Time  Color DARK YELLOW 01/10/2019 03:53 PM  
 Appearance CLOUDY (A) 01/10/2019 03:53 PM  
 Specific gravity 1.020 01/10/2019 03:53 PM  
 pH (UA) 5.5 01/10/2019 03:53 PM  
 Protein 100 (A) 01/10/2019 03:53 PM  
 Glucose NEGATIVE  01/10/2019 03:53 PM  
 Ketone TRACE (A) 01/10/2019 03:53 PM  
 Bilirubin NEGATIVE  09/07/2018 09:16 PM  
 Urobilinogen 1.0 01/10/2019 03:53 PM  
 Nitrites NEGATIVE  01/10/2019 03:53 PM  
 Leukocyte Esterase NEGATIVE  01/10/2019 03:53 PM  
 Epithelial cells FEW 01/10/2019 03:53 PM  
 Bacteria NEGATIVE  01/10/2019 03:53 PM  
 WBC 0-4 01/10/2019 03:53 PM  
 RBC 0-5 01/10/2019 03:53 PM  
 
 
 
Medications Reviewed:  
 
Current Facility-Administered Medications Medication Dose Route Frequency  0.9% sodium chloride infusion  75 mL/hr IntraVENous CONTINUOUS  
 amLODIPine (NORVASC) tablet 2.5 mg  2.5 mg Oral DAILY  famotidine (PEPCID) tablet 20 mg  20 mg Oral Q12H  
 venlafaxine-SR (EFFEXOR-XR) capsule 150 mg  150 mg Oral DAILY AFTER BREAKFAST  levETIRAcetam (KEPPRA) tablet 1,000 mg  1,000 mg Oral Q12H  
 influenza vaccine 2018-19 (6 mos+)(PF) (FLUARIX QUAD/FLULAVAL QUAD) injection 0.5 mL  0.5 mL IntraMUSCular PRIOR TO DISCHARGE  sodium chloride (NS) flush 5-40 mL  5-40 mL IntraVENous Q8H  
 sodium chloride (NS) flush 5-40 mL  5-40 mL IntraVENous PRN  
 ALPRAZolam (XANAX) tablet 1 mg  1 mg Oral Q6H PRN  
 aspirin chewable tablet 81 mg  81 mg Oral DAILY  clonazePAM (KlonoPIN) tablet 0.5 mg  0.5 mg Oral QHS  clopidogrel (PLAVIX) tablet 75 mg  75 mg Oral DAILY  FLUoxetine (PROzac) capsule 20 mg  20 mg Oral DAILY  isosorbide mononitrate ER (IMDUR) tablet 30 mg  30 mg Oral DAILY  lactulose (CHRONULAC) solution 30 g  30 g Oral TID  nitroglycerin (NITROSTAT) tablet 0.4 mg  0.4 mg SubLINGual Q5MIN PRN  pantoprazole (PROTONIX) tablet 40 mg  40 mg Oral ACB&D  pregabalin (LYRICA) capsule 50 mg  50 mg Oral TID  QUEtiapine (SEROquel) tablet 25 mg  25 mg Oral QHS  tamsulosin (FLOMAX) capsule 0.4 mg  0.4 mg Oral ACB&D  
 venlafaxine-SR (EFFEXOR-XR) capsule 75 mg  75 mg Oral QHS  cefTRIAXone (ROCEPHIN) 1 g in 0.9% sodium chloride (MBP/ADV) 50 mL  1 g IntraVENous Q24H  
 azithromycin (ZITHROMAX) tablet 250 mg  250 mg Oral DAILY  carvedilol (COREG) tablet 6.25 mg  6.25 mg Oral BID WITH MEALS  insulin lispro (HUMALOG) injection   SubCUTAneous AC&HS  
 glucose chewable tablet 16 g  4 Tab Oral PRN  
  dextrose (D50W) injection syrg 12.5-25 g  12.5-25 g IntraVENous PRN  
 glucagon (GLUCAGEN) injection 1 mg  1 mg IntraMUSCular PRN  
 fentaNYL citrate (PF) injection 25 mcg  25 mcg IntraVENous Q4H PRN  
 
______________________________________________________________________ EXPECTED LENGTH OF STAY: - - - 
ACTUAL LENGTH OF STAY:          2 Naina Herron MD

## 2019-01-13 NOTE — PROGRESS NOTES
Problem: Pressure Injury - Risk of 
Goal: *Prevention of pressure injury Document Iván Scale and appropriate interventions in the flowsheet. Outcome: Progressing Towards Goal 
Pressure Injury Interventions: 
  
 
Moisture Interventions: Absorbent underpads, Apply protective barrier, creams and emollients, Internal/External urinary devices, Limit adult briefs, Minimize layers, Offer toileting Q_hr Activity Interventions: Pressure redistribution bed/mattress(bed type) Mobility Interventions: Float heels, HOB 30 degrees or less, Pressure redistribution bed/mattress (bed type) Nutrition Interventions: Document food/fluid/supplement intake Friction and Shear Interventions: HOB 30 degrees or less, Lift sheet, Minimize layers

## 2019-01-13 NOTE — PROGRESS NOTES
Bedside shift change report given to Beth Geiger (oncoming nurse) by Mendel Mango, RN (offgoing nurse). Report included the following information SBAR, Kardex, Intake/Output and Recent Results. 0425: US called to notify that [address] could be done this AM. Will transport to [place] shortly. 0455: Pt taken down to [place] via nurse and tech. US tech approximates ~15min. Will call when complete. 6626: Pt transported back to room 662 via bed with nurse and tech. No c/o pain at this time, will continue to monitor.

## 2019-01-13 NOTE — PROGRESS NOTES
Bedside and Verbal shift change report given to Brenna Adams (oncoming nurse) by Whit Baptiste RN (offgoing nurse). Report included the following information SBAR, Kardex, ED Summary, Procedure Summary, Intake/Output, MAR, Accordion, Recent Results and Med Rec Status.
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
## 2019-01-14 ENCOUNTER — APPOINTMENT (OUTPATIENT)
Dept: GENERAL RADIOLOGY | Age: 64
DRG: 682 | End: 2019-01-14
Attending: INTERNAL MEDICINE
Payer: MEDICARE

## 2019-01-14 LAB
ALBUMIN SERPL-MCNC: 2.2 G/DL (ref 3.5–5)
ALBUMIN/GLOB SERPL: 0.6 {RATIO} (ref 1.1–2.2)
ALP SERPL-CCNC: 54 U/L (ref 45–117)
ALT SERPL-CCNC: 91 U/L (ref 12–78)
ANION GAP SERPL CALC-SCNC: 9 MMOL/L (ref 5–15)
AST SERPL-CCNC: 53 U/L (ref 15–37)
BASOPHILS # BLD: 0 K/UL (ref 0–0.1)
BASOPHILS NFR BLD: 0 % (ref 0–1)
BILIRUB SERPL-MCNC: 0.4 MG/DL (ref 0.2–1)
BUN SERPL-MCNC: 28 MG/DL (ref 6–20)
BUN/CREAT SERPL: 20 (ref 12–20)
CALCIUM SERPL-MCNC: 7.6 MG/DL (ref 8.5–10.1)
CHLORIDE SERPL-SCNC: 112 MMOL/L (ref 97–108)
CK SERPL-CCNC: 372 U/L (ref 39–308)
CO2 SERPL-SCNC: 19 MMOL/L (ref 21–32)
CREAT SERPL-MCNC: 1.4 MG/DL (ref 0.7–1.3)
DIFFERENTIAL METHOD BLD: ABNORMAL
EOSINOPHIL # BLD: 0.3 K/UL (ref 0–0.4)
EOSINOPHIL NFR BLD: 4 % (ref 0–7)
ERYTHROCYTE [DISTWIDTH] IN BLOOD BY AUTOMATED COUNT: 16.8 % (ref 11.5–14.5)
GLOBULIN SER CALC-MCNC: 3.5 G/DL (ref 2–4)
GLUCOSE BLD STRIP.AUTO-MCNC: 124 MG/DL (ref 65–100)
GLUCOSE BLD STRIP.AUTO-MCNC: 131 MG/DL (ref 65–100)
GLUCOSE BLD STRIP.AUTO-MCNC: 160 MG/DL (ref 65–100)
GLUCOSE BLD STRIP.AUTO-MCNC: 98 MG/DL (ref 65–100)
GLUCOSE SERPL-MCNC: 115 MG/DL (ref 65–100)
HCT VFR BLD AUTO: 40.5 % (ref 36.6–50.3)
HGB BLD-MCNC: 12.3 G/DL (ref 12.1–17)
IMM GRANULOCYTES # BLD AUTO: 0 K/UL (ref 0–0.04)
IMM GRANULOCYTES NFR BLD AUTO: 1 % (ref 0–0.5)
LYMPHOCYTES # BLD: 1.6 K/UL (ref 0.8–3.5)
LYMPHOCYTES NFR BLD: 20 % (ref 12–49)
MAGNESIUM SERPL-MCNC: 2.1 MG/DL (ref 1.6–2.4)
MCH RBC QN AUTO: 24 PG (ref 26–34)
MCHC RBC AUTO-ENTMCNC: 30.4 G/DL (ref 30–36.5)
MCV RBC AUTO: 78.9 FL (ref 80–99)
MONOCYTES # BLD: 0.7 K/UL (ref 0–1)
MONOCYTES NFR BLD: 9 % (ref 5–13)
MYOGLOBIN UR QL: ABNORMAL
MYOGLOBIN UR-MCNC: 9281 NG/ML (ref 0–13)
NEUTS SEG # BLD: 5.3 K/UL (ref 1.8–8)
NEUTS SEG NFR BLD: 66 % (ref 32–75)
NRBC # BLD: 0 K/UL (ref 0–0.01)
NRBC BLD-RTO: 0 PER 100 WBC
PHOSPHATE SERPL-MCNC: 2.9 MG/DL (ref 2.6–4.7)
PLATELET # BLD AUTO: 151 K/UL (ref 150–400)
PMV BLD AUTO: 10.8 FL (ref 8.9–12.9)
POTASSIUM SERPL-SCNC: 3.9 MMOL/L (ref 3.5–5.1)
PROT SERPL-MCNC: 5.7 G/DL (ref 6.4–8.2)
RBC # BLD AUTO: 5.13 M/UL (ref 4.1–5.7)
SERVICE CMNT-IMP: ABNORMAL
SERVICE CMNT-IMP: NORMAL
SODIUM SERPL-SCNC: 140 MMOL/L (ref 136–145)
WBC # BLD AUTO: 8 K/UL (ref 4.1–11.1)

## 2019-01-14 PROCEDURE — 97165 OT EVAL LOW COMPLEX 30 MIN: CPT

## 2019-01-14 PROCEDURE — 71046 X-RAY EXAM CHEST 2 VIEWS: CPT

## 2019-01-14 PROCEDURE — 74011000258 HC RX REV CODE- 258: Performed by: HOSPITALIST

## 2019-01-14 PROCEDURE — 82550 ASSAY OF CK (CPK): CPT

## 2019-01-14 PROCEDURE — 80053 COMPREHEN METABOLIC PANEL: CPT

## 2019-01-14 PROCEDURE — 74011250637 HC RX REV CODE- 250/637: Performed by: PSYCHIATRY & NEUROLOGY

## 2019-01-14 PROCEDURE — 83735 ASSAY OF MAGNESIUM: CPT

## 2019-01-14 PROCEDURE — 36415 COLL VENOUS BLD VENIPUNCTURE: CPT

## 2019-01-14 PROCEDURE — 74011250636 HC RX REV CODE- 250/636: Performed by: INTERNAL MEDICINE

## 2019-01-14 PROCEDURE — 84100 ASSAY OF PHOSPHORUS: CPT

## 2019-01-14 PROCEDURE — 74011250636 HC RX REV CODE- 250/636: Performed by: HOSPITALIST

## 2019-01-14 PROCEDURE — 74011250637 HC RX REV CODE- 250/637: Performed by: HOSPITALIST

## 2019-01-14 PROCEDURE — 87205 SMEAR GRAM STAIN: CPT

## 2019-01-14 PROCEDURE — 85025 COMPLETE CBC W/AUTO DIFF WBC: CPT

## 2019-01-14 PROCEDURE — 65660000000 HC RM CCU STEPDOWN

## 2019-01-14 PROCEDURE — 74011636637 HC RX REV CODE- 636/637: Performed by: HOSPITALIST

## 2019-01-14 PROCEDURE — 74011250637 HC RX REV CODE- 250/637: Performed by: INTERNAL MEDICINE

## 2019-01-14 PROCEDURE — 97530 THERAPEUTIC ACTIVITIES: CPT

## 2019-01-14 PROCEDURE — 74011250637 HC RX REV CODE- 250/637: Performed by: FAMILY MEDICINE

## 2019-01-14 PROCEDURE — 82962 GLUCOSE BLOOD TEST: CPT

## 2019-01-14 PROCEDURE — 77030018798 HC PMP KT ENTRL FED COVD -A

## 2019-01-14 RX ORDER — IBUPROFEN 600 MG/1
600 TABLET ORAL
Status: DISCONTINUED | OUTPATIENT
Start: 2019-01-14 | End: 2019-01-16 | Stop reason: HOSPADM

## 2019-01-14 RX ORDER — MUPIROCIN 20 MG/G
OINTMENT TOPICAL DAILY
Status: DISCONTINUED | OUTPATIENT
Start: 2019-01-15 | End: 2019-01-16 | Stop reason: HOSPADM

## 2019-01-14 RX ADMIN — Medication 10 ML: at 22:51

## 2019-01-14 RX ADMIN — Medication 10 ML: at 05:35

## 2019-01-14 RX ADMIN — PREGABALIN 50 MG: 25 CAPSULE ORAL at 16:35

## 2019-01-14 RX ADMIN — SODIUM CHLORIDE 75 ML/HR: 900 INJECTION, SOLUTION INTRAVENOUS at 14:23

## 2019-01-14 RX ADMIN — LEVETIRACETAM 1000 MG: 500 TABLET, FILM COATED ORAL at 22:48

## 2019-01-14 RX ADMIN — ASPIRIN 81 MG CHEWABLE TABLET 81 MG: 81 TABLET CHEWABLE at 08:28

## 2019-01-14 RX ADMIN — TAMSULOSIN HYDROCHLORIDE 0.4 MG: 0.4 CAPSULE ORAL at 16:35

## 2019-01-14 RX ADMIN — CARVEDILOL 6.25 MG: 6.25 TABLET, FILM COATED ORAL at 08:28

## 2019-01-14 RX ADMIN — CLONAZEPAM 0.5 MG: 0.5 TABLET ORAL at 22:48

## 2019-01-14 RX ADMIN — TAMSULOSIN HYDROCHLORIDE 0.4 MG: 0.4 CAPSULE ORAL at 07:10

## 2019-01-14 RX ADMIN — LACTULOSE 30 G: 20 SOLUTION ORAL at 23:05

## 2019-01-14 RX ADMIN — ISOSORBIDE MONONITRATE 30 MG: 30 TABLET ORAL at 08:29

## 2019-01-14 RX ADMIN — PANTOPRAZOLE SODIUM 40 MG: 40 TABLET, DELAYED RELEASE ORAL at 07:10

## 2019-01-14 RX ADMIN — IBUPROFEN 600 MG: 600 TABLET ORAL at 16:59

## 2019-01-14 RX ADMIN — SODIUM CHLORIDE 75 ML/HR: 900 INJECTION, SOLUTION INTRAVENOUS at 01:02

## 2019-01-14 RX ADMIN — VENLAFAXINE HYDROCHLORIDE 150 MG: 150 CAPSULE, EXTENDED RELEASE ORAL at 08:28

## 2019-01-14 RX ADMIN — VENLAFAXINE HYDROCHLORIDE 75 MG: 37.5 CAPSULE, EXTENDED RELEASE ORAL at 22:48

## 2019-01-14 RX ADMIN — LACTULOSE 30 G: 20 SOLUTION ORAL at 16:35

## 2019-01-14 RX ADMIN — PREGABALIN 50 MG: 25 CAPSULE ORAL at 08:28

## 2019-01-14 RX ADMIN — QUETIAPINE FUMARATE 25 MG: 25 TABLET ORAL at 22:49

## 2019-01-14 RX ADMIN — LACTULOSE 30 G: 20 SOLUTION ORAL at 08:28

## 2019-01-14 RX ADMIN — PREGABALIN 50 MG: 25 CAPSULE ORAL at 22:48

## 2019-01-14 RX ADMIN — INSULIN LISPRO 2 UNITS: 100 INJECTION, SOLUTION INTRAVENOUS; SUBCUTANEOUS at 16:35

## 2019-01-14 RX ADMIN — CARVEDILOL 6.25 MG: 6.25 TABLET, FILM COATED ORAL at 16:36

## 2019-01-14 RX ADMIN — AMLODIPINE BESYLATE 2.5 MG: 5 TABLET ORAL at 08:28

## 2019-01-14 RX ADMIN — CLOPIDOGREL BISULFATE 75 MG: 75 TABLET ORAL at 08:29

## 2019-01-14 RX ADMIN — FAMOTIDINE 20 MG: 20 TABLET ORAL at 07:10

## 2019-01-14 RX ADMIN — Medication 10 ML: at 14:20

## 2019-01-14 RX ADMIN — AZITHROMYCIN 250 MG: 250 TABLET, FILM COATED ORAL at 17:46

## 2019-01-14 RX ADMIN — FAMOTIDINE 20 MG: 20 TABLET ORAL at 17:46

## 2019-01-14 RX ADMIN — PANTOPRAZOLE SODIUM 40 MG: 40 TABLET, DELAYED RELEASE ORAL at 16:35

## 2019-01-14 RX ADMIN — CEFTRIAXONE 1 G: 1 INJECTION, POWDER, FOR SOLUTION INTRAMUSCULAR; INTRAVENOUS at 17:45

## 2019-01-14 RX ADMIN — FLUOXETINE 20 MG: 20 CAPSULE ORAL at 08:29

## 2019-01-14 RX ADMIN — FENTANYL CITRATE 25 MCG: 50 INJECTION, SOLUTION INTRAMUSCULAR; INTRAVENOUS at 11:38

## 2019-01-14 RX ADMIN — LEVETIRACETAM 1000 MG: 500 TABLET, FILM COATED ORAL at 08:28

## 2019-01-14 RX ADMIN — FENTANYL CITRATE 25 MCG: 50 INJECTION, SOLUTION INTRAMUSCULAR; INTRAVENOUS at 05:33

## 2019-01-14 NOTE — PROGRESS NOTES
Attempted to see patient for PT evaluation, however patient off the floor at this time. Will hold at this time and follow up time permitting and patient availability. Thanks.   
Teddy Yao, PT, DPT

## 2019-01-14 NOTE — ROUTINE PROCESS
Bedside shift change report given to Manuela Flor (oncoming nurse) by Jonathan Mariscal (offgoing nurse). Report included the following information SBAR, Kardex, Med Rec Status and Cardiac Rhythm Sinus Arrhythmia.

## 2019-01-14 NOTE — PROGRESS NOTES
Hospitalist Progress Note Jose Mcbride MD 
Answering service: [phone] OR [phone] from in house phone Cell:   
  
Date of Service:  2019 NAME:  Ike Link. :  1955 MRN:  [id number] Admission Summary:  
The patient was brought from home. According to the daughters, they found him lying on the floor when they checked on him around 11:00. The daughters think he might have been down since around 3:00. The daughter suspect possible breakthrough seizure, but they  added usually when he had seizures he has an auditory aura, but did not hear anything last night. The patient has been having some cough and congestion for the last 3 days and all of them had what looked like upper respiratory viral infection. Interval history / Subjective:  
 Pt resting in bed, slightly confused but able to answer questions to be alert x 4, no fevers or chills , reports he feels warm and his left shoulder hurts Assessment & Plan:  
 
 
 1) TACOS due to rhabdomyolysis Due to glf , CK trending down today 1.4 from 1.6 yesterday, nephrology following Am labs Left shoulder pain and immobility - Appreciate ortho input 
- XR elbow with possible radial head Fx, CT shoulder and elbow from  : No fracture. 2. Mild glenohumeral and AC joint osteoarthritis. Moderate glenohumeral joint 
effusion. 
- per ortho may need sling and immobilization, Ortho following for further recs Possible PNA Mild bilateral patchy upper lobe groundglass opacities are nonspecific but could 
represent early pneumonia. On antibiotics ,blood culture neg so far , No WCC, no fevers Will repeat CXR Possible seizure Seen by neuro , increased keppra and stopped lamictal due to abnormal LFT  
 EEG completed, need to follow results CT head no acute findings Seizure precautions and monitor Abnormal lft Improving - He has underlying BARGER And superimposed rhabdo Repeat labs in am  
 
 
Coronary artery disease. He has had CABG twice. Chronic systolic heart failure. Stable, strict I/O Type 2 diabetes. accuchecks , SSI Larraine Piles Hypertension. Controlled, continue current meds and adjust as needed Larraine Piles Dementia. .  Seizure disorder. Continue current meds Chronic liver disease and cirrhosis. Code status: full DVT prophylaxis:  
 
Care Plan discussed with: Patient/Family Disposition: TBD Hospital Problems  Date Reviewed: 1/2/2019 Codes Class Noted POA Fall ICD-10-CM: W19. Dav S[place] ICD-9-CM: H199.2  1/10/2019 Unknown * (Principal) TACOS (acute kidney injury) ([facility] ICD-10-CM: N17.9 ICD-9-CM: 584.9  1/10/2019 Unknown Review of Systems: A comprehensive review of systems was negative except for that written in the HPI. Vital Signs:  
 Last 24hrs VS reviewed since prior progress note. Most recent are: 
Visit Vitals /61 (BP 1 Location: Right arm, BP Patient Position: Standing) Pulse 80 Temp 97.6 °F (36.4 °C) Resp 21 Ht 5' 9\" (1.753 m) Wt 100.5 kg (221 lb 9.6 oz) SpO2 97% BMI 32.72 kg/m² Intake/Output Summary (Last 24 hours) at 1/14/2019 1130 Last data filed at 1/14/2019 0102 Gross per 24 hour Intake 250 ml Output 1150 ml Net -900 ml Physical Examination:  
 
 
     
Constitutional:  No acute distress, cooperative, pleasant , scalp abrasion, alert x 4  
ENT:  Oral mucous moist, oropharynx benign. Neck supple, Resp:  decreased basal BS   
CV:  Regular rhythm, normal rate, no murmurs, gallops, rubs GI:  Soft, non distended, non tender. normoactive bowel sounds, no hepatosplenomegaly Musculoskeletal:  No edema, warm, 2+ pulses throughout Neurologic:  left shoulder not moving. AAOx2, Psych:  Good insight, Not anxious nor agitated. Data Review:  
 Review and/or order of clinical lab test 
 
 
Labs: Recent Labs  
  01/14/19 0304 01/13/19 0220 WBC 8.0 8.3 HGB 12.3 12.4 HCT 40.5 40.2  150 Recent Labs  
  01/14/19 0304 01/13/19 0220 01/12/19 
8599  137 138  138  
K 3.9 3.7 4.1  4.2 * 109* 108  109* CO2 19* 21 20*  20* BUN 28* 34* 41*  40* CREA 1.40* 1.61* 1.57*  1.59* * 111* 103*  101* CA 7.6* 7.6* 7.4*  7.5* MG 2.1 2.1  --   
PHOS 2.9 2.8  --   
 
Recent Labs  
  01/14/19 0304 01/13/19 0220 01/12/19 
1444 SGOT 53* 95* 121* ALT 91* 116* 118* AP 54 57 57 TBILI 0.4 0.4 0.5 TP 5.7* 5.9* 5.6* ALB 2.2* 2.1* 2.1*  
GLOB 3.5 3.8 3.5 No results for input(s): INR, PTP, APTT in the last 72 hours. No lab exists for component: INREXT, INREXT No results for input(s): FE, TIBC, PSAT, FERR in the last 72 hours. No results found for: FOL, RBCF No results for input(s): PH, PCO2, PO2 in the last 72 hours. Recent Labs  
  01/14/19 0304 01/13/19 0220 01/12/19 
3287 * 805* 1,586* No results found for: CHOL, CHOLX, CHLST, CHOLV, HDL, LDL, LDLC, DLDLP, TGLX, TRIGL, TRIGP, CHHD, CHHDX Lab Results Component Value Date/Time Glucose (POC) 98 01/14/2019 07:07 AM  
 Glucose (POC) 139 (H) 01/13/2019 10:15 PM  
 Glucose (POC) 137 (H) 01/13/2019 04:47 PM  
 Glucose (POC) 137 (H) 01/13/2019 11:33 AM  
 Glucose (POC) 92 01/13/2019 07:41 AM  
 
Lab Results Component Value Date/Time  Color DARK YELLOW 01/10/2019 03:53 PM  
 Appearance CLOUDY (A) 01/10/2019 03:53 PM  
 Specific gravity 1.020 01/10/2019 03:53 PM  
 pH (UA) 5.5 01/10/2019 03:53 PM  
 Protein 100 (A) 01/10/2019 03:53 PM  
 Glucose NEGATIVE  01/10/2019 03:53 PM  
 Ketone TRACE (A) 01/10/2019 03:53 PM  
 Bilirubin NEGATIVE  09/07/2018 09:16 PM  
 Urobilinogen 1.0 01/10/2019 03:53 PM  
 Nitrites NEGATIVE  01/10/2019 03:53 PM  
 Leukocyte Esterase NEGATIVE  01/10/2019 03:53 PM  
 Epithelial cells FEW 01/10/2019 03:53 PM  
 Bacteria NEGATIVE  01/10/2019 03:53 PM  
 WBC 0-4 01/10/2019 03:53 PM  
 RBC 0-5 01/10/2019 03:53 PM  
 
 
 
Medications Reviewed:  
 
Current Facility-Administered Medications Medication Dose Route Frequency  0.9% sodium chloride infusion  75 mL/hr IntraVENous CONTINUOUS  
 amLODIPine (NORVASC) tablet 2.5 mg  2.5 mg Oral DAILY  famotidine (PEPCID) tablet 20 mg  20 mg Oral Q12H  
 venlafaxine-SR (EFFEXOR-XR) capsule 150 mg  150 mg Oral DAILY AFTER BREAKFAST  levETIRAcetam (KEPPRA) tablet 1,000 mg  1,000 mg Oral Q12H  
 influenza vaccine 2018-19 (6 mos+)(PF) (FLUARIX QUAD/FLULAVAL QUAD) injection 0.5 mL  0.5 mL IntraMUSCular PRIOR TO DISCHARGE  sodium chloride (NS) flush 5-40 mL  5-40 mL IntraVENous Q8H  
 sodium chloride (NS) flush 5-40 mL  5-40 mL IntraVENous PRN  
 aspirin chewable tablet 81 mg  81 mg Oral DAILY  clonazePAM (KlonoPIN) tablet 0.5 mg  0.5 mg Oral QHS  clopidogrel (PLAVIX) tablet 75 mg  75 mg Oral DAILY  FLUoxetine (PROzac) capsule 20 mg  20 mg Oral DAILY  isosorbide mononitrate ER (IMDUR) tablet 30 mg  30 mg Oral DAILY  lactulose (CHRONULAC) solution 30 g  30 g Oral TID  nitroglycerin (NITROSTAT) tablet 0.4 mg  0.4 mg SubLINGual Q5MIN PRN  pantoprazole (PROTONIX) tablet 40 mg  40 mg Oral ACB&D  pregabalin (LYRICA) capsule 50 mg  50 mg Oral TID  QUEtiapine (SEROquel) tablet 25 mg  25 mg Oral QHS  tamsulosin (FLOMAX) capsule 0.4 mg  0.4 mg Oral ACB&D  
 venlafaxine-SR (EFFEXOR-XR) capsule 75 mg  75 mg Oral QHS  cefTRIAXone (ROCEPHIN) 1 g in 0.9% sodium chloride (MBP/ADV) 50 mL  1 g IntraVENous Q24H  
 azithromycin (ZITHROMAX) tablet 250 mg  250 mg Oral DAILY  carvedilol (COREG) tablet 6.25 mg  6.25 mg Oral BID WITH MEALS  insulin lispro (HUMALOG) injection   SubCUTAneous AC&HS  
 glucose chewable tablet 16 g  4 Tab Oral PRN  
 dextrose (D50W) injection syrg 12.5-25 g  12.5-25 g IntraVENous PRN  
 glucagon (GLUCAGEN) injection 1 mg  1 mg IntraMUSCular PRN  
  fentaNYL citrate (PF) injection 25 mcg  25 mcg IntraVENous Q4H PRN  
 
______________________________________________________________________ EXPECTED LENGTH OF STAY: 4d 12h ACTUAL LENGTH OF STAY:          3 Javon Jung MD

## 2019-01-14 NOTE — PROGRESS NOTES
Problem: Falls - Risk of 
Goal: *Absence of Falls Document Sierra Kaur Fall Risk and appropriate interventions in the flowsheet. Outcome: Progressing Towards Goal 
Fall Risk Interventions: 
Mobility Interventions: OT consult for ADLs, Patient to call before getting OOB, PT Consult for mobility concerns, PT Consult for assist device competence, Utilize walker, cane, or other assistive device Mentation Interventions: Door open when patient unattended, Increase mobility, More frequent rounding, Room close to nurse's station, Toileting rounds Medication Interventions: Patient to call before getting OOB, Teach patient to arise slowly Elimination Interventions: Call light in reach, Patient to call for help with toileting needs, Toileting schedule/hourly rounds History of Falls Interventions: Room close to nurse's station, Utilize gait belt for transfer/ambulation, Door open when patient unattended Call bell within reach--patient aware to use if needing assistance out of bed.

## 2019-01-14 NOTE — PROGRESS NOTES
Bedside and Verbal shift change report given to Nallely Stevens (oncoming nurse) by Alfred Muñoz (offgoing nurse). Report included the following information SBAR, Kardex, Intake/Output, MAR and Recent Results.

## 2019-01-14 NOTE — PROGRESS NOTES
[facility] 
 [address] [facility], [address] [facility], [facility] Phone: [phone]   [phone]   
  
Nephrology Progress Note Diann Ames.     1955     [id number] Date of Admission : 1/10/2019 
01/14/19 CC:  Follow up for TACOS, Rhabdo Assessment and Plan TACOS on CKD · 2/2 dehydration + Acute Rhabdomyolysis · Cr improving · Cont IVF for now · Labs in AM 
  
Acute Rhabdomyolysis · W/ ARF + Acute Liver Injury · triggered by prolonged immobilization, high dose Statin and dehydration · CPK trending down 
  
AG metabolic acidosis  
-follow for now  
  
CKD Stage III : 
· Baseline CR 1.4 mg/dl · Likely 2/2 DM, HTN  
  
Acute Liver Injury  
  
PNA Chronic Systolic CHF Type II DM  
HTN Seizure disorder Cirrhosis of Liver Interval History:   
Seen and examined. Feeling ok. Appetite fair. Cr improving. No cp or sob, stable UOP. Review of Systems: Pertinent items are noted in HPI. Current Medications:  
Current Facility-Administered Medications Medication Dose Route Frequency  0.9% sodium chloride infusion  75 mL/hr IntraVENous CONTINUOUS  
 amLODIPine (NORVASC) tablet 2.5 mg  2.5 mg Oral DAILY  famotidine (PEPCID) tablet 20 mg  20 mg Oral Q12H  
 venlafaxine-SR (EFFEXOR-XR) capsule 150 mg  150 mg Oral DAILY AFTER BREAKFAST  levETIRAcetam (KEPPRA) tablet 1,000 mg  1,000 mg Oral Q12H  
 influenza vaccine 2018-19 (6 mos+)(PF) (FLUARIX QUAD/FLULAVAL QUAD) injection 0.5 mL  0.5 mL IntraMUSCular PRIOR TO DISCHARGE  sodium chloride (NS) flush 5-40 mL  5-40 mL IntraVENous Q8H  
 sodium chloride (NS) flush 5-40 mL  5-40 mL IntraVENous PRN  
 aspirin chewable tablet 81 mg  81 mg Oral DAILY  clonazePAM (KlonoPIN) tablet 0.5 mg  0.5 mg Oral QHS  clopidogrel (PLAVIX) tablet 75 mg  75 mg Oral DAILY  FLUoxetine (PROzac) capsule 20 mg  20 mg Oral DAILY  isosorbide mononitrate ER (IMDUR) tablet 30 mg  30 mg Oral DAILY  lactulose (CHRONULAC) solution 30 g  30 g Oral TID  nitroglycerin (NITROSTAT) tablet 0.4 mg  0.4 mg SubLINGual Q5MIN PRN  pantoprazole (PROTONIX) tablet 40 mg  40 mg Oral ACB&D  pregabalin (LYRICA) capsule 50 mg  50 mg Oral TID  QUEtiapine (SEROquel) tablet 25 mg  25 mg Oral QHS  tamsulosin (FLOMAX) capsule 0.4 mg  0.4 mg Oral ACB&D  
 venlafaxine-SR (EFFEXOR-XR) capsule 75 mg  75 mg Oral QHS  cefTRIAXone (ROCEPHIN) 1 g in 0.9% sodium chloride (MBP/ADV) 50 mL  1 g IntraVENous Q24H  
 azithromycin (ZITHROMAX) tablet 250 mg  250 mg Oral DAILY  carvedilol (COREG) tablet 6.25 mg  6.25 mg Oral BID WITH MEALS  insulin lispro (HUMALOG) injection   SubCUTAneous AC&HS  
 glucose chewable tablet 16 g  4 Tab Oral PRN  
 dextrose (D50W) injection syrg 12.5-25 g  12.5-25 g IntraVENous PRN  
 glucagon (GLUCAGEN) injection 1 mg  1 mg IntraMUSCular PRN  
 fentaNYL citrate (PF) injection 25 mcg  25 mcg IntraVENous Q4H PRN Allergies Allergen Reactions  Latex, Natural Rubber Hives  Codeine Anaphylaxis Tolerated fentanyl previously  Demerol [Meperidine] Anaphylaxis Tolerated fentanyl previously  Mushroom Anaphylaxis  Metformin Diarrhea And dehydration  Wellbutrin [Bupropion Hcl] Anaphylaxis Objective: 
Vitals:   
Vitals:  
 01/14/19 0202 01/14/19 1005 01/14/19 4847 01/14/19 6921 BP: 121/68  138/69 134/77 Pulse: 71  74 80 Resp: 19 19 Temp: 98.2 °F (36.8 °C)  97.5 °F (36.4 °C) SpO2: 95%  94% Weight:  100.5 kg (221 lb 9.6 oz) Height:      
 
Intake and Output: 
No intake/output data recorded. 01/12 1901 - 01/14 0700 In: 1578.8 [P.O.:400; I.V.:1178.8] Out: 2025 [Urine:2025] Physical Examination:Pt intubated    No 
General: NAD Neck:  Supple, no JVD Resp:  Lungs CTA B/L, no wheezing , normal respiratory effort CV:  RRR,  no rub, minimal LE edema GI:  Soft, NT, + Bowel sounds Neurologic:  Non focal 
 Psych:             Unable to asssess Skin:  No Rash :  bhandari []    High complexity decision making was performed 
[]    Patient is at high-risk of decompensation with multiple organ involvement Lab Data Personally Reviewed: I have reviewed all the pertinent labs, microbiology data and radiology studies during assessment. Recent Labs  
  01/14/19 
0304 01/13/19 
0220 01/12/19 
1444 01/12/19 
5994  137  --  138  138  
K 3.9 3.7  --  4.1  4.2 * 109*  --  108  109* CO2 19* 21  --  20*  20* * 111*  --  103*  101* BUN 28* 34*  --  41*  40* CREA 1.40* 1.61*  --  1.57*  1.59* CA 7.6* 7.6*  --  7.4*  7.5* MG 2.1 2.1  --   --   
PHOS 2.9 2.8  --   --   
ALB 2.2* 2.1* 2.1* 2.2*  
SGOT 53* 95* 121* 157* ALT 91* 116* 118* 122* Recent Labs  
  01/14/19 
0304 01/13/19 
0220 01/12/19 
2639 WBC 8.0 8.3 9.7 HGB 12.3 12.4 13.5 HCT 40.5 40.2 43.6  150 142* Lab Results Component Value Date/Time Specimen Description: ESOPHAGEAL BRUSHING 09/14/2010 10:48 AM  
 
Lab Results Component Value Date/Time Culture result: STAPHYLOCOCCUS EPIDERMIDIS (A) 09/07/2018 09:16 PM  
 Culture result: NO GROWTH 5 DAYS 07/17/2017 09:01 PM  
 Culture result: MRSA NOT PRESENT 03/02/2017 04:15 PM  
 Culture result:  03/02/2017 04:15 PM  
      Screening of patient nares for MRSA is for surveillance purposes and, if positive, to facilitate isolation considerations in high risk settings. It is not intended for automatic decolonization interventions per se as regimens are not sufficiently effective to warrant routine use. Culture result: NORMAL RESPIRATORY MART/NO BETA STREP ISOLATED 03/02/2017 12:26 PM  
 
Recent Results (from the past 24 hour(s)) GLUCOSE, POC Collection Time: 01/13/19 11:33 AM  
Result Value Ref Range Glucose (POC) 137 (H) 65 - 100 mg/dL Performed by Mercedes Stahl POC G3 - PUL  Collection Time: 01/13/19  1:06 PM  
 Result Value Ref Range FIO2 (POC) 0.21 % pH (POC) 7.324 (L) 7.35 - 7.45    
 pCO2 (POC) 31.2 (L) 35.0 - 45.0 MMHG  
 pO2 (POC) 79 (L) 80 - 100 MMHG  
 HCO3 (POC) 16.2 (L) 22 - 26 MMOL/L  
 sO2 (POC) 95 92 - 97 % Base deficit (POC) 10 mmol/L Site RIGHT RADIAL Device: ROOM AIR Allens test (POC) YES Specimen type (POC) ARTERIAL Total resp. rate 18 GLUCOSE, POC Collection Time: 01/13/19  4:47 PM  
Result Value Ref Range Glucose (POC) 137 (H) 65 - 100 mg/dL Performed by Ewelina Vega GLUCOSE, POC Collection Time: 01/13/19 10:15 PM  
Result Value Ref Range Glucose (POC) 139 (H) 65 - 100 mg/dL Performed by Phylicia Marie CBC WITH AUTOMATED DIFF Collection Time: 01/14/19  3:04 AM  
Result Value Ref Range WBC 8.0 4.1 - 11.1 K/uL  
 RBC 5.13 4.10 - 5.70 M/uL  
 HGB 12.3 12.1 - 17.0 g/dL HCT 40.5 36.6 - 50.3 % MCV 78.9 (L) 80.0 - 99.0 FL  
 MCH 24.0 (L) 26.0 - 34.0 PG  
 MCHC 30.4 30.0 - 36.5 g/dL  
 RDW 16.8 (H) 11.5 - 14.5 % PLATELET 112 609 - 358 K/uL MPV 10.8 8.9 - 12.9 FL  
 NRBC 0.0 0  WBC ABSOLUTE NRBC 0.00 0.00 - 0.01 K/uL NEUTROPHILS 66 32 - 75 % LYMPHOCYTES 20 12 - 49 % MONOCYTES 9 5 - 13 % EOSINOPHILS 4 0 - 7 % BASOPHILS 0 0 - 1 % IMMATURE GRANULOCYTES 1 (H) 0.0 - 0.5 % ABS. NEUTROPHILS 5.3 1.8 - 8.0 K/UL  
 ABS. LYMPHOCYTES 1.6 0.8 - 3.5 K/UL  
 ABS. MONOCYTES 0.7 0.0 - 1.0 K/UL  
 ABS. EOSINOPHILS 0.3 0.0 - 0.4 K/UL  
 ABS. BASOPHILS 0.0 0.0 - 0.1 K/UL  
 ABS. IMM. GRANS. 0.0 0.00 - 0.04 K/UL  
 DF AUTOMATED METABOLIC PANEL, COMPREHENSIVE Collection Time: 01/14/19  3:04 AM  
Result Value Ref Range Sodium 140 136 - 145 mmol/L Potassium 3.9 3.5 - 5.1 mmol/L Chloride 112 (H) 97 - 108 mmol/L  
 CO2 19 (L) 21 - 32 mmol/L Anion gap 9 5 - 15 mmol/L Glucose 115 (H) 65 - 100 mg/dL BUN 28 (H) 6 - 20 MG/DL  Creatinine 1.40 (H) 0.70 - 1.30 MG/DL  
 BUN/Creatinine ratio 20 12 - 20    
 GFR est AA >60 >60 ml/min/1.73m2 GFR est non-AA 51 (L) >60 ml/min/1.73m2 Calcium 7.6 (L) 8.5 - 10.1 MG/DL Bilirubin, total 0.4 0.2 - 1.0 MG/DL  
 ALT (SGPT) 91 (H) 12 - 78 U/L  
 AST (SGOT) 53 (H) 15 - 37 U/L Alk. phosphatase 54 45 - 117 U/L Protein, total 5.7 (L) 6.4 - 8.2 g/dL Albumin 2.2 (L) 3.5 - 5.0 g/dL Globulin 3.5 2.0 - 4.0 g/dL A-G Ratio 0.6 (L) 1.1 - 2.2 PHOSPHORUS Collection Time: 01/14/19  3:04 AM  
Result Value Ref Range Phosphorus 2.9 2.6 - 4.7 MG/DL MAGNESIUM Collection Time: 01/14/19  3:04 AM  
Result Value Ref Range Magnesium 2.1 1.6 - 2.4 mg/dL CK Collection Time: 01/14/19  3:04 AM  
Result Value Ref Range  (H) 39 - 308 U/L  
GLUCOSE, POC Collection Time: 01/14/19  7:07 AM  
Result Value Ref Range Glucose (POC) 98 65 - 100 mg/dL Performed by Kecia Clarke Total time spent with patient:  xxx   min. Care Plan discussed with: 
Patient Family RN Consulting Physician [facility],      
 
I have reviewed the flowsheets. Chart and Pertinent Notes have been reviewed. No change in PMH ,family and social history from Consult note.  
 
 
Bozena Kennedy MD
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
## 2019-01-14 NOTE — WOUND CARE
WOCN Note:  
 
New consult for multiple skin wounds with history of MRSA. Chart shows: 
Admitted for fall, shoulder pain, rhabdomyolysis after falling at home; history of CABG x2, DM, seizures Assessment:  
He is communicative, continent and mobile -able to turn independently for assessment. Bed: advance with seizure pads in place on rails Patient reports pain in left shoulder and forehead with wound cleaning. Bilateral heels, buttocks, and sacral skin intact and without erythema. 1. POA, left side of head = 3.5 x 2 x 0 cm  Appears like a drying serous bullae but uncertain of original injury. Left open to air. 2. POA, left top of shoulder toward neck; intact, elevated, red plaque. Left open to air. 3. POA, right forehead down into eyebrow, open abrasion with small white papules at margin, yellow exudate = 8 x 7 x 0.1 cm 50% moist red base 50% moist yellow. ?MRSA? Cleaned with saline and Mepitel One applied. Recommendations:   
Right forehead: please clean any crusted exudate with saline and apply Mepitel One contact layer. Will reach out to Dr. Jonathan Narayan re: topical Bactroban. Transition of Care: Plan to follow weekly and as needed while admitted to hospital.  
 
JADA Cervantes, RN, [employer] Certified Wound, Ostomy, Continence Nurse 
office [phone] 
pager [phone] or call  to page

## 2019-01-15 LAB
ALBUMIN SERPL-MCNC: 2.1 G/DL (ref 3.5–5)
ALBUMIN/GLOB SERPL: 0.6 {RATIO} (ref 1.1–2.2)
ALP SERPL-CCNC: 55 U/L (ref 45–117)
ALT SERPL-CCNC: 69 U/L (ref 12–78)
ANION GAP SERPL CALC-SCNC: 9 MMOL/L (ref 5–15)
AST SERPL-CCNC: 41 U/L (ref 15–37)
BASOPHILS # BLD: 0 K/UL (ref 0–0.1)
BASOPHILS NFR BLD: 0 % (ref 0–1)
BILIRUB SERPL-MCNC: 0.4 MG/DL (ref 0.2–1)
BUN SERPL-MCNC: 26 MG/DL (ref 6–20)
BUN/CREAT SERPL: 19 (ref 12–20)
CALCIUM SERPL-MCNC: 7.8 MG/DL (ref 8.5–10.1)
CHLORIDE SERPL-SCNC: 112 MMOL/L (ref 97–108)
CK SERPL-CCNC: 269 U/L (ref 39–308)
CO2 SERPL-SCNC: 19 MMOL/L (ref 21–32)
CREAT SERPL-MCNC: 1.34 MG/DL (ref 0.7–1.3)
DIFFERENTIAL METHOD BLD: ABNORMAL
EOSINOPHIL # BLD: 0.4 K/UL (ref 0–0.4)
EOSINOPHIL NFR BLD: 6 % (ref 0–7)
ERYTHROCYTE [DISTWIDTH] IN BLOOD BY AUTOMATED COUNT: 16.5 % (ref 11.5–14.5)
GLOBULIN SER CALC-MCNC: 3.4 G/DL (ref 2–4)
GLUCOSE BLD STRIP.AUTO-MCNC: 130 MG/DL (ref 65–100)
GLUCOSE BLD STRIP.AUTO-MCNC: 134 MG/DL (ref 65–100)
GLUCOSE BLD STRIP.AUTO-MCNC: 145 MG/DL (ref 65–100)
GLUCOSE BLD STRIP.AUTO-MCNC: 175 MG/DL (ref 65–100)
GLUCOSE SERPL-MCNC: 129 MG/DL (ref 65–100)
HCT VFR BLD AUTO: 38.5 % (ref 36.6–50.3)
HGB BLD-MCNC: 12 G/DL (ref 12.1–17)
IMM GRANULOCYTES # BLD AUTO: 0 K/UL (ref 0–0.04)
IMM GRANULOCYTES NFR BLD AUTO: 0 % (ref 0–0.5)
LYMPHOCYTES # BLD: 1.9 K/UL (ref 0.8–3.5)
LYMPHOCYTES NFR BLD: 26 % (ref 12–49)
MAGNESIUM SERPL-MCNC: 2 MG/DL (ref 1.6–2.4)
MCH RBC QN AUTO: 24.5 PG (ref 26–34)
MCHC RBC AUTO-ENTMCNC: 31.2 G/DL (ref 30–36.5)
MCV RBC AUTO: 78.7 FL (ref 80–99)
MONOCYTES # BLD: 0.5 K/UL (ref 0–1)
MONOCYTES NFR BLD: 6 % (ref 5–13)
NEUTS SEG # BLD: 4.4 K/UL (ref 1.8–8)
NEUTS SEG NFR BLD: 61 % (ref 32–75)
NRBC # BLD: 0 K/UL (ref 0–0.01)
NRBC BLD-RTO: 0 PER 100 WBC
PHOSPHATE SERPL-MCNC: 3.2 MG/DL (ref 2.6–4.7)
PLATELET # BLD AUTO: 147 K/UL (ref 150–400)
PMV BLD AUTO: 10.4 FL (ref 8.9–12.9)
POTASSIUM SERPL-SCNC: 3.7 MMOL/L (ref 3.5–5.1)
PROT SERPL-MCNC: 5.5 G/DL (ref 6.4–8.2)
RBC # BLD AUTO: 4.89 M/UL (ref 4.1–5.7)
SERVICE CMNT-IMP: ABNORMAL
SODIUM SERPL-SCNC: 140 MMOL/L (ref 136–145)
WBC # BLD AUTO: 7.2 K/UL (ref 4.1–11.1)

## 2019-01-15 PROCEDURE — 85025 COMPLETE CBC W/AUTO DIFF WBC: CPT

## 2019-01-15 PROCEDURE — 93005 ELECTROCARDIOGRAM TRACING: CPT

## 2019-01-15 PROCEDURE — 97116 GAIT TRAINING THERAPY: CPT | Performed by: PHYSICAL THERAPIST

## 2019-01-15 PROCEDURE — 74011636637 HC RX REV CODE- 636/637: Performed by: HOSPITALIST

## 2019-01-15 PROCEDURE — 84100 ASSAY OF PHOSPHORUS: CPT

## 2019-01-15 PROCEDURE — 77030032490 HC SLV COMPR SCD KNE COVD -B

## 2019-01-15 PROCEDURE — 80053 COMPREHEN METABOLIC PANEL: CPT

## 2019-01-15 PROCEDURE — 97161 PT EVAL LOW COMPLEX 20 MIN: CPT | Performed by: PHYSICAL THERAPIST

## 2019-01-15 PROCEDURE — 74011250637 HC RX REV CODE- 250/637: Performed by: FAMILY MEDICINE

## 2019-01-15 PROCEDURE — 82962 GLUCOSE BLOOD TEST: CPT

## 2019-01-15 PROCEDURE — 74011250637 HC RX REV CODE- 250/637: Performed by: PSYCHIATRY & NEUROLOGY

## 2019-01-15 PROCEDURE — 74011250636 HC RX REV CODE- 250/636: Performed by: HOSPITALIST

## 2019-01-15 PROCEDURE — 74011250637 HC RX REV CODE- 250/637: Performed by: INTERNAL MEDICINE

## 2019-01-15 PROCEDURE — 97110 THERAPEUTIC EXERCISES: CPT

## 2019-01-15 PROCEDURE — 36415 COLL VENOUS BLD VENIPUNCTURE: CPT

## 2019-01-15 PROCEDURE — 74011250637 HC RX REV CODE- 250/637: Performed by: HOSPITALIST

## 2019-01-15 PROCEDURE — 65660000000 HC RM CCU STEPDOWN

## 2019-01-15 PROCEDURE — 83735 ASSAY OF MAGNESIUM: CPT

## 2019-01-15 PROCEDURE — 82550 ASSAY OF CK (CPK): CPT

## 2019-01-15 RX ADMIN — VENLAFAXINE HYDROCHLORIDE 150 MG: 150 CAPSULE, EXTENDED RELEASE ORAL at 09:30

## 2019-01-15 RX ADMIN — FLUOXETINE 20 MG: 20 CAPSULE ORAL at 09:27

## 2019-01-15 RX ADMIN — INSULIN LISPRO 2 UNITS: 100 INJECTION, SOLUTION INTRAVENOUS; SUBCUTANEOUS at 12:53

## 2019-01-15 RX ADMIN — CLONAZEPAM 0.5 MG: 0.5 TABLET ORAL at 21:23

## 2019-01-15 RX ADMIN — PANTOPRAZOLE SODIUM 40 MG: 40 TABLET, DELAYED RELEASE ORAL at 07:11

## 2019-01-15 RX ADMIN — TAMSULOSIN HYDROCHLORIDE 0.4 MG: 0.4 CAPSULE ORAL at 07:11

## 2019-01-15 RX ADMIN — AMLODIPINE BESYLATE 2.5 MG: 5 TABLET ORAL at 09:30

## 2019-01-15 RX ADMIN — Medication 10 ML: at 07:11

## 2019-01-15 RX ADMIN — VENLAFAXINE HYDROCHLORIDE 75 MG: 37.5 CAPSULE, EXTENDED RELEASE ORAL at 21:23

## 2019-01-15 RX ADMIN — TAMSULOSIN HYDROCHLORIDE 0.4 MG: 0.4 CAPSULE ORAL at 17:00

## 2019-01-15 RX ADMIN — QUETIAPINE FUMARATE 25 MG: 25 TABLET ORAL at 21:24

## 2019-01-15 RX ADMIN — FAMOTIDINE 20 MG: 20 TABLET ORAL at 18:27

## 2019-01-15 RX ADMIN — LACTULOSE 30 G: 20 SOLUTION ORAL at 21:24

## 2019-01-15 RX ADMIN — MUPIROCIN: 20 OINTMENT TOPICAL at 09:31

## 2019-01-15 RX ADMIN — LACTULOSE 30 G: 20 SOLUTION ORAL at 09:26

## 2019-01-15 RX ADMIN — CARVEDILOL 6.25 MG: 6.25 TABLET, FILM COATED ORAL at 09:27

## 2019-01-15 RX ADMIN — ASPIRIN 81 MG CHEWABLE TABLET 81 MG: 81 TABLET CHEWABLE at 09:27

## 2019-01-15 RX ADMIN — FENTANYL CITRATE 25 MCG: 50 INJECTION, SOLUTION INTRAMUSCULAR; INTRAVENOUS at 21:24

## 2019-01-15 RX ADMIN — CARVEDILOL 9.38 MG: 6.25 TABLET, FILM COATED ORAL at 18:26

## 2019-01-15 RX ADMIN — LACTULOSE 30 G: 20 SOLUTION ORAL at 17:00

## 2019-01-15 RX ADMIN — LEVETIRACETAM 1000 MG: 500 TABLET, FILM COATED ORAL at 21:24

## 2019-01-15 RX ADMIN — LEVETIRACETAM 1000 MG: 500 TABLET, FILM COATED ORAL at 09:27

## 2019-01-15 RX ADMIN — PREGABALIN 50 MG: 25 CAPSULE ORAL at 09:26

## 2019-01-15 RX ADMIN — PANTOPRAZOLE SODIUM 40 MG: 40 TABLET, DELAYED RELEASE ORAL at 17:00

## 2019-01-15 RX ADMIN — Medication 10 ML: at 13:59

## 2019-01-15 RX ADMIN — ISOSORBIDE MONONITRATE 30 MG: 30 TABLET ORAL at 09:30

## 2019-01-15 RX ADMIN — FAMOTIDINE 20 MG: 20 TABLET ORAL at 07:11

## 2019-01-15 RX ADMIN — Medication 10 ML: at 21:25

## 2019-01-15 RX ADMIN — PREGABALIN 50 MG: 25 CAPSULE ORAL at 17:00

## 2019-01-15 RX ADMIN — PREGABALIN 50 MG: 25 CAPSULE ORAL at 21:23

## 2019-01-15 RX ADMIN — CLOPIDOGREL BISULFATE 75 MG: 75 TABLET ORAL at 09:30
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
## 2019-01-15 NOTE — PROGRESS NOTES
Hospitalist Progress Note Tracy Pelayo MD 
Answering service: [phone] from in house phone Cell: [phone] Date of Service:  1/15/2019 NAME:  Perez Hull. :  1955 MRN:  [id number] Admission Summary:  
The patient is a 71 yr old male with pMD of CAD s/p CABG, chronic systolic CHF, DM type 2, HTN, Dementia, Seizure disorder, chronic liver disease/cirrhosis who was brought from home. According to the daughters, they found him lying on the floor when they checked on him around 11:00. The daughters think he might have been down since around 3:00. The daughter suspect possible breakthrough seizure, but they  added usually when he had seizures he has an auditory aura, but did not hear anything last night. The patient has been having some cough and congestion for the last 3 days and all of them had what looked like upper respiratory viral infection. Interval history / Subjective:  
 F/u SeizurePt resting in bed, slightly confused but able to answer questions to be alert x 4, no fevers or chills , reports he feels warm and his left shoulder hurts Assessment & Plan:  
 
Acute renal failure - due to rhabdomyolysis/dehydration 
-Due to ground level fall , CK trending down, nephrology following  
-Appreciate input -now resolved Left shoulder pain and immobility - XR elbow with possible radial head Fx,  
-CT shoulder and elbow from  : No fracture. Mild glenohumeral and AC joint osteoarthritis. Moderate glenohumeral joint effusion. 
- per ortho need sling and immobilization - Appreciate ortho input, spoke 1/15, clear for discharge  
-Outpatient follow up with Dr Yojana Simpson Possible PNA  
- CXR Mild bilateral patchy upper lobe groundglass opacities are nonspecific but could 
represent early pneumonia. 
-No WCC, no fevers -Repeat CXR  No acute abnormality identified.  There is minor atelectasis/scarring 
in the mid lungs 
-Was on Azithro/ceftriaxone, stopped 1/14 
-resolved Possible breakthrough seizure  
-Seen by neuro , increased keppra and stopped lamictal due to abnormal LFT  
 -EEG completed, normal 
-CT head no acute findings 
-Seizure precautions and monitor  
-Continue Keppra/Klonopin 
-Outpatient follow up with Dr Keshia Fox in 4 weeks Transaminitis Improving He has underlying BARGER And superimposed rhabdo Coronary artery disease. He has had CABG twice. Chronic systolic heart failure. Stable, strict I/O Type 2 diabetes. accuchecks , SSI Hypertension. Controlled, continue current meds and adjust as needed Ledell Jamie Dementia. Anxiety/depression 
-on Seroquel/Prozac 
-Will get EKG for Qtc Chronic liver disease and cirrhosis. PT/OT inpatient rehab Code status: full DVT prophylaxis: scd Plan: The patient is stable medically for transfer to rehab, awaiting placement Care Plan discussed with: Patient/Family Disposition: TBD Hospital Problems  Date Reviewed: 1/2/2019 Codes Class Noted POA Fall ICD-10-CM: W19. Avni Seal ICD-9-CM: W347.8  1/10/2019 Unknown * (Principal) TACOS (acute kidney injury) ([facility] [address] ICD-10-CM: N17.9 ICD-9-CM: 584.9  1/10/2019 Unknown Review of Systems: A comprehensive review of systems was negative except for that written in the HPI. Vital Signs:  
 Last 24hrs VS reviewed since prior progress note. Most recent are: 
Visit Vitals /68 Pulse 77 Temp 97.8 °F (36.6 °C) Resp 15 Ht 5' 9\" (1.753 m) Wt 104.6 kg (230 lb 9.6 oz) SpO2 98% BMI 34.05 kg/m² Intake/Output Summary (Last 24 hours) at 1/15/2019 3242 Last data filed at 1/14/2019 2000 Gross per 24 hour Intake 4201.25 ml Output  Net 4201.25 ml Physical Examination:  
 
 
     
Constitutional:  No acute distress, cooperative, pleasant , scalp abrasion, alert x 4  
 ENT:  Oral mucous moist, oropharynx benign. Neck supple, Resp:  decreased basal BS   
CV:  Regular rhythm, normal rate, no murmurs, gallops, rubs GI:  Soft, non distended, non tender. normoactive bowel sounds, no hepatosplenomegaly Musculoskeletal:  No edema, warm, 2+ pulses throughout Neurologic:  left shoulder not moving. AAOx2, Psych:  Good insight, Not anxious nor agitated. Data Review:  
 Review and/or order of clinical lab test 
 
 
Labs:  
 
Recent Labs  
  01/15/19 
0200 01/14/19 
0304 WBC 7.2 8.0 HGB 12.0* 12.3 HCT 38.5 40.5 * 151 Recent Labs  
  01/15/19 
0200 01/14/19 
0304 01/13/19 
0220  140 137  
K 3.7 3.9 3.7 * 112* 109* CO2 19* 19* 21 BUN 26* 28* 34* CREA 1.34* 1.40* 1.61* * 115* 111* CA 7.8* 7.6* 7.6*  
MG 2.0 2.1 2.1 PHOS 3.2 2.9 2.8 Recent Labs  
  01/15/19 
0200 01/14/19 
0304 01/13/19 
0220 SGOT 41* 53* 95* ALT 69 91* 116* AP 55 54 57 TBILI 0.4 0.4 0.4 TP 5.5* 5.7* 5.9* ALB 2.1* 2.2* 2.1*  
GLOB 3.4 3.5 3.8 No results for input(s): INR, PTP, APTT in the last 72 hours. No lab exists for component: INREXT, INREXT No results for input(s): FE, TIBC, PSAT, FERR in the last 72 hours. No results found for: FOL, RBCF No results for input(s): PH, PCO2, PO2 in the last 72 hours. Recent Labs  
  01/15/19 
0200 01/14/19 
0304 01/13/19 
0220  372* 805* No results found for: CHOL, CHOLX, CHLST, CHOLV, HDL, LDL, LDLC, DLDLP, TGLX, TRIGL, TRIGP, CHHD, CHHDX Lab Results Component Value Date/Time Glucose (POC) 130 (H) 01/15/2019 09:33 AM  
 Glucose (POC) 124 (H) 01/14/2019 10:20 PM  
 Glucose (POC) 160 (H) 01/14/2019 04:22 PM  
 Glucose (POC) 131 (H) 01/14/2019 11:40 AM  
 Glucose (POC) 98 01/14/2019 07:07 AM  
 
Lab Results Component Value Date/Time  Color DARK YELLOW 01/10/2019 03:53 PM  
 Appearance CLOUDY (A) 01/10/2019 03:53 PM  
 Specific gravity 1.020 01/10/2019 03:53 PM  
 pH (UA) 5.5 01/10/2019 03:53 PM  
 Protein 100 (A) 01/10/2019 03:53 PM  
 Glucose NEGATIVE  01/10/2019 03:53 PM  
 Ketone TRACE (A) 01/10/2019 03:53 PM  
 Bilirubin NEGATIVE  09/07/2018 09:16 PM  
 Urobilinogen 1.0 01/10/2019 03:53 PM  
 Nitrites NEGATIVE  01/10/2019 03:53 PM  
 Leukocyte Esterase NEGATIVE  01/10/2019 03:53 PM  
 Epithelial cells FEW 01/10/2019 03:53 PM  
 Bacteria NEGATIVE  01/10/2019 03:53 PM  
 WBC 0-4 01/10/2019 03:53 PM  
 RBC 0-5 01/10/2019 03:53 PM  
 
 
 
Medications Reviewed:  
 
Current Facility-Administered Medications Medication Dose Route Frequency  ibuprofen (MOTRIN) tablet 600 mg  600 mg Oral Q8H PRN  
 mupirocin (BACTROBAN) 2 % ointment   Topical DAILY  amLODIPine (NORVASC) tablet 2.5 mg  2.5 mg Oral DAILY  famotidine (PEPCID) tablet 20 mg  20 mg Oral Q12H  
 venlafaxine-SR (EFFEXOR-XR) capsule 150 mg  150 mg Oral DAILY AFTER BREAKFAST  levETIRAcetam (KEPPRA) tablet 1,000 mg  1,000 mg Oral Q12H  
 influenza vaccine 2018-19 (6 mos+)(PF) (FLUARIX QUAD/FLULAVAL QUAD) injection 0.5 mL  0.5 mL IntraMUSCular PRIOR TO DISCHARGE  sodium chloride (NS) flush 5-40 mL  5-40 mL IntraVENous Q8H  
 sodium chloride (NS) flush 5-40 mL  5-40 mL IntraVENous PRN  
 aspirin chewable tablet 81 mg  81 mg Oral DAILY  clonazePAM (KlonoPIN) tablet 0.5 mg  0.5 mg Oral QHS  clopidogrel (PLAVIX) tablet 75 mg  75 mg Oral DAILY  FLUoxetine (PROzac) capsule 20 mg  20 mg Oral DAILY  isosorbide mononitrate ER (IMDUR) tablet 30 mg  30 mg Oral DAILY  lactulose (CHRONULAC) solution 30 g  30 g Oral TID  nitroglycerin (NITROSTAT) tablet 0.4 mg  0.4 mg SubLINGual Q5MIN PRN  pantoprazole (PROTONIX) tablet 40 mg  40 mg Oral ACB&D  pregabalin (LYRICA) capsule 50 mg  50 mg Oral TID  QUEtiapine (SEROquel) tablet 25 mg  25 mg Oral QHS  tamsulosin (FLOMAX) capsule 0.4 mg  0.4 mg Oral ACB&D  
 venlafaxine-SR (EFFEXOR-XR) capsule 75 mg  75 mg Oral QHS  carvedilol (COREG) tablet 6.25 mg  6.25 mg Oral BID WITH MEALS  insulin lispro (HUMALOG) injection   SubCUTAneous AC&HS  
 glucose chewable tablet 16 g  4 Tab Oral PRN  
 dextrose (D50W) injection syrg 12.5-25 g  12.5-25 g IntraVENous PRN  
 glucagon (GLUCAGEN) injection 1 mg  1 mg IntraMUSCular PRN  
 fentaNYL citrate (PF) injection 25 mcg  25 mcg IntraVENous Q4H PRN  
 
______________________________________________________________________ EXPECTED LENGTH OF STAY: 4d 12h ACTUAL LENGTH OF STAY:          4 Opal Diaz MD
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
## 2019-01-15 NOTE — PROGRESS NOTES
Ortho Progress Note 69yo male, left shoulder DJD and possible radial head fx Moving arm freely without much pain Swelling around the elbow Recommend discontinuation of sling and elbow ROM as tolerated Elbow fracture will heal well, primary complication of this fracture is elbow stiffness from prolonged immobilization Can follow-up with Dr. Jeancarlos Verdugo in 2 weeks for follow-up elbow xrays Follow-up sooner for management of his shoulder pain if needed Discussed with Dr. Jeancarlos Garcia PA-C [address] [facility] 
1/15/19

## 2019-01-15 NOTE — PROGRESS NOTES
[facility] 
 [address] [address], [facility] [address] [facility] [address] [facility] Phone: [phone]   [phone]   
  
Nephrology Progress Note Nighat Dunn.     1955     [id number] Date of Admission : 1/10/2019 
01/15/19 CC:  Follow up for TACOS, Rhabdo Assessment and Plan TACOS on CKD · 2/2 dehydration + Acute Rhabdomyolysis · Cr slowly improving and back to baseline · D/c IVF and monitor 
  
Acute Rhabdomyolysis · W/ ARF + Acute Liver Injury · triggered by prolonged immobilization, high dose Statin and dehydration · CPK trending down 
  
AG metabolic acidosis  
-follow for now  
  
CKD Stage III : 
· Baseline CR 1.4 mg/dl · Likely 2/2 DM, HTN  
  
Acute Liver Injury  
  
PNA Chronic Systolic CHF Type II DM  
HTN Seizure disorder Cirrhosis of Liver Interval History:   
Seen and examined. Feeling ok. Taking po w/o issues. Cr improving. No cp or sob. UOP not recorded. Review of Systems: Pertinent items are noted in HPI. Current Medications:  
Current Facility-Administered Medications Medication Dose Route Frequency  ibuprofen (MOTRIN) tablet 600 mg  600 mg Oral Q8H PRN  
 mupirocin (BACTROBAN) 2 % ointment   Topical DAILY  0.9% sodium chloride infusion  75 mL/hr IntraVENous CONTINUOUS  
 amLODIPine (NORVASC) tablet 2.5 mg  2.5 mg Oral DAILY  famotidine (PEPCID) tablet 20 mg  20 mg Oral Q12H  
 venlafaxine-SR (EFFEXOR-XR) capsule 150 mg  150 mg Oral DAILY AFTER BREAKFAST  levETIRAcetam (KEPPRA) tablet 1,000 mg  1,000 mg Oral Q12H  
 influenza vaccine 2018-19 (6 mos+)(PF) (FLUARIX QUAD/FLULAVAL QUAD) injection 0.5 mL  0.5 mL IntraMUSCular PRIOR TO DISCHARGE  sodium chloride (NS) flush 5-40 mL  5-40 mL IntraVENous Q8H  
 sodium chloride (NS) flush 5-40 mL  5-40 mL IntraVENous PRN  
 aspirin chewable tablet 81 mg  81 mg Oral DAILY  clonazePAM (KlonoPIN) tablet 0.5 mg  0.5 mg Oral QHS  clopidogrel (PLAVIX) tablet 75 mg  75 mg Oral DAILY  FLUoxetine (PROzac) capsule 20 mg  20 mg Oral DAILY  isosorbide mononitrate ER (IMDUR) tablet 30 mg  30 mg Oral DAILY  lactulose (CHRONULAC) solution 30 g  30 g Oral TID  nitroglycerin (NITROSTAT) tablet 0.4 mg  0.4 mg SubLINGual Q5MIN PRN  pantoprazole (PROTONIX) tablet 40 mg  40 mg Oral ACB&D  pregabalin (LYRICA) capsule 50 mg  50 mg Oral TID  QUEtiapine (SEROquel) tablet 25 mg  25 mg Oral QHS  tamsulosin (FLOMAX) capsule 0.4 mg  0.4 mg Oral ACB&D  
 venlafaxine-SR (EFFEXOR-XR) capsule 75 mg  75 mg Oral QHS  carvedilol (COREG) tablet 6.25 mg  6.25 mg Oral BID WITH MEALS  insulin lispro (HUMALOG) injection   SubCUTAneous AC&HS  
 glucose chewable tablet 16 g  4 Tab Oral PRN  
 dextrose (D50W) injection syrg 12.5-25 g  12.5-25 g IntraVENous PRN  
 glucagon (GLUCAGEN) injection 1 mg  1 mg IntraMUSCular PRN  
 fentaNYL citrate (PF) injection 25 mcg  25 mcg IntraVENous Q4H PRN Allergies Allergen Reactions  Latex, Natural Rubber Hives  Codeine Anaphylaxis Tolerated fentanyl previously  Demerol [Meperidine] Anaphylaxis Tolerated fentanyl previously  Mushroom Anaphylaxis  Metformin Diarrhea And dehydration  Wellbutrin [Bupropion Hcl] Anaphylaxis Objective: 
Vitals:   
Vitals:  
 01/14/19 1800 01/14/19 2200 01/15/19 0159 01/15/19 3699 BP: 132/69 119/65 141/81 147/69 Pulse: 75 72 72 72 Resp: 17 18 12 15 Temp: 97.8 °F (36.6 °C) 97.7 °F (36.5 °C) 97.7 °F (36.5 °C) 97.8 °F (36.6 °C) SpO2: 98% 97% 95% 98% Weight:   104.6 kg (230 lb 9.6 oz) Height:      
 
Intake and Output: 
No intake/output data recorded. 01/13 1901 - 01/15 0700 In: 4201.3 [P.O.:250; I.V.:3351.3] Out: 550 [Urine:550] Physical Examination:Pt intubated    No 
General: NAD Neck:  Supple, no JVD Resp:  Lungs CTA B/L, no wheezing , normal respiratory effort CV:  RRR,  no rub, minimal LE edema GI:  Soft, NT, + Bowel sounds Neurologic:  Non focal 
Psych:             Unable to asssess Skin:  No Rash :  bhandari []    High complexity decision making was performed 
[]    Patient is at high-risk of decompensation with multiple organ involvement Lab Data Personally Reviewed: I have reviewed all the pertinent labs, microbiology data and radiology studies during assessment. Recent Labs  
  01/15/19 
0200 01/14/19 
0304 01/13/19 
0220 01/12/19 
1444  140 137  --   
K 3.7 3.9 3.7  --   
* 112* 109*  --   
CO2 19* 19* 21  --   
* 115* 111*  --   
BUN 26* 28* 34*  --   
CREA 1.34* 1.40* 1.61*  --   
CA 7.8* 7.6* 7.6*  --   
MG 2.0 2.1 2.1  --   
PHOS 3.2 2.9 2.8  --   
ALB 2.1* 2.2* 2.1* 2.1*  
SGOT 41* 53* 95* 121* ALT 69 91* 116* 118* Recent Labs  
  01/15/19 
0200 01/14/19 
0304 01/13/19 
0220 WBC 7.2 8.0 8.3 HGB 12.0* 12.3 12.4 HCT 38.5 40.5 40.2 * 151 150 Lab Results Component Value Date/Time Specimen Description: ESOPHAGEAL BRUSHING 09/14/2010 10:48 AM  
 
Lab Results Component Value Date/Time Culture result: PENDING 01/14/2019 03:04 AM  
 Culture result: STAPHYLOCOCCUS EPIDERMIDIS (A) 09/07/2018 09:16 PM  
 Culture result: NO GROWTH 5 DAYS 07/17/2017 09:01 PM  
 Culture result: MRSA NOT PRESENT 03/02/2017 04:15 PM  
 Culture result:  03/02/2017 04:15 PM  
      Screening of patient nares for MRSA is for surveillance purposes and, if positive, to facilitate isolation considerations in high risk settings. It is not intended for automatic decolonization interventions per se as regimens are not sufficiently effective to warrant routine use. Recent Results (from the past 24 hour(s)) GLUCOSE, POC Collection Time: 01/14/19 11:40 AM  
Result Value Ref Range Glucose (POC) 131 (H) 65 - 100 mg/dL Performed by Allison Lyon GLUCOSE, POC Collection Time: 01/14/19  4:22 PM  
Result Value Ref Range Glucose (POC) 160 (H) 65 - 100 mg/dL Performed by Tamika Barron GLUCOSE, POC Collection Time: 01/14/19 10:20 PM  
Result Value Ref Range Glucose (POC) 124 (H) 65 - 100 mg/dL Performed by Deidra Epstein CBC WITH AUTOMATED DIFF Collection Time: 01/15/19  2:00 AM  
Result Value Ref Range WBC 7.2 4.1 - 11.1 K/uL  
 RBC 4.89 4. 10 - 5.70 M/uL  
 HGB 12.0 (L) 12.1 - 17.0 g/dL HCT 38.5 36.6 - 50.3 % MCV 78.7 (L) 80.0 - 99.0 FL  
 MCH 24.5 (L) 26.0 - 34.0 PG  
 MCHC 31.2 30.0 - 36.5 g/dL  
 RDW 16.5 (H) 11.5 - 14.5 % PLATELET 592 (L) 125 - 400 K/uL MPV 10.4 8.9 - 12.9 FL  
 NRBC 0.0 0  WBC ABSOLUTE NRBC 0.00 0.00 - 0.01 K/uL NEUTROPHILS 61 32 - 75 % LYMPHOCYTES 26 12 - 49 % MONOCYTES 6 5 - 13 % EOSINOPHILS 6 0 - 7 % BASOPHILS 0 0 - 1 % IMMATURE GRANULOCYTES 0 0.0 - 0.5 % ABS. NEUTROPHILS 4.4 1.8 - 8.0 K/UL  
 ABS. LYMPHOCYTES 1.9 0.8 - 3.5 K/UL  
 ABS. MONOCYTES 0.5 0.0 - 1.0 K/UL  
 ABS. EOSINOPHILS 0.4 0.0 - 0.4 K/UL  
 ABS. BASOPHILS 0.0 0.0 - 0.1 K/UL  
 ABS. IMM. GRANS. 0.0 0.00 - 0.04 K/UL  
 DF AUTOMATED METABOLIC PANEL, COMPREHENSIVE Collection Time: 01/15/19  2:00 AM  
Result Value Ref Range Sodium 140 136 - 145 mmol/L Potassium 3.7 3.5 - 5.1 mmol/L Chloride 112 (H) 97 - 108 mmol/L  
 CO2 19 (L) 21 - 32 mmol/L Anion gap 9 5 - 15 mmol/L Glucose 129 (H) 65 - 100 mg/dL BUN 26 (H) 6 - 20 MG/DL Creatinine 1.34 (H) 0.70 - 1.30 MG/DL  
 BUN/Creatinine ratio 19 12 - 20 GFR est AA >60 >60 ml/min/1.73m2 GFR est non-AA 54 (L) >60 ml/min/1.73m2 Calcium 7.8 (L) 8.5 - 10.1 MG/DL Bilirubin, total 0.4 0.2 - 1.0 MG/DL  
 ALT (SGPT) 69 12 - 78 U/L  
 AST (SGOT) 41 (H) 15 - 37 U/L Alk. phosphatase 55 45 - 117 U/L Protein, total 5.5 (L) 6.4 - 8.2 g/dL Albumin 2.1 (L) 3.5 - 5.0 g/dL Globulin 3.4 2.0 - 4.0 g/dL A-G Ratio 0.6 (L) 1.1 - 2.2 PHOSPHORUS  Collection Time: 01/15/19  2:00 AM  
 Result Value Ref Range Phosphorus 3.2 2.6 - 4.7 MG/DL MAGNESIUM Collection Time: 01/15/19  2:00 AM  
Result Value Ref Range Magnesium 2.0 1.6 - 2.4 mg/dL CK Collection Time: 01/15/19  2:00 AM  
Result Value Ref Range  39 - 308 U/L Total time spent with patient:  xxx   min. Care Plan discussed with: 
Patient Family RN Consulting Physician [facility],      
 
I have reviewed the flowsheets. Chart and Pertinent Notes have been reviewed. No change in PMH ,family and social history from Consult note.  
 
 
Armando Randhawa MD

## 2019-01-15 NOTE — ROUTINE PROCESS
Bedside shift change report given to Lesley (oncoming nurse) by Louise Muniz (offgoing nurse). Report included the following information SBAR, Kardex, Med Rec Status and Cardiac Rhythm NSR.

## 2019-01-15 NOTE — PROGRESS NOTES
Problem: Self Care Deficits Care Plan (Adult) Goal: *Acute Goals and Plan of Care (Insert Text) Occupational Therapy Goals Initiated 1/14/2019 1. Patient will perform grooming standing at sink with contact guard assist within 7 day(s). 2.  Patient will perform lower body dressing with contact guard assist within 7 day(s). 3.  Patient will perform bathing with contact guard assist within 7 day(s). 4.  Patient will perform toilet transfers with contact guard assist within 7 day(s). 5.  Patient will perform all aspects of toileting with contact guard assist within 7 day(s). 6.  Patient will participate in upper extremity therapeutic exercise/activities with supervision/set-up for 10 minutes within 7 day(s). 7.  Patient will utilize energy conservation techniques during functional activities with verbal cues within 7 day(s). 8.  Patient will independently maintain LUE NWB precaution during activity within 7 day(s). Occupational Therapy TREATMENT Patient: Evelia Mckeon (65 y.o. male) Date: 1/15/2019 Diagnosis: Fall [W19. Brennan Gourd TACOS (acute kidney injury) ([facility] [address] [N17.9] TACOS (acute kidney injury) ([facility] [address] Precautions: Fall(LUE NWB) Chart, occupational therapy assessment, plan of care, and goals were reviewed.  
 
ASSESSMENT: 
Noted probable L radial head fracture and ortho orders for L shoulder and elbow ROM, but remains NWB but encouraged mobility, patient in bed fatigued post PT session however agreeable and increased alertness in order to tolerate L UE AROM HEP, instructed on L shoulder flexion AROM, elbow flexion/extension, wrist flexion/extension and  Hand active  and extension, 3x daily, with  10x for edema management in a elevated positioning, no issues with lunch tray, lifting and using L UE functionally, will need further education during functional mobility on NWB of L UE especially with transfer retraining and standing activities, per patient was doing most ADLs and all activity with a SPC,s continue recommend rehab due to patient not at baseline to return home with family at setup to mod I level. Next educate on ADl retraining with L UE NWB Progression toward goals: 
[x]       Improving appropriately and progressing toward goals 
[]       Improving slowly and progressing toward goals 
[]       Not making progress toward goals and plan of care will be adjusted PLAN: 
Patient continues to benefit from skilled intervention to address the above impairments. Continue treatment per established plan of care. Discharge Recommendations:  Inpatient Rehab Further Equipment Recommendations for Discharge:  TBD at rehab SUBJECTIVE:  
Patient stated I know I need to move it some.  OBJECTIVE DATA SUMMARY:  
Cognitive/Behavioral Status: 
Neurologic State: Alert Orientation Level: Oriented to place;Oriented to situation;Oriented to person;Disoriented to time Cognition: Follows commands;Decreased attention/concentration Functional Mobility and Transfers for ADLs:Bed Mobility: 
Rolling: Modified independent Supine to Sit: Minimum assistance;Assist x2 Sit to Supine: Minimum assistance;Assist x2 Scooting: Contact guard assistance Transfers: 
Sit to Stand: Minimum assistance;Assist x2 Balance: 
Sitting: Impaired Sitting - Static: Good (unsupported) Sitting - Dynamic: Fair (occasional) Standing: Impaired Standing - Static: Constant support; Fair 
Standing - Dynamic : Fair ADL Intervention: 
Feeding Feeding Assistance: Supervision/set-up Container Management: Supervision/set-up Cutting Food: Supervision/set-up Food to Mouth: Independent Drink to Mouth: Independent L UE full AROM with minimal decreased and end range for shoulder Flexion and elbow flexion, edema limiting and mild pain with full elbow flexion, unable to touch shoulder. Educate don thumb up for improved positioning during exercises. Neuro Re-Education: 
  
  
  
Therapeutic Exercises: L UE- left with L UE Elevated for edema management, instructed to complete 3-5x daily EXERCISE Sets Reps Active Active Assist  
Passive Comments Shoulder Flexion 2 5 then 10x [x]               []               []                 
   []               []               []                 
   []               []               []                 
Elbow flexion/extension 2 5 then 10x [x]               []               []                 
Wrist flexion/extension 2 10 [x]               []               []                 
Finger flexion/extension 2 10 [x]               []               []               10x daily/hourly as tolerated Pain: 
Pain Scale 1: Numeric (0 - 10) Pain Intensity 1: 0 Activity Tolerance:  
Fair, tolerated exercises, well felt L elbow tightness during full flexion Please refer to the flowsheet for vital signs taken during this treatment. After treatment:  
[] Patient left in no apparent distress sitting up in chair 
[x] Patient left in no apparent distress in bed 
[x] Call bell left within reach [x] Nursing notified 
[] Caregiver present 
[] Bed alarm activated COMMUNICATION/COLLABORATION:  
The patients plan of care was discussed with: Physical Therapist and Registered Nurse Isatu Ridley OT Time Calculation: 13 mins

## 2019-01-15 NOTE — PROGRESS NOTES
Reason for Admission:   Fall, TACOS 
               
RRAT Score:     19 Do you (patient/family) have any concerns for transition/discharge? No   
           
Plan for utilizing home health:  TBD Likelihood of readmission?   low Transition of Care Plan:     CM met with pt to introduce him to the role of CM and transition of care. He verbalized understanding. He deferred to his daughter,  Alvin Faust ([phone]). CM spoke with Alvin Faust and introduced her to the role of CM. She stated that she and her sister live with this pt. Per Alvin Faust, this pt has been fairly independent at home and with ADL's. CM informed her that the therapists are recommending Inpatient Rehab for this pt and offered choice. She stated that she would like a referral to go to [facility]. CM sent a referral to [facility] via [employer]. Adalberto Broussard Care Management Interventions PCP Verified by CM: Yes 
Palliative Care Criteria Met (RRAT>21 & CHF Dx)?: No 
Transition of Care Consult (CM Consult): Discharge Planning MyChart Signup: No 
Discharge Durable Medical Equipment: No 
Physical Therapy Consult: Yes Occupational Therapy Consult: Yes Speech Therapy Consult: No 
Current Support Network: Own Home(Pt's two adult daughters live with him.) Confirm Follow Up Transport: Family Plan discussed with Pt/Family/Caregiver: Yes Freedom of Choice Offered: Yes [employer] Information Provided?: No

## 2019-01-15 NOTE — PROGRESS NOTES
Bedside and Verbal shift change report given to Irasema Nava RN (oncoming nurse) by Olu Harris RN (offgoing nurse).  Report included the following information SBAR, Kardex, Intake/Output, MAR, Recent Results and Cardiac Rhythm SA.

## 2019-01-15 NOTE — PROGRESS NOTES
Problem: Falls - Risk of 
Goal: *Absence of Falls Document Lucita Rogue Fall Risk and appropriate interventions in the flowsheet. Outcome: Progressing Towards Goal 
Fall Risk Interventions: 
Mobility Interventions: Patient to call before getting OOB, PT Consult for mobility concerns, PT Consult for assist device competence Mentation Interventions: Bed/chair exit alarm, Eyeglasses and hearing aids, Gait belt with transfers/ambulation, Reorient patient, Increase mobility, More frequent rounding Medication Interventions: Patient to call before getting OOB, Utilize gait belt for transfers/ambulation, Bed/chair exit alarm Elimination Interventions: Call light in reach, Toileting schedule/hourly rounds, Urinal in reach History of Falls Interventions: Consult care management for discharge planning, Door open when patient unattended, Room close to nurse's station Comments: Problem: Falls - Risk of 
Goal: *Absence of Falls Document Deal Rogue Fall Risk and appropriate interventions in the flowsheet.  
Outcome: Progressing Towards Goal 
Fall Risk Interventions: 
Mobility Interventions: Patient to call before getting OOB, PT Consult for mobility concerns, PT Consult for assist device competence 
  
Mentation Interventions: Bed/chair exit alarm, Eyeglasses and hearing aids, Gait belt with transfers/ambulation, Reorient patient, Increase mobility, More frequent rounding 
  
Medication Interventions: Patient to call before getting OOB, Utilize gait belt for transfers/ambulation, Bed/chair exit alarm 
  
Elimination Interventions: Call light in reach, Toileting schedule/hourly rounds, Urinal in reach 
  
History of Falls Interventions: Consult care management for discharge planning, Door open when patient unattended, Room close to nurse's station

## 2019-01-16 VITALS
TEMPERATURE: 98.5 F | OXYGEN SATURATION: 97 % | SYSTOLIC BLOOD PRESSURE: 134 MMHG | WEIGHT: 230 LBS | BODY MASS INDEX: 34.07 KG/M2 | RESPIRATION RATE: 16 BRPM | HEIGHT: 69 IN | DIASTOLIC BLOOD PRESSURE: 68 MMHG | HEART RATE: 72 BPM

## 2019-01-16 LAB
ALBUMIN SERPL-MCNC: 2.2 G/DL (ref 3.5–5)
ALBUMIN/GLOB SERPL: 0.6 {RATIO} (ref 1.1–2.2)
ALP SERPL-CCNC: 55 U/L (ref 45–117)
ALT SERPL-CCNC: 57 U/L (ref 12–78)
ANION GAP SERPL CALC-SCNC: 7 MMOL/L (ref 5–15)
AST SERPL-CCNC: 31 U/L (ref 15–37)
ATRIAL RATE: 70 BPM
BACTERIA SPEC CULT: ABNORMAL
BILIRUB SERPL-MCNC: 0.4 MG/DL (ref 0.2–1)
BUN SERPL-MCNC: 22 MG/DL (ref 6–20)
BUN/CREAT SERPL: 17 (ref 12–20)
CALCIUM SERPL-MCNC: 7.5 MG/DL (ref 8.5–10.1)
CALCULATED P AXIS, ECG09: 85 DEGREES
CALCULATED R AXIS, ECG10: 19 DEGREES
CALCULATED T AXIS, ECG11: 120 DEGREES
CHLORIDE SERPL-SCNC: 110 MMOL/L (ref 97–108)
CO2 SERPL-SCNC: 22 MMOL/L (ref 21–32)
CREAT SERPL-MCNC: 1.27 MG/DL (ref 0.7–1.3)
DIAGNOSIS, 93000: NORMAL
GLOBULIN SER CALC-MCNC: 3.5 G/DL (ref 2–4)
GLUCOSE BLD STRIP.AUTO-MCNC: 145 MG/DL (ref 65–100)
GLUCOSE BLD STRIP.AUTO-MCNC: 97 MG/DL (ref 65–100)
GLUCOSE SERPL-MCNC: 107 MG/DL (ref 65–100)
GRAM STN SPEC: ABNORMAL
MAGNESIUM SERPL-MCNC: 1.8 MG/DL (ref 1.6–2.4)
P-R INTERVAL, ECG05: 172 MS
POTASSIUM SERPL-SCNC: 3.9 MMOL/L (ref 3.5–5.1)
PROT SERPL-MCNC: 5.7 G/DL (ref 6.4–8.2)
Q-T INTERVAL, ECG07: 436 MS
QRS DURATION, ECG06: 122 MS
QTC CALCULATION (BEZET), ECG08: 470 MS
SERVICE CMNT-IMP: ABNORMAL
SERVICE CMNT-IMP: ABNORMAL
SERVICE CMNT-IMP: NORMAL
SODIUM SERPL-SCNC: 139 MMOL/L (ref 136–145)
VENTRICULAR RATE, ECG03: 70 BPM

## 2019-01-16 PROCEDURE — 74011250636 HC RX REV CODE- 250/636: Performed by: HOSPITALIST

## 2019-01-16 PROCEDURE — 97535 SELF CARE MNGMENT TRAINING: CPT

## 2019-01-16 PROCEDURE — 90471 IMMUNIZATION ADMIN: CPT

## 2019-01-16 PROCEDURE — 36415 COLL VENOUS BLD VENIPUNCTURE: CPT

## 2019-01-16 PROCEDURE — 74011250637 HC RX REV CODE- 250/637: Performed by: HOSPITALIST

## 2019-01-16 PROCEDURE — 93306 TTE W/DOPPLER COMPLETE: CPT

## 2019-01-16 PROCEDURE — 74011250637 HC RX REV CODE- 250/637: Performed by: FAMILY MEDICINE

## 2019-01-16 PROCEDURE — 97116 GAIT TRAINING THERAPY: CPT

## 2019-01-16 PROCEDURE — 74011636637 HC RX REV CODE- 636/637: Performed by: HOSPITALIST

## 2019-01-16 PROCEDURE — 90686 IIV4 VACC NO PRSV 0.5 ML IM: CPT | Performed by: HOSPITALIST

## 2019-01-16 PROCEDURE — 82962 GLUCOSE BLOOD TEST: CPT

## 2019-01-16 PROCEDURE — 74011250637 HC RX REV CODE- 250/637: Performed by: INTERNAL MEDICINE

## 2019-01-16 PROCEDURE — 74011250637 HC RX REV CODE- 250/637: Performed by: PSYCHIATRY & NEUROLOGY

## 2019-01-16 PROCEDURE — 83735 ASSAY OF MAGNESIUM: CPT

## 2019-01-16 PROCEDURE — 80053 COMPREHEN METABOLIC PANEL: CPT

## 2019-01-16 RX ORDER — VENLAFAXINE HYDROCHLORIDE 150 MG/1
150 CAPSULE, EXTENDED RELEASE ORAL
Qty: 30 CAP | Refills: 0 | Status: ON HOLD | OUTPATIENT
Start: 2019-01-17 | End: 2020-05-13 | Stop reason: SDUPTHER

## 2019-01-16 RX ORDER — CARVEDILOL 3.12 MG/1
9.38 TABLET ORAL 2 TIMES DAILY WITH MEALS
Qty: 180 TAB | Refills: 1 | Status: ON HOLD | OUTPATIENT
Start: 2019-01-16 | End: 2019-06-14 | Stop reason: SDUPTHER

## 2019-01-16 RX ORDER — VENLAFAXINE HYDROCHLORIDE 75 MG/1
75 CAPSULE, EXTENDED RELEASE ORAL
Qty: 30 CAP | Refills: 0 | Status: SHIPPED | OUTPATIENT
Start: 2019-01-16 | End: 2019-06-17

## 2019-01-16 RX ORDER — CLONAZEPAM 1 MG/1
0.5 TABLET ORAL
Qty: 15 TAB | Refills: 0 | Status: SHIPPED | OUTPATIENT
Start: 2019-01-16 | End: 2019-06-17

## 2019-01-16 RX ORDER — LEVETIRACETAM 1000 MG/1
1000 TABLET ORAL EVERY 12 HOURS
Qty: 60 TAB | Refills: 1 | Status: SHIPPED | OUTPATIENT
Start: 2019-01-16

## 2019-01-16 RX ORDER — AMLODIPINE BESYLATE 2.5 MG/1
2.5 TABLET ORAL DAILY
Qty: 30 TAB | Refills: 1 | Status: SHIPPED | OUTPATIENT
Start: 2019-01-17 | End: 2019-09-03

## 2019-01-16 RX ADMIN — INFLUENZA VIRUS VACCINE 0.5 ML: 15; 15; 15; 15 SUSPENSION INTRAMUSCULAR at 12:33

## 2019-01-16 RX ADMIN — Medication 10 ML: at 06:14

## 2019-01-16 RX ADMIN — TAMSULOSIN HYDROCHLORIDE 0.4 MG: 0.4 CAPSULE ORAL at 08:18

## 2019-01-16 RX ADMIN — FAMOTIDINE 20 MG: 20 TABLET ORAL at 06:12

## 2019-01-16 RX ADMIN — AMLODIPINE BESYLATE 2.5 MG: 5 TABLET ORAL at 08:19

## 2019-01-16 RX ADMIN — PREGABALIN 50 MG: 25 CAPSULE ORAL at 08:17

## 2019-01-16 RX ADMIN — INSULIN LISPRO 2 UNITS: 100 INJECTION, SOLUTION INTRAVENOUS; SUBCUTANEOUS at 12:32

## 2019-01-16 RX ADMIN — PANTOPRAZOLE SODIUM 40 MG: 40 TABLET, DELAYED RELEASE ORAL at 08:19

## 2019-01-16 RX ADMIN — MUPIROCIN: 20 OINTMENT TOPICAL at 08:20

## 2019-01-16 RX ADMIN — LACTULOSE 30 G: 20 SOLUTION ORAL at 08:20

## 2019-01-16 RX ADMIN — CLOPIDOGREL BISULFATE 75 MG: 75 TABLET ORAL at 08:18

## 2019-01-16 RX ADMIN — VENLAFAXINE HYDROCHLORIDE 150 MG: 150 CAPSULE, EXTENDED RELEASE ORAL at 08:17

## 2019-01-16 RX ADMIN — ISOSORBIDE MONONITRATE 30 MG: 30 TABLET ORAL at 08:18

## 2019-01-16 RX ADMIN — FLUOXETINE 20 MG: 20 CAPSULE ORAL at 08:17

## 2019-01-16 RX ADMIN — ASPIRIN 81 MG CHEWABLE TABLET 81 MG: 81 TABLET CHEWABLE at 08:18

## 2019-01-16 RX ADMIN — LEVETIRACETAM 1000 MG: 500 TABLET, FILM COATED ORAL at 08:18

## 2019-01-16 RX ADMIN — FENTANYL CITRATE 25 MCG: 50 INJECTION, SOLUTION INTRAMUSCULAR; INTRAVENOUS at 06:14

## 2019-01-16 RX ADMIN — CARVEDILOL 9.38 MG: 6.25 TABLET, FILM COATED ORAL at 08:18

## 2019-01-16 NOTE — PROGRESS NOTES
Cardiology Progress Note 2019     Admit Date: 1/10/2019 Admit Diagnosis: Fall [W19Monica José TACOS (acute kidney injury) ([facility] [N17.9]  CC: none currently Assessment:  
Principal Problem: 
  TACOS (acute kidney injury) ([facility] (1/10/2019) Active Problems: 
  Fall (1/10/2019) Plan:  
 
Cont current meds, tolerating increased dose of carvedilol Cont on telemetry, no more events Would start lisinopril 5 mg on DC as renal function has normalized Echo reviewed Subjective:   
 
Cinthia Hand. resting comfortably this Jayce Lime Objective:  
 Physical Exam: 
Overall VSSAF;   
Visit Vitals /77 Pulse 78 Temp 98.7 °F (37.1 °C) Resp 12 Ht 5' 9\" (1.753 m) Wt 104.3 kg (230 lb) SpO2 94% BMI 33.97 kg/m² Temp (24hrs), Av.2 °F (36.8 °C), Min:97.9 °F (36.6 °C), Max:98.7 °F (37.1 °C) Patient Vitals for the past 8 hrs: 
 Pulse 19 0818 78  
19 0600 72  
19 0200 74 Patient Vitals for the past 8 hrs: 
 Resp  
19 0600 12  
19 0200 12 Patient Vitals for the past 8 hrs: 
 BP  
19 0818 154/77  
19 0600 143/74  
19 0200 142/68  
  
/ 1901 -  0700 In: 3251.3 [I.V.:3251.3] Out: - General Appearance: Well developed, well nourished, no acute distress. Ears/Nose/Mouth/Throat:   Normal MM; anicteric. JVP: WNL Resp:   Lungs clear to auscultation bilaterally. Nl resp effort. Cardiovascular:  RRR, S1, S2 normal, no new murmur. No gallop or rub. Abdomen:   Soft, non-tender, bowel sounds are present. Extremities: No edema bilaterally. Skin: 
Neuro: Warm and dry. A/O x3, grossly nonfocal  
                     
Data Review:    
Telemetry independently reviewed :  NSR Labs:  
Recent Results (from the past 24 hour(s)) GLUCOSE, POC Collection Time: 01/15/19 12:08 PM  
Result Value Ref Range Glucose (POC) 145 (H) 65 - 100 mg/dL Performed by Aristeo Fernando (PCT) EKG, 12 LEAD, INITIAL Collection Time: 01/15/19 12:14 PM  
Result Value Ref Range Ventricular Rate 70 BPM  
 Atrial Rate 70 BPM  
 P-R Interval 172 ms QRS Duration 122 ms  
 Q-T Interval 436 ms  
 QTC Calculation (Bezet) 470 ms Calculated P Axis 85 degrees Calculated R Axis 19 degrees Calculated T Axis 120 degrees Diagnosis Sinus rhythm with premature atrial complexes Possible Left atrial enlargement Nonspecific intraventricular conduction delay T wave abnormality, consider lateral ischemia When compared with ECG of 10-VIOLA-2018 03:32, 
Criteria for Septal infarct are no longer present Confirmed by Karyn Castillo M.D., Latoya Garcia ([id number]) on 1/16/2019 6:41:34 AM 
  
GLUCOSE, POC Collection Time: 01/15/19  4:58 PM  
Result Value Ref Range Glucose (POC) 134 (H) 65 - 100 mg/dL Performed by Zahra Sylvester GLUCOSE, POC Collection Time: 01/15/19  9:39 PM  
Result Value Ref Range Glucose (POC) 175 (H) 65 - 100 mg/dL Performed by Diana Hays MAGNESIUM Collection Time: 01/16/19  2:16 AM  
Result Value Ref Range Magnesium 1.8 1.6 - 2.4 mg/dL METABOLIC PANEL, COMPREHENSIVE Collection Time: 01/16/19  2:16 AM  
Result Value Ref Range Sodium 139 136 - 145 mmol/L Potassium 3.9 3.5 - 5.1 mmol/L Chloride 110 (H) 97 - 108 mmol/L  
 CO2 22 21 - 32 mmol/L Anion gap 7 5 - 15 mmol/L Glucose 107 (H) 65 - 100 mg/dL BUN 22 (H) 6 - 20 MG/DL Creatinine 1.27 0.70 - 1.30 MG/DL  
 BUN/Creatinine ratio 17 12 - 20 GFR est AA >60 >60 ml/min/1.73m2 GFR est non-AA 57 (L) >60 ml/min/1.73m2 Calcium 7.5 (L) 8.5 - 10.1 MG/DL Bilirubin, total 0.4 0.2 - 1.0 MG/DL  
 ALT (SGPT) 57 12 - 78 U/L  
 AST (SGOT) 31 15 - 37 U/L Alk. phosphatase 55 45 - 117 U/L Protein, total 5.7 (L) 6.4 - 8.2 g/dL Albumin 2.2 (L) 3.5 - 5.0 g/dL Globulin 3.5 2.0 - 4.0 g/dL A-G Ratio 0.6 (L) 1.1 - 2.2 GLUCOSE, POC Collection Time: 01/16/19  6:25 AM  
Result Value Ref Range Glucose (POC) 97 65 - 100 mg/dL Performed by Uziel Salazar Current medications reviewed Jayden Wharton MD

## 2019-01-16 NOTE — DISCHARGE SUMMARY
Discharge Summary       PATIENT ID: Evelyn Cowan. MRN: [id number]   YOB: 1955    DATE OF ADMISSION: 1/10/2019  1:56 PM    DATE OF DISCHARGE: 1/16/2019   PRIMARY CARE PROVIDER: Lee Parish MD     ATTENDING PHYSICIAN: Dr Deysi Boucher  DISCHARGING PROVIDER: Deysi Boucher MD    To contact this individual call [phone] and ask the  to page. If unavailable ask to be transferred the Adult Hospitalist Department. CONSULTATIONS: IP CONSULT TO HOSPITALIST  IP CONSULT TO NEUROLOGY  IP CONSULT TO NEPHROLOGY  IP CONSULT TO ORTHOPEDIC SURGERY  IP CONSULT TO CARDIOLOGY    PROCEDURES/SURGERIES: * No surgery found *    ADMITTING [address] [place] [address] COURSE:   Acute renal failure  - due to rhabdomyolysis/dehydration  -Due to ground level fall , CK trending down, nephrology following   -Appreciate input  -now resolved      Left shoulder pain and immobility  - XR elbow with possible radial head Fx,   -CT shoulder and elbow from 1/12 : No fracture. Mild glenohumeral and AC joint osteoarthritis. Moderate glenohumeral joint effusion.  - per ortho need sling and immobilization  - Appreciate ortho input, spoke 1/15, clear for discharge   -Outpatient follow up with Dr Satish Mora        Possible PNA   - CXR Mild bilateral patchy upper lobe groundglass opacities are nonspecific but could  represent early pneumonia.  -No WCC, no fevers  -Repeat CXR 1/14 No acute abnormality identified. There is minor atelectasis/scarring  in the mid lungs  -Was on Azithro/ceftriaxone, stopped 1/14  -resolved        Possible breakthrough seizure   -Seen by neuro , increased keppra and stopped lamictal due to abnormal LFT    -EEG completed, normal  -CT head no acute findings  -Seizure precautions and monitor   -Continue Keppra/Klonopin  -Outpatient follow up with Dr Nayely Hanna in 4 weeks     Transaminitis  Improving  He has underlying BARGER   And superimposed rhabdo     Coronary artery disease.    He has had CABG twice.     Chronic systolic heart failure. Stable, strict I/O     NSVT   -Appreciate Cardiology input, increased the dose of Coreg     Type 2 diabetes. accuchecks , SSI      Hypertension. Controlled, continue current meds and adjust as needed     .  Dementia.     Anxiety/depression  -on Seroquel/Prozac  -Will get EKG for Qtc     Chronic liver disease and cirrhosis.     PT/OT inpatient rehab     Code status: full        DISCHARGE DIAGNOSES / PLAN:      1. ARF       PENDING TEST RESULTS:   At the time of discharge the following test results are still pending: none    FOLLOW UP APPOINTMENTS:    Follow-up Information     Follow up With Specialties Details Why Contact Info    Braulio Olivares MD [facility] In 1 week  [address] [facility]      Luiz Sandoval MD Orthopedic Surgery In 2 weeks or sooner if pain worsens [place] [address]       [facility] [address] [facility]    Braulio Olivares MD [facility] In 1 week  [address]  [phone]      Neurology  In 2 weeks             ADDITIONAL CARE RECOMMENDATIONS:   Follow up with PMD  Follow up with Neurology  Follow up with Dr Sergey Cardozo: Diabetic Diet    ACTIVITY: Activity as tolerated      DISCHARGE MEDICATIONS:   See Medication Reconciliation Form      NOTIFY YOUR PHYSICIAN FOR ANY OF THE FOLLOWING:   Fever over 101 degrees for 24 hours. Chest pain, shortness of breath, fever, chills, nausea, vomiting, diarrhea, change in mentation, falling, weakness, bleeding. Severe pain or pain not relieved by medications. Or, any other signs or symptoms that you may have questions about.     DISPOSITION:    Home With:   OT  PT  HH  RN      x SNF/Inpatient Rehab/LTAC    Independent/assisted living    Hospice    Other:       PATIENT CONDITION AT DISCHARGE:     Functional status    Poor    x Deconditioned     Independent      Cognition    x Lucid     Forgetful     Dementia      Catheters/lines (plus indication)    [place]     PICC     PEG    x None      Code status   x  Full code     DNR      PHYSICAL EXAMINATION AT DISCHARGE:  Please see progress note        CHRONIC MEDICAL DIAGNOSES:  Problem List as of 1/16/2019 Date Reviewed: 1/2/2019          Codes Class Noted - Resolved    Fall ICD-10-CM: W19. Ameya Gross  ICD-9-CM: E888.9  1/10/2019 - Present        * (Principal) TACOS (acute kidney injury) ([facility] ICD-10-CM: N17.9  ICD-9-CM: 584.9  1/10/2019 - Present        Chest pain ICD-10-CM: R07.9  ICD-9-CM: 786.50  1/11/2018 - Present        Acute chest pain ICD-10-CM: R07.9  ICD-9-CM: 786.50  1/10/2018 - Present        Hepatic encephalopathy ([facility] ICD-10-CM: K72.90  ICD-9-CM: 572.2  7/17/2017 - Present        Neuropathy ICD-10-CM: G62.9  ICD-9-CM: 355.9  4/14/2017 - Present        Cirrhosis ([facility] ICD-10-CM: K74.60  ICD-9-CM: 571.5  4/14/2017 - Present        CAD (coronary artery disease) ICD-10-CM: I25.10  ICD-9-CM: 414.00  4/14/2017 - Present        S/P coronary artery stent placement ICD-10-CM: Z95.5  ICD-9-CM: V45.82  4/14/2017 - Present        S/P CABG (coronary artery bypass graft) ICD-10-CM: Z95.1  ICD-9-CM: V45.81  4/14/2017 - Present    Overview Signed 4/14/2017 11:27 AM by Shannan Blevins MD     2002 and 2013             Thrombocytopenia ([facility] ICD-10-CM: D69.6  ICD-9-CM: 287.5  4/14/2017 - Present        MRSA infection ICD-10-CM: A49.02  ICD-9-CM: 041.12  4/14/2017 - Present    Overview Signed 4/14/2017 11:28 AM by Shannan Blevins MD     2016             S/P cholecystectomy ICD-10-CM: Z90.49  ICD-9-CM: V45.79  4/14/2017 - Present        Metabolic encephalopathy XXR-11-XK: G93.41  ICD-9-CM: 348.31  12/19/2016 - Present        Seizure ([address] ICD-10-CM: R56.9  ICD-9-CM: 780.39  11/21/2016 - Present        Sinusitis ICD-10-CM: J32.9  ICD-9-CM: 473.9  Unknown - Present        Joint pain ICD-10-CM: M25.50  ICD-9-CM: 719.40  Unknown - Present        Low back pain ICD-10-CM: M54.5  ICD-9-CM: 724.2  Unknown - Present        GERD (gastroesophageal reflux disease) ICD-10-CM: K21.9  ICD-9-CM: 530.81  Unknown - Present        Diabetes mellitus, type 2 ([facility] ICD-10-CM: E11.9  ICD-9-CM: 250.00  Unknown - Present        RESOLVED: Pneumonia ICD-10-CM: J18.9  ICD-9-CM: 796  3/3/2017 - 4/14/2017        RESOLVED: Fever ICD-10-CM: R50.9  ICD-9-CM: 780.60  3/2/2017 - 4/14/2017        RESOLVED: Abscess ICD-10-CM: L02.91  ICD-9-CM: 682.9  1/5/2017 - 4/14/2017        RESOLVED: Altered mental status ICD-10-CM: R41.82  ICD-9-CM: 780.97  12/19/2016 - 4/14/2017        RESOLVED: Debility ICD-10-CM: R53.81  ICD-9-CM: 799.3  11/22/2016 - 4/14/2017        RESOLVED: Cellulitis ICD-10-CM: L03.90  ICD-9-CM: 682.9  11/7/2016 - 4/14/2017        RESOLVED: Snoring ICD-10-CM: R06.83  ICD-9-CM: 786.09  Unknown - 4/14/2017        RESOLVED: Night sweats ICD-10-CM: R61  ICD-9-CM: 780.8  Unknown - 4/14/2017        RESOLVED: Chest pain ICD-10-CM: 786.5  ICD-9-CM: 786.5  Unknown - 4/14/2017        RESOLVED: Sore throat ICD-10-CM: J02.9  ICD-9-CM: 233  Unknown - 4/14/2017        RESOLVED: Dysphagia ICD-10-CM: 787.2  ICD-9-CM: 787.2  Unknown - 4/14/2017        RESOLVED: Tingling sensation ICD-10-CM: R20.2  ICD-9-CM: 782.0  Unknown - 4/14/2017        RESOLVED: Nausea ICD-10-CM: R11.0  ICD-9-CM: 787.02  Unknown - 4/14/2017        RESOLVED: Diarrhea ICD-10-CM: R19.7  ICD-9-CM: 787.91  Unknown - 4/14/2017        RESOLVED: Snoring ICD-10-CM: R06.83  ICD-9-CM: 786.09  Unknown - 4/14/2017        RESOLVED: Abdominal pain, epigastric ICD-10-CM: R10.13  ICD-9-CM: 789.06  9/13/2010 - 4/14/2017              Greater than 49 minutes were spent with the patient on counseling and coordination of care    Signed:   Kiki Zhao MD  1/16/2019  1:04 PM

## 2019-01-16 NOTE — ROUTINE PROCESS
TRANSFER - OUT REPORT: 
 
Verbal report given to [facility]  [phone]([facility]) on Jessica Robins.  being transferred to [facility]) for routine progression of care Report consisted of patients Situation, Background, Assessment and  
Recommendations(SBAR). Information from the following report(s) SBAR, ED Summary, [facility] and Recent Results was reviewed with the receiving nurse. Lines:    
 
Opportunity for questions and clarification was provided. Patient transported with: 
 The [employer] Bedside RN performed patient education and medication education. Discharge concerns initiated and discussed with patient, including clarification on \"who\" assists the patient at their home and instructions for when the home going patient should call their provider after discharge. Opportunity for questions and clarification was provided. Patient receptive to education: YES Patient stated: I understand Barriers to Education: none Diagnosis Education given:  YES Length of stay: 5 Expected Day of Discharge: 5 Ask if they have \"Help at Home\" & add to white board? YES Education Day #: 5 Medication Education Given:  NO 
M in the box Medication name:   
 
Pt aware of [facility] survey: YES

## 2019-01-16 NOTE — PROGRESS NOTES
PAULA spoke with Barbara with [facility]. She stated that this pt has been approved to come there today. PAULA met with pt and spoke with his daughter, Whit Lyles, to inform them and they are both in agreement with plan. PAULA informed attending of available bed and he stated that this pt will be medically ready for dc later today. PAULA set up ambulance transport with [facility] for 4pm today. An envelope containing pt's kardex, MAR, AVS and emtala will be sent with pt to [facility]. Also, pt's nurse will call report. Candice Spencer

## 2019-01-16 NOTE — PROGRESS NOTES
[facility] 
 [address] [facility], [address] [facility] Phone: [phone]   [phone]   
  
Nephrology Progress Note Evelia Ferrer.     1955     [id number] Date of Admission : 1/10/2019 
01/16/19 CC:  Follow up for TACOS, Rhabdo Assessment and Plan TACOS on CKD · 2/2 dehydration + Acute Rhabdomyolysis · Cr at baseline · Ok to resume lisinopril 
  
Acute Rhabdomyolysis · W/ ARF + Acute Liver Injury · triggered by prolonged immobilization, high dose Statin and dehydration · CPK trending down 
  
CKD Stage III : 
· Baseline CR 1.4 mg/dl · Likely 2/2 DM, HTN  
  
Acute Liver Injury  
  
PNA Chronic Systolic CHF Type II DM  
HTN Seizure disorder Cirrhosis of Liver Interval History:   
Seen and examined. Feeling ok. Taking po w/o issues. Cr improving. No cp or sob. Review of Systems: Pertinent items are noted in HPI. Current Medications:  
Current Facility-Administered Medications Medication Dose Route Frequency  carvedilol (COREG) tablet 9.375 mg  9.375 mg Oral BID WITH MEALS  ibuprofen (MOTRIN) tablet 600 mg  600 mg Oral Q8H PRN  
 mupirocin (BACTROBAN) 2 % ointment   Topical DAILY  amLODIPine (NORVASC) tablet 2.5 mg  2.5 mg Oral DAILY  famotidine (PEPCID) tablet 20 mg  20 mg Oral Q12H  
 venlafaxine-SR (EFFEXOR-XR) capsule 150 mg  150 mg Oral DAILY AFTER BREAKFAST  levETIRAcetam (KEPPRA) tablet 1,000 mg  1,000 mg Oral Q12H  
 influenza vaccine 2018-19 (6 mos+)(PF) (FLUARIX QUAD/FLULAVAL QUAD) injection 0.5 mL  0.5 mL IntraMUSCular PRIOR TO DISCHARGE  sodium chloride (NS) flush 5-40 mL  5-40 mL IntraVENous Q8H  
 sodium chloride (NS) flush 5-40 mL  5-40 mL IntraVENous PRN  
 aspirin chewable tablet 81 mg  81 mg Oral DAILY  clonazePAM (KlonoPIN) tablet 0.5 mg  0.5 mg Oral QHS  clopidogrel (PLAVIX) tablet 75 mg  75 mg Oral DAILY  FLUoxetine (PROzac) capsule 20 mg  20 mg Oral DAILY  isosorbide mononitrate ER (IMDUR) tablet 30 mg  30 mg Oral DAILY  lactulose (CHRONULAC) solution 30 g  30 g Oral TID  nitroglycerin (NITROSTAT) tablet 0.4 mg  0.4 mg SubLINGual Q5MIN PRN  pantoprazole (PROTONIX) tablet 40 mg  40 mg Oral ACB&D  pregabalin (LYRICA) capsule 50 mg  50 mg Oral TID  QUEtiapine (SEROquel) tablet 25 mg  25 mg Oral QHS  tamsulosin (FLOMAX) capsule 0.4 mg  0.4 mg Oral ACB&D  
 venlafaxine-SR (EFFEXOR-XR) capsule 75 mg  75 mg Oral QHS  insulin lispro (HUMALOG) injection   SubCUTAneous AC&HS  
 glucose chewable tablet 16 g  4 Tab Oral PRN  
 dextrose (D50W) injection syrg 12.5-25 g  12.5-25 g IntraVENous PRN  
 glucagon (GLUCAGEN) injection 1 mg  1 mg IntraMUSCular PRN  
 fentaNYL citrate (PF) injection 25 mcg  25 mcg IntraVENous Q4H PRN Allergies Allergen Reactions  Latex, Natural Rubber Hives  Codeine Anaphylaxis Tolerated fentanyl previously  Demerol [Meperidine] Anaphylaxis Tolerated fentanyl previously  Mushroom Anaphylaxis  Metformin Diarrhea And dehydration  Wellbutrin [Bupropion Hcl] Anaphylaxis Objective: 
Vitals:   
Vitals:  
 01/16/19 0200 01/16/19 0600 01/16/19 0818 01/16/19 1000 BP: 142/68 143/74 154/77 127/69 Pulse: 74 72 78 79 Resp: 12 12  15 Temp: 97.9 °F (36.6 °C) 98.7 °F (37.1 °C)  98.4 °F (36.9 °C) SpO2: 96% 94%  96% Weight: 104.3 kg (230 lb) Height:      
 
Intake and Output: 
No intake/output data recorded. 01/14 1901 - 01/16 0700 In: 3251.3 [I.V.:3251.3] Out: - Physical Examination:Pt intubated    No 
General: NAD Neck:  Supple, no JVD Resp:  Lungs CTA B/L, no wheezing , normal respiratory effort CV:  RRR,  no rub, minimal LE edema GI:  Soft, NT, + Bowel sounds Neurologic:  Non focal 
Psych:             Unable to asssess Skin:  No Rash :  bhandari []    High complexity decision making was performed 
[]    Patient is at high-risk of decompensation with multiple organ involvement Lab Data Personally Reviewed: I have reviewed all the pertinent labs, microbiology data and radiology studies during assessment. Recent Labs  
  01/16/19 
0216 01/15/19 
0200 01/14/19 
0304  140 140  
K 3.9 3.7 3.9 * 112* 112* CO2 22 19* 19* * 129* 115* BUN 22* 26* 28* CREA 1.27 1.34* 1.40* CA 7.5* 7.8* 7.6*  
MG 1.8 2.0 2.1 PHOS  --  3.2 2.9 ALB 2.2* 2.1* 2.2*  
SGOT 31 41* 53* ALT 57 69 91* Recent Labs  
  01/15/19 
0200 01/14/19 
0304 WBC 7.2 8.0 HGB 12.0* 12.3 HCT 38.5 40.5 * 151 Lab Results Component Value Date/Time Specimen Description: ESOPHAGEAL BRUSHING 09/14/2010 10:48 AM  
 
Lab Results Component Value Date/Time Culture result: RARE STAPHYLOCOCCUS SPECIES, COAGULASE NEGATIVE (A) 01/14/2019 03:04 AM  
 Culture result: STAPHYLOCOCCUS EPIDERMIDIS (A) 09/07/2018 09:16 PM  
 Culture result: NO GROWTH 5 DAYS 07/17/2017 09:01 PM  
 Culture result: MRSA NOT PRESENT 03/02/2017 04:15 PM  
 Culture result:  03/02/2017 04:15 PM  
      Screening of patient nares for MRSA is for surveillance purposes and, if positive, to facilitate isolation considerations in high risk settings. It is not intended for automatic decolonization interventions per se as regimens are not sufficiently effective to warrant routine use. Recent Results (from the past 24 hour(s)) GLUCOSE, POC Collection Time: 01/15/19 12:08 PM  
Result Value Ref Range Glucose (POC) 145 (H) 65 - 100 mg/dL Performed by Abbi Morley (PCT) EKG, 12 LEAD, INITIAL Collection Time: 01/15/19 12:14 PM  
Result Value Ref Range Ventricular Rate 70 BPM  
 Atrial Rate 70 BPM  
 P-R Interval 172 ms QRS Duration 122 ms  
 Q-T Interval 436 ms  
 QTC Calculation (Bezet) 470 ms Calculated P Axis 85 degrees Calculated R Axis 19 degrees Calculated T Axis 120 degrees Diagnosis Sinus rhythm with premature atrial complexes Possible Left atrial enlargement Nonspecific intraventricular conduction delay T wave abnormality, consider lateral ischemia When compared with ECG of 10-VIOLA-2018 03:32, 
Criteria for Septal infarct are no longer present Confirmed by Allen Maldonado M.D., Radha Smoke ([id number]) on 1/16/2019 6:41:34 AM 
  
GLUCOSE, POC Collection Time: 01/15/19  4:58 PM  
Result Value Ref Range Glucose (POC) 134 (H) 65 - 100 mg/dL Performed by Earlene Lopez GLUCOSE, POC Collection Time: 01/15/19  9:39 PM  
Result Value Ref Range Glucose (POC) 175 (H) 65 - 100 mg/dL Performed by Reema Ram MAGNESIUM Collection Time: 01/16/19  2:16 AM  
Result Value Ref Range Magnesium 1.8 1.6 - 2.4 mg/dL METABOLIC PANEL, COMPREHENSIVE Collection Time: 01/16/19  2:16 AM  
Result Value Ref Range Sodium 139 136 - 145 mmol/L Potassium 3.9 3.5 - 5.1 mmol/L Chloride 110 (H) 97 - 108 mmol/L  
 CO2 22 21 - 32 mmol/L Anion gap 7 5 - 15 mmol/L Glucose 107 (H) 65 - 100 mg/dL BUN 22 (H) 6 - 20 MG/DL Creatinine 1.27 0.70 - 1.30 MG/DL  
 BUN/Creatinine ratio 17 12 - 20 GFR est AA >60 >60 ml/min/1.73m2 GFR est non-AA 57 (L) >60 ml/min/1.73m2 Calcium 7.5 (L) 8.5 - 10.1 MG/DL Bilirubin, total 0.4 0.2 - 1.0 MG/DL  
 ALT (SGPT) 57 12 - 78 U/L  
 AST (SGOT) 31 15 - 37 U/L Alk. phosphatase 55 45 - 117 U/L Protein, total 5.7 (L) 6.4 - 8.2 g/dL Albumin 2.2 (L) 3.5 - 5.0 g/dL Globulin 3.5 2.0 - 4.0 g/dL A-G Ratio 0.6 (L) 1.1 - 2.2 GLUCOSE, POC Collection Time: 01/16/19  6:25 AM  
Result Value Ref Range Glucose (POC) 97 65 - 100 mg/dL Performed by Michael Thomas Total time spent with patient:  xxx   min. Care Plan discussed with: 
Patient Family RN Consulting Physician [address] [facility],      
 
I have reviewed the flowsheets. Chart and Pertinent Notes have been reviewed. No change in PMH ,family and social history from Consult note.  
 
 
Veronica Roman MD

## 2019-01-16 NOTE — DISCHARGE INSTRUCTIONS
Discharge SNF/Rehab Instructions/LTAC       PATIENT ID: Josias Dillard MRN: [id number]   YOB: 1955    DATE OF ADMISSION: 1/10/2019  1:56 PM    DATE OF DISCHARGE: 1/16/2019    PRIMARY CARE PROVIDER: Crys Felix MD       ATTENDING PHYSICIAN: Jemma Duran MD  DISCHARGING PROVIDER: Marjean Nissen, MD     To contact this individual call [phone] and ask the  to page. If unavailable ask to be transferred the Adult Hospitalist Department. CONSULTATIONS: IP CONSULT TO HOSPITALIST  IP CONSULT TO NEUROLOGY  IP CONSULT TO NEPHROLOGY  IP CONSULT TO ORTHOPEDIC SURGERY  IP CONSULT TO CARDIOLOGY    PROCEDURES/SURGERIES: * No surgery found *    ADMITTING [address] [place] [address] COURSE:   Acute renal failure  - due to rhabdomyolysis/dehydration  -Due to ground level fall , CK trending down, nephrology following   -Appreciate input  -now resolved      Left shoulder pain and immobility  - XR elbow with possible radial head Fx,   -CT shoulder and elbow from 1/12 : No fracture. Mild glenohumeral and AC joint osteoarthritis. Moderate glenohumeral joint effusion.  - per ortho need sling and immobilization  - Appreciate ortho input, spoke 1/15, clear for discharge   -Outpatient follow up with Dr Jaylon Escalera        Possible PNA   - CXR Mild bilateral patchy upper lobe groundglass opacities are nonspecific but could  represent early pneumonia.  -No WCC, no fevers  -Repeat CXR 1/14 No acute abnormality identified. There is minor atelectasis/scarring  in the mid lungs  -Was on Azithro/ceftriaxone, stopped 1/14  -resolved        Possible breakthrough seizure   -Seen by neuro , increased keppra and stopped lamictal due to abnormal LFT    -EEG completed, normal  -CT head no acute findings  -Seizure precautions and monitor   -Continue Keppra/Klonopin  -Outpatient follow up with Dr Ced Pickens in 4 weeks     Transaminitis  Improving  He has underlying BARGER   And superimposed rhabdo     Coronary artery disease.    He has had CABG twice.     Chronic systolic heart failure. Stable, strict I/O     NSVT   -Appreciate Cardiology input, increased the dose of Coreg     Type 2 diabetes. accuchecks , SSI      Hypertension. Controlled, continue current meds and adjust as needed     .  Dementia.     Anxiety/depression  -on Seroquel/Prozac  -Will get EKG for Qtc     Chronic liver disease and cirrhosis.     PT/OT inpatient rehab     Code status: full        DISCHARGE DIAGNOSES / PLAN:      1. ARF       PENDING TEST RESULTS:   At the time of discharge the following test results are still pending: none    FOLLOW UP APPOINTMENTS:    Follow-up Information     Follow up With Specialties Details Why Contact Info    Lexa Pandey MD [facility] In 1 week  [address] [facility]      Liana Morrison MD Orthopedic Surgery In 2 weeks or sooner if pain worsens [place] [address]       [facility]    Lexa Pandey MD [facility] In 1 week  [address] [facility]  [phone]      Neurology  In 2 weeks             ADDITIONAL CARE RECOMMENDATIONS:   Follow up with PMD  Follow up with Neurology  Follow up with Dr Doug Tavarez: Diabetic Diet    ACTIVITY: Activity as tolerated      DISCHARGE MEDICATIONS:   See Medication Reconciliation Form      NOTIFY YOUR PHYSICIAN FOR ANY OF THE FOLLOWING:   Fever over 101 degrees for 24 hours. Chest pain, shortness of breath, fever, chills, nausea, vomiting, diarrhea, change in mentation, falling, weakness, bleeding. Severe pain or pain not relieved by medications. Or, any other signs or symptoms that you may have questions about.     DISPOSITION:    Home With:   OT  PT  HH  RN      x SNF/Inpatient Rehab/LTAC    Independent/assisted living    Hospice    Other: PATIENT CONDITION AT DISCHARGE:     Functional status    Poor    x Deconditioned     Independent      Cognition    x Lucid     Forgetful     Dementia      Catheters/lines (plus indication)    [place]     PICC     PEG    x None      Code status   x  Full code     DNR      PHYSICAL EXAMINATION AT DISCHARGE:  Please see progress note      CHRONIC MEDICAL DIAGNOSES:  Problem List as of 1/16/2019 Date Reviewed: 1/2/2019          Codes Class Noted - Resolved    Fall ICD-10-CM: W19Monica Marquez  ICD-9-CM: E888.9  1/10/2019 - Present        * (Principal) TACOS (acute kidney injury) ([facility] ICD-10-CM: N17.9  ICD-9-CM: 584.9  1/10/2019 - Present        Chest pain ICD-10-CM: R07.9  ICD-9-CM: 786.50  1/11/2018 - Present        Acute chest pain ICD-10-CM: R07.9  ICD-9-CM: 786.50  1/10/2018 - Present        Hepatic encephalopathy ([facility] ICD-10-CM: K72.90  ICD-9-CM: 572.2  7/17/2017 - Present        Neuropathy ICD-10-CM: G62.9  ICD-9-CM: 355.9  4/14/2017 - Present        Cirrhosis ([facility] ICD-10-CM: K74.60  ICD-9-CM: 571.5  4/14/2017 - Present        CAD (coronary artery disease) ICD-10-CM: I25.10  ICD-9-CM: 414.00  4/14/2017 - Present        S/P coronary artery stent placement ICD-10-CM: Z95.5  ICD-9-CM: V45.82  4/14/2017 - Present        S/P CABG (coronary artery bypass graft) ICD-10-CM: Z95.1  ICD-9-CM: V45.81  4/14/2017 - Present    Overview Signed 4/14/2017 11:27 AM by Megan Xiao MD     2002 and 2013             Thrombocytopenia ([facility] ICD-10-CM: D69.6  ICD-9-CM: 287.5  4/14/2017 - Present        MRSA infection ICD-10-CM: A49.02  ICD-9-CM: 041.12  4/14/2017 - Present    Overview Signed 4/14/2017 11:28 AM by Megan Xiao MD     2016             S/P cholecystectomy ICD-10-CM: Z90.49  ICD-9-CM: V45.79  4/14/2017 - Present        Metabolic encephalopathy ZBS-46-FF: G93.41  ICD-9-CM: 348.31  12/19/2016 - Present        Seizure ([address] ICD-10-CM: R56.9  ICD-9-CM: 780.39  11/21/2016 - Present        Sinusitis ICD-10-CM: J32.9  ICD-9-CM: 473.9  Unknown - Present        Joint pain ICD-10-CM: M25.50  ICD-9-CM: 719.40  Unknown - Present        Low back pain ICD-10-CM: M54.5  ICD-9-CM: 724.2  Unknown - Present        GERD (gastroesophageal reflux disease) ICD-10-CM: K21.9  ICD-9-CM: 530.81  Unknown - Present        Diabetes mellitus, type 2 ([facility] ICD-10-CM: E11.9  ICD-9-CM: 250.00  Unknown - Present        RESOLVED: Pneumonia ICD-10-CM: J18.9  ICD-9-CM: 596  3/3/2017 - 4/14/2017        RESOLVED: Fever ICD-10-CM: R50.9  ICD-9-CM: 780.60  3/2/2017 - 4/14/2017        RESOLVED: Abscess ICD-10-CM: L02.91  ICD-9-CM: 682.9  1/5/2017 - 4/14/2017        RESOLVED: Altered mental status ICD-10-CM: R41.82  ICD-9-CM: 780.97  12/19/2016 - 4/14/2017        RESOLVED: Debility ICD-10-CM: R53.81  ICD-9-CM: 799.3  11/22/2016 - 4/14/2017        RESOLVED: Cellulitis ICD-10-CM: L03.90  ICD-9-CM: 682.9  11/7/2016 - 4/14/2017        RESOLVED: Snoring ICD-10-CM: R06.83  ICD-9-CM: 786.09  Unknown - 4/14/2017        RESOLVED: Night sweats ICD-10-CM: R61  ICD-9-CM: 780.8  Unknown - 4/14/2017        RESOLVED: Chest pain ICD-10-CM: 786.5  ICD-9-CM: 786.5  Unknown - 4/14/2017        RESOLVED: Sore throat ICD-10-CM: J02.9  ICD-9-CM: 027  Unknown - 4/14/2017        RESOLVED: Dysphagia ICD-10-CM: 787.2  ICD-9-CM: 787.2  Unknown - 4/14/2017        RESOLVED: Tingling sensation ICD-10-CM: R20.2  ICD-9-CM: 782.0  Unknown - 4/14/2017        RESOLVED: Nausea ICD-10-CM: R11.0  ICD-9-CM: 787.02  Unknown - 4/14/2017        RESOLVED: Diarrhea ICD-10-CM: R19.7  ICD-9-CM: 787.91  Unknown - 4/14/2017        RESOLVED: Snoring ICD-10-CM: R06.83  ICD-9-CM: 786.09  Unknown - 4/14/2017        RESOLVED: Abdominal pain, epigastric ICD-10-CM: R10.13  ICD-9-CM: 789.06  9/13/2010 - 4/14/2017                CDMP Checked:   Yes x     PROBLEM LIST Updated:  Yes x         Signed:   Kush Spring MD  1/16/2019  1:02 PM

## 2019-01-16 NOTE — PROGRESS NOTES
Problem: Falls - Risk of 
Goal: *Absence of Falls Document Kade Vega Fall Risk and appropriate interventions in the flowsheet. Outcome: Progressing Towards Goal 
Fall Risk Interventions: 
Mobility Interventions: OT consult for ADLs, Patient to call before getting OOB, PT Consult for mobility concerns, PT Consult for assist device competence Mentation Interventions: Door open when patient unattended, Bed/chair exit alarm, Adequate sleep, hydration, pain control, Reorient patient, Room close to nurse's station Medication Interventions: Bed/chair exit alarm, Patient to call before getting OOB, Teach patient to arise slowly Elimination Interventions: Urinal in reach, Call light in reach, Patient to call for help with toileting needs History of Falls Interventions: Bed/chair exit alarm, Room close to nurse's station Problem: Pressure Injury - Risk of 
Goal: *Prevention of pressure injury Document Iván Scale and appropriate interventions in the flowsheet. Outcome: Progressing Towards Goal 
Pressure Injury Interventions: 
Sensory Interventions: Assess changes in LOC, Assess need for specialty bed, Discuss PT/OT consult with provider, Float heels, Keep linens dry and wrinkle-free, Maintain/enhance activity level, Minimize linen layers Moisture Interventions: Absorbent underpads, Limit adult briefs, Minimize layers Activity Interventions: Increase time out of bed, Pressure redistribution bed/mattress(bed type), PT/OT evaluation Mobility Interventions: HOB 30 degrees or less, Pressure redistribution bed/mattress (bed type), PT/OT evaluation Nutrition Interventions: Document food/fluid/supplement intake Friction and Shear Interventions: HOB 30 degrees or less, Lift sheet, Minimize layers

## 2019-01-16 NOTE — ROUTINE PROCESS
Bedside shift change report given to Amanda Goyal (oncoming nurse) by Renato Delgadillo (offgoing nurse).  Report included the following information SBAR, ED Summary, MAR, Recent Results and Cardiac Rhythm SA.

## 2019-01-16 NOTE — PROGRESS NOTES
PAULA spoke with durga Jimenez for [facility] this morning. She stated that she has \"written up\" this pt's case and is sending it to her physician for approval. Will follow. Berkley Molina

## 2019-01-16 NOTE — PROGRESS NOTES
Problem: Self Care Deficits Care Plan (Adult) Goal: *Acute Goals and Plan of Care (Insert Text) Occupational Therapy Goals Initiated 1/14/2019 1. Patient will perform grooming standing at sink with contact guard assist within 7 day(s). 2.  Patient will perform lower body dressing with contact guard assist within 7 day(s). 3.  Patient will perform bathing with contact guard assist within 7 day(s). 4.  Patient will perform toilet transfers with contact guard assist within 7 day(s). 5.  Patient will perform all aspects of toileting with contact guard assist within 7 day(s). 6.  Patient will participate in upper extremity therapeutic exercise/activities with supervision/set-up for 10 minutes within 7 day(s). 7.  Patient will utilize energy conservation techniques during functional activities with verbal cues within 7 day(s). 8.  Patient will independently maintain LUE NWB precaution during activity within 7 day(s). Occupational Therapy TREATMENT Patient: Declan Ventura (65 y.o. male) Date: 1/16/2019 Diagnosis: Fall [W19. Erle Fridah TACOS (acute kidney injury) ([facility] [N17.9] TACOS (acute kidney injury) ([facility] Precautions: Fall(LUE NWB) Chart, occupational therapy assessment, plan of care, and goals were reviewed. ASSESSMENT: 
Patient cleared by RN to be seen for OT session, received in bed and agreeable to treatment. Patient with min A for bed mobility 2* NWB LUE. Patient with min A/CGA for functional mobility to/from bathroom. Patient completed grooming sitting at sink with supervision after s/u. Patient noted to require increased time for processing and sequencing of tasks. Patient able to maintain NBW on LUE throughout session with minimal verbal cues. Patient left sitting up in chair with call bell in reach, RN aware and all needs met.   
 
Recommend with nursing patient to complete as able in order to maintain strength, endurance and independence: ADLs with supervision/setup, OOB to chair 3x/day and mobilizing to the bathroom for toileting with 1 assist. Thank you for your assistance. Progression toward goals: 
[]       Improving appropriately and progressing toward goals [x]       Improving slowly and progressing toward goals 
[]       Not making progress toward goals and plan of care will be adjusted PLAN: 
Patient continues to benefit from skilled intervention to address the above impairments. Continue treatment per established plan of care. Discharge Recommendations:  Inpatient Rehab Further Equipment Recommendations for Discharge:  TBD by rehab SUBJECTIVE:  
Patient stated I would love to get up out of the bed.  OBJECTIVE DATA SUMMARY:  
Cognitive/Behavioral Status: 
Neurologic State: Alert Orientation Level: Oriented X4 Cognition: Follows commands; Appropriate for age attention/concentration Perception: Appears intact Perseveration: No perseveration noted Safety/Judgement: Decreased awareness of environment;Decreased awareness of need for assistance;Decreased awareness of need for safety;Decreased insight into deficits; Fall prevention Functional Mobility and Transfers for ADLs:Bed Mobility: 
Supine to Sit: Minimum assistance; Additional time Scooting: Contact guard assistance Transfers: 
Sit to Stand: Minimum assistance Balance: 
Sitting: Intact Sitting - Static: Good (unsupported) Sitting - Dynamic: Good (unsupported) Standing: Impaired; With support Standing - Static: Good;Constant support Standing - Dynamic : Fair ADL Intervention: 
Grooming Washing Face: Supervision/set-up Upper Body Dressing Assistance Hospital Gown: Minimum  assistance(2* impaired ROM in LUE to tie behind head) Cognitive Retraining Safety/Judgement: Decreased awareness of environment;Decreased awareness of need for assistance;Decreased awareness of need for safety;Decreased insight into deficits; Fall prevention Pain: 
Pain Scale 1: Numeric (0 - 10) Pain Intensity 1: 0 
 Activity Tolerance:  
Good, VSS Please refer to the flowsheet for vital signs taken during this treatment. After treatment:  
[x] Patient left in no apparent distress sitting up in chair 
[] Patient left in no apparent distress in bed 
[x] Call bell left within reach [x] Nursing notified 
[] Caregiver present 
[] Bed alarm activated COMMUNICATION/COLLABORATION:  
The patients plan of care was discussed with: Physical Therapist and Registered Nurse Andres Phoenix, OT Time Calculation: 20 mins

## 2019-01-16 NOTE — PROGRESS NOTES
Problem: Mobility Impaired (Adult and Pediatric) Goal: *Acute Goals and Plan of Care (Insert Text) Physical Therapy Goals 1/15/2019 1. Patient will move from supine to sit and sit to supine , scoot up and down and roll side to side in bed with supervision/set-up within 7 day(s). 2.  Patient will transfer from bed to chair and chair to bed with supervision/set-up using the least restrictive device within 7 day(s). 3.  Patient will perform sit to stand with supervision/set-up within 7 day(s). 4.  Patient will ambulate with supervision/set-up for 100 feet with the least restrictive device within 7 day(s). 5.  Patient will ascend/descend 4 stairs with 1 handrail(s) with minimal assistance/contact guard assist within 7 day(s). physical Therapy TREATMENT Patient: Breana Gutiérrez (65 y.o. male) Date: 1/16/2019 Diagnosis: Fall [W19. Rexine Hussain TACOS (acute kidney injury) ([facility] [address] [N17.9] TACOS (acute kidney injury) ([facility] [address] Precautions: Fall(LUE NWB) Chart, physical therapy assessment, plan of care and goals were reviewed. ASSESSMENT: 
Patient received supine in bed and agreeable to therapy. Pt tolerated session fairly well and making progress towards goals. Pt continues to be limited by decreased tolerance to activity, impaired balance and gait requiring min A for safety. Pt completed bed mobility with min A and did a good job maintaining NWB LUE. Pt performed sit<>stand with min A and additional time to assume standing position. Pt ambulated with min A and HHA on the R demonstrating decreased rock, wide BERNARDO, lateral trunk sway. Noted increased work of breathing, but spO2 >90% on room air. Pt remained seated in chair and encouraged to remain up for lunch. Pt will continue to benefit from PT to progress mobility as tolerated. Pt will benefit from inpatient rehab as he is well below baseline mobility status. Progression toward goals: 
[x]    Improving appropriately and progressing toward goals []    Improving slowly and progressing toward goals 
[]    Not making progress toward goals and plan of care will be adjusted PLAN: 
Patient continues to benefit from skilled intervention to address the above impairments. Continue treatment per established plan of care. Discharge Recommendations:  Inpatient Rehab Further Equipment Recommendations for Discharge:  tbd SUBJECTIVE:  
Patient stated I feel tired.  OBJECTIVE DATA SUMMARY:  
Critical Behavior: 
Neurologic State: Alert Orientation Level: Oriented X4 Cognition: Follows commands, Decreased attention/concentration Safety/Judgement: Decreased awareness of environment, Decreased insight into deficits, Decreased awareness of need for safety, Decreased awareness of need for assistance Functional Mobility Training: 
Bed Mobility: 
  
Supine to Sit: Minimum assistance; Additional time Transfers: 
Sit to Stand: Minimum assistance Stand to Sit: Contact guard assistance Balance: 
Sitting: Intact Standing: Impaired Standing - Static: Good Standing - Dynamic : FairAmbulation/Gait Training: 
Distance (ft): 35 Feet (ft) Assistive Device: Gait belt(HHA) Ambulation - Level of Assistance: Minimal assistance Gait Abnormalities: Decreased step clearance;Trunk sway increased Base of Support: Widened Speed/Usha: Pace decreased (<100 feet/min); Shuffled Step Length: Right shortened;Left shortened Pain: 
Pain Scale 1: Numeric (0 - 10) Pain Intensity 1: 0 Activity Tolerance:  
Improving. vss Please refer to the flowsheet for vital signs taken during this treatment. After treatment:  
[x]    Patient left in no apparent distress sitting up in chair 
[]    Patient left in no apparent distress in bed 
[x]    Call bell left within reach [x]    Nursing notified 
[]    Caregiver present 
[]    Bed alarm activated COMMUNICATION/COLLABORATION:  
 The patients plan of care was discussed with: Occupational Therapist and Registered Nurse Ford Bernard, PT, DPT Time Calculation: 21 mins

## 2019-01-16 NOTE — PROGRESS NOTES
Bedside and Verbal shift change report given to Iris Patino RN (oncoming nurse) by Stormy Moore RN (offgoing nurse).  Report included the following information SBAR, Kardex, Intake/Output, MAR and Recent Results SA.

## 2019-01-16 NOTE — PROGRESS NOTES
Hospitalist Progress Note Joanna Jewell MD 
Answering service: [phone] from in house phone Cell: [phone] Date of Service:  2019 NAME:  Dangelo Yates. :  1955 MRN:  [id number] Admission Summary:  
The patient is a 71 yr old male with pMD of CAD s/p CABG, chronic systolic CHF, DM type 2, HTN, Dementia, Seizure disorder, chronic liver disease/cirrhosis who was brought from home. According to the daughters, they found him lying on the floor when they checked on him around 11:00. The daughters think he might have been down since around 3:00. The daughter suspect possible breakthrough seizure, but they  added usually when he had seizures he has an auditory aura, but did not hear anything last night. The patient has been having some cough and congestion for the last 3 days and all of them had what looked like upper respiratory viral infection. Interval history / Subjective:  
 F/u Seizure No new issues Resting comfortably Assessment & Plan:  
 
Acute renal failure - due to rhabdomyolysis/dehydration 
-Due to ground level fall , CK trending down, nephrology following  
-Appreciate input -now resolved Left shoulder pain and immobility - XR elbow with possible radial head Fx,  
-CT shoulder and elbow from  : No fracture. Mild glenohumeral and AC joint osteoarthritis. Moderate glenohumeral joint effusion. 
- per ortho need sling and immobilization - Appreciate ortho input, spoke 1/15, clear for discharge  
-Outpatient follow up with Dr Eddie Bro Possible PNA  
- CXR Mild bilateral patchy upper lobe groundglass opacities are nonspecific but could 
represent early pneumonia. 
-No WCC, no fevers -Repeat CXR  No acute abnormality identified. There is minor atelectasis/scarring 
in the mid lungs 
-Was on Azithro/ceftriaxone, stopped  
-resolved Possible breakthrough seizure  
-Seen by neuro , increased keppra and stopped lamictal due to abnormal LFT  
 -EEG completed, normal 
-CT head no acute findings 
-Seizure precautions and monitor  
-Continue Keppra/Klonopin 
-Outpatient follow up with Dr Chad Oliver in 4 weeks Transaminitis Improving He has underlying BARGER And superimposed rhabdo Coronary artery disease. He has had CABG twice. Chronic systolic heart failure. Stable, strict I/O 
 
NSVT  
-Appreciate Cardiology input, increased the dose of Coreg Type 2 diabetes. accuchecks , SSI Hypertension. Controlled, continue current meds and adjust as needed Michaelyn Daily Dementia. Anxiety/depression 
-on Seroquel/Prozac 
-Will get EKG for Qtc Chronic liver disease and cirrhosis. PT/OT inpatient rehab Code status: full DVT prophylaxis: scd Plan: The patient is stable medically for transfer to rehab, awaiting placement Care Plan discussed with: Patient/Family Disposition: D Hospital Problems  Date Reviewed: 1/2/2019 Codes Class Noted POA Fall ICD-10-CM: W19. Latha Jay ICD-9-CM: U757.4  1/10/2019 Unknown * (Principal) TACOS (acute kidney injury) ([facility] [address] ICD-10-CM: N17.9 ICD-9-CM: 584.9  1/10/2019 Unknown Review of Systems: A comprehensive review of systems was negative except for that written in the HPI. Vital Signs:  
 Last 24hrs VS reviewed since prior progress note. Most recent are: 
Visit Vitals /77 Pulse 78 Temp 98.7 °F (37.1 °C) Resp 12 Ht 5' 9\" (1.753 m) Wt 104.3 kg (230 lb) SpO2 94% BMI 33.97 kg/m² No intake or output data in the 24 hours ending 01/16/19 0819 Physical Examination:  
 
 
     
Constitutional:  No acute distress, cooperative, pleasant , scalp abrasion, alert x 4  
ENT:  Oral mucous moist, oropharynx benign. Neck supple, Resp:  decreased basal BS   
CV:  Regular rhythm, normal rate, no murmurs, gallops, rubs GI:  Soft, non distended, non tender. normoactive bowel sounds, no hepatosplenomegaly Musculoskeletal:  No edema, warm, 2+ pulses throughout Neurologic:  left shoulder not moving. AAOx2, Psych:  Good insight, Not anxious nor agitated. Data Review:  
 Review and/or order of clinical lab test 
 
 
Labs:  
 
Recent Labs  
  01/15/19 
0200 01/14/19 
0304 WBC 7.2 8.0 HGB 12.0* 12.3 HCT 38.5 40.5 * 151 Recent Labs  
  01/16/19 
0216 01/15/19 
0200 01/14/19 
0304  140 140  
K 3.9 3.7 3.9 * 112* 112* CO2 22 19* 19*  
BUN 22* 26* 28* CREA 1.27 1.34* 1.40* * 129* 115* CA 7.5* 7.8* 7.6*  
MG 1.8 2.0 2.1 PHOS  --  3.2 2.9 Recent Labs  
  01/16/19 
0216 01/15/19 
0200 01/14/19 
0304 SGOT 31 41* 53* ALT 57 69 91* AP 55 55 54 TBILI 0.4 0.4 0.4 TP 5.7* 5.5* 5.7* ALB 2.2* 2.1* 2.2*  
GLOB 3.5 3.4 3.5 No results for input(s): INR, PTP, APTT in the last 72 hours. No lab exists for component: INREXT, INREXT No results for input(s): FE, TIBC, PSAT, FERR in the last 72 hours. No results found for: FOL, RBCF No results for input(s): PH, PCO2, PO2 in the last 72 hours. Recent Labs  
  01/15/19 
0200 01/14/19 
0304  372* No results found for: CHOL, CHOLX, CHLST, CHOLV, HDL, LDL, LDLC, DLDLP, TGLX, TRIGL, TRIGP, CHHD, CHHDX Lab Results Component Value Date/Time Glucose (POC) 97 01/16/2019 06:25 AM  
 Glucose (POC) 175 (H) 01/15/2019 09:39 PM  
 Glucose (POC) 134 (H) 01/15/2019 04:58 PM  
 Glucose (POC) 145 (H) 01/15/2019 12:08 PM  
 Glucose (POC) 130 (H) 01/15/2019 09:33 AM  
 
Lab Results Component Value Date/Time  Color DARK YELLOW 01/10/2019 03:53 PM  
 Appearance CLOUDY (A) 01/10/2019 03:53 PM  
 Specific gravity 1.020 01/10/2019 03:53 PM  
 pH (UA) 5.5 01/10/2019 03:53 PM  
 Protein 100 (A) 01/10/2019 03:53 PM  
 Glucose NEGATIVE  01/10/2019 03:53 PM  
 Ketone TRACE (A) 01/10/2019 03:53 PM  
 Bilirubin NEGATIVE  09/07/2018 09:16 PM  
 Urobilinogen 1.0 01/10/2019 03:53 PM  
 Nitrites NEGATIVE  01/10/2019 03:53 PM  
 Leukocyte Esterase NEGATIVE  01/10/2019 03:53 PM  
 Epithelial cells FEW 01/10/2019 03:53 PM  
 Bacteria NEGATIVE  01/10/2019 03:53 PM  
 WBC 0-4 01/10/2019 03:53 PM  
 RBC 0-5 01/10/2019 03:53 PM  
 
 
 
Medications Reviewed:  
 
Current Facility-Administered Medications Medication Dose Route Frequency  carvedilol (COREG) tablet 9.375 mg  9.375 mg Oral BID WITH MEALS  ibuprofen (MOTRIN) tablet 600 mg  600 mg Oral Q8H PRN  
 mupirocin (BACTROBAN) 2 % ointment   Topical DAILY  amLODIPine (NORVASC) tablet 2.5 mg  2.5 mg Oral DAILY  famotidine (PEPCID) tablet 20 mg  20 mg Oral Q12H  
 venlafaxine-SR (EFFEXOR-XR) capsule 150 mg  150 mg Oral DAILY AFTER BREAKFAST  levETIRAcetam (KEPPRA) tablet 1,000 mg  1,000 mg Oral Q12H  
 influenza vaccine 2018-19 (6 mos+)(PF) (FLUARIX QUAD/FLULAVAL QUAD) injection 0.5 mL  0.5 mL IntraMUSCular PRIOR TO DISCHARGE  sodium chloride (NS) flush 5-40 mL  5-40 mL IntraVENous Q8H  
 sodium chloride (NS) flush 5-40 mL  5-40 mL IntraVENous PRN  
 aspirin chewable tablet 81 mg  81 mg Oral DAILY  clonazePAM (KlonoPIN) tablet 0.5 mg  0.5 mg Oral QHS  clopidogrel (PLAVIX) tablet 75 mg  75 mg Oral DAILY  FLUoxetine (PROzac) capsule 20 mg  20 mg Oral DAILY  isosorbide mononitrate ER (IMDUR) tablet 30 mg  30 mg Oral DAILY  lactulose (CHRONULAC) solution 30 g  30 g Oral TID  nitroglycerin (NITROSTAT) tablet 0.4 mg  0.4 mg SubLINGual Q5MIN PRN  pantoprazole (PROTONIX) tablet 40 mg  40 mg Oral ACB&D  pregabalin (LYRICA) capsule 50 mg  50 mg Oral TID  QUEtiapine (SEROquel) tablet 25 mg  25 mg Oral QHS  tamsulosin (FLOMAX) capsule 0.4 mg  0.4 mg Oral ACB&D  
 venlafaxine-SR (EFFEXOR-XR) capsule 75 mg  75 mg Oral QHS  insulin lispro (HUMALOG) injection   SubCUTAneous AC&HS  
 glucose chewable tablet 16 g  4 Tab Oral PRN  
  dextrose (D50W) injection syrg 12.5-25 g  12.5-25 g IntraVENous PRN  
 glucagon (GLUCAGEN) injection 1 mg  1 mg IntraMUSCular PRN  
 fentaNYL citrate (PF) injection 25 mcg  25 mcg IntraVENous Q4H PRN  
 
______________________________________________________________________ EXPECTED LENGTH OF STAY: 4d 12h ACTUAL LENGTH OF STAY:          5 Theresa Mittal MD

## 2019-02-11 ENCOUNTER — OFFICE VISIT (OUTPATIENT)
Dept: NEUROLOGY | Age: 64
End: 2019-02-11

## 2019-02-11 VITALS
HEIGHT: 69 IN | WEIGHT: 230 LBS | SYSTOLIC BLOOD PRESSURE: 116 MMHG | BODY MASS INDEX: 34.07 KG/M2 | OXYGEN SATURATION: 99 % | HEART RATE: 92 BPM | DIASTOLIC BLOOD PRESSURE: 78 MMHG | RESPIRATION RATE: 20 BRPM

## 2019-02-11 DIAGNOSIS — G40.209 PARTIAL SYMPTOMATIC EPILEPSY WITH COMPLEX PARTIAL SEIZURES, NOT INTRACTABLE, WITHOUT STATUS EPILEPTICUS (HCC): Primary | ICD-10-CM
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
## 2019-03-01 RX ORDER — LEVETIRACETAM 500 MG/1
TABLET ORAL
Qty: 120 TAB | Refills: 0 | Status: SHIPPED | OUTPATIENT
Start: 2019-03-01 | End: 2019-04-04 | Stop reason: SDUPTHER

## 2019-03-31 ENCOUNTER — APPOINTMENT (OUTPATIENT)
Dept: CT IMAGING | Age: 64
End: 2019-03-31
Attending: EMERGENCY MEDICINE
Payer: MEDICARE

## 2019-03-31 ENCOUNTER — HOSPITAL ENCOUNTER (EMERGENCY)
Age: 64
Discharge: HOME HEALTH | End: 2019-03-31
Attending: EMERGENCY MEDICINE
Payer: MEDICARE

## 2019-03-31 VITALS
SYSTOLIC BLOOD PRESSURE: 149 MMHG | TEMPERATURE: 99.1 F | RESPIRATION RATE: 16 BRPM | HEART RATE: 94 BPM | OXYGEN SATURATION: 95 % | DIASTOLIC BLOOD PRESSURE: 75 MMHG

## 2019-03-31 DIAGNOSIS — N39.0 URINARY TRACT INFECTION WITHOUT HEMATURIA, SITE UNSPECIFIED: Primary | ICD-10-CM

## 2019-03-31 LAB
ALBUMIN SERPL-MCNC: 3.2 G/DL (ref 3.5–5)
ALBUMIN/GLOB SERPL: 0.9 {RATIO} (ref 1.1–2.2)
ALP SERPL-CCNC: 101 U/L (ref 45–117)
ALT SERPL-CCNC: 16 U/L (ref 12–78)
ANION GAP SERPL CALC-SCNC: 5 MMOL/L (ref 5–15)
APPEARANCE UR: ABNORMAL
AST SERPL-CCNC: 8 U/L (ref 15–37)
BACTERIA URNS QL MICRO: ABNORMAL /HPF
BASOPHILS # BLD: 0 K/UL (ref 0–0.1)
BASOPHILS NFR BLD: 0 % (ref 0–1)
BILIRUB SERPL-MCNC: 0.7 MG/DL (ref 0.2–1)
BILIRUB UR QL: NEGATIVE
BUN SERPL-MCNC: 17 MG/DL (ref 6–20)
BUN/CREAT SERPL: 12 (ref 12–20)
CALCIUM SERPL-MCNC: 8.3 MG/DL (ref 8.5–10.1)
CHLORIDE SERPL-SCNC: 105 MMOL/L (ref 97–108)
CO2 SERPL-SCNC: 27 MMOL/L (ref 21–32)
COLOR UR: ABNORMAL
COMMENT, HOLDF: NORMAL
CREAT SERPL-MCNC: 1.44 MG/DL (ref 0.7–1.3)
DIFFERENTIAL METHOD BLD: ABNORMAL
EOSINOPHIL # BLD: 0.2 K/UL (ref 0–0.4)
EOSINOPHIL NFR BLD: 2 % (ref 0–7)
EPITH CASTS URNS QL MICRO: ABNORMAL /LPF
ERYTHROCYTE [DISTWIDTH] IN BLOOD BY AUTOMATED COUNT: 15.2 % (ref 11.5–14.5)
GLOBULIN SER CALC-MCNC: 3.7 G/DL (ref 2–4)
GLUCOSE SERPL-MCNC: 137 MG/DL (ref 65–100)
GLUCOSE UR STRIP.AUTO-MCNC: NEGATIVE MG/DL
HCT VFR BLD AUTO: 41.3 % (ref 36.6–50.3)
HGB BLD-MCNC: 12.7 G/DL (ref 12.1–17)
HGB UR QL STRIP: ABNORMAL
HYALINE CASTS URNS QL MICRO: ABNORMAL /LPF (ref 0–5)
IMM GRANULOCYTES # BLD AUTO: 0.1 K/UL (ref 0–0.04)
IMM GRANULOCYTES NFR BLD AUTO: 0 % (ref 0–0.5)
KETONES UR QL STRIP.AUTO: NEGATIVE MG/DL
LEUKOCYTE ESTERASE UR QL STRIP.AUTO: ABNORMAL
LYMPHOCYTES # BLD: 0.8 K/UL (ref 0.8–3.5)
LYMPHOCYTES NFR BLD: 7 % (ref 12–49)
MCH RBC QN AUTO: 23.3 PG (ref 26–34)
MCHC RBC AUTO-ENTMCNC: 30.8 G/DL (ref 30–36.5)
MCV RBC AUTO: 75.8 FL (ref 80–99)
MONOCYTES # BLD: 0.4 K/UL (ref 0–1)
MONOCYTES NFR BLD: 4 % (ref 5–13)
NEUTS SEG # BLD: 10.8 K/UL (ref 1.8–8)
NEUTS SEG NFR BLD: 87 % (ref 32–75)
NITRITE UR QL STRIP.AUTO: POSITIVE
NRBC # BLD: 0 K/UL (ref 0–0.01)
NRBC BLD-RTO: 0 PER 100 WBC
PH UR STRIP: 6 [PH] (ref 5–8)
PLATELET # BLD AUTO: 158 K/UL (ref 150–400)
PMV BLD AUTO: 11.6 FL (ref 8.9–12.9)
POTASSIUM SERPL-SCNC: 3.4 MMOL/L (ref 3.5–5.1)
PROT SERPL-MCNC: 6.9 G/DL (ref 6.4–8.2)
PROT UR STRIP-MCNC: 100 MG/DL
RBC # BLD AUTO: 5.45 M/UL (ref 4.1–5.7)
RBC #/AREA URNS HPF: ABNORMAL /HPF (ref 0–5)
SAMPLES BEING HELD,HOLD: NORMAL
SODIUM SERPL-SCNC: 137 MMOL/L (ref 136–145)
SP GR UR REFRACTOMETRY: 1.01 (ref 1–1.03)
UR CULT HOLD, URHOLD: NORMAL
UROBILINOGEN UR QL STRIP.AUTO: 2 EU/DL (ref 0.2–1)
WBC # BLD AUTO: 12.4 K/UL (ref 4.1–11.1)
WBC URNS QL MICRO: ABNORMAL /HPF (ref 0–4)

## 2019-03-31 PROCEDURE — 74011000258 HC RX REV CODE- 258: Performed by: EMERGENCY MEDICINE

## 2019-03-31 PROCEDURE — 36415 COLL VENOUS BLD VENIPUNCTURE: CPT

## 2019-03-31 PROCEDURE — 96365 THER/PROPH/DIAG IV INF INIT: CPT

## 2019-03-31 PROCEDURE — 99284 EMERGENCY DEPT VISIT MOD MDM: CPT

## 2019-03-31 PROCEDURE — 74176 CT ABD & PELVIS W/O CONTRAST: CPT

## 2019-03-31 PROCEDURE — 81001 URINALYSIS AUTO W/SCOPE: CPT

## 2019-03-31 PROCEDURE — 74011250636 HC RX REV CODE- 250/636: Performed by: EMERGENCY MEDICINE

## 2019-03-31 PROCEDURE — 51798 US URINE CAPACITY MEASURE: CPT

## 2019-03-31 PROCEDURE — 87186 SC STD MICRODIL/AGAR DIL: CPT

## 2019-03-31 PROCEDURE — 87086 URINE CULTURE/COLONY COUNT: CPT

## 2019-03-31 PROCEDURE — 87077 CULTURE AEROBIC IDENTIFY: CPT

## 2019-03-31 PROCEDURE — 96375 TX/PRO/DX INJ NEW DRUG ADDON: CPT

## 2019-03-31 PROCEDURE — 85025 COMPLETE CBC W/AUTO DIFF WBC: CPT

## 2019-03-31 PROCEDURE — 51701 INSERT BLADDER CATHETER: CPT

## 2019-03-31 PROCEDURE — 77030005563 HC CATH URETH INT MMGH -A

## 2019-03-31 PROCEDURE — 80053 COMPREHEN METABOLIC PANEL: CPT

## 2019-03-31 RX ORDER — CEPHALEXIN 500 MG/1
500 CAPSULE ORAL 4 TIMES DAILY
Qty: 28 CAP | Refills: 0 | Status: SHIPPED | OUTPATIENT
Start: 2019-03-31 | End: 2019-04-07

## 2019-03-31 RX ORDER — MORPHINE SULFATE 2 MG/ML
4 INJECTION, SOLUTION INTRAMUSCULAR; INTRAVENOUS ONCE
Status: COMPLETED | OUTPATIENT
Start: 2019-03-31 | End: 2019-03-31

## 2019-03-31 RX ORDER — ONDANSETRON 2 MG/ML
4 INJECTION INTRAMUSCULAR; INTRAVENOUS
Status: COMPLETED | OUTPATIENT
Start: 2019-03-31 | End: 2019-03-31

## 2019-03-31 RX ADMIN — MORPHINE SULFATE 4 MG: 2 INJECTION, SOLUTION INTRAMUSCULAR; INTRAVENOUS at 06:56

## 2019-03-31 RX ADMIN — SODIUM CHLORIDE 1000 ML: 900 INJECTION, SOLUTION INTRAVENOUS at 08:34

## 2019-03-31 RX ADMIN — ONDANSETRON 4 MG: 2 INJECTION INTRAMUSCULAR; INTRAVENOUS at 06:55

## 2019-03-31 RX ADMIN — CEFTRIAXONE 1 G: 1 INJECTION, POWDER, FOR SOLUTION INTRAMUSCULAR; INTRAVENOUS at 08:34

## 2019-03-31 NOTE — ED NOTES
Bedside and Verbal shift change report given to Dandre Moran RN (oncoming nurse) by Solange Darnell RN (offgoing nurse). Report included the following information SBAR, ED Summary, MAR and Recent Results.

## 2019-03-31 NOTE — ED NOTES
Bedside and Verbal shift change report given to Nora Crabtree RN (oncoming nurse) by Leticia Wharton RN (offgoing nurse). Report included the following information SBAR, ED Summary, MAR and Recent Results.

## 2019-03-31 NOTE — ED NOTES
MD notified of pt not being able to urinate. Orders received for bladder scan. Pt has 220 mLs of urine per bladder scanner.
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
## 2019-03-31 NOTE — ED TRIAGE NOTES
Pt arrives from home with c/o of RIGHT flank pain difficulty urinating since Friday night. Pt increased water intake and cranberry pills with minimal relief. Denies blood in urine or painful urination. Pt takes flomax BID.

## 2019-04-02 LAB
BACTERIA SPEC CULT: ABNORMAL
CC UR VC: ABNORMAL
SERVICE CMNT-IMP: ABNORMAL

## 2019-04-05 ENCOUNTER — HOSPITAL ENCOUNTER (EMERGENCY)
Age: 64
Discharge: HOME | End: 2019-04-05
Attending: EMERGENCY MEDICINE
Payer: MEDICARE

## 2019-04-05 VITALS
TEMPERATURE: 98.5 F | RESPIRATION RATE: 16 BRPM | OXYGEN SATURATION: 99 % | DIASTOLIC BLOOD PRESSURE: 73 MMHG | SYSTOLIC BLOOD PRESSURE: 135 MMHG | HEART RATE: 73 BPM

## 2019-04-05 DIAGNOSIS — R30.0 DYSURIA: Primary | ICD-10-CM

## 2019-04-05 LAB
ALBUMIN SERPL-MCNC: 2.6 G/DL (ref 3.5–5)
ALBUMIN/GLOB SERPL: 0.6 {RATIO} (ref 1.1–2.2)
ALP SERPL-CCNC: 73 U/L (ref 45–117)
ALT SERPL-CCNC: 20 U/L (ref 12–78)
ANION GAP SERPL CALC-SCNC: 7 MMOL/L (ref 5–15)
APPEARANCE UR: CLEAR
AST SERPL-CCNC: 25 U/L (ref 15–37)
BACTERIA URNS QL MICRO: NEGATIVE /HPF
BASOPHILS # BLD: 0 K/UL (ref 0–0.1)
BASOPHILS NFR BLD: 0 % (ref 0–1)
BILIRUB SERPL-MCNC: 0.5 MG/DL (ref 0.2–1)
BILIRUB UR QL CFM: NEGATIVE
BUN SERPL-MCNC: 20 MG/DL (ref 6–20)
BUN/CREAT SERPL: 14 (ref 12–20)
CALCIUM SERPL-MCNC: 8.2 MG/DL (ref 8.5–10.1)
CHLORIDE SERPL-SCNC: 103 MMOL/L (ref 97–108)
CO2 SERPL-SCNC: 27 MMOL/L (ref 21–32)
COLOR UR: ABNORMAL
COMMENT, HOLDF: NORMAL
CREAT SERPL-MCNC: 1.41 MG/DL (ref 0.7–1.3)
DIFFERENTIAL METHOD BLD: ABNORMAL
EOSINOPHIL # BLD: 0.1 K/UL (ref 0–0.4)
EOSINOPHIL NFR BLD: 2 % (ref 0–7)
EPITH CASTS URNS QL MICRO: ABNORMAL /LPF
ERYTHROCYTE [DISTWIDTH] IN BLOOD BY AUTOMATED COUNT: 15.5 % (ref 11.5–14.5)
GLOBULIN SER CALC-MCNC: 4.2 G/DL (ref 2–4)
GLUCOSE SERPL-MCNC: 168 MG/DL (ref 65–100)
GLUCOSE UR STRIP.AUTO-MCNC: NEGATIVE MG/DL
HCT VFR BLD AUTO: 38.5 % (ref 36.6–50.3)
HGB BLD-MCNC: 12.1 G/DL (ref 12.1–17)
HGB UR QL STRIP: NEGATIVE
IMM GRANULOCYTES # BLD AUTO: 0 K/UL (ref 0–0.04)
IMM GRANULOCYTES NFR BLD AUTO: 0 % (ref 0–0.5)
KETONES UR QL STRIP.AUTO: NEGATIVE MG/DL
LEUKOCYTE ESTERASE UR QL STRIP.AUTO: NEGATIVE
LYMPHOCYTES # BLD: 1.3 K/UL (ref 0.8–3.5)
LYMPHOCYTES NFR BLD: 16 % (ref 12–49)
MCH RBC QN AUTO: 23.4 PG (ref 26–34)
MCHC RBC AUTO-ENTMCNC: 31.4 G/DL (ref 30–36.5)
MCV RBC AUTO: 74.6 FL (ref 80–99)
MONOCYTES # BLD: 0.5 K/UL (ref 0–1)
MONOCYTES NFR BLD: 6 % (ref 5–13)
NEUTS SEG # BLD: 6.5 K/UL (ref 1.8–8)
NEUTS SEG NFR BLD: 76 % (ref 32–75)
NITRITE UR QL STRIP.AUTO: NEGATIVE
NRBC # BLD: 0 K/UL (ref 0–0.01)
NRBC BLD-RTO: 0 PER 100 WBC
PH UR STRIP: 6.5 [PH] (ref 5–8)
PLATELET # BLD AUTO: 184 K/UL (ref 150–400)
PMV BLD AUTO: 11.7 FL (ref 8.9–12.9)
POTASSIUM SERPL-SCNC: 3.1 MMOL/L (ref 3.5–5.1)
PROT SERPL-MCNC: 6.8 G/DL (ref 6.4–8.2)
PROT UR STRIP-MCNC: 100 MG/DL
RBC # BLD AUTO: 5.16 M/UL (ref 4.1–5.7)
RBC #/AREA URNS HPF: ABNORMAL /HPF (ref 0–5)
SAMPLES BEING HELD,HOLD: NORMAL
SODIUM SERPL-SCNC: 137 MMOL/L (ref 136–145)
SP GR UR REFRACTOMETRY: 1.02 (ref 1–1.03)
UR CULT HOLD, URHOLD: NORMAL
UROBILINOGEN UR QL STRIP.AUTO: 2 EU/DL (ref 0.2–1)
WBC # BLD AUTO: 8.4 K/UL (ref 4.1–11.1)
WBC URNS QL MICRO: ABNORMAL /HPF (ref 0–4)

## 2019-04-05 PROCEDURE — 85025 COMPLETE CBC W/AUTO DIFF WBC: CPT

## 2019-04-05 PROCEDURE — 99283 EMERGENCY DEPT VISIT LOW MDM: CPT

## 2019-04-05 PROCEDURE — 80053 COMPREHEN METABOLIC PANEL: CPT

## 2019-04-05 PROCEDURE — 81001 URINALYSIS AUTO W/SCOPE: CPT

## 2019-04-05 PROCEDURE — 36415 COLL VENOUS BLD VENIPUNCTURE: CPT

## 2019-04-05 RX ORDER — PHENAZOPYRIDINE HYDROCHLORIDE 200 MG/1
200 TABLET, FILM COATED ORAL
Qty: 6 TAB | Refills: 0 | Status: SHIPPED | OUTPATIENT
Start: 2019-04-05 | End: 2019-04-07

## 2019-04-05 NOTE — ED TRIAGE NOTES
Pt presents with pain and difficulty urinating. Pt was seen on Monday for same issue and was given keflex. Pt states it has not helped, he is still in pain and have difficulty urinating. Pt states when he does go it burns.
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
## 2019-04-06 NOTE — DISCHARGE INSTRUCTIONS
Patient Education        Painful Urination (Dysuria): Care Instructions  Your Care Instructions  Burning pain with urination (dysuria) is a common symptom of a urinary tract infection or other urinary problems. The bladder may become inflamed. This can cause pain when the bladder fills and empties. You may also feel pain if the tube that carries urine from the bladder to the outside of the body (urethra) gets irritated or infected. Sexually transmitted infections (STIs) also may cause pain when you urinate. Sometimes the pain can be caused by things other than an infection. The urethra can be irritated by soaps, perfumes, or foreign objects in the urethra. Kidney stones can cause pain when they pass through the urethra. The cause may be hard to find. You may need tests. Treatment for painful urination depends on the cause. Follow-up care is a key part of your treatment and safety. Be sure to make and go to all appointments, and call your doctor if you are having problems. It's also a good idea to know your test results and keep a list of the medicines you take. How can you care for yourself at home? · Drink extra water for the next day or two. This will help make the urine less concentrated. (If you have kidney, heart, or liver disease and have to limit fluids, talk with your doctor before you increase the amount of fluids you drink.)  · Avoid drinks that are carbonated or have caffeine. They can irritate the bladder. · Urinate often. Try to empty your bladder each time. For women:  · Urinate right after you have sex. · After going to the bathroom, wipe from front to back. · Avoid douches, bubble baths, and feminine hygiene sprays. And avoid other feminine hygiene products that have deodorants. When should you call for help? Call your doctor now or seek immediate medical care if:    · You have new symptoms, such as fever, nausea, or vomiting.     · You have new or worse symptoms of a urinary problem. For example:  ? You have blood or pus in your urine. ? You have chills or body aches. ? It hurts worse to urinate. ? You have groin or belly pain. ? You have pain in your back just below your rib cage (the flank area).    Watch closely for changes in your health, and be sure to contact your doctor if you have any problems. Where can you learn more? Go to http://gordo-melinda.info/. Enter M400 in the search box to learn more about \"Painful Urination (Dysuria): Care Instructions. \"  Current as of: March 20, 2018  Content Version: 11.9  © 6982-4282 [employer]. Care instructions adapted under license by [employer] (which disclaims liability or warranty for this information). If you have questions about a medical condition or this instruction, always ask your healthcare professional. [place] [address] any warranty or liability for your use of this information.

## 2019-04-06 NOTE — ED NOTES
MD reviewed discharge instructions and options with patient and patient verbalized understanding. RN reviewed discharge instructions using teachback method. Pt ambulated to exit without difficulty and in no signs of acute distress escorted by wife, and she  will drive home. No complaints or needs expressed at this time. Patient was counseled on medications prescribed at discharge. VSS, verbalized relief from most intense pain. Patient to call [facility] in the morning for appointment.

## 2019-04-08 ENCOUNTER — TELEPHONE (OUTPATIENT)
Dept: CASE MANAGEMENT | Age: 64
End: 2019-04-08

## 2019-04-08 RX ORDER — LEVETIRACETAM 500 MG/1
TABLET ORAL
Qty: 120 TAB | Refills: 0 | Status: SHIPPED | OUTPATIENT
Start: 2019-04-08 | End: 2019-05-05 | Stop reason: SDUPTHER

## 2019-05-06 RX ORDER — LEVETIRACETAM 500 MG/1
TABLET ORAL
Qty: 120 TAB | Refills: 0 | Status: SHIPPED | OUTPATIENT
Start: 2019-05-06 | End: 2019-06-03 | Stop reason: SDUPTHER

## 2019-06-13 ENCOUNTER — HOSPITAL ENCOUNTER (INPATIENT)
Age: 64
LOS: 4 days | Discharge: HOME HEALTH | DRG: 293 | End: 2019-06-17
Attending: STUDENT IN AN ORGANIZED HEALTH CARE EDUCATION/TRAINING PROGRAM | Admitting: INTERNAL MEDICINE
Payer: MEDICARE

## 2019-06-13 ENCOUNTER — APPOINTMENT (OUTPATIENT)
Dept: GENERAL RADIOLOGY | Age: 64
DRG: 293 | End: 2019-06-13
Attending: STUDENT IN AN ORGANIZED HEALTH CARE EDUCATION/TRAINING PROGRAM
Payer: MEDICARE

## 2019-06-13 DIAGNOSIS — I50.23 ACUTE ON CHRONIC SYSTOLIC CONGESTIVE HEART FAILURE (HCC): Primary | ICD-10-CM

## 2019-06-13 PROBLEM — I50.9 CHF EXACERBATION (HCC): Status: ACTIVE | Noted: 2019-06-13

## 2019-06-13 LAB
ALBUMIN SERPL-MCNC: 2.8 G/DL (ref 3.5–5)
ALBUMIN/GLOB SERPL: 0.7 {RATIO} (ref 1.1–2.2)
ALP SERPL-CCNC: 119 U/L (ref 45–117)
ALT SERPL-CCNC: 13 U/L (ref 12–78)
ANION GAP SERPL CALC-SCNC: 7 MMOL/L (ref 5–15)
AST SERPL-CCNC: 8 U/L (ref 15–37)
BASOPHILS # BLD: 0 K/UL (ref 0–0.1)
BASOPHILS NFR BLD: 0 % (ref 0–1)
BILIRUB SERPL-MCNC: 1 MG/DL (ref 0.2–1)
BNP SERPL-MCNC: 4786 PG/ML
BUN SERPL-MCNC: 18 MG/DL (ref 6–20)
BUN/CREAT SERPL: 14 (ref 12–20)
CALCIUM SERPL-MCNC: 8.1 MG/DL (ref 8.5–10.1)
CHLORIDE SERPL-SCNC: 106 MMOL/L (ref 97–108)
CO2 SERPL-SCNC: 26 MMOL/L (ref 21–32)
COMMENT, HOLDF: NORMAL
CREAT SERPL-MCNC: 1.3 MG/DL (ref 0.7–1.3)
DIFFERENTIAL METHOD BLD: ABNORMAL
EOSINOPHIL # BLD: 0.2 K/UL (ref 0–0.4)
EOSINOPHIL NFR BLD: 3 % (ref 0–7)
ERYTHROCYTE [DISTWIDTH] IN BLOOD BY AUTOMATED COUNT: 16.4 % (ref 11.5–14.5)
GLOBULIN SER CALC-MCNC: 3.8 G/DL (ref 2–4)
GLUCOSE SERPL-MCNC: 95 MG/DL (ref 65–100)
HCT VFR BLD AUTO: 39.3 % (ref 36.6–50.3)
HGB BLD-MCNC: 11.8 G/DL (ref 12.1–17)
IMM GRANULOCYTES # BLD AUTO: 0 K/UL (ref 0–0.04)
IMM GRANULOCYTES NFR BLD AUTO: 1 % (ref 0–0.5)
LYMPHOCYTES # BLD: 1.6 K/UL (ref 0.8–3.5)
LYMPHOCYTES NFR BLD: 20 % (ref 12–49)
MCH RBC QN AUTO: 21.6 PG (ref 26–34)
MCHC RBC AUTO-ENTMCNC: 30 G/DL (ref 30–36.5)
MCV RBC AUTO: 72 FL (ref 80–99)
MONOCYTES # BLD: 0.4 K/UL (ref 0–1)
MONOCYTES NFR BLD: 5 % (ref 5–13)
NEUTS SEG # BLD: 5.8 K/UL (ref 1.8–8)
NEUTS SEG NFR BLD: 72 % (ref 32–75)
NRBC # BLD: 0 K/UL (ref 0–0.01)
NRBC BLD-RTO: 0 PER 100 WBC
PLATELET # BLD AUTO: 158 K/UL (ref 150–400)
POTASSIUM SERPL-SCNC: 3.2 MMOL/L (ref 3.5–5.1)
PROT SERPL-MCNC: 6.6 G/DL (ref 6.4–8.2)
RBC # BLD AUTO: 5.46 M/UL (ref 4.1–5.7)
SAMPLES BEING HELD,HOLD: NORMAL
SODIUM SERPL-SCNC: 139 MMOL/L (ref 136–145)
TROPONIN I SERPL-MCNC: <0.05 NG/ML
WBC # BLD AUTO: 8.1 K/UL (ref 4.1–11.1)

## 2019-06-13 PROCEDURE — 93005 ELECTROCARDIOGRAM TRACING: CPT

## 2019-06-13 PROCEDURE — 74011250637 HC RX REV CODE- 250/637: Performed by: STUDENT IN AN ORGANIZED HEALTH CARE EDUCATION/TRAINING PROGRAM

## 2019-06-13 PROCEDURE — 74011250636 HC RX REV CODE- 250/636: Performed by: STUDENT IN AN ORGANIZED HEALTH CARE EDUCATION/TRAINING PROGRAM

## 2019-06-13 PROCEDURE — 71046 X-RAY EXAM CHEST 2 VIEWS: CPT

## 2019-06-13 PROCEDURE — 96374 THER/PROPH/DIAG INJ IV PUSH: CPT

## 2019-06-13 PROCEDURE — 94761 N-INVAS EAR/PLS OXIMETRY MLT: CPT

## 2019-06-13 PROCEDURE — 84484 ASSAY OF TROPONIN QUANT: CPT

## 2019-06-13 PROCEDURE — 99285 EMERGENCY DEPT VISIT HI MDM: CPT

## 2019-06-13 PROCEDURE — 83880 ASSAY OF NATRIURETIC PEPTIDE: CPT

## 2019-06-13 PROCEDURE — 65660000000 HC RM CCU STEPDOWN

## 2019-06-13 PROCEDURE — 80053 COMPREHEN METABOLIC PANEL: CPT

## 2019-06-13 PROCEDURE — 85025 COMPLETE CBC W/AUTO DIFF WBC: CPT

## 2019-06-13 PROCEDURE — 36415 COLL VENOUS BLD VENIPUNCTURE: CPT

## 2019-06-13 RX ORDER — FUROSEMIDE 10 MG/ML
40 INJECTION INTRAMUSCULAR; INTRAVENOUS EVERY 12 HOURS
Status: DISPENSED | OUTPATIENT
Start: 2019-06-13 | End: 2019-06-15

## 2019-06-13 RX ORDER — HEPARIN SODIUM 5000 [USP'U]/ML
5000 INJECTION, SOLUTION INTRAVENOUS; SUBCUTANEOUS EVERY 8 HOURS
Status: DISCONTINUED | OUTPATIENT
Start: 2019-06-13 | End: 2019-06-17 | Stop reason: HOSPADM

## 2019-06-13 RX ORDER — ALPRAZOLAM 1 MG/1
1 TABLET ORAL 2 TIMES DAILY
Status: DISCONTINUED | OUTPATIENT
Start: 2019-06-13 | End: 2019-06-15

## 2019-06-13 RX ORDER — CLOPIDOGREL BISULFATE 75 MG/1
75 TABLET ORAL DAILY
Status: DISCONTINUED | OUTPATIENT
Start: 2019-06-14 | End: 2019-06-17 | Stop reason: HOSPADM

## 2019-06-13 RX ORDER — FINASTERIDE 5 MG/1
5 TABLET, FILM COATED ORAL
COMMUNITY

## 2019-06-13 RX ORDER — FLUOXETINE HYDROCHLORIDE 20 MG/1
20 CAPSULE ORAL DAILY
Status: DISCONTINUED | OUTPATIENT
Start: 2019-06-14 | End: 2019-06-17 | Stop reason: HOSPADM

## 2019-06-13 RX ORDER — QUETIAPINE FUMARATE 25 MG/1
25 TABLET, FILM COATED ORAL
Status: DISCONTINUED | OUTPATIENT
Start: 2019-06-13 | End: 2019-06-17 | Stop reason: HOSPADM

## 2019-06-13 RX ORDER — LEVETIRACETAM 500 MG/1
500 TABLET ORAL 2 TIMES DAILY
Status: DISCONTINUED | OUTPATIENT
Start: 2019-06-13 | End: 2019-06-14

## 2019-06-13 RX ORDER — GUAIFENESIN 100 MG/5ML
81 LIQUID (ML) ORAL DAILY
Status: DISCONTINUED | OUTPATIENT
Start: 2019-06-14 | End: 2019-06-17 | Stop reason: HOSPADM

## 2019-06-13 RX ORDER — VENLAFAXINE HYDROCHLORIDE 37.5 MG/1
150 CAPSULE, EXTENDED RELEASE ORAL
Status: DISCONTINUED | OUTPATIENT
Start: 2019-06-14 | End: 2019-06-17 | Stop reason: HOSPADM

## 2019-06-13 RX ORDER — ISOSORBIDE MONONITRATE 30 MG/1
30 TABLET, EXTENDED RELEASE ORAL DAILY
Status: DISCONTINUED | OUTPATIENT
Start: 2019-06-14 | End: 2019-06-17 | Stop reason: HOSPADM

## 2019-06-13 RX ORDER — PREGABALIN 50 MG/1
50 CAPSULE ORAL 3 TIMES DAILY
Status: DISCONTINUED | OUTPATIENT
Start: 2019-06-13 | End: 2019-06-17 | Stop reason: HOSPADM

## 2019-06-13 RX ORDER — LISINOPRIL 10 MG/1
10 TABLET ORAL DAILY
Status: DISCONTINUED | OUTPATIENT
Start: 2019-06-14 | End: 2019-06-17 | Stop reason: HOSPADM

## 2019-06-13 RX ORDER — FAMOTIDINE 20 MG/1
20 TABLET, FILM COATED ORAL 2 TIMES DAILY
Status: DISCONTINUED | OUTPATIENT
Start: 2019-06-14 | End: 2019-06-14

## 2019-06-13 RX ORDER — PANTOPRAZOLE SODIUM 40 MG/1
40 TABLET, DELAYED RELEASE ORAL
Status: DISCONTINUED | OUTPATIENT
Start: 2019-06-14 | End: 2019-06-17 | Stop reason: HOSPADM

## 2019-06-13 RX ORDER — TAMSULOSIN HYDROCHLORIDE 0.4 MG/1
0.4 CAPSULE ORAL
Status: DISCONTINUED | OUTPATIENT
Start: 2019-06-14 | End: 2019-06-17 | Stop reason: HOSPADM

## 2019-06-13 RX ORDER — EPLERENONE 25 MG/1
25 TABLET, FILM COATED ORAL DAILY
COMMUNITY
End: 2019-06-17

## 2019-06-13 RX ORDER — AMLODIPINE BESYLATE 5 MG/1
2.5 TABLET ORAL DAILY
Status: DISCONTINUED | OUTPATIENT
Start: 2019-06-14 | End: 2019-06-17 | Stop reason: HOSPADM

## 2019-06-13 RX ORDER — POTASSIUM CHLORIDE 1.5 G/1.77G
40 POWDER, FOR SOLUTION ORAL
Status: COMPLETED | OUTPATIENT
Start: 2019-06-13 | End: 2019-06-13

## 2019-06-13 RX ORDER — FUROSEMIDE 10 MG/ML
40 INJECTION INTRAMUSCULAR; INTRAVENOUS
Status: COMPLETED | OUTPATIENT
Start: 2019-06-13 | End: 2019-06-13

## 2019-06-13 RX ORDER — NITROGLYCERIN 0.4 MG/1
0.4 TABLET SUBLINGUAL
Status: DISCONTINUED | OUTPATIENT
Start: 2019-06-13 | End: 2019-06-17 | Stop reason: HOSPADM

## 2019-06-13 RX ADMIN — FUROSEMIDE 40 MG: 10 INJECTION, SOLUTION INTRAMUSCULAR; INTRAVENOUS at 20:28

## 2019-06-13 RX ADMIN — POTASSIUM CHLORIDE 40 MEQ: 1.5 POWDER, FOR SOLUTION ORAL at 20:28
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
## 2019-06-13 NOTE — ED TRIAGE NOTES
Pt reports feeling increasingly SOB for the last few days. Pt extremely winded after ambulating and getting into bed with a cane. Wears cpap at night. A&ox3, on RA at baseline.

## 2019-06-14 ENCOUNTER — APPOINTMENT (OUTPATIENT)
Dept: NON INVASIVE DIAGNOSTICS | Age: 64
DRG: 293 | End: 2019-06-14
Attending: INTERNAL MEDICINE
Payer: MEDICARE

## 2019-06-14 LAB
ATRIAL RATE: 82 BPM
CALCULATED P AXIS, ECG09: 71 DEGREES
CALCULATED R AXIS, ECG10: 42 DEGREES
CALCULATED T AXIS, ECG11: 142 DEGREES
DIAGNOSIS, 93000: NORMAL
ECHO AO ROOT DIAM: 3.6 CM
ECHO LA MAJOR AXIS: 6.36 CM
ECHO LA TO AORTIC ROOT RATIO: 1.77
ECHO LV E' LATERAL VELOCITY: 5.02 CM/S
ECHO LV E' SEPTAL VELOCITY: 3.63 CM/S
ECHO LV INTERNAL DIMENSION DIASTOLIC: 6.32 CM (ref 4.2–5.9)
ECHO LV INTERNAL DIMENSION SYSTOLIC: 5.7 CM
ECHO LV IVSD: 1 CM (ref 0.6–1)
ECHO LV MASS 2D: 322.4 G (ref 88–224)
ECHO LV MASS INDEX 2D: 158.8 G/M2 (ref 49–115)
ECHO LV POSTERIOR WALL DIASTOLIC: 1 CM (ref 0.6–1)
ECHO MV A VELOCITY: 80.11 CM/S
ECHO MV AREA PHT: 3.9 CM2
ECHO MV E DECELERATION TIME (DT): 194.3 MS
ECHO MV E VELOCITY: 84.78 CM/S
ECHO MV E/A RATIO: 1.06
ECHO MV E/E' LATERAL: 16.89
ECHO MV E/E' RATIO (AVERAGED): 20.12
ECHO MV E/E' SEPTAL: 23.36
ECHO MV PRESSURE HALF TIME (PHT): 56.4 MS
ECHO MV REGURGITANT RADIUS PISA: 0.51 CM
ECHO RV INTERNAL DIMENSION: 4.06 CM
ERYTHROCYTE [DISTWIDTH] IN BLOOD BY AUTOMATED COUNT: 16.3 % (ref 11.5–14.5)
GLUCOSE BLD STRIP.AUTO-MCNC: 109 MG/DL (ref 65–100)
HCT VFR BLD AUTO: 39.2 % (ref 36.6–50.3)
HGB BLD-MCNC: 12 G/DL (ref 12.1–17)
MCH RBC QN AUTO: 21.8 PG (ref 26–34)
MCHC RBC AUTO-ENTMCNC: 30.6 G/DL (ref 30–36.5)
MCV RBC AUTO: 71.1 FL (ref 80–99)
NRBC # BLD: 0 K/UL (ref 0–0.01)
NRBC BLD-RTO: 0 PER 100 WBC
P-R INTERVAL, ECG05: 156 MS
PLATELET # BLD AUTO: 161 K/UL (ref 150–400)
PMV BLD AUTO: 11.1 FL (ref 8.9–12.9)
Q-T INTERVAL, ECG07: 432 MS
QRS DURATION, ECG06: 122 MS
QTC CALCULATION (BEZET), ECG08: 504 MS
RBC # BLD AUTO: 5.51 M/UL (ref 4.1–5.7)
SERVICE CMNT-IMP: ABNORMAL
TROPONIN I SERPL-MCNC: <0.05 NG/ML
TROPONIN I SERPL-MCNC: <0.05 NG/ML
VENTRICULAR RATE, ECG03: 82 BPM
WBC # BLD AUTO: 7.3 K/UL (ref 4.1–11.1)

## 2019-06-14 PROCEDURE — 74011250637 HC RX REV CODE- 250/637: Performed by: INTERNAL MEDICINE

## 2019-06-14 PROCEDURE — 77030012939 HC DRSG HYDRCOIL BMS -A

## 2019-06-14 PROCEDURE — 74011250637 HC RX REV CODE- 250/637: Performed by: FAMILY MEDICINE

## 2019-06-14 PROCEDURE — 74011250636 HC RX REV CODE- 250/636: Performed by: INTERNAL MEDICINE

## 2019-06-14 PROCEDURE — 36415 COLL VENOUS BLD VENIPUNCTURE: CPT

## 2019-06-14 PROCEDURE — 84484 ASSAY OF TROPONIN QUANT: CPT

## 2019-06-14 PROCEDURE — 0399T ECHO ADULT COMPLETE: CPT

## 2019-06-14 PROCEDURE — 82962 GLUCOSE BLOOD TEST: CPT

## 2019-06-14 PROCEDURE — 94760 N-INVAS EAR/PLS OXIMETRY 1: CPT

## 2019-06-14 PROCEDURE — 65660000000 HC RM CCU STEPDOWN

## 2019-06-14 PROCEDURE — 77030027138 HC INCENT SPIROMETER -A

## 2019-06-14 PROCEDURE — 77030011256 HC DRSG MEPILEX <16IN NO BORD MOLN -A

## 2019-06-14 PROCEDURE — 85027 COMPLETE CBC AUTOMATED: CPT

## 2019-06-14 RX ORDER — CARVEDILOL 12.5 MG/1
12.5 TABLET ORAL 2 TIMES DAILY WITH MEALS
Status: DISCONTINUED | OUTPATIENT
Start: 2019-06-14 | End: 2019-06-17

## 2019-06-14 RX ORDER — LEVETIRACETAM 500 MG/1
1000 TABLET ORAL 2 TIMES DAILY
Status: DISCONTINUED | OUTPATIENT
Start: 2019-06-14 | End: 2019-06-17 | Stop reason: HOSPADM

## 2019-06-14 RX ORDER — CARVEDILOL 12.5 MG/1
12.5 TABLET ORAL 2 TIMES DAILY WITH MEALS
COMMUNITY
End: 2019-06-17

## 2019-06-14 RX ADMIN — LEVETIRACETAM 500 MG: 500 TABLET ORAL at 09:43

## 2019-06-14 RX ADMIN — QUETIAPINE FUMARATE 25 MG: 25 TABLET ORAL at 00:05

## 2019-06-14 RX ADMIN — CARVEDILOL 9.38 MG: 6.25 TABLET, FILM COATED ORAL at 09:43

## 2019-06-14 RX ADMIN — PANTOPRAZOLE SODIUM 40 MG: 40 TABLET, DELAYED RELEASE ORAL at 06:45

## 2019-06-14 RX ADMIN — CARVEDILOL 12.5 MG: 12.5 TABLET, FILM COATED ORAL at 15:58

## 2019-06-14 RX ADMIN — TAMSULOSIN HYDROCHLORIDE 0.4 MG: 0.4 CAPSULE ORAL at 15:58

## 2019-06-14 RX ADMIN — RIFAXIMIN 550 MG: 550 TABLET ORAL at 18:32

## 2019-06-14 RX ADMIN — LEVETIRACETAM 1000 MG: 500 TABLET ORAL at 22:21

## 2019-06-14 RX ADMIN — LACTULOSE 45 ML: 20 SOLUTION ORAL at 09:42

## 2019-06-14 RX ADMIN — HEPARIN SODIUM 5000 UNITS: 5000 INJECTION INTRAVENOUS; SUBCUTANEOUS at 22:21

## 2019-06-14 RX ADMIN — PREGABALIN 50 MG: 50 CAPSULE ORAL at 15:58

## 2019-06-14 RX ADMIN — LISINOPRIL 10 MG: 10 TABLET ORAL at 09:43

## 2019-06-14 RX ADMIN — LACTULOSE 45 ML: 20 SOLUTION ORAL at 15:57

## 2019-06-14 RX ADMIN — FLUOXETINE 20 MG: 20 CAPSULE ORAL at 09:43

## 2019-06-14 RX ADMIN — HEPARIN SODIUM 5000 UNITS: 5000 INJECTION INTRAVENOUS; SUBCUTANEOUS at 00:05

## 2019-06-14 RX ADMIN — FAMOTIDINE 20 MG: 20 TABLET ORAL at 09:43

## 2019-06-14 RX ADMIN — RIFAXIMIN 550 MG: 550 TABLET ORAL at 00:05

## 2019-06-14 RX ADMIN — ISOSORBIDE MONONITRATE 30 MG: 30 TABLET ORAL at 09:43

## 2019-06-14 RX ADMIN — PREGABALIN 50 MG: 50 CAPSULE ORAL at 09:43

## 2019-06-14 RX ADMIN — FUROSEMIDE 40 MG: 10 INJECTION, SOLUTION INTRAMUSCULAR; INTRAVENOUS at 09:43

## 2019-06-14 RX ADMIN — RIFAXIMIN 550 MG: 550 TABLET ORAL at 09:43

## 2019-06-14 RX ADMIN — LACTULOSE 45 ML: 20 SOLUTION ORAL at 00:05

## 2019-06-14 RX ADMIN — ALPRAZOLAM 1 MG: 1 TABLET ORAL at 00:05

## 2019-06-14 RX ADMIN — TAMSULOSIN HYDROCHLORIDE 0.4 MG: 0.4 CAPSULE ORAL at 06:45

## 2019-06-14 RX ADMIN — PREGABALIN 50 MG: 50 CAPSULE ORAL at 00:05

## 2019-06-14 RX ADMIN — ALPRAZOLAM 1 MG: 1 TABLET ORAL at 09:43

## 2019-06-14 RX ADMIN — HEPARIN SODIUM 5000 UNITS: 5000 INJECTION INTRAVENOUS; SUBCUTANEOUS at 06:45

## 2019-06-14 RX ADMIN — HEPARIN SODIUM 5000 UNITS: 5000 INJECTION INTRAVENOUS; SUBCUTANEOUS at 15:58

## 2019-06-14 RX ADMIN — FUROSEMIDE 40 MG: 10 INJECTION, SOLUTION INTRAMUSCULAR; INTRAVENOUS at 22:21

## 2019-06-14 RX ADMIN — ASPIRIN 81 MG CHEWABLE TABLET 81 MG: 81 TABLET CHEWABLE at 09:43

## 2019-06-14 RX ADMIN — PANTOPRAZOLE SODIUM 40 MG: 40 TABLET, DELAYED RELEASE ORAL at 15:58

## 2019-06-14 RX ADMIN — AMLODIPINE BESYLATE 2.5 MG: 5 TABLET ORAL at 09:43

## 2019-06-14 RX ADMIN — PREGABALIN 50 MG: 50 CAPSULE ORAL at 22:21

## 2019-06-14 RX ADMIN — QUETIAPINE FUMARATE 25 MG: 25 TABLET ORAL at 22:21

## 2019-06-14 RX ADMIN — CLOPIDOGREL BISULFATE 75 MG: 75 TABLET, FILM COATED ORAL at 09:43

## 2019-06-14 RX ADMIN — LEVETIRACETAM 500 MG: 500 TABLET ORAL at 00:06

## 2019-06-14 RX ADMIN — VENLAFAXINE HYDROCHLORIDE 150 MG: 37.5 CAPSULE, EXTENDED RELEASE ORAL at 09:43

## 2019-06-14 NOTE — ROUTINE PROCESS
Primary Nurse Abdiaziz Moreno and Delta Haque RN performed a dual skin assessment on this patient Impairment noted- see wound doc flow sheet Iván score is 19 Pt has multiple scrapes on legs from dogs, scars on back and chest (healed) from previous surgeries and open wound on forehead.

## 2019-06-14 NOTE — NURSE NAVIGATOR
Chart reviewed by Heart Failure Nurse Navigator. Heart Failure database completed. EF:  40/45% (ef 1/2019), another echo currently pending     ACEi/ARB/ARNi: lisinopril 10 mg daily    BB: coreg 9.375 mg twice daily    Aldosterone Antagonist: eplerenone 25 mg daily    Obstructive Sleep Apnea Screening:   STOP-BANG score:   Referred to Sleep Medicine:     CRT: not currently indicated. NYHA Functional Class documentation requested. Heart Failure Teach Back in Patient Education. Heart Failure Avoiding Triggers on Discharge Instructions. Cardiologist: Dr. Suki Leon ([facility])    Post discharge follow up phone call to be made within 48-72 hours of discharge.       MEDICARE HF BUNDLE
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
## 2019-06-14 NOTE — PROGRESS NOTES
Hospitalist Progress Note    NAME: Trina Fernando. :  1955   MRN:  [id number]       Assessment / Plan:  ASSESSMENT:  1. Shortness of breath. Hypokalemia. Improved, hypokalemia better, will replace   2. Acute on chronic systolic congestive heart failure. Diuretics, daily BMP, no need for ischemic work up cardiology seen and evaluated ECHO pending   3. Coronary artery disease, status post coronary artery bypass graft x2 and percutaneous transluminal coronary angioplasty with stents. 4.  Hypertension. BP stable continue meds   5. Diabetes mellitus. SSI   6. Liver cirrhosis. stable   7. Gastroesophageal reflux disease. PPI   8. Obstructive sleep apnea with seizures on continuous positive airway pressure. 9.  Seizures. continue Keppra   10. Epigastric hernia. on PPI        Subjective:     Chief Complaint / Reason for Physician Visit  \" feeling less shortness of breath \". Discussed with RN events overnight. Review of Systems:  Symptom Y/N Comments  Symptom Y/N Comments   Fever/Chills    Chest Pain     Poor Appetite    Edema     Cough    Abdominal Pain     Sputum    Joint Pain     SOB/ORTA    Pruritis/Rash     Nausea/vomit    Tolerating PT/OT     Diarrhea    Tolerating Diet     Constipation    Other       Could NOT obtain due to:      Objective:     VITALS:   Last 24hrs VS reviewed since prior progress note.  Most recent are:  Patient Vitals for the past 24 hrs:   Temp Pulse Resp BP SpO2   19 1300 97.8 °F (36.6 °C) 85 20 117/63 95 %   19 0751 97.9 °F (36.6 °C) 87 20 157/80 97 %   19 0450 98.3 °F (36.8 °C) 85 20 146/86 97 %   19 0120 97.3 °F (36.3 °C) 90 20 130/90 94 %   19 0000 98 °F (36.7 °C) 85 22 157/87 94 %   19 2345  90 25 (!) 141/103 97 %   19 2315  86 21 144/71 97 %   19 2246  82 22  98 %   19 2245  84 22 153/81 96 %   19 2230  84 22 156/87 98 %   19 2215  82 25  94 %   19 2200  85 26 (!) 152/119 97 % 06/13/19 2145  84 26 151/64 98 %   06/13/19 2130  84 25 (!) 142/94 99 %   06/13/19 2115  83 23 111/49 96 %   06/13/19 2100  79 21 102/56 96 %   06/13/19 2045  82 23 160/85 98 %   06/13/19 2030  80 25 172/79 99 %   06/13/19 2015  81 21 157/88 97 %   06/13/19 2000  79 24 (!) 149/94 96 %   06/13/19 1945  81 24 164/90 94 %   06/13/19 1930  76 22 139/90 94 %   06/13/19 1915  77 18 148/90 97 %   06/13/19 1900  77 21 (!) 157/97 96 %   06/13/19 1845  80 21 (!) 158/94 95 %   06/13/19 1830  80 23 154/82 93 %   06/13/19 1815  74 16 156/83 97 %   06/13/19 1800  80  149/73    06/13/19 1656     96 %       Intake/Output Summary (Last 24 hours) at 6/14/2019 1420  Last data filed at 6/14/2019 1300  Gross per 24 hour   Intake 240 ml   Output 2625 ml   Net -2385 ml        PHYSICAL EXAM:  General: WD, WN. Alert, cooperative, no acute distress    EENT:  EOMI. Anicteric sclerae. MMM  Resp:  CTA bilaterally, no wheezing or rales. No accessory muscle use  CV:  Regular  rhythm,  No edema  GI:  Soft, Non distended, Non tender.  +Bowel sounds  Neurologic:  Alert and oriented X 3, normal speech,   Psych:   Good insight. Not anxious nor agitated  Skin:  No rashes. No jaundice    Reviewed most current lab test results and cultures  YES  Reviewed most current radiology test results   YES  Review and summation of old records today    NO  Reviewed patient's current orders and MAR    YES  PMH/SH reviewed - no change compared to H&P  ________________________________________________________________________  Care Plan discussed with:    Comments   Patient     Family      RN     Care Manager     Consultant                        Multidiciplinary team rounds were held today with , nursing, pharmacist and clinical coordinator. Patient's plan of care was discussed; medications were reviewed and discharge planning was addressed.      ________________________________________________________________________  Total NON critical care TIME:  35 Minutes    Total CRITICAL CARE TIME Spent:   Minutes non procedure based      Comments   >50% of visit spent in counseling and coordination of care     ________________________________________________________________________  Jesusita Franco MD     Procedures: see electronic medical records for all procedures/Xrays and details which were not copied into this note but were reviewed prior to creation of Plan. LABS:  I reviewed today's most current labs and imaging studies.   Pertinent labs include:  Recent Labs     06/14/19  0501 06/13/19  1738   WBC 7.3 8.1   HGB 12.0* 11.8*   HCT 39.2 39.3    158     Recent Labs     06/13/19  1738      K 3.2*      CO2 26   GLU 95   BUN 18   CREA 1.30   CA 8.1*   ALB 2.8*   TBILI 1.0   SGOT 8*   ALT 13       Signed: Jesusita Franco MD

## 2019-06-14 NOTE — ROUTINE PROCESS
TRANSFER - IN REPORT: 
 
Verbal report received from 1 [facility] RN(name) on Trina Fernando.  being received from ED(unit) for routine progression of care Report consisted of patients Situation, Background, Assessment and  
Recommendations(SBAR). Information from the following report(s) SBAR, ED Summary, Intake/Output and Recent Results was reviewed with the receiving nurse. Opportunity for questions and clarification was provided. Assessment completed upon patients arrival to unit and care assumed.

## 2019-06-14 NOTE — CARDIO/PULMONARY
Cardiac Rehab: Consult received and chart reviewed. Echo and cardiology consult pending. Will follow for results and see as time allows.  Marysol Tirado RN 
\

## 2019-06-14 NOTE — NURSE NAVIGATOR
HF NN introduced self to patient and explained role. Patient was given LIVING WITH HEART FAILURE booklet. Briefly discussed low sodium diet with patient. Had to cut visit/education short as echo tech arrived to perform echo.
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
## 2019-06-14 NOTE — ED NOTES
CONSULT NOTE:  8:15 PM Shirley Stanley MD communicated with Dr. Kary Tapia MD, Consult for Hospitalist via [facility] Text. Discussed available diagnostic tests and clinical findings. Dr. Gaviota Wen will evaluate the patient for admission to the hospital.     8:15 PM  Patient is being admitted to the hospital.  The results of their tests and reasons for their admission have been discussed with them and/or available family. They convey agreement and understanding for the need to be admitted and for their admission diagnosis.

## 2019-06-14 NOTE — NURSE NAVIGATOR
Follow up scheduled with [facility] with Dr. Landy Stratton for 6/24/19 at 12:30 pm.  Information on After Visit Summary. Primary care visit scheduled for 6/19/19 at 11:30 am.  Information on After Visit Summary.

## 2019-06-14 NOTE — ROUTINE PROCESS
Bedside shift change report given to Jessica Mendiola 1729 (oncoming nurse) by Steffi Trinh RN (offgoing nurse). Report included the following information SBAR, ED Summary, Procedure Summary, Intake/Output, MAR and Recent Results.

## 2019-06-14 NOTE — DISCHARGE INSTRUCTIONS
Patient Education   Patient Education   Patient Education   Please make follow up with your primary care and Dr. Dixon Cottle shortly after discharge       Orthostatic Hypotension: Care Instructions  Your Care Instructions    Orthostatic hypotension is a quick drop in blood pressure. It happens when you get up from sitting or lying down. You may feel faint, lightheaded, or dizzy. When a person sits up or stands up, the body changes the way it pumps blood. This can slow the flow of blood to the brain for a very short time. And that can make you feel lightheaded. Many medicines can cause this problem, especially in older people. Lack of fluids (dehydration) or illnesses such as diabetes or heart disease also can cause it. Follow-up care is a key part of your treatment and safety. Be sure to make and go to all appointments, and call your doctor if you are having problems. It's also a good idea to know your test results and keep a list of the medicines you take. How can you care for yourself at home? · Tell your doctor about any problems you have with your medicines. · If your doctor prescribes medicine to help prevent a low blood pressure problem, take it exactly as prescribed. Call your doctor if you think you are having a problem with your medicine. · Drink plenty of fluids, enough so that your urine is light yellow or clear like water. Choose water and other caffeine-free clear liquids. If you have kidney, heart, or liver disease and have to limit fluids, talk with your doctor before you increase the amount of fluids you drink. · Limit or avoid alcohol and caffeine. · Get up slowly from bed or after sitting for a long time. If you are in bed, roll to your side and swing your legs over the edge of the bed and onto the floor. Push your body up to a sitting position. Wait for a while before you slowly stand up. If you are dizzy or lightheaded, sit or lie down. When should you call for help?   Call [phone] anytime you think you may need emergency care. For example, call if:    · You passed out (lost consciousness).    Watch closely for changes in your health, and be sure to contact your doctor if:    · You do not get better as expected. Where can you learn more? Go to http://gordo-melinda.info/. Enter E229 in the search box to learn more about \"Orthostatic Hypotension: Care Instructions. \"  Current as of: July 22, 2018  Content Version: 11.9  © 5999-9415 [employer]. Care instructions adapted under license by [employer] (which disclaims liability or warranty for this information). If you have questions about a medical condition or this instruction, always ask your healthcare professional. [address] any warranty or liability for your use of this information. Cirrhosis: Care Instructions  Your Care Instructions    Cirrhosis occurs when healthy tissue in your liver gets scarred. This keeps the liver from working well. It usually happens after a liver has been inflamed for years. Cirrhosis is most often caused by alcohol abuse or hepatitis infection. But there are other causes too. These include medicines and too much fat in the liver. Conditions passed down in families and other disorders can also cause it. In some cases, no cause can be found. Treatment can't completely fix liver damage. But you may be able to slow or prevent more damage if you don't drink alcohol or use drugs that harm your liver. Follow-up care is a key part of your treatment and safety. Be sure to make and go to all appointments, and call your doctor if you are having problems. It's also a good idea to know your test results and keep a list of the medicines you take. How can you care for yourself at home? · Do not drink any alcohol. It can harm your liver. Talk to your doctor if you need help to stop drinking. · Be safe with medicines.  Take your medicines exactly as prescribed. Call your doctor if you think you are having a problem with your medicine. · Talk to your doctor before you take any other medicines. These include over-the-counter medicines and herbal products. · Be careful taking acetaminophen (Tylenol), ibuprofen (Advil, Motrin), or naproxen (Aleve). These can sometimes cause more liver damage. Talk with your doctor if you're not sure which medicines are safe. · If your cirrhosis causes extra fluid to build up in your body, try not to eat a lot of salt. Use less salt when you cook and at the table. Don't eat fast foods or snack foods with a lot of salt. Extra fluid in your belly, legs, and chest can cause serious problems. · Work with your doctor or a dietitian to be sure you eat the right amount of carbohydrate, protein, fat, and sodium (salt). It's very important to choose the best foods for the health of your liver. · If your doctor recommends it, limit how much fluid you drink. · If your doctor recommends it, get more exercise. Walking is a good choice. Bit by bit, increase the amount you walk every day. Try for at least 30 minutes on most days of the week. You also may want to swim, bike, or do other activities. When should you call for help? Call 911 anytime you think you may need emergency care. For example, call if:    · You have trouble breathing.     · You vomit blood or what looks like coffee grounds.    Call your doctor now or seek immediate medical care if:    · You feel very sleepy or confused.     · You have new belly pain, or your pain gets worse.     · You have a fever.     · There is a new or increasing yellow tint to your skin or the whites of your eyes.     · You have any abnormal bleeding, such as:  ? Nosebleeds. ? Vaginal bleeding that is different (heavier, more frequent, at a different time of the month) than what you are used to.  ? Bloody or black stools, or rectal bleeding. ?  Bloody or pink urine.    Watch closely for changes in your health, and be sure to contact your doctor if:    · You have any problems.     · Your belly is getting bigger.     · You are gaining weight. Where can you learn more? Go to http://gordo-melinda.info/. Enter M412 in the search box to learn more about \"Cirrhosis: Care Instructions. \"  Current as of: March 27, 2018  Content Version: 11.9  © 7060-3868 [employer]. Care instructions adapted under license by [employer] (which disclaims liability or warranty for this information). If you have questions about a medical condition or this instruction, always ask your healthcare professional. [address] any warranty or liability for your use of this information. Heart Failure: Care Instructions  Your Care Instructions    Heart failure occurs when your heart does not pump as much blood as the body needs. Failure does not mean that the heart has stopped pumping but rather that it is not pumping as well as it should. Over time, this causes fluid buildup in your lungs and other parts of your body. Fluid buildup can cause shortness of breath, fatigue, swollen ankles, and other problems. By taking medicines regularly, reducing sodium (salt) in your diet, checking your weight every day, and making lifestyle changes, you can feel better and live longer. Follow-up care is a key part of your treatment and safety. Be sure to make and go to all appointments, and call your doctor if you are having problems. It's also a good idea to know your test results and keep a list of the medicines you take. How can you care for yourself at home? Medicines    · Be safe with medicines. Take your medicines exactly as prescribed.  Call your doctor if you think you are having a problem with your medicine.     · Do not take any vitamins, over-the-counter medicine, or herbal products without talking to your doctor first. Mya Arce not take ibuprofen (Advil or Motrin) and naproxen (Aleve) without talking to your doctor first. They could make your heart failure worse.     · You may be taking some of the following medicine. ? Beta-blockers can slow heart rate, decrease blood pressure, and improve your condition. Taking a beta-blocker may lower your chance of needing to be hospitalized. ? Angiotensin-converting enzyme inhibitors (ACEIs) reduce the heart's workload, lower blood pressure, and reduce swelling. Taking an ACEI may lower your chance of needing to be hospitalized again. ? Angiotensin II receptor blockers (ARBs) work like ACEIs. Your doctor may prescribe them instead of ACEIs. ? Diuretics, also called water pills, reduce swelling. ? Potassium supplements replace this important mineral, which is sometimes lost with diuretics. ? Aspirin and other blood thinners prevent blood clots, which can cause a stroke or heart attack.    You will get more details on the specific medicines your doctor prescribes. Diet    · Your doctor may suggest that you limit sodium to 2,000 milligrams (mg) a day or less. That is less than 1 teaspoon of salt a day, including all the salt you eat in cooking or in packaged foods. People get most of their sodium from processed foods. Fast food and restaurant meals also tend to be very high in sodium.     · Ask your doctor how much liquid you can drink each day. You may have to limit liquids.    Weight    · Weigh yourself without clothing at the same time each day. Record your weight. Call your doctor if you have a sudden weight gain, such as more than 2 to 3 pounds in a day or 5 pounds in a week. (Your doctor may suggest a different range of weight gain.) A sudden weight gain may mean that your heart failure is getting worse.    Activity level    · Start light exercise (if your doctor says it is okay). Even if you can only do a small amount, exercise will help you get stronger, have more energy, and manage your weight and your stress.  Walking is an easy way to get exercise. Start out by walking a little more than you did before. Bit by bit, increase the amount you walk.     · When you exercise, watch for signs that your heart is working too hard. You are pushing yourself too hard if you cannot talk while you are exercising. If you become short of breath or dizzy or have chest pain, stop, sit down, and rest.     · If you feel \"wiped out\" the day after you exercise, walk slower or for a shorter distance until you can work up to a better pace.     · Get enough rest at night. Sleeping with 1 or 2 pillows under your upper body and head may help you breathe easier.    Lifestyle changes    · Do not smoke. Smoking can make a heart condition worse. If you need help quitting, talk to your doctor about stop-smoking programs and medicines. These can increase your chances of quitting for good. Quitting smoking may be the most important step you can take to protect your heart.     · Limit alcohol to 2 drinks a day for men and 1 drink a day for women. Too much alcohol can cause health problems.     · Avoid getting sick from colds and the flu. Get a pneumococcal vaccine shot. If you have had one before, ask your doctor whether you need another dose. Get a flu shot each year. If you must be around people with colds or the flu, wash your hands often. When should you call for help? Call 911 if you have symptoms of sudden heart failure such as:    · You have severe trouble breathing.     · You cough up pink, foamy mucus.     · You have a new irregular or rapid heartbeat.    Call your doctor now or seek immediate medical care if:    · You have new or increased shortness of breath.     · You are dizzy or lightheaded, or you feel like you may faint.     · You have sudden weight gain, such as more than 2 to 3 pounds in a day or 5 pounds in a week.  (Your doctor may suggest a different range of weight gain.)     · You have increased swelling in your legs, ankles, or feet.     · You are suddenly so tired or weak that you cannot do your usual activities.    Watch closely for changes in your health, and be sure to contact your doctor if you develop new symptoms. Where can you learn more? Go to http://gordo-melinda.info/. Enter D878 in the search box to learn more about \"Heart Failure: Care Instructions. \"  Current as of: July 22, 2018  Content Version: 11.9  © 3726-9944 [employer]. Care instructions adapted under license by [employer] (which disclaims liability or warranty for this information). If you have questions about a medical condition or this instruction, always ask your healthcare professional. [address] any warranty or liability for your use of this information.

## 2019-06-14 NOTE — ROUTINE PROCESS
TRANSFER - OUT REPORT: 
 
Verbal report [facility] to [facility](name[facility]  [facility] to (unit) for routine progression of care Report consisted of patients Situation, Background, Assessment and  
Recommendations(SBAR). Information from the following report(s) ED Summary and Recent Results was reviewed with the receiving nurse. Lines:  
Peripheral IV 06/13/19 Left Antecubital (Active) Site Assessment Clean, dry, & intact 6/13/2019  5:43 PM  
Phlebitis Assessment 0 6/13/2019  5:43 PM  
Infiltration Assessment 0 6/13/2019  5:43 PM  
Dressing Status Clean, dry, & intact 6/13/2019  5:43 PM  
Dressing Type Transparent 6/13/2019  5:43 PM  
Alcohol Cap Used No 6/13/2019  5:43 PM  
  
 
Opportunity for questions and clarification was provided. Patient transported with: 
 [employer]

## 2019-06-15 LAB
ANION GAP SERPL CALC-SCNC: 9 MMOL/L (ref 5–15)
BACTERIA SPEC CULT: NORMAL
BACTERIA SPEC CULT: NORMAL
BUN SERPL-MCNC: 23 MG/DL (ref 6–20)
BUN/CREAT SERPL: 15 (ref 12–20)
CALCIUM SERPL-MCNC: 7.7 MG/DL (ref 8.5–10.1)
CHLORIDE SERPL-SCNC: 102 MMOL/L (ref 97–108)
CO2 SERPL-SCNC: 24 MMOL/L (ref 21–32)
CREAT SERPL-MCNC: 1.54 MG/DL (ref 0.7–1.3)
GLUCOSE BLD STRIP.AUTO-MCNC: 126 MG/DL (ref 65–100)
GLUCOSE BLD STRIP.AUTO-MCNC: 146 MG/DL (ref 65–100)
GLUCOSE BLD STRIP.AUTO-MCNC: 167 MG/DL (ref 65–100)
GLUCOSE SERPL-MCNC: 110 MG/DL (ref 65–100)
MAGNESIUM SERPL-MCNC: 1.6 MG/DL (ref 1.6–2.4)
POTASSIUM SERPL-SCNC: 2.8 MMOL/L (ref 3.5–5.1)
SERVICE CMNT-IMP: ABNORMAL
SERVICE CMNT-IMP: NORMAL
SODIUM SERPL-SCNC: 135 MMOL/L (ref 136–145)

## 2019-06-15 PROCEDURE — 94760 N-INVAS EAR/PLS OXIMETRY 1: CPT

## 2019-06-15 PROCEDURE — 83735 ASSAY OF MAGNESIUM: CPT

## 2019-06-15 PROCEDURE — 74011250636 HC RX REV CODE- 250/636: Performed by: FAMILY MEDICINE

## 2019-06-15 PROCEDURE — 74011250637 HC RX REV CODE- 250/637: Performed by: INTERNAL MEDICINE

## 2019-06-15 PROCEDURE — 80048 BASIC METABOLIC PNL TOTAL CA: CPT

## 2019-06-15 PROCEDURE — 74011250636 HC RX REV CODE- 250/636: Performed by: INTERNAL MEDICINE

## 2019-06-15 PROCEDURE — 82962 GLUCOSE BLOOD TEST: CPT

## 2019-06-15 PROCEDURE — 65660000000 HC RM CCU STEPDOWN

## 2019-06-15 PROCEDURE — 74011250637 HC RX REV CODE- 250/637: Performed by: FAMILY MEDICINE

## 2019-06-15 PROCEDURE — 36415 COLL VENOUS BLD VENIPUNCTURE: CPT

## 2019-06-15 RX ORDER — POTASSIUM CHLORIDE 1.5 G/1.77G
20 POWDER, FOR SOLUTION ORAL 2 TIMES DAILY WITH MEALS
Status: DISCONTINUED | OUTPATIENT
Start: 2019-06-15 | End: 2019-06-17 | Stop reason: HOSPADM

## 2019-06-15 RX ORDER — POTASSIUM CHLORIDE 750 MG/1
40 TABLET, FILM COATED, EXTENDED RELEASE ORAL ONCE
Status: COMPLETED | OUTPATIENT
Start: 2019-06-15 | End: 2019-06-15

## 2019-06-15 RX ORDER — ALPRAZOLAM 0.5 MG/1
0.25 TABLET ORAL
Status: DISCONTINUED | OUTPATIENT
Start: 2019-06-15 | End: 2019-06-17 | Stop reason: HOSPADM

## 2019-06-15 RX ORDER — POTASSIUM CHLORIDE 14.9 MG/ML
10 INJECTION INTRAVENOUS
Status: DISPENSED | OUTPATIENT
Start: 2019-06-15 | End: 2019-06-15

## 2019-06-15 RX ADMIN — PANTOPRAZOLE SODIUM 40 MG: 40 TABLET, DELAYED RELEASE ORAL at 18:31

## 2019-06-15 RX ADMIN — HEPARIN SODIUM 5000 UNITS: 5000 INJECTION INTRAVENOUS; SUBCUTANEOUS at 13:36

## 2019-06-15 RX ADMIN — POTASSIUM CHLORIDE 40 MEQ: 750 TABLET, FILM COATED, EXTENDED RELEASE ORAL at 13:36

## 2019-06-15 RX ADMIN — PREGABALIN 50 MG: 50 CAPSULE ORAL at 16:00

## 2019-06-15 RX ADMIN — LISINOPRIL 10 MG: 10 TABLET ORAL at 09:51

## 2019-06-15 RX ADMIN — QUETIAPINE FUMARATE 25 MG: 25 TABLET ORAL at 22:58

## 2019-06-15 RX ADMIN — FUROSEMIDE 40 MG: 10 INJECTION, SOLUTION INTRAMUSCULAR; INTRAVENOUS at 09:50

## 2019-06-15 RX ADMIN — AMLODIPINE BESYLATE 2.5 MG: 5 TABLET ORAL at 09:51

## 2019-06-15 RX ADMIN — POTASSIUM CHLORIDE 10 MEQ: 200 INJECTION, SOLUTION INTRAVENOUS at 09:52

## 2019-06-15 RX ADMIN — RIFAXIMIN 550 MG: 550 TABLET ORAL at 18:31

## 2019-06-15 RX ADMIN — POTASSIUM CHLORIDE 20 MEQ: 1.5 POWDER, FOR SOLUTION ORAL at 18:31

## 2019-06-15 RX ADMIN — PANTOPRAZOLE SODIUM 40 MG: 40 TABLET, DELAYED RELEASE ORAL at 06:38

## 2019-06-15 RX ADMIN — FLUOXETINE 20 MG: 20 CAPSULE ORAL at 09:51

## 2019-06-15 RX ADMIN — PREGABALIN 50 MG: 50 CAPSULE ORAL at 22:59

## 2019-06-15 RX ADMIN — CARVEDILOL 12.5 MG: 12.5 TABLET, FILM COATED ORAL at 18:31

## 2019-06-15 RX ADMIN — PREGABALIN 50 MG: 50 CAPSULE ORAL at 09:51

## 2019-06-15 RX ADMIN — LEVETIRACETAM 1000 MG: 500 TABLET ORAL at 22:59

## 2019-06-15 RX ADMIN — VENLAFAXINE HYDROCHLORIDE 150 MG: 37.5 CAPSULE, EXTENDED RELEASE ORAL at 09:51

## 2019-06-15 RX ADMIN — ASPIRIN 81 MG CHEWABLE TABLET 81 MG: 81 TABLET CHEWABLE at 09:51

## 2019-06-15 RX ADMIN — TAMSULOSIN HYDROCHLORIDE 0.4 MG: 0.4 CAPSULE ORAL at 18:31

## 2019-06-15 RX ADMIN — POTASSIUM CHLORIDE 10 MEQ: 200 INJECTION, SOLUTION INTRAVENOUS at 09:00

## 2019-06-15 RX ADMIN — CARVEDILOL 12.5 MG: 12.5 TABLET, FILM COATED ORAL at 09:50

## 2019-06-15 RX ADMIN — TAMSULOSIN HYDROCHLORIDE 0.4 MG: 0.4 CAPSULE ORAL at 06:38

## 2019-06-15 RX ADMIN — CLOPIDOGREL BISULFATE 75 MG: 75 TABLET, FILM COATED ORAL at 09:50

## 2019-06-15 RX ADMIN — POTASSIUM CHLORIDE 20 MEQ: 1.5 POWDER, FOR SOLUTION ORAL at 09:50

## 2019-06-15 RX ADMIN — LEVETIRACETAM 1000 MG: 500 TABLET ORAL at 09:51

## 2019-06-15 RX ADMIN — HEPARIN SODIUM 5000 UNITS: 5000 INJECTION INTRAVENOUS; SUBCUTANEOUS at 06:38

## 2019-06-15 RX ADMIN — HEPARIN SODIUM 5000 UNITS: 5000 INJECTION INTRAVENOUS; SUBCUTANEOUS at 22:59

## 2019-06-15 RX ADMIN — ISOSORBIDE MONONITRATE 30 MG: 30 TABLET ORAL at 09:51

## 2019-06-15 NOTE — ROUTINE PROCESS
Bedside shift change report given to Jessica Mendiola 1729 (oncoming nurse) by Nancie Yates RN (offgoing nurse). Report included the following information SBAR, Procedure Summary, Intake/Output, MAR and Recent Results (lab Potassium, Calcuim, Sodium) and output.

## 2019-06-15 NOTE — PROGRESS NOTES
[facility] Cardiology Progress Note    Date of service: 6/15/2019    Subjective:  Mr Jacque Rodrigues says he is feeling better  Breathing improved  Making good urine    Objective:    Visit Vitals  /81 (BP 1 Location: Right arm, BP Patient Position: At rest)   Pulse 87   Temp 98 °F (36.7 °C)   Resp 18   Ht 5' 9\" (1.753 m)   Wt 86.4 kg (190 lb 7.6 oz)   SpO2 99%   BMI 28.13 kg/m²       Physical Exam   Constitutional: He is oriented to person, place, and time. He appears well-developed and well-nourished. HENT:   Head: Normocephalic and atraumatic. Eyes: Conjunctivae are normal. No scleral icterus. Neck: No JVD present. Cardiovascular: Normal rate, regular rhythm and normal heart sounds. Exam reveals no gallop. No murmur heard. Pulmonary/Chest: Effort normal and breath sounds normal. No stridor. No respiratory distress. He has no wheezes. He has no rales. Abdominal: Soft. He exhibits no distension. Musculoskeletal: He exhibits no edema or deformity. Neurological: He is alert and oriented to person, place, and time. Skin: Skin is warm and dry. Psychiatric: He has a normal mood and affect. His behavior is normal.       Data reviewed:  Recent Results (from the past 12 hour(s))   MAGNESIUM    Collection Time: 06/15/19  4:12 AM   Result Value Ref Range    Magnesium 1.6 1.6 - 2.4 mg/dL   METABOLIC PANEL, BASIC    Collection Time: 06/15/19  4:12 AM   Result Value Ref Range    Sodium 135 (L) 136 - 145 mmol/L    Potassium 2.8 (L) 3.5 - 5.1 mmol/L    Chloride 102 97 - 108 mmol/L    CO2 24 21 - 32 mmol/L    Anion gap 9 5 - 15 mmol/L    Glucose 110 (H) 65 - 100 mg/dL    BUN 23 (H) 6 - 20 MG/DL    Creatinine 1.54 (H) 0.70 - 1.30 MG/DL    BUN/Creatinine ratio 15 12 - 20      GFR est AA 55 (L) >60 ml/min/1.73m2    GFR est non-AA 46 (L) >60 ml/min/1.73m2    Calcium 7.7 (L) 8.5 - 10.1 MG/DL       Assessment:  1. Acute on chronic systolic heart failure  2. Ischemic cardiomyopathy  3.  CAD s/p CABG    Plan:  Continue diuresis    Signed:  Kana Washington MD  Interventional Cardiology  6/15/2019

## 2019-06-15 NOTE — PROGRESS NOTES
Call placed to Dr Indy Mcqueen. Made him aware that the patient was unable to tolerate all 4 IV Potassium bags due to extreme pain an burning at IV site and radiating up and down arm.  Orders given

## 2019-06-15 NOTE — PROGRESS NOTES
Hospitalist Progress Note    NAME: Grazyna Yu :  1955   MRN:  [id number]       Assessment / Plan:  ASSESSMENT:  1. Shortness of breath due to acute on chronic systolic CHF: continue diuretics   2. Acute on chronic systolic congestive heart failure. Diuretics, daily BMP, no need for ischemic work up cardiology seen and evaluated ECHO:  Moderately dilated left ventricle. Severe systolic dysfunction. Estimated left ventricular ejection fraction is 16 - 20%. 3.  Coronary artery disease, status post coronary artery bypass graft x2 and percutaneous transluminal coronary angioplasty with stents. 4.  Hypertension. BP stable continue meds   5. Diabetes mellitus. SSI   6. Liver cirrhosis. stable   7. Gastroesophageal reflux disease. PPI   8. Obstructive sleep apnea with seizures on continuous positive airway pressure. 9.  Seizures. continue Keppra   10. Epigastric hernia. on PPI  11. Hypokalemia: patient could not tolerate IV KCL. Now we replacing PO         Subjective:     Chief Complaint / Reason for Physician Visit  Feeling better less shortness of breath, multiple loose BM  \". Discussed with RN events overnight. Review of Systems:  Symptom Y/N Comments  Symptom Y/N Comments   Fever/Chills    Chest Pain     Poor Appetite    Edema     Cough    Abdominal Pain     Sputum    Joint Pain     SOB/ORTA    Pruritis/Rash     Nausea/vomit    Tolerating PT/OT     Diarrhea    Tolerating Diet     Constipation    Other       Could NOT obtain due to:      Objective:     VITALS:   Last 24hrs VS reviewed since prior progress note.  Most recent are:  Patient Vitals for the past 24 hrs:   Temp Pulse Resp BP SpO2   06/15/19 1217 97.6 °F (36.4 °C) 79 18 122/68 96 %   06/15/19 0823 98 °F (36.7 °C) 87 18 158/81 99 %   06/15/19 0406 97.7 °F (36.5 °C) 74 18 113/64 100 %   19 2340 98.5 °F (36.9 °C) 84 17 131/80 99 %   19 2020 97.9 °F (36.6 °C) 85 17 122/70 97 %   19 1532 99 °F (37.2 °C) 82 18 122/67 96 %       Intake/Output Summary (Last 24 hours) at 6/15/2019 1515  Last data filed at 6/15/2019 1258  Gross per 24 hour   Intake 480 ml   Output 600 ml   Net -120 ml        PHYSICAL EXAM:  General: WD, WN. Alert, cooperative, no acute distress    EENT:  EOMI. Anicteric sclerae. MMM  Resp:  CTA bilaterally, no wheezing or rales. No accessory muscle use  CV:  Regular  rhythm,  No edema  GI:  Soft, Non distended, Non tender.  +Bowel sounds  Neurologic:  Alert and oriented X 3, normal speech,   Psych:   Good insight. Not anxious nor agitated  Skin:  No rashes. No jaundice    Reviewed most current lab test results and cultures  YES  Reviewed most current radiology test results   YES  Review and summation of old records today    NO  Reviewed patient's current orders and MAR    YES  PMH/SH reviewed - no change compared to H&P  ________________________________________________________________________  Care Plan discussed with:    Comments   Patient     Family      RN     Care Manager     Consultant                        Multidiciplinary team rounds were held today with , nursing, pharmacist and clinical coordinator. Patient's plan of care was discussed; medications were reviewed and discharge planning was addressed. ________________________________________________________________________  Total NON critical care TIME:  35 Minutes    Total CRITICAL CARE TIME Spent:   Minutes non procedure based      Comments   >50% of visit spent in counseling and coordination of care     ________________________________________________________________________  Louella Nyhan, MD     Procedures: see electronic medical records for all procedures/Xrays and details which were not copied into this note but were reviewed prior to creation of Plan. LABS:  I reviewed today's most current labs and imaging studies.   Pertinent labs include:  Recent Labs     06/14/19  0501 06/13/19  1738   WBC 7.3 8.1   HGB 12.0* 11.8*   HCT 39.2 39.3    158     Recent Labs     06/15/19  0412 06/13/19  1738   * 139   K 2.8* 3.2*    106   CO2 24 26   * 95   BUN 23* 18   CREA 1.54* 1.30   CA 7.7* 8.1*   MG 1.6  --    ALB  --  2.8*   TBILI  --  1.0   SGOT  --  8*   ALT  --  13       Signed: Seth Pelayo MD

## 2019-06-16 LAB
ANION GAP SERPL CALC-SCNC: 9 MMOL/L (ref 5–15)
BUN SERPL-MCNC: 23 MG/DL (ref 6–20)
BUN/CREAT SERPL: 16 (ref 12–20)
CALCIUM SERPL-MCNC: 8.1 MG/DL (ref 8.5–10.1)
CHLORIDE SERPL-SCNC: 106 MMOL/L (ref 97–108)
CO2 SERPL-SCNC: 23 MMOL/L (ref 21–32)
CREAT SERPL-MCNC: 1.4 MG/DL (ref 0.7–1.3)
GLUCOSE BLD STRIP.AUTO-MCNC: 181 MG/DL (ref 65–100)
GLUCOSE BLD STRIP.AUTO-MCNC: 97 MG/DL (ref 65–100)
GLUCOSE SERPL-MCNC: 102 MG/DL (ref 65–100)
POTASSIUM SERPL-SCNC: 3.6 MMOL/L (ref 3.5–5.1)
SERVICE CMNT-IMP: ABNORMAL
SERVICE CMNT-IMP: NORMAL
SODIUM SERPL-SCNC: 138 MMOL/L (ref 136–145)

## 2019-06-16 PROCEDURE — 65660000000 HC RM CCU STEPDOWN

## 2019-06-16 PROCEDURE — 80048 BASIC METABOLIC PNL TOTAL CA: CPT

## 2019-06-16 PROCEDURE — 74011250637 HC RX REV CODE- 250/637: Performed by: INTERNAL MEDICINE

## 2019-06-16 PROCEDURE — 94760 N-INVAS EAR/PLS OXIMETRY 1: CPT

## 2019-06-16 PROCEDURE — 36415 COLL VENOUS BLD VENIPUNCTURE: CPT

## 2019-06-16 PROCEDURE — 74011250636 HC RX REV CODE- 250/636: Performed by: INTERNAL MEDICINE

## 2019-06-16 PROCEDURE — 74011250637 HC RX REV CODE- 250/637: Performed by: FAMILY MEDICINE

## 2019-06-16 PROCEDURE — 82962 GLUCOSE BLOOD TEST: CPT

## 2019-06-16 PROCEDURE — 97161 PT EVAL LOW COMPLEX 20 MIN: CPT

## 2019-06-16 PROCEDURE — 97116 GAIT TRAINING THERAPY: CPT

## 2019-06-16 RX ORDER — FUROSEMIDE 20 MG/1
20 TABLET ORAL 2 TIMES DAILY
Status: DISCONTINUED | OUTPATIENT
Start: 2019-06-16 | End: 2019-06-17 | Stop reason: HOSPADM

## 2019-06-16 RX ADMIN — RIFAXIMIN 550 MG: 550 TABLET ORAL at 09:33

## 2019-06-16 RX ADMIN — CARVEDILOL 12.5 MG: 12.5 TABLET, FILM COATED ORAL at 18:05

## 2019-06-16 RX ADMIN — LEVETIRACETAM 1000 MG: 500 TABLET ORAL at 09:34

## 2019-06-16 RX ADMIN — HEPARIN SODIUM 5000 UNITS: 5000 INJECTION INTRAVENOUS; SUBCUTANEOUS at 13:34

## 2019-06-16 RX ADMIN — HEPARIN SODIUM 5000 UNITS: 5000 INJECTION INTRAVENOUS; SUBCUTANEOUS at 21:47

## 2019-06-16 RX ADMIN — CARVEDILOL 12.5 MG: 12.5 TABLET, FILM COATED ORAL at 09:34

## 2019-06-16 RX ADMIN — PREGABALIN 50 MG: 50 CAPSULE ORAL at 09:33

## 2019-06-16 RX ADMIN — PANTOPRAZOLE SODIUM 40 MG: 40 TABLET, DELAYED RELEASE ORAL at 07:03

## 2019-06-16 RX ADMIN — ASPIRIN 81 MG CHEWABLE TABLET 81 MG: 81 TABLET CHEWABLE at 09:34

## 2019-06-16 RX ADMIN — PREGABALIN 50 MG: 50 CAPSULE ORAL at 18:05

## 2019-06-16 RX ADMIN — TAMSULOSIN HYDROCHLORIDE 0.4 MG: 0.4 CAPSULE ORAL at 07:03

## 2019-06-16 RX ADMIN — POTASSIUM CHLORIDE 20 MEQ: 1.5 POWDER, FOR SOLUTION ORAL at 09:33

## 2019-06-16 RX ADMIN — CLOPIDOGREL BISULFATE 75 MG: 75 TABLET, FILM COATED ORAL at 09:33

## 2019-06-16 RX ADMIN — PREGABALIN 50 MG: 50 CAPSULE ORAL at 21:47

## 2019-06-16 RX ADMIN — RIFAXIMIN 550 MG: 550 TABLET ORAL at 18:05

## 2019-06-16 RX ADMIN — PANTOPRAZOLE SODIUM 40 MG: 40 TABLET, DELAYED RELEASE ORAL at 18:05

## 2019-06-16 RX ADMIN — QUETIAPINE FUMARATE 25 MG: 25 TABLET ORAL at 21:47

## 2019-06-16 RX ADMIN — LEVETIRACETAM 1000 MG: 500 TABLET ORAL at 21:47

## 2019-06-16 RX ADMIN — VENLAFAXINE HYDROCHLORIDE 150 MG: 37.5 CAPSULE, EXTENDED RELEASE ORAL at 09:33

## 2019-06-16 RX ADMIN — POTASSIUM CHLORIDE 20 MEQ: 1.5 POWDER, FOR SOLUTION ORAL at 18:05

## 2019-06-16 RX ADMIN — ISOSORBIDE MONONITRATE 30 MG: 30 TABLET ORAL at 09:34

## 2019-06-16 RX ADMIN — HEPARIN SODIUM 5000 UNITS: 5000 INJECTION INTRAVENOUS; SUBCUTANEOUS at 07:03

## 2019-06-16 RX ADMIN — LISINOPRIL 10 MG: 10 TABLET ORAL at 09:33

## 2019-06-16 RX ADMIN — TAMSULOSIN HYDROCHLORIDE 0.4 MG: 0.4 CAPSULE ORAL at 18:05

## 2019-06-16 RX ADMIN — LACTULOSE 45 ML: 20 SOLUTION ORAL at 16:00

## 2019-06-16 RX ADMIN — FLUOXETINE 20 MG: 20 CAPSULE ORAL at 09:33

## 2019-06-16 RX ADMIN — AMLODIPINE BESYLATE 2.5 MG: 5 TABLET ORAL at 09:34

## 2019-06-16 NOTE — PROGRESS NOTES
1040Call placed to Dr Ulysses Fortis he returned call and was made aware the Mr Maddy Almanzar was hypostatic when PT came in to work with him. AM /76. AM Meds given. Sitting /76 and standing BP. 84/64 and patient was slightly symptomatic with dizziness. Asked to hold any further diuretics.

## 2019-06-16 NOTE — PROGRESS NOTES
[facility] Cardiology Progress Note    Date of service: 6/16/2019    Subjective:  Mr Donell Morrissey says he is feeling better  Breathing improved  Making good urine  Able to ambulate short distances without too much trouble    Objective:    Visit Vitals  /76 (BP 1 Location: Right arm, BP Patient Position: At rest)   Pulse 82   Temp 98.7 °F (37.1 °C)   Resp 18   Ht 5' 9\" (1.753 m)   Wt 86.4 kg (190 lb 7.6 oz)   SpO2 96%   BMI 28.13 kg/m²       Physical Exam   Constitutional: He is oriented to person, place, and time. He appears well-developed and well-nourished. HENT:   Head: Normocephalic and atraumatic. Eyes: Conjunctivae are normal. No scleral icterus. Neck: No JVD present. Cardiovascular: Normal rate, regular rhythm and normal heart sounds. Exam reveals no gallop. No murmur heard. Pulmonary/Chest: Effort normal and breath sounds normal. No stridor. No respiratory distress. He has no wheezes. He has no rales. Abdominal: Soft. He exhibits no distension. Musculoskeletal: He exhibits no edema or deformity. Neurological: He is alert and oriented to person, place, and time. Skin: Skin is warm and dry. Psychiatric: He has a normal mood and affect.  His behavior is normal.       Data reviewed:  Recent Results (from the past 12 hour(s))   GLUCOSE, POC    Collection Time: 06/15/19  9:40 PM   Result Value Ref Range    Glucose (POC) 126 (H) 65 - 100 mg/dL    Performed by [address] [place] [address] [facility], [facility]    Collection Time: 06/16/19  4:00 AM   Result Value Ref Range    Sodium 138 136 - 145 mmol/L    Potassium 3.6 3.5 - 5.1 mmol/L    Chloride 106 97 - 108 mmol/L    CO2 23 21 - 32 mmol/L    Anion gap 9 5 - 15 mmol/L    Glucose 102 (H) 65 - 100 mg/dL    BUN 23 (H) 6 - 20 MG/DL    Creatinine 1.40 (H) 0.70 - 1.30 MG/DL    BUN/Creatinine ratio 16 12 - 20      GFR est AA >60 >60 ml/min/1.73m2    GFR est non-AA 51 (L) >60 ml/min/1.73m2    Calcium 8.1 (L) 8.5 - 10.1 MG/DL   GLUCOSE, POC    Collection Time: 06/16/19  6:30 AM   Result Value Ref Range    Glucose (POC) 97 65 - 100 mg/dL    Performed by Luther Mendiola        Assessment:  1. Acute on chronic systolic heart failure  2. Ischemic cardiomyopathy  3. CAD s/p CABG    Plan:  Continue diuresis    Signed:  Kana Cerna MD  Interventional Cardiology  6/16/2019

## 2019-06-16 NOTE — PROGRESS NOTES
Hospitalist Progress Note    NAME: Yeni Chen. :  1955   MRN:  [id number]       Assessment / Plan:  ASSESSMENT:  1. Shortness of breath due to acute on chronic systolic CHF: continue diuretics   2. Acute on chronic systolic congestive heart failure. Diuretics( on hold for Hypotension ), daily BMP, no need for ischemic work up cardiology seen and evaluated ECHO:  Moderately dilated left ventricle. Severe systolic dysfunction. Estimated left ventricular ejection fraction is 16 - 20%. 3.  Coronary artery disease, status post coronary artery bypass graft x2 and percutaneous transluminal coronary angioplasty with stents. 4.  Hypertension. BP stable continue meds HOLD BP meds, has low BP, will check prior to next doses   5. Diabetes mellitus. SSI   6. Liver cirrhosis. stable   7. Gastroesophageal reflux disease. PPI   8. Obstructive sleep apnea with seizures on continuous positive airway pressure. 9.  Seizures. continue Keppra   10. Epigastric hernia. on PPI  11. Hypokalemia: patient could not tolerate IV KCL. Now we replacing PO         Subjective:     Chief Complaint / Reason for Physician Visit  BM better, shortness of breath better, dizzy and low BP when stands up   \". Discussed with RN events overnight. Review of Systems:  Symptom Y/N Comments  Symptom Y/N Comments   Fever/Chills    Chest Pain     Poor Appetite    Edema     Cough    Abdominal Pain     Sputum    Joint Pain     SOB/ORTA    Pruritis/Rash     Nausea/vomit    Tolerating PT/OT     Diarrhea    Tolerating Diet     Constipation    Other       Could NOT obtain due to:      Objective:     VITALS:   Last 24hrs VS reviewed since prior progress note.  Most recent are:  Patient Vitals for the past 24 hrs:   Temp Pulse Resp BP SpO2   19 1214 97.1 °F (36.2 °C) 75 20 107/66 97 %   19 1053    (!) 84/64    19 1050    118/76    19 0805 98.3 °F (36.8 °C) 85 18 133/76 97 %   19 0354 98.6 °F (37 °C) 82 18 142/76 96 %   06/15/19 2356 98.7 °F (37.1 °C) 78 18 142/77 98 %   06/15/19 2018 98.3 °F (36.8 °C) 81 18 134/76 100 %   06/15/19 1727 97.8 °F (36.6 °C) 82 16 129/68 98 %       Intake/Output Summary (Last 24 hours) at 6/16/2019 1220  Last data filed at 6/16/2019 0703  Gross per 24 hour   Intake 240 ml   Output 900 ml   Net -660 ml        PHYSICAL EXAM:  General: WD, WN. Alert, cooperative, no acute distress    EENT:  EOMI. Anicteric sclerae. MMM  Resp:  CTA bilaterally, no wheezing or rales. No accessory muscle use  CV:  Regular  rhythm,  No edema  GI:  Soft, Non distended, Non tender.  +Bowel sounds  Neurologic:  Alert and oriented X 3, normal speech,   Psych:   Good insight. Not anxious nor agitated  Skin:  No rashes. No jaundice    Reviewed most current lab test results and cultures  YES  Reviewed most current radiology test results   YES  Review and summation of old records today    NO  Reviewed patient's current orders and MAR    YES  PMH/ reviewed - no change compared to H&P  ________________________________________________________________________  Care Plan discussed with:    Comments   Patient     Family      RN     Care Manager     Consultant                        Multidiciplinary team rounds were held today with , nursing, pharmacist and clinical coordinator. Patient's plan of care was discussed; medications were reviewed and discharge planning was addressed. ________________________________________________________________________  Total NON critical care TIME:  35 Minutes    Total CRITICAL CARE TIME Spent:   Minutes non procedure based      Comments   >50% of visit spent in counseling and coordination of care     ________________________________________________________________________  Mariola Whitten MD     Procedures: see electronic medical records for all procedures/Xrays and details which were not copied into this note but were reviewed prior to creation of Plan.       LABS:  I reviewed today's most current labs and imaging studies.   Pertinent labs include:  Recent Labs     06/14/19  0501 06/13/19  1738   WBC 7.3 8.1   HGB 12.0* 11.8*   HCT 39.2 39.3    158     Recent Labs     06/16/19  0400 06/15/19  0412 06/13/19  1738    135* 139   K 3.6 2.8* 3.2*    102 106   CO2 23 24 26   * 110* 95   BUN 23* 23* 18   CREA 1.40* 1.54* 1.30   CA 8.1* 7.7* 8.1*   MG  --  1.6  --    ALB  --   --  2.8*   TBILI  --   --  1.0   SGOT  --   --  8*   ALT  --   --  13       Signed: Jaswinder Treviño MD

## 2019-06-16 NOTE — ROUTINE PROCESS
Bedside shift change report given to Hilary Duran RN (oncoming nurse) by Yessi Welsh RN (offgoing nurse). Report included the following information SBAR, Procedure Summary, Intake/Output, MAR and Recent Results.
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
## 2019-06-17 ENCOUNTER — DOCUMENTATION ONLY (OUTPATIENT)
Dept: CASE MANAGEMENT | Age: 64
End: 2019-06-17

## 2019-06-17 ENCOUNTER — HOME HEALTH ADMISSION (OUTPATIENT)
Dept: HOME HEALTH SERVICES | Facility: HOME HEALTH | Age: 64
End: 2019-06-17

## 2019-06-17 VITALS
OXYGEN SATURATION: 99 % | HEIGHT: 69 IN | WEIGHT: 188.71 LBS | BODY MASS INDEX: 27.95 KG/M2 | RESPIRATION RATE: 20 BRPM | SYSTOLIC BLOOD PRESSURE: 129 MMHG | HEART RATE: 83 BPM | TEMPERATURE: 98.3 F | DIASTOLIC BLOOD PRESSURE: 74 MMHG

## 2019-06-17 LAB
ANION GAP SERPL CALC-SCNC: 8 MMOL/L (ref 5–15)
BUN SERPL-MCNC: 23 MG/DL (ref 6–20)
BUN/CREAT SERPL: 16 (ref 12–20)
CALCIUM SERPL-MCNC: 8.2 MG/DL (ref 8.5–10.1)
CHLORIDE SERPL-SCNC: 105 MMOL/L (ref 97–108)
CO2 SERPL-SCNC: 24 MMOL/L (ref 21–32)
CREAT SERPL-MCNC: 1.47 MG/DL (ref 0.7–1.3)
GLUCOSE SERPL-MCNC: 117 MG/DL (ref 65–100)
POTASSIUM SERPL-SCNC: 3.7 MMOL/L (ref 3.5–5.1)
SODIUM SERPL-SCNC: 137 MMOL/L (ref 136–145)

## 2019-06-17 PROCEDURE — 74011250637 HC RX REV CODE- 250/637: Performed by: INTERNAL MEDICINE

## 2019-06-17 PROCEDURE — 36415 COLL VENOUS BLD VENIPUNCTURE: CPT

## 2019-06-17 PROCEDURE — 80048 BASIC METABOLIC PNL TOTAL CA: CPT

## 2019-06-17 PROCEDURE — 74011250636 HC RX REV CODE- 250/636: Performed by: INTERNAL MEDICINE

## 2019-06-17 PROCEDURE — 97116 GAIT TRAINING THERAPY: CPT

## 2019-06-17 PROCEDURE — 74011250637 HC RX REV CODE- 250/637: Performed by: FAMILY MEDICINE

## 2019-06-17 RX ORDER — FUROSEMIDE 20 MG/1
20 TABLET ORAL DAILY
Qty: 30 TAB | Refills: 0 | Status: SHIPPED | OUTPATIENT
Start: 2019-06-17 | End: 2020-05-24

## 2019-06-17 RX ORDER — POTASSIUM CHLORIDE 1.5 G/1.77G
20 POWDER, FOR SOLUTION ORAL DAILY
Qty: 7 PACKET | Refills: 0 | Status: SHIPPED | OUTPATIENT
Start: 2019-06-17 | End: 2020-05-24

## 2019-06-17 RX ORDER — CARVEDILOL 3.12 MG/1
12.5 TABLET ORAL 2 TIMES DAILY WITH MEALS
Qty: 90 TAB | Refills: 0 | Status: SHIPPED | OUTPATIENT
Start: 2019-06-17 | End: 2019-06-17

## 2019-06-17 RX ORDER — CARVEDILOL 3.12 MG/1
15.62 TABLET ORAL 2 TIMES DAILY WITH MEALS
Qty: 300 TAB | Refills: 0 | Status: SHIPPED | OUTPATIENT
Start: 2019-06-17 | End: 2019-09-03

## 2019-06-17 RX ADMIN — TAMSULOSIN HYDROCHLORIDE 0.4 MG: 0.4 CAPSULE ORAL at 06:45

## 2019-06-17 RX ADMIN — PANTOPRAZOLE SODIUM 40 MG: 40 TABLET, DELAYED RELEASE ORAL at 06:45

## 2019-06-17 RX ADMIN — LEVETIRACETAM 1000 MG: 500 TABLET ORAL at 09:50

## 2019-06-17 RX ADMIN — RIFAXIMIN 550 MG: 550 TABLET ORAL at 09:50

## 2019-06-17 RX ADMIN — CARVEDILOL 15.62 MG: 12.5 TABLET, FILM COATED ORAL at 16:44

## 2019-06-17 RX ADMIN — AMLODIPINE BESYLATE 2.5 MG: 5 TABLET ORAL at 09:50

## 2019-06-17 RX ADMIN — FLUOXETINE 20 MG: 20 CAPSULE ORAL at 09:51

## 2019-06-17 RX ADMIN — PANTOPRAZOLE SODIUM 40 MG: 40 TABLET, DELAYED RELEASE ORAL at 15:48

## 2019-06-17 RX ADMIN — LISINOPRIL 10 MG: 10 TABLET ORAL at 09:50

## 2019-06-17 RX ADMIN — HEPARIN SODIUM 5000 UNITS: 5000 INJECTION INTRAVENOUS; SUBCUTANEOUS at 06:24

## 2019-06-17 RX ADMIN — ASPIRIN 81 MG CHEWABLE TABLET 81 MG: 81 TABLET CHEWABLE at 09:50

## 2019-06-17 RX ADMIN — VENLAFAXINE HYDROCHLORIDE 150 MG: 37.5 CAPSULE, EXTENDED RELEASE ORAL at 09:51

## 2019-06-17 RX ADMIN — ISOSORBIDE MONONITRATE 30 MG: 30 TABLET ORAL at 09:49

## 2019-06-17 RX ADMIN — CLOPIDOGREL BISULFATE 75 MG: 75 TABLET, FILM COATED ORAL at 09:50

## 2019-06-17 RX ADMIN — PREGABALIN 50 MG: 50 CAPSULE ORAL at 15:47

## 2019-06-17 RX ADMIN — PREGABALIN 50 MG: 50 CAPSULE ORAL at 09:50

## 2019-06-17 RX ADMIN — FUROSEMIDE 20 MG: 20 TABLET ORAL at 09:51

## 2019-06-17 RX ADMIN — POTASSIUM CHLORIDE 20 MEQ: 1.5 POWDER, FOR SOLUTION ORAL at 09:51

## 2019-06-17 NOTE — PROGRESS NOTES
[facility] Cardiology Progress Note    Date of service: 6/17/2019    Subjective:  Continues to slowly improve    Objective:    Visit Vitals  /67 (BP 1 Location: Right arm, BP Patient Position: At rest;Sitting)   Pulse 80   Temp 96.3 °F (35.7 °C)   Resp 18   Ht 5' 9\" (1.753 m)   Wt 85.6 kg (188 lb 11.4 oz)   SpO2 97%   BMI 27.87 kg/m²       Physical Exam   Constitutional: He is oriented to person, place, and time. He appears well-developed and well-nourished. HENT:   Head: Normocephalic and atraumatic. Eyes: Conjunctivae are normal. No scleral icterus. Neck: No JVD present. Cardiovascular: Normal rate, regular rhythm and normal heart sounds. Exam reveals no gallop. No murmur heard. Pulmonary/Chest: Effort normal and breath sounds normal. No stridor. No respiratory distress. He has no wheezes. He has no rales. Abdominal: Soft. He exhibits no distension. Musculoskeletal: He exhibits no edema or deformity. Neurological: He is alert and oriented to person, place, and time. Skin: Skin is warm and dry. Psychiatric: He has a normal mood and affect. His behavior is normal.       Data reviewed:  Recent Results (from the past 12 hour(s))   METABOLIC PANEL, BASIC    Collection Time: 06/17/19  3:50 AM   Result Value Ref Range    Sodium 137 136 - 145 mmol/L    Potassium 3.7 3.5 - 5.1 mmol/L    Chloride 105 97 - 108 mmol/L    CO2 24 21 - 32 mmol/L    Anion gap 8 5 - 15 mmol/L    Glucose 117 (H) 65 - 100 mg/dL    BUN 23 (H) 6 - 20 MG/DL    Creatinine 1.47 (H) 0.70 - 1.30 MG/DL    BUN/Creatinine ratio 16 12 - 20      GFR est AA 58 (L) >60 ml/min/1.73m2    GFR est non-AA 48 (L) >60 ml/min/1.73m2    Calcium 8.2 (L) 8.5 - 10.1 MG/DL       Assessment:  1. Acute on chronic systolic heart failure  2. Ischemic cardiomyopathy  3.  CAD s/p CABG    Plan:  Increased BB  Cont diuresis

## 2019-06-17 NOTE — PROGRESS NOTES
Bedside and Verbal shift change report given to Antony (oncoming nurse) by Taco Washington (offgoing nurse). Report included the following information SBAR, Intake/Output, MAR and Cardiac Rhythm nsr.

## 2019-06-17 NOTE — PROGRESS NOTES
Problem: Mobility Impaired (Adult and Pediatric)  Goal: *Acute Goals and Plan of Care (Insert Text)  Description  Physical Therapy Goals  Initiated 6/16/2019  1. Patient will move from supine to sit and sit to supine  in bed with independence within 7 day(s). 2.  Patient will transfer from bed to chair and chair to bed with independence using the least restrictive device within 7 day(s). 3.  Patient will perform sit to stand with independence within 7 day(s). 4.  Patient will ambulate with modified independence for 300 feet with the least restrictive device within 7 day(s), keeping O2 sats >92% on RA. 5.  Patient will ascend/descend 2 stairs with 2 handrail(s) with modified independence within 7 day(s). 6. Patient will complete 6 MWT, demonstrating good pacing techniques within 7 days. Outcome: Progressing Towards Goal     PHYSICAL THERAPY TREATMENT  Patient: Debbie Morillo (65 y.o. male)  Date: 6/17/2019  Diagnosis: CHF exacerbation ([facility] [I50.9] CHF exacerbation ([facility])       Precautions:    Chart, physical therapy assessment, plan of care and goals were reviewed. ASSESSMENT:  Patient continues to improve in activity tolerance. BP stable this date with slight drop in BP from sit to stand although not significant-see doc flow for details. Patient overall mod I for transfers and supervision for gait. Improved ambulation to 150 feet with SPC and supervision. No overt LOB noted and patient reports gait appears to be at baseline. Patient declining stair navigation at this time, no concerns about navigating 2 stairs at home. States plans to discharge this PM.  Will decrease frequency to 3 x week if patient remains admitted secondary to improvements in balance, gait, and hypotension. Recommend HHPT. Progression toward goals:  ?    Improving appropriately and progressing toward goals  ? Improving slowly and progressing toward goals  ?     Not making progress toward goals and plan of care will be adjusted     PLAN:  Patient continues to benefit from skilled intervention to address the above impairments. Continue treatment per established plan of care. Discharge Recommendations:  Home Health  Further Equipment Recommendations for Discharge: [facility] and [facility]      SUBJECTIVE:   Patient stated ? I'm doing better. ?    OBJECTIVE DATA SUMMARY:   Critical Behavior:              Functional Mobility Training:  Bed Mobility:     Supine to Sit: Modified independent              Transfers:  Sit to Stand: Modified independent  Stand to Sit: Modified independent  Stand Pivot Transfers: Modified independent                          Balance:  Sitting: Intact  Standing: Intact; With support  Ambulation/Gait Training:  Distance (ft): 150 Feet (ft)  Assistive Device: Gait belt;Cane, straight  Ambulation - Level of Assistance: Supervision        Gait Abnormalities: Decreased step clearance        Base of Support: Widened     Speed/Usha: Pace decreased (<100 feet/min)  Step Length: Right shortened;Left shortened                    Stairs:     Stairs - Level of Assistance: (declined stair navigation)        Neuro Re-Education:    Therapeutic Exercises:     Pain:  Pain Scale 1: Numeric (0 - 10)  Pain Intensity 1: 0              Activity Tolerance:   VSS  Please refer to the flowsheet for vital signs taken during this treatment. After treatment:   ?    Patient left in no apparent distress sitting up in chair  ? Patient left in no apparent distress in bed-EOB  ? Call bell left within reach  ? Nursing notified  ? Caregiver present  ?     Bed alarm activated    COMMUNICATION/COLLABORATION:   The patient?s plan of care was discussed with: Registered Nurse    Og Tafoya, PT   Time Calculation: 14 mins

## 2019-06-17 NOTE — PROGRESS NOTES
TRICIA: Home with [facility] for CHF    Reason for Admission:  CHF s/p CABG                RRAT Score:   26               Resources/supports as identified by patient/family:   Patient has tow supportive daughters who live with him                Top Challenges facing patient (as identified by patient/family and CM):  CHF, patient still grieving his wife who passed about 3 years ago. CM listened to patient and provided support. Finances/Medication cost? Patient has Medicare and did not voice any financial stressors                   Transportation? Patient's daughter will provide transportation home              Support system or lack thereof? See above                     Living arrangements? Patient lives in a rental house with his 2 daughters and 7 dogs. Self-care/ADL's/Cognition? Patient is independent, alert, oriented x 4 and uses a cane for safe ambulation           Current Advanced Directive/Advance Care Plan:  Not on file                          Plan for utilizing home health:  Yes. [facility] for CHF                   Transition of Care Plan:  CM met with patient to discuss discharge planning. CM also noted order for a [facility] for CHF. Patient agreed to visit and a referral was sent to [facility] via [facility]. Patient did not voice any discharge barriers. CM will follow as needed. Katelynn Mercer MSA, RN, CRM. Care Management Interventions  PCP Verified by CM: Yes  Palliative Care Criteria Met (RRAT>21 & CHF Dx)?: Yes  Palliative Consult Recommended?: No  Mode of Transport at Discharge:  Other (see comment)(Private car)  Transition of Care Consult (CM Consult): Discharge Planning, [address]: Yes  Physical Therapy Consult: Yes  Current Support Network: Lives with Caregiver  Confirm Follow Up Transport: Family  Plan discussed with Pt/Family/Caregiver: Yes  Freedom of Choice Offered: Yes  Discharge Location  Discharge Placement: Home with home health     3:30 am. CM offered patient [address] for PT, his daughter Luz Fleming [phone] declined stating she is going to start an exercise program for him but will accept the [facility] visit. Nuria Mckinnon MSA, RN, CRM.

## 2019-06-17 NOTE — CARDIO/PULMONARY
Cardiac Rehab: Living with Heart Failure Booklet given to Avtar Fernando .   
 
Educated using teach back method. Discussed diagnosis definition and assessed patient understanding. Reviewed importance of daily weight monitoring and Low Sodium diet (2896-1281 mg. Daily). Weights since admit were documented on his HF calender. Encouraged activity and rest periods within symptom limitations and as ordered by physician. Avtar Fernando does weigh everyday but does not know the names of his medications. Stressed the importance of reading food labels to avoid excess intake of sodium Discussed importance of reporting signs and symptoms of exacerbation, and when to report them to the doctor, to prevent re-hospitalization. Avtar Fernando was encouraged to keep all appointments with doctor. JANELL Becerra could benefit from further education on the following HF topics: Shanel Sharma 25

## 2019-06-17 NOTE — DISCHARGE SUMMARY
Hospitalist Discharge Summary     Patient ID:  Britni Pacheco  [id number]  71 y.o.  1955 6/13/2019    PCP on record: Kenyetta Barker MD    Admit date: 6/13/2019  Discharge date and time: 6/17/2019    DISCHARGE DIAGNOSIS:  Acute on chronic CHF( NYHA Class III)   cirrhosis  Hypokalemia      CONSULTATIONS:  IP CONSULT TO HOSPITALIST  IP CONSULT TO CARDIOLOGY    Excerpted HPI from H&P of Dru Soriano MD:  HISTORY OF PRESENTING COMPLAINT:  The patient is a 58-year-old male with past medical history of coronary artery disease, status post CABG x2, status post PTCA with stents x15, hypertension, cirrhosis, diabetes, obstructive sleep apnea on CPAP and gastroesophageal reflux disease who presented to the emergency room from home with complaints of shortness of breath. Shortness of breath started two to three days ago as per the patient's report. The patient is accompanied by their daughter who lives with the patient. Shortness of breath is exacerbated when lying flat, but is unchanged with ambulation and there is associated PND. The patient denies cough, nasal congestion, sore throat, epistaxis, or rhinorrhea. The daughter states that the patient has been using his CPAP more frequently at night due to shortness of breath. The patient is not reporting any chest pain, palpitations, irregular heartbeat, anxiety, or depression. The patient reports that he is usually cold, but otherwise there is no report of chills, rigors, or fever. There is no complaint of recent fall or trauma. The last time the patient fell was in January. He denies dysuria, frequency, hematuria, or urethral discharge. There is no lower extremity pain, but the patient reports intermittent swelling of lower extremity. There is no headache, lightheadedness, dizziness, blurred vision, double vision, syncopal episode, or seizures. The patient has history of seizures, but there is no report of recent seizures. According to the patient, the shortness of breath is reported as a band around his chest, which keeps him from breathing deep. The patient denies generalized weakness, focal weakness, numbness, tingling sensation, or abnormal sensation. He also denies dysuria or frequency. He also denies loss of appetite, dysphagia, or odynophagia. There is no report of any skin lesions or rash.           ______________________________________________________________________  DISCHARGE SUMMARY/HOSPITAL COURSE:  for full details see H&P, daily progress notes, labs, consult notes. 1.  Shortness of breath due to acute on chronic systolic CHF: continue diuretics   2.  Acute on chronic systolic congestive heart failure. Diuretics( on hold for Hypotension ), daily BMP, no need for ischemic work up cardiology seen and evaluated ECHO:  Moderately dilated left ventricle. Severe systolic dysfunction. Estimated left ventricular ejection fraction is 16 - 20%. Discussed with card regarding disposition  3.  Coronary artery disease, status post coronary artery bypass graft x2 and percutaneous transluminal coronary angioplasty with stents. 4.  Hypertension. BP stable continue meds HOLD BP meds, has low BP, will check prior to next doses   5.  Diabetes mellitus. SSI   6.  Liver cirrhosis. stable   7.  Gastroesophageal reflux disease. PPI   8.  Obstructive sleep apnea with seizures on continuous positive airway pressure. 9.  Seizures. continue Keppra   10.  Epigastric hernia. on PPI  11. Hypokalemia: patient could not tolerate IV KCL. Now we replacing PO              _______________________________________________________________________  Patient seen and examined by me on discharge day. Pertinent Findings:  Gen:    Not in distress  Chest: Clear lungs  CVS:   Regular rhythm.   No edema  Abd:  Soft, not distended, not tender  Neuro:  Alert, orientedx3  _______________________________________________________________________  DISCHARGE MEDICATIONS: Current Discharge Medication List      START taking these medications    Details   furosemide (LASIX) 20 mg tablet Take 1 Tab by mouth daily. Qty: 30 Tab, Refills: 0      potassium chloride (KLOR-CON) 20 mEq pack Take 1 Packet by mouth daily. Qty: 7 Packet, Refills: 0         CONTINUE these medications which have CHANGED    Details   carvedilol (COREG) 3.125 mg tablet Take 4 Tabs by mouth two (2) times daily (with meals). Qty: 90 Tab, Refills: 0         CONTINUE these medications which have NOT CHANGED    Details   finasteride (PROSCAR) 5 mg tablet Take 5 mg by mouth every morning. eplerenone (INSPRA) 25 mg tablet Take 25 mg by mouth daily. levETIRAcetam 1,000 mg tablet Take 1 Tab by mouth every twelve (12) hours. Qty: 60 Tab, Refills: 1      venlafaxine-SR (EFFEXOR-XR) 150 mg capsule Take 1 Cap by mouth daily (after breakfast). Qty: 30 Cap, Refills: 0      clopidogrel (PLAVIX) 75 mg tab Take 1 Tab by mouth daily. Qty: 30 Tab, Refills: 2      isosorbide mononitrate ER (IMDUR) 30 mg tablet Take 1 Tab by mouth daily. Qty: 30 Tab, Refills: 2      lisinopril (PRINIVIL, ZESTRIL) 10 mg tablet Take 1 Tab by mouth daily. Qty: 30 Tab, Refills: 2      FLUoxetine (PROZAC) 20 mg capsule Take 20 mg by mouth daily. QUEtiapine (SEROQUEL) 100 mg tablet Take 25 mg by mouth nightly. nitroglycerin (NITROSTAT) 0.4 mg SL tablet 0.4 mg by SubLINGual route every five (5) minutes as needed for Chest Pain. May repeat every 5 minutes for a maximum of 3 doses if chest pain not relieved call MD      lactulose (CHRONULAC) 10 gram/15 mL solution Take 45 mL by mouth three (3) times daily. Adjust dosage so that you have 2-3 bowel movements per day. Qty: 1 Bottle, Refills: 0      raNITIdine (ZANTAC) 150 mg tablet Take 150 mg by mouth two (2) times a day. Before Breakfast and in the afternoon (also takes Protonix at same time)      aspirin 81 mg chewable tablet Take 1 Tab by mouth daily.   Qty: 30 Tab, Refills: 0 pantoprazole (PROTONIX) 40 mg tablet Take 1 Tab by mouth Before breakfast and dinner. Before Breakfast and in the afternoon (also takes Zantac at same time)  Qty: 60 Tab, Refills: 0      pregabalin (LYRICA) 50 mg capsule Take 1 Cap by mouth three (3) times daily. Max Daily Amount: 150 mg.  Qty: 90 Cap, Refills: 0      tamsulosin (FLOMAX) 0.4 mg capsule Take 1 Cap by mouth Before breakfast and dinner. Before Breakfast and in the afternoon  Qty: 30 Cap, Refills: 0      rifAXIMin (XIFAXAN) 550 mg tablet Take 1 Tab by mouth two (2) times a day. Qty: 180 Tab, Refills: 3         STOP taking these medications       clonazePAM (KLONOPIN) 1 mg tablet Comments:   Reason for Stopping:         ALPRAZolam (XANAX) 1 mg tablet Comments:   Reason for Stopping:                 Patient Follow Up Instructions: Activity: Activity as tolerated  Diet: Cardiac Diet  Wound Care: None needed    Follow-up with Cardiology  in 1 week. Follow-up tests/labs BMP by PCP  Follow-up Information     Follow up With Specialties Details Why Contact Info    Dr. Kelly Young  On 6/24/2019 at 12:30 pm on 6/24/19 [address] [place] [address] [address], [address] [address]    Indy Hsieh MD Family Practice Go on 6/19/2019 at 11:30 am 6/19/19. [facility]      [address] [id number] will call for a visit appt.  If no call in 48 hours, call [address]  [phone]        ________________________________________________________________    Risk of deterioration: Moderate    Condition at Discharge:  Stable  __________________________________________________________________    Disposition  Home with family and home health services    ____________________________________________________________________    Code Status: Full Code  ___________________________________________________________________      Total time in minutes spent coordinating this discharge (includes going over instructions, follow-up, prescriptions, and preparing report for sign off to her PCP) :  30 minutes    Signed:  Clinton Payne MD
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
## 2019-06-17 NOTE — PROGRESS NOTES
Discussed getting up slowly, medication changes,  when was patients last seizure. Patient cleared for discharge and orders written. Patients family unclear on current pharmacy. The [facility] the RX was sent to closed today. Discussed next dose due in the morning. Evening dose given prior to discharge. Discharge instructions reviewed. Opportunity for questions given to family and patient. Lili from [address] at bedside prior to discharge. IV removed and volunteer services

## 2019-06-17 NOTE — PROGRESS NOTES
Spiritual Care Partner Volunteer visited patient in [facility] [facility] on 6/17/19. Documented by:   Chaplain Torres MDiv, MACE  [phone] ([id number])
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
## 2019-06-24 ENCOUNTER — HOME CARE VISIT (OUTPATIENT)
Dept: HOME HEALTH SERVICES | Facility: HOME HEALTH | Age: 64
End: 2019-06-24

## 2019-06-26 ENCOUNTER — HOME CARE VISIT (OUTPATIENT)
Dept: HOME HEALTH SERVICES | Facility: HOME HEALTH | Age: 64
End: 2019-06-26

## 2019-07-01 ENCOUNTER — HOME CARE VISIT (OUTPATIENT)
Dept: SCHEDULING | Facility: HOME HEALTH | Age: 64
End: 2019-07-01

## 2019-07-01 PROCEDURE — G0299 HHS/HOSPICE OF RN EA 15 MIN: HCPCS

## 2019-07-03 ENCOUNTER — HOME CARE VISIT (OUTPATIENT)
Dept: HOME HEALTH SERVICES | Facility: HOME HEALTH | Age: 64
End: 2019-07-03

## 2019-07-12 ENCOUNTER — HOME CARE VISIT (OUTPATIENT)
Dept: HOME HEALTH SERVICES | Facility: HOME HEALTH | Age: 64
End: 2019-07-12

## 2019-08-20 ENCOUNTER — HOSPITAL ENCOUNTER (INPATIENT)
Age: 64
LOS: 14 days | Discharge: NURSING, REHAB OR LONG-TERM CARE | DRG: 224 | End: 2019-09-03
Attending: EMERGENCY MEDICINE | Admitting: INTERNAL MEDICINE
Payer: MEDICARE

## 2019-08-20 ENCOUNTER — APPOINTMENT (OUTPATIENT)
Dept: GENERAL RADIOLOGY | Age: 64
DRG: 224 | End: 2019-08-20
Attending: INTERNAL MEDICINE
Payer: MEDICARE

## 2019-08-20 DIAGNOSIS — I47.20 V-TACH: ICD-10-CM

## 2019-08-20 DIAGNOSIS — I21.3 ST ELEVATION (STEMI) MYOCARDIAL INFARCTION (HCC): ICD-10-CM

## 2019-08-20 DIAGNOSIS — I21.4 NON-STEMI (NON-ST ELEVATED MYOCARDIAL INFARCTION) (HCC): ICD-10-CM

## 2019-08-20 DIAGNOSIS — I47.20 VT (VENTRICULAR TACHYCARDIA): Primary | ICD-10-CM

## 2019-08-20 LAB
ALBUMIN SERPL-MCNC: 3.3 G/DL (ref 3.5–5)
ALBUMIN/GLOB SERPL: 0.9 {RATIO} (ref 1.1–2.2)
ALP SERPL-CCNC: 104 U/L (ref 45–117)
ALT SERPL-CCNC: 16 U/L (ref 12–78)
ANION GAP SERPL CALC-SCNC: 11 MMOL/L (ref 5–15)
ARTERIAL PATENCY WRIST A: YES
AST SERPL-CCNC: 9 U/L (ref 15–37)
BASE DEFICIT BLD-SCNC: 5 MMOL/L
BASOPHILS # BLD: 0 K/UL (ref 0–0.1)
BASOPHILS NFR BLD: 0 % (ref 0–1)
BDY SITE: ABNORMAL
BILIRUB SERPL-MCNC: 0.5 MG/DL (ref 0.2–1)
BUN SERPL-MCNC: 26 MG/DL (ref 6–20)
BUN/CREAT SERPL: 15 (ref 12–20)
CALCIUM SERPL-MCNC: 8.3 MG/DL (ref 8.5–10.1)
CHLORIDE SERPL-SCNC: 106 MMOL/L (ref 97–108)
CO2 SERPL-SCNC: 22 MMOL/L (ref 21–32)
COMMENT, HOLDF: NORMAL
CREAT SERPL-MCNC: 1.74 MG/DL (ref 0.7–1.3)
DIFFERENTIAL METHOD BLD: ABNORMAL
EOSINOPHIL # BLD: 0.2 K/UL (ref 0–0.4)
EOSINOPHIL NFR BLD: 3 % (ref 0–7)
ERYTHROCYTE [DISTWIDTH] IN BLOOD BY AUTOMATED COUNT: 19 % (ref 11.5–14.5)
GAS FLOW.O2 O2 DELIVERY SYS: ABNORMAL L/MIN
GAS FLOW.O2 SETTING OXYMISER: 14 BPM
GLOBULIN SER CALC-MCNC: 3.8 G/DL (ref 2–4)
GLUCOSE SERPL-MCNC: 107 MG/DL (ref 65–100)
HCO3 BLD-SCNC: 21 MMOL/L (ref 22–26)
HCT VFR BLD AUTO: 42.3 % (ref 36.6–50.3)
HGB BLD-MCNC: 12.5 G/DL (ref 12.1–17)
IMM GRANULOCYTES # BLD AUTO: 0 K/UL (ref 0–0.04)
IMM GRANULOCYTES NFR BLD AUTO: 1 % (ref 0–0.5)
LYMPHOCYTES # BLD: 1.4 K/UL (ref 0.8–3.5)
LYMPHOCYTES NFR BLD: 22 % (ref 12–49)
MAGNESIUM SERPL-MCNC: 1.8 MG/DL (ref 1.6–2.4)
MCH RBC QN AUTO: 21.2 PG (ref 26–34)
MCHC RBC AUTO-ENTMCNC: 29.6 G/DL (ref 30–36.5)
MCV RBC AUTO: 71.6 FL (ref 80–99)
MONOCYTES # BLD: 0.4 K/UL (ref 0–1)
MONOCYTES NFR BLD: 6 % (ref 5–13)
NEUTS SEG # BLD: 4.3 K/UL (ref 1.8–8)
NEUTS SEG NFR BLD: 68 % (ref 32–75)
NRBC # BLD: 0 K/UL (ref 0–0.01)
NRBC BLD-RTO: 0 PER 100 WBC
O2/TOTAL GAS SETTING VFR VENT: 100 %
PCO2 BLD: 42.5 MMHG (ref 35–45)
PEEP RESPIRATORY: 5 CMH2O
PH BLD: 7.3 [PH] (ref 7.35–7.45)
PLATELET # BLD AUTO: 168 K/UL (ref 150–400)
PO2 BLD: 409 MMHG (ref 80–100)
POTASSIUM SERPL-SCNC: 3.9 MMOL/L (ref 3.5–5.1)
PROT SERPL-MCNC: 7.1 G/DL (ref 6.4–8.2)
RBC # BLD AUTO: 5.91 M/UL (ref 4.1–5.7)
SAMPLES BEING HELD,HOLD: NORMAL
SAO2 % BLD: 100 % (ref 92–97)
SODIUM SERPL-SCNC: 139 MMOL/L (ref 136–145)
SPECIMEN TYPE: ABNORMAL
TOTAL RESP. RATE, ITRR: 19
TROPONIN I BLD-MCNC: <0.04 NG/ML (ref 0–0.08)
TROPONIN I SERPL-MCNC: <0.05 NG/ML
VENTILATION MODE VENT: ABNORMAL
VT SETTING VENT: 500 ML
WBC # BLD AUTO: 6.3 K/UL (ref 4.1–11.1)

## 2019-08-20 PROCEDURE — 96374 THER/PROPH/DIAG INJ IV PUSH: CPT

## 2019-08-20 PROCEDURE — 93455 CORONARY ART/GRFT ANGIO S&I: CPT | Performed by: INTERNAL MEDICINE

## 2019-08-20 PROCEDURE — 74011250636 HC RX REV CODE- 250/636

## 2019-08-20 PROCEDURE — B2111ZZ FLUOROSCOPY OF MULTIPLE CORONARY ARTERIES USING LOW OSMOLAR CONTRAST: ICD-10-PCS | Performed by: INTERNAL MEDICINE

## 2019-08-20 PROCEDURE — 76010000093 HC SPECIAL PROCEDURE

## 2019-08-20 PROCEDURE — 94002 VENT MGMT INPAT INIT DAY: CPT

## 2019-08-20 PROCEDURE — C1769 GUIDE WIRE: HCPCS | Performed by: INTERNAL MEDICINE

## 2019-08-20 PROCEDURE — 5A1945Z RESPIRATORY VENTILATION, 24-96 CONSECUTIVE HOURS: ICD-10-PCS | Performed by: ANESTHESIOLOGY

## 2019-08-20 PROCEDURE — 36600 WITHDRAWAL OF ARTERIAL BLOOD: CPT

## 2019-08-20 PROCEDURE — 93005 ELECTROCARDIOGRAM TRACING: CPT

## 2019-08-20 PROCEDURE — 77030040361 HC SLV COMPR DVT MDII -B

## 2019-08-20 PROCEDURE — B2131ZZ FLUOROSCOPY OF MULTIPLE CORONARY ARTERY BYPASS GRAFTS USING LOW OSMOLAR CONTRAST: ICD-10-PCS | Performed by: INTERNAL MEDICINE

## 2019-08-20 PROCEDURE — 77030010547 HC BG URIN W/UMETER -A

## 2019-08-20 PROCEDURE — 84484 ASSAY OF TROPONIN QUANT: CPT

## 2019-08-20 PROCEDURE — 77030008771 HC TU NG SALEM SUMP -A

## 2019-08-20 PROCEDURE — 5A2204Z RESTORATION OF CARDIAC RHYTHM, SINGLE: ICD-10-PCS | Performed by: EMERGENCY MEDICINE

## 2019-08-20 PROCEDURE — 74018 RADEX ABDOMEN 1 VIEW: CPT

## 2019-08-20 PROCEDURE — 74011636320 HC RX REV CODE- 636/320: Performed by: INTERNAL MEDICINE

## 2019-08-20 PROCEDURE — 74011000250 HC RX REV CODE- 250: Performed by: INTERNAL MEDICINE

## 2019-08-20 PROCEDURE — 74011250637 HC RX REV CODE- 250/637: Performed by: INTERNAL MEDICINE

## 2019-08-20 PROCEDURE — 82803 BLOOD GASES ANY COMBINATION: CPT

## 2019-08-20 PROCEDURE — 77030011943

## 2019-08-20 PROCEDURE — 99153 MOD SED SAME PHYS/QHP EA: CPT | Performed by: INTERNAL MEDICINE

## 2019-08-20 PROCEDURE — 83735 ASSAY OF MAGNESIUM: CPT

## 2019-08-20 PROCEDURE — 74011250636 HC RX REV CODE- 250/636: Performed by: ANESTHESIOLOGY

## 2019-08-20 PROCEDURE — 74011000250 HC RX REV CODE- 250: Performed by: ANESTHESIOLOGY

## 2019-08-20 PROCEDURE — B2181ZZ FLUOROSCOPY OF LEFT INTERNAL MAMMARY BYPASS GRAFT USING LOW OSMOLAR CONTRAST: ICD-10-PCS | Performed by: INTERNAL MEDICINE

## 2019-08-20 PROCEDURE — 74011250636 HC RX REV CODE- 250/636: Performed by: INTERNAL MEDICINE

## 2019-08-20 PROCEDURE — 85025 COMPLETE CBC W/AUTO DIFF WBC: CPT

## 2019-08-20 PROCEDURE — 77030004533 HC CATH ANGI DX IMP BSC -B: Performed by: INTERNAL MEDICINE

## 2019-08-20 PROCEDURE — 77030013715 HC INFL SYS MRTM -B: Performed by: INTERNAL MEDICINE

## 2019-08-20 PROCEDURE — 80053 COMPREHEN METABOLIC PANEL: CPT

## 2019-08-20 PROCEDURE — 0BH17EZ INSERTION OF ENDOTRACHEAL AIRWAY INTO TRACHEA, VIA NATURAL OR ARTIFICIAL OPENING: ICD-10-PCS | Performed by: ANESTHESIOLOGY

## 2019-08-20 PROCEDURE — 74011000258 HC RX REV CODE- 258: Performed by: INTERNAL MEDICINE

## 2019-08-20 PROCEDURE — C1894 INTRO/SHEATH, NON-LASER: HCPCS | Performed by: INTERNAL MEDICINE

## 2019-08-20 PROCEDURE — 99284 EMERGENCY DEPT VISIT MOD MDM: CPT

## 2019-08-20 PROCEDURE — 65620000000 HC RM CCU GENERAL

## 2019-08-20 PROCEDURE — C1760 CLOSURE DEV, VASC: HCPCS | Performed by: INTERNAL MEDICINE

## 2019-08-20 PROCEDURE — 99152 MOD SED SAME PHYS/QHP 5/>YRS: CPT | Performed by: INTERNAL MEDICINE

## 2019-08-20 PROCEDURE — 77030013744: Performed by: INTERNAL MEDICINE

## 2019-08-20 PROCEDURE — 4A023N7 MEASUREMENT OF CARDIAC SAMPLING AND PRESSURE, LEFT HEART, PERCUTANEOUS APPROACH: ICD-10-PCS | Performed by: INTERNAL MEDICINE

## 2019-08-20 RX ORDER — SODIUM CHLORIDE 0.9 % (FLUSH) 0.9 %
5-40 SYRINGE (ML) INJECTION EVERY 8 HOURS
Status: DISCONTINUED | OUTPATIENT
Start: 2019-08-20 | End: 2019-09-03 | Stop reason: HOSPADM

## 2019-08-20 RX ORDER — ALPRAZOLAM 1 MG/1
1 TABLET ORAL 2 TIMES DAILY
COMMUNITY
End: 2019-09-03

## 2019-08-20 RX ORDER — TAMSULOSIN HYDROCHLORIDE 0.4 MG/1
0.4 CAPSULE ORAL
Status: DISCONTINUED | OUTPATIENT
Start: 2019-08-20 | End: 2019-08-26

## 2019-08-20 RX ORDER — FUROSEMIDE 20 MG/1
20 TABLET ORAL DAILY
Status: DISCONTINUED | OUTPATIENT
Start: 2019-08-21 | End: 2019-09-03 | Stop reason: HOSPADM

## 2019-08-20 RX ORDER — AMLODIPINE BESYLATE 5 MG/1
2.5 TABLET ORAL DAILY
Status: DISCONTINUED | OUTPATIENT
Start: 2019-08-21 | End: 2019-08-29

## 2019-08-20 RX ORDER — LISINOPRIL 10 MG/1
10 TABLET ORAL DAILY
Status: DISCONTINUED | OUTPATIENT
Start: 2019-08-21 | End: 2019-08-26 | Stop reason: SDUPTHER

## 2019-08-20 RX ORDER — VENLAFAXINE HYDROCHLORIDE 150 MG/1
150 CAPSULE, EXTENDED RELEASE ORAL
Status: DISCONTINUED | OUTPATIENT
Start: 2019-08-21 | End: 2019-09-03 | Stop reason: HOSPADM

## 2019-08-20 RX ORDER — EPLERENONE 25 MG/1
25 TABLET, FILM COATED ORAL DAILY
COMMUNITY
End: 2020-05-24

## 2019-08-20 RX ORDER — QUETIAPINE FUMARATE 25 MG/1
25 TABLET, FILM COATED ORAL
Status: DISCONTINUED | OUTPATIENT
Start: 2019-08-20 | End: 2019-09-03 | Stop reason: HOSPADM

## 2019-08-20 RX ORDER — CLOPIDOGREL BISULFATE 75 MG/1
75 TABLET ORAL DAILY
Status: DISCONTINUED | OUTPATIENT
Start: 2019-08-21 | End: 2019-09-03 | Stop reason: HOSPADM

## 2019-08-20 RX ORDER — PANTOPRAZOLE SODIUM 40 MG/1
40 TABLET, DELAYED RELEASE ORAL
Status: DISCONTINUED | OUTPATIENT
Start: 2019-08-20 | End: 2019-08-22

## 2019-08-20 RX ORDER — NITROGLYCERIN 0.4 MG/1
0.4 TABLET SUBLINGUAL
Status: DISCONTINUED | OUTPATIENT
Start: 2019-08-20 | End: 2019-09-03 | Stop reason: HOSPADM

## 2019-08-20 RX ORDER — FLUOXETINE HYDROCHLORIDE 20 MG/1
20 CAPSULE ORAL DAILY
Status: DISCONTINUED | OUTPATIENT
Start: 2019-08-21 | End: 2019-08-27

## 2019-08-20 RX ORDER — LEVETIRACETAM 500 MG/1
1000 TABLET ORAL EVERY 12 HOURS
Status: DISCONTINUED | OUTPATIENT
Start: 2019-08-20 | End: 2019-08-21 | Stop reason: SDUPTHER

## 2019-08-20 RX ORDER — PREGABALIN 25 MG/1
50 CAPSULE ORAL 3 TIMES DAILY
Status: DISCONTINUED | OUTPATIENT
Start: 2019-08-20 | End: 2019-09-03 | Stop reason: HOSPADM

## 2019-08-20 RX ORDER — SUCCINYLCHOLINE CHLORIDE 20 MG/ML
INJECTION INTRAMUSCULAR; INTRAVENOUS AS NEEDED
Status: DISCONTINUED | OUTPATIENT
Start: 2019-08-20 | End: 2019-08-20 | Stop reason: HOSPADM

## 2019-08-20 RX ORDER — FAMOTIDINE 20 MG/1
20 TABLET, FILM COATED ORAL
Status: DISCONTINUED | OUTPATIENT
Start: 2019-08-20 | End: 2019-09-03 | Stop reason: HOSPADM

## 2019-08-20 RX ORDER — PROPOFOL 10 MG/ML
0-50 INJECTION, EMULSION INTRAVENOUS
Status: DISCONTINUED | OUTPATIENT
Start: 2019-08-20 | End: 2019-08-23

## 2019-08-20 RX ORDER — FINASTERIDE 5 MG/1
5 TABLET, FILM COATED ORAL
Status: DISCONTINUED | OUTPATIENT
Start: 2019-08-21 | End: 2019-09-03 | Stop reason: HOSPADM

## 2019-08-20 RX ORDER — POTASSIUM CHLORIDE 1.5 G/1.77G
20 POWDER, FOR SOLUTION ORAL DAILY
Status: DISCONTINUED | OUTPATIENT
Start: 2019-08-21 | End: 2019-08-26 | Stop reason: SDUPTHER

## 2019-08-20 RX ORDER — GLIMEPIRIDE 4 MG/1
2 TABLET ORAL 2 TIMES DAILY
COMMUNITY
End: 2020-05-26 | Stop reason: CLARIF

## 2019-08-20 RX ORDER — LIDOCAINE HYDROCHLORIDE ANHYDROUS AND DEXTROSE MONOHYDRATE .8; 5 G/100ML; G/100ML
1 INJECTION, SOLUTION INTRAVENOUS CONTINUOUS
Status: DISCONTINUED | OUTPATIENT
Start: 2019-08-20 | End: 2019-08-24

## 2019-08-20 RX ORDER — CLONAZEPAM 1 MG/1
1 TABLET ORAL
COMMUNITY
End: 2020-08-11

## 2019-08-20 RX ORDER — LIDOCAINE HYDROCHLORIDE 10 MG/ML
INJECTION INFILTRATION; PERINEURAL AS NEEDED
Status: DISCONTINUED | OUTPATIENT
Start: 2019-08-20 | End: 2019-08-20 | Stop reason: HOSPADM

## 2019-08-20 RX ORDER — ETOMIDATE 2 MG/ML
INJECTION INTRAVENOUS AS NEEDED
Status: DISCONTINUED | OUTPATIENT
Start: 2019-08-20 | End: 2019-08-20 | Stop reason: HOSPADM

## 2019-08-20 RX ORDER — MIDAZOLAM HYDROCHLORIDE 1 MG/ML
INJECTION, SOLUTION INTRAMUSCULAR; INTRAVENOUS AS NEEDED
Status: DISCONTINUED | OUTPATIENT
Start: 2019-08-20 | End: 2019-08-20 | Stop reason: HOSPADM

## 2019-08-20 RX ORDER — GUAIFENESIN 100 MG/5ML
81 LIQUID (ML) ORAL DAILY
Status: DISCONTINUED | OUTPATIENT
Start: 2019-08-21 | End: 2019-09-03 | Stop reason: HOSPADM

## 2019-08-20 RX ORDER — ISOSORBIDE MONONITRATE 30 MG/1
30 TABLET, EXTENDED RELEASE ORAL DAILY
Status: DISCONTINUED | OUTPATIENT
Start: 2019-08-21 | End: 2019-09-03 | Stop reason: HOSPADM

## 2019-08-20 RX ORDER — ATORVASTATIN CALCIUM 80 MG/1
80 TABLET, FILM COATED ORAL DAILY
COMMUNITY

## 2019-08-20 RX ORDER — LIDOCAINE HCL/PF 100 MG/5ML
SYRINGE (ML) INTRAVENOUS AS NEEDED
Status: DISCONTINUED | OUTPATIENT
Start: 2019-08-20 | End: 2019-08-20 | Stop reason: HOSPADM

## 2019-08-20 RX ORDER — SODIUM CHLORIDE 0.9 % (FLUSH) 0.9 %
5-40 SYRINGE (ML) INJECTION AS NEEDED
Status: DISCONTINUED | OUTPATIENT
Start: 2019-08-20 | End: 2019-09-03 | Stop reason: HOSPADM

## 2019-08-20 RX ADMIN — LIDOCAINE HYDROCHLORIDE 1 MG/MIN: 8 INJECTION, SOLUTION INTRAVENOUS at 17:07

## 2019-08-20 RX ADMIN — AMIODARONE HYDROCHLORIDE 1 MG/MIN: 50 INJECTION, SOLUTION INTRAVENOUS at 16:12

## 2019-08-20 RX ADMIN — SODIUM CHLORIDE 500 ML: 900 INJECTION, SOLUTION INTRAVENOUS at 18:00

## 2019-08-20 RX ADMIN — PANTOPRAZOLE SODIUM 40 MG: 40 TABLET, DELAYED RELEASE ORAL at 21:27

## 2019-08-20 RX ADMIN — AMIODARONE HYDROCHLORIDE 150 MG: 50 INJECTION, SOLUTION INTRAVENOUS at 16:12

## 2019-08-20 RX ADMIN — CARVEDILOL 15.62 MG: 12.5 TABLET, FILM COATED ORAL at 21:27

## 2019-08-20 RX ADMIN — PROPOFOL 10 MCG/KG/MIN: 10 INJECTION, EMULSION INTRAVENOUS at 16:36

## 2019-08-20 RX ADMIN — LACTULOSE 45 ML: 20 SOLUTION ORAL at 21:27

## 2019-08-20 RX ADMIN — PHENYLEPHRINE HYDROCHLORIDE 30 MCG/MIN: 10 INJECTION INTRAVENOUS at 18:53

## 2019-08-20 RX ADMIN — Medication 10 ML: at 21:28

## 2019-08-20 RX ADMIN — PREGABALIN 50 MG: 25 CAPSULE ORAL at 21:27

## 2019-08-20 RX ADMIN — FAMOTIDINE 20 MG: 20 TABLET ORAL at 21:30

## 2019-08-20 RX ADMIN — SODIUM CHLORIDE 500 ML: 900 INJECTION, SOLUTION INTRAVENOUS at 16:13

## 2019-08-20 RX ADMIN — LEVETIRACETAM 1000 MG: 500 TABLET, FILM COATED ORAL at 21:27

## 2019-08-20 NOTE — PROGRESS NOTES
TRANSFER - OUT REPORT:    Verbal report given to JANELL ZUNIGA (name) on Wes Tello.  being transferred to CCU (unit) for routine post - op       Report consisted of patients Situation, Background, Assessment and   Recommendations(SBAR). Information from the following report(s) SBAR, ED Summary, Procedure Summary and MAR was reviewed with the receiving nurse. Lines:   Peripheral IV 08/20/19 Left Antecubital (Active)   Site Assessment Clean, dry, & intact 8/20/2019  4:09 PM   Phlebitis Assessment 0 8/20/2019  4:09 PM   Infiltration Assessment 0 8/20/2019  4:09 PM   Dressing Status Clean, dry, & intact 8/20/2019  4:09 PM       Peripheral IV 08/20/19 Right Antecubital (Active)   Site Assessment Clean, dry, & intact 8/20/2019  4:09 PM   Phlebitis Assessment 0 8/20/2019  4:09 PM   Infiltration Assessment 0 8/20/2019  4:09 PM   Dressing Status Clean, dry, & intact 8/20/2019  4:09 PM        Opportunity for questions and clarification was provided.       Patient transported with:   Monitor  O2 @ 15 liters  Registered Nurse

## 2019-08-20 NOTE — ROUTINE PROCESS
17:30 TRANSFER - IN REPORT:    Verbal report received from Colleen(name) on Mardeen Ink.  being received from cath lab(unit) for routine progression of care      Report consisted of patients Situation, Background, Assessment and   Recommendations(SBAR). Information from the following report(s) SBAR, Kardex, Intake/Output, MAR, Accordion, Recent Results, Med Rec Status, Cardiac Rhythm NSR right bbb, Alarm Parameters , Pre Procedure Checklist, Procedure Verification and Quality Measures was reviewed with the receiving nurse. Opportunity for questions and clarification was provided. Assessment completed upon patients arrival to unit and care assumed. Primary Nurse Josephine Mojica RN and Felecia RN performed a dual skin assessment on this patient No impairment noted  Current Bed:   Total Care   Iván score is 10    18:53 Jas started for low BP    19:15 OG tube placed to give medications, awaiting KUB for confirmation    19:17 Bedside shift change report given to Elisha (oncoming nurse) by Mila Ty (offgoing nurse). Report included the following information SBAR, Kardex, Procedure Summary, Intake/Output, MAR, Accordion, Recent Results, Med Rec Status, Cardiac Rhythm NSR/SB right bbb, Alarm Parameters , Pre Procedure Checklist, Procedure Verification and Quality Measures.

## 2019-08-20 NOTE — PROGRESS NOTES
Intubation Note    Called to bedside secondary to  impending respiratory failure. Patient pre-oxygenated with 100% oxygen. Smooth RSI with Etomidate 20 mg + Succinylcholine 140 mg IV. DVL x 1    7.5 ETT taped and secured at 23 cm at the teeth. Tube confirmed by visualization and  + Bilateral BS, + Chest rise, + ETCO2    VSS. CXR pending.     Care turned over to covering  Attending MD.
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
## 2019-08-20 NOTE — Clinical Note
Single view of the saphenous vein graft to the right coronary obtained using power injection.  occulded

## 2019-08-20 NOTE — PROGRESS NOTES
Cardiac Cath Lab Procedure Area Arrival Note:    Iva Quinonez arrived to Cardiac Cath Lab, Procedure Area. Patient identifiers verified with NAME and DATE OF BIRTH. Procedure verified with patient. Consent forms verified. Allergies verified. Patient informed of procedure and plan of care. Questions answered with review. Patient voiced understanding of procedure and plan of care. Patient on cardiac monitor, non-invasive blood pressure, SPO2 monitor. On RA and placed on O2 @ 2 lpm via NC.  IV of NSS on pump at 999 ml/hr. Patient status currently having runs of VT. Patient is A&Ox 4. Patient reports chest pain. Patient medicated during procedure with orders obtained and verified by Dr. Velarde Co. Refer to patients Cardiac Cath Lab PROCEDURE REPORT for vital signs, assessment, status, and response during procedure, printed at end of case. Printed report on chart or scanned into chart.

## 2019-08-20 NOTE — PROGRESS NOTES
responded to code stemi.  spoke with emergency response team and they mentioned that family was at house and were not sure if they would be coming in. Pt was taken to cath lab. Please contact [phone] [facility] for further support.  follow up as needed.      Lily Solares, MACE   [phone])

## 2019-08-20 NOTE — Clinical Note
TRANSFER - OUT REPORT:  
 
Verbal report given to: myesha. Report consisted of patient's Situation, Background, Assessment and  
Recommendations(SBAR). Opportunity for questions and clarification was provided. Patient transported with a Registered Nurse. Patient transported to: ccu.
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
## 2019-08-20 NOTE — ED TRIAGE NOTES
Pt presents to ED from EMS from home with complaints of being found unresponsive. Pt had hx of seizures. Pt does not remember event. EMS reports EKG shows runs of Vtach and STEMI. Pt has no complaints of chest pain. Pt cardiologist is barbara.  Pt is a/ox4 on arrival

## 2019-08-20 NOTE — Clinical Note
TRANSFER - OUT REPORT:  
 
Verbal report given to: Lewis Baeza RN & Bonnie Tejada . Report consisted of patient's Situation, Background, Assessment and  
Recommendations(SBAR). Opportunity for questions and clarification was provided. Patient transported with a Registered Nurse, [address] [place] [address] / Patient Care Tech, Monitor and Oxygen. Patient transported to: CCU 20.

## 2019-08-20 NOTE — Clinical Note
TRANSFER - IN REPORT:  
 
Verbal report received from: Onofre Haines RN & [address], [facility]. Report consisted of patient's Situation, Background, Assessment and  
Recommendations(SBAR). Opportunity for questions and clarification was provided. Assessment completed upon patient's arrival to unit and care assumed. Patient transported with a Registered Nurse, [address] [place] [address] / Patient Care Tech, Monitor and Oxygen.

## 2019-08-21 ENCOUNTER — APPOINTMENT (OUTPATIENT)
Dept: GENERAL RADIOLOGY | Age: 64
DRG: 224 | End: 2019-08-21
Attending: INTERNAL MEDICINE
Payer: MEDICARE

## 2019-08-21 LAB
ALBUMIN SERPL-MCNC: 3.1 G/DL (ref 3.5–5)
ALBUMIN/GLOB SERPL: 0.9 {RATIO} (ref 1.1–2.2)
ALP SERPL-CCNC: 95 U/L (ref 45–117)
ALT SERPL-CCNC: 17 U/L (ref 12–78)
ANION GAP SERPL CALC-SCNC: 8 MMOL/L (ref 5–15)
ANION GAP SERPL CALC-SCNC: 8 MMOL/L (ref 5–15)
APPEARANCE UR: CLEAR
ARTERIAL PATENCY WRIST A: YES
AST SERPL-CCNC: 11 U/L (ref 15–37)
ATRIAL RATE: 62 BPM
BACTERIA URNS QL MICRO: ABNORMAL /HPF
BASE DEFICIT BLD-SCNC: 9 MMOL/L
BASOPHILS # BLD: 0 K/UL (ref 0–0.1)
BASOPHILS NFR BLD: 0 % (ref 0–1)
BDY SITE: ABNORMAL
BILIRUB SERPL-MCNC: 0.5 MG/DL (ref 0.2–1)
BILIRUB UR QL: NEGATIVE
BUN SERPL-MCNC: 29 MG/DL (ref 6–20)
BUN SERPL-MCNC: 30 MG/DL (ref 6–20)
BUN/CREAT SERPL: 16 (ref 12–20)
BUN/CREAT SERPL: 17 (ref 12–20)
CALCIUM SERPL-MCNC: 7.9 MG/DL (ref 8.5–10.1)
CALCIUM SERPL-MCNC: 8.4 MG/DL (ref 8.5–10.1)
CALCULATED P AXIS, ECG09: 49 DEGREES
CALCULATED R AXIS, ECG10: 99 DEGREES
CALCULATED T AXIS, ECG11: 63 DEGREES
CHLORIDE SERPL-SCNC: 110 MMOL/L (ref 97–108)
CHLORIDE SERPL-SCNC: 111 MMOL/L (ref 97–108)
CO2 SERPL-SCNC: 21 MMOL/L (ref 21–32)
CO2 SERPL-SCNC: 21 MMOL/L (ref 21–32)
COLOR UR: ABNORMAL
CREAT SERPL-MCNC: 1.74 MG/DL (ref 0.7–1.3)
CREAT SERPL-MCNC: 1.8 MG/DL (ref 0.7–1.3)
DIAGNOSIS, 93000: NORMAL
DIFFERENTIAL METHOD BLD: ABNORMAL
EOSINOPHIL # BLD: 0.2 K/UL (ref 0–0.4)
EOSINOPHIL NFR BLD: 1 % (ref 0–7)
EPITH CASTS URNS QL MICRO: ABNORMAL /LPF
ERYTHROCYTE [DISTWIDTH] IN BLOOD BY AUTOMATED COUNT: 19 % (ref 11.5–14.5)
GAS FLOW.O2 O2 DELIVERY SYS: ABNORMAL L/MIN
GAS FLOW.O2 SETTING OXYMISER: 16 BPM
GLOBULIN SER CALC-MCNC: 3.4 G/DL (ref 2–4)
GLUCOSE BLD STRIP.AUTO-MCNC: 121 MG/DL (ref 65–100)
GLUCOSE BLD STRIP.AUTO-MCNC: 146 MG/DL (ref 65–100)
GLUCOSE BLD STRIP.AUTO-MCNC: 166 MG/DL (ref 65–100)
GLUCOSE BLD STRIP.AUTO-MCNC: 44 MG/DL (ref 65–100)
GLUCOSE BLD STRIP.AUTO-MCNC: 44 MG/DL (ref 65–100)
GLUCOSE BLD STRIP.AUTO-MCNC: 52 MG/DL (ref 65–100)
GLUCOSE BLD STRIP.AUTO-MCNC: 74 MG/DL (ref 65–100)
GLUCOSE SERPL-MCNC: 53 MG/DL (ref 65–100)
GLUCOSE SERPL-MCNC: 70 MG/DL (ref 65–100)
GLUCOSE UR STRIP.AUTO-MCNC: NEGATIVE MG/DL
HCO3 BLD-SCNC: 17.7 MMOL/L (ref 22–26)
HCT VFR BLD AUTO: 39.5 % (ref 36.6–50.3)
HGB BLD-MCNC: 11.5 G/DL (ref 12.1–17)
HGB UR QL STRIP: ABNORMAL
IMM GRANULOCYTES # BLD AUTO: 0.1 K/UL (ref 0–0.04)
IMM GRANULOCYTES NFR BLD AUTO: 1 % (ref 0–0.5)
KETONES UR QL STRIP.AUTO: NEGATIVE MG/DL
LACTATE SERPL-SCNC: 1.5 MMOL/L (ref 0.4–2)
LEUKOCYTE ESTERASE UR QL STRIP.AUTO: ABNORMAL
LYMPHOCYTES # BLD: 2.1 K/UL (ref 0.8–3.5)
LYMPHOCYTES NFR BLD: 14 % (ref 12–49)
MAGNESIUM SERPL-MCNC: 1.8 MG/DL (ref 1.6–2.4)
MAGNESIUM SERPL-MCNC: 2.2 MG/DL (ref 1.6–2.4)
MCH RBC QN AUTO: 21 PG (ref 26–34)
MCHC RBC AUTO-ENTMCNC: 29.1 G/DL (ref 30–36.5)
MCV RBC AUTO: 72.2 FL (ref 80–99)
MONOCYTES # BLD: 0.9 K/UL (ref 0–1)
MONOCYTES NFR BLD: 6 % (ref 5–13)
NEUTS SEG # BLD: 11.6 K/UL (ref 1.8–8)
NEUTS SEG NFR BLD: 77 % (ref 32–75)
NITRITE UR QL STRIP.AUTO: NEGATIVE
NRBC # BLD: 0 K/UL (ref 0–0.01)
NRBC BLD-RTO: 0 PER 100 WBC
O2/TOTAL GAS SETTING VFR VENT: 50 %
P-R INTERVAL, ECG05: 172 MS
PCO2 BLD: 37.3 MMHG (ref 35–45)
PEEP RESPIRATORY: 5 CMH2O
PH BLD: 7.28 [PH] (ref 7.35–7.45)
PH UR STRIP: 5.5 [PH] (ref 5–8)
PIP ISTAT,IPIP: 17
PLATELET # BLD AUTO: 209 K/UL (ref 150–400)
PO2 BLD: 147 MMHG (ref 80–100)
POTASSIUM SERPL-SCNC: 4 MMOL/L (ref 3.5–5.1)
POTASSIUM SERPL-SCNC: 4.1 MMOL/L (ref 3.5–5.1)
PROT SERPL-MCNC: 6.5 G/DL (ref 6.4–8.2)
PROT UR STRIP-MCNC: 100 MG/DL
Q-T INTERVAL, ECG07: 500 MS
QRS DURATION, ECG06: 134 MS
QTC CALCULATION (BEZET), ECG08: 507 MS
RBC # BLD AUTO: 5.47 M/UL (ref 4.1–5.7)
RBC #/AREA URNS HPF: >100 /HPF (ref 0–5)
SAO2 % BLD: 99 % (ref 92–97)
SERVICE CMNT-IMP: ABNORMAL
SERVICE CMNT-IMP: NORMAL
SODIUM SERPL-SCNC: 139 MMOL/L (ref 136–145)
SODIUM SERPL-SCNC: 140 MMOL/L (ref 136–145)
SP GR UR REFRACTOMETRY: 1.02 (ref 1–1.03)
SPECIMEN TYPE: ABNORMAL
TOTAL RESP. RATE, ITRR: 23
UR CULT HOLD, URHOLD: NORMAL
UROBILINOGEN UR QL STRIP.AUTO: 1 EU/DL (ref 0.2–1)
VENTILATION MODE VENT: ABNORMAL
VENTRICULAR RATE, ECG03: 62 BPM
VT SETTING VENT: 500 ML
WBC # BLD AUTO: 15 K/UL (ref 4.1–11.1)
WBC URNS QL MICRO: ABNORMAL /HPF (ref 0–4)

## 2019-08-21 PROCEDURE — 74011250636 HC RX REV CODE- 250/636: Performed by: INTERNAL MEDICINE

## 2019-08-21 PROCEDURE — 71045 X-RAY EXAM CHEST 1 VIEW: CPT

## 2019-08-21 PROCEDURE — 87040 BLOOD CULTURE FOR BACTERIA: CPT

## 2019-08-21 PROCEDURE — 74011250637 HC RX REV CODE- 250/637: Performed by: INTERNAL MEDICINE

## 2019-08-21 PROCEDURE — 83735 ASSAY OF MAGNESIUM: CPT

## 2019-08-21 PROCEDURE — 94003 VENT MGMT INPAT SUBQ DAY: CPT

## 2019-08-21 PROCEDURE — 82803 BLOOD GASES ANY COMBINATION: CPT

## 2019-08-21 PROCEDURE — 82962 GLUCOSE BLOOD TEST: CPT

## 2019-08-21 PROCEDURE — 74011000250 HC RX REV CODE- 250: Performed by: INTERNAL MEDICINE

## 2019-08-21 PROCEDURE — 36600 WITHDRAWAL OF ARTERIAL BLOOD: CPT

## 2019-08-21 PROCEDURE — 87070 CULTURE OTHR SPECIMN AEROBIC: CPT

## 2019-08-21 PROCEDURE — 74011000258 HC RX REV CODE- 258: Performed by: INTERNAL MEDICINE

## 2019-08-21 PROCEDURE — 80053 COMPREHEN METABOLIC PANEL: CPT

## 2019-08-21 PROCEDURE — 65620000000 HC RM CCU GENERAL

## 2019-08-21 PROCEDURE — 80176 ASSAY OF LIDOCAINE: CPT

## 2019-08-21 PROCEDURE — 81001 URINALYSIS AUTO W/SCOPE: CPT

## 2019-08-21 PROCEDURE — 85025 COMPLETE CBC W/AUTO DIFF WBC: CPT

## 2019-08-21 PROCEDURE — 77030034848

## 2019-08-21 PROCEDURE — 77030011943

## 2019-08-21 PROCEDURE — 74011000258 HC RX REV CODE- 258

## 2019-08-21 PROCEDURE — 83605 ASSAY OF LACTIC ACID: CPT

## 2019-08-21 PROCEDURE — 77010033678 HC OXYGEN DAILY

## 2019-08-21 PROCEDURE — 36415 COLL VENOUS BLD VENIPUNCTURE: CPT

## 2019-08-21 RX ORDER — DEXTROSE MONOHYDRATE 100 MG/ML
INJECTION, SOLUTION INTRAVENOUS
Status: DISPENSED
Start: 2019-08-21 | End: 2019-08-21

## 2019-08-21 RX ORDER — MAGNESIUM SULFATE HEPTAHYDRATE 40 MG/ML
2 INJECTION, SOLUTION INTRAVENOUS ONCE
Status: COMPLETED | OUTPATIENT
Start: 2019-08-21 | End: 2019-08-21

## 2019-08-21 RX ORDER — SODIUM BICARBONATE 84 MG/ML
50 INJECTION, SOLUTION INTRAVENOUS
Status: COMPLETED | OUTPATIENT
Start: 2019-08-21 | End: 2019-08-21

## 2019-08-21 RX ORDER — LEVOFLOXACIN 5 MG/ML
750 INJECTION, SOLUTION INTRAVENOUS EVERY 24 HOURS
Status: DISCONTINUED | OUTPATIENT
Start: 2019-08-21 | End: 2019-08-21

## 2019-08-21 RX ORDER — CHLORHEXIDINE GLUCONATE 1.2 MG/ML
15 RINSE ORAL EVERY 12 HOURS
Status: DISCONTINUED | OUTPATIENT
Start: 2019-08-21 | End: 2019-08-25

## 2019-08-21 RX ORDER — VANCOMYCIN HYDROCHLORIDE
1250
Status: DISCONTINUED | OUTPATIENT
Start: 2019-08-22 | End: 2019-08-24

## 2019-08-21 RX ORDER — VANCOMYCIN 2 GRAM/500 ML IN 0.9 % SODIUM CHLORIDE INTRAVENOUS
2000 ONCE
Status: COMPLETED | OUTPATIENT
Start: 2019-08-21 | End: 2019-08-21

## 2019-08-21 RX ORDER — LEVOFLOXACIN 5 MG/ML
750 INJECTION, SOLUTION INTRAVENOUS
Status: DISCONTINUED | OUTPATIENT
Start: 2019-08-21 | End: 2019-08-24

## 2019-08-21 RX ADMIN — LACTULOSE 45 ML: 20 SOLUTION ORAL at 21:53

## 2019-08-21 RX ADMIN — MAGNESIUM SULFATE HEPTAHYDRATE 2 G: 40 INJECTION, SOLUTION INTRAVENOUS at 09:31

## 2019-08-21 RX ADMIN — FLUOXETINE 20 MG: 20 CAPSULE ORAL at 08:44

## 2019-08-21 RX ADMIN — VANCOMYCIN HYDROCHLORIDE 2000 MG: 10 INJECTION, POWDER, LYOPHILIZED, FOR SOLUTION INTRAVENOUS at 13:25

## 2019-08-21 RX ADMIN — Medication 10 ML: at 13:34

## 2019-08-21 RX ADMIN — PROPOFOL 25 MCG/KG/MIN: 10 INJECTION, EMULSION INTRAVENOUS at 20:19

## 2019-08-21 RX ADMIN — PANTOPRAZOLE SODIUM 40 MG: 40 TABLET, DELAYED RELEASE ORAL at 06:42

## 2019-08-21 RX ADMIN — PROPOFOL 25 MCG/KG/MIN: 10 INJECTION, EMULSION INTRAVENOUS at 13:36

## 2019-08-21 RX ADMIN — LACTULOSE 45 ML: 20 SOLUTION ORAL at 17:09

## 2019-08-21 RX ADMIN — LEVETIRACETAM 1000 MG: 100 SOLUTION ORAL at 21:56

## 2019-08-21 RX ADMIN — PROPOFOL 15 MCG/KG/MIN: 10 INJECTION, EMULSION INTRAVENOUS at 04:18

## 2019-08-21 RX ADMIN — PHENYLEPHRINE HYDROCHLORIDE 200 MCG/MIN: 10 INJECTION INTRAVENOUS at 00:45

## 2019-08-21 RX ADMIN — TAMSULOSIN HYDROCHLORIDE 0.4 MG: 0.4 CAPSULE ORAL at 06:42

## 2019-08-21 RX ADMIN — ACETAMINOPHEN 650 MG: 650 SOLUTION ORAL at 12:00

## 2019-08-21 RX ADMIN — FUROSEMIDE 20 MG: 40 TABLET ORAL at 08:46

## 2019-08-21 RX ADMIN — PHENYLEPHRINE HYDROCHLORIDE 250 MCG/MIN: 10 INJECTION INTRAVENOUS at 03:21

## 2019-08-21 RX ADMIN — DEXTROSE MONOHYDRATE 250 ML: 100 INJECTION, SOLUTION INTRAVENOUS at 07:16

## 2019-08-21 RX ADMIN — DEXTROSE MONOHYDRATE 250 ML: 10 INJECTION, SOLUTION INTRAVENOUS at 14:19

## 2019-08-21 RX ADMIN — SODIUM BICARBONATE 50 MEQ: 84 INJECTION, SOLUTION INTRAVENOUS at 10:47

## 2019-08-21 RX ADMIN — ASPIRIN 81 MG CHEWABLE TABLET 81 MG: 81 TABLET CHEWABLE at 08:44

## 2019-08-21 RX ADMIN — FAMOTIDINE 20 MG: 20 TABLET ORAL at 06:42

## 2019-08-21 RX ADMIN — CLOPIDOGREL BISULFATE 75 MG: 75 TABLET, FILM COATED ORAL at 08:44

## 2019-08-21 RX ADMIN — PHENYLEPHRINE HYDROCHLORIDE 125 MCG/MIN: 10 INJECTION INTRAVENOUS at 21:51

## 2019-08-21 RX ADMIN — FAMOTIDINE 20 MG: 20 TABLET ORAL at 17:09

## 2019-08-21 RX ADMIN — LEVETIRACETAM 1000 MG: 100 SOLUTION ORAL at 10:47

## 2019-08-21 RX ADMIN — PREGABALIN 50 MG: 25 CAPSULE ORAL at 21:53

## 2019-08-21 RX ADMIN — Medication 10 ML: at 05:08

## 2019-08-21 RX ADMIN — Medication 10 ML: at 21:53

## 2019-08-21 RX ADMIN — LACTULOSE 45 ML: 20 SOLUTION ORAL at 08:42

## 2019-08-21 RX ADMIN — CHLORHEXIDINE GLUCONATE 15 ML: 1.2 RINSE ORAL at 21:53

## 2019-08-21 RX ADMIN — PHENYLEPHRINE HYDROCHLORIDE 250 MCG/MIN: 10 INJECTION INTRAVENOUS at 09:15

## 2019-08-21 RX ADMIN — PHENYLEPHRINE HYDROCHLORIDE 220 MCG/MIN: 10 INJECTION INTRAVENOUS at 11:41

## 2019-08-21 RX ADMIN — PHENYLEPHRINE HYDROCHLORIDE 140 MCG/MIN: 10 INJECTION INTRAVENOUS at 17:45

## 2019-08-21 RX ADMIN — PIPERACILLIN SODIUM,TAZOBACTAM SODIUM 3.38 G: 3; .375 INJECTION, POWDER, FOR SOLUTION INTRAVENOUS at 14:37

## 2019-08-21 RX ADMIN — FINASTERIDE 5 MG: 5 TABLET, FILM COATED ORAL at 06:42

## 2019-08-21 RX ADMIN — PIPERACILLIN SODIUM,TAZOBACTAM SODIUM 3.38 G: 3; .375 INJECTION, POWDER, FOR SOLUTION INTRAVENOUS at 22:00

## 2019-08-21 RX ADMIN — PHENYLEPHRINE HYDROCHLORIDE 300 MCG/MIN: 10 INJECTION INTRAVENOUS at 05:22

## 2019-08-21 RX ADMIN — POTASSIUM CHLORIDE 20 MEQ: 1.5 POWDER, FOR SOLUTION ORAL at 08:43

## 2019-08-21 RX ADMIN — PHENYLEPHRINE HYDROCHLORIDE 300 MCG/MIN: 10 INJECTION INTRAVENOUS at 07:18

## 2019-08-21 RX ADMIN — DEXTROSE MONOHYDRATE 250 ML: 10 INJECTION, SOLUTION INTRAVENOUS at 22:28

## 2019-08-21 RX ADMIN — LEVOFLOXACIN 750 MG: 5 INJECTION, SOLUTION INTRAVENOUS at 13:23

## 2019-08-21 RX ADMIN — PHENYLEPHRINE HYDROCHLORIDE 185 MCG/MIN: 10 INJECTION INTRAVENOUS at 14:10

## 2019-08-21 NOTE — PROGRESS NOTES
Cardiology Progress Note  2019     Admit Date: 2019  Admit Diagnosis: V tach ([address] [I47.2]  VT (ventricular tachycardia) ([address] [I47.2]  CC: none currently    Assessment:   Active Problems:    V tach ([address] (2019)      VT (ventricular tachycardia) ([address] (2019)      Plan:     Holding most of CHF meds 2/2 hypotension, start back as tolerated  Wean vasopressors, ventilation as tolerated  Twice daily BMP  Lidocaine level, further adjustments based on results  Dose of IV bicarb as acidotic  Will plan on EP evaluation, likely ICD placement when off vasopressors prior to DC    CC time 30-74 minutes    Subjective:      Giovani Gomez. has been more stable, no VT on lidocaine. RAAS -1    Objective:    Physical Exam:  Overall VSSAF;    Visit Vitals  /71   Pulse 64   Temp 97.1 °F (36.2 °C)   Resp 20   Ht 5' 9\" (1.753 m)   Wt 87.5 kg (192 lb 14.4 oz)   SpO2 100%   BMI 28.49 kg/m²     Temp (24hrs), Av.2 °F (36.2 °C), Min:96.6 °F (35.9 °C), Max:98 °F (36.7 °C)    Patient Vitals for the past 8 hrs:   Pulse   19 0700 64   19 0600 63   19 0500 (!) 59   19 0418 (!) 58   19 0400 60   19 0300 (!) 58    Patient Vitals for the past 8 hrs:   Resp   19 0700 20   19 0600 20   19 0500 18   19 0418 19   19 0400 19   19 0300 19    Patient Vitals for the past 8 hrs:   BP   19 0700 115/71   19 0600 106/64   19 0500 98/57   19 0400 98/64   19 0300 96/59       1901 -  0700  In: 1978.3 [I.V.:1498.3]  Out: 1275 [Urine:1275]      General Appearance: No acute distress. Ears/Nose/Mouth/Throat:   Normal MM; anicteric. JVP: WNL   Resp:   Lungs with ronchi   Cardiovascular:  RRR, S1, S2 normal, no new murmur. No gallop or rub. Abdomen:   Soft, non-tender, bowel sounds are present. Extremities: +edema bilaterally. Skin:  Neuro: Warm and dry.   RAAS -1                         Data Review: Telemetry independently reviewed :   NSR  Labs:   Recent Results (from the past 24 hour(s))   POC TROPONIN-I    Collection Time: 08/20/19  4:08 PM   Result Value Ref Range    Troponin-I (POC) <0.04 0.00 - 0.08 ng/mL   SAMPLES BEING HELD    Collection Time: 08/20/19  4:14 PM   Result Value Ref Range    SAMPLES BEING HELD 1RED 1LAV 1PST 1BLUE     COMMENT        Add-on orders for these samples will be processed based on acceptable specimen integrity and analyte stability, which may vary by analyte. METABOLIC PANEL, COMPREHENSIVE    Collection Time: 08/20/19  4:14 PM   Result Value Ref Range    Sodium 139 136 - 145 mmol/L    Potassium 3.9 3.5 - 5.1 mmol/L    Chloride 106 97 - 108 mmol/L    CO2 22 21 - 32 mmol/L    Anion gap 11 5 - 15 mmol/L    Glucose 107 (H) 65 - 100 mg/dL    BUN 26 (H) 6 - 20 MG/DL    Creatinine 1.74 (H) 0.70 - 1.30 MG/DL    BUN/Creatinine ratio 15 12 - 20      GFR est AA 48 (L) >60 ml/min/1.73m2    GFR est non-AA 40 (L) >60 ml/min/1.73m2    Calcium 8.3 (L) 8.5 - 10.1 MG/DL    Bilirubin, total 0.5 0.2 - 1.0 MG/DL    ALT (SGPT) 16 12 - 78 U/L    AST (SGOT) 9 (L) 15 - 37 U/L    Alk. phosphatase 104 45 - 117 U/L    Protein, total 7.1 6.4 - 8.2 g/dL    Albumin 3.3 (L) 3.5 - 5.0 g/dL    Globulin 3.8 2.0 - 4.0 g/dL    A-G Ratio 0.9 (L) 1.1 - 2.2     CBC WITH AUTOMATED DIFF    Collection Time: 08/20/19  4:14 PM   Result Value Ref Range    WBC 6.3 4.1 - 11.1 K/uL    RBC 5.91 (H) 4.10 - 5.70 M/uL    HGB 12.5 12.1 - 17.0 g/dL    HCT 42.3 36.6 - 50.3 %    MCV 71.6 (L) 80.0 - 99.0 FL    MCH 21.2 (L) 26.0 - 34.0 PG    MCHC 29.6 (L) 30.0 - 36.5 g/dL    RDW 19.0 (H) 11.5 - 14.5 %    PLATELET 057 440 - 790 K/uL    NRBC 0.0 0  WBC    ABSOLUTE NRBC 0.00 0.00 - 0.01 K/uL    NEUTROPHILS 68 32 - 75 %    LYMPHOCYTES 22 12 - 49 %    MONOCYTES 6 5 - 13 %    EOSINOPHILS 3 0 - 7 %    BASOPHILS 0 0 - 1 %    IMMATURE GRANULOCYTES 1 (H) 0.0 - 0.5 %    ABS. NEUTROPHILS 4.3 1.8 - 8.0 K/UL    ABS.  LYMPHOCYTES 1.4 0.8 - 3.5 K/UL    ABS. MONOCYTES 0.4 0.0 - 1.0 K/UL    ABS. EOSINOPHILS 0.2 0.0 - 0.4 K/UL    ABS. BASOPHILS 0.0 0.0 - 0.1 K/UL    ABS. IMM. GRANS. 0.0 0.00 - 0.04 K/UL    DF AUTOMATED     TROPONIN I    Collection Time: 08/20/19  4:14 PM   Result Value Ref Range    Troponin-I, Qt. <0.05 <0.05 ng/mL   MAGNESIUM    Collection Time: 08/20/19  4:14 PM   Result Value Ref Range    Magnesium 1.8 1.6 - 2.4 mg/dL   EKG, 12 LEAD, INITIAL    Collection Time: 08/20/19  5:57 PM   Result Value Ref Range    Ventricular Rate 62 BPM    Atrial Rate 62 BPM    P-R Interval 172 ms    QRS Duration 134 ms    Q-T Interval 500 ms    QTC Calculation (Bezet) 507 ms    Calculated P Axis 49 degrees    Calculated R Axis 99 degrees    Calculated T Axis 63 degrees    Diagnosis       Normal sinus rhythm  Possible Left atrial enlargement  Rightward axis  Nonspecific intraventricular block  When compared with ECG of 13-JUN-2019 17:32,  mild axis shift is noted  Confirmed by Manjit Witt ([id number]) on 8/21/2019 8:14:01 AM     POC G3 - PUL    Collection Time: 08/20/19  6:13 PM   Result Value Ref Range    FIO2 (POC) 100 %    pH (POC) 7.302 (L) 7.35 - 7.45      pCO2 (POC) 42.5 35.0 - 45.0 MMHG    pO2 (POC) 409 (H) 80 - 100 MMHG    HCO3 (POC) 21.0 (L) 22 - 26 MMOL/L    sO2 (POC) 100 (H) 92 - 97 %    Base deficit (POC) 5 mmol/L    Site RIGHT RADIAL      Device: VENT      Mode ASSIST CONTROL      Tidal volume 500 ml    Set Rate 14 bpm    PEEP/CPAP (POC) 5 cmH2O    Allens test (POC) YES      Specimen type (POC) ARTERIAL      Total resp.  rate 19     METABOLIC PANEL, COMPREHENSIVE    Collection Time: 08/21/19  4:41 AM   Result Value Ref Range    Sodium 139 136 - 145 mmol/L    Potassium 4.1 3.5 - 5.1 mmol/L    Chloride 110 (H) 97 - 108 mmol/L    CO2 21 21 - 32 mmol/L    Anion gap 8 5 - 15 mmol/L    Glucose 53 (L) 65 - 100 mg/dL    BUN 30 (H) 6 - 20 MG/DL    Creatinine 1.74 (H) 0.70 - 1.30 MG/DL    BUN/Creatinine ratio 17 12 - 20      GFR est AA 48 (L) >60 ml/min/1.73m2 GFR est non-AA 40 (L) >60 ml/min/1.73m2    Calcium 8.4 (L) 8.5 - 10.1 MG/DL    Bilirubin, total 0.5 0.2 - 1.0 MG/DL    ALT (SGPT) 17 12 - 78 U/L    AST (SGOT) 11 (L) 15 - 37 U/L    Alk. phosphatase 95 45 - 117 U/L    Protein, total 6.5 6.4 - 8.2 g/dL    Albumin 3.1 (L) 3.5 - 5.0 g/dL    Globulin 3.4 2.0 - 4.0 g/dL    A-G Ratio 0.9 (L) 1.1 - 2.2     CBC WITH AUTOMATED DIFF    Collection Time: 08/21/19  4:41 AM   Result Value Ref Range    WBC 15.0 (H) 4.1 - 11.1 K/uL    RBC 5.47 4.10 - 5.70 M/uL    HGB 11.5 (L) 12.1 - 17.0 g/dL    HCT 39.5 36.6 - 50.3 %    MCV 72.2 (L) 80.0 - 99.0 FL    MCH 21.0 (L) 26.0 - 34.0 PG    MCHC 29.1 (L) 30.0 - 36.5 g/dL    RDW 19.0 (H) 11.5 - 14.5 %    PLATELET 967 444 - 691 K/uL    NRBC 0.0 0  WBC    ABSOLUTE NRBC 0.00 0.00 - 0.01 K/uL    NEUTROPHILS 77 (H) 32 - 75 %    LYMPHOCYTES 14 12 - 49 %    MONOCYTES 6 5 - 13 %    EOSINOPHILS 1 0 - 7 %    BASOPHILS 0 0 - 1 %    IMMATURE GRANULOCYTES 1 (H) 0.0 - 0.5 %    ABS. NEUTROPHILS 11.6 (H) 1.8 - 8.0 K/UL    ABS. LYMPHOCYTES 2.1 0.8 - 3.5 K/UL    ABS. MONOCYTES 0.9 0.0 - 1.0 K/UL    ABS. EOSINOPHILS 0.2 0.0 - 0.4 K/UL    ABS. BASOPHILS 0.0 0.0 - 0.1 K/UL    ABS. IMM.  GRANS. 0.1 (H) 0.00 - 0.04 K/UL    DF AUTOMATED     MAGNESIUM    Collection Time: 08/21/19  4:41 AM   Result Value Ref Range    Magnesium 1.8 1.6 - 2.4 mg/dL   GLUCOSE, POC    Collection Time: 08/21/19  7:11 AM   Result Value Ref Range    Glucose (POC) 44 (LL) 65 - 100 mg/dL    Performed by [address], POC    Collection Time: 08/21/19  7:13 AM   Result Value Ref Range    Glucose (POC) 44 (LL) 65 - 100 mg/dL    Performed by [address], POC    Collection Time: 08/21/19  7:37 AM   Result Value Ref Range    Glucose (POC) 121 (H) 65 - 100 mg/dL    Performed by Nina Miranda       Current medications reviewed       Pamela Castrejon MD

## 2019-08-21 NOTE — DIABETES MGMT
[facility]    DTC Progress Note    Recommendations/ Comments:  Chart review for extreme hypoglycemia - BG 44 mg/dL at 0711 today     If appropriate, please consider   Continue to observe BG   If BG's rise and needs correction, please use the lispro high sensitivity scale due to renal status  Add A1c to next lab draw    Current hospital DM medication: none    Chart reviewed on [facility]. .    Patient is a 71 y.o. male with known DM on Amaryl 4 mg 1/2 tab AM and PM  Admitted V tach  Vent      A1c:   Lab Results   Component Value Date/Time    Hemoglobin A1c 6.7 (H) 01/11/2018 05:04 AM    Hemoglobin A1c 6.7 (H) 07/18/2017 02:22 AM       Recent Glucose Results:   Lab Results   Component Value Date/Time    GLU 53 (L) 08/21/2019 04:41 AM     (H) 08/20/2019 04:14 PM    GLUCPOC 121 (H) 08/21/2019 07:37 AM    GLUCPOC 44 (LL) 08/21/2019 07:13 AM    GLUCPOC 44 (LL) 08/21/2019 07:11 AM        Lab Results   Component Value Date/Time    Creatinine 1.74 (H) 08/21/2019 04:41 AM     Estimated Creatinine Clearance: 46.3 mL/min (A) (based on SCr of 1.74 mg/dL (H)). Active Orders   There are no active orders of the following types: Diet. PO intake: No data found. Will continue to follow as needed.     Thank you  Juaquin Jain RN, CDE      Time spent: 5 min

## 2019-08-21 NOTE — ROUTINE PROCESS
08:00 Bedside shift change report given to Tesha Koch (oncoming nurse) by Ajit Rust (offgoing nurse). Report included the following information SBAR, Kardex, Procedure Summary, Intake/Output, MAR, Accordion, Recent Results, Med Rec Status, Cardiac Rhythm NSR right BBB, Alarm Parameters , Pre Procedure Checklist, Procedure Verification and Quality Measures. 09:15 Dr. Nicole Bryan at bedside, orders for medications clarified, some held    10:20 ABG's called to Dr. Nicole Bryan. 10:47 NaHCO3 given per order, vitals stable, will continue to wean Phenylephrine    12:00 Temp elevated, tylenol given    13:23 Pan cultured and antibiotics started per Dr. Nicole Bryan    14:19 Blood sugar low on lab, D 10 given per protocol. . Uneventful afternoon, resting quietly. 18:00 Uneventful afternoon    19:30 Bedside shift change report given to Ajti Rust (oncoming nurse) by Tesha Koch (offgoing nurse). Report included the following information SBAR, Kardex, Procedure Summary, Intake/Output, MAR, Accordion, Recent Results, Med Rec Status, Cardiac Rhythm NSR, Alarm Parameters , Pre Procedure Checklist, Procedure Verification and Quality Measures.

## 2019-08-21 NOTE — PROGRESS NOTES
Bedside and Verbal shift change report given to Elisha (oncoming nurse) by Sharon Tripathi (offgoing nurse). Report included the following information SBAR, Kardex, Intake/Output, MAR, Accordion, Recent Results, Cardiac Rhythm -NSR and Alarm Parameters . 2000 - Assumed care. Gtts verified. 2100 - CHG bath given. SBPs 100 -120, Jas gtt weaned. [id number]    Patient with hypoglycemic episode(s) at 2219(time) on 8/21/19(date). BG value(s) pre-treatment 46    Was patient symptomatic? [] yes, [x] no  Patient was treated with the following rescue medications/treatments: [] D50                [] Glucose tablets                [] Glucagon                [] 4oz juice                [] 6oz reg soda                [] 8oz low fat milk  BG value post-treatment: 166  Once BG treated and value greater than 80mg/dl, pt was provided with the following:  [] snack  [] meal  Name of MD notified:Carlos  The following orders were received: none    0000 - Large liquid stool x3, Flexiseal inserted, 250ml out.  0200 - Increased ectopy noted, multiform PVC's. VSS, will monitor. 0400 - AM labs drawn. HYPOGLYCEMIC EPISODE DOCUMENTATION    Patient with hypoglycemic episode(s) at 0401(time) on 8/22/19(date). BG value(s) pre-treatment 28    Was patient symptomatic? [] yes, [] no  Patient was treated with the following rescue medications/treatments: [] D50                [] Glucose tablets                [] Glucagon                [] 4oz juice                [] 6oz reg soda                [] 8oz low fat milk  BG value post-treatment: 146  Once BG treated and value greater than 80mg/dl, pt was provided with the following:  [] snack  [] meal  Name of MD notified: Serge Lang  The following orders were received: none    0500 - AM CXR obtained. 0600 - Dr. Norberto Winn at bedside, update given. Bedside and Verbal shift change report given to Sharon Tripathi (oncoming nurse) by Jefry Garrett (offgoing nurse).  Report included the following information SBAR, Kardex, Intake/Output, MAR, Accordion, Recent Results, Cardiac Rhythm -NSR/SB/BBB and Alarm Parameters .

## 2019-08-21 NOTE — PROGRESS NOTES
Bedside and Verbal shift change report given to Elisha (oncoming nurse) by Jag Taveras (offgoing nurse). Report included the following information SBAR, Kardex, Intake/Output, MAR, Accordion, Recent Results, Cardiac Rhythm -NSR/SB/BBB and Alarm Parameters . 2000 - Assumed care. Family at bedside, plan of care reviewed. SBPs 90s-110, Jas gtt titrated. 2100 - Pt has not voided. Bladder scan >600ml. Straight cathed, results 625ml. CHG bath given. [facility] - Spoke w/ pt's daughter, update given. 0000 - SBP continues 90s-110, continue to titrate Jas gtt.  0400 - AM labs drawn. AM CXR obtained. Pt has not voided. Bladder scan >400ml, bhandari catheter inserted per MD order. 0700 - HYPOGLYCEMIC EPISODE DOCUMENTATION    Patient with hypoglycemic episode(s) at 0711(time) on 08/21/19(date). BG value(s) pre-treatment 40    Was patient symptomatic? [] yes, [x] no  Patient was treated with the following rescue medications/treatments: [x] D50                [] Glucose tablets                [] Glucagon                [] 4oz juice                [] 6oz reg soda                [] 8oz low fat milk  BG value post-treatment: 121  Once BG treated and value greater than 80mg/dl, pt was provided with the following:  [] snack  [] meal  Name of MD notified:Dr. Elle Stokes  The following orders were received: none    Dr. Elle Stokes at bedside, update given, new orders noted. Bedside and Verbal shift change report given to Jag Taveras (oncoming nurse) by Young Duarte (offgoing nurse). Report included the following information SBAR, Kardex, Intake/Output, MAR, Accordion, Recent Results, Cardiac Rhythm -NSR/SB/BBB and Alarm Parameters .

## 2019-08-21 NOTE — PROGRESS NOTES
Day #1 of Levaquin  Indication:  aspiration PNA  Current regimen:  750 mg IV Q24h  Abx regimen: vancomycin, Levaquin, Zosyn  Recent Labs     19  0441 19  1614   WBC 15.0* 6.3   CREA 1.74* 1.74*   BUN 30* 26*     Est CrCl: ~46 ml/min  Temp (24hrs), Av.8 °F (36.6 °C), Min:96.6 °F (35.9 °C), Max:100.7 °F (38.2 °C)    Cultures: none    Plan: Change to Levaquin 750 mg IV Q48h per renal dose adjustment policy .

## 2019-08-21 NOTE — PROGRESS NOTES
Pharmacist Note - Vancomycin Dosing    Consult provided for this 71 y.o. male for indication of aspiration PNA. Antibiotic regimen(s): vancomycin, Zosyn, Levaquin    Recent Labs     19  0441 19  1614   WBC 15.0* 6.3   CREA 1.74* 1.74*   BUN 30* 26*     Frequency of BMP: daily  Height: 175.3 cm  Weight: 87.5 kg  Est CrCl: ~46 ml/min  Temp (24hrs), Av.8 °F (36.6 °C), Min:96.6 °F (35.9 °C), Max:100.7 °F (38.2 °C)    Cultures:  none    Goal trough = 15 - 20 mcg/mL    Therapy will be initiated with a loading dose of 2000 mg IV x 1 to be followed by a maintenance dose of 1250 mg IV every 18 hours. Pharmacy to follow patient daily and order levels / make dose adjustments as appropriate.

## 2019-08-21 NOTE — PROGRESS NOTES
Problem: Falls - Risk of  Goal: *Absence of Falls  Description  Document Kenya Dotson Fall Risk and appropriate interventions in the flowsheet. Outcome: Progressing Towards Goal  Note:   Fall Risk Interventions:            Medication Interventions: Evaluate medications/consider consulting pharmacy    Elimination Interventions: Toileting schedule/hourly rounds              Problem: Patient Education: Go to Patient Education Activity  Goal: Patient/Family Education  Outcome: Progressing Towards Goal     Problem: Pressure Injury - Risk of  Goal: *Prevention of pressure injury  Description  Document Iván Scale and appropriate interventions in the flowsheet. Outcome: Progressing Towards Goal  Note:   Pressure Injury Interventions:  Sensory Interventions: Assess changes in LOC, Assess need for specialty bed, Avoid rigorous massage over bony prominences, Check visual cues for pain, Float heels, Keep linens dry and wrinkle-free, Maintain/enhance activity level, Minimize linen layers, Monitor skin under medical devices, Pad between skin to skin, Pressure redistribution bed/mattress (bed type), Turn and reposition approx. every two hours (pillows and wedges if needed)    Moisture Interventions: Absorbent underpads, Apply protective barrier, creams and emollients, Internal/External urinary devices, Maintain skin hydration (lotion/cream), Minimize layers, Moisture barrier    Activity Interventions: Pressure redistribution bed/mattress(bed type)    Mobility Interventions: Assess need for specialty bed, Float heels, HOB 30 degrees or less, Pressure redistribution bed/mattress (bed type), Turn and reposition approx.  every two hours(pillow and wedges)    Nutrition Interventions: Document food/fluid/supplement intake    Friction and Shear Interventions: Apply protective barrier, creams and emollients, HOB 30 degrees or less, Lift sheet, Minimize layers, Transferring/repositioning devices                Problem: Patient Education: Go to Patient Education Activity  Goal: Patient/Family Education  Outcome: Progressing Towards Goal     Problem: Non-Violent Restraints  Goal: *Removal from restraints as soon as assessed to be safe  Outcome: Progressing Towards Goal  Goal: *No harm/injury to patient while restraints in use  Outcome: Progressing Towards Goal  Goal: *Patient's dignity will be maintained  Outcome: Progressing Towards Goal  Goal: *Patient Specific Goal (EDIT GOAL, INSERT TEXT)  Outcome: Progressing Towards Goal  Goal: Non-violent Restaints:Standard Interventions  Outcome: Progressing Towards Goal  Goal: Non-violent Restraints:Patient Interventions  Outcome: Progressing Towards Goal  Goal: Patient/Family Education  Outcome: Progressing Towards Goal     Problem: Ventilator Management  Goal: *Adequate oxygenation and ventilation  Outcome: Progressing Towards Goal  Goal: *Patient maintains clear airway/free of aspiration  Outcome: Progressing Towards Goal  Goal: *Absence of infection signs and symptoms  Outcome: Progressing Towards Goal  Goal: *Normal spontaneous ventilation  Outcome: Progressing Towards Goal     Problem: Patient Education: Go to Patient Education Activity  Goal: Patient/Family Education  Outcome: Progressing Towards Goal     Problem: Infection - Risk of, Ventilator-Associated Pneumonia  Goal: *Absence of infection signs and symptoms  Outcome: Progressing Towards Goal     Problem: Patient Education: Go to Patient Education Activity  Goal: Patient/Family Education  Outcome: Progressing Towards Goal     Problem: Arrhythmia Pathway (Adult)  Goal: *Absence of arrhythmia  Outcome: Progressing Towards Goal     Problem: Patient Education: Go to Patient Education Activity  Goal: Patient/Family Education  Outcome: Progressing Towards Goal     Problem: Patient Education: Go to Patient Education Activity  Goal: Patient/Family Education  Outcome: Progressing Towards Goal     Problem: Heart Failure: Day 1  Goal: Off Pathway (Use only if patient is Off Pathway)  Outcome: Progressing Towards Goal  Goal: Activity/Safety  Outcome: Progressing Towards Goal  Goal: Consults, if ordered  Outcome: Progressing Towards Goal  Goal: Diagnostic Test/Procedures  Outcome: Progressing Towards Goal  Goal: Nutrition/Diet  Outcome: Progressing Towards Goal  Goal: Discharge Planning  Outcome: Progressing Towards Goal  Goal: Medications  Outcome: Progressing Towards Goal  Goal: Respiratory  Outcome: Progressing Towards Goal  Goal: Treatments/Interventions/Procedures  Outcome: Progressing Towards Goal  Goal: Psychosocial  Outcome: Progressing Towards Goal  Goal: *Oxygen saturation within defined limits  Outcome: Progressing Towards Goal  Goal: *Hemodynamically stable  Outcome: Progressing Towards Goal  Goal: *Optimal pain control at patient's stated goal  Outcome: Progressing Towards Goal  Goal: *Anxiety reduced or absent  Outcome: Progressing Towards Goal     Problem: Heart Failure: Day 2  Goal: Off Pathway (Use only if patient is Off Pathway)  Outcome: Progressing Towards Goal  Goal: Activity/Safety  Outcome: Progressing Towards Goal  Goal: Consults, if ordered  Outcome: Progressing Towards Goal  Goal: Diagnostic Test/Procedures  Outcome: Progressing Towards Goal  Goal: Nutrition/Diet  Outcome: Progressing Towards Goal  Goal: Discharge Planning  Outcome: Progressing Towards Goal  Goal: Medications  Outcome: Progressing Towards Goal  Goal: Respiratory  Outcome: Progressing Towards Goal  Goal: Treatments/Interventions/Procedures  Outcome: Progressing Towards Goal  Goal: Psychosocial  Outcome: Progressing Towards Goal  Goal: *Oxygen saturation within defined limits  Outcome: Progressing Towards Goal  Goal: *Hemodynamically stable  Outcome: Progressing Towards Goal  Goal: *Optimal pain control at patient's stated goal  Outcome: Progressing Towards Goal  Goal: *Anxiety reduced or absent  Outcome: Progressing Towards Goal  Goal: *Demonstrates progressive activity  Outcome: Progressing Towards Goal     Problem: Heart Failure: Day 3  Goal: Off Pathway (Use only if patient is Off Pathway)  Outcome: Progressing Towards Goal  Goal: Activity/Safety  Outcome: Progressing Towards Goal  Goal: Diagnostic Test/Procedures  Outcome: Progressing Towards Goal  Goal: Nutrition/Diet  Outcome: Progressing Towards Goal  Goal: Discharge Planning  Outcome: Progressing Towards Goal  Goal: Medications  Outcome: Progressing Towards Goal  Goal: Respiratory  Outcome: Progressing Towards Goal  Goal: Treatments/Interventions/Procedures  Outcome: Progressing Towards Goal  Goal: Psychosocial  Outcome: Progressing Towards Goal  Goal: *Oxygen saturation within defined limits  Outcome: Progressing Towards Goal  Goal: *Hemodynamically stable  Outcome: Progressing Towards Goal  Goal: *Optimal pain control at patient's stated goal  Outcome: Progressing Towards Goal  Goal: *Anxiety reduced or absent  Outcome: Progressing Towards Goal  Goal: *Demonstrates progressive activity  Outcome: Progressing Towards Goal     Problem: Heart Failure: Day 4  Goal: Off Pathway (Use only if patient is Off Pathway)  Outcome: Progressing Towards Goal  Goal: Activity/Safety  Outcome: Progressing Towards Goal  Goal: Diagnostic Test/Procedures  Outcome: Progressing Towards Goal  Goal: Nutrition/Diet  Outcome: Progressing Towards Goal  Goal: Discharge Planning  Outcome: Progressing Towards Goal  Goal: Medications  Outcome: Progressing Towards Goal  Goal: Respiratory  Outcome: Progressing Towards Goal  Goal: Treatments/Interventions/Procedures  Outcome: Progressing Towards Goal  Goal: Psychosocial  Outcome: Progressing Towards Goal  Goal: *Oxygen saturation within defined limits  Outcome: Progressing Towards Goal  Goal: *Hemodynamically stable  Outcome: Progressing Towards Goal  Goal: *Optimal pain control at patient's stated goal  Outcome: Progressing Towards Goal  Goal: *Anxiety reduced or absent  Outcome: Progressing Towards Goal  Goal: *Demonstrates progressive activity  Outcome: Progressing Towards Goal     Problem: Heart Failure: Day 5  Goal: Off Pathway (Use only if patient is Off Pathway)  Outcome: Progressing Towards Goal  Goal: Activity/Safety  Outcome: Progressing Towards Goal  Goal: Diagnostic Test/Procedures  Outcome: Progressing Towards Goal  Goal: Nutrition/Diet  Outcome: Progressing Towards Goal  Goal: Discharge Planning  Outcome: Progressing Towards Goal  Goal: Medications  Outcome: Progressing Towards Goal  Goal: Respiratory  Outcome: Progressing Towards Goal  Goal: Treatments/Interventions/Procedures  Outcome: Progressing Towards Goal  Goal: Psychosocial  Outcome: Progressing Towards Goal     Problem: Heart Failure: Discharge Outcomes  Goal: *Demonstrates ability to perform prescribed activity without shortness of breath or discomfort  Outcome: Progressing Towards Goal  Goal: *Left ventricular function assessment completed prior to or during stay, or planned for post-discharge  Outcome: Progressing Towards Goal  Goal: *ACEI prescribed if LVEF less than 40% and no contraindications or ARB prescribed  Outcome: Progressing Towards Goal  Goal: *Verbalizes understanding and describes prescribed diet  Outcome: Progressing Towards Goal  Goal: *Verbalizes understanding/describes prescribed medications  Outcome: Progressing Towards Goal  Goal: *Describes available resources and support systems  Description  (eg: Home Health, Palliative Care, Advanced Medical Directive)  Outcome: Progressing Towards Goal  Goal: *Describes smoking cessation resources  Outcome: Progressing Towards Goal  Goal: *Understands and describes signs and symptoms to report to providers(Stroke Metric)  Outcome: Progressing Towards Goal  Goal: *Describes/verbalizes understanding of follow-up/return appt  Description  (eg: to physicians, diabetes treatment coordinator, and other resources  Outcome: Progressing Towards Goal  Goal: *Describes importance of continuing daily weights and changes to report to physician  Outcome: Progressing Towards Goal

## 2019-08-21 NOTE — INTERDISCIPLINARY ROUNDS
IDR/SLIDR Summary          Patient: Leopoldo Pemberton MRN: [id number]    Age: 71 y.o. YOB: 1955 Room/Bed: [address] [place] [address]   Admit Diagnosis: V tach ([address] [I47.2]  VT (ventricular tachycardia) ([facility] [address] [I47.2]  Principal Diagnosis: <principal problem not specified>   Goals: stable HR/rhythm, SAT/SBT? Readmission: NO  Quality Measure: CHF  VTE Prophylaxis: Mechanical  Influenza Vaccine screening completed? YES  Pneumococcal Vaccine screening completed? NO  Mobility needs: Yes   Nutrition plan:Yes  Consults:Speech, Respiratory and Case Management    Financial concerns:Yes  Escalated to CM? YES  RRAT Score:    Interventions:H2H  Testing due for pt today? YES  LOS: 1 days Expected length of stay ?  days  Discharge plan: tbd   PCP: Matthew Walls MD  Transportation needs: Yes    Days before discharge:two or more days before discharge   Discharge disposition: Home    Signed:     Hennie Dubin, RN  8/21/2019  7:28 AM

## 2019-08-22 ENCOUNTER — APPOINTMENT (OUTPATIENT)
Dept: GENERAL RADIOLOGY | Age: 64
DRG: 224 | End: 2019-08-22
Attending: INTERNAL MEDICINE
Payer: MEDICARE

## 2019-08-22 LAB
AMMONIA PLAS-SCNC: 36 UMOL/L
ANION GAP SERPL CALC-SCNC: 10 MMOL/L (ref 5–15)
ARTERIAL PATENCY WRIST A: ABNORMAL
BASE DEFICIT BLD-SCNC: 10 MMOL/L
BASOPHILS # BLD: 0 K/UL (ref 0–0.1)
BASOPHILS NFR BLD: 0 % (ref 0–1)
BDY SITE: ABNORMAL
BUN SERPL-MCNC: 28 MG/DL (ref 6–20)
BUN/CREAT SERPL: 14 (ref 12–20)
CALCIUM SERPL-MCNC: 7.9 MG/DL (ref 8.5–10.1)
CHLORIDE SERPL-SCNC: 112 MMOL/L (ref 97–108)
CO2 SERPL-SCNC: 20 MMOL/L (ref 21–32)
CORTIS SERPL-MCNC: 29.3 UG/DL
CREAT SERPL-MCNC: 1.95 MG/DL (ref 0.7–1.3)
DIFFERENTIAL METHOD BLD: ABNORMAL
EOSINOPHIL # BLD: 0 K/UL (ref 0–0.4)
EOSINOPHIL NFR BLD: 0 % (ref 0–7)
ERYTHROCYTE [DISTWIDTH] IN BLOOD BY AUTOMATED COUNT: 19.2 % (ref 11.5–14.5)
GAS FLOW.O2 O2 DELIVERY SYS: ABNORMAL L/MIN
GAS FLOW.O2 SETTING OXYMISER: 16 BPM
GLUCOSE BLD STRIP.AUTO-MCNC: 134 MG/DL (ref 65–100)
GLUCOSE BLD STRIP.AUTO-MCNC: 135 MG/DL (ref 65–100)
GLUCOSE BLD STRIP.AUTO-MCNC: 165 MG/DL (ref 65–100)
GLUCOSE BLD STRIP.AUTO-MCNC: 176 MG/DL (ref 65–100)
GLUCOSE BLD STRIP.AUTO-MCNC: 182 MG/DL (ref 65–100)
GLUCOSE BLD STRIP.AUTO-MCNC: 32 MG/DL (ref 65–100)
GLUCOSE BLD STRIP.AUTO-MCNC: 33 MG/DL (ref 65–100)
GLUCOSE BLD STRIP.AUTO-MCNC: 33 MG/DL (ref 65–100)
GLUCOSE BLD STRIP.AUTO-MCNC: 37 MG/DL (ref 65–100)
GLUCOSE BLD STRIP.AUTO-MCNC: 43 MG/DL (ref 65–100)
GLUCOSE BLD STRIP.AUTO-MCNC: 63 MG/DL (ref 65–100)
GLUCOSE BLD STRIP.AUTO-MCNC: 64 MG/DL (ref 65–100)
GLUCOSE SERPL-MCNC: 32 MG/DL (ref 65–100)
HCO3 BLD-SCNC: 17 MMOL/L (ref 22–26)
HCT VFR BLD AUTO: 40.8 % (ref 36.6–50.3)
HGB BLD-MCNC: 11.8 G/DL (ref 12.1–17)
IMM GRANULOCYTES # BLD AUTO: 0.2 K/UL (ref 0–0.04)
IMM GRANULOCYTES NFR BLD AUTO: 1 % (ref 0–0.5)
LIDOCAIN SERPL-MCNC: 1 UG/ML
LYMPHOCYTES # BLD: 1.2 K/UL (ref 0.8–3.5)
LYMPHOCYTES NFR BLD: 8 % (ref 12–49)
MAGNESIUM SERPL-MCNC: 2.4 MG/DL (ref 1.6–2.4)
MCH RBC QN AUTO: 21 PG (ref 26–34)
MCHC RBC AUTO-ENTMCNC: 28.9 G/DL (ref 30–36.5)
MCV RBC AUTO: 72.5 FL (ref 80–99)
MONOCYTES # BLD: 1.2 K/UL (ref 0–1)
MONOCYTES NFR BLD: 8 % (ref 5–13)
NEUTS SEG # BLD: 12.8 K/UL (ref 1.8–8)
NEUTS SEG NFR BLD: 83 % (ref 32–75)
NRBC # BLD: 0 K/UL (ref 0–0.01)
NRBC BLD-RTO: 0 PER 100 WBC
O2/TOTAL GAS SETTING VFR VENT: 0.35 %
PCO2 BLD: 35.3 MMHG (ref 35–45)
PEEP RESPIRATORY: 5 CMH2O
PH BLD: 7.29 [PH] (ref 7.35–7.45)
PIP ISTAT,IPIP: 17
PLATELET # BLD AUTO: 167 K/UL (ref 150–400)
PO2 BLD: 152 MMHG (ref 80–100)
POTASSIUM SERPL-SCNC: 3.6 MMOL/L (ref 3.5–5.1)
RBC # BLD AUTO: 5.63 M/UL (ref 4.1–5.7)
RBC MORPH BLD: ABNORMAL
SAO2 % BLD: 99 % (ref 92–97)
SERVICE CMNT-IMP: ABNORMAL
SODIUM SERPL-SCNC: 142 MMOL/L (ref 136–145)
SPECIMEN TYPE: ABNORMAL
VENTILATION MODE VENT: ABNORMAL
VT SETTING VENT: 500 ML
WBC # BLD AUTO: 15.4 K/UL (ref 4.1–11.1)

## 2019-08-22 PROCEDURE — 36600 WITHDRAWAL OF ARTERIAL BLOOD: CPT

## 2019-08-22 PROCEDURE — 82140 ASSAY OF AMMONIA: CPT

## 2019-08-22 PROCEDURE — 80048 BASIC METABOLIC PNL TOTAL CA: CPT

## 2019-08-22 PROCEDURE — 74011250637 HC RX REV CODE- 250/637: Performed by: INTERNAL MEDICINE

## 2019-08-22 PROCEDURE — 74011250636 HC RX REV CODE- 250/636: Performed by: INTERNAL MEDICINE

## 2019-08-22 PROCEDURE — 74011000250 HC RX REV CODE- 250: Performed by: INTERNAL MEDICINE

## 2019-08-22 PROCEDURE — 65620000000 HC RM CCU GENERAL

## 2019-08-22 PROCEDURE — 85025 COMPLETE CBC W/AUTO DIFF WBC: CPT

## 2019-08-22 PROCEDURE — 36415 COLL VENOUS BLD VENIPUNCTURE: CPT

## 2019-08-22 PROCEDURE — 83735 ASSAY OF MAGNESIUM: CPT

## 2019-08-22 PROCEDURE — 82533 TOTAL CORTISOL: CPT

## 2019-08-22 PROCEDURE — 94640 AIRWAY INHALATION TREATMENT: CPT

## 2019-08-22 PROCEDURE — 74011000258 HC RX REV CODE- 258: Performed by: INTERNAL MEDICINE

## 2019-08-22 PROCEDURE — 82962 GLUCOSE BLOOD TEST: CPT

## 2019-08-22 PROCEDURE — 94003 VENT MGMT INPAT SUBQ DAY: CPT

## 2019-08-22 PROCEDURE — 82803 BLOOD GASES ANY COMBINATION: CPT

## 2019-08-22 PROCEDURE — 71045 X-RAY EXAM CHEST 1 VIEW: CPT

## 2019-08-22 RX ORDER — SODIUM BICARBONATE 84 MG/ML
100 INJECTION, SOLUTION INTRAVENOUS
Status: COMPLETED | OUTPATIENT
Start: 2019-08-22 | End: 2019-08-22

## 2019-08-22 RX ORDER — EPLERENONE 25 MG/1
25 TABLET, FILM COATED ORAL DAILY
Status: DISCONTINUED | OUTPATIENT
Start: 2019-08-23 | End: 2019-09-03 | Stop reason: HOSPADM

## 2019-08-22 RX ORDER — ATORVASTATIN CALCIUM 40 MG/1
80 TABLET, FILM COATED ORAL DAILY
Status: DISCONTINUED | OUTPATIENT
Start: 2019-08-23 | End: 2019-09-03 | Stop reason: HOSPADM

## 2019-08-22 RX ORDER — DEXTROSE MONOHYDRATE 100 MG/ML
75 INJECTION, SOLUTION INTRAVENOUS CONTINUOUS
Status: DISCONTINUED | OUTPATIENT
Start: 2019-08-22 | End: 2019-08-22

## 2019-08-22 RX ORDER — POTASSIUM CHLORIDE 1.5 G/1.77G
40 POWDER, FOR SOLUTION ORAL
Status: COMPLETED | OUTPATIENT
Start: 2019-08-22 | End: 2019-08-22

## 2019-08-22 RX ORDER — DEXTROSE MONOHYDRATE 100 MG/ML
75 INJECTION, SOLUTION INTRAVENOUS
Status: DISPENSED | OUTPATIENT
Start: 2019-08-22 | End: 2019-08-23

## 2019-08-22 RX ORDER — DEXTROSE MONOHYDRATE 100 MG/ML
75 INJECTION, SOLUTION INTRAVENOUS CONTINUOUS
Status: DISCONTINUED | OUTPATIENT
Start: 2019-08-22 | End: 2019-08-22 | Stop reason: SDUPTHER

## 2019-08-22 RX ORDER — GLIMEPIRIDE 2 MG/1
4 TABLET ORAL 2 TIMES DAILY
Status: DISCONTINUED | OUTPATIENT
Start: 2019-08-22 | End: 2019-09-03 | Stop reason: HOSPADM

## 2019-08-22 RX ORDER — CLONAZEPAM 0.5 MG/1
1 TABLET ORAL
Status: DISCONTINUED | OUTPATIENT
Start: 2019-08-22 | End: 2019-09-03 | Stop reason: HOSPADM

## 2019-08-22 RX ORDER — BUDESONIDE 0.5 MG/2ML
500 INHALANT ORAL
Status: DISCONTINUED | OUTPATIENT
Start: 2019-08-22 | End: 2019-08-30

## 2019-08-22 RX ORDER — ALPRAZOLAM 0.5 MG/1
1 TABLET ORAL 2 TIMES DAILY
Status: DISCONTINUED | OUTPATIENT
Start: 2019-08-22 | End: 2019-09-03 | Stop reason: HOSPADM

## 2019-08-22 RX ADMIN — DEXTROSE MONOHYDRATE 250 ML: 10 INJECTION, SOLUTION INTRAVENOUS at 12:15

## 2019-08-22 RX ADMIN — Medication 10 ML: at 06:22

## 2019-08-22 RX ADMIN — PROPOFOL 25 MCG/KG/MIN: 10 INJECTION, EMULSION INTRAVENOUS at 04:08

## 2019-08-22 RX ADMIN — PHENYLEPHRINE HYDROCHLORIDE 95 MCG/MIN: 10 INJECTION INTRAVENOUS at 02:33

## 2019-08-22 RX ADMIN — PHENYLEPHRINE HYDROCHLORIDE 80 MCG/MIN: 10 INJECTION INTRAVENOUS at 17:10

## 2019-08-22 RX ADMIN — FAMOTIDINE 20 MG: 20 TABLET ORAL at 06:23

## 2019-08-22 RX ADMIN — LEVETIRACETAM 1000 MG: 100 SOLUTION ORAL at 21:01

## 2019-08-22 RX ADMIN — CLOPIDOGREL BISULFATE 75 MG: 75 TABLET, FILM COATED ORAL at 08:18

## 2019-08-22 RX ADMIN — CHLORHEXIDINE GLUCONATE 15 ML: 1.2 RINSE ORAL at 21:01

## 2019-08-22 RX ADMIN — PIPERACILLIN SODIUM,TAZOBACTAM SODIUM 3.38 G: 3; .375 INJECTION, POWDER, FOR SOLUTION INTRAVENOUS at 22:05

## 2019-08-22 RX ADMIN — POTASSIUM CHLORIDE 20 MEQ: 1.5 POWDER, FOR SOLUTION ORAL at 08:38

## 2019-08-22 RX ADMIN — Medication 10 ML: at 21:01

## 2019-08-22 RX ADMIN — PROPOFOL 25 MCG/KG/MIN: 10 INJECTION, EMULSION INTRAVENOUS at 18:49

## 2019-08-22 RX ADMIN — PANTOPRAZOLE SODIUM 40 MG: 40 TABLET, DELAYED RELEASE ORAL at 06:23

## 2019-08-22 RX ADMIN — DEXTROSE MONOHYDRATE 250 ML: 10 INJECTION, SOLUTION INTRAVENOUS at 17:07

## 2019-08-22 RX ADMIN — METHYLPREDNISOLONE SODIUM SUCCINATE 40 MG: 40 INJECTION, POWDER, FOR SOLUTION INTRAMUSCULAR; INTRAVENOUS at 18:13

## 2019-08-22 RX ADMIN — LACTULOSE 45 ML: 20 SOLUTION ORAL at 21:02

## 2019-08-22 RX ADMIN — DEXTROSE MONOHYDRATE 250 ML: 10 INJECTION, SOLUTION INTRAVENOUS at 20:30

## 2019-08-22 RX ADMIN — FINASTERIDE 5 MG: 5 TABLET, FILM COATED ORAL at 06:23

## 2019-08-22 RX ADMIN — ASPIRIN 81 MG CHEWABLE TABLET 81 MG: 81 TABLET CHEWABLE at 08:17

## 2019-08-22 RX ADMIN — FLUOXETINE 20 MG: 20 CAPSULE ORAL at 08:18

## 2019-08-22 RX ADMIN — BUDESONIDE 500 MCG: 0.5 INHALANT RESPIRATORY (INHALATION) at 19:56

## 2019-08-22 RX ADMIN — DEXTROSE MONOHYDRATE 250 ML: 10 INJECTION, SOLUTION INTRAVENOUS at 10:03

## 2019-08-22 RX ADMIN — PIPERACILLIN SODIUM,TAZOBACTAM SODIUM 3.38 G: 3; .375 INJECTION, POWDER, FOR SOLUTION INTRAVENOUS at 06:21

## 2019-08-22 RX ADMIN — VANCOMYCIN HYDROCHLORIDE 1250 MG: 10 INJECTION, POWDER, LYOPHILIZED, FOR SOLUTION INTRAVENOUS at 06:21

## 2019-08-22 RX ADMIN — LEVETIRACETAM 1000 MG: 100 SOLUTION ORAL at 08:17

## 2019-08-22 RX ADMIN — PROPOFOL 25 MCG/KG/MIN: 10 INJECTION, EMULSION INTRAVENOUS at 14:24

## 2019-08-22 RX ADMIN — CHLORHEXIDINE GLUCONATE 15 ML: 1.2 RINSE ORAL at 08:19

## 2019-08-22 RX ADMIN — POTASSIUM CHLORIDE 40 MEQ: 1.5 POWDER, FOR SOLUTION ORAL at 07:20

## 2019-08-22 RX ADMIN — PIPERACILLIN SODIUM,TAZOBACTAM SODIUM 3.38 G: 3; .375 INJECTION, POWDER, FOR SOLUTION INTRAVENOUS at 14:22

## 2019-08-22 RX ADMIN — DEXTROSE MONOHYDRATE 75 ML/HR: 10 INJECTION, SOLUTION INTRAVENOUS at 14:00

## 2019-08-22 RX ADMIN — PHENYLEPHRINE HYDROCHLORIDE 90 MCG/MIN: 10 INJECTION INTRAVENOUS at 11:24

## 2019-08-22 RX ADMIN — SODIUM BICARBONATE 100 MEQ: 84 INJECTION, SOLUTION INTRAVENOUS at 18:13

## 2019-08-22 RX ADMIN — LACTULOSE 45 ML: 20 SOLUTION ORAL at 08:17

## 2019-08-22 RX ADMIN — Medication 10 ML: at 14:21

## 2019-08-22 RX ADMIN — LACTULOSE 45 ML: 20 SOLUTION ORAL at 16:57

## 2019-08-22 RX ADMIN — FAMOTIDINE 20 MG: 20 TABLET ORAL at 16:57

## 2019-08-22 RX ADMIN — LIDOCAINE HYDROCHLORIDE 0.5 MG/MIN: 8 INJECTION, SOLUTION INTRAVENOUS at 14:24

## 2019-08-22 RX ADMIN — DEXTROSE MONOHYDRATE 250 ML: 10 INJECTION, SOLUTION INTRAVENOUS at 04:08

## 2019-08-22 RX ADMIN — DEXTROSE MONOHYDRATE 75 ML/HR: 10 INJECTION, SOLUTION INTRAVENOUS at 17:31

## 2019-08-22 RX ADMIN — BUDESONIDE 500 MCG: 0.5 INHALANT RESPIRATORY (INHALATION) at 09:42

## 2019-08-22 NOTE — PROGRESS NOTES
Cardiology Progress Note                                        Admit Date: 8/20/2019    Assessment/Plan:     VTs; requiring multiple shocks during this admission; EF is very low with previously known ischemic cardiomyopathy; I asked Dr. Felipe Broderick for evaluation for ICD  CAD; stable from cardiac cath  Shock; cardiogenic; improving; Jas is coming off  Acute respiratory failure; it was mainly to protect his airway during multiple shocks and cardiac cath; will be weaning him off    Jason Dumont. is a 71 y.o. male with     PROBLEM LIST:  Patient Active Problem List    Diagnosis Date Noted   Yonathan Schlichter tach ([address] 08/20/2019     Priority: 1 - One    VT (ventricular tachycardia) ([address] 08/20/2019    CHF exacerbation ([address] 06/13/2019    Fall 01/10/2019    TACOS (acute kidney injury) ([address] 01/10/2019    Chest pain 01/11/2018    Acute chest pain 01/10/2018    Hepatic encephalopathy ([address] 07/17/2017    Neuropathy 04/14/2017    Cirrhosis ([address] 04/14/2017    CAD (coronary artery disease) 04/14/2017    S/P coronary artery stent placement 04/14/2017    S/P CABG (coronary artery bypass graft) 04/14/2017    Thrombocytopenia ([address] 04/14/2017    MRSA infection 04/14/2017    S/P cholecystectomy 51/45/7439    Metabolic encephalopathy 40/62/2992    Seizure ([address] 11/21/2016    Sinusitis     Joint pain     Low back pain     GERD (gastroesophageal reflux disease)     Diabetes mellitus, type 2 (HCC)          Subjective:     Jason Dumont. reports none.     Visit Vitals  /56   Pulse (!) 53   Temp 97.6 °F (36.4 °C)   Resp 18   Ht 5' 9\" (1.753 m)   Wt 194 lb 10.7 oz (88.3 kg)   SpO2 100%   BMI 28.75 kg/m²       Intake/Output Summary (Last 24 hours) at 8/22/2019 0713  Last data filed at 8/22/2019 0500  Gross per 24 hour   Intake 3547.76 ml   Output 2420 ml   Net 1127.76 ml       Objective:      Physical Exam:  HEENT: Perrla, EOMI  Neck: No JVD,  No thyroidmegaly  Resp: some rales  CV: RRR s1s2 No murmur no s3  Abd:Soft, Nontender  Ext: No edema  Neuro: Alert and oriented; Nonfocal  Skin: Warm, Dry, Intact  Pulses: 2+ DP/PT/Rad      Telemetry: normal sinus rhythm    Current Facility-Administered Medications   Medication Dose Route Frequency    PHENYLephrine (ZAHIDA-SYNEPHRINE) 30 mg in 0.9% sodium chloride 250 mL infusion   mcg/min IntraVENous TITRATE    levETIRAcetam (KEPPRA) oral solution 1,000 mg  1,000 mg Oral Q12H    dextrose 10 % infusion 250 mL  250 mL IntraVENous PRN    acetaminophen (TYLENOL) solution 650 mg  650 mg Per NG tube Q4H PRN    piperacillin-tazobactam (ZOSYN) 3.375 g in 0.9% sodium chloride (MBP/ADV) 100 mL  3.375 g IntraVENous Q8H    levoFLOXacin (LEVAQUIN) 750 mg in D5W IVPB  750 mg IntraVENous Q48H    Vancomycin - Pharmacy to Dose   Other Rx Dosing/Monitoring    vancomycin (VANCOCIN) 1250 mg in  ml infusion  1,250 mg IntraVENous Q18H    chlorhexidine (PERIDEX) 0.12 % mouthwash 15 mL  15 mL Oral Q12H    propofol (DIPRIVAN) 10 mg/mL injection  0-50 mcg/kg/min IntraVENous TITRATE    lidocaine 8 mg/mL (Xylocaine) infusion  0.5 mg/min IntraVENous CONTINUOUS    aspirin chewable tablet 81 mg  81 mg Oral DAILY    pantoprazole (PROTONIX) tablet 40 mg  40 mg Oral ACB&D    pregabalin (LYRICA) capsule 50 mg  50 mg Oral TID    [Held by provider] tamsulosin (FLOMAX) capsule 0.4 mg  0.4 mg Oral ACB&D    famotidine (PEPCID) tablet 20 mg  20 mg Oral ACB&D    lactulose (CHRONULAC) 10 gram/15 mL solution 45 mL  30 g Oral TID    FLUoxetine (PROzac) capsule 20 mg  20 mg Oral DAILY    QUEtiapine (SEROquel) tablet 25 mg  25 mg Oral QHS    nitroglycerin (NITROSTAT) tablet 0.4 mg  0.4 mg SubLINGual Q5MIN PRN    clopidogrel (PLAVIX) tablet 75 mg  75 mg Oral DAILY    [Held by provider] isosorbide mononitrate ER (IMDUR) tablet 30 mg  30 mg Oral DAILY    [Held by provider] lisinopril (PRINIVIL, ZESTRIL) tablet 10 mg  10 mg Oral DAILY    venlafaxine-SR (EFFEXOR-XR) capsule 150 mg  150 mg Oral DAILY AFTER BREAKFAST    [Held by provider] amLODIPine (NORVASC) tablet 2.5 mg  2.5 mg Oral DAILY    finasteride (PROSCAR) tablet 5 mg  5 mg Oral 7am    [Held by provider] furosemide (LASIX) tablet 20 mg  20 mg Oral DAILY    potassium chloride (KLOR-CON) packet for solution 20 mEq  20 mEq Oral DAILY    [Held by provider] carvedilol (COREG) tablet 15.625 mg  15.625 mg Oral BID WITH MEALS    sodium chloride (NS) flush 5-40 mL  5-40 mL IntraVENous Q8H    sodium chloride (NS) flush 5-40 mL  5-40 mL IntraVENous PRN         Data Review:   Labs:    Recent Results (from the past 24 hour(s))   GLUCOSE, POC    Collection Time: 08/21/19  7:37 AM   Result Value Ref Range    Glucose (POC) 121 (H) 65 - 100 mg/dL    Performed by Shelly Waller    POC G3 - PUL    Collection Time: 08/21/19 10:06 AM   Result Value Ref Range    FIO2 (POC) 50 %    pH (POC) 7.284 (L) 7.35 - 7.45      pCO2 (POC) 37.3 35.0 - 45.0 MMHG    pO2 (POC) 147 (H) 80 - 100 MMHG    HCO3 (POC) 17.7 (L) 22 - 26 MMOL/L    sO2 (POC) 99 (H) 92 - 97 %    Base deficit (POC) 9 mmol/L    Site RIGHT RADIAL      Device: VENT      Mode ASSIST CONTROL      Tidal volume 500 ml    Set Rate 16 bpm    PEEP/CPAP (POC) 5 cmH2O    PIP (POC) 17      Allens test (POC) YES      Specimen type (POC) ARTERIAL      Total resp.  rate 23     GLUCOSE, POC    Collection Time: 08/21/19 11:28 AM   Result Value Ref Range    Glucose (POC) 74 65 - 100 mg/dL    Performed [facility] [id number] [facility]    Collection Time: 08/21/19  1:00 PM   Result Value Ref Range    Sodium 140 136 - 145 mmol/L    Potassium 4.0 3.5 - 5.1 mmol/L    Chloride 111 (H) 97 - 108 mmol/L    CO2 21 21 - 32 mmol/L    Anion gap 8 5 - 15 mmol/L    Glucose 70 65 - 100 mg/dL    BUN 29 (H) 6 - 20 MG/DL    Creatinine 1.80 (H) 0.70 - 1.30 MG/DL    BUN/Creatinine ratio 16 12 - 20      GFR est AA 46 (L) >60 ml/min/1.73m2    GFR est non-AA 38 (L) >60 ml/min/1.73m2    Calcium 7.9 (L) 8.5 - 10.1 MG/DL MAGNESIUM    Collection Time: 08/21/19  1:00 PM   Result Value Ref Range    Magnesium 2.2 1.6 - 2.4 mg/dL   CULTURE, RESPIRATORY/SPUTUM/BRONCH W GRAM STAIN    Collection Time: 08/21/19  1:00 PM   Result Value Ref Range    Special Requests: NO SPECIAL REQUESTS      GRAM STAIN 1+ WBCS SEEN      GRAM STAIN MODERATE EPITHELIAL CELLS SEEN      GRAM STAIN 1+ GRAM POSITIVE COCCI IN CLUSTERS      GRAM STAIN 1+ GRAM NEGATIVE RODS      GRAM STAIN MODERATE GRAM POSITIVE COCCI IN CHAINS      Culture result: PENDING    LACTIC ACID    Collection Time: 08/21/19  1:13 PM   Result Value Ref Range    Lactic acid 1.5 0.4 - 2.0 MMOL/L   URINALYSIS W/MICROSCOPIC    Collection Time: 08/21/19  1:17 PM   Result Value Ref Range    Color YELLOW/STRAW      Appearance CLEAR CLEAR      Specific gravity 1.020 1.003 - 1.030      pH (UA) 5.5 5.0 - 8.0      Protein 100 (A) NEG mg/dL    Glucose NEGATIVE  NEG mg/dL    Ketone NEGATIVE  NEG mg/dL    Bilirubin NEGATIVE  NEG      Blood LARGE (A) NEG      Urobilinogen 1.0 0.2 - 1.0 EU/dL    Nitrites NEGATIVE  NEG      Leukocyte Esterase TRACE (A) NEG      WBC 5-10 0 - 4 /hpf    RBC >100 (H) 0 - 5 /hpf    Epithelial cells FEW FEW /lpf    Bacteria 1+ (A) NEG /hpf   URINE CULTURE HOLD SAMPLE    Collection Time: 08/21/19  1:17 PM   Result Value Ref Range    Urine culture hold        URINE ON HOLD IN MICROBIOLOGY DEPT FOR 3 DAYS. IF UNPRESERVED URINE IS SUBMITTED, IT CANNOT BE USED FOR ADDITIONAL TESTING AFTER 24 HRS, RECOLLECTION WILL BE REQUIRED.    GLUCOSE, POC    Collection Time: 08/21/19  2:39 PM   Result Value Ref Range    Glucose (POC) 146 (H) 65 - 100 mg/dL    Performed by [id number], POC    Collection Time: 08/21/19 10:19 PM   Result Value Ref Range    Glucose (POC) 52 (L) 65 - 100 mg/dL    Performed by [address], POC    Collection Time: 08/21/19 10:41 PM   Result Value Ref Range    Glucose (POC) 166 (H) 65 - 100 mg/dL    Performed by Martinez Simmons METABOLIC PANEL, BASIC    Collection Time: 08/22/19  3:44 AM   Result Value Ref Range    Sodium 142 136 - 145 mmol/L    Potassium 3.6 3.5 - 5.1 mmol/L    Chloride 112 (H) 97 - 108 mmol/L    CO2 20 (L) 21 - 32 mmol/L    Anion gap 10 5 - 15 mmol/L    Glucose 32 (LL) 65 - 100 mg/dL    BUN 28 (H) 6 - 20 MG/DL    Creatinine 1.95 (H) 0.70 - 1.30 MG/DL    BUN/Creatinine ratio 14 12 - 20      GFR est AA 42 (L) >60 ml/min/1.73m2    GFR est non-AA 35 (L) >60 ml/min/1.73m2    Calcium 7.9 (L) 8.5 - 10.1 MG/DL   MAGNESIUM    Collection Time: 08/22/19  3:44 AM   Result Value Ref Range    Magnesium 2.4 1.6 - 2.4 mg/dL   AMMONIA    Collection Time: 08/22/19  3:44 AM   Result Value Ref Range    Ammonia 36 (H) <32 UMOL/L   CBC WITH AUTOMATED DIFF    Collection Time: 08/22/19  3:44 AM   Result Value Ref Range    WBC 15.4 (H) 4.1 - 11.1 K/uL    RBC 5.63 4.10 - 5.70 M/uL    HGB 11.8 (L) 12.1 - 17.0 g/dL    HCT 40.8 36.6 - 50.3 %    MCV 72.5 (L) 80.0 - 99.0 FL    MCH 21.0 (L) 26.0 - 34.0 PG    MCHC 28.9 (L) 30.0 - 36.5 g/dL    RDW 19.2 (H) 11.5 - 14.5 %    PLATELET 101 933 - 150 K/uL    NRBC 0.0 0  WBC    ABSOLUTE NRBC 0.00 0.00 - 0.01 K/uL    NEUTROPHILS 83 (H) 32 - 75 %    LYMPHOCYTES 8 (L) 12 - 49 %    MONOCYTES 8 5 - 13 %    EOSINOPHILS 0 0 - 7 %    BASOPHILS 0 0 - 1 %    IMMATURE GRANULOCYTES 1 (H) 0.0 - 0.5 %    ABS. NEUTROPHILS 12.8 (H) 1.8 - 8.0 K/UL    ABS. LYMPHOCYTES 1.2 0.8 - 3.5 K/UL    ABS. MONOCYTES 1.2 (H) 0.0 - 1.0 K/UL    ABS. EOSINOPHILS 0.0 0.0 - 0.4 K/UL    ABS. BASOPHILS 0.0 0.0 - 0.1 K/UL    ABS. IMM.  GRANS. 0.2 (H) 0.00 - 0.04 K/UL    DF SMEAR SCANNED      RBC COMMENTS SAMANTHA CELLS  PRESENT        RBC COMMENTS OVALOCYTES  PRESENT        RBC COMMENTS ANISOCYTOSIS  1+        RBC COMMENTS MICROCYTOSIS  1+       GLUCOSE, POC    Collection Time: 08/22/19  3:59 AM   Result Value Ref Range    Glucose (POC) 32 (LL) 65 - 100 mg/dL    Performed by [address], POC    Collection Time: 08/22/19  4:01 AM Result Value Ref Range    Glucose (POC) 33 (LL) 65 - 100 mg/dL    Performed by [address], POC    Collection Time: 08/22/19  4:23 AM   Result Value Ref Range    Glucose (POC) 134 (H) 65 - 100 mg/dL    Performed by Yaz [place]
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
## 2019-08-22 NOTE — PROGRESS NOTES
Consult seen, full note to follow. Chronic ischemic cardiomyopathy EF 16-20%, history of syncope, and recurrent monomorphic VT (required emergency shock in the ER). Acute respiratory failure with fever, leukocytosis, thick secretions, acidotic--intubated. Not directly consentable for an ICD yet, not clinically ready for implant yet. May go for a BIV system given the IVCD 134 msec and degree of historical CHF.     Signed By: Danielle Koroma MD     August 22, 2019

## 2019-08-22 NOTE — DIABETES MGMT
[facility]    D[facility] Progress Note    Recommendations/ Comments:  Chart review for extreme hypoglycemia - BG 32 at 0329 and then 37 mg/dL at 1000. [email or web address]  Elevated creatinine noted     If appropriate, please consider   Continue to observe BG   If BG's eventually rise and needs correction, please use the lispro high sensitivity scale due to renal status  Add A1c to next lab draw    Current hospital DM medication: none    Chart reviewed on Jean Claude Blackman .    Patient is a 71 y.o. male with known DM on Amaryl 4 mg 1/2 tab AM and PM  Admitted V tach  Vent      A1c:   Lab Results   Component Value Date/Time    Hemoglobin A1c 6.7 (H) 01/11/2018 05:04 AM    Hemoglobin A1c 6.7 (H) 07/18/2017 02:22 AM       Recent Glucose Results:   Lab Results   Component Value Date/Time    GLU 32 (LL) 08/22/2019 03:44 AM    GLUCPOC 165 (H) 08/22/2019 12:33 PM    GLUCPOC 64 (L) 08/22/2019 12:08 PM    GLUCPOC 182 (H) 08/22/2019 10:22 AM        Lab Results   Component Value Date/Time    Creatinine 1.95 (H) 08/22/2019 03:44 AM     Estimated Creatinine Clearance: 41.5 mL/min (A) (based on SCr of 1.95 mg/dL (H)). Active Orders   There are no active orders of the following types: Diet. PO intake: No data found. Will continue to follow as needed.     Thank you  Kevin Thompson RN, CDE      Time spent: 2 min

## 2019-08-22 NOTE — ROUTINE PROCESS
07:30 SBAR from [facility]    08:30 Copious tan green secretions from ETT. 10:03 BG 37, D10 bolus started per order. HYPOGLYCEMIC EPISODE DOCUMENTATION    Patient with hypoglycemic episode(s) at 10:00(time) on t    BG value(s) pre-treatment 40    Was patient symptomatic? [] yes, [x] no  Patient was treated with the following rescue medications/treatments: [x] D50               BG value post-treatment: 182  Once BG treated and value greater than 80mg/dl, pt was provided with the following:  [] snack  [] meal  Name of MD notified:Ro  The following orders were received:     10:22 Repeat BG = 182 (repeated after D 10% completed )    12:08 BG 64, D10 bolus infusing. HYPOGLYCEMIC EPISODE DOCUMENTATION    Patient with hypoglycemic episode(s) at 10:00(time) on t  (date). BG value(s) pre-treatment 40    Was patient symptomatic? [] yes, [x] no  Patient was treated with the following rescue medications/treatments: D 10                        BG value post-treatment: 165    Name of MD notified: Natasha Wilson  The following orders were received:     12:33 Repeat BG =165. Call made to Dr. Natasha Wilson     13:00 Continue to suction copious thick tan and green secretions. Awaiting consult with Dr. Blaze Marks    5:08pm. BG 33, when repeated it is 43. 250ml D10 infusing per order. HYPOGLYCEMIC EPISODE DOCUMENTATION    Patient with hypoglycemic episode(s) at 17:08(time) on t  (date). BG value(s) pre-treatment [address]    Was patient symptomatic? [] yes, [x] no  Patient was treated with the following rescue medications/treatments: (D 10)                 BG value post-treatment: 135Once BG treated and value greater than 80mg/dl, pt was provided with the following:    Name of MD notified:N/A  The following orders were received: N/A    1800 ABG's called to Dr. Natasha Wilson, see orders    19:30 Bedside shift change report given to Elisha (oncoming nurse) by BODØ (offgoing nurse).  Report included the following information SBAR, Kardex, Procedure Summary, Intake/Output, MAR, Accordion, Recent Results, Med Rec Status, Cardiac Rhythm SB , Alarm Parameters , Pre Procedure Checklist, Procedure Verification and Quality Measures.

## 2019-08-22 NOTE — INTERDISCIPLINARY ROUNDS
IDR/SLIDR Summary          Patient: Meeta Roque MRN: [id number]    Age: 71 y.o. YOB: 1955 [facility]Bed[facility] [place]   Admit Diagnosis: V tach ([facility] [I47.2]  VT (ventricular tachycardia) ([facility] [I47.2]  Principal Diagnosis: <principal problem not specified>   Goals: stable HR/rhythm, SAT/SBT? Readmission: NO  Quality Measure: CHF  VTE Prophylaxis: Mechanical  Influenza Vaccine screening completed? YES  Pneumococcal Vaccine screening completed? NO  Mobility needs: Yes   Nutrition plan:Yes  Consults:Speech, Respiratory and Case Management    Financial concerns:Yes  Escalated to CM? YES  RRAT Score: 26   Interventions:H2H  Testing due for pt today? YES  LOS: 2 days Expected length of stay ?  days  Discharge plan: tbd   PCP: Mehreen Trivedi MD  Transportation needs: Yes    Days before discharge:two or more days before discharge   Discharge disposition: Home    Signed:     Maximo Rajput RN  8/22/2019  5:19 AM

## 2019-08-22 NOTE — PROGRESS NOTES
Problem: Falls - Risk of  Goal: *Absence of Falls  Description  Document Kashmirw Foster Fall Risk and appropriate interventions in the flowsheet. Outcome: Progressing Towards Goal  Note:   Fall Risk Interventions:            Medication Interventions: Evaluate medications/consider consulting pharmacy    Elimination Interventions: Call light in reach, Toileting schedule/hourly rounds              Problem: Patient Education: Go to Patient Education Activity  Goal: Patient/Family Education  Outcome: Progressing Towards Goal     Problem: Pressure Injury - Risk of  Goal: *Prevention of pressure injury  Description  Document Iván Scale and appropriate interventions in the flowsheet. Outcome: Progressing Towards Goal  Note:   Pressure Injury Interventions:  Sensory Interventions: Assess need for specialty bed, Avoid rigorous massage over bony prominences, Check visual cues for pain, Float heels, Keep linens dry and wrinkle-free, Maintain/enhance activity level, Pad between skin to skin, Minimize linen layers, Monitor skin under medical devices, Pressure redistribution bed/mattress (bed type), Suspension boots, Turn and reposition approx.  every two hours (pillows and wedges if needed), Use 30-degree side-lying position    Moisture Interventions: Apply protective barrier, creams and emollients, Moisture barrier, Minimize layers, Maintain skin hydration (lotion/cream), Internal/External urinary devices, Internal/External fecal devices    Activity Interventions: Pressure redistribution bed/mattress(bed type)    Mobility Interventions: HOB 30 degrees or less, Pressure redistribution bed/mattress (bed type), Suspension boots    Nutrition Interventions: Discuss nutritional consult with provider    Friction and Shear Interventions: Feet elevated on foot rest, Transferring/repositioning devices, Transfer aides:transfer board/[place] lift/ceiling lift, Minimize layers, Lift sheet, Lift team/patient mobility team, HOB 30 degrees or less                Problem: Patient Education: Go to Patient Education Activity  Goal: Patient/Family Education  Outcome: Progressing Towards Goal     Problem: Non-Violent Restraints  Goal: *Removal from restraints as soon as assessed to be safe  Outcome: Progressing Towards Goal  Goal: *No harm/injury to patient while restraints in use  Outcome: Progressing Towards Goal  Goal: *Patient's dignity will be maintained  Outcome: Progressing Towards Goal  Goal: *Patient Specific Goal (EDIT GOAL, INSERT TEXT)  Outcome: Progressing Towards Goal  Goal: Non-violent Restaints:Standard Interventions  Outcome: Progressing Towards Goal  Goal: Non-violent Restraints:Patient Interventions  Outcome: Progressing Towards Goal  Goal: Patient/Family Education  Outcome: Progressing Towards Goal     Problem: Ventilator Management  Goal: *Adequate oxygenation and ventilation  Outcome: Progressing Towards Goal  Goal: *Patient maintains clear airway/free of aspiration  Outcome: Progressing Towards Goal  Goal: *Absence of infection signs and symptoms  Outcome: Progressing Towards Goal  Goal: *Normal spontaneous ventilation  Outcome: Progressing Towards Goal     Problem: Patient Education: Go to Patient Education Activity  Goal: Patient/Family Education  Outcome: Progressing Towards Goal     Problem: Infection - Risk of, Ventilator-Associated Pneumonia  Goal: *Absence of infection signs and symptoms  Outcome: Progressing Towards Goal     Problem: Patient Education: Go to Patient Education Activity  Goal: Patient/Family Education  Outcome: Progressing Towards Goal     Problem: Arrhythmia Pathway (Adult)  Goal: *Absence of arrhythmia  Outcome: Progressing Towards Goal     Problem: Patient Education: Go to Patient Education Activity  Goal: Patient/Family Education  Outcome: Progressing Towards Goal     Problem: Patient Education: Go to Patient Education Activity  Goal: Patient/Family Education  Outcome: Progressing Towards Goal     Problem: Heart Failure: Day 2  Goal: Off Pathway (Use only if patient is Off Pathway)  Outcome: Progressing Towards Goal  Goal: Activity/Safety  Outcome: Progressing Towards Goal  Goal: Consults, if ordered  Outcome: Progressing Towards Goal  Goal: Diagnostic Test/Procedures  Outcome: Progressing Towards Goal  Goal: Nutrition/Diet  Outcome: Progressing Towards Goal  Goal: Discharge Planning  Outcome: Progressing Towards Goal  Goal: Medications  Outcome: Progressing Towards Goal  Goal: Respiratory  Outcome: Progressing Towards Goal  Goal: Treatments/Interventions/Procedures  Outcome: Progressing Towards Goal  Goal: Psychosocial  Outcome: Progressing Towards Goal  Goal: *Oxygen saturation within defined limits  Outcome: Progressing Towards Goal  Goal: *Hemodynamically stable  Outcome: Progressing Towards Goal  Goal: *Optimal pain control at patient's stated goal  Outcome: Progressing Towards Goal  Goal: *Anxiety reduced or absent  Outcome: Progressing Towards Goal  Goal: *Demonstrates progressive activity  Outcome: Progressing Towards Goal     Problem: Heart Failure: Day 3  Goal: Off Pathway (Use only if patient is Off Pathway)  Outcome: Progressing Towards Goal  Goal: Activity/Safety  Outcome: Progressing Towards Goal  Goal: Diagnostic Test/Procedures  Outcome: Progressing Towards Goal  Goal: Nutrition/Diet  Outcome: Progressing Towards Goal  Goal: Discharge Planning  Outcome: Progressing Towards Goal  Goal: Medications  Outcome: Progressing Towards Goal  Goal: Respiratory  Outcome: Progressing Towards Goal  Goal: Treatments/Interventions/Procedures  Outcome: Progressing Towards Goal  Goal: Psychosocial  Outcome: Progressing Towards Goal  Goal: *Oxygen saturation within defined limits  Outcome: Progressing Towards Goal  Goal: *Hemodynamically stable  Outcome: Progressing Towards Goal  Goal: *Optimal pain control at patient's stated goal  Outcome: Progressing Towards Goal  Goal: *Anxiety reduced or absent  Outcome: Progressing Towards Goal  Goal: *Demonstrates progressive activity  Outcome: Progressing Towards Goal     Problem: Heart Failure: Day 4  Goal: Off Pathway (Use only if patient is Off Pathway)  Outcome: Progressing Towards Goal  Goal: Activity/Safety  Outcome: Progressing Towards Goal  Goal: Diagnostic Test/Procedures  Outcome: Progressing Towards Goal  Goal: Nutrition/Diet  Outcome: Progressing Towards Goal  Goal: Discharge Planning  Outcome: Progressing Towards Goal  Goal: Medications  Outcome: Progressing Towards Goal  Goal: Respiratory  Outcome: Progressing Towards Goal  Goal: Treatments/Interventions/Procedures  Outcome: Progressing Towards Goal  Goal: Psychosocial  Outcome: Progressing Towards Goal  Goal: *Oxygen saturation within defined limits  Outcome: Progressing Towards Goal  Goal: *Hemodynamically stable  Outcome: Progressing Towards Goal  Goal: *Optimal pain control at patient's stated goal  Outcome: Progressing Towards Goal  Goal: *Anxiety reduced or absent  Outcome: Progressing Towards Goal  Goal: *Demonstrates progressive activity  Outcome: Progressing Towards Goal     Problem: Heart Failure: Day 5  Goal: Off Pathway (Use only if patient is Off Pathway)  Outcome: Progressing Towards Goal  Goal: Activity/Safety  Outcome: Progressing Towards Goal  Goal: Diagnostic Test/Procedures  Outcome: Progressing Towards Goal  Goal: Nutrition/Diet  Outcome: Progressing Towards Goal  Goal: Discharge Planning  Outcome: Progressing Towards Goal  Goal: Medications  Outcome: Progressing Towards Goal  Goal: Respiratory  Outcome: Progressing Towards Goal  Goal: Treatments/Interventions/Procedures  Outcome: Progressing Towards Goal  Goal: Psychosocial  Outcome: Progressing Towards Goal     Problem: Heart Failure: Discharge Outcomes  Goal: *Demonstrates ability to perform prescribed activity without shortness of breath or discomfort  Outcome: Progressing Towards Goal  Goal: *Left ventricular function assessment completed prior to or during stay, or planned for post-discharge  Outcome: Progressing Towards Goal  Goal: *ACEI prescribed if LVEF less than 40% and no contraindications or ARB prescribed  Outcome: Progressing Towards Goal  Goal: *Verbalizes understanding and describes prescribed diet  Outcome: Progressing Towards Goal  Goal: *Verbalizes understanding/describes prescribed medications  Outcome: Progressing Towards Goal  Goal: *Describes available resources and support systems  Description  (eg: Home Health, Palliative Care, Advanced Medical Directive)  Outcome: Progressing Towards Goal  Goal: *Describes smoking cessation resources  Outcome: Progressing Towards Goal  Goal: *Understands and describes signs and symptoms to report to providers(Stroke Metric)  Outcome: Progressing Towards Goal  Goal: *Describes/verbalizes understanding of follow-up/return appt  Description  (eg: to physicians, diabetes treatment coordinator, and other resources  Outcome: Progressing Towards Goal  Goal: *Describes importance of continuing daily weights and changes to report to physician  Outcome: Progressing Towards Goal     Problem: Infection - Risk of, Urinary Catheter-Associated Urinary Tract Infection  Goal: *Absence of infection signs and symptoms  Outcome: Progressing Towards Goal     Problem: Patient Education: Go to Patient Education Activity  Goal: Patient/Family Education  Outcome: Progressing Towards Goal

## 2019-08-23 ENCOUNTER — APPOINTMENT (OUTPATIENT)
Dept: GENERAL RADIOLOGY | Age: 64
DRG: 224 | End: 2019-08-23
Attending: INTERNAL MEDICINE
Payer: MEDICARE

## 2019-08-23 LAB
ALBUMIN SERPL-MCNC: 2.5 G/DL (ref 3.5–5)
ALBUMIN/GLOB SERPL: 0.7 {RATIO} (ref 1.1–2.2)
ALP SERPL-CCNC: 71 U/L (ref 45–117)
ALT SERPL-CCNC: 12 U/L (ref 12–78)
AMMONIA PLAS-SCNC: 60 UMOL/L
ANION GAP SERPL CALC-SCNC: 8 MMOL/L (ref 5–15)
ARTERIAL PATENCY WRIST A: YES
AST SERPL-CCNC: 8 U/L (ref 15–37)
BACTERIA SPEC CULT: NORMAL
BASE DEFICIT BLD-SCNC: 7 MMOL/L
BASOPHILS # BLD: 0 K/UL (ref 0–0.1)
BASOPHILS NFR BLD: 0 % (ref 0–1)
BDY SITE: ABNORMAL
BILIRUB SERPL-MCNC: 0.5 MG/DL (ref 0.2–1)
BUN SERPL-MCNC: 20 MG/DL (ref 6–20)
BUN/CREAT SERPL: 11 (ref 12–20)
CALCIUM SERPL-MCNC: 7.9 MG/DL (ref 8.5–10.1)
CHLORIDE SERPL-SCNC: 111 MMOL/L (ref 97–108)
CO2 SERPL-SCNC: 19 MMOL/L (ref 21–32)
CORTIS SERPL-MCNC: 12.6 UG/DL
CREAT SERPL-MCNC: 1.74 MG/DL (ref 0.7–1.3)
DATE LAST DOSE: ABNORMAL
DIFFERENTIAL METHOD BLD: ABNORMAL
EOSINOPHIL # BLD: 0 K/UL (ref 0–0.4)
EOSINOPHIL NFR BLD: 0 % (ref 0–7)
ERYTHROCYTE [DISTWIDTH] IN BLOOD BY AUTOMATED COUNT: 18.2 % (ref 11.5–14.5)
GAS FLOW.O2 O2 DELIVERY SYS: ABNORMAL L/MIN
GAS FLOW.O2 SETTING OXYMISER: 16 BPM
GLOBULIN SER CALC-MCNC: 3.4 G/DL (ref 2–4)
GLUCOSE BLD STRIP.AUTO-MCNC: 115 MG/DL (ref 65–100)
GLUCOSE BLD STRIP.AUTO-MCNC: 136 MG/DL (ref 65–100)
GLUCOSE BLD STRIP.AUTO-MCNC: 204 MG/DL (ref 65–100)
GLUCOSE BLD STRIP.AUTO-MCNC: 212 MG/DL (ref 65–100)
GLUCOSE BLD STRIP.AUTO-MCNC: 217 MG/DL (ref 65–100)
GLUCOSE BLD STRIP.AUTO-MCNC: 226 MG/DL (ref 65–100)
GLUCOSE SERPL-MCNC: 145 MG/DL (ref 65–100)
GRAM STN SPEC: NORMAL
HCO3 BLD-SCNC: 18.9 MMOL/L (ref 22–26)
HCT VFR BLD AUTO: 35 % (ref 36.6–50.3)
HGB BLD-MCNC: 10.4 G/DL (ref 12.1–17)
IMM GRANULOCYTES # BLD AUTO: 0 K/UL (ref 0–0.04)
IMM GRANULOCYTES NFR BLD AUTO: 0 % (ref 0–0.5)
LIDOCAIN SERPL-MCNC: 1.1 UG/ML
LYMPHOCYTES # BLD: 0.4 K/UL (ref 0.8–3.5)
LYMPHOCYTES NFR BLD: 5 % (ref 12–49)
MAGNESIUM SERPL-MCNC: 2.1 MG/DL (ref 1.6–2.4)
MCH RBC QN AUTO: 21.3 PG (ref 26–34)
MCHC RBC AUTO-ENTMCNC: 29.7 G/DL (ref 30–36.5)
MCV RBC AUTO: 71.6 FL (ref 80–99)
MONOCYTES # BLD: 0.1 K/UL (ref 0–1)
MONOCYTES NFR BLD: 1 % (ref 5–13)
NEUTS SEG # BLD: 7.2 K/UL (ref 1.8–8)
NEUTS SEG NFR BLD: 94 % (ref 32–75)
NRBC # BLD: 0 K/UL (ref 0–0.01)
NRBC BLD-RTO: 0 PER 100 WBC
O2/TOTAL GAS SETTING VFR VENT: 28 %
PCO2 BLD: 35.1 MMHG (ref 35–45)
PEEP RESPIRATORY: 5 CMH2O
PH BLD: 7.34 [PH] (ref 7.35–7.45)
PHOSPHATE SERPL-MCNC: 3.5 MG/DL (ref 2.6–4.7)
PLATELET # BLD AUTO: 120 K/UL (ref 150–400)
PLATELET COMMENTS,PCOM: ABNORMAL
PO2 BLD: 98 MMHG (ref 80–100)
POTASSIUM SERPL-SCNC: 3.9 MMOL/L (ref 3.5–5.1)
PROT SERPL-MCNC: 5.9 G/DL (ref 6.4–8.2)
RBC # BLD AUTO: 4.89 M/UL (ref 4.1–5.7)
RBC MORPH BLD: ABNORMAL
REPORTED DOSE,DOSE: ABNORMAL UNITS
REPORTED DOSE/TIME,TMG: ABNORMAL
SAO2 % BLD: 97 % (ref 92–97)
SERVICE CMNT-IMP: ABNORMAL
SERVICE CMNT-IMP: NORMAL
SODIUM SERPL-SCNC: 138 MMOL/L (ref 136–145)
SPECIMEN TYPE: ABNORMAL
VANCOMYCIN TROUGH SERPL-MCNC: 15.7 UG/ML (ref 5–10)
VENTILATION MODE VENT: ABNORMAL
VT SETTING VENT: 500 ML
WBC # BLD AUTO: 7.7 K/UL (ref 4.1–11.1)

## 2019-08-23 PROCEDURE — 83735 ASSAY OF MAGNESIUM: CPT

## 2019-08-23 PROCEDURE — 82140 ASSAY OF AMMONIA: CPT

## 2019-08-23 PROCEDURE — 82962 GLUCOSE BLOOD TEST: CPT

## 2019-08-23 PROCEDURE — 74011250637 HC RX REV CODE- 250/637: Performed by: INTERNAL MEDICINE

## 2019-08-23 PROCEDURE — 80053 COMPREHEN METABOLIC PANEL: CPT

## 2019-08-23 PROCEDURE — 74011250636 HC RX REV CODE- 250/636: Performed by: INTERNAL MEDICINE

## 2019-08-23 PROCEDURE — 94003 VENT MGMT INPAT SUBQ DAY: CPT

## 2019-08-23 PROCEDURE — 65620000000 HC RM CCU GENERAL

## 2019-08-23 PROCEDURE — 94640 AIRWAY INHALATION TREATMENT: CPT

## 2019-08-23 PROCEDURE — 84100 ASSAY OF PHOSPHORUS: CPT

## 2019-08-23 PROCEDURE — 71045 X-RAY EXAM CHEST 1 VIEW: CPT

## 2019-08-23 PROCEDURE — 94660 CPAP INITIATION&MGMT: CPT

## 2019-08-23 PROCEDURE — 82803 BLOOD GASES ANY COMBINATION: CPT

## 2019-08-23 PROCEDURE — 36600 WITHDRAWAL OF ARTERIAL BLOOD: CPT

## 2019-08-23 PROCEDURE — 74011000250 HC RX REV CODE- 250: Performed by: INTERNAL MEDICINE

## 2019-08-23 PROCEDURE — C9113 INJ PANTOPRAZOLE SODIUM, VIA: HCPCS | Performed by: INTERNAL MEDICINE

## 2019-08-23 PROCEDURE — 82533 TOTAL CORTISOL: CPT

## 2019-08-23 PROCEDURE — 74011000258 HC RX REV CODE- 258: Performed by: INTERNAL MEDICINE

## 2019-08-23 PROCEDURE — 80202 ASSAY OF VANCOMYCIN: CPT

## 2019-08-23 PROCEDURE — 36415 COLL VENOUS BLD VENIPUNCTURE: CPT

## 2019-08-23 PROCEDURE — 85025 COMPLETE CBC W/AUTO DIFF WBC: CPT

## 2019-08-23 PROCEDURE — 80176 ASSAY OF LIDOCAINE: CPT

## 2019-08-23 RX ORDER — LABETALOL HYDROCHLORIDE 5 MG/ML
10 INJECTION, SOLUTION INTRAVENOUS
Status: DISCONTINUED | OUTPATIENT
Start: 2019-08-23 | End: 2019-09-03 | Stop reason: HOSPADM

## 2019-08-23 RX ORDER — POTASSIUM CHLORIDE 1.5 G/1.77G
20 POWDER, FOR SOLUTION ORAL
Status: COMPLETED | OUTPATIENT
Start: 2019-08-23 | End: 2019-08-23

## 2019-08-23 RX ORDER — CARVEDILOL 3.12 MG/1
3.12 TABLET ORAL 2 TIMES DAILY WITH MEALS
Status: DISCONTINUED | OUTPATIENT
Start: 2019-08-23 | End: 2019-08-26

## 2019-08-23 RX ADMIN — PROPOFOL 30 MCG/KG/MIN: 10 INJECTION, EMULSION INTRAVENOUS at 06:58

## 2019-08-23 RX ADMIN — CARVEDILOL 3.12 MG: 3.12 TABLET, FILM COATED ORAL at 19:39

## 2019-08-23 RX ADMIN — CHLORHEXIDINE GLUCONATE 15 ML: 1.2 RINSE ORAL at 08:15

## 2019-08-23 RX ADMIN — LEVETIRACETAM 1000 MG: 100 SOLUTION ORAL at 21:22

## 2019-08-23 RX ADMIN — Medication 10 ML: at 06:44

## 2019-08-23 RX ADMIN — Medication 10 ML: at 21:23

## 2019-08-23 RX ADMIN — QUETIAPINE FUMARATE 25 MG: 25 TABLET ORAL at 21:22

## 2019-08-23 RX ADMIN — CLONAZEPAM 1 MG: 0.5 TABLET ORAL at 21:22

## 2019-08-23 RX ADMIN — METHYLPREDNISOLONE SODIUM SUCCINATE 40 MG: 40 INJECTION, POWDER, FOR SOLUTION INTRAMUSCULAR; INTRAVENOUS at 00:59

## 2019-08-23 RX ADMIN — VANCOMYCIN HYDROCHLORIDE 1250 MG: 10 INJECTION, POWDER, LYOPHILIZED, FOR SOLUTION INTRAVENOUS at 01:09

## 2019-08-23 RX ADMIN — ASPIRIN 81 MG CHEWABLE TABLET 81 MG: 81 TABLET CHEWABLE at 09:07

## 2019-08-23 RX ADMIN — Medication 10 ML: at 13:00

## 2019-08-23 RX ADMIN — PROPOFOL 30 MCG/KG/MIN: 10 INJECTION, EMULSION INTRAVENOUS at 01:09

## 2019-08-23 RX ADMIN — LACTULOSE 45 ML: 20 SOLUTION ORAL at 17:11

## 2019-08-23 RX ADMIN — PIPERACILLIN SODIUM,TAZOBACTAM SODIUM 3.38 G: 3; .375 INJECTION, POWDER, FOR SOLUTION INTRAVENOUS at 22:47

## 2019-08-23 RX ADMIN — BUDESONIDE 500 MCG: 0.5 INHALANT RESPIRATORY (INHALATION) at 21:02

## 2019-08-23 RX ADMIN — PIPERACILLIN SODIUM,TAZOBACTAM SODIUM 3.38 G: 3; .375 INJECTION, POWDER, FOR SOLUTION INTRAVENOUS at 06:46

## 2019-08-23 RX ADMIN — CLOPIDOGREL BISULFATE 75 MG: 75 TABLET, FILM COATED ORAL at 09:07

## 2019-08-23 RX ADMIN — POTASSIUM CHLORIDE 20 MEQ: 1.5 POWDER, FOR SOLUTION ORAL at 06:45

## 2019-08-23 RX ADMIN — LEVOFLOXACIN 750 MG: 5 INJECTION, SOLUTION INTRAVENOUS at 12:58

## 2019-08-23 RX ADMIN — METHYLPREDNISOLONE SODIUM SUCCINATE 40 MG: 40 INJECTION, POWDER, FOR SOLUTION INTRAMUSCULAR; INTRAVENOUS at 06:44

## 2019-08-23 RX ADMIN — LEVETIRACETAM 1000 MG: 100 SOLUTION ORAL at 09:09

## 2019-08-23 RX ADMIN — PIPERACILLIN SODIUM,TAZOBACTAM SODIUM 3.38 G: 3; .375 INJECTION, POWDER, FOR SOLUTION INTRAVENOUS at 14:14

## 2019-08-23 RX ADMIN — POTASSIUM CHLORIDE 20 MEQ: 1.5 POWDER, FOR SOLUTION ORAL at 09:17

## 2019-08-23 RX ADMIN — FAMOTIDINE 20 MG: 20 TABLET ORAL at 06:45

## 2019-08-23 RX ADMIN — FINASTERIDE 5 MG: 5 TABLET, FILM COATED ORAL at 06:45

## 2019-08-23 RX ADMIN — METHYLPREDNISOLONE SODIUM SUCCINATE 40 MG: 40 INJECTION, POWDER, FOR SOLUTION INTRAMUSCULAR; INTRAVENOUS at 17:10

## 2019-08-23 RX ADMIN — EPLERENONE 25 MG: 25 TABLET, FILM COATED ORAL at 09:05

## 2019-08-23 RX ADMIN — SODIUM CHLORIDE 40 MG: 9 INJECTION INTRAMUSCULAR; INTRAVENOUS; SUBCUTANEOUS at 09:09

## 2019-08-23 RX ADMIN — LACTULOSE 45 ML: 20 SOLUTION ORAL at 09:05

## 2019-08-23 RX ADMIN — BUDESONIDE 500 MCG: 0.5 INHALANT RESPIRATORY (INHALATION) at 09:46

## 2019-08-23 RX ADMIN — ATORVASTATIN CALCIUM 80 MG: 40 TABLET, FILM COATED ORAL at 09:08

## 2019-08-23 RX ADMIN — PHENYLEPHRINE HYDROCHLORIDE 30 MCG/MIN: 10 INJECTION INTRAVENOUS at 01:10

## 2019-08-23 RX ADMIN — METHYLPREDNISOLONE SODIUM SUCCINATE 40 MG: 40 INJECTION, POWDER, FOR SOLUTION INTRAMUSCULAR; INTRAVENOUS at 12:58

## 2019-08-23 RX ADMIN — LACTULOSE 45 ML: 20 SOLUTION ORAL at 21:22

## 2019-08-23 RX ADMIN — VANCOMYCIN HYDROCHLORIDE 1250 MG: 10 INJECTION, POWDER, LYOPHILIZED, FOR SOLUTION INTRAVENOUS at 19:54

## 2019-08-23 RX ADMIN — FAMOTIDINE 20 MG: 20 TABLET ORAL at 17:11

## 2019-08-23 NOTE — INTERDISCIPLINARY ROUNDS
IDR/SLIDR Summary          Patient: Iva Quinonez MRN: [id number]    Age: 71 y.o. YOB: 1955 Room/Bed: [address] [place] [address]   Admit Diagnosis: V tach (HCC) [I47.2]  VT (ventricular tachycardia) ([facility] [address] [I47.2]  Principal Diagnosis: <principal problem not specified>   Goals: stable HR/rhythm, wean pressors, SAT/SBT?, ICD  Readmission: NO  Quality Measure: CHF  VTE Prophylaxis: Mechanical  Influenza Vaccine screening completed? YES  Pneumococcal Vaccine screening completed? NO  Mobility needs: Yes   Nutrition plan:Yes  Consults:P.T, O.T., Speech, Respiratory and Case Management    Financial concerns:Yes  Escalated to CM? YES  RRAT Score: 23   Interventions:H2H  Testing due for pt today? YES  LOS: 3 days Expected length of stay ?  days  Discharge plan: tbd   PCP: Ally Hazel MD  Transportation needs: Yes    Days before discharge:two or more days before discharge   Discharge disposition: Home    Signed:     David Boles RN  8/23/2019  7:21 AM

## 2019-08-23 NOTE — PROGRESS NOTES
Cardiology Progress Note                                        Admit Date: 8/20/2019    Assessment/Plan:     VT; recurrent and required shocked once and now on increased Lidocaine drip for now  CHF; improving; will try to extubate him  CAD; stable anatomy on cath  Abnormal LFTs; better; doubt cirrhosis exacerbation but more passive congestion  Infection; some possible aspiration PNA but definitely better on IV ATBs; expect extubation soon    Bettye Linder is a 71 y.o. male with     PROBLEM LIST:  Patient Active Problem List    Diagnosis Date Noted   Nadine Marx tach ([facility] [address] 08/20/2019     Priority: 1 - One    VT (ventricular tachycardia) ([address] 08/20/2019    CHF exacerbation ([address] 06/13/2019    Fall 01/10/2019    TACOS (acute kidney injury) ([address] 01/10/2019    Chest pain 01/11/2018    Acute chest pain 01/10/2018    Hepatic encephalopathy ([address] 07/17/2017    Neuropathy 04/14/2017    Cirrhosis ([facility] [address] 04/14/2017    CAD (coronary artery disease) 04/14/2017    S/P coronary artery stent placement 04/14/2017    S/P CABG (coronary artery bypass graft) 04/14/2017    Thrombocytopenia ([address] 04/14/2017    MRSA infection 04/14/2017    S/P cholecystectomy 82/85/3625    Metabolic encephalopathy 82/26/0177    Seizure ([address] 11/21/2016    Sinusitis     Joint pain     Low back pain     GERD (gastroesophageal reflux disease)     Diabetes mellitus, type 2 (HCC)          Subjective:     Bettye Anton. reports none.     Visit Vitals  /70   Pulse 64   Temp 98.3 °F (36.8 °C)   Resp 19   Ht 5' 9\" (1.753 m)   Wt 200 lb 6.4 oz (90.9 kg)   SpO2 100%   BMI 29.59 kg/m²       Intake/Output Summary (Last 24 hours) at 8/23/2019 0716  Last data filed at 8/23/2019 0700  Gross per 24 hour   Intake 3924.85 ml   Output 2270 ml   Net 1654.85 ml       Objective:      Physical Exam:  HEENT: Perrla, EOMI  Neck: No JVD,  No thyroidmegaly  Resp:  rales  CV: RRR s1s2 No murmur no s3  Abd:Soft, Nontender  Ext: 1+  edema  Neuro: Alert and oriented; Nonfocal  Skin: Warm, Dry, Intact  Pulses: 2+ DP/PT/Rad      Telemetry: normal sinus rhythm    Current Facility-Administered Medications   Medication Dose Route Frequency    budesonide (PULMICORT) 500 mcg/2 ml nebulizer suspension  500 mcg Nebulization BID RT    eplerenone (INSPRA) tablet 25 mg  25 mg Oral DAILY    atorvastatin (LIPITOR) tablet 80 mg  80 mg Oral DAILY    [Held by provider] glimepiride (AMARYL) tablet 4 mg  4 mg Oral BID    [Held by provider] ALPRAZolam (XANAX) tablet 1 mg  1 mg Oral BID    clonazePAM (KlonoPIN) tablet 1 mg  1 mg Oral QHS    dextrose 10% infusion  75 mL/hr IntraVENous TITRATE    pantoprazole (PROTONIX) 40 mg in sodium chloride 0.9% 10 mL injection  40 mg IntraVENous DAILY    methylPREDNISolone (PF) (SOLU-MEDROL) injection 40 mg  40 mg IntraVENous Q6H    PHENYLephrine (ZAHIDA-SYNEPHRINE) 30 mg in 0.9% sodium chloride 250 mL infusion   mcg/min IntraVENous TITRATE    levETIRAcetam (KEPPRA) oral solution 1,000 mg  1,000 mg Oral Q12H    dextrose 10 % infusion 250 mL  250 mL IntraVENous PRN    acetaminophen (TYLENOL) solution 650 mg  650 mg Per NG tube Q4H PRN    piperacillin-tazobactam (ZOSYN) 3.375 g in 0.9% sodium chloride (MBP/ADV) 100 mL  3.375 g IntraVENous Q8H    levoFLOXacin (LEVAQUIN) 750 mg in D5W IVPB  750 mg IntraVENous Q48H    Vancomycin - Pharmacy to Dose   Other Rx Dosing/Monitoring    vancomycin (VANCOCIN) 1250 mg in  ml infusion  1,250 mg IntraVENous Q18H    chlorhexidine (PERIDEX) 0.12 % mouthwash 15 mL  15 mL Oral Q12H    propofol (DIPRIVAN) 10 mg/mL injection  0-50 mcg/kg/min IntraVENous TITRATE    lidocaine 8 mg/mL (Xylocaine) infusion  1 mg/min IntraVENous CONTINUOUS    aspirin chewable tablet 81 mg  81 mg Oral DAILY    [Held by provider] pregabalin (LYRICA) capsule 50 mg  50 mg Oral TID    [Held by provider] tamsulosin (FLOMAX) capsule 0.4 mg  0.4 mg Oral ACB&D    famotidine (PEPCID) tablet 20 mg  20 mg Oral ACB&D  lactulose (CHRONULAC) 10 gram/15 mL solution 45 mL  30 g Oral TID    [Held by provider] FLUoxetine (PROzac) capsule 20 mg  20 mg Oral DAILY    QUEtiapine (SEROquel) tablet 25 mg  25 mg Oral QHS    nitroglycerin (NITROSTAT) tablet 0.4 mg  0.4 mg SubLINGual Q5MIN PRN    clopidogrel (PLAVIX) tablet 75 mg  75 mg Oral DAILY    [Held by provider] isosorbide mononitrate ER (IMDUR) tablet 30 mg  30 mg Oral DAILY    [Held by provider] lisinopril (PRINIVIL, ZESTRIL) tablet 10 mg  10 mg Oral DAILY    venlafaxine-SR (EFFEXOR-XR) capsule 150 mg  150 mg Oral DAILY AFTER BREAKFAST    [Held by provider] amLODIPine (NORVASC) tablet 2.5 mg  2.5 mg Oral DAILY    finasteride (PROSCAR) tablet 5 mg  5 mg Oral 7am    [Held by provider] furosemide (LASIX) tablet 20 mg  20 mg Oral DAILY    potassium chloride (KLOR-CON) packet for solution 20 mEq  20 mEq Oral DAILY    [Held by provider] carvedilol (COREG) tablet 15.625 mg  15.625 mg Oral BID WITH MEALS    sodium chloride (NS) flush 5-40 mL  5-40 mL IntraVENous Q8H    sodium chloride (NS) flush 5-40 mL  5-40 mL IntraVENous PRN         Data Review:   Labs:    Recent Results (from the past 24 hour(s))   CORTISOL    Collection Time: 08/22/19  9:11 AM   Result Value Ref Range    Cortisol, random 29.3 ug/dL   GLUCOSE, POC    Collection Time: 08/22/19 10:00 AM   Result Value Ref Range    Glucose (POC) 37 (LL) 65 - 100 mg/dL    Performed by [address], POC    Collection Time: 08/22/19 10:22 AM   Result Value Ref Range    Glucose (POC) 182 (H) 65 - 100 mg/dL    Performed by [address], POC    Collection Time: 08/22/19 12:08 PM   Result Value Ref Range    Glucose (POC) 64 (L) 65 - 100 mg/dL    Performed by [address] Khanna, POC    Collection Time: 08/22/19 12:33 PM   Result Value Ref Range    Glucose (POC) 165 (H) 65 - 100 mg/dL    Performed by [address] Kh[address], POC    Collection Time: 08/22/19  5:03 PM   Result Value Ref Range    Glucose (POC) 33 (LL) 65 - 100 mg/dL    Performed by [address], POC    Collection Time: 08/22/19  5:04 PM   Result Value Ref Range    Glucose (POC) 43 (LL) 65 - 100 mg/dL    Performed by [address], POC    Collection Time: 08/22/19  5:33 PM   Result Value Ref Range    Glucose (POC) 135 (H) 65 - 100 mg/dL    Performed by Nelson Zavala    POC G3 - PUL    Collection Time: 08/22/19  5:40 PM   Result Value Ref Range    FIO2 (POC) 0.35 %    pH (POC) 7.292 (L) 7.35 - 7.45      pCO2 (POC) 35.3 35.0 - 45.0 MMHG    pO2 (POC) 152 (H) 80 - 100 MMHG    HCO3 (POC) 17.0 (L) 22 - 26 MMOL/L    sO2 (POC) 99 (H) 92 - 97 %    Base deficit (POC) 10 mmol/L    Site RIGHT RADIAL      Device: VENT      Mode ASSIST CONTROL      Tidal volume 500 ml    Set Rate 16 bpm    PEEP/CPAP (POC) 5.0 cmH2O    PIP (POC) 17      Allens test (POC) N/A      Specimen type (POC) ARTERIAL     GLUCOSE, POC    Collection Time: 08/22/19  8:27 PM   Result Value Ref Range    Glucose (POC) 63 (L) 65 - 100 mg/dL    Performed by [address], POC    Collection Time: 08/22/19  8:46 PM   Result Value Ref Range    Glucose (POC) 176 (H) 65 - 100 mg/dL    Performed by [facility], POC    Collection Time: 08/23/19 12:49 AM   Result Value Ref Range    Glucose (POC) 115 (H) 65 - 100 mg/dL    Performed by [facility] [address], POC    Collection Time: 08/23/19  4:08 AM   Result Value Ref Range    Glucose (POC) 136 (H) 65 - 100 mg/dL    Performed by Lele Suazo    Collection Time: 08/23/19  4:15 AM   Result Value Ref Range    Cortisol, random 12.6 ug/dL   CBC WITH AUTOMATED DIFF    Collection Time: 08/23/19  4:15 AM   Result Value Ref Range    WBC 7.7 4.1 - 11.1 K/uL    RBC 4.89 4. 10 - 5.70 M/uL    HGB 10.4 (L) 12.1 - 17.0 g/dL    HCT 35.0 (L) 36.6 - 50.3 %    MCV 71.6 (L) 80.0 - 99.0 FL    MCH 21.3 (L) 26.0 - 34.0 PG    MCHC 29.7 (L) 30.0 - 36.5 g/dL    RDW 18.2 (H) 11.5 - 14.5 %    PLATELET 409 (L) 571 - 400 K/uL    NRBC 0.0 0  WBC    ABSOLUTE NRBC 0.00 0.00 - 0.01 K/uL    NEUTROPHILS 94 (H) 32 - 75 %    LYMPHOCYTES 5 (L) 12 - 49 %    MONOCYTES 1 (L) 5 - 13 %    EOSINOPHILS 0 0 - 7 %    BASOPHILS 0 0 - 1 %    IMMATURE GRANULOCYTES 0 0.0 - 0.5 %    ABS. NEUTROPHILS 7.2 1.8 - 8.0 K/UL    ABS. LYMPHOCYTES 0.4 (L) 0.8 - 3.5 K/UL    ABS. MONOCYTES 0.1 0.0 - 1.0 K/UL    ABS. EOSINOPHILS 0.0 0.0 - 0.4 K/UL    ABS. BASOPHILS 0.0 0.0 - 0.1 K/UL    ABS. IMM. GRANS. 0.0 0.00 - 0.04 K/UL    DF SMEAR SCANNED      PLATELET COMMENTS Large Platelets      RBC COMMENTS OVALOCYTES  PRESENT        RBC COMMENTS POLYCHROMASIA  1+        RBC COMMENTS ANISOCYTOSIS  1+        RBC COMMENTS MICROCYTOSIS  1+        RBC COMMENTS HYPOCHROMIA  2+       PHOSPHORUS    Collection Time: 08/23/19  4:15 AM   Result Value Ref Range    Phosphorus 3.5 2.6 - 4.7 MG/DL   AMMONIA    Collection Time: 08/23/19  4:15 AM   Result Value Ref Range    Ammonia 60 (H) <32 UMOL/L   MAGNESIUM    Collection Time: 08/23/19  4:16 AM   Result Value Ref Range    Magnesium 2.1 1.6 - 2.4 mg/dL   METABOLIC PANEL, COMPREHENSIVE    Collection Time: 08/23/19  4:16 AM   Result Value Ref Range    Sodium 138 136 - 145 mmol/L    Potassium 3.9 3.5 - 5.1 mmol/L    Chloride 111 (H) 97 - 108 mmol/L    CO2 19 (L) 21 - 32 mmol/L    Anion gap 8 5 - 15 mmol/L    Glucose 145 (H) 65 - 100 mg/dL    BUN 20 6 - 20 MG/DL    Creatinine 1.74 (H) 0.70 - 1.30 MG/DL    BUN/Creatinine ratio 11 (L) 12 - 20      GFR est AA 48 (L) >60 ml/min/1.73m2    GFR est non-AA 40 (L) >60 ml/min/1.73m2    Calcium 7.9 (L) 8.5 - 10.1 MG/DL    Bilirubin, total 0.5 0.2 - 1.0 MG/DL    ALT (SGPT) 12 12 - 78 U/L    AST (SGOT) 8 (L) 15 - 37 U/L    Alk.  phosphatase 71 45 - 117 U/L    Protein, total 5.9 (L) 6.4 - 8.2 g/dL    Albumin 2.5 (L) 3.5 - 5.0 g/dL    Globulin 3.4 2.0 - 4.0 g/dL    A-G Ratio 0.7 (L) 1.1 - 2.2     POC G3 - PUL    Collection Time: 08/23/19  5:15 AM   Result Value Ref Range    FIO2 (POC) 28 %    pH (POC) 7.338 (L) 7.35 - 7.45      pCO2 (POC) 35.1 35.0 - 45.0 MMHG    pO2 (POC) 98 80 - 100 MMHG    HCO3 (POC) 18.9 (L) 22 - 26 MMOL/L    sO2 (POC) 97 92 - 97 %    Base deficit (POC) 7 mmol/L    Site LEFT RADIAL      Device: VENT      Mode ASSIST CONTROL      Tidal volume 500 ml    Set Rate 16 bpm    PEEP/CPAP (POC) 5 cmH2O    Allens test (POC) YES      Specimen type (POC) ARTERIAL

## 2019-08-23 NOTE — PROGRESS NOTES
Bedside and Verbal shift change report given to Elisha (oncoming nurse) by BODØ (offgoing nurse). Report included the following information SBAR, Kardex, Intake/Output, MAR, Accordion, Recent Results, Cardiac Rhythm -NSR/SB and Alarm Parameters . 2000 - Assumed care. Gtts verified. 2030 - [address] [facility]    Patient with hypoglycemic episode(s) at 2027(time) on 8/22/19(date). BG value(s) pre-treatment 61    Was patient symptomatic? [] yes, [x] no  Patient was treated with the following rescue medications/treatments: [] D50                [] Glucose tablets                [] Glucagon                [] 4oz juice                [] 6oz reg soda                [] 8oz low fat milk  BG value post-treatment: 176  Once BG treated and value greater than 80mg/dl, pt was provided with the following:  [] snack  [] meal  Name of MD notified:Dr. Gutierrez Hearing  The following orders were received: none    2300 - 0200 -130, Jas gtt weaned & stopped. 0400 - V tach burst, HR 200s, pulse present, immediately defibrillated at bedside x1, returned to NSR/RBBB, VSS. Labs drawn. AM CXR obtained. 0500 - Cardiology on call paged, update given. Lidocaine gtt titrated per order. 0700 - Dr. Gutierrez Hearing at bedside, update given. Bedside and Verbal shift change report given to Shiv Gould (oncoming nurse) by [facility] (offgoing nurse). Report included the following information SBAR, Kardex, Intake/Output, MAR, Accordion, Recent Results, Cardiac Rhythm -NSR/SB and Alarm Parameters .

## 2019-08-23 NOTE — PROGRESS NOTES
Problem: Falls - Risk of  Goal: *Absence of Falls  Description  Document Kenya Dotson Fall Risk and appropriate interventions in the flowsheet. Outcome: Progressing Towards Goal  Note:   Fall Risk Interventions:            Medication Interventions: Evaluate medications/consider consulting pharmacy, Patient to call before getting OOB    Elimination Interventions: Call light in reach              Problem: Patient Education: Go to Patient Education Activity  Goal: Patient/Family Education  Outcome: Progressing Towards Goal     Problem: Pressure Injury - Risk of  Goal: *Prevention of pressure injury  Description  Document Iván Scale and appropriate interventions in the flowsheet. Outcome: Progressing Towards Goal  Note:   Pressure Injury Interventions:  Sensory Interventions: Assess changes in LOC, Assess need for specialty bed, Avoid rigorous massage over bony prominences, Check visual cues for pain, Float heels, Keep linens dry and wrinkle-free, Minimize linen layers, Maintain/enhance activity level, Monitor skin under medical devices, Pad between skin to skin, Pressure redistribution bed/mattress (bed type), Turn and reposition approx. every two hours (pillows and wedges if needed)    Moisture Interventions: Absorbent underpads, Apply protective barrier, creams and emollients, Assess need for specialty bed, Check for incontinence Q2 hours and as needed, Internal/External urinary devices, Maintain skin hydration (lotion/cream), Minimize layers, Moisture barrier    Activity Interventions: Pressure redistribution bed/mattress(bed type), Assess need for specialty bed    Mobility Interventions: HOB 30 degrees or less, Pressure redistribution bed/mattress (bed type), Turn and reposition approx.  every two hours(pillow and wedges), Assess need for specialty bed, Float heels    Nutrition Interventions: Document food/fluid/supplement intake, Discuss nutritional consult with provider    Friction and Shear Interventions: Apply protective barrier, creams and emollients, HOB 30 degrees or less, Lift sheet, Lift team/patient mobility team, Minimize layers, Transferring/repositioning devices                Problem: Patient Education: Go to Patient Education Activity  Goal: Patient/Family Education  Outcome: Progressing Towards Goal     Problem: Non-Violent Restraints  Goal: *Removal from restraints as soon as assessed to be safe  Outcome: Progressing Towards Goal  Goal: *No harm/injury to patient while restraints in use  Outcome: Progressing Towards Goal  Goal: *Patient's dignity will be maintained  Outcome: Progressing Towards Goal  Goal: *Patient Specific Goal (EDIT GOAL, INSERT TEXT)  Outcome: Progressing Towards Goal  Goal: Non-violent Restaints:Standard Interventions  Outcome: Progressing Towards Goal  Goal: Non-violent Restraints:Patient Interventions  Outcome: Progressing Towards Goal  Goal: Patient/Family Education  Outcome: Progressing Towards Goal     Problem: Ventilator Management  Goal: *Adequate oxygenation and ventilation  Outcome: Progressing Towards Goal  Goal: *Patient maintains clear airway/free of aspiration  Outcome: Progressing Towards Goal  Goal: *Absence of infection signs and symptoms  Outcome: Progressing Towards Goal  Goal: *Normal spontaneous ventilation  Outcome: Progressing Towards Goal     Problem: Patient Education: Go to Patient Education Activity  Goal: Patient/Family Education  Outcome: Progressing Towards Goal     Problem: Infection - Risk of, Ventilator-Associated Pneumonia  Goal: *Absence of infection signs and symptoms  Outcome: Progressing Towards Goal     Problem: Patient Education: Go to Patient Education Activity  Goal: Patient/Family Education  Outcome: Progressing Towards Goal     Problem: Arrhythmia Pathway (Adult)  Goal: *Absence of arrhythmia  Outcome: Progressing Towards Goal     Problem: Patient Education: Go to Patient Education Activity  Goal: Patient/Family Education  Outcome: Progressing Towards Goal     Problem: Heart Failure: Day 4  Goal: Off Pathway (Use only if patient is Off Pathway)  Outcome: Progressing Towards Goal  Goal: Activity/Safety  Outcome: Progressing Towards Goal  Goal: Diagnostic Test/Procedures  Outcome: Progressing Towards Goal  Goal: Nutrition/Diet  Outcome: Progressing Towards Goal  Goal: Discharge Planning  Outcome: Progressing Towards Goal  Goal: Medications  Outcome: Progressing Towards Goal  Goal: Respiratory  Outcome: Progressing Towards Goal  Goal: Treatments/Interventions/Procedures  Outcome: Progressing Towards Goal  Goal: Psychosocial  Outcome: Progressing Towards Goal  Goal: *Oxygen saturation within defined limits  Outcome: Progressing Towards Goal  Goal: *Hemodynamically stable  Outcome: Progressing Towards Goal  Goal: *Optimal pain control at patient's stated goal  Outcome: Progressing Towards Goal  Goal: *Anxiety reduced or absent  Outcome: Progressing Towards Goal  Goal: *Demonstrates progressive activity  Outcome: Progressing Towards Goal     Problem: Heart Failure: Day 5  Goal: Off Pathway (Use only if patient is Off Pathway)  Outcome: Progressing Towards Goal  Goal: Activity/Safety  Outcome: Progressing Towards Goal  Goal: Diagnostic Test/Procedures  Outcome: Progressing Towards Goal  Goal: Nutrition/Diet  Outcome: Progressing Towards Goal  Goal: Discharge Planning  Outcome: Progressing Towards Goal  Goal: Medications  Outcome: Progressing Towards Goal  Goal: Respiratory  Outcome: Progressing Towards Goal  Goal: Treatments/Interventions/Procedures  Outcome: Progressing Towards Goal  Goal: Psychosocial  Outcome: Progressing Towards Goal     Problem: Heart Failure: Discharge Outcomes  Goal: *Demonstrates ability to perform prescribed activity without shortness of breath or discomfort  Outcome: Progressing Towards Goal  Goal: *Left ventricular function assessment completed prior to or during stay, or planned for post-discharge  Outcome: Progressing Towards Goal  Goal: *ACEI prescribed if LVEF less than 40% and no contraindications or ARB prescribed  Outcome: Progressing Towards Goal  Goal: *Verbalizes understanding and describes prescribed diet  Outcome: Progressing Towards Goal  Goal: *Verbalizes understanding/describes prescribed medications  Outcome: Progressing Towards Goal  Goal: *Describes available resources and support systems  Description  (eg: Home Health, Palliative Care, Advanced Medical Directive)  Outcome: Progressing Towards Goal  Goal: *Describes smoking cessation resources  Outcome: Progressing Towards Goal  Goal: *Understands and describes signs and symptoms to report to providers(Stroke Metric)  Outcome: Progressing Towards Goal  Goal: *Describes/verbalizes understanding of follow-up/return appt  Description  (eg: to physicians, diabetes treatment coordinator, and other resources  Outcome: Progressing Towards Goal  Goal: *Describes importance of continuing daily weights and changes to report to physician  Outcome: Progressing Towards Goal     Problem: Infection - Risk of, Urinary Catheter-Associated Urinary Tract Infection  Goal: *Absence of infection signs and symptoms  Outcome: Progressing Towards Goal     Problem: Patient Education: Go to Patient Education Activity  Goal: Patient/Family Education  Outcome: Progressing Towards Goal     Problem: Non-Violent Restraints  Goal: *Removal from restraints as soon as assessed to be safe  Outcome: Progressing Towards Goal  Goal: *No harm/injury to patient while restraints in use  Outcome: Progressing Towards Goal  Goal: *Patient's dignity will be maintained  Outcome: Progressing Towards Goal  Goal: *Patient Specific Goal (EDIT GOAL, INSERT TEXT)  Outcome: Progressing Towards Goal  Goal: Non-violent Restaints:Standard Interventions  Outcome: Progressing Towards Goal  Goal: Non-violent Restraints:Patient Interventions  Outcome: Progressing Towards Goal  Goal: Patient/Family Education  Outcome: Progressing Towards Goal

## 2019-08-23 NOTE — PROGRESS NOTES
Reason for Readmission:    V tach          RRAT Score and Risk Level:     23     Level of Readmission:    1      Care Conference scheduled:   No        Resources/supports as identified by patient/family:   2 daughters       Top Challenges facing patient (as identified by patient/family and CM): Finances/Medication cost?     TBD  Transportation      daughter  Support system or lack thereof?   daughters  (wife  3 years ago)  Living arrangements? Lives with 2 daughters      Self-care/ADLs/Cognition? Unable to assess- patient is intubated and sedated at this time       Current Advanced Directive/Advance Care Plan:  Not on file           Plan for utilizing home health:   TBD - has been at [facility] and [facility] in the past             Transition of Care Plan:    Based on readmission, the patient's previous Plan of Care   has been evaluated and/or modified. The current Transition of Care Plan is:       TBD     Patient has recent hospitalization from 19 to 19 for CHF - is followed by [facility] cardiology - patient remains intubated and sedated this am - CM staff will continue to follow for disposition needs as identified.  CHRISTIANE Pepper

## 2019-08-23 NOTE — PROGRESS NOTES
0730- Bedside shift change report given to Audrey Germain (oncoming nurse) by Leny Bedolla (offgoing nurse). Report included the following information SBAR, Kardex, Procedure Summary, Intake/Output, MAR, Recent Results, Cardiac Rhythm normal sinus rhythm/V tach and Alarm Parameters . 1030- Patient extubated to  Bipap, Family updated. Patient alert and oriented x2.     1800- Pt. BP trending up, cardiologist made aware, order received. Family updated. 1930- Bedside shift change report given to Monique (oncoming nurse) by Audrey Germain (offgoing nurse). Report included the following information SBAR, Kardex, Procedure Summary, Intake/Output, MAR, Recent Results, Cardiac Rhythm Normal sinus; Vtach and Alarm Parameters .

## 2019-08-23 NOTE — DIABETES MGMT
[facility]    [facility] Progress Note    Recommendations/ Comments:  Chart review for extreme hypoglycemia yesterday (8/22/2019)  - BG 32 at 0329 and then 37 mg/dL at 1000. IVF [email or web address] begun  He required tx with D10 x 5 on 8/22/2019  Today  mg/dL  Solu Medrol 40 mg Q 6 hours begun 8/22/2019  Elevated creatinine noted     If appropriate, please consider     Please use the lispro high sensitivity scale for BG correction due to renal status  While on steroids may require NPH insulin - start with low dose NPH 4 units Q 12 hours times with steroid  Add A1c to next lab draw    Current hospital DM medication:   Amaryl 4 mg on hold    Chart reviewed on Jean Claude Blackman .    Patient is a 71 y.o. male with known DM on Amaryl 4 mg 1/2 tab AM and PM  Admitted V tach  Vent      A1c:   Lab Results   Component Value Date/Time    Hemoglobin A1c 6.7 (H) 01/11/2018 05:04 AM    Hemoglobin A1c 6.7 (H) 07/18/2017 02:22 AM       Recent Glucose Results:   Lab Results   Component Value Date/Time     (H) 08/23/2019 04:16 AM    GLUCPOC 204 (H) 08/23/2019 09:21 AM    GLUCPOC 136 (H) 08/23/2019 04:08 AM    GLUCPOC 115 (H) 08/23/2019 12:49 AM        Lab Results   Component Value Date/Time    Creatinine 1.74 (H) 08/23/2019 04:16 AM     Estimated Creatinine Clearance: 47.2 mL/min (A) (based on SCr of 1.74 mg/dL (H)). Active Orders   There are no active orders of the following types: Diet. PO intake: No data found. Will continue to follow as needed.     Thank you  Kevin Thompson RN, CDE      Time spent: 5 min
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
## 2019-08-24 LAB
AMMONIA PLAS-SCNC: 38 UMOL/L
ANION GAP SERPL CALC-SCNC: 9 MMOL/L (ref 5–15)
BASOPHILS # BLD: 0 K/UL (ref 0–0.1)
BASOPHILS NFR BLD: 0 % (ref 0–1)
BUN SERPL-MCNC: 20 MG/DL (ref 6–20)
BUN/CREAT SERPL: 13 (ref 12–20)
CALCIUM SERPL-MCNC: 8 MG/DL (ref 8.5–10.1)
CHLORIDE SERPL-SCNC: 110 MMOL/L (ref 97–108)
CO2 SERPL-SCNC: 18 MMOL/L (ref 21–32)
CREAT SERPL-MCNC: 1.58 MG/DL (ref 0.7–1.3)
DIFFERENTIAL METHOD BLD: ABNORMAL
EOSINOPHIL # BLD: 0 K/UL (ref 0–0.4)
EOSINOPHIL NFR BLD: 0 % (ref 0–7)
ERYTHROCYTE [DISTWIDTH] IN BLOOD BY AUTOMATED COUNT: 17.7 % (ref 11.5–14.5)
GLUCOSE BLD STRIP.AUTO-MCNC: 139 MG/DL (ref 65–100)
GLUCOSE BLD STRIP.AUTO-MCNC: 161 MG/DL (ref 65–100)
GLUCOSE BLD STRIP.AUTO-MCNC: 165 MG/DL (ref 65–100)
GLUCOSE BLD STRIP.AUTO-MCNC: 188 MG/DL (ref 65–100)
GLUCOSE BLD STRIP.AUTO-MCNC: 226 MG/DL (ref 65–100)
GLUCOSE SERPL-MCNC: 199 MG/DL (ref 65–100)
HCT VFR BLD AUTO: 34.9 % (ref 36.6–50.3)
HGB BLD-MCNC: 10.1 G/DL (ref 12.1–17)
IMM GRANULOCYTES # BLD AUTO: 0.1 K/UL (ref 0–0.04)
IMM GRANULOCYTES NFR BLD AUTO: 1 % (ref 0–0.5)
LYMPHOCYTES # BLD: 0.4 K/UL (ref 0.8–3.5)
LYMPHOCYTES NFR BLD: 6 % (ref 12–49)
MAGNESIUM SERPL-MCNC: 2.1 MG/DL (ref 1.6–2.4)
MCH RBC QN AUTO: 20.7 PG (ref 26–34)
MCHC RBC AUTO-ENTMCNC: 28.9 G/DL (ref 30–36.5)
MCV RBC AUTO: 71.7 FL (ref 80–99)
MONOCYTES # BLD: 0.1 K/UL (ref 0–1)
MONOCYTES NFR BLD: 2 % (ref 5–13)
NEUTS SEG # BLD: 6.2 K/UL (ref 1.8–8)
NEUTS SEG NFR BLD: 91 % (ref 32–75)
NRBC # BLD: 0 K/UL (ref 0–0.01)
NRBC BLD-RTO: 0 PER 100 WBC
PLATELET # BLD AUTO: 108 K/UL (ref 150–400)
POTASSIUM SERPL-SCNC: 3.7 MMOL/L (ref 3.5–5.1)
RBC # BLD AUTO: 4.87 M/UL (ref 4.1–5.7)
RBC MORPH BLD: ABNORMAL
SERVICE CMNT-IMP: ABNORMAL
SODIUM SERPL-SCNC: 137 MMOL/L (ref 136–145)
WBC # BLD AUTO: 6.8 K/UL (ref 4.1–11.1)

## 2019-08-24 PROCEDURE — 74011250636 HC RX REV CODE- 250/636: Performed by: INTERNAL MEDICINE

## 2019-08-24 PROCEDURE — 74011000258 HC RX REV CODE- 258: Performed by: INTERNAL MEDICINE

## 2019-08-24 PROCEDURE — 94640 AIRWAY INHALATION TREATMENT: CPT

## 2019-08-24 PROCEDURE — 74011250637 HC RX REV CODE- 250/637: Performed by: INTERNAL MEDICINE

## 2019-08-24 PROCEDURE — 36415 COLL VENOUS BLD VENIPUNCTURE: CPT

## 2019-08-24 PROCEDURE — 74011636637 HC RX REV CODE- 636/637: Performed by: INTERNAL MEDICINE

## 2019-08-24 PROCEDURE — 82140 ASSAY OF AMMONIA: CPT

## 2019-08-24 PROCEDURE — C9113 INJ PANTOPRAZOLE SODIUM, VIA: HCPCS | Performed by: INTERNAL MEDICINE

## 2019-08-24 PROCEDURE — 74011000250 HC RX REV CODE- 250: Performed by: INTERNAL MEDICINE

## 2019-08-24 PROCEDURE — 85025 COMPLETE CBC W/AUTO DIFF WBC: CPT

## 2019-08-24 PROCEDURE — 83735 ASSAY OF MAGNESIUM: CPT

## 2019-08-24 PROCEDURE — 80048 BASIC METABOLIC PNL TOTAL CA: CPT

## 2019-08-24 PROCEDURE — 65620000000 HC RM CCU GENERAL

## 2019-08-24 PROCEDURE — 82962 GLUCOSE BLOOD TEST: CPT

## 2019-08-24 RX ORDER — INSULIN LISPRO 100 [IU]/ML
INJECTION, SOLUTION INTRAVENOUS; SUBCUTANEOUS
Status: DISCONTINUED | OUTPATIENT
Start: 2019-08-24 | End: 2019-09-03 | Stop reason: HOSPADM

## 2019-08-24 RX ORDER — DEXTROSE MONOHYDRATE 100 MG/ML
125-250 INJECTION, SOLUTION INTRAVENOUS AS NEEDED
Status: DISCONTINUED | OUTPATIENT
Start: 2019-08-24 | End: 2019-09-03 | Stop reason: HOSPADM

## 2019-08-24 RX ORDER — LEVOFLOXACIN 5 MG/ML
750 INJECTION, SOLUTION INTRAVENOUS EVERY 24 HOURS
Status: DISCONTINUED | OUTPATIENT
Start: 2019-08-24 | End: 2019-08-26 | Stop reason: SDUPTHER

## 2019-08-24 RX ORDER — MAGNESIUM SULFATE 100 %
4 CRYSTALS MISCELLANEOUS AS NEEDED
Status: DISCONTINUED | OUTPATIENT
Start: 2019-08-24 | End: 2019-09-03 | Stop reason: HOSPADM

## 2019-08-24 RX ADMIN — ATORVASTATIN CALCIUM 80 MG: 40 TABLET, FILM COATED ORAL at 08:22

## 2019-08-24 RX ADMIN — METHYLPREDNISOLONE SODIUM SUCCINATE 40 MG: 40 INJECTION, POWDER, FOR SOLUTION INTRAMUSCULAR; INTRAVENOUS at 05:28

## 2019-08-24 RX ADMIN — LEVETIRACETAM 1000 MG: 100 SOLUTION ORAL at 21:11

## 2019-08-24 RX ADMIN — Medication 10 ML: at 05:28

## 2019-08-24 RX ADMIN — QUETIAPINE FUMARATE 25 MG: 25 TABLET ORAL at 21:10

## 2019-08-24 RX ADMIN — LEVOFLOXACIN 750 MG: 5 INJECTION, SOLUTION INTRAVENOUS at 13:55

## 2019-08-24 RX ADMIN — METHYLPREDNISOLONE SODIUM SUCCINATE 40 MG: 40 INJECTION, POWDER, FOR SOLUTION INTRAMUSCULAR; INTRAVENOUS at 13:55

## 2019-08-24 RX ADMIN — LIDOCAINE HYDROCHLORIDE 1 MG/MIN: 8 INJECTION, SOLUTION INTRAVENOUS at 07:37

## 2019-08-24 RX ADMIN — AMIODARONE HYDROCHLORIDE 1 MG/MIN: 50 INJECTION, SOLUTION INTRAVENOUS at 21:38

## 2019-08-24 RX ADMIN — POTASSIUM CHLORIDE 20 MEQ: 1.5 POWDER, FOR SOLUTION ORAL at 08:24

## 2019-08-24 RX ADMIN — BUDESONIDE 500 MCG: 0.5 INHALANT RESPIRATORY (INHALATION) at 08:56

## 2019-08-24 RX ADMIN — SODIUM CHLORIDE 40 MG: 9 INJECTION INTRAMUSCULAR; INTRAVENOUS; SUBCUTANEOUS at 08:22

## 2019-08-24 RX ADMIN — CHLORHEXIDINE GLUCONATE 15 ML: 1.2 RINSE ORAL at 12:29

## 2019-08-24 RX ADMIN — VENLAFAXINE HYDROCHLORIDE 150 MG: 150 CAPSULE, EXTENDED RELEASE ORAL at 08:23

## 2019-08-24 RX ADMIN — PIPERACILLIN SODIUM,TAZOBACTAM SODIUM 3.38 G: 3; .375 INJECTION, POWDER, FOR SOLUTION INTRAVENOUS at 06:14

## 2019-08-24 RX ADMIN — CLOPIDOGREL BISULFATE 75 MG: 75 TABLET, FILM COATED ORAL at 08:24

## 2019-08-24 RX ADMIN — Medication 10 ML: at 13:56

## 2019-08-24 RX ADMIN — Medication 10 ML: at 21:10

## 2019-08-24 RX ADMIN — AMIODARONE HYDROCHLORIDE 150 MG: 50 INJECTION, SOLUTION INTRAVENOUS at 15:41

## 2019-08-24 RX ADMIN — CLONAZEPAM 1 MG: 0.5 TABLET ORAL at 21:10

## 2019-08-24 RX ADMIN — LEVETIRACETAM 1000 MG: 100 SOLUTION ORAL at 08:21

## 2019-08-24 RX ADMIN — PIPERACILLIN SODIUM,TAZOBACTAM SODIUM 3.38 G: 3; .375 INJECTION, POWDER, FOR SOLUTION INTRAVENOUS at 22:27

## 2019-08-24 RX ADMIN — CARVEDILOL 3.12 MG: 3.12 TABLET, FILM COATED ORAL at 17:19

## 2019-08-24 RX ADMIN — FINASTERIDE 5 MG: 5 TABLET, FILM COATED ORAL at 05:36

## 2019-08-24 RX ADMIN — VANCOMYCIN HYDROCHLORIDE 1250 MG: 10 INJECTION, POWDER, LYOPHILIZED, FOR SOLUTION INTRAVENOUS at 13:54

## 2019-08-24 RX ADMIN — ASPIRIN 81 MG CHEWABLE TABLET 81 MG: 81 TABLET CHEWABLE at 08:24

## 2019-08-24 RX ADMIN — EPLERENONE 25 MG: 25 TABLET, FILM COATED ORAL at 08:23

## 2019-08-24 RX ADMIN — PIPERACILLIN SODIUM,TAZOBACTAM SODIUM 3.38 G: 3; .375 INJECTION, POWDER, FOR SOLUTION INTRAVENOUS at 15:40

## 2019-08-24 RX ADMIN — AMIODARONE HYDROCHLORIDE 1 MG/MIN: 50 INJECTION, SOLUTION INTRAVENOUS at 16:11

## 2019-08-24 RX ADMIN — FAMOTIDINE 20 MG: 20 TABLET ORAL at 05:28

## 2019-08-24 RX ADMIN — CARVEDILOL 3.12 MG: 3.12 TABLET, FILM COATED ORAL at 08:24

## 2019-08-24 RX ADMIN — LACTULOSE 45 ML: 20 SOLUTION ORAL at 21:10

## 2019-08-24 RX ADMIN — BUDESONIDE 500 MCG: 0.5 INHALANT RESPIRATORY (INHALATION) at 20:34

## 2019-08-24 RX ADMIN — METHYLPREDNISOLONE SODIUM SUCCINATE 40 MG: 40 INJECTION, POWDER, FOR SOLUTION INTRAMUSCULAR; INTRAVENOUS at 00:28

## 2019-08-24 RX ADMIN — INSULIN LISPRO 2 UNITS: 100 INJECTION, SOLUTION INTRAVENOUS; SUBCUTANEOUS at 21:23

## 2019-08-24 RX ADMIN — METHYLPREDNISOLONE SODIUM SUCCINATE 40 MG: 40 INJECTION, POWDER, FOR SOLUTION INTRAMUSCULAR; INTRAVENOUS at 21:10

## 2019-08-24 RX ADMIN — FAMOTIDINE 20 MG: 20 TABLET ORAL at 17:19

## 2019-08-24 NOTE — PROGRESS NOTES
Problem: Falls - Risk of  Goal: *Absence of Falls  Description  Document Candida Lightning Fall Risk and appropriate interventions in the flowsheet. Outcome: Progressing Towards Goal  Note:   Fall Risk Interventions:  Mobility Interventions: Communicate number of staff needed for ambulation/transfer    Mentation Interventions: Adequate sleep, hydration, pain control    Medication Interventions: Evaluate medications/consider consulting pharmacy    Elimination Interventions: Toileting schedule/hourly rounds              Problem: Patient Education: Go to Patient Education Activity  Goal: Patient/Family Education  Outcome: Progressing Towards Goal     Problem: Pressure Injury - Risk of  Goal: *Prevention of pressure injury  Description  Document Iván Scale and appropriate interventions in the flowsheet.   Outcome: Progressing Towards Goal  Note:   Pressure Injury Interventions:  Sensory Interventions: Keep linens dry and wrinkle-free    Moisture Interventions: Absorbent underpads    Activity Interventions: Assess need for specialty bed    Mobility Interventions: Assess need for specialty bed    Nutrition Interventions: Document food/fluid/supplement intake    Friction and Shear Interventions: Apply protective barrier, creams and emollients                Problem: Patient Education: Go to Patient Education Activity  Goal: Patient/Family Education  Outcome: Progressing Towards Goal     Problem: Non-Violent Restraints  Goal: *Removal from restraints as soon as assessed to be safe  Outcome: Progressing Towards Goal  Goal: *No harm/injury to patient while restraints in use  Outcome: Progressing Towards Goal  Goal: *Patient's dignity will be maintained  Outcome: Progressing Towards Goal  Goal: *Patient Specific Goal (EDIT GOAL, INSERT TEXT)  Outcome: Progressing Towards Goal  Goal: Non-violent Restaints:Standard Interventions  Outcome: Progressing Towards Goal  Goal: Non-violent Restraints:Patient Interventions  Outcome: Progressing Towards Goal  Goal: Patient/Family Education  Outcome: Progressing Towards Goal     Problem: Ventilator Management  Goal: *Adequate oxygenation and ventilation  Outcome: Progressing Towards Goal  Goal: *Patient maintains clear airway/free of aspiration  Outcome: Progressing Towards Goal  Goal: *Absence of infection signs and symptoms  Outcome: Progressing Towards Goal  Goal: *Normal spontaneous ventilation  Outcome: Progressing Towards Goal     Problem: Patient Education: Go to Patient Education Activity  Goal: Patient/Family Education  Outcome: Progressing Towards Goal     Problem: Infection - Risk of, Ventilator-Associated Pneumonia  Goal: *Absence of infection signs and symptoms  Outcome: Progressing Towards Goal     Problem: Patient Education: Go to Patient Education Activity  Goal: Patient/Family Education  Outcome: Progressing Towards Goal     Problem: Arrhythmia Pathway (Adult)  Goal: *Absence of arrhythmia  Outcome: Progressing Towards Goal     Problem: Patient Education: Go to Patient Education Activity  Goal: Patient/Family Education  Outcome: Progressing Towards Goal     Problem: Heart Failure: Day 4  Goal: Off Pathway (Use only if patient is Off Pathway)  Outcome: Progressing Towards Goal  Goal: Activity/Safety  Outcome: Progressing Towards Goal  Goal: Diagnostic Test/Procedures  Outcome: Progressing Towards Goal  Goal: Nutrition/Diet  Outcome: Progressing Towards Goal  Goal: Discharge Planning  Outcome: Progressing Towards Goal  Goal: Medications  Outcome: Progressing Towards Goal  Goal: Respiratory  Outcome: Progressing Towards Goal  Goal: Treatments/Interventions/Procedures  Outcome: Progressing Towards Goal  Goal: Psychosocial  Outcome: Progressing Towards Goal  Goal: *Oxygen saturation within defined limits  Outcome: Progressing Towards Goal  Goal: *Hemodynamically stable  Outcome: Progressing Towards Goal  Goal: *Optimal pain control at patient's stated goal  Outcome: Progressing Towards Goal  Goal: *Anxiety reduced or absent  Outcome: Progressing Towards Goal  Goal: *Demonstrates progressive activity  Outcome: Progressing Towards Goal     Problem: Heart Failure: Day 5  Goal: Off Pathway (Use only if patient is Off Pathway)  Outcome: Progressing Towards Goal  Goal: Activity/Safety  Outcome: Progressing Towards Goal  Goal: Diagnostic Test/Procedures  Outcome: Progressing Towards Goal  Goal: Nutrition/Diet  Outcome: Progressing Towards Goal  Goal: Discharge Planning  Outcome: Progressing Towards Goal  Goal: Medications  Outcome: Progressing Towards Goal  Goal: Respiratory  Outcome: Progressing Towards Goal  Goal: Treatments/Interventions/Procedures  Outcome: Progressing Towards Goal  Goal: Psychosocial  Outcome: Progressing Towards Goal     Problem: Heart Failure: Discharge Outcomes  Goal: *Demonstrates ability to perform prescribed activity without shortness of breath or discomfort  Outcome: Progressing Towards Goal  Goal: *Left ventricular function assessment completed prior to or during stay, or planned for post-discharge  Outcome: Progressing Towards Goal  Goal: *ACEI prescribed if LVEF less than 40% and no contraindications or ARB prescribed  Outcome: Progressing Towards Goal  Goal: *Verbalizes understanding and describes prescribed diet  Outcome: Progressing Towards Goal  Goal: *Verbalizes understanding/describes prescribed medications  Outcome: Progressing Towards Goal  Goal: *Describes available resources and support systems  Description  (eg: Home Health, Palliative Care, Advanced Medical Directive)  Outcome: Progressing Towards Goal  Goal: *Describes smoking cessation resources  Outcome: Progressing Towards Goal  Goal: *Understands and describes signs and symptoms to report to providers(Stroke Metric)  Outcome: Progressing Towards Goal  Goal: *Describes/verbalizes understanding of follow-up/return appt  Description  (eg: to physicians, diabetes treatment coordinator, and other resources  Outcome: Progressing Towards Goal  Goal: *Describes importance of continuing daily weights and changes to report to physician  Outcome: Progressing Towards Goal     Problem: Non-Violent Restraints  Goal: *Removal from restraints as soon as assessed to be safe  Outcome: Progressing Towards Goal  Goal: *No harm/injury to patient while restraints in use  Outcome: Progressing Towards Goal  Goal: *Patient's dignity will be maintained  Outcome: Progressing Towards Goal  Goal: *Patient Specific Goal (EDIT GOAL, INSERT TEXT)  Outcome: Progressing Towards Goal  Goal: Non-violent Restaints:Standard Interventions  Outcome: Progressing Towards Goal  Goal: Non-violent Restraints:Patient Interventions  Outcome: Progressing Towards Goal  Goal: Patient/Family Education  Outcome: Progressing Towards Goal

## 2019-08-24 NOTE — PROGRESS NOTES
Bedside shift change report given to Claudene Pollard (oncoming nurse) by Deena Ovalle (offgoing nurse). Report included the following information SBAR, Kardex, Procedure Summary, Intake/Output, MAR, Recent Results, Cardiac Rhythm NSR; V tach and Alarm Parameters . 0800- Patient out of bed with 1 assist, tolerated well. 1100- Patient back in bed to rest.     1600- Patient switched from Lidocaine to Amiodarone. 1930-  Bedside shift change report given to Monique (oncoming nurse) by Claudene Pollard (offgoing nurse). Report included the following information SBAR, Kardex, ED Summary, Intake/Output, MAR, Recent Results, Cardiac Rhythm NSR and Alarm Parameters .

## 2019-08-24 NOTE — PROGRESS NOTES
1930 Bedside and Verbal shift change report given to Monique RN (oncoming nurse) by Oliva Cifuentes RN (offgoing nurse). Report included the following information SBAR.     2200 Pt refused CHG bath and bed change    0406 RT at bedside for ABG. Arterial Blood Gas result:  pO2 98; pCO2 26; pH 7.473;  HCO3 19, %O2 Sat 98.    0420 Labs drawn. 9806 Lab unable to locate BMP. Redrawn. 0730 Bedside shift change report given to Alesia Vera (oncoming nurse) by Cat RN (offgoing nurse). Report included the following information SBAR.

## 2019-08-24 NOTE — PROGRESS NOTES
Problem: Falls - Risk of  Goal: *Absence of Falls  Description  Document Mohamud Escobedo Fall Risk and appropriate interventions in the flowsheet. Outcome: Progressing Towards Goal  Note:   Fall Risk Interventions:  Mobility Interventions: Bed/chair exit alarm    Mentation Interventions: Adequate sleep, hydration, pain control    Medication Interventions: Bed/chair exit alarm    Elimination Interventions: Toileting schedule/hourly rounds              Problem: Patient Education: Go to Patient Education Activity  Goal: Patient/Family Education  Outcome: Progressing Towards Goal     Problem: Pressure Injury - Risk of  Goal: *Prevention of pressure injury  Description  Document Iván Scale and appropriate interventions in the flowsheet.   Outcome: Progressing Towards Goal  Note:   Pressure Injury Interventions:  Sensory Interventions: Keep linens dry and wrinkle-free    Moisture Interventions: Absorbent underpads    Activity Interventions: Increase time out of bed    Mobility Interventions: Assess need for specialty bed    Nutrition Interventions: Document food/fluid/supplement intake    Friction and Shear Interventions: Apply protective barrier, creams and emollients                Problem: Patient Education: Go to Patient Education Activity  Goal: Patient/Family Education  Outcome: Progressing Towards Goal     Problem: Non-Violent Restraints  Goal: *Removal from restraints as soon as assessed to be safe  Outcome: Progressing Towards Goal  Goal: *No harm/injury to patient while restraints in use  Outcome: Progressing Towards Goal  Goal: *Patient's dignity will be maintained  Outcome: Progressing Towards Goal  Goal: *Patient Specific Goal (EDIT GOAL, INSERT TEXT)  Outcome: Progressing Towards Goal  Goal: Non-violent Restaints:Standard Interventions  Outcome: Progressing Towards Goal  Goal: Non-violent Restraints:Patient Interventions  Outcome: Progressing Towards Goal  Goal: Patient/Family Education  Outcome: Progressing Towards Goal     Problem: Ventilator Management  Goal: *Adequate oxygenation and ventilation  Outcome: Progressing Towards Goal  Goal: *Patient maintains clear airway/free of aspiration  Outcome: Progressing Towards Goal  Goal: *Absence of infection signs and symptoms  Outcome: Progressing Towards Goal  Goal: *Normal spontaneous ventilation  Outcome: Progressing Towards Goal     Problem: Patient Education: Go to Patient Education Activity  Goal: Patient/Family Education  Outcome: Progressing Towards Goal     Problem: Infection - Risk of, Ventilator-Associated Pneumonia  Goal: *Absence of infection signs and symptoms  Outcome: Progressing Towards Goal     Problem: Patient Education: Go to Patient Education Activity  Goal: Patient/Family Education  Outcome: Progressing Towards Goal     Problem: Arrhythmia Pathway (Adult)  Goal: *Absence of arrhythmia  Outcome: Progressing Towards Goal     Problem: Patient Education: Go to Patient Education Activity  Goal: Patient/Family Education  Outcome: Progressing Towards Goal     Problem: Heart Failure: Day 4  Goal: Off Pathway (Use only if patient is Off Pathway)  Outcome: Progressing Towards Goal  Goal: Activity/Safety  Outcome: Progressing Towards Goal  Goal: Diagnostic Test/Procedures  Outcome: Progressing Towards Goal  Goal: Nutrition/Diet  Outcome: Progressing Towards Goal  Goal: Discharge Planning  Outcome: Progressing Towards Goal  Goal: Medications  Outcome: Progressing Towards Goal  Goal: Respiratory  Outcome: Progressing Towards Goal  Goal: Treatments/Interventions/Procedures  Outcome: Progressing Towards Goal  Goal: Psychosocial  Outcome: Progressing Towards Goal  Goal: *Oxygen saturation within defined limits  Outcome: Progressing Towards Goal  Goal: *Hemodynamically stable  Outcome: Progressing Towards Goal  Goal: *Optimal pain control at patient's stated goal  Outcome: Progressing Towards Goal  Goal: *Anxiety reduced or absent  Outcome: Progressing Towards Goal  Goal: *Demonstrates progressive activity  Outcome: Progressing Towards Goal     Problem: Heart Failure: Day 5  Goal: Off Pathway (Use only if patient is Off Pathway)  Outcome: Progressing Towards Goal  Goal: Activity/Safety  Outcome: Progressing Towards Goal  Goal: Diagnostic Test/Procedures  Outcome: Progressing Towards Goal  Goal: Nutrition/Diet  Outcome: Progressing Towards Goal  Goal: Discharge Planning  Outcome: Progressing Towards Goal  Goal: Medications  Outcome: Progressing Towards Goal  Goal: Respiratory  Outcome: Progressing Towards Goal  Goal: Treatments/Interventions/Procedures  Outcome: Progressing Towards Goal  Goal: Psychosocial  Outcome: Progressing Towards Goal     Problem: Heart Failure: Discharge Outcomes  Goal: *Demonstrates ability to perform prescribed activity without shortness of breath or discomfort  Outcome: Progressing Towards Goal  Goal: *Left ventricular function assessment completed prior to or during stay, or planned for post-discharge  Outcome: Progressing Towards Goal  Goal: *ACEI prescribed if LVEF less than 40% and no contraindications or ARB prescribed  Outcome: Progressing Towards Goal  Goal: *Verbalizes understanding and describes prescribed diet  Outcome: Progressing Towards Goal  Goal: *Verbalizes understanding/describes prescribed medications  Outcome: Progressing Towards Goal  Goal: *Describes available resources and support systems  Description  (eg: Home Health, Palliative Care, Advanced Medical Directive)  Outcome: Progressing Towards Goal  Goal: *Describes smoking cessation resources  Outcome: Progressing Towards Goal  Goal: *Understands and describes signs and symptoms to report to providers(Stroke Metric)  Outcome: Progressing Towards Goal  Goal: *Describes/verbalizes understanding of follow-up/return appt  Description  (eg: to physicians, diabetes treatment coordinator, and other resources  Outcome: Progressing Towards Goal  Goal: *Describes importance of continuing daily weights and changes to report to physician  Outcome: Progressing Towards Goal     Problem: Non-Violent Restraints  Goal: *Removal from restraints as soon as assessed to be safe  Outcome: Progressing Towards Goal  Goal: *No harm/injury to patient while restraints in use  Outcome: Progressing Towards Goal  Goal: *Patient's dignity will be maintained  Outcome: Progressing Towards Goal  Goal: *Patient Specific Goal (EDIT GOAL, INSERT TEXT)  Outcome: Progressing Towards Goal  Goal: Non-violent Restaints:Standard Interventions  Outcome: Progressing Towards Goal  Goal: Non-violent Restraints:Patient Interventions  Outcome: Progressing Towards Goal  Goal: Patient/Family Education  Outcome: Progressing Towards Goal

## 2019-08-24 NOTE — PROGRESS NOTES
Pharmacist Note - Vancomycin Dosing  Therapy day: 3  Indication: aspiration PNA  Current regimen: 1250 mg IV Q18h    A Trough Level resulted at 15.7 mcg/mL which was obtained ~18.5 hrs post-dose. The extrapolated \"true\" trough is approximately 16.3 mcg/mL based on the patient's known kinetics. Goal trough: 15 - 20 mcg/mL     Plan: Continue current regimen. Pharmacy will continue to monitor this patient daily for changes in clinical status and renal function.

## 2019-08-24 NOTE — PROGRESS NOTES
Day #4 of Levaquin  Indication:  Possible aspiration PNA  Current regimen:  750 mg IV Q 48hrs  Abx regimen: Vancomycin, Levaquin, and Zosyn  Recent Labs     19  0249 19  0416 19  0415 19  0344   WBC 6.8  --  7.7 15.4*   CREA 1.58* 1.74*  --  1.95*   BUN 20 20  --  28*   Est CrCl: ~52 ml/min; UO: >1 ml/kg/hr  Temp (24hrs), Av.8 °F (36.6 °C), Min:96.7 °F (35.9 °C), Max:98.6 °F (37 °C)    Cultures:    blood - NGTD - pending   Resp - normal marcelino - Final    Plan: Change to 750 mg IV Q 24hrs with respect to indication and renal status (CrCl >50 mL/min) per MEC/P&T-approved Renal Dosing Adjustment protocol. Pharmacy will continue to monitor this patient daily for changes in clinical status and renal function.     Aaron Mckeon, PharmD  Clinical Pharmacist  [facility]  [phone]
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
## 2019-08-24 NOTE — PROGRESS NOTES
EP/ ARRHYTHMIA Progress Note    Patient ID:  Patient: Jocy Amado. MRN: [id number]  Age: 71 y.o.  : 1955    Date of  Admission: 2019  4:01 PM   PCP:  Rogue Heimlich, MD    Assessment: 1. Sustained monomorphic VT shocked to sinus. Suppressed with lidocaine infusion initially, when weaned to 0.5 mg had recurrent VT, so back to 1 mg.  2. Chronic ischemic cardiomyopathy EF 15-20%. 3. Chronic systolic CHF class 3.  4. IVCD >120 msec. 5. Remote CABG, PCI. Cath without an active CAD process to contribute to his presentation, no PCI. 6. Acute respiratory failure with hypoxia, metabolic acidosis, leukocytosis, thick respiratory secretions but no obvious pneumonia. Extubated . 7. TACOS atop CKD stage unspecified. Still with metabolic acidosis. 8. Hepatic cirrhosis seen on prior imaging. 9. Seizure disorder. 10. Mild thrombocytopenia, anemia. Plan:     1. I discussed the potential benefits, risks (infection, bleeding, PTX, device malfunction, med reaction, etc), and alternatives to ICD implant. Shared decision making was utilized between the physician/provider and patient/family in regards to ICD generator implantation and therapy ([place] decision making tool at [facility] org, scanned to medial section). Given these, he agrees to proceed. All questions answered. 2. Amiodarone load is another option, I'd stop lidocaine infusion and use amiodarone instead. 3. Defer to primary team to treat the metabolic acidosis if it persists. Would lean toward a BIV-ICD given the severity of cardiomyopathy, CHF, and QRS duration>120 msec. [x]       High complexity decision making was performed in this patient at high risk for decompensation with multiple organ involvement. Jocy Amado. is a 71 y.o. male with a history of a chronic cardiomyopathy and heart failure, found unresponsive per EMS.   A wide complex tachycardia consistent with VT was noted, shocked back to sinus in the ER. Cardiac cath did not show obstructive CAD by Dr. Mami Escamilla. He was intubated and subsequently extubated. TODAY:  No chest pain, syncope, dizziness, or palpitations.         Allergies   Allergen Reactions    Latex, Natural Rubber Hives    Codeine Anaphylaxis     Tolerated fentanyl previously      Demerol [Meperidine] Anaphylaxis     Tolerated fentanyl previously      Mushroom Anaphylaxis    Metformin Diarrhea     And dehydration    Wellbutrin [Bupropion Hcl] Anaphylaxis          Current Facility-Administered Medications   Medication Dose Route Frequency    levoFLOXacin (LEVAQUIN) 750 mg in D5W IVPB  750 mg IntraVENous Q24H    carvedilol (COREG) tablet 3.125 mg  3.125 mg Oral BID WITH MEALS    labetalol (NORMODYNE;TRANDATE) 20 mg/4 mL (5 mg/mL) injection 10 mg  10 mg IntraVENous Q3H PRN    budesonide (PULMICORT) 500 mcg/2 ml nebulizer suspension  500 mcg Nebulization BID RT    eplerenone (INSPRA) tablet 25 mg  25 mg Oral DAILY    atorvastatin (LIPITOR) tablet 80 mg  80 mg Oral DAILY    [Held by provider] glimepiride (AMARYL) tablet 4 mg  4 mg Oral BID    [Held by provider] ALPRAZolam (XANAX) tablet 1 mg  1 mg Oral BID    clonazePAM (KlonoPIN) tablet 1 mg  1 mg Oral QHS    pantoprazole (PROTONIX) 40 mg in sodium chloride 0.9% 10 mL injection  40 mg IntraVENous DAILY    methylPREDNISolone (PF) (SOLU-MEDROL) injection 40 mg  40 mg IntraVENous Q6H    PHENYLephrine (ZAHIDA-SYNEPHRINE) 30 mg in 0.9% sodium chloride 250 mL infusion   mcg/min IntraVENous TITRATE    levETIRAcetam (KEPPRA) oral solution 1,000 mg  1,000 mg Oral Q12H    dextrose 10 % infusion 250 mL  250 mL IntraVENous PRN    acetaminophen (TYLENOL) solution 650 mg  650 mg Per NG tube Q4H PRN    piperacillin-tazobactam (ZOSYN) 3.375 g in 0.9% sodium chloride (MBP/ADV) 100 mL  3.375 g IntraVENous Q8H    chlorhexidine (PERIDEX) 0.12 % mouthwash 15 mL  15 mL Oral Q12H    lidocaine 8 mg/mL (Xylocaine) infusion  1 mg/min IntraVENous CONTINUOUS    aspirin chewable tablet 81 mg  81 mg Oral DAILY    [Held by provider] pregabalin (LYRICA) capsule 50 mg  50 mg Oral TID    [Held by provider] tamsulosin (FLOMAX) capsule 0.4 mg  0.4 mg Oral ACB&D    famotidine (PEPCID) tablet 20 mg  20 mg Oral ACB&D    lactulose (CHRONULAC) 10 gram/15 mL solution 45 mL  30 g Oral TID    [Held by provider] FLUoxetine (PROzac) capsule 20 mg  20 mg Oral DAILY    QUEtiapine (SEROquel) tablet 25 mg  25 mg Oral QHS    nitroglycerin (NITROSTAT) tablet 0.4 mg  0.4 mg SubLINGual Q5MIN PRN    clopidogrel (PLAVIX) tablet 75 mg  75 mg Oral DAILY    [Held by provider] isosorbide mononitrate ER (IMDUR) tablet 30 mg  30 mg Oral DAILY    [Held by provider] lisinopril (PRINIVIL, ZESTRIL) tablet 10 mg  10 mg Oral DAILY    venlafaxine-SR (EFFEXOR-XR) capsule 150 mg  150 mg Oral DAILY AFTER BREAKFAST    [Held by provider] amLODIPine (NORVASC) tablet 2.5 mg  2.5 mg Oral DAILY    finasteride (PROSCAR) tablet 5 mg  5 mg Oral 7am    [Held by provider] furosemide (LASIX) tablet 20 mg  20 mg Oral DAILY    potassium chloride (KLOR-CON) packet for solution 20 mEq  20 mEq Oral DAILY    sodium chloride (NS) flush 5-40 mL  5-40 mL IntraVENous Q8H    sodium chloride (NS) flush 5-40 mL  5-40 mL IntraVENous PRN       Review of Symptoms:    Cardiology: negative for chest pain, palpitations, orthopnea, PND, edema, syncope   Gastrointestinal: negative for abdominal pain, N/V, dysphagia, change in bowel habits, bleeding   Genitourinary:  +HENRY, negative for hematuria        Objective:      Physical Exam:  Temp (24hrs), Av.6 °F (36.4 °C), Min:96.7 °F (35.9 °C), Max:98.6 °F (37 °C)    Patient Vitals for the past 8 hrs:   Pulse   19 1400 73   19 1300 81   19 1200 81   19 1100 78   19 1000 82   19 0900 83   19 0800 82    Patient Vitals for the past 8 hrs:   Resp   19 1400 18   19 1300 17   19 1200 20   08/24/19 1100 23   08/24/19 1000 21   08/24/19 0900 15   08/24/19 0800 15    Patient Vitals for the past 8 hrs:   BP   08/24/19 1400 160/73   08/24/19 1300 134/64   08/24/19 1200 163/77   08/24/19 1100 160/80   08/24/19 1000 158/77   08/24/19 0900 162/74   08/24/19 0800 159/80          Intake/Output Summary (Last 24 hours) at 8/24/2019 1505  Last data filed at 8/24/2019 1400  Gross per 24 hour   Intake 18375.33 ml   Output 3060 ml   Net 79619.33 ml       Nondiaphoretic, not in acute distress. No scleral icterus, mucous membranes moist, conjuctivae pink, no xanthelasma. Unlabored, clear to auscultation bilaterally anteriorly, symmetric air movement. Regular rate and rhythm, no new murmur, pericardial rub, knock, or gallop. No JVD or peripheral edema. Palpable radial pulses bilaterally. Abdomen, soft, nontender, nondistended. No abdominal bruit or pulstatile masses. Extremities without cyanosis or clubbing. Muscle tone and bulk normal.  Skin warm and dry. No rashes or ulcers. Neuro grossly nonfocal.  No seizure activity. CARDIOGRAPHICS and STUDIES, I reviewed:    Telemetry:  SR with isolated PVC's. ECG:  SR with IVCD>120 msec. Labs:  No results for input(s): CPK, CKMB, CKNDX, TROIQ in the last 72 hours. No lab exists for component: CPKMB  No results found for: CHOL, CHOLX, CHLST, CHOLV, HDL, LDL, LDLC, DLDLP, TGLX, TRIGL, TRIGP, CHHD, CHHDX  No results for input(s): INR, PTP, APTT in the last 72 hours.     No lab exists for component: Emil Brown   Recent Labs     08/24/19  0249 08/23/19  0416 08/23/19  0415 08/22/19  0344    138  --  142   K 3.7 3.9  --  3.6   * 111*  --  112*   CO2 18* 19*  --  20*   BUN 20 20  --  28*   CREA 1.58* 1.74*  --  1.95*   * 145*  --  32*   PHOS  --   --  3.5  --    CA 8.0* 7.9*  --  7.9*   ALB  --  2.5*  --   --    WBC 6.8  --  7.7 15.4*   HGB 10.1*  --  10.4* 11.8*   HCT 34.9*  --  35.0* 40.8   *  --  120* 167     Recent Labs 08/23/19  0416   SGOT 8*   AP 71   TP 5.9*   ALB 2.5*   GLOB 3.4     No components found for: GLPOC  No results for input(s): PH, PCO2, PO2 in the last 72 hours.         Queta Au MD  8/24/2019

## 2019-08-24 NOTE — PROGRESS NOTES
1930 Bedside and Verbal shift change report given to Cat RN (oncoming nurse) by Mark Tsang RN (offgoing nurse). Report included the following information SBAR.     2230 Pt bp > 160. Anne Berrios MD paged. PRN labetalol orders obtained. 0250 Labs drawn    0730 Bedside and Verbal shift change report given to [address] [place] [address] [address] (oncoming nurse) by Cat RN (offgoing nurse). Report included the following information SBAR.

## 2019-08-25 LAB
AMMONIA PLAS-SCNC: 59 UMOL/L
ANION GAP SERPL CALC-SCNC: 9 MMOL/L (ref 5–15)
ARTERIAL PATENCY WRIST A: YES
BASE DEFICIT BLD-SCNC: 5 MMOL/L
BDY SITE: ABNORMAL
BUN SERPL-MCNC: 23 MG/DL (ref 6–20)
BUN/CREAT SERPL: 17 (ref 12–20)
CALCIUM SERPL-MCNC: 8.1 MG/DL (ref 8.5–10.1)
CHLORIDE SERPL-SCNC: 108 MMOL/L (ref 97–108)
CO2 SERPL-SCNC: 21 MMOL/L (ref 21–32)
CREAT SERPL-MCNC: 1.39 MG/DL (ref 0.7–1.3)
ERYTHROCYTE [DISTWIDTH] IN BLOOD BY AUTOMATED COUNT: 18.4 % (ref 11.5–14.5)
EST. AVERAGE GLUCOSE BLD GHB EST-MCNC: 134 MG/DL
GAS FLOW.O2 O2 DELIVERY SYS: ABNORMAL L/MIN
GLUCOSE BLD STRIP.AUTO-MCNC: 152 MG/DL (ref 65–100)
GLUCOSE BLD STRIP.AUTO-MCNC: 154 MG/DL (ref 65–100)
GLUCOSE BLD STRIP.AUTO-MCNC: 197 MG/DL (ref 65–100)
GLUCOSE BLD STRIP.AUTO-MCNC: 235 MG/DL (ref 65–100)
GLUCOSE SERPL-MCNC: 164 MG/DL (ref 65–100)
HBA1C MFR BLD: 6.3 % (ref 4.2–6.3)
HCO3 BLD-SCNC: 19 MMOL/L (ref 22–26)
HCT VFR BLD AUTO: 37.1 % (ref 36.6–50.3)
HGB BLD-MCNC: 11 G/DL (ref 12.1–17)
LACTATE SERPL-SCNC: 1.9 MMOL/L (ref 0.4–2)
MCH RBC QN AUTO: 21 PG (ref 26–34)
MCHC RBC AUTO-ENTMCNC: 29.6 G/DL (ref 30–36.5)
MCV RBC AUTO: 70.7 FL (ref 80–99)
NRBC # BLD: 0.02 K/UL (ref 0–0.01)
NRBC BLD-RTO: 0.3 PER 100 WBC
PCO2 BLD: 26 MMHG (ref 35–45)
PH BLD: 7.47 [PH] (ref 7.35–7.45)
PLATELET # BLD AUTO: 143 K/UL (ref 150–400)
PMV BLD AUTO: ABNORMAL FL (ref 8.9–12.9)
PO2 BLD: 98 MMHG (ref 80–100)
POTASSIUM SERPL-SCNC: 3.9 MMOL/L (ref 3.5–5.1)
RBC # BLD AUTO: 5.25 M/UL (ref 4.1–5.7)
SAO2 % BLD: 98 % (ref 92–97)
SERVICE CMNT-IMP: ABNORMAL
SODIUM SERPL-SCNC: 138 MMOL/L (ref 136–145)
SPECIMEN TYPE: ABNORMAL
TOTAL RESP. RATE, ITRR: 18
WBC # BLD AUTO: 7.3 K/UL (ref 4.1–11.1)

## 2019-08-25 PROCEDURE — 74011250636 HC RX REV CODE- 250/636: Performed by: INTERNAL MEDICINE

## 2019-08-25 PROCEDURE — 74011000250 HC RX REV CODE- 250: Performed by: INTERNAL MEDICINE

## 2019-08-25 PROCEDURE — 82140 ASSAY OF AMMONIA: CPT

## 2019-08-25 PROCEDURE — 80048 BASIC METABOLIC PNL TOTAL CA: CPT

## 2019-08-25 PROCEDURE — 36600 WITHDRAWAL OF ARTERIAL BLOOD: CPT

## 2019-08-25 PROCEDURE — 94640 AIRWAY INHALATION TREATMENT: CPT

## 2019-08-25 PROCEDURE — 85027 COMPLETE CBC AUTOMATED: CPT

## 2019-08-25 PROCEDURE — 83605 ASSAY OF LACTIC ACID: CPT

## 2019-08-25 PROCEDURE — 82803 BLOOD GASES ANY COMBINATION: CPT

## 2019-08-25 PROCEDURE — 74011250637 HC RX REV CODE- 250/637: Performed by: INTERNAL MEDICINE

## 2019-08-25 PROCEDURE — 74011000258 HC RX REV CODE- 258: Performed by: INTERNAL MEDICINE

## 2019-08-25 PROCEDURE — 36415 COLL VENOUS BLD VENIPUNCTURE: CPT

## 2019-08-25 PROCEDURE — 77030010547 HC BG URIN W/UMETER -A

## 2019-08-25 PROCEDURE — 74011636637 HC RX REV CODE- 636/637: Performed by: INTERNAL MEDICINE

## 2019-08-25 PROCEDURE — 82962 GLUCOSE BLOOD TEST: CPT

## 2019-08-25 PROCEDURE — 83036 HEMOGLOBIN GLYCOSYLATED A1C: CPT

## 2019-08-25 PROCEDURE — 65620000000 HC RM CCU GENERAL

## 2019-08-25 PROCEDURE — C9113 INJ PANTOPRAZOLE SODIUM, VIA: HCPCS | Performed by: INTERNAL MEDICINE

## 2019-08-25 RX ORDER — SODIUM CHLORIDE 9 MG/ML
5 INJECTION, SOLUTION INTRAVENOUS CONTINUOUS
Status: DISCONTINUED | OUTPATIENT
Start: 2019-08-25 | End: 2019-09-03 | Stop reason: HOSPADM

## 2019-08-25 RX ADMIN — Medication 10 ML: at 05:19

## 2019-08-25 RX ADMIN — METHYLPREDNISOLONE SODIUM SUCCINATE 40 MG: 40 INJECTION, POWDER, FOR SOLUTION INTRAMUSCULAR; INTRAVENOUS at 13:06

## 2019-08-25 RX ADMIN — LEVETIRACETAM 1000 MG: 100 SOLUTION ORAL at 21:05

## 2019-08-25 RX ADMIN — EPLERENONE 25 MG: 25 TABLET, FILM COATED ORAL at 08:28

## 2019-08-25 RX ADMIN — METHYLPREDNISOLONE SODIUM SUCCINATE 40 MG: 40 INJECTION, POWDER, FOR SOLUTION INTRAMUSCULAR; INTRAVENOUS at 01:00

## 2019-08-25 RX ADMIN — CARVEDILOL 3.12 MG: 3.12 TABLET, FILM COATED ORAL at 17:21

## 2019-08-25 RX ADMIN — CLONAZEPAM 1 MG: 0.5 TABLET ORAL at 21:06

## 2019-08-25 RX ADMIN — INSULIN LISPRO 2 UNITS: 100 INJECTION, SOLUTION INTRAVENOUS; SUBCUTANEOUS at 06:40

## 2019-08-25 RX ADMIN — FAMOTIDINE 20 MG: 20 TABLET ORAL at 17:21

## 2019-08-25 RX ADMIN — ASPIRIN 81 MG CHEWABLE TABLET 81 MG: 81 TABLET CHEWABLE at 08:27

## 2019-08-25 RX ADMIN — POTASSIUM CHLORIDE 20 MEQ: 1.5 POWDER, FOR SOLUTION ORAL at 08:27

## 2019-08-25 RX ADMIN — BUDESONIDE 500 MCG: 0.5 INHALANT RESPIRATORY (INHALATION) at 20:46

## 2019-08-25 RX ADMIN — CLOPIDOGREL BISULFATE 75 MG: 75 TABLET, FILM COATED ORAL at 08:28

## 2019-08-25 RX ADMIN — QUETIAPINE FUMARATE 25 MG: 25 TABLET ORAL at 21:05

## 2019-08-25 RX ADMIN — PIPERACILLIN SODIUM,TAZOBACTAM SODIUM 3.38 G: 3; .375 INJECTION, POWDER, FOR SOLUTION INTRAVENOUS at 17:27

## 2019-08-25 RX ADMIN — FINASTERIDE 5 MG: 5 TABLET, FILM COATED ORAL at 06:19

## 2019-08-25 RX ADMIN — LACTULOSE 45 ML: 20 SOLUTION ORAL at 17:21

## 2019-08-25 RX ADMIN — SODIUM CHLORIDE 40 MG: 9 INJECTION INTRAMUSCULAR; INTRAVENOUS; SUBCUTANEOUS at 08:25

## 2019-08-25 RX ADMIN — CARVEDILOL 3.12 MG: 3.12 TABLET, FILM COATED ORAL at 08:27

## 2019-08-25 RX ADMIN — INSULIN LISPRO 3 UNITS: 100 INJECTION, SOLUTION INTRAVENOUS; SUBCUTANEOUS at 12:56

## 2019-08-25 RX ADMIN — AMIODARONE HYDROCHLORIDE 0.5 MG/MIN: 50 INJECTION, SOLUTION INTRAVENOUS at 23:09

## 2019-08-25 RX ADMIN — METHYLPREDNISOLONE SODIUM SUCCINATE 40 MG: 40 INJECTION, POWDER, FOR SOLUTION INTRAMUSCULAR; INTRAVENOUS at 08:25

## 2019-08-25 RX ADMIN — LABETALOL 20 MG/4 ML (5 MG/ML) INTRAVENOUS SYRINGE 10 MG: at 21:15

## 2019-08-25 RX ADMIN — PIPERACILLIN SODIUM,TAZOBACTAM SODIUM 3.38 G: 3; .375 INJECTION, POWDER, FOR SOLUTION INTRAVENOUS at 22:37

## 2019-08-25 RX ADMIN — SODIUM CHLORIDE 5 ML/HR: 900 INJECTION, SOLUTION INTRAVENOUS at 21:11

## 2019-08-25 RX ADMIN — LACTULOSE 45 ML: 20 SOLUTION ORAL at 08:26

## 2019-08-25 RX ADMIN — AMIODARONE HYDROCHLORIDE 0.5 MG/MIN: 50 INJECTION, SOLUTION INTRAVENOUS at 12:00

## 2019-08-25 RX ADMIN — LEVOFLOXACIN 750 MG: 5 INJECTION, SOLUTION INTRAVENOUS at 12:57

## 2019-08-25 RX ADMIN — VENLAFAXINE HYDROCHLORIDE 150 MG: 150 CAPSULE, EXTENDED RELEASE ORAL at 08:26

## 2019-08-25 RX ADMIN — AMIODARONE HYDROCHLORIDE 0.5 MG/MIN: 50 INJECTION, SOLUTION INTRAVENOUS at 21:11

## 2019-08-25 RX ADMIN — INSULIN LISPRO 2 UNITS: 100 INJECTION, SOLUTION INTRAVENOUS; SUBCUTANEOUS at 17:21

## 2019-08-25 RX ADMIN — LACTULOSE 45 ML: 20 SOLUTION ORAL at 21:04

## 2019-08-25 RX ADMIN — Medication 10 ML: at 13:00

## 2019-08-25 RX ADMIN — LEVETIRACETAM 1000 MG: 100 SOLUTION ORAL at 08:28

## 2019-08-25 RX ADMIN — BUDESONIDE 500 MCG: 0.5 INHALANT RESPIRATORY (INHALATION) at 08:01

## 2019-08-25 RX ADMIN — METHYLPREDNISOLONE SODIUM SUCCINATE 40 MG: 40 INJECTION, POWDER, FOR SOLUTION INTRAMUSCULAR; INTRAVENOUS at 21:05

## 2019-08-25 RX ADMIN — PIPERACILLIN SODIUM,TAZOBACTAM SODIUM 3.38 G: 3; .375 INJECTION, POWDER, FOR SOLUTION INTRAVENOUS at 06:18

## 2019-08-25 RX ADMIN — ATORVASTATIN CALCIUM 80 MG: 40 TABLET, FILM COATED ORAL at 08:27

## 2019-08-25 RX ADMIN — FAMOTIDINE 20 MG: 20 TABLET ORAL at 06:19

## 2019-08-25 RX ADMIN — Medication 10 ML: at 21:06

## 2019-08-25 NOTE — PROGRESS NOTES
[facility] Cardiology Progress Note    Date of Service: 8/24/2019    Subjective:  No acute events overnight. No sustained VT.     Objective:    Visit Vitals  /74   Pulse 72   Temp 97.5 °F (36.4 °C)   Resp 21   Ht 5' 9\" (1.753 m)   Wt 90 kg (198 lb 6.6 oz)   SpO2 100%   BMI 29.30 kg/m²     Physical Exam  GEN: NAD, appears stated age  HEENT: EOMI, MMM, OP clear  NECK: No JVD  CV: RRR, normal S1 and S2, no M/R/G  LUNGS: CTAB, no W/R/R  ABD: NABS, soft, NT/ND  EXT: No edema, 2+ distal pulses  PSYCH: Mood and affect normal  NEURO: AAO, MAEW, face symmetrical, speech intact    Current Facility-Administered Medications   Medication Dose Route Frequency    levoFLOXacin (LEVAQUIN) 750 mg in D5W IVPB  750 mg IntraVENous Q24H    amiodarone (CORDARONE) 375 mg/250 mL D5W infusion  0.5-1 mg/min IntraVENous TITRATE    glucose chewable tablet 16 g  4 Tab Oral PRN    glucagon (GLUCAGEN) injection 1 mg  1 mg IntraMUSCular PRN    dextrose 10% infusion 125-250 mL  125-250 mL IntraVENous PRN    insulin lispro (HUMALOG) injection   SubCUTAneous AC&HS    carvedilol (COREG) tablet 3.125 mg  3.125 mg Oral BID WITH MEALS    labetalol (NORMODYNE;TRANDATE) 20 mg/4 mL (5 mg/mL) injection 10 mg  10 mg IntraVENous Q3H PRN    budesonide (PULMICORT) 500 mcg/2 ml nebulizer suspension  500 mcg Nebulization BID RT    eplerenone (INSPRA) tablet 25 mg  25 mg Oral DAILY    atorvastatin (LIPITOR) tablet 80 mg  80 mg Oral DAILY    [Held by provider] glimepiride (AMARYL) tablet 4 mg  4 mg Oral BID    [Held by provider] ALPRAZolam (XANAX) tablet 1 mg  1 mg Oral BID    clonazePAM (KlonoPIN) tablet 1 mg  1 mg Oral QHS    pantoprazole (PROTONIX) 40 mg in sodium chloride 0.9% 10 mL injection  40 mg IntraVENous DAILY    methylPREDNISolone (PF) (SOLU-MEDROL) injection 40 mg  40 mg IntraVENous Q6H    PHENYLephrine (ZAHIDA-SYNEPHRINE) 30 mg in 0.9% sodium chloride 250 mL infusion   mcg/min IntraVENous TITRATE    levETIRAcetam (KEPPRA) oral solution 1,000 mg  1,000 mg Oral Q12H    acetaminophen (TYLENOL) solution 650 mg  650 mg Per NG tube Q4H PRN    piperacillin-tazobactam (ZOSYN) 3.375 g in 0.9% sodium chloride (MBP/ADV) 100 mL  3.375 g IntraVENous Q8H    chlorhexidine (PERIDEX) 0.12 % mouthwash 15 mL  15 mL Oral Q12H    aspirin chewable tablet 81 mg  81 mg Oral DAILY    [Held by provider] pregabalin (LYRICA) capsule 50 mg  50 mg Oral TID    [Held by provider] tamsulosin (FLOMAX) capsule 0.4 mg  0.4 mg Oral ACB&D    famotidine (PEPCID) tablet 20 mg  20 mg Oral ACB&D    lactulose (CHRONULAC) 10 gram/15 mL solution 45 mL  30 g Oral TID    [Held by provider] FLUoxetine (PROzac) capsule 20 mg  20 mg Oral DAILY    QUEtiapine (SEROquel) tablet 25 mg  25 mg Oral QHS    nitroglycerin (NITROSTAT) tablet 0.4 mg  0.4 mg SubLINGual Q5MIN PRN    clopidogrel (PLAVIX) tablet 75 mg  75 mg Oral DAILY    [Held by provider] isosorbide mononitrate ER (IMDUR) tablet 30 mg  30 mg Oral DAILY    [Held by provider] lisinopril (PRINIVIL, ZESTRIL) tablet 10 mg  10 mg Oral DAILY    venlafaxine-SR (EFFEXOR-XR) capsule 150 mg  150 mg Oral DAILY AFTER BREAKFAST    [Held by provider] amLODIPine (NORVASC) tablet 2.5 mg  2.5 mg Oral DAILY    finasteride (PROSCAR) tablet 5 mg  5 mg Oral 7am    [Held by provider] furosemide (LASIX) tablet 20 mg  20 mg Oral DAILY    potassium chloride (KLOR-CON) packet for solution 20 mEq  20 mEq Oral DAILY    sodium chloride (NS) flush 5-40 mL  5-40 mL IntraVENous Q8H    sodium chloride (NS) flush 5-40 mL  5-40 mL IntraVENous PRN       Data Reviewed:  Recent Results (from the past 12 hour(s))   GLUCOSE, POC    Collection Time: 08/24/19 12:47 PM   Result Value Ref Range    Glucose (POC) 139 (H) 65 - 100 mg/dL    Performed by Soledad Hawkins    GLUCOSE, POC    Collection Time: 08/24/19  5:05 PM   Result Value Ref Range    Glucose (POC) 188 (H) 65 - 100 mg/dL    Performed by [place] [address], POC    Collection Time: 08/24/19 9:18 PM   Result Value Ref Range    Glucose (POC) 226 (H) 65 - 100 mg/dL    Performed by Barbara Gonsalez        Assessment:  1. VT storm  2. Acute on chronic combined systolic and diastolic CHF with EF 06-78%  3. CAD s/p remote CABG and PCI  4. Acute hypoxic respiratory failure requiring mechanical ventilation, now extubated  5. Chronic renal insufficiency    Plan:  EP plans ICD placement early next week, I discussed plans with Dr. Kavya Pereira (EP)  Continue broad spectrum abx for mixed shock (now improved), can narrow abx in next few days  Appears nearly euvolemic  Check lactate tomorrow AM in light of decreasing bicarbonate    Signed:  Errol Ribeiro.  Magan Pacheco, [facility] / [address] [place] [address] [facility]  08/24/19

## 2019-08-25 NOTE — PROGRESS NOTES
Bedside shift change report given to Lazaro Mueller (oncoming nurse) by Monique (offgoing nurse). Report included the following information SBAR, Kardex, ED Summary, Intake/Output, MAR, Recent Results, Cardiac Rhythm NSR and Alarm Parameters  . 1130- Uneventful morning, VSS.      1700- Uneventful shift, VSS. Bedside shift change report given to Honey Anderson (oncoming nurse) by Lazaro Mueller (offgoing nurse). Report included the following information SBAR, Kardex, ED Summary, Intake/Output, MAR, Recent Results, Cardiac Rhythm NSR and Alarm Parameters .
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
## 2019-08-25 NOTE — PROGRESS NOTES
Metabolic acidosis slowly creeping up per serial serum bicarb levels. Nonanion gap character. Wonder if he's having too much stooling with lactulose, this can be checked by nursing and lactulose adjusted. Would check an ABG tomorrow AM to get more info, doubt significant enough respiratory alkalosis to drive this bicarb value. His renal function is improving, would expect his metabolic acidosis to improve. Just making sure there is no issue with going forward with the sedation anticipated on Monday.

## 2019-08-26 ENCOUNTER — APPOINTMENT (OUTPATIENT)
Dept: GENERAL RADIOLOGY | Age: 64
DRG: 224 | End: 2019-08-26
Attending: INTERNAL MEDICINE
Payer: MEDICARE

## 2019-08-26 LAB
AMMONIA PLAS-SCNC: 62 UMOL/L
ANION GAP SERPL CALC-SCNC: 10 MMOL/L (ref 5–15)
BACTERIA SPEC CULT: NORMAL
BUN SERPL-MCNC: 21 MG/DL (ref 6–20)
BUN/CREAT SERPL: 15 (ref 12–20)
CALCIUM SERPL-MCNC: 8.4 MG/DL (ref 8.5–10.1)
CHLORIDE SERPL-SCNC: 105 MMOL/L (ref 97–108)
CO2 SERPL-SCNC: 21 MMOL/L (ref 21–32)
CREAT SERPL-MCNC: 1.4 MG/DL (ref 0.7–1.3)
ERYTHROCYTE [DISTWIDTH] IN BLOOD BY AUTOMATED COUNT: 18.9 % (ref 11.5–14.5)
GLUCOSE BLD STRIP.AUTO-MCNC: 131 MG/DL (ref 65–100)
GLUCOSE BLD STRIP.AUTO-MCNC: 146 MG/DL (ref 65–100)
GLUCOSE BLD STRIP.AUTO-MCNC: 147 MG/DL (ref 65–100)
GLUCOSE BLD STRIP.AUTO-MCNC: 163 MG/DL (ref 65–100)
GLUCOSE SERPL-MCNC: 207 MG/DL (ref 65–100)
HCT VFR BLD AUTO: 41.7 % (ref 36.6–50.3)
HGB BLD-MCNC: 12.5 G/DL (ref 12.1–17)
MCH RBC QN AUTO: 20.9 PG (ref 26–34)
MCHC RBC AUTO-ENTMCNC: 30 G/DL (ref 30–36.5)
MCV RBC AUTO: 69.8 FL (ref 80–99)
NRBC # BLD: 0 K/UL (ref 0–0.01)
NRBC BLD-RTO: 0 PER 100 WBC
PLATELET # BLD AUTO: 141 K/UL (ref 150–400)
POTASSIUM SERPL-SCNC: 3.7 MMOL/L (ref 3.5–5.1)
RBC # BLD AUTO: 5.97 M/UL (ref 4.1–5.7)
SERVICE CMNT-IMP: ABNORMAL
SERVICE CMNT-IMP: NORMAL
SODIUM SERPL-SCNC: 136 MMOL/L (ref 136–145)
WBC # BLD AUTO: 9 K/UL (ref 4.1–11.1)

## 2019-08-26 PROCEDURE — 74011000250 HC RX REV CODE- 250: Performed by: INTERNAL MEDICINE

## 2019-08-26 PROCEDURE — 80048 BASIC METABOLIC PNL TOTAL CA: CPT

## 2019-08-26 PROCEDURE — 71045 X-RAY EXAM CHEST 1 VIEW: CPT

## 2019-08-26 PROCEDURE — C1893 INTRO/SHEATH, FIXED,NON-PEEL: HCPCS | Performed by: INTERNAL MEDICINE

## 2019-08-26 PROCEDURE — 74011000258 HC RX REV CODE- 258: Performed by: INTERNAL MEDICINE

## 2019-08-26 PROCEDURE — 0JH609Z INSERTION OF CARDIAC RESYNCHRONIZATION DEFIBRILLATOR PULSE GENERATOR INTO CHEST SUBCUTANEOUS TISSUE AND FASCIA, OPEN APPROACH: ICD-10-PCS | Performed by: INTERNAL MEDICINE

## 2019-08-26 PROCEDURE — 94664 DEMO&/EVAL PT USE INHALER: CPT

## 2019-08-26 PROCEDURE — 33249 INSJ/RPLCMT DEFIB W/LEAD(S): CPT | Performed by: INTERNAL MEDICINE

## 2019-08-26 PROCEDURE — 74011250636 HC RX REV CODE- 250/636: Performed by: INTERNAL MEDICINE

## 2019-08-26 PROCEDURE — 74011250637 HC RX REV CODE- 250/637: Performed by: INTERNAL MEDICINE

## 2019-08-26 PROCEDURE — 85027 COMPLETE CBC AUTOMATED: CPT

## 2019-08-26 PROCEDURE — 99153 MOD SED SAME PHYS/QHP EA: CPT | Performed by: INTERNAL MEDICINE

## 2019-08-26 PROCEDURE — C1769 GUIDE WIRE: HCPCS | Performed by: INTERNAL MEDICINE

## 2019-08-26 PROCEDURE — 82140 ASSAY OF AMMONIA: CPT

## 2019-08-26 PROCEDURE — 82962 GLUCOSE BLOOD TEST: CPT

## 2019-08-26 PROCEDURE — 33225 L VENTRIC PACING LEAD ADD-ON: CPT | Performed by: INTERNAL MEDICINE

## 2019-08-26 PROCEDURE — 02HK3KZ INSERTION OF DEFIBRILLATOR LEAD INTO RIGHT VENTRICLE, PERCUTANEOUS APPROACH: ICD-10-PCS | Performed by: INTERNAL MEDICINE

## 2019-08-26 PROCEDURE — C1900 LEAD, CORONARY VENOUS: HCPCS | Performed by: INTERNAL MEDICINE

## 2019-08-26 PROCEDURE — 77030039046 HC PAD DEFIB RADIOTRNSPNT CNMD -B: Performed by: INTERNAL MEDICINE

## 2019-08-26 PROCEDURE — 74011636637 HC RX REV CODE- 636/637: Performed by: INTERNAL MEDICINE

## 2019-08-26 PROCEDURE — 02HL3KZ INSERTION OF DEFIBRILLATOR LEAD INTO LEFT VENTRICLE, PERCUTANEOUS APPROACH: ICD-10-PCS | Performed by: INTERNAL MEDICINE

## 2019-08-26 PROCEDURE — 94640 AIRWAY INHALATION TREATMENT: CPT

## 2019-08-26 PROCEDURE — C1882 AICD, OTHER THAN SING/DUAL: HCPCS | Performed by: INTERNAL MEDICINE

## 2019-08-26 PROCEDURE — 65620000000 HC RM CCU GENERAL

## 2019-08-26 PROCEDURE — 74011000272 HC RX REV CODE- 272: Performed by: INTERNAL MEDICINE

## 2019-08-26 PROCEDURE — 93005 ELECTROCARDIOGRAM TRACING: CPT

## 2019-08-26 PROCEDURE — C1751 CATH, INF, PER/CENT/MIDLINE: HCPCS | Performed by: INTERNAL MEDICINE

## 2019-08-26 PROCEDURE — 77030018673: Performed by: INTERNAL MEDICINE

## 2019-08-26 PROCEDURE — 36415 COLL VENOUS BLD VENIPUNCTURE: CPT

## 2019-08-26 PROCEDURE — 77030012935 HC DRSG AQUACEL BMS -B: Performed by: INTERNAL MEDICINE

## 2019-08-26 PROCEDURE — 77030002996 HC SUT SLK J&J -A: Performed by: INTERNAL MEDICINE

## 2019-08-26 PROCEDURE — C1892 INTRO/SHEATH,FIXED,PEEL-AWAY: HCPCS | Performed by: INTERNAL MEDICINE

## 2019-08-26 PROCEDURE — 99152 MOD SED SAME PHYS/QHP 5/>YRS: CPT | Performed by: INTERNAL MEDICINE

## 2019-08-26 PROCEDURE — 77030031139 HC SUT VCRL2 J&J -A: Performed by: INTERNAL MEDICINE

## 2019-08-26 PROCEDURE — 74011250636 HC RX REV CODE- 250/636

## 2019-08-26 PROCEDURE — 02H63KZ INSERTION OF DEFIBRILLATOR LEAD INTO RIGHT ATRIUM, PERCUTANEOUS APPROACH: ICD-10-PCS | Performed by: INTERNAL MEDICINE

## 2019-08-26 PROCEDURE — C1898 LEAD, PMKR, OTHER THAN TRANS: HCPCS | Performed by: INTERNAL MEDICINE

## 2019-08-26 PROCEDURE — C1777 LEAD, AICD, ENDO SINGLE COIL: HCPCS | Performed by: INTERNAL MEDICINE

## 2019-08-26 PROCEDURE — 77030019580 HC CBL PACE MEDT -B: Performed by: INTERNAL MEDICINE

## 2019-08-26 PROCEDURE — 77030028698 HC BLD TISS PLSM MEDT -D: Performed by: INTERNAL MEDICINE

## 2019-08-26 DEVICE — LEAD DEFIB 8FR L58CM OPTIM EXT RET HELIX SGL COIL TRUE BPLR: Type: IMPLANTABLE DEVICE | Site: HEART | Status: FUNCTIONAL

## 2019-08-26 DEVICE — DEFIBRILLATOR CARD 38CC W41XH76MM THK14MM L VENT DF-4 IS-4: Type: IMPLANTABLE DEVICE | Site: CHEST  WALL | Status: FUNCTIONAL

## 2019-08-26 DEVICE — LEAD PCMKR TENDRIL 52CM --: Type: IMPLANTABLE DEVICE | Site: HEART | Status: FUNCTIONAL

## 2019-08-26 DEVICE — LEAD PACE 47FR L86CM 60MM SPACE L HRT 16 CRV TRAD S SHP: Type: IMPLANTABLE DEVICE | Site: HEART | Status: FUNCTIONAL

## 2019-08-26 RX ORDER — TAMSULOSIN HYDROCHLORIDE 0.4 MG/1
0.4 CAPSULE ORAL DAILY
Status: DISCONTINUED | OUTPATIENT
Start: 2019-08-26 | End: 2019-09-03 | Stop reason: HOSPADM

## 2019-08-26 RX ORDER — SODIUM CHLORIDE 0.9 % (FLUSH) 0.9 %
5-40 SYRINGE (ML) INJECTION EVERY 8 HOURS
Status: DISCONTINUED | OUTPATIENT
Start: 2019-08-26 | End: 2019-08-29

## 2019-08-26 RX ORDER — CARVEDILOL 12.5 MG/1
12.5 TABLET ORAL 2 TIMES DAILY WITH MEALS
Status: DISCONTINUED | OUTPATIENT
Start: 2019-08-26 | End: 2019-08-29

## 2019-08-26 RX ORDER — LIDOCAINE HYDROCHLORIDE AND EPINEPHRINE 10; 10 MG/ML; UG/ML
INJECTION, SOLUTION INFILTRATION; PERINEURAL AS NEEDED
Status: DISCONTINUED | OUTPATIENT
Start: 2019-08-26 | End: 2019-08-26 | Stop reason: HOSPADM

## 2019-08-26 RX ORDER — MIDAZOLAM HYDROCHLORIDE 1 MG/ML
INJECTION, SOLUTION INTRAMUSCULAR; INTRAVENOUS AS NEEDED
Status: DISCONTINUED | OUTPATIENT
Start: 2019-08-26 | End: 2019-08-26 | Stop reason: HOSPADM

## 2019-08-26 RX ORDER — SODIUM CHLORIDE 0.9 % (FLUSH) 0.9 %
5-40 SYRINGE (ML) INJECTION AS NEEDED
Status: DISCONTINUED | OUTPATIENT
Start: 2019-08-26 | End: 2019-09-03 | Stop reason: HOSPADM

## 2019-08-26 RX ORDER — AMIODARONE HYDROCHLORIDE 200 MG/1
400 TABLET ORAL 3 TIMES DAILY
Status: DISCONTINUED | OUTPATIENT
Start: 2019-08-26 | End: 2019-08-28

## 2019-08-26 RX ORDER — CEFAZOLIN SODIUM/WATER 2 G/20 ML
2 SYRINGE (ML) INTRAVENOUS ONCE
Status: COMPLETED | OUTPATIENT
Start: 2019-08-26 | End: 2019-08-26

## 2019-08-26 RX ORDER — FENTANYL CITRATE 50 UG/ML
INJECTION, SOLUTION INTRAMUSCULAR; INTRAVENOUS AS NEEDED
Status: DISCONTINUED | OUTPATIENT
Start: 2019-08-26 | End: 2019-08-26 | Stop reason: HOSPADM

## 2019-08-26 RX ORDER — LEVOFLOXACIN 750 MG/1
750 TABLET ORAL EVERY 24 HOURS
Status: DISCONTINUED | OUTPATIENT
Start: 2019-08-26 | End: 2019-08-27

## 2019-08-26 RX ORDER — LISINOPRIL 5 MG/1
5 TABLET ORAL DAILY
Status: DISCONTINUED | OUTPATIENT
Start: 2019-08-26 | End: 2019-08-29

## 2019-08-26 RX ORDER — POTASSIUM CHLORIDE 750 MG/1
20 TABLET, FILM COATED, EXTENDED RELEASE ORAL DAILY
Status: DISCONTINUED | OUTPATIENT
Start: 2019-08-26 | End: 2019-09-03 | Stop reason: HOSPADM

## 2019-08-26 RX ORDER — PANTOPRAZOLE SODIUM 40 MG/1
40 TABLET, DELAYED RELEASE ORAL
Status: DISCONTINUED | OUTPATIENT
Start: 2019-08-26 | End: 2019-09-03 | Stop reason: HOSPADM

## 2019-08-26 RX ORDER — LEVETIRACETAM 500 MG/1
1000 TABLET ORAL 2 TIMES DAILY
Status: DISCONTINUED | OUTPATIENT
Start: 2019-08-26 | End: 2019-09-03 | Stop reason: HOSPADM

## 2019-08-26 RX ADMIN — AMIODARONE HYDROCHLORIDE 400 MG: 200 TABLET ORAL at 20:56

## 2019-08-26 RX ADMIN — CARVEDILOL 3.12 MG: 3.12 TABLET, FILM COATED ORAL at 08:34

## 2019-08-26 RX ADMIN — FINASTERIDE 5 MG: 5 TABLET, FILM COATED ORAL at 05:09

## 2019-08-26 RX ADMIN — INSULIN LISPRO 2 UNITS: 100 INJECTION, SOLUTION INTRAVENOUS; SUBCUTANEOUS at 06:26

## 2019-08-26 RX ADMIN — FUROSEMIDE 20 MG: 40 TABLET ORAL at 08:35

## 2019-08-26 RX ADMIN — LACTULOSE 45 ML: 20 SOLUTION ORAL at 15:27

## 2019-08-26 RX ADMIN — POTASSIUM CHLORIDE 20 MEQ: 750 TABLET, EXTENDED RELEASE ORAL at 09:58

## 2019-08-26 RX ADMIN — BUDESONIDE 500 MCG: 0.5 INHALANT RESPIRATORY (INHALATION) at 21:36

## 2019-08-26 RX ADMIN — VENLAFAXINE HYDROCHLORIDE 150 MG: 150 CAPSULE, EXTENDED RELEASE ORAL at 08:35

## 2019-08-26 RX ADMIN — FAMOTIDINE 20 MG: 20 TABLET ORAL at 05:09

## 2019-08-26 RX ADMIN — PANTOPRAZOLE SODIUM 40 MG: 40 TABLET, DELAYED RELEASE ORAL at 08:48

## 2019-08-26 RX ADMIN — BUDESONIDE 500 MCG: 0.5 INHALANT RESPIRATORY (INHALATION) at 08:19

## 2019-08-26 RX ADMIN — QUETIAPINE FUMARATE 25 MG: 25 TABLET ORAL at 20:58

## 2019-08-26 RX ADMIN — INSULIN LISPRO 2 UNITS: 100 INJECTION, SOLUTION INTRAVENOUS; SUBCUTANEOUS at 16:51

## 2019-08-26 RX ADMIN — METHYLPREDNISOLONE SODIUM SUCCINATE 40 MG: 40 INJECTION, POWDER, FOR SOLUTION INTRAMUSCULAR; INTRAVENOUS at 08:37

## 2019-08-26 RX ADMIN — LEVETIRACETAM 1000 MG: 500 TABLET, FILM COATED ORAL at 20:56

## 2019-08-26 RX ADMIN — CLONAZEPAM 1 MG: 0.5 TABLET ORAL at 20:57

## 2019-08-26 RX ADMIN — METHYLPREDNISOLONE SODIUM SUCCINATE 40 MG: 40 INJECTION, POWDER, FOR SOLUTION INTRAMUSCULAR; INTRAVENOUS at 20:55

## 2019-08-26 RX ADMIN — CARVEDILOL 12.5 MG: 12.5 TABLET, FILM COATED ORAL at 16:54

## 2019-08-26 RX ADMIN — TAMSULOSIN HYDROCHLORIDE 0.4 MG: 0.4 CAPSULE ORAL at 08:35

## 2019-08-26 RX ADMIN — ATORVASTATIN CALCIUM 80 MG: 40 TABLET, FILM COATED ORAL at 08:34

## 2019-08-26 RX ADMIN — Medication 10 ML: at 21:00

## 2019-08-26 RX ADMIN — FAMOTIDINE 20 MG: 20 TABLET ORAL at 15:30

## 2019-08-26 RX ADMIN — LACTULOSE 45 ML: 20 SOLUTION ORAL at 20:57

## 2019-08-26 RX ADMIN — Medication 20 ML: at 05:07

## 2019-08-26 RX ADMIN — LEVETIRACETAM 1000 MG: 500 TABLET, FILM COATED ORAL at 08:48

## 2019-08-26 RX ADMIN — LACTULOSE 45 ML: 20 SOLUTION ORAL at 08:36

## 2019-08-26 RX ADMIN — ASPIRIN 81 MG CHEWABLE TABLET 81 MG: 81 TABLET CHEWABLE at 08:35

## 2019-08-26 RX ADMIN — Medication 10 ML: at 20:59

## 2019-08-26 RX ADMIN — CLOPIDOGREL BISULFATE 75 MG: 75 TABLET, FILM COATED ORAL at 08:35

## 2019-08-26 RX ADMIN — LISINOPRIL 5 MG: 5 TABLET ORAL at 09:58

## 2019-08-26 RX ADMIN — PIPERACILLIN SODIUM,TAZOBACTAM SODIUM 3.38 G: 3; .375 INJECTION, POWDER, FOR SOLUTION INTRAVENOUS at 15:28

## 2019-08-26 RX ADMIN — EPLERENONE 25 MG: 25 TABLET, FILM COATED ORAL at 08:48

## 2019-08-26 RX ADMIN — PIPERACILLIN SODIUM,TAZOBACTAM SODIUM 3.38 G: 3; .375 INJECTION, POWDER, FOR SOLUTION INTRAVENOUS at 06:19

## 2019-08-26 RX ADMIN — AMIODARONE HYDROCHLORIDE 400 MG: 200 TABLET ORAL at 15:27

## 2019-08-26 RX ADMIN — LABETALOL 20 MG/4 ML (5 MG/ML) INTRAVENOUS SYRINGE 10 MG: at 01:00

## 2019-08-26 RX ADMIN — LEVOFLOXACIN 750 MG: 750 TABLET, FILM COATED ORAL at 16:40

## 2019-08-26 RX ADMIN — PIPERACILLIN SODIUM,TAZOBACTAM SODIUM 3.38 G: 3; .375 INJECTION, POWDER, FOR SOLUTION INTRAVENOUS at 23:19

## 2019-08-26 NOTE — PROGRESS NOTES
[facility] Cardiology Progress Note    Date of Service: 8/25/2019    Subjective:  No acute events overnight. No sustained VT. Feels about the same today.     Objective:    Visit Vitals  BP (!) 166/103 (BP 1 Location: Right arm, BP Patient Position: At rest)   Pulse 66   Temp 97.6 °F (36.4 °C)   Resp 22   Ht 5' 9\" (1.753 m)   Wt 86.2 kg (190 lb 0.6 oz)   SpO2 100%   BMI 28.06 kg/m²     Physical Exam  GEN: NAD, appears stated age  HEENT: EOMI, MMM, OP clear  NECK: No JVD  CV: RRR, normal S1 and S2, no M/R/G  LUNGS: CTAB, no W/R/R  ABD: NABS, soft, NT/ND  EXT: No edema, 2+ distal pulses  PSYCH: Mood and affect normal  NEURO: AAO, MAEW, face symmetrical, speech intact    Current Facility-Administered Medications   Medication Dose Route Frequency    methylPREDNISolone (PF) (SOLU-MEDROL) injection 40 mg  40 mg IntraVENous Q12H    0.9% sodium chloride infusion  5 mL/hr IntraVENous CONTINUOUS    levoFLOXacin (LEVAQUIN) 750 mg in D5W IVPB  750 mg IntraVENous Q24H    amiodarone (CORDARONE) 375 mg/250 mL D5W infusion  0.5-1 mg/min IntraVENous TITRATE    glucose chewable tablet 16 g  4 Tab Oral PRN    glucagon (GLUCAGEN) injection 1 mg  1 mg IntraMUSCular PRN    dextrose 10% infusion 125-250 mL  125-250 mL IntraVENous PRN    insulin lispro (HUMALOG) injection   SubCUTAneous AC&HS    carvedilol (COREG) tablet 3.125 mg  3.125 mg Oral BID WITH MEALS    labetalol (NORMODYNE;TRANDATE) injection 10 mg  10 mg IntraVENous Q3H PRN    budesonide (PULMICORT) 500 mcg/2 ml nebulizer suspension  500 mcg Nebulization BID RT    eplerenone (INSPRA) tablet 25 mg  25 mg Oral DAILY    atorvastatin (LIPITOR) tablet 80 mg  80 mg Oral DAILY    [Held by provider] glimepiride (AMARYL) tablet 4 mg  4 mg Oral BID    [Held by provider] ALPRAZolam (XANAX) tablet 1 mg  1 mg Oral BID    clonazePAM (KlonoPIN) tablet 1 mg  1 mg Oral QHS    pantoprazole (PROTONIX) 40 mg in sodium chloride 0.9% 10 mL injection  40 mg IntraVENous DAILY    PHENYLephrine (ZAHIDA-SYNEPHRINE) 30 mg in 0.9% sodium chloride 250 mL infusion   mcg/min IntraVENous TITRATE    levETIRAcetam (KEPPRA) oral solution 1,000 mg  1,000 mg Oral Q12H    acetaminophen (TYLENOL) solution 650 mg  650 mg Per NG tube Q4H PRN    piperacillin-tazobactam (ZOSYN) 3.375 g in 0.9% sodium chloride (MBP/ADV) 100 mL  3.375 g IntraVENous Q8H    aspirin chewable tablet 81 mg  81 mg Oral DAILY    [Held by provider] pregabalin (LYRICA) capsule 50 mg  50 mg Oral TID    [Held by provider] tamsulosin (FLOMAX) capsule 0.4 mg  0.4 mg Oral ACB&D    famotidine (PEPCID) tablet 20 mg  20 mg Oral ACB&D    lactulose (CHRONULAC) 10 gram/15 mL solution 45 mL  30 g Oral TID    [Held by provider] FLUoxetine (PROzac) capsule 20 mg  20 mg Oral DAILY    QUEtiapine (SEROquel) tablet 25 mg  25 mg Oral QHS    nitroglycerin (NITROSTAT) tablet 0.4 mg  0.4 mg SubLINGual Q5MIN PRN    clopidogrel (PLAVIX) tablet 75 mg  75 mg Oral DAILY    [Held by provider] isosorbide mononitrate ER (IMDUR) tablet 30 mg  30 mg Oral DAILY    [Held by provider] lisinopril (PRINIVIL, ZESTRIL) tablet 10 mg  10 mg Oral DAILY    venlafaxine-SR (EFFEXOR-XR) capsule 150 mg  150 mg Oral DAILY AFTER BREAKFAST    [Held by provider] amLODIPine (NORVASC) tablet 2.5 mg  2.5 mg Oral DAILY    finasteride (PROSCAR) tablet 5 mg  5 mg Oral 7am    furosemide (LASIX) tablet 20 mg  20 mg Oral DAILY    potassium chloride (KLOR-CON) packet for solution 20 mEq  20 mEq Oral DAILY    sodium chloride (NS) flush 5-40 mL  5-40 mL IntraVENous Q8H    sodium chloride (NS) flush 5-40 mL  5-40 mL IntraVENous PRN       Data Reviewed:  Recent Results (from the past 12 hour(s))   GLUCOSE, POC    Collection Time: 08/25/19  5:01 PM   Result Value Ref Range    Glucose (POC) 197 (H) 65 - 100 mg/dL    Performed by Soledad Hawkins    GLUCOSE, POC    Collection Time: 08/25/19  9:16 PM   Result Value Ref Range    Glucose (POC) 154 (H) 65 - 100 mg/dL    Performed by PHIL Hurtado        Assessment:  1. VT storm  2. Acute on chronic combined systolic and diastolic CHF with EF 04-85%  3. CAD s/p remote CABG and PCI  4. Acute hypoxic respiratory failure requiring mechanical ventilation, now extubated  5. Chronic renal insufficiency  6. Respiratory alkalosis with metabolic acidosis    Plan:  EP plans ICD placement tomorrow, I discussed plans with Dr. Doris Lopez (EP)  Continue broad spectrum abx for mixed shock (now improved), can narrow abx in next few days  Appears nearly euvolemic    Signed:  Josh Ruiz.  [place] MD  Structural / [facility]

## 2019-08-26 NOTE — PROGRESS NOTES
S/p BIV-ICD implant to the left chest using transvenous leads. No complications. Full note to follow.

## 2019-08-26 NOTE — PROGRESS NOTES
2000 Received report from [facility] and assumed care. VSS, denies pain. Call bell at side. 2100 Daughter at bedside shaving patient, update provided. 2230 Complete chlorhexidine bed bath provided, tolerated well. Condom cath placed on.  2300 Placed on bipap. 0130 Condom cath slipped off, incontinence care provided, condom cath replaced. 0300 Labs drawn. 0600 VSS, denies pain, call bell at side. 0730 Bedside and Verbal shift change report given to Joy Meckel (oncoming nurse) by Farhad Patel (offgoing nurse). Report included the following information SBAR, Kardex, Intake/Output, MAR, Recent Results and Alarm Parameters .

## 2019-08-26 NOTE — DIABETES MGMT
[facility]    [facility] Consult Note    Recommendations/ Comments: Consult received for hospital BG management. BG's more stable - 152 mg/dL - 197 mg/dL yesterday with one results 235 mg/dL. He required 7 units yesterday of lispro correction  Amaryl 4 mg remains on hold  On steroids Solu Medrol 40 mg Q 12 hours  Elevated creatinine    Historically with severe hypoglycemia with results as low as in the 30's. Last episome occurred on 8/22/2019. If appropriate please consider  Add  Carb consistent to current diet order  Continue to observe BG response and po intake  Would continue to hold Amaryl at this time due to previous hypoglycemia  If BG's rise to > 180 mg/dL persistently, add it back starting at a lower dose, 1 mg    Current hospital DM medication:   Lispro normal sensitivity correction scale  Amaryl 4 mg daily - on hold    DTC will continue to follow patient as needed. ____________________________    Consult received for:   []           Hospital Medication Recommendations                 [x]           Hospital Blood Glucose Management    Chart reviewed and initial evaluation complete on Giancarlo Hare .    Patient is a 71 y.o. male with known DM on Amaryl 4 mg 1/2 tab AM and PM  Admitted V tach  Extubated 8/23/2019      A1c:   Lab Results   Component Value Date/Time    Hemoglobin A1c 6.3 08/25/2019 04:22 AM       Recent Glucose Results:   Lab Results   Component Value Date/Time     (H) 08/26/2019 03:06 AM    GLUCPOC 146 (H) 08/26/2019 06:22 AM    GLUCPOC 154 (H) 08/25/2019 09:16 PM    GLUCPOC 197 (H) 08/25/2019 05:01 PM        Lab Results   Component Value Date/Time    Creatinine 1.40 (H) 08/26/2019 03:06 AM       Active Orders   Diet    DIET CARDIAC Soft Solids        PO intake:   Patient Vitals for the past 72 hrs:   % Diet Eaten   08/24/19 0800 100 %         Thank you.   Christiano Souza RN, CDE      Time spent: 7 min

## 2019-08-26 NOTE — ROUTINE PROCESS
07:30 SBAR from [place]    10:00 Up to chair after chlorahexidine bath. No complaints    11:55 To cath lab on monitor with RN and on Amiodarone gtt. 14:21 Patient back to CCU on bedrest, sleeping, left SC dressing D & I. Ice pack applied, vitals stable. 18:00 Uneventful afternoon    19:30 Bedside shift change report given to Thania (oncoming nurse) by Jordyn Cerrato (offgoing nurse). Report included the following information SBAR, Kardex, Procedure Summary, Intake/Output, MAR, Accordion, Recent Results, Med Rec Status, Cardiac Rhythm NSR bbb pvc's and pac's, Alarm Parameters , Pre Procedure Checklist, Procedure Verification, Quality Measures and Dual Neuro Assessment.

## 2019-08-26 NOTE — INTERDISCIPLINARY ROUNDS
IDR/SLIDR Summary          Patient: Hernan Young. MRN: [id number]    Age: 71 y.o. YOB: 1955 Room/Bed: [facility]/   Admit Diagnosis: V tach (HCC) [I47.2]  VT (ventricular tachycardia) ([address] [I47.2]  Principal Diagnosis: <principal problem not specified>   Goals: stable HR/rhythm, ICD  Readmission: NO  Quality Measure: CHF  VTE Prophylaxis: Mechanical  Influenza Vaccine screening completed? YES  Pneumococcal Vaccine screening completed? NO  Mobility needs: Yes   Nutrition plan:Yes  Consults:P.T, O.T., Speech, Respiratory and Case Management    Financial concerns:Yes  Escalated to CM? YES  RRAT Score: 27   Interventions:H2H  Testing due for pt today? YES  LOS: 5 days Expected length of stay ?  days  Discharge plan: tbd   PCP: Yuan Arnold MD  Transportation needs: Yes    Days before discharge:two or more days before discharge  and longer than expected LOS  Discharge disposition: Home    Signed:     Guzman Joshi RN  8/25/2019  7:21 AM

## 2019-08-26 NOTE — PROGRESS NOTES
Clinical Pharmacy Note: IV to PO Automatic Conversion  Please note: Hernan kumar protonix has been changed from IV to PO based on the following critiera:    Patient is taking scheduled oral medications  Patient is tolerating tube feeds at goal rate or a full liquid, soft or regular diet    This IV to PO conversion is based on the [employer] approved automatic conversion policy for eligible patients. Please call with questions.
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
## 2019-08-26 NOTE — PROGRESS NOTES
1200: TRANSFER - IN REPORT:    Verbal report received from Ryan Andre, [facility] (name) on [facility].  being received from CCU (unit) for ordered procedure      Report consisted of patients Situation, Background, Assessment and   Recommendations(SBAR). Information from the following report(s) SBAR was reviewed with the receiving nurse. Opportunity for questions and clarification was provided. Assessment completed upon patients arrival to unit and care assumed. Cardiac Cath Lab Procedure Area Arrival Note:    Jean Claude Towson. arrived to Cardiac Cath Lab, Procedure Area. Patient identifiers verified with NAME and DATE OF BIRTH. Procedure verified with patient. Consent forms verified. Allergies verified. Patient informed of procedure and plan of care. Questions answered with review. Patient voiced understanding of procedure and plan of care. Patient on cardiac monitor, non-invasive blood pressure, SPO2 monitor. On O2 @ 2 lpm via NC.  IV of NS on pump at 25 ml/hr, Amio @ 0.5. Patient status doing well without problems. Patient is A&Ox 4. Patient reports no pain. Patient medicated during procedure with orders obtained and verified by Dr. Luiz Mcneill. Refer to patients Cardiac Cath Lab PROCEDURE REPORT for vital signs, assessment, status, and response during procedure, printed at end of case. Printed report on chart or scanned into chart. 1400: TRANSFER - OUT REPORT:    Verbal report given to Ryan Andre RN (name) on [facility].  being transferred to CCU(unit) for routine post - op       Report consisted of patients Situation, Background, Assessment and   Recommendations(SBAR). Information from the following report(s) SBAR, Procedure Summary and MAR was reviewed with the receiving nurse.     Lines:   Peripheral IV 08/20/19 Right Antecubital (Active)   Site Assessment Clean 8/26/2019  8:00 AM   Phlebitis Assessment 0 8/26/2019  8:00 AM   Infiltration Assessment 0 8/26/2019  8:00 AM   Dressing Status Clean, dry, & intact 8/26/2019  8:00 AM   Dressing Type Transparent 8/26/2019  8:00 AM   Hub Color/Line Status Green; Infusing 8/26/2019  8:00 AM   Action Taken Open ports on tubing capped 8/26/2019  8:00 AM   Alcohol Cap Used Yes 8/26/2019  8:00 AM       Peripheral IV 08/21/19 Right; Lower Forearm (Active)   Site Assessment Clean 8/26/2019  8:00 AM   Phlebitis Assessment 0 8/26/2019  8:00 AM   Infiltration Assessment 0 8/26/2019  8:00 AM   Dressing Status Clean, dry, & intact 8/26/2019  8:00 AM   Dressing Type Transparent 8/26/2019  8:00 AM   Hub Color/Line Status Pink 8/26/2019  8:00 AM   Action Taken Open ports on tubing capped 8/26/2019  8:00 AM   Alcohol Cap Used Yes 8/26/2019  8:00 AM       Peripheral IV 08/25/19 Right; Anterior Forearm (Active)   Site Assessment Clean 8/26/2019  8:00 AM   Phlebitis Assessment 0 8/26/2019  8:00 AM   Infiltration Assessment 0 8/26/2019  8:00 AM   Dressing Status Clean, dry, & intact 8/26/2019  8:00 AM   Dressing Type Transparent 8/26/2019  8:00 AM   Hub Color/Line Status Green; Infusing 8/26/2019  8:00 AM   Action Taken Open ports on tubing capped 8/26/2019  8:00 AM   Alcohol Cap Used Yes 8/26/2019  8:00 AM        Opportunity for questions and clarification was provided.       Patient transported with:   Monitor  O2 @ 2 liters  Registered Nurse  [facility]

## 2019-08-26 NOTE — PROGRESS NOTES
[facility] Cardiology Progress Note    Date of Service: 8/25/2019    Subjective:  No c/o this AM. Resting comfortably. No VT. NPO for ICD.     Objective:    Visit Vitals  /78   Pulse 60   Temp 97.5 °F (36.4 °C)   Resp 20   Ht 5' 9\" (1.753 m)   Wt 86.2 kg (190 lb 0.6 oz)   SpO2 97%   BMI 28.06 kg/m²     Physical Exam  GEN: NAD  HEENT: EOMI, MMM, OP clear  NECK: No JVD  CV: RRR, normal S1 and S2, no M/R/G  LUNGS: CTAB, no W/R/R  ABD: NABS, soft, NT/ND  EXT: No edema, 2+ distal pulses  PSYCH: Mood and affect normal  NEURO: AAO, MAEW, face symmetrical, speech intact    Current Facility-Administered Medications   Medication Dose Route Frequency    tamsulosin (FLOMAX) capsule 0.4 mg  0.4 mg Oral DAILY    potassium chloride SR (KLOR-CON 10) tablet 20 mEq  20 mEq Oral DAILY    levETIRAcetam (KEPPRA) tablet 1,000 mg  1,000 mg Oral BID    pantoprazole (PROTONIX) tablet 40 mg  40 mg Oral ACB    levoFLOXacin (LEVAQUIN) tablet 750 mg  750 mg Oral Q24H    carvedilol (COREG) tablet 12.5 mg  12.5 mg Oral BID WITH MEALS    lisinopril (PRINIVIL, ZESTRIL) tablet 5 mg  5 mg Oral DAILY    methylPREDNISolone (PF) (SOLU-MEDROL) injection 40 mg  40 mg IntraVENous Q12H    0.9% sodium chloride infusion  5 mL/hr IntraVENous CONTINUOUS    amiodarone (CORDARONE) 375 mg/250 mL D5W infusion  0.5-1 mg/min IntraVENous TITRATE    glucose chewable tablet 16 g  4 Tab Oral PRN    glucagon (GLUCAGEN) injection 1 mg  1 mg IntraMUSCular PRN    dextrose 10% infusion 125-250 mL  125-250 mL IntraVENous PRN    insulin lispro (HUMALOG) injection   SubCUTAneous AC&HS    labetalol (NORMODYNE;TRANDATE) injection 10 mg  10 mg IntraVENous Q3H PRN    budesonide (PULMICORT) 500 mcg/2 ml nebulizer suspension  500 mcg Nebulization BID RT    eplerenone (INSPRA) tablet 25 mg  25 mg Oral DAILY    atorvastatin (LIPITOR) tablet 80 mg  80 mg Oral DAILY    [Held by provider] glimepiride (AMARYL) tablet 4 mg  4 mg Oral BID    [Held by provider] ALPRAZolam Jeraldene Halsted) tablet 1 mg  1 mg Oral BID    clonazePAM (KlonoPIN) tablet 1 mg  1 mg Oral QHS    PHENYLephrine (ZAHIDA-SYNEPHRINE) 30 mg in 0.9% sodium chloride 250 mL infusion   mcg/min IntraVENous TITRATE    acetaminophen (TYLENOL) solution 650 mg  650 mg Per NG tube Q4H PRN    piperacillin-tazobactam (ZOSYN) 3.375 g in 0.9% sodium chloride (MBP/ADV) 100 mL  3.375 g IntraVENous Q8H    aspirin chewable tablet 81 mg  81 mg Oral DAILY    [Held by provider] pregabalin (LYRICA) capsule 50 mg  50 mg Oral TID    famotidine (PEPCID) tablet 20 mg  20 mg Oral ACB&D    lactulose (CHRONULAC) 10 gram/15 mL solution 45 mL  30 g Oral TID    [Held by provider] FLUoxetine (PROzac) capsule 20 mg  20 mg Oral DAILY    QUEtiapine (SEROquel) tablet 25 mg  25 mg Oral QHS    nitroglycerin (NITROSTAT) tablet 0.4 mg  0.4 mg SubLINGual Q5MIN PRN    clopidogrel (PLAVIX) tablet 75 mg  75 mg Oral DAILY    [Held by provider] isosorbide mononitrate ER (IMDUR) tablet 30 mg  30 mg Oral DAILY    venlafaxine-SR (EFFEXOR-XR) capsule 150 mg  150 mg Oral DAILY AFTER BREAKFAST    [Held by provider] amLODIPine (NORVASC) tablet 2.5 mg  2.5 mg Oral DAILY    finasteride (PROSCAR) tablet 5 mg  5 mg Oral 7am    furosemide (LASIX) tablet 20 mg  20 mg Oral DAILY    sodium chloride (NS) flush 5-40 mL  5-40 mL IntraVENous Q8H    sodium chloride (NS) flush 5-40 mL  5-40 mL IntraVENous PRN       Data Reviewed:  Recent Results (from the past 12 hour(s))   AMMONIA    Collection Time: 08/26/19  3:06 AM   Result Value Ref Range    Ammonia 62 (H) <32 UMOL/L   CBC W/O DIFF    Collection Time: 08/26/19  3:06 AM   Result Value Ref Range    WBC 9.0 4.1 - 11.1 K/uL    RBC 5.97 (H) 4.10 - 5.70 M/uL    HGB 12.5 12.1 - 17.0 g/dL    HCT 41.7 36.6 - 50.3 %    MCV 69.8 (L) 80.0 - 99.0 FL    MCH 20.9 (L) 26.0 - 34.0 PG    MCHC 30.0 30.0 - 36.5 g/dL    RDW 18.9 (H) 11.5 - 14.5 %    PLATELET 859 (L) 413 - 400 K/uL    NRBC 0.0 0  WBC    ABSOLUTE NRBC 0.00 0.00 - 9.94 K/uL   METABOLIC PANEL, BASIC    Collection Time: 08/26/19  3:06 AM   Result Value Ref Range    Sodium 136 136 - 145 mmol/L    Potassium 3.7 3.5 - 5.1 mmol/L    Chloride 105 97 - 108 mmol/L    CO2 21 21 - 32 mmol/L    Anion gap 10 5 - 15 mmol/L    Glucose 207 (H) 65 - 100 mg/dL    BUN 21 (H) 6 - 20 MG/DL    Creatinine 1.40 (H) 0.70 - 1.30 MG/DL    BUN/Creatinine ratio 15 12 - 20      GFR est AA >60 >60 ml/min/1.73m2    GFR est non-AA 51 (L) >60 ml/min/1.73m2    Calcium 8.4 (L) 8.5 - 10.1 MG/DL   GLUCOSE, POC    Collection Time: 08/26/19  6:22 AM   Result Value Ref Range    Glucose (POC) 146 (H) 65 - 100 mg/dL    Performed by Bean Obrien    EKG, 12 LEAD, INITIAL    Collection Time: 08/26/19  8:12 AM   Result Value Ref Range    Ventricular Rate 64 BPM    Atrial Rate 64 BPM    P-R Interval 146 ms    QRS Duration 122 ms    Q-T Interval 502 ms    QTC Calculation (Bezet) 517 ms    Calculated P Axis 39 degrees    Calculated R Axis 12 degrees    Calculated T Axis 104 degrees    Diagnosis       Sinus rhythm with sinus arrhythmia with occasional premature ventricular   complexes  Possible Left atrial enlargement  Nonspecific intraventricular conduction delay  ST & T wave abnormality, consider lateral ischemia  When compared with ECG of 20-AUG-2019 17:57,  premature ventricular complexes are now present  Minimal criteria for Septal infarct are no longer present         Assessment:  1. VT storm  2. 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 CAD s/p remote CABG and PCI  4. Acute hypoxic respiratory failure requiring mechanical ventilation, now extubated  5. Chronic renal insufficiency  6.  Respiratory alkalosis with metabolic acidosis    Plan:  NPO for ICD today  Increased BB, ACEi  On amiodarone for VT  Wean Abx as able  Likely OK for transfer to floor tomorrow  Plan for Pt/OT eval when on floor

## 2019-08-26 NOTE — PROGRESS NOTES
Clinical Pharmacy Note: Re: IV to PO Automatic Conversion - Antibiotic    Please note: Samuel kumar levaquin has been changed from IV to PO based on the following criteria:    The patient:  1. Has received IV therapy for at least 48 hours   2. Has a functioning GI tract  - Taking scheduled oral medications  - Tolerating tube feeds at goal rate or a full liquid, soft, or regular diet         3. Is clinically stable        - Temperature < 100.4F for at least 24 hours        - WBC is trending down    This IV to PO conversion is based on the P&[employer] approved automatic conversion policy for eligible patients. Please call with questions.

## 2019-08-27 LAB
ALBUMIN SERPL-MCNC: 2.7 G/DL (ref 3.5–5)
ALBUMIN/GLOB SERPL: 0.8 {RATIO} (ref 1.1–2.2)
ALP SERPL-CCNC: 68 U/L (ref 45–117)
ALT SERPL-CCNC: 20 U/L (ref 12–78)
AMMONIA PLAS-SCNC: 60 UMOL/L
ANION GAP SERPL CALC-SCNC: 7 MMOL/L (ref 5–15)
AST SERPL-CCNC: 11 U/L (ref 15–37)
ATRIAL RATE: 64 BPM
BASOPHILS # BLD: 0 K/UL (ref 0–0.1)
BASOPHILS NFR BLD: 0 % (ref 0–1)
BILIRUB SERPL-MCNC: 0.6 MG/DL (ref 0.2–1)
BUN SERPL-MCNC: 27 MG/DL (ref 6–20)
BUN/CREAT SERPL: 19 (ref 12–20)
CALCIUM SERPL-MCNC: 8.2 MG/DL (ref 8.5–10.1)
CALCULATED P AXIS, ECG09: 39 DEGREES
CALCULATED R AXIS, ECG10: 12 DEGREES
CALCULATED T AXIS, ECG11: 104 DEGREES
CHLORIDE SERPL-SCNC: 106 MMOL/L (ref 97–108)
CO2 SERPL-SCNC: 22 MMOL/L (ref 21–32)
CREAT SERPL-MCNC: 1.41 MG/DL (ref 0.7–1.3)
DIAGNOSIS, 93000: NORMAL
DIFFERENTIAL METHOD BLD: ABNORMAL
EOSINOPHIL # BLD: 0 K/UL (ref 0–0.4)
EOSINOPHIL NFR BLD: 0 % (ref 0–7)
ERYTHROCYTE [DISTWIDTH] IN BLOOD BY AUTOMATED COUNT: 19.4 % (ref 11.5–14.5)
GLOBULIN SER CALC-MCNC: 3.6 G/DL (ref 2–4)
GLUCOSE BLD STRIP.AUTO-MCNC: 138 MG/DL (ref 65–100)
GLUCOSE BLD STRIP.AUTO-MCNC: 157 MG/DL (ref 65–100)
GLUCOSE BLD STRIP.AUTO-MCNC: 171 MG/DL (ref 65–100)
GLUCOSE BLD STRIP.AUTO-MCNC: 190 MG/DL (ref 65–100)
GLUCOSE SERPL-MCNC: 166 MG/DL (ref 65–100)
HCT VFR BLD AUTO: 45.2 % (ref 36.6–50.3)
HGB BLD-MCNC: 12.8 G/DL (ref 12.1–17)
IMM GRANULOCYTES # BLD AUTO: 0.1 K/UL (ref 0–0.04)
IMM GRANULOCYTES NFR BLD AUTO: 1 % (ref 0–0.5)
LYMPHOCYTES # BLD: 0.8 K/UL (ref 0.8–3.5)
LYMPHOCYTES NFR BLD: 7 % (ref 12–49)
MAGNESIUM SERPL-MCNC: 2.2 MG/DL (ref 1.6–2.4)
MCH RBC QN AUTO: 20.6 PG (ref 26–34)
MCHC RBC AUTO-ENTMCNC: 28.3 G/DL (ref 30–36.5)
MCV RBC AUTO: 72.9 FL (ref 80–99)
MONOCYTES # BLD: 0.6 K/UL (ref 0–1)
MONOCYTES NFR BLD: 5 % (ref 5–13)
NEUTS SEG # BLD: 9.9 K/UL (ref 1.8–8)
NEUTS SEG NFR BLD: 87 % (ref 32–75)
NRBC # BLD: 0 K/UL (ref 0–0.01)
NRBC BLD-RTO: 0 PER 100 WBC
P-R INTERVAL, ECG05: 146 MS
PLATELET # BLD AUTO: 133 K/UL (ref 150–400)
POTASSIUM SERPL-SCNC: 3.8 MMOL/L (ref 3.5–5.1)
PROT SERPL-MCNC: 6.3 G/DL (ref 6.4–8.2)
Q-T INTERVAL, ECG07: 502 MS
QRS DURATION, ECG06: 122 MS
QTC CALCULATION (BEZET), ECG08: 517 MS
RBC # BLD AUTO: 6.2 M/UL (ref 4.1–5.7)
RBC MORPH BLD: ABNORMAL
SERVICE CMNT-IMP: ABNORMAL
SODIUM SERPL-SCNC: 135 MMOL/L (ref 136–145)
VENTRICULAR RATE, ECG03: 64 BPM
WBC # BLD AUTO: 11.4 K/UL (ref 4.1–11.1)

## 2019-08-27 PROCEDURE — 82962 GLUCOSE BLOOD TEST: CPT

## 2019-08-27 PROCEDURE — 94640 AIRWAY INHALATION TREATMENT: CPT

## 2019-08-27 PROCEDURE — 83735 ASSAY OF MAGNESIUM: CPT

## 2019-08-27 PROCEDURE — 74011250637 HC RX REV CODE- 250/637: Performed by: INTERNAL MEDICINE

## 2019-08-27 PROCEDURE — 80053 COMPREHEN METABOLIC PANEL: CPT

## 2019-08-27 PROCEDURE — 82140 ASSAY OF AMMONIA: CPT

## 2019-08-27 PROCEDURE — 74011250636 HC RX REV CODE- 250/636: Performed by: INTERNAL MEDICINE

## 2019-08-27 PROCEDURE — 85025 COMPLETE CBC W/AUTO DIFF WBC: CPT

## 2019-08-27 PROCEDURE — 97161 PT EVAL LOW COMPLEX 20 MIN: CPT

## 2019-08-27 PROCEDURE — 74011000258 HC RX REV CODE- 258: Performed by: INTERNAL MEDICINE

## 2019-08-27 PROCEDURE — 74011636637 HC RX REV CODE- 636/637: Performed by: INTERNAL MEDICINE

## 2019-08-27 PROCEDURE — 36415 COLL VENOUS BLD VENIPUNCTURE: CPT

## 2019-08-27 PROCEDURE — 74011000250 HC RX REV CODE- 250: Performed by: INTERNAL MEDICINE

## 2019-08-27 PROCEDURE — 97116 GAIT TRAINING THERAPY: CPT

## 2019-08-27 PROCEDURE — 65660000001 HC RM ICU INTERMED STEPDOWN

## 2019-08-27 RX ORDER — PREDNISONE 10 MG/1
10 TABLET ORAL
Status: COMPLETED | OUTPATIENT
Start: 2019-08-28 | End: 2019-08-28

## 2019-08-27 RX ADMIN — INSULIN LISPRO 2 UNITS: 100 INJECTION, SOLUTION INTRAVENOUS; SUBCUTANEOUS at 18:02

## 2019-08-27 RX ADMIN — CARVEDILOL 12.5 MG: 12.5 TABLET, FILM COATED ORAL at 16:09

## 2019-08-27 RX ADMIN — INSULIN LISPRO 2 UNITS: 100 INJECTION, SOLUTION INTRAVENOUS; SUBCUTANEOUS at 12:39

## 2019-08-27 RX ADMIN — METHYLPREDNISOLONE SODIUM SUCCINATE 40 MG: 40 INJECTION, POWDER, FOR SOLUTION INTRAMUSCULAR; INTRAVENOUS at 08:19

## 2019-08-27 RX ADMIN — Medication 10 ML: at 21:18

## 2019-08-27 RX ADMIN — EPLERENONE 25 MG: 25 TABLET, FILM COATED ORAL at 08:19

## 2019-08-27 RX ADMIN — Medication 10 ML: at 14:12

## 2019-08-27 RX ADMIN — BUDESONIDE 500 MCG: 0.5 INHALANT RESPIRATORY (INHALATION) at 08:38

## 2019-08-27 RX ADMIN — LACTULOSE 45 ML: 20 SOLUTION ORAL at 21:16

## 2019-08-27 RX ADMIN — ATORVASTATIN CALCIUM 80 MG: 40 TABLET, FILM COATED ORAL at 08:19

## 2019-08-27 RX ADMIN — FUROSEMIDE 20 MG: 40 TABLET ORAL at 08:20

## 2019-08-27 RX ADMIN — FINASTERIDE 5 MG: 5 TABLET, FILM COATED ORAL at 08:19

## 2019-08-27 RX ADMIN — LEVETIRACETAM 1000 MG: 500 TABLET, FILM COATED ORAL at 21:17

## 2019-08-27 RX ADMIN — AMLODIPINE BESYLATE 2.5 MG: 5 TABLET ORAL at 10:40

## 2019-08-27 RX ADMIN — ASPIRIN 81 MG CHEWABLE TABLET 81 MG: 81 TABLET CHEWABLE at 08:18

## 2019-08-27 RX ADMIN — Medication 10 ML: at 21:17

## 2019-08-27 RX ADMIN — LEVETIRACETAM 1000 MG: 500 TABLET, FILM COATED ORAL at 08:19

## 2019-08-27 RX ADMIN — AMIODARONE HYDROCHLORIDE 400 MG: 200 TABLET ORAL at 21:17

## 2019-08-27 RX ADMIN — CLOPIDOGREL BISULFATE 75 MG: 75 TABLET, FILM COATED ORAL at 08:19

## 2019-08-27 RX ADMIN — CLONAZEPAM 1 MG: 0.5 TABLET ORAL at 21:17

## 2019-08-27 RX ADMIN — VENLAFAXINE HYDROCHLORIDE 150 MG: 150 CAPSULE, EXTENDED RELEASE ORAL at 08:19

## 2019-08-27 RX ADMIN — ISOSORBIDE MONONITRATE 30 MG: 30 TABLET ORAL at 10:40

## 2019-08-27 RX ADMIN — QUETIAPINE FUMARATE 25 MG: 25 TABLET ORAL at 21:17

## 2019-08-27 RX ADMIN — PIPERACILLIN SODIUM,TAZOBACTAM SODIUM 3.38 G: 3; .375 INJECTION, POWDER, FOR SOLUTION INTRAVENOUS at 23:42

## 2019-08-27 RX ADMIN — LACTULOSE 45 ML: 20 SOLUTION ORAL at 16:10

## 2019-08-27 RX ADMIN — PIPERACILLIN SODIUM,TAZOBACTAM SODIUM 3.38 G: 3; .375 INJECTION, POWDER, FOR SOLUTION INTRAVENOUS at 06:35

## 2019-08-27 RX ADMIN — FAMOTIDINE 20 MG: 20 TABLET ORAL at 06:36

## 2019-08-27 RX ADMIN — LISINOPRIL 5 MG: 5 TABLET ORAL at 08:18

## 2019-08-27 RX ADMIN — PANTOPRAZOLE SODIUM 40 MG: 40 TABLET, DELAYED RELEASE ORAL at 06:35

## 2019-08-27 RX ADMIN — POTASSIUM CHLORIDE 20 MEQ: 750 TABLET, EXTENDED RELEASE ORAL at 08:19

## 2019-08-27 RX ADMIN — PIPERACILLIN SODIUM,TAZOBACTAM SODIUM 3.38 G: 3; .375 INJECTION, POWDER, FOR SOLUTION INTRAVENOUS at 14:11

## 2019-08-27 RX ADMIN — LACTULOSE 45 ML: 20 SOLUTION ORAL at 08:19

## 2019-08-27 RX ADMIN — AMIODARONE HYDROCHLORIDE 400 MG: 200 TABLET ORAL at 08:18

## 2019-08-27 RX ADMIN — Medication 10 ML: at 08:21

## 2019-08-27 RX ADMIN — BUDESONIDE 500 MCG: 0.5 INHALANT RESPIRATORY (INHALATION) at 19:23

## 2019-08-27 RX ADMIN — CARVEDILOL 12.5 MG: 12.5 TABLET, FILM COATED ORAL at 08:18

## 2019-08-27 RX ADMIN — FAMOTIDINE 20 MG: 20 TABLET ORAL at 16:09

## 2019-08-27 RX ADMIN — AMIODARONE HYDROCHLORIDE 400 MG: 200 TABLET ORAL at 16:09

## 2019-08-27 RX ADMIN — TAMSULOSIN HYDROCHLORIDE 0.4 MG: 0.4 CAPSULE ORAL at 08:18

## 2019-08-27 NOTE — PROGRESS NOTES
1930: report received from [facility]. 2100: Patient given CHG bath.   0600: Patient complaining shoulder sore. Ice pack reapplied tylenol given. 0730: Bedside and Verbal shift change report given to [facility] (oncoming nurse) by Eduard Yousif (offgoing nurse). Report included the following information SBAR, Kardex, Procedure Summary, Intake/Output, MAR, Recent Results, Cardiac Rhythm paced SR  and Alarm Parameters .

## 2019-08-27 NOTE — PROGRESS NOTES
SPEECH THERAPY SCREENING:  SERVICES ARE NOT INDICATED AT THIS TIME    [facility] therapy based on results obtained during the nursing admission assessment. The patients chart was reviewed and the patient is not appropriate for a skilled therapy evaluation at this time unless he is not tolerating his diet. Please consult speech therapy if any therapy needs arise. Thank you.     Jens Saenz, SLP

## 2019-08-27 NOTE — PROGRESS NOTES
S Cardiology Progress Note    Date of Service: 8/25/2019    Subjective:  ICD went well.  JAVED, no c/o this AM    Objective:    Visit Vitals  /81   Pulse 65   Temp 97.6 °F (36.4 °C)   Resp 20   Ht 5' 9\" (1.753 m)   Wt 86.6 kg (190 lb 14.7 oz)   SpO2 95%   BMI 28.19 kg/m²     Physical Exam  GEN: NAD  HEENT: EOMI, MMM, OP clear  NECK: No JVD  CV: RRR, normal S1 and S2, no M/R/G  Chest: ICD dsg c/d/i  LUNGS: CTAB, no W/R/R  ABD: NABS, soft, NT/ND  EXT: No edema, 2+ distal pulses  PSYCH: Mood and affect normal  NEURO: AAO, MAEW, face symmetrical, speech intact    Current Facility-Administered Medications   Medication Dose Route Frequency    tamsulosin (FLOMAX) capsule 0.4 mg  0.4 mg Oral DAILY    potassium chloride SR (KLOR-CON 10) tablet 20 mEq  20 mEq Oral DAILY    levETIRAcetam (KEPPRA) tablet 1,000 mg  1,000 mg Oral BID    pantoprazole (PROTONIX) tablet 40 mg  40 mg Oral ACB    levoFLOXacin (LEVAQUIN) tablet 750 mg  750 mg Oral Q24H    carvedilol (COREG) tablet 12.5 mg  12.5 mg Oral BID WITH MEALS    lisinopril (PRINIVIL, ZESTRIL) tablet 5 mg  5 mg Oral DAILY    sodium chloride (NS) flush 5-40 mL  5-40 mL IntraVENous Q8H    sodium chloride (NS) flush 5-40 mL  5-40 mL IntraVENous PRN    amiodarone (CORDARONE) tablet 400 mg  400 mg Oral TID    methylPREDNISolone (PF) (SOLU-MEDROL) injection 40 mg  40 mg IntraVENous Q12H    0.9% sodium chloride infusion  5 mL/hr IntraVENous CONTINUOUS    glucose chewable tablet 16 g  4 Tab Oral PRN    glucagon (GLUCAGEN) injection 1 mg  1 mg IntraMUSCular PRN    dextrose 10% infusion 125-250 mL  125-250 mL IntraVENous PRN    insulin lispro (HUMALOG) injection   SubCUTAneous AC&HS    labetalol (NORMODYNE;TRANDATE) injection 10 mg  10 mg IntraVENous Q3H PRN    budesonide (PULMICORT) 500 mcg/2 ml nebulizer suspension  500 mcg Nebulization BID RT    eplerenone (INSPRA) tablet 25 mg  25 mg Oral DAILY    atorvastatin (LIPITOR) tablet 80 mg  80 mg Oral DAILY    [Held by provider] glimepiride (AMARYL) tablet 4 mg  4 mg Oral BID    [Held by provider] ALPRAZolam (XANAX) tablet 1 mg  1 mg Oral BID    clonazePAM (KlonoPIN) tablet 1 mg  1 mg Oral QHS    acetaminophen (TYLENOL) solution 650 mg  650 mg Per NG tube Q4H PRN    piperacillin-tazobactam (ZOSYN) 3.375 g in 0.9% sodium chloride (MBP/ADV) 100 mL  3.375 g IntraVENous Q8H    aspirin chewable tablet 81 mg  81 mg Oral DAILY    [Held by provider] pregabalin (LYRICA) capsule 50 mg  50 mg Oral TID    famotidine (PEPCID) tablet 20 mg  20 mg Oral ACB&D    lactulose (CHRONULAC) 10 gram/15 mL solution 45 mL  30 g Oral TID    QUEtiapine (SEROquel) tablet 25 mg  25 mg Oral QHS    nitroglycerin (NITROSTAT) tablet 0.4 mg  0.4 mg SubLINGual Q5MIN PRN    clopidogrel (PLAVIX) tablet 75 mg  75 mg Oral DAILY    isosorbide mononitrate ER (IMDUR) tablet 30 mg  30 mg Oral DAILY    venlafaxine-SR (EFFEXOR-XR) capsule 150 mg  150 mg Oral DAILY AFTER BREAKFAST    amLODIPine (NORVASC) tablet 2.5 mg  2.5 mg Oral DAILY    finasteride (PROSCAR) tablet 5 mg  5 mg Oral 7am    furosemide (LASIX) tablet 20 mg  20 mg Oral DAILY    sodium chloride (NS) flush 5-40 mL  5-40 mL IntraVENous Q8H    sodium chloride (NS) flush 5-40 mL  5-40 mL IntraVENous PRN       Data Reviewed:  Recent Results (from the past 12 hour(s))   AMMONIA    Collection Time: 08/27/19  4:36 AM   Result Value Ref Range    Ammonia 60 (H) <96 UMOL/L   METABOLIC PANEL, COMPREHENSIVE    Collection Time: 08/27/19  4:36 AM   Result Value Ref Range    Sodium 135 (L) 136 - 145 mmol/L    Potassium 3.8 3.5 - 5.1 mmol/L    Chloride 106 97 - 108 mmol/L    CO2 22 21 - 32 mmol/L    Anion gap 7 5 - 15 mmol/L    Glucose 166 (H) 65 - 100 mg/dL    BUN 27 (H) 6 - 20 MG/DL    Creatinine 1.41 (H) 0.70 - 1.30 MG/DL    BUN/Creatinine ratio 19 12 - 20      GFR est AA >60 >60 ml/min/1.73m2    GFR est non-AA 50 (L) >60 ml/min/1.73m2    Calcium 8.2 (L) 8.5 - 10.1 MG/DL    Bilirubin, total 0.6 0.2 - 1.0 MG/DL    ALT (SGPT) 20 12 - 78 U/L    AST (SGOT) 11 (L) 15 - 37 U/L    Alk. phosphatase 68 45 - 117 U/L    Protein, total 6.3 (L) 6.4 - 8.2 g/dL    Albumin 2.7 (L) 3.5 - 5.0 g/dL    Globulin 3.6 2.0 - 4.0 g/dL    A-G Ratio 0.8 (L) 1.1 - 2.2     MAGNESIUM    Collection Time: 08/27/19  4:36 AM   Result Value Ref Range    Magnesium 2.2 1.6 - 2.4 mg/dL   CBC WITH AUTOMATED DIFF    Collection Time: 08/27/19  4:36 AM   Result Value Ref Range    WBC 11.4 (H) 4.1 - 11.1 K/uL    RBC 6.20 (H) 4.10 - 5.70 M/uL    HGB 12.8 12.1 - 17.0 g/dL    HCT 45.2 36.6 - 50.3 %    MCV 72.9 (L) 80.0 - 99.0 FL    MCH 20.6 (L) 26.0 - 34.0 PG    MCHC 28.3 (L) 30.0 - 36.5 g/dL    RDW 19.4 (H) 11.5 - 14.5 %    PLATELET 420 (L) 508 - 400 K/uL    NRBC 0.0 0  WBC    ABSOLUTE NRBC 0.00 0.00 - 0.01 K/uL    NEUTROPHILS 87 (H) 32 - 75 %    LYMPHOCYTES 7 (L) 12 - 49 %    MONOCYTES 5 5 - 13 %    EOSINOPHILS 0 0 - 7 %    BASOPHILS 0 0 - 1 %    IMMATURE GRANULOCYTES 1 (H) 0.0 - 0.5 %    ABS. NEUTROPHILS 9.9 (H) 1.8 - 8.0 K/UL    ABS. LYMPHOCYTES 0.8 0.8 - 3.5 K/UL    ABS. MONOCYTES 0.6 0.0 - 1.0 K/UL    ABS. EOSINOPHILS 0.0 0.0 - 0.4 K/UL    ABS. BASOPHILS 0.0 0.0 - 0.1 K/UL    ABS. IMM. GRANS. 0.1 (H) 0.00 - 0.04 K/UL    DF SMEAR SCANNED      RBC COMMENTS ANISOCYTOSIS  1+        RBC COMMENTS MICROCYTOSIS  1+        RBC COMMENTS HYPOCHROMIA  3+        RBC COMMENTS OVALOCYTES  PRESENT       GLUCOSE, POC    Collection Time: 08/27/19  6:34 AM   Result Value Ref Range    Glucose (POC) 138 (H) 65 - 100 mg/dL    Performed by Ale Huddleston        Assessment:  1. VT storm  2. Acute on chronic combined systolic and diastolic CHF with EF 04-57%  3. CAD s/p remote CABG and PCI  4. Acute hypoxic respiratory failure requiring mechanical ventilation, now extubated  5. Chronic renal insufficiency  6.  Respiratory alkalosis with metabolic acidosis    Plan:  Restarted home nitrate, CCB  OK for transfer to floor  PT consult  Should be off Abx tomorrow  Holding some psych meds 2/2 QTc and VT
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
## 2019-08-27 NOTE — ROUTINE PROCESS
07[facility]    10:00 Up to chair with assist x 1, no complaints    10:45 TRANSFER - OUT REPORT:    Verbal report given to Manas(name) on Nan Ryan.  being transferred to [facility](unit) for routine progression of care       Report consisted of patients Situation, Background, Assessment and   Recommendations(SBAR). Information from the following report(s) Kardex, Procedure Summary, Intake/Output, MAR, Accordion, Recent Results, Med Rec Status, Cardiac Rhythm NSR BBB occ pvc, occ pac, Alarm Parameters , Pre Procedure Checklist, Procedure Verification, Quality Measures and Dual Neuro Assessment was reviewed with the receiving nurse. Lines:   Peripheral IV 08/20/19 Right Antecubital (Active)   Site Assessment Clean, dry, & intact 8/27/2019  8:00 AM   Phlebitis Assessment 0 8/27/2019  8:00 AM   Infiltration Assessment 0 8/27/2019  8:00 AM   Dressing Status Clean, dry, & intact 8/27/2019  8:00 AM   Dressing Type Bacteriocidal;Transparent 8/27/2019  8:00 AM   Hub Color/Line Status Green 8/27/2019  8:00 AM   Action Taken Open ports on tubing capped 8/27/2019  8:00 AM   Alcohol Cap Used Yes 8/27/2019  8:00 AM       Peripheral IV 08/21/19 Right; Lower Forearm (Active)   Site Assessment Clean, dry, & intact 8/27/2019  8:00 AM   Phlebitis Assessment 0 8/27/2019  8:00 AM   Infiltration Assessment 0 8/27/2019  8:00 AM   Dressing Status Clean, dry, & intact 8/27/2019  8:00 AM   Dressing Type Bacteriocidal;Transparent 8/27/2019  8:00 AM   Hub Color/Line Status Pink 8/27/2019  8:00 AM   Action Taken Open ports on tubing capped 8/27/2019  8:00 AM   Alcohol Cap Used Yes 8/27/2019  8:00 AM       Peripheral IV 08/25/19 Right; Anterior Forearm (Active)   Site Assessment Clean, dry, & intact 8/27/2019  8:00 AM   Phlebitis Assessment 0 8/27/2019  8:00 AM   Infiltration Assessment 0 8/27/2019  8:00 AM   Dressing Status Clean, dry, & intact 8/27/2019  8:00 AM   Dressing Type Bacteriocidal;Transparent 8/27/2019  8:00 AM   Hub Color/Line Status Green 8/27/2019  8:00 AM   Action Taken Open ports on tubing capped 8/27/2019  8:00 AM   Alcohol Cap Used Yes 8/27/2019  8:00 AM        Opportunity for questions and clarification was provided.       Patient transported with:   Registered Nurse  Tech

## 2019-08-27 NOTE — DISCHARGE INSTRUCTIONS
DISCHARGE INSTRUCTIONS FOR PATIENTS WITH ICD'S    You had a [employer] BIV-ICD implant placed by Dr. Toan Denton on 8/26 due to ventricular tachycardia requiring shock treatment in the emergency room in the setting of a weak heart. 1. Remember to call for an appointment in 2-4 weeks [phone] to check healing and implant programming with Dr. Bc Stephens nurse, Autumn Lopez. After that, if no issues, you can continue your follow-up with your cardiologist, Dr. Pawan Grigsby.  2. [place] are available from your pharmacist to wear at all times if you choose to wear one. 3. Carry your ID card for your ICD with you at all times. This card will be given to you in the hospital or mailed to you. 4. The ICD will bulge slightly under your skin. The bulge will decrease in size over the next few weeks. Please notify the doctor's office if you notice any of the following around your ICD site:   A.  A bruise that does not go away. B.  Soreness or yellow, green, or brown drainage from the site. C. Any swelling from the site. D. If you have a fever of 100 degrees or higher that lasts for a few days. INCISION CARE       1.  Leave the skin glue over your site until it dissolves on its own, usually in a few weeks. 2.  You may shower after 3 days as long as your incision isnt submerged or directly sprayed upon until well healed. 3.  For comfort, wear loose fitting clothing. 4.  Report any signs of infection, fever, pain, swelling, redness, oozing, or heat at site especially if these symptoms increase after the first 3 to 4 days. ACTIVITY PRECAUTIONS     1. Avoid rough contact with the implant site. 2. No driving for 14 days. 3. Avoid lifting your arm over your head, carrying anything on the affected side, or lifting over 10 pounds for 30 days. For the first 2 days only bend your arm at the elbow.   4. Any extreme activity such as golf, weight lifting or exercise biking should be restricted for 60 days. 5. Do not carry objects by holding them against your implant site. 6.  No shooting rifles or any type of gun with the affected shoulder permanently. 7.  Welding and chainsaws are prohibited. SPECIAL PRECAUTIONS     1. You should avoid all strong magnetic fields, such as arc welding, large transformers, large motors. Some ICD devices will beep if it detects a strong magnet. If this occurs, move out of the area. 2.  You may not have an MRI which uses a strong magnet to take pictures unless given the OK by a cardiologist.  3.  Treatments or surgery that requires diathermy or electrocautery should be discussed with your doctor before scheduled. 4.  Avoid radio frequency transmitters, including radar. 5.  Advise dentist or other medical personnel you see that you have an ICD. 6.  Cell phones and microwave oven use is okay. 7.  If you plan to move or take a trip to a new area, the doctor's office will give you a name of a doctor to contact for any problems. SPECIAL INSTRUCTIONS ON SHOCKS     1. Notify your doctor for any of the following:       A. Anytime a shock is received in a 24 hour period. An office visit is not usually required for a single shock. B.  Two or more shocks in a row. If you do not feel well, call the [facility], otherwise call your doctor. This may require an office visit. C. Two or more shocks spaced apart by several hours. This may require an office visit. 2.  Keep a record of events. Include date, time, symptoms and activity at that time. ANTIBIOTIC THERAPY    During the first 8 weeks after your ICD insertion, you may need antibiotics before any dental work or certain tests or operations. Let the dentist or doctor who is caring for you know that you have had an implanted device.
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
## 2019-08-27 NOTE — NURSE NAVIGATOR
Chart reviewed by Heart Failure Nurse Navigator. Heart Failure database completed. EF:  16/20      ACEi/ARB/ARNi: Lisinopril    BB: Coreg    Aldosterone Antagonist: Inspra    Obstructive Sleep Apnea Screening:  Yes/CPAP   STOP-BANG score:   Referred to Sleep Medicine:     CRT ICD    NYHA Functional Class requested     Heart Failure Teach Back in Patient Education. Heart Failure Avoiding Triggers on Discharge Instructions. Cardiologist: BECKY      Post discharge follow up phone call to be made within 48-72 hours of discharge.       Medicare Heart Failure Bundle

## 2019-08-27 NOTE — PROGRESS NOTES
TRANSFER - IN REPORT:    Verbal report received from [facility]) on Camila Madsen.  being received from CCU(unit) for routine progression of care      Report consisted of patients Situation, Background, Assessment and   Recommendations(SBAR). Information from the following report(s) SBAR, Kardex, Procedure Summary, Intake/Output, MAR, Recent Results and Cardiac Rhythm NSR was reviewed with the receiving nurse. Opportunity for questions and clarification was provided. Assessment completed upon patients arrival to unit and care assumed. 1100: Pt placed on tele. Vitals done. Pt up in chair watching TV. Bedside and Verbal shift change report given to Mariah Dahl (oncoming nurse) by Thalia Shrestha RN (offgoing nurse). Report included the following information SBAR, Kardex, Procedure Summary, Intake/Output, MAR, Recent Results and Cardiac Rhythm NSR.

## 2019-08-28 LAB
AMMONIA PLAS-SCNC: 38 UMOL/L
ANION GAP SERPL CALC-SCNC: 10 MMOL/L (ref 5–15)
BUN SERPL-MCNC: 33 MG/DL (ref 6–20)
BUN/CREAT SERPL: 22 (ref 12–20)
CALCIUM SERPL-MCNC: 8 MG/DL (ref 8.5–10.1)
CHLORIDE SERPL-SCNC: 103 MMOL/L (ref 97–108)
CO2 SERPL-SCNC: 24 MMOL/L (ref 21–32)
CREAT SERPL-MCNC: 1.5 MG/DL (ref 0.7–1.3)
GLUCOSE BLD STRIP.AUTO-MCNC: 134 MG/DL (ref 65–100)
GLUCOSE BLD STRIP.AUTO-MCNC: 139 MG/DL (ref 65–100)
GLUCOSE BLD STRIP.AUTO-MCNC: 164 MG/DL (ref 65–100)
GLUCOSE BLD STRIP.AUTO-MCNC: 183 MG/DL (ref 65–100)
GLUCOSE BLD STRIP.AUTO-MCNC: 198 MG/DL (ref 65–100)
GLUCOSE BLD STRIP.AUTO-MCNC: 198 MG/DL (ref 65–100)
GLUCOSE SERPL-MCNC: 132 MG/DL (ref 65–100)
MAGNESIUM SERPL-MCNC: 2.2 MG/DL (ref 1.6–2.4)
POTASSIUM SERPL-SCNC: 3.2 MMOL/L (ref 3.5–5.1)
SERVICE CMNT-IMP: ABNORMAL
SODIUM SERPL-SCNC: 137 MMOL/L (ref 136–145)

## 2019-08-28 PROCEDURE — 65660000001 HC RM ICU INTERMED STEPDOWN

## 2019-08-28 PROCEDURE — 83735 ASSAY OF MAGNESIUM: CPT

## 2019-08-28 PROCEDURE — 74011636637 HC RX REV CODE- 636/637: Performed by: INTERNAL MEDICINE

## 2019-08-28 PROCEDURE — 74011250637 HC RX REV CODE- 250/637: Performed by: INTERNAL MEDICINE

## 2019-08-28 PROCEDURE — 82140 ASSAY OF AMMONIA: CPT

## 2019-08-28 PROCEDURE — 36415 COLL VENOUS BLD VENIPUNCTURE: CPT

## 2019-08-28 PROCEDURE — 65660000000 HC RM CCU STEPDOWN

## 2019-08-28 PROCEDURE — 97116 GAIT TRAINING THERAPY: CPT

## 2019-08-28 PROCEDURE — 74011000250 HC RX REV CODE- 250: Performed by: INTERNAL MEDICINE

## 2019-08-28 PROCEDURE — 94640 AIRWAY INHALATION TREATMENT: CPT

## 2019-08-28 PROCEDURE — 74011250636 HC RX REV CODE- 250/636: Performed by: INTERNAL MEDICINE

## 2019-08-28 PROCEDURE — 97166 OT EVAL MOD COMPLEX 45 MIN: CPT

## 2019-08-28 PROCEDURE — 80048 BASIC METABOLIC PNL TOTAL CA: CPT

## 2019-08-28 PROCEDURE — 82962 GLUCOSE BLOOD TEST: CPT

## 2019-08-28 PROCEDURE — 97530 THERAPEUTIC ACTIVITIES: CPT

## 2019-08-28 PROCEDURE — 94760 N-INVAS EAR/PLS OXIMETRY 1: CPT

## 2019-08-28 PROCEDURE — 74011000258 HC RX REV CODE- 258: Performed by: INTERNAL MEDICINE

## 2019-08-28 PROCEDURE — 77030029684 HC NEB SM VOL KT MONA -A

## 2019-08-28 PROCEDURE — 97535 SELF CARE MNGMENT TRAINING: CPT

## 2019-08-28 RX ORDER — AMIODARONE HYDROCHLORIDE 200 MG/1
400 TABLET ORAL 2 TIMES DAILY
Status: DISCONTINUED | OUTPATIENT
Start: 2019-08-28 | End: 2019-09-03

## 2019-08-28 RX ADMIN — LISINOPRIL 5 MG: 5 TABLET ORAL at 09:13

## 2019-08-28 RX ADMIN — VENLAFAXINE HYDROCHLORIDE 150 MG: 150 CAPSULE, EXTENDED RELEASE ORAL at 09:12

## 2019-08-28 RX ADMIN — CARVEDILOL 12.5 MG: 12.5 TABLET, FILM COATED ORAL at 09:12

## 2019-08-28 RX ADMIN — FAMOTIDINE 20 MG: 20 TABLET ORAL at 07:14

## 2019-08-28 RX ADMIN — PREDNISONE 10 MG: 10 TABLET ORAL at 09:13

## 2019-08-28 RX ADMIN — PIPERACILLIN SODIUM,TAZOBACTAM SODIUM 3.38 G: 3; .375 INJECTION, POWDER, FOR SOLUTION INTRAVENOUS at 07:14

## 2019-08-28 RX ADMIN — CARVEDILOL 12.5 MG: 12.5 TABLET, FILM COATED ORAL at 16:29

## 2019-08-28 RX ADMIN — INSULIN LISPRO 2 UNITS: 100 INJECTION, SOLUTION INTRAVENOUS; SUBCUTANEOUS at 13:01

## 2019-08-28 RX ADMIN — Medication 10 ML: at 13:02

## 2019-08-28 RX ADMIN — Medication 10 ML: at 07:15

## 2019-08-28 RX ADMIN — LACTULOSE 45 ML: 20 SOLUTION ORAL at 09:11

## 2019-08-28 RX ADMIN — FINASTERIDE 5 MG: 5 TABLET, FILM COATED ORAL at 07:14

## 2019-08-28 RX ADMIN — FAMOTIDINE 20 MG: 20 TABLET ORAL at 16:29

## 2019-08-28 RX ADMIN — ASPIRIN 81 MG CHEWABLE TABLET 81 MG: 81 TABLET CHEWABLE at 09:11

## 2019-08-28 RX ADMIN — ATORVASTATIN CALCIUM 80 MG: 40 TABLET, FILM COATED ORAL at 09:11

## 2019-08-28 RX ADMIN — POTASSIUM CHLORIDE 20 MEQ: 750 TABLET, EXTENDED RELEASE ORAL at 09:12

## 2019-08-28 RX ADMIN — ACETAMINOPHEN 650 MG: 650 SOLUTION ORAL at 04:45

## 2019-08-28 RX ADMIN — TAMSULOSIN HYDROCHLORIDE 0.4 MG: 0.4 CAPSULE ORAL at 09:13

## 2019-08-28 RX ADMIN — ISOSORBIDE MONONITRATE 30 MG: 30 TABLET ORAL at 09:12

## 2019-08-28 RX ADMIN — CLONAZEPAM 1 MG: 0.5 TABLET ORAL at 22:01

## 2019-08-28 RX ADMIN — AMIODARONE HYDROCHLORIDE 400 MG: 200 TABLET ORAL at 09:12

## 2019-08-28 RX ADMIN — BUDESONIDE 500 MCG: 0.5 INHALANT RESPIRATORY (INHALATION) at 19:19

## 2019-08-28 RX ADMIN — EPLERENONE 25 MG: 25 TABLET, FILM COATED ORAL at 09:12

## 2019-08-28 RX ADMIN — LACTULOSE 45 ML: 20 SOLUTION ORAL at 22:01

## 2019-08-28 RX ADMIN — QUETIAPINE FUMARATE 25 MG: 25 TABLET ORAL at 22:01

## 2019-08-28 RX ADMIN — Medication 10 ML: at 22:05

## 2019-08-28 RX ADMIN — AMIODARONE HYDROCHLORIDE 400 MG: 200 TABLET ORAL at 18:11

## 2019-08-28 RX ADMIN — INSULIN LISPRO 2 UNITS: 100 INJECTION, SOLUTION INTRAVENOUS; SUBCUTANEOUS at 16:39

## 2019-08-28 RX ADMIN — BUDESONIDE 500 MCG: 0.5 INHALANT RESPIRATORY (INHALATION) at 09:02

## 2019-08-28 RX ADMIN — PANTOPRAZOLE SODIUM 40 MG: 40 TABLET, DELAYED RELEASE ORAL at 07:14

## 2019-08-28 RX ADMIN — FUROSEMIDE 20 MG: 40 TABLET ORAL at 09:13

## 2019-08-28 RX ADMIN — LEVETIRACETAM 1000 MG: 500 TABLET, FILM COATED ORAL at 20:20

## 2019-08-28 RX ADMIN — CLOPIDOGREL BISULFATE 75 MG: 75 TABLET, FILM COATED ORAL at 09:13

## 2019-08-28 RX ADMIN — AMLODIPINE BESYLATE 2.5 MG: 5 TABLET ORAL at 09:13

## 2019-08-28 RX ADMIN — LACTULOSE 45 ML: 20 SOLUTION ORAL at 16:35

## 2019-08-28 RX ADMIN — LEVETIRACETAM 1000 MG: 500 TABLET, FILM COATED ORAL at 09:12

## 2019-08-28 NOTE — PROGRESS NOTES
Physical Therapy  8/28/2019    Patient orthostatic (very symptomatic) and up to moderate assist with gait this session using SPC. Full note to follow. Resting BP prior to below, 147/71.     Vitals:    08/28/19 1148 08/28/19 1205 08/28/19 1208 08/28/19 1221   BP: 112/78 (!) 84/60 136/66 121/67   BP 1 Location: Right arm Right arm Right arm Right arm   BP Patient Position: Sitting Sitting;Post activity Supine At rest   Pulse: 70 86 66 69   Resp:    18   Temp:    97.6 °F (36.4 °C)   SpO2:    94%   Weight:       Height:           Katt Tinoco Cassette, PT, DPT

## 2019-08-28 NOTE — PROGRESS NOTES
0800: Bedside shift change report given to Jazzmine Roland (oncoming nurse) by Lance Boyd (offgoing nurse). Report included the following information SBAR, Kardex, Intake/Output, MAR and Recent Results. Problem: Falls - Risk of  Goal: *Absence of Falls  Description  Document Elaina Pace Fall Risk and appropriate interventions in the flowsheet. Outcome: Progressing Towards Goal  Note:   Fall Risk Interventions:  Mobility Interventions: Assess mobility with egress test    Mentation Interventions: Evaluate medications/consider consulting pharmacy    Medication Interventions: Evaluate medications/consider consulting pharmacy    Elimination Interventions: Call light in reach              Problem: Pressure Injury - Risk of  Goal: *Prevention of pressure injury  Description  Document Iván Scale and appropriate interventions in the flowsheet.   Outcome: Progressing Towards Goal  Note:   Pressure Injury Interventions:  Sensory Interventions: Assess changes in LOC    Moisture Interventions: Absorbent underpads    Activity Interventions: Pressure redistribution bed/mattress(bed type)    Mobility Interventions: Pressure redistribution bed/mattress (bed type)    Nutrition Interventions: Document food/fluid/supplement intake    Friction and Shear Interventions: Lift sheet

## 2019-08-28 NOTE — PROGRESS NOTES
Problem: Mobility Impaired (Adult and Pediatric)  Goal: *Acute Goals and Plan of Care (Insert Text)  Description  FUNCTIONAL STATUS PRIOR TO ADMISSION: Patient was modified independent using a Single point cane PRN for functional mobility. HOME SUPPORT PRIOR TO ADMISSION: The patient lived with his two daughters who completed all household chores. Patient reports he was able to get dressed and bathed without assist, but did suffer a fall in January of 2019 (still wearing a bandage on his R frontal forehead). States he also has fallen out of bed since then but unable to provide many other details around fall history. Per chart review, he has previously experience orthostatic hypotension on other admissions and reports this contributed to falls at home. Physical Therapy Goals  Initiated 8/27/2019  1. Patient will move from supine to sit and sit to supine , scoot up and down and roll side to side in bed with modified independence within 7 day(s). 2.  Patient will transfer from bed to chair and chair to bed with supervision/set-up using the least restrictive device within 7 day(s). 3.  Patient will perform sit to stand with supervision/set-up within 7 day(s). 4.  Patient will ambulate with supervision/set-up for 150 feet with the least restrictive device within 7 day(s). 5.  Patient will ascend/descend 3 stairs with bilateral handrail(s) with minimal assistance/contact guard assist within 7 day(s). 6.  Patient will improve Tinetti score by 4-5 points within 7 days. Outcome: Progressing Towards Goal   PHYSICAL THERAPY TREATMENT  Patient: Iva Quinonez (65 y.o. male)  Date: 8/28/2019  Diagnosis: [facility] [I47.2]  VT (ventricular tachycardia) ([facility]) [I47.2] <principal problem not specified>  Procedure(s) (LRB):  INSERT ICD BIV MULTI (N/A) 2 Days Post-Op  Precautions: Fall(ICD precautions L UE)  Chart, physical therapy assessment, plan of care and goals were reviewed.     ASSESSMENT  Based on the objective data described below, patient presents with greatly impaired balance, decreased activity tolerance with orthostatic hypotension, generalized weakness and need for mod Ax1 with gait. Used SPC this session with gait as patient uses one PRN at home, however he was even more unsteady than yesterday's session. Presented with staggering, frequent path deviations and losses of balance, and even running into door frame on the way into the bathroom. Required assist with lower body dressing (socks) after episode of incontinence while standing in the bathroom. Dramatic drop in BP following toileting and gait to/from bathroom, see below. Resting /71  Vitals:    08/28/19 1148 08/28/19 1205 08/28/19 1208 08/28/19 1221   BP: 112/78 (!) 84/60 136/66 121/67   BP 1 Location: Right arm Right arm Right arm Right arm   BP Patient Position: Sitting Sitting;Post activity Supine At rest   Pulse: 70 86 66 69   Resp:    18   Temp:    97.6 °F (36.4 °C)   SpO2:    94%   Weight:       Height:              Current Level of Function Impacting Discharge (mobility/balance): mod A with gait    Other factors to consider for discharge: lives with daughters, previously modified independent with self care and mobility, new L UE ICD precautions, history of falls with R head contusion         PLAN :  Patient continues to benefit from skilled intervention to address the above impairments. Continue treatment per established plan of care. to address goals.     Recommendation for discharge: (in order for the patient to meet his/her long term goals)  Physical therapy at least 2 days/week in the home AND ensure assist and/or supervision for safety with home mobility (24/7) IF patient is able to progress with gait tolerance and balance once BP stable , May need short rehab stay if unable to progress    This discharge recommendation:  Has been made in collaboration with the attending provider and/or case management    Equipment recommendations for successful discharge (if) home: TBD        SUBJECTIVE:   Patient stated I feel dizzy.     OBJECTIVE DATA SUMMARY:   Critical Behavior:  Neurologic State: Alert, Confused  Orientation Level: Oriented to person, Oriented to place, Oriented to situation  Cognition: Memory loss     Functional Mobility Training:  Bed Mobility:  Supine to Sit: Moderate assistance  Sit to Supine: Moderate assistance  Scooting: Contact guard assistance  Transfers:  Sit to Stand: Contact guard assistance;Minimum assistance  Stand to Sit: Minimum assistance  Balance:  Sitting: Intact  Standing: Impaired; With support  Standing - Static: Fair;Poor  Standing - Dynamic : Poor  Ambulation/Gait Training:  Distance (ft): 20 Feet (ft)(x2)  Assistive Device: Gait belt;Cane, straight  Ambulation - Level of Assistance: Minimal assistance; Moderate assistance  Gait Abnormalities: Decreased step clearance; Path deviations;Trunk sway increased(staggering)  Base of Support: Widened  Speed/Usha: Shuffled  Step Length: Right shortened;Left shortened    Activity Tolerance:   Fair and signs and symptoms of orthostatic hypotension  Please refer to the flowsheet for vital signs taken during this treatment.     After treatment patient left in no apparent distress:   Supine in bed, Call bell within reach and Side rails x 3    COMMUNICATION/COLLABORATION:   The patients plan of care was discussed with: Occupational Therapist, Registered Nurse,  and charge JANELL Stokes, PT, DPT   Time Calculation: 31 mins
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
## 2019-08-28 NOTE — PROGRESS NOTES
CM attempted to meet with patient at bedside. Patient sleeping soundly. CM called and spoke with patient's daughter, Marvin Monet (p: [phone]) about freedom of choice for home health agency. Ms. Charles Dias is agreeable to [address]. CM requested [facility] orders for SN (med rec), PT, and OT from bedside RN. OT is to work with patient today. CM sent referral to [facility] via [employer] with orders to follow. CM will continue to follow.     Rodrigue Camp, MPH
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
## 2019-08-28 NOTE — PROGRESS NOTES
[facility] Cardiology Progress Note    Date of Service: 8/25/2019    Subjective:  Cont to improve    Objective:    Visit Vitals  /74 (BP 1 Location: Left arm, BP Patient Position: At rest)   Pulse 63   Temp 97.8 °F (36.6 °C)   Resp 18   Ht 5' 9\" (1.753 m)   Wt 87.9 kg (193 lb 12.6 oz)   SpO2 98%   BMI 28.62 kg/m²     Physical Exam  GEN: NAD  HEENT: EOMI, MMM, OP clear  NECK: No JVD  CV: RRR, normal S1 and S2, no M/R/G  Chest: ICD dsg c/d/i  LUNGS: CTAB, no W/R/R  ABD: NABS, soft, NT/ND  EXT: No edema, 2+ distal pulses  PSYCH: Mood and affect normal  NEURO: AAO, MAEW, face symmetrical, speech intact    Current Facility-Administered Medications   Medication Dose Route Frequency    amiodarone (CORDARONE) tablet 400 mg  400 mg Oral BID    tamsulosin (FLOMAX) capsule 0.4 mg  0.4 mg Oral DAILY    potassium chloride SR (KLOR-CON 10) tablet 20 mEq  20 mEq Oral DAILY    levETIRAcetam (KEPPRA) tablet 1,000 mg  1,000 mg Oral BID    pantoprazole (PROTONIX) tablet 40 mg  40 mg Oral ACB    carvedilol (COREG) tablet 12.5 mg  12.5 mg Oral BID WITH MEALS    lisinopril (PRINIVIL, ZESTRIL) tablet 5 mg  5 mg Oral DAILY    sodium chloride (NS) flush 5-40 mL  5-40 mL IntraVENous Q8H    sodium chloride (NS) flush 5-40 mL  5-40 mL IntraVENous PRN    0.9% sodium chloride infusion  5 mL/hr IntraVENous CONTINUOUS    glucose chewable tablet 16 g  4 Tab Oral PRN    glucagon (GLUCAGEN) injection 1 mg  1 mg IntraMUSCular PRN    dextrose 10% infusion 125-250 mL  125-250 mL IntraVENous PRN    insulin lispro (HUMALOG) injection   SubCUTAneous AC&HS    labetalol (NORMODYNE;TRANDATE) injection 10 mg  10 mg IntraVENous Q3H PRN    budesonide (PULMICORT) 500 mcg/2 ml nebulizer suspension  500 mcg Nebulization BID RT    eplerenone (INSPRA) tablet 25 mg  25 mg Oral DAILY    atorvastatin (LIPITOR) tablet 80 mg  80 mg Oral DAILY    [Held by provider] glimepiride (AMARYL) tablet 4 mg  4 mg Oral BID    [Held by provider] ALPRAZolam Ermelinda Melara) tablet 1 mg  1 mg Oral BID    clonazePAM (KlonoPIN) tablet 1 mg  1 mg Oral QHS    acetaminophen (TYLENOL) solution 650 mg  650 mg Per NG tube Q4H PRN    aspirin chewable tablet 81 mg  81 mg Oral DAILY    [Held by provider] pregabalin (LYRICA) capsule 50 mg  50 mg Oral TID    famotidine (PEPCID) tablet 20 mg  20 mg Oral ACB&D    lactulose (CHRONULAC) 10 gram/15 mL solution 45 mL  30 g Oral TID    QUEtiapine (SEROquel) tablet 25 mg  25 mg Oral QHS    nitroglycerin (NITROSTAT) tablet 0.4 mg  0.4 mg SubLINGual Q5MIN PRN    clopidogrel (PLAVIX) tablet 75 mg  75 mg Oral DAILY    isosorbide mononitrate ER (IMDUR) tablet 30 mg  30 mg Oral DAILY    venlafaxine-SR (EFFEXOR-XR) capsule 150 mg  150 mg Oral DAILY AFTER BREAKFAST    amLODIPine (NORVASC) tablet 2.5 mg  2.5 mg Oral DAILY    finasteride (PROSCAR) tablet 5 mg  5 mg Oral 7am    furosemide (LASIX) tablet 20 mg  20 mg Oral DAILY    sodium chloride (NS) flush 5-40 mL  5-40 mL IntraVENous Q8H    sodium chloride (NS) flush 5-40 mL  5-40 mL IntraVENous PRN       Data Reviewed:  Recent Results (from the past 12 hour(s))   AMMONIA    Collection Time: 08/28/19  4:14 AM   Result Value Ref Range    Ammonia 38 (H) <88 UMOL/L   METABOLIC PANEL, BASIC    Collection Time: 08/28/19  4:14 AM   Result Value Ref Range    Sodium 137 136 - 145 mmol/L    Potassium 3.2 (L) 3.5 - 5.1 mmol/L    Chloride 103 97 - 108 mmol/L    CO2 24 21 - 32 mmol/L    Anion gap 10 5 - 15 mmol/L    Glucose 132 (H) 65 - 100 mg/dL    BUN 33 (H) 6 - 20 MG/DL    Creatinine 1.50 (H) 0.70 - 1.30 MG/DL    BUN/Creatinine ratio 22 (H) 12 - 20      GFR est AA 57 (L) >60 ml/min/1.73m2    GFR est non-AA 47 (L) >60 ml/min/1.73m2    Calcium 8.0 (L) 8.5 - 10.1 MG/DL   MAGNESIUM    Collection Time: 08/28/19  4:14 AM   Result Value Ref Range    Magnesium 2.2 1.6 - 2.4 mg/dL   GLUCOSE, POC    Collection Time: 08/28/19  6:19 AM   Result Value Ref Range    Glucose (POC) 134 (H) 65 - 100 mg/dL    Performed by Mauricio Lozano Dillon        Assessment:  1. VT storm  2. Acute on chronic combined systolic and diastolic CHF with EF 51-26%  3. CAD s/p remote CABG and PCI  4. Acute hypoxic respiratory failure requiring mechanical ventilation, now extubated  5. Chronic renal insufficiency  6.  Respiratory alkalosis with metabolic acidosis    Plan:  Finished abx today  OT consult  Cont current cardiac meds

## 2019-08-28 NOTE — PROGRESS NOTES
Orders received, chart reviewed and patient received sitting EOB with PT SOB and pale. Returned to supine. See PT note. Based on functional mobility report to and from bathroom, inability to complete toileting with independence recommend rehab to increase independence and safety prior to discharging home. Patient 8/20 cath, extubated 3 days later, and POD#1 PPM. Baseline EF 15%, tremors, peripheral neuropathy, falls and head contusion as a result. This session now orthostatic hypotension symptomatic. Will f/u this afternoon.

## 2019-08-29 LAB
COMMENT, HOLDF: NORMAL
ERYTHROCYTE [DISTWIDTH] IN BLOOD BY AUTOMATED COUNT: 19.3 % (ref 11.5–14.5)
GLUCOSE BLD STRIP.AUTO-MCNC: 131 MG/DL (ref 65–100)
GLUCOSE BLD STRIP.AUTO-MCNC: 134 MG/DL (ref 65–100)
GLUCOSE BLD STRIP.AUTO-MCNC: 142 MG/DL (ref 65–100)
GLUCOSE BLD STRIP.AUTO-MCNC: 173 MG/DL (ref 65–100)
HCT VFR BLD AUTO: 36.5 % (ref 36.6–50.3)
HGB BLD-MCNC: 10.8 G/DL (ref 12.1–17)
MCH RBC QN AUTO: 21 PG (ref 26–34)
MCHC RBC AUTO-ENTMCNC: 29.6 G/DL (ref 30–36.5)
MCV RBC AUTO: 70.9 FL (ref 80–99)
NRBC # BLD: 0 K/UL (ref 0–0.01)
NRBC BLD-RTO: 0 PER 100 WBC
PLATELET # BLD AUTO: 132 K/UL (ref 150–400)
PMV BLD AUTO: ABNORMAL FL (ref 8.9–12.9)
RBC # BLD AUTO: 5.15 M/UL (ref 4.1–5.7)
SAMPLES BEING HELD,HOLD: NORMAL
SERVICE CMNT-IMP: ABNORMAL
WBC # BLD AUTO: 11.6 K/UL (ref 4.1–11.1)

## 2019-08-29 PROCEDURE — 85027 COMPLETE CBC AUTOMATED: CPT

## 2019-08-29 PROCEDURE — 97530 THERAPEUTIC ACTIVITIES: CPT

## 2019-08-29 PROCEDURE — 74011000250 HC RX REV CODE- 250: Performed by: INTERNAL MEDICINE

## 2019-08-29 PROCEDURE — 36415 COLL VENOUS BLD VENIPUNCTURE: CPT

## 2019-08-29 PROCEDURE — 97116 GAIT TRAINING THERAPY: CPT

## 2019-08-29 PROCEDURE — 74011250637 HC RX REV CODE- 250/637: Performed by: INTERNAL MEDICINE

## 2019-08-29 PROCEDURE — 82962 GLUCOSE BLOOD TEST: CPT

## 2019-08-29 PROCEDURE — 94640 AIRWAY INHALATION TREATMENT: CPT

## 2019-08-29 PROCEDURE — 65660000001 HC RM ICU INTERMED STEPDOWN

## 2019-08-29 PROCEDURE — 74011636637 HC RX REV CODE- 636/637: Performed by: INTERNAL MEDICINE

## 2019-08-29 PROCEDURE — 94760 N-INVAS EAR/PLS OXIMETRY 1: CPT

## 2019-08-29 PROCEDURE — 65660000000 HC RM CCU STEPDOWN

## 2019-08-29 RX ORDER — LISINOPRIL 5 MG/1
2.5 TABLET ORAL DAILY
Status: DISCONTINUED | OUTPATIENT
Start: 2019-08-30 | End: 2019-09-03 | Stop reason: HOSPADM

## 2019-08-29 RX ORDER — ACETAMINOPHEN 325 MG/1
650 TABLET ORAL
Status: DISCONTINUED | OUTPATIENT
Start: 2019-08-29 | End: 2019-09-03 | Stop reason: HOSPADM

## 2019-08-29 RX ORDER — CARVEDILOL 6.25 MG/1
6.25 TABLET ORAL 2 TIMES DAILY WITH MEALS
Status: DISCONTINUED | OUTPATIENT
Start: 2019-08-29 | End: 2019-08-30

## 2019-08-29 RX ADMIN — VENLAFAXINE HYDROCHLORIDE 150 MG: 150 CAPSULE, EXTENDED RELEASE ORAL at 08:24

## 2019-08-29 RX ADMIN — LACTULOSE 45 ML: 20 SOLUTION ORAL at 17:00

## 2019-08-29 RX ADMIN — CARVEDILOL 12.5 MG: 12.5 TABLET, FILM COATED ORAL at 07:05

## 2019-08-29 RX ADMIN — LEVETIRACETAM 1000 MG: 500 TABLET, FILM COATED ORAL at 08:23

## 2019-08-29 RX ADMIN — LISINOPRIL 5 MG: 5 TABLET ORAL at 08:24

## 2019-08-29 RX ADMIN — AMIODARONE HYDROCHLORIDE 400 MG: 200 TABLET ORAL at 17:00

## 2019-08-29 RX ADMIN — CLONAZEPAM 1 MG: 0.5 TABLET ORAL at 21:35

## 2019-08-29 RX ADMIN — AMLODIPINE BESYLATE 2.5 MG: 5 TABLET ORAL at 08:23

## 2019-08-29 RX ADMIN — INSULIN LISPRO 2 UNITS: 100 INJECTION, SOLUTION INTRAVENOUS; SUBCUTANEOUS at 17:01

## 2019-08-29 RX ADMIN — LACTULOSE 45 ML: 20 SOLUTION ORAL at 08:24

## 2019-08-29 RX ADMIN — FUROSEMIDE 20 MG: 40 TABLET ORAL at 08:23

## 2019-08-29 RX ADMIN — Medication 10 ML: at 06:45

## 2019-08-29 RX ADMIN — TAMSULOSIN HYDROCHLORIDE 0.4 MG: 0.4 CAPSULE ORAL at 08:24

## 2019-08-29 RX ADMIN — POTASSIUM CHLORIDE 20 MEQ: 750 TABLET, EXTENDED RELEASE ORAL at 08:24

## 2019-08-29 RX ADMIN — FAMOTIDINE 20 MG: 20 TABLET ORAL at 17:00

## 2019-08-29 RX ADMIN — PANTOPRAZOLE SODIUM 40 MG: 40 TABLET, DELAYED RELEASE ORAL at 06:45

## 2019-08-29 RX ADMIN — AMIODARONE HYDROCHLORIDE 400 MG: 200 TABLET ORAL at 08:24

## 2019-08-29 RX ADMIN — Medication 10 ML: at 14:43

## 2019-08-29 RX ADMIN — FAMOTIDINE 20 MG: 20 TABLET ORAL at 06:45

## 2019-08-29 RX ADMIN — ISOSORBIDE MONONITRATE 30 MG: 30 TABLET ORAL at 08:24

## 2019-08-29 RX ADMIN — LACTULOSE 45 ML: 20 SOLUTION ORAL at 21:36

## 2019-08-29 RX ADMIN — Medication 10 ML: at 21:38

## 2019-08-29 RX ADMIN — BUDESONIDE 500 MCG: 0.5 INHALANT RESPIRATORY (INHALATION) at 20:01

## 2019-08-29 RX ADMIN — CARVEDILOL 6.25 MG: 6.25 TABLET, FILM COATED ORAL at 17:00

## 2019-08-29 RX ADMIN — ATORVASTATIN CALCIUM 80 MG: 40 TABLET, FILM COATED ORAL at 08:23

## 2019-08-29 RX ADMIN — ACETAMINOPHEN 650 MG: 325 TABLET, FILM COATED ORAL at 21:43

## 2019-08-29 RX ADMIN — EPLERENONE 25 MG: 25 TABLET, FILM COATED ORAL at 08:23

## 2019-08-29 RX ADMIN — BUDESONIDE 500 MCG: 0.5 INHALANT RESPIRATORY (INHALATION) at 08:30

## 2019-08-29 RX ADMIN — ASPIRIN 81 MG CHEWABLE TABLET 81 MG: 81 TABLET CHEWABLE at 08:23

## 2019-08-29 RX ADMIN — LEVETIRACETAM 1000 MG: 500 TABLET, FILM COATED ORAL at 21:34

## 2019-08-29 RX ADMIN — CLOPIDOGREL BISULFATE 75 MG: 75 TABLET, FILM COATED ORAL at 08:24

## 2019-08-29 RX ADMIN — QUETIAPINE FUMARATE 25 MG: 25 TABLET ORAL at 21:35

## 2019-08-29 RX ADMIN — FINASTERIDE 5 MG: 5 TABLET, FILM COATED ORAL at 07:05

## 2019-08-29 NOTE — PROGRESS NOTES
8/28-8/29/2019 - Shift times - 1930 to 0730    The RN documentation of patient care for [address] by Sachi Mueller. Tere Larsen RN has been reviewed and approved. All medications have been administered under the direct supervision of the faculty or preceptor.     Aide Barfield RN

## 2019-08-29 NOTE — PROGRESS NOTES
[facility]iology Progress Note    Date of Service: 8/25/2019    Subjective:  No c/o this AM. Orthostatic yesterday with therapy    Objective:    Visit Vitals  /80 (BP 1 Location: Right arm, BP Patient Position: At rest)   Pulse 68   Temp 97.7 °F (36.5 °C)   Resp 16   Ht 5' 9\" (1.753 m)   Wt 89.8 kg (197 lb 15.6 oz)   SpO2 100%   BMI 29.24 kg/m²     Physical Exam  GEN: NAD  HEENT: EOMI, MMM, OP clear  NECK: No JVD  CV: RRR, normal S1 and S2, no M/R/G  Chest: ICD dsg c/d/i  LUNGS: CTAB, no W/R/R  ABD: NABS, soft, NT/ND  EXT: No edema, 2+ distal pulses  PSYCH: Mood and affect normal  NEURO: AAO, MAEW, face symmetrical, speech intact    Current Facility-Administered Medications   Medication Dose Route Frequency    amiodarone (CORDARONE) tablet 400 mg  400 mg Oral BID    tamsulosin (FLOMAX) capsule 0.4 mg  0.4 mg Oral DAILY    potassium chloride SR (KLOR-CON 10) tablet 20 mEq  20 mEq Oral DAILY    levETIRAcetam (KEPPRA) tablet 1,000 mg  1,000 mg Oral BID    pantoprazole (PROTONIX) tablet 40 mg  40 mg Oral ACB    carvedilol (COREG) tablet 12.5 mg  12.5 mg Oral BID WITH MEALS    lisinopril (PRINIVIL, ZESTRIL) tablet 5 mg  5 mg Oral DAILY    sodium chloride (NS) flush 5-40 mL  5-40 mL IntraVENous Q8H    sodium chloride (NS) flush 5-40 mL  5-40 mL IntraVENous PRN    0.9% sodium chloride infusion  5 mL/hr IntraVENous CONTINUOUS    glucose chewable tablet 16 g  4 Tab Oral PRN    glucagon (GLUCAGEN) injection 1 mg  1 mg IntraMUSCular PRN    dextrose 10% infusion 125-250 mL  125-250 mL IntraVENous PRN    insulin lispro (HUMALOG) injection   SubCUTAneous AC&HS    labetalol (NORMODYNE;TRANDATE) injection 10 mg  10 mg IntraVENous Q3H PRN    budesonide (PULMICORT) 500 mcg/2 ml nebulizer suspension  500 mcg Nebulization BID RT    eplerenone (INSPRA) tablet 25 mg  25 mg Oral DAILY    atorvastatin (LIPITOR) tablet 80 mg  80 mg Oral DAILY    [Held by provider] glimepiride (AMARYL) tablet 4 mg  4 mg Oral BID    [Held by provider] ALPRAZolam Colie Saritha) tablet 1 mg  1 mg Oral BID    clonazePAM (KlonoPIN) tablet 1 mg  1 mg Oral QHS    acetaminophen (TYLENOL) solution 650 mg  650 mg Per NG tube Q4H PRN    aspirin chewable tablet 81 mg  81 mg Oral DAILY    [Held by provider] pregabalin (LYRICA) capsule 50 mg  50 mg Oral TID    famotidine (PEPCID) tablet 20 mg  20 mg Oral ACB&D    lactulose (CHRONULAC) 10 gram/15 mL solution 45 mL  30 g Oral TID    QUEtiapine (SEROquel) tablet 25 mg  25 mg Oral QHS    nitroglycerin (NITROSTAT) tablet 0.4 mg  0.4 mg SubLINGual Q5MIN PRN    clopidogrel (PLAVIX) tablet 75 mg  75 mg Oral DAILY    isosorbide mononitrate ER (IMDUR) tablet 30 mg  30 mg Oral DAILY    venlafaxine-SR (EFFEXOR-XR) capsule 150 mg  150 mg Oral DAILY AFTER BREAKFAST    finasteride (PROSCAR) tablet 5 mg  5 mg Oral 7am    furosemide (LASIX) tablet 20 mg  20 mg Oral DAILY    sodium chloride (NS) flush 5-40 mL  5-40 mL IntraVENous Q8H    sodium chloride (NS) flush 5-40 mL  5-40 mL IntraVENous PRN       Data Reviewed:  Recent Results (from the past 12 hour(s))   SAMPLES BEING HELD    Collection Time: 08/29/19  3:55 AM   Result Value Ref Range    SAMPLES BEING HELD 1 PST 1LAV     COMMENT        Add-on orders for these samples will be processed based on acceptable specimen integrity and analyte stability, which may vary by analyte. GLUCOSE, POC    Collection Time: 08/29/19  6:26 AM   Result Value Ref Range    Glucose (POC) 134 (H) 65 - 100 mg/dL    Performed by KATELYN ZACARIAS        Assessment:  1. VT storm  2. Acute on chronic combined systolic and diastolic CHF with EF 25-81%  3. CAD s/p remote CABG and PCI  4. Acute hypoxic respiratory failure requiring mechanical ventilation, now extubated  5. Chronic renal insufficiency  6.  Respiratory alkalosis with metabolic acidosis    Plan:  Holding amlodipine for now, may need to hold nitrate  Appreciate recs of therapy providers, will pursue rehab as this appears most appropriate  Will decrease amiodarone dose on DC  Off of abx

## 2019-08-29 NOTE — PROGRESS NOTES
1940 Bedside and Verbal shift change report given to Rose (oncoming nurse) by Wayne Thomas 411  (offgoing nurse). Report included the following information SBAR, Kardex, ED Summary, Intake/Output, MAR, Recent Results, Med Rec Status and Cardiac Rhythm NSR/Paced, BBB c PVCs. 0740 Bedside and Verbal shift change report given to Lucie Seth (oncoming nurse) by  Ameena Padilla (offgoing nurse). Report included the following information SBAR, Kardex, ED Summary, Intake/Output, MAR, Recent Results, Med Rec Status and Cardiac Rhythm NSR/Paced, BBB c PVCs. Problem: Falls - Risk of  Goal: *Absence of Falls  Description  Document Elaina Pace Fall Risk and appropriate interventions in the flowsheet. Outcome: Progressing Towards Goal  Note:   Fall Risk Interventions:  Mobility Interventions: Communicate number of staff needed for ambulation/transfer, Patient to call before getting OOB, Utilize walker, cane, or other assistive device, Utilize gait belt for transfers/ambulation    Mentation Interventions: Adequate sleep, hydration, pain control, Door open when patient unattended, Evaluate medications/consider consulting pharmacy, Gait belt with transfers/ambulation, More frequent rounding, Reorient patient, Room close to nurse's station, Toileting rounds    Medication Interventions: Evaluate medications/consider consulting pharmacy, Patient to call before getting OOB, Teach patient to arise slowly, Utilize gait belt for transfers/ambulation    Elimination Interventions: Call light in reach, Patient to call for help with toileting needs, Toilet paper/wipes in reach, Toileting schedule/hourly rounds, Urinal in reach              Problem: Patient Education: Go to Patient Education Activity  Goal: Patient/Family Education  Outcome: Progressing Towards Goal     Problem: Pressure Injury - Risk of  Goal: *Prevention of pressure injury  Description  Document Iván Scale and appropriate interventions in the flowsheet.   Outcome: Progressing Towards Goal  Note:   Pressure Injury Interventions:  Sensory Interventions: Assess changes in LOC, Chair cushion, Discuss PT/OT consult with provider, Keep linens dry and wrinkle-free, Maintain/enhance activity level, Minimize linen layers, Pressure redistribution bed/mattress (bed type)    Moisture Interventions: Absorbent underpads, Minimize layers, Offer toileting Q_hr, Check for incontinence Q2 hours and as needed    Activity Interventions: Chair cushion, Increase time out of bed, Pressure redistribution bed/mattress(bed type), PT/OT evaluation    Mobility Interventions: Chair cushion, HOB 30 degrees or less, Pressure redistribution bed/mattress (bed type), PT/OT evaluation    Nutrition Interventions: Document food/fluid/supplement intake    Friction and Shear Interventions: HOB 30 degrees or less, Lift sheet, Lift team/patient mobility team, Minimize layers                Problem: Pacer/ICD: Discharge Outcomes  Goal: *No signs and symptoms of infection or wound complications  Outcome: Progressing Towards Goal  Goal: *Anxiety reduced or absent  Outcome: Progressing Towards Goal  Goal: *Optimal pain control at patient's stated goal  Outcome: Progressing Towards Goal

## 2019-08-29 NOTE — PROGRESS NOTES
Problem: Mobility Impaired (Adult and Pediatric)  Goal: *Acute Goals and Plan of Care (Insert Text)  Description  FUNCTIONAL STATUS PRIOR TO ADMISSION: Patient was modified independent using a Single point cane PRN for functional mobility. HOME SUPPORT PRIOR TO ADMISSION: The patient lived with his two daughters who completed all household chores. Patient reports he was able to get dressed and bathed without assist, but did suffer a fall in January of 2019 (still wearing a bandage on his R frontal forehead). States he also has fallen out of bed since then but unable to provide many other details around fall history. Per chart review, he has previously experience orthostatic hypotension on other admissions and reports this contributed to falls at home. Physical Therapy Goals  Initiated 8/27/2019  1. Patient will move from supine to sit and sit to supine , scoot up and down and roll side to side in bed with modified independence within 7 day(s). 2.  Patient will transfer from bed to chair and chair to bed with supervision/set-up using the least restrictive device within 7 day(s). 3.  Patient will perform sit to stand with supervision/set-up within 7 day(s). 4.  Patient will ambulate with supervision/set-up for 150 feet with the least restrictive device within 7 day(s). 5.  Patient will ascend/descend 3 stairs with bilateral handrail(s) with minimal assistance/contact guard assist within 7 day(s). 6.  Patient will improve Tinetti score by 4-5 points within 7 days. Outcome: Progressing Towards Goal    PHYSICAL THERAPY TREATMENT  Patient: Jeremiah Delgado (65 y.o. male)  Date: 8/29/2019  Diagnosis: [facility] [I47.2]  VT (ventricular tachycardia) ([facility]) [I47.2] <principal problem not specified>  Procedure(s) (LRB):  INSERT ICD BIV MULTI (N/A) 3 Days Post-Op  Precautions: Fall(ICD precautions L UE)  Chart, physical therapy assessment, plan of care and goals were reviewed.     ASSESSMENT  Based on the objective data described below, pt limited by orthostatic hypotension. Noted significant postural sway in standing. Unable to safely mobilize pt at this time. Pt recovered in sitting but declined to transfer to chair requesting to return supine. Pt requiring v.c and facilitation to adhere to precautions     Vitals:    08/29/19 1200 08/29/19 1203 08/29/19 1207 08/29/19 1208   BP: 106/59 (!) 74/59 (!) 85/58 109/57   BP 1 Location: Right arm Right arm Right arm Right arm   BP Patient Position: Sitting Standing Standing Sitting   Pulse: 69 73  72   Resp:       Temp:       SpO2:       Weight:       Height:            Current Level of Function Impacting Discharge (mobility/balance): min to CGA for bed mobility and sit to stand    Other factors to consider for discharge: orthostatic hypotension          PLAN :  Patient continues to benefit from skilled intervention to address the above impairments. Continue treatment per established plan of care. to address goals. Recommendation for discharge: (in order for the patient to meet his/her long term goals)  Therapy 3 hours per day 5-7 days per week    This discharge recommendation:  Has been made in collaboration with the attending provider and/or case management    Equipment recommendations for successful discharge (if) home: to be determined by rehab facility       SUBJECTIVE:   Patient stated I am tired.     OBJECTIVE DATA SUMMARY:   Critical Behavior:  Neurologic State: Alert, Confused  Orientation Level: Oriented to person, Oriented to place, Oriented to time, Disoriented to situation  Cognition: Decreased attention/concentration, Memory loss     Functional Mobility Training:  Bed Mobility:     Supine to Sit: Minimum assistance(physical assist to adhere to precautions )  Sit to Supine: Contact guard assistance           Transfers:  Sit to Stand: Minimum assistance  Stand to Sit: Contact guard assistance                             Balance:  Sitting: Intact  Standing: Impaired  Standing - Static: Fair(Postural sway)  Ambulation/Gait Training:                                                       Stairs: Therapeutic Exercises:     Pain Rating:  Left shoulder    Activity Tolerance:   signs and symptoms of orthostatic hypotension  Please refer to the flowsheet for vital signs taken during this treatment.     After treatment patient left in no apparent distress:   Supine in bed and Call bell within reach    COMMUNICATION/COLLABORATION:   The patients plan of care was discussed with: Registered Nurse    Jabari Nicholson PTA   Time Calculation: 17 mins

## 2019-08-29 NOTE — PROGRESS NOTES
0900 - CM updated by cardiologist Sebastian Holloway MD) patient needs IPR and not home health due to home safety concerns, patient needing to regain strength, and orthostatic.    0905 - CM spoke with patient regarding IPR vs. HH. Patient agreeable to [facility]. CM called patient's daughter to provide update. Daughter agreeable with plan for [facility].    0930 - CM sent referral for [facility] via [employer]. 1050 - CM spoke with [facility] liaison (p: [phone]). Concerns regarding patient's EF of 15%, acknowledged patient has AICD, and will update CM of needs/concerns (if any) that Tyra RAMIREZ has regarding patient. 1353 - CM spoke with [facility] liaison. Liaison to work patient up for MD review now. Concerns remain about EF% and orthostatics. CM will attempt second option for IPR. CM will continue to follow.     Zbigniew Reilly, MPH

## 2019-08-29 NOTE — DIABETES MGMT
[facility]    [facility] Progress Note    Recommendations/ Comments: Chart reviewed for hyperglycemia. Some BG over the past 24 hours above 180 mg/dl. Prednisone now discontinued. No conc. Sweets added to diet order yesterday. BG today has been stable in the 130's. Will continue to follow trends. Continue correction as needed. Current hospital DM medication: lispro correction scale - normal sensitivity    Chart reviewed on [facility]. .    Patient is a 71 y.o. male with known DM on Amaryl 4 mg BID at home. A1c:   Lab Results   Component Value Date/Time    Hemoglobin A1c 6.3 08/25/2019 04:22 AM    Hemoglobin A1c 6.7 (H) 01/11/2018 05:04 AM       Recent Glucose Results:   Lab Results   Component Value Date/Time    GLUCPOC 131 (H) 08/29/2019 11:21 AM    GLUCPOC 134 (H) 08/29/2019 06:26 AM    GLUCPOC 198 (H) 08/28/2019 09:00 PM        Lab Results   Component Value Date/Time    Creatinine 1.50 (H) 08/28/2019 04:14 AM     Estimated Creatinine Clearance: 54.4 mL/min (A) (based on SCr of 1.5 mg/dL (H)). Active Orders   Diet    DIET CARDIAC Soft Solids; No Conc. Sweets        PO intake:   Patient Vitals for the past 72 hrs:   % Diet Eaten   08/29/19 1116 100 %   08/29/19 0821 100 %   08/27/19 1113 100 %   08/27/19 0900 100 %       Will continue to follow as needed.     Thank you  Abimael Mack RD, Diabetes Clinician       Time spent: 4 minutes

## 2019-08-29 NOTE — PROGRESS NOTES
Problem: Self Care Deficits Care Plan (Adult)  Goal: *Acute Goals and Plan of Care (Insert Text)  Description  FUNCTIONAL STATUS PRIOR TO ADMISSION: Patient was modified independent using a Single point cane for functional mobility. Patient was modified independent for basic and instrumental ADLs. Does not drive. Wife passed away in June. Daughters perform all instrumental ADLs due to patient memory loss. HOME SUPPORT: The patient lived with daughter but did not require assist.    Occupational Therapy Goals  Initiated 8/28/2019  1. Patient will perform ADLs standing 5 mins without fatigue or LOB with supervision/set-up within 5 day(s). 2.  Patient will perform lower body ADLs with supervision/set-up within 5 day(s). 3.  Patient will perform gathering ADL items high and low 2/2 with supervision/set-up within 5 day(s). 4.  Patient will perform toilet transfers with supervision/set-up within 5 day(s). 5.  Patient will perform all aspects of toileting with supervision/set-up within 5 day(s). Outcome: Progressing Towards Goal       OCCUPATIONAL THERAPY TREATMENT  Patient: Brea Weinberg (65 y.o. male)  Date: 8/29/2019  Diagnosis: V tach ([facility] [I47.2]  VT (ventricular tachycardia) ([facility]) [I47.2] <principal problem not specified>  Procedure(s) (LRB):  INSERT ICD BIV MULTI (N/A) 3 Days Post-Op  Precautions: Fall(ICD precautions L UE)  Chart, occupational therapy assessment, plan of care, and goals were reviewed. ASSESSMENT  Based on the objective data described below, pt presents with impaired higher level balance, PPM precautions with verbal cues for adherence with functional transfers, risk for falls and decline in functional status. pt recalled 2 PPM precautions without prompting. He continues to demonstrate orthostatic hypotension with symptomatic c/o. Further mobility limited after standing due to pt c/o dizziness with decreased responsiveness noted.      Patient Vitals for the past 4 hrs: Position Pulse Resp BP SpO2   08/29/19 1208 sitting 72 -- 109/57 --   08/29/19 1207 Standing  -- -- (!) 85/58 --   08/29/19 1203 standing 73 -- (!) 74/59 --   08/29/19 1200 Sitting EOB 69 -- 106/59 --   08/29/19 1159 supine 66 -- 122/64 --          Current Level of Function Impacting Discharge (ADLs): A x 1 for PPM precautions, orthostatic hypotension    Other factors to consider for discharge: lives alone, wife recently passed away         PLAN :  Patient continues to benefit from skilled intervention to address the above impairments. Continue treatment per established plan of care. to address goals. Recommend with staff: [place] to chair and bathroom with A x 1 and use of pt's SPC    Recommend next OT session: Standing endurance     Recommendation for discharge: (in order for the patient to meet his/her long term goals)  Therapy 3 hours per day 5-7 days per week    This discharge recommendation:  Has been made in collaboration with the attending provider and/or case management    Equipment recommendations for successful discharge (if) home: TBD       SUBJECTIVE:   Patient stated I feel a little dizzy.     OBJECTIVE DATA SUMMARY:   Cognitive/Behavioral Status:  Neurologic State: Alert;Confused  Orientation Level: Oriented to person;Oriented to place;Oriented to time;Disoriented to situation  Cognition: Decreased attention/concentration;Memory loss             Functional Mobility and Transfers for ADLs:  Bed Mobility:  Supine to Sit: Minimum assistance(physical assist to adhere to precautions )  Sit to Supine: Contact guard assistance    Transfers:  Sit to Stand: Minimum assistance          Balance:  Sitting: Intact  Standing: Impaired  Standing - Static: Fair(Postural sway)    ADL Intervention:     Pt recalled PPM precautions verbally but required cues to adhere to NWB L UE during sit to stands and scooting. Stood with CGA but with dizziness, pt needing up to min A for balance.                Activity Tolerance: Fair  Please refer to the flowsheet for vital signs taken during this treatment.     After treatment patient left in no apparent distress:   Supine in bed and Call bell within reach    COMMUNICATION/COLLABORATION:   The patients plan of care was discussed with: Physical Therapy Assistant and Registered Nurse    Steven Arevalo OT  Time Calculation: 17 mins

## 2019-08-29 NOTE — PROGRESS NOTES
0730:  Bedside shift change report given to Francine Beach, RN (oncoming nurse) by Brice Smith (offgoing nurse). Report included the following information SBAR, Kardex, Procedure Summary, Intake/Output, MAR and Recent Results. Patient had uneventful shift. 1930:  Bedside shift change report given to Myla Alexis RN (oncoming nurse) by Francine Beach RN (offgoing nurse). Report included the following information SBAR, Kardex, Procedure Summary, Intake/Output, MAR and Recent Results. Problem: Falls - Risk of  Goal: *Absence of Falls  Description  Document Antonio Corbett Fall Risk and appropriate interventions in the flowsheet. Outcome: Progressing Towards Goal  Note:   Fall Risk Interventions:  Mobility Interventions: Patient to call before getting OOB, PT Consult for mobility concerns, PT Consult for assist device competence, Strengthening exercises (ROM-active/passive)    Mentation Interventions: Bed/chair exit alarm, Adequate sleep, hydration, pain control, Door open when patient unattended    Medication Interventions: Patient to call before getting OOB, Teach patient to arise slowly, Utilize gait belt for transfers/ambulation    Elimination Interventions: Call light in reach, Patient to call for help with toileting needs, Toileting schedule/hourly rounds              Problem: Pressure Injury - Risk of  Goal: *Prevention of pressure injury  Description  Document Iván Scale and appropriate interventions in the flowsheet. Outcome: Progressing Towards Goal  Note:   Pressure Injury Interventions:  Sensory Interventions: Assess changes in LOC, Check visual cues for pain, Turn and reposition approx.  every two hours (pillows and wedges if needed)    Moisture Interventions: Absorbent underpads, Minimize layers, Offer toileting Q_hr, Check for incontinence Q2 hours and as needed    Activity Interventions: Increase time out of bed, Pressure redistribution bed/mattress(bed type), PT/OT evaluation    Mobility Interventions: HOB 30 degrees or less, Pressure redistribution bed/mattress (bed type), PT/OT evaluation    Nutrition Interventions: Document food/fluid/supplement intake, Discuss nutritional consult with provider, Offer support with meals,snacks and hydration    Friction and Shear Interventions: HOB 30 degrees or less, Lift sheet, Minimize layers                Problem: Heart Failure: Discharge Outcomes  Goal: *Describes available resources and support systems  Description  (eg: Home Health, Palliative Care, Advanced Medical Directive)  Outcome: Progressing Towards Goal     Problem: Pacer/ICD: Discharge Outcomes  Goal: *Hemodynamically stable  Outcome: Progressing Towards Goal

## 2019-08-30 LAB
COMMENT, HOLDF: NORMAL
ERYTHROCYTE [DISTWIDTH] IN BLOOD BY AUTOMATED COUNT: 19.3 % (ref 11.5–14.5)
ERYTHROCYTE [DISTWIDTH] IN BLOOD BY AUTOMATED COUNT: 19.8 % (ref 11.5–14.5)
GLUCOSE BLD STRIP.AUTO-MCNC: 116 MG/DL (ref 65–100)
GLUCOSE BLD STRIP.AUTO-MCNC: 120 MG/DL (ref 65–100)
GLUCOSE BLD STRIP.AUTO-MCNC: 125 MG/DL (ref 65–100)
GLUCOSE BLD STRIP.AUTO-MCNC: 144 MG/DL (ref 65–100)
HCT VFR BLD AUTO: 34.7 % (ref 36.6–50.3)
HCT VFR BLD AUTO: 40.3 % (ref 36.6–50.3)
HGB BLD-MCNC: 10.2 G/DL (ref 12.1–17)
HGB BLD-MCNC: 11.9 G/DL (ref 12.1–17)
MCH RBC QN AUTO: 21 PG (ref 26–34)
MCH RBC QN AUTO: 21.1 PG (ref 26–34)
MCHC RBC AUTO-ENTMCNC: 29.4 G/DL (ref 30–36.5)
MCHC RBC AUTO-ENTMCNC: 29.5 G/DL (ref 30–36.5)
MCV RBC AUTO: 71.5 FL (ref 80–99)
MCV RBC AUTO: 71.5 FL (ref 80–99)
NRBC # BLD: 0 K/UL (ref 0–0.01)
NRBC # BLD: 0 K/UL (ref 0–0.01)
NRBC BLD-RTO: 0 PER 100 WBC
NRBC BLD-RTO: 0 PER 100 WBC
PLATELET # BLD AUTO: 128 K/UL (ref 150–400)
PLATELET # BLD AUTO: 144 K/UL (ref 150–400)
RBC # BLD AUTO: 4.85 M/UL (ref 4.1–5.7)
RBC # BLD AUTO: 5.64 M/UL (ref 4.1–5.7)
SAMPLES BEING HELD,HOLD: NORMAL
SERVICE CMNT-IMP: ABNORMAL
WBC # BLD AUTO: 11.6 K/UL (ref 4.1–11.1)
WBC # BLD AUTO: 12.8 K/UL (ref 4.1–11.1)

## 2019-08-30 PROCEDURE — 74011250637 HC RX REV CODE- 250/637: Performed by: INTERNAL MEDICINE

## 2019-08-30 PROCEDURE — 94760 N-INVAS EAR/PLS OXIMETRY 1: CPT

## 2019-08-30 PROCEDURE — 82962 GLUCOSE BLOOD TEST: CPT

## 2019-08-30 PROCEDURE — 85027 COMPLETE CBC AUTOMATED: CPT

## 2019-08-30 PROCEDURE — 65660000001 HC RM ICU INTERMED STEPDOWN

## 2019-08-30 PROCEDURE — 65660000000 HC RM CCU STEPDOWN

## 2019-08-30 PROCEDURE — 36415 COLL VENOUS BLD VENIPUNCTURE: CPT

## 2019-08-30 PROCEDURE — 74011636637 HC RX REV CODE- 636/637: Performed by: INTERNAL MEDICINE

## 2019-08-30 PROCEDURE — 97530 THERAPEUTIC ACTIVITIES: CPT

## 2019-08-30 PROCEDURE — 94640 AIRWAY INHALATION TREATMENT: CPT

## 2019-08-30 PROCEDURE — 74011000250 HC RX REV CODE- 250: Performed by: INTERNAL MEDICINE

## 2019-08-30 RX ORDER — CARVEDILOL 3.12 MG/1
3.12 TABLET ORAL 2 TIMES DAILY WITH MEALS
Status: DISCONTINUED | OUTPATIENT
Start: 2019-08-30 | End: 2019-09-03 | Stop reason: HOSPADM

## 2019-08-30 RX ADMIN — Medication 10 ML: at 21:31

## 2019-08-30 RX ADMIN — FUROSEMIDE 20 MG: 40 TABLET ORAL at 08:15

## 2019-08-30 RX ADMIN — LACTULOSE 45 ML: 20 SOLUTION ORAL at 21:31

## 2019-08-30 RX ADMIN — LACTULOSE 45 ML: 20 SOLUTION ORAL at 08:16

## 2019-08-30 RX ADMIN — Medication 10 ML: at 15:09

## 2019-08-30 RX ADMIN — CLONAZEPAM 1 MG: 0.5 TABLET ORAL at 21:31

## 2019-08-30 RX ADMIN — POTASSIUM CHLORIDE 20 MEQ: 750 TABLET, EXTENDED RELEASE ORAL at 08:15

## 2019-08-30 RX ADMIN — QUETIAPINE FUMARATE 25 MG: 25 TABLET ORAL at 21:31

## 2019-08-30 RX ADMIN — FAMOTIDINE 20 MG: 20 TABLET ORAL at 16:54

## 2019-08-30 RX ADMIN — ISOSORBIDE MONONITRATE 30 MG: 30 TABLET ORAL at 08:15

## 2019-08-30 RX ADMIN — ACETAMINOPHEN 650 MG: 325 TABLET, FILM COATED ORAL at 20:26

## 2019-08-30 RX ADMIN — ACETAMINOPHEN 650 MG: 325 TABLET, FILM COATED ORAL at 02:43

## 2019-08-30 RX ADMIN — LEVETIRACETAM 1000 MG: 500 TABLET, FILM COATED ORAL at 20:26

## 2019-08-30 RX ADMIN — ATORVASTATIN CALCIUM 80 MG: 40 TABLET, FILM COATED ORAL at 08:15

## 2019-08-30 RX ADMIN — CARVEDILOL 3.12 MG: 3.12 TABLET, FILM COATED ORAL at 16:54

## 2019-08-30 RX ADMIN — LACTULOSE 45 ML: 20 SOLUTION ORAL at 16:54

## 2019-08-30 RX ADMIN — VENLAFAXINE HYDROCHLORIDE 150 MG: 150 CAPSULE, EXTENDED RELEASE ORAL at 08:16

## 2019-08-30 RX ADMIN — TAMSULOSIN HYDROCHLORIDE 0.4 MG: 0.4 CAPSULE ORAL at 08:15

## 2019-08-30 RX ADMIN — LEVETIRACETAM 1000 MG: 500 TABLET, FILM COATED ORAL at 08:15

## 2019-08-30 RX ADMIN — AMIODARONE HYDROCHLORIDE 400 MG: 200 TABLET ORAL at 16:53

## 2019-08-30 RX ADMIN — FINASTERIDE 5 MG: 5 TABLET, FILM COATED ORAL at 07:04

## 2019-08-30 RX ADMIN — ACETAMINOPHEN 650 MG: 325 TABLET, FILM COATED ORAL at 07:02

## 2019-08-30 RX ADMIN — CARVEDILOL 6.25 MG: 6.25 TABLET, FILM COATED ORAL at 08:15

## 2019-08-30 RX ADMIN — PANTOPRAZOLE SODIUM 40 MG: 40 TABLET, DELAYED RELEASE ORAL at 07:02

## 2019-08-30 RX ADMIN — BUDESONIDE 500 MCG: 0.5 INHALANT RESPIRATORY (INHALATION) at 07:34

## 2019-08-30 RX ADMIN — INSULIN LISPRO 2 UNITS: 100 INJECTION, SOLUTION INTRAVENOUS; SUBCUTANEOUS at 11:14

## 2019-08-30 RX ADMIN — LISINOPRIL 2.5 MG: 5 TABLET ORAL at 08:15

## 2019-08-30 RX ADMIN — EPLERENONE 25 MG: 25 TABLET, FILM COATED ORAL at 08:15

## 2019-08-30 RX ADMIN — Medication 10 ML: at 07:02

## 2019-08-30 RX ADMIN — AMIODARONE HYDROCHLORIDE 400 MG: 200 TABLET ORAL at 08:16

## 2019-08-30 RX ADMIN — FAMOTIDINE 20 MG: 20 TABLET ORAL at 07:02

## 2019-08-30 NOTE — PROGRESS NOTES
1930: Bedside and Verbal shift change report given to Timothy Saez RN (oncoming nurse) by Rosa Fry RN (offgoing nurse). Report included the following information SBAR, Kardex, Intake/Output, MAR, Recent Results and Cardiac Rhythm NSR.   2030: Patient with ice pack to L subclavian PPM site. Upon assessment, hematoma was noted to be about the size of a tennis ball, tender to touch. VSS. No other patient complaints. PM site assessed, incision intact with no abnormalities. No recent labs to review. 2200: CCU RN at bedside to assess PM site. CCU RN agrees with ice pack to site. Pressure held to site for about 15 min. Will page on call MD.  0157: Spoke with Korin Roldan MD regarding hematoma. Orders received to ice the site and send a STAT CBC.   2310: CBC results stable. Size of Hematoma seems to be reducing. 0730: Bedside and Verbal shift change report given to [facility], RN (oncoming nurse) by Timothy Saez RN (offgoing nurse). Report included the following information SBAR, Kardex, Intake/Output, MAR, Recent Results and Cardiac Rhythm NSR.

## 2019-08-30 NOTE — PROGRESS NOTES
Physical Therapy  8/30/2019    Patient again orthostatic with OT this morning. Currently sleeping per RN. F/u later for PT as able. Note CM stated that patient accepted by [facility] for inpatient rehab but will not have bed available until Tuesday. Over the weekend, recommend patient up to the chair with gait belt and RN assist x1 with close watch on BP. Thank you.     Katt Escobar, PT, DPT

## 2019-08-30 NOTE — PROGRESS NOTES
S Cardiology Progress Note    Date of Service: 8/25/2019    Subjective:  Hematoma developed o/n.  Improved with o/n treatments    Objective:    Visit Vitals  /59   Pulse 61   Temp 97.8 °F (36.6 °C)   Resp 16   Ht 5' 9\" (1.753 m)   Wt 90.7 kg (200 lb)   SpO2 100%   BMI 29.53 kg/m²     Physical Exam  GEN: NAD  HEENT: EOMI, MMM, OP clear  NECK: No JVD  CV: RRR, normal S1 and S2, no M/R/G  Chest: ICD dsg c/d/i; site marked, some ecchymosis noted  LUNGS: CTAB, no W/R/R  ABD: NABS, soft, NT/ND  EXT: No edema, 2+ distal pulses  PSYCH: Mood and affect normal  NEURO: AAO, MAEW, face symmetrical, speech intact    Current Facility-Administered Medications   Medication Dose Route Frequency    carvedilol (COREG) tablet 3.125 mg  3.125 mg Oral BID WITH MEALS    acetaminophen (TYLENOL) tablet 650 mg  650 mg Oral Q4H PRN    lisinopril (PRINIVIL, ZESTRIL) tablet 2.5 mg  2.5 mg Oral DAILY    amiodarone (CORDARONE) tablet 400 mg  400 mg Oral BID    tamsulosin (FLOMAX) capsule 0.4 mg  0.4 mg Oral DAILY    potassium chloride SR (KLOR-CON 10) tablet 20 mEq  20 mEq Oral DAILY    levETIRAcetam (KEPPRA) tablet 1,000 mg  1,000 mg Oral BID    pantoprazole (PROTONIX) tablet 40 mg  40 mg Oral ACB    sodium chloride (NS) flush 5-40 mL  5-40 mL IntraVENous PRN    0.9% sodium chloride infusion  5 mL/hr IntraVENous CONTINUOUS    glucose chewable tablet 16 g  4 Tab Oral PRN    glucagon (GLUCAGEN) injection 1 mg  1 mg IntraMUSCular PRN    dextrose 10% infusion 125-250 mL  125-250 mL IntraVENous PRN    insulin lispro (HUMALOG) injection   SubCUTAneous AC&HS    labetalol (NORMODYNE;TRANDATE) injection 10 mg  10 mg IntraVENous Q3H PRN    budesonide (PULMICORT) 500 mcg/2 ml nebulizer suspension  500 mcg Nebulization BID RT    eplerenone (INSPRA) tablet 25 mg  25 mg Oral DAILY    atorvastatin (LIPITOR) tablet 80 mg  80 mg Oral DAILY    [Held by provider] glimepiride (AMARYL) tablet 4 mg  4 mg Oral BID    [Held by provider] ALPRAZolam (XANAX) tablet 1 mg  1 mg Oral BID    clonazePAM (KlonoPIN) tablet 1 mg  1 mg Oral QHS    aspirin chewable tablet 81 mg  81 mg Oral DAILY    [Held by provider] pregabalin (LYRICA) capsule 50 mg  50 mg Oral TID    famotidine (PEPCID) tablet 20 mg  20 mg Oral ACB&D    lactulose (CHRONULAC) 10 gram/15 mL solution 45 mL  30 g Oral TID    QUEtiapine (SEROquel) tablet 25 mg  25 mg Oral QHS    nitroglycerin (NITROSTAT) tablet 0.4 mg  0.4 mg SubLINGual Q5MIN PRN    [Held by provider] clopidogrel (PLAVIX) tablet 75 mg  75 mg Oral DAILY    isosorbide mononitrate ER (IMDUR) tablet 30 mg  30 mg Oral DAILY    venlafaxine-SR (EFFEXOR-XR) capsule 150 mg  150 mg Oral DAILY AFTER BREAKFAST    finasteride (PROSCAR) tablet 5 mg  5 mg Oral 7am    furosemide (LASIX) tablet 20 mg  20 mg Oral DAILY    sodium chloride (NS) flush 5-40 mL  5-40 mL IntraVENous Q8H    sodium chloride (NS) flush 5-40 mL  5-40 mL IntraVENous PRN       Data Reviewed:  Recent Results (from the past 12 hour(s))   CBC W/O DIFF    Collection Time: 08/30/19  2:46 AM   Result Value Ref Range    WBC 11.6 (H) 4.1 - 11.1 K/uL    RBC 4.85 4.10 - 5.70 M/uL    HGB 10.2 (L) 12.1 - 17.0 g/dL    HCT 34.7 (L) 36.6 - 50.3 %    MCV 71.5 (L) 80.0 - 99.0 FL    MCH 21.0 (L) 26.0 - 34.0 PG    MCHC 29.4 (L) 30.0 - 36.5 g/dL    RDW 19.3 (H) 11.5 - 14.5 %    PLATELET 546 (L) 059 - 400 K/uL    NRBC 0.0 0  WBC    ABSOLUTE NRBC 0.00 0.00 - 0.01 K/uL   SAMPLES BEING HELD    Collection Time: 08/30/19  2:46 AM   Result Value Ref Range    SAMPLES BEING HELD 1PST     COMMENT        Add-on orders for these samples will be processed based on acceptable specimen integrity and analyte stability, which may vary by analyte.    GLUCOSE, POC    Collection Time: 08/30/19  6:39 AM   Result Value Ref Range    Glucose (POC) 120 (H) 65 - 100 mg/dL    Performed by SATURNINO Romero    Collection Time: 08/30/19 11:11 AM   Result Value Ref Range    Glucose (POC) 144 (H) 65 - 100 mg/dL    Performed by Claire Cooneyutant    CBC W/O DIFF    Collection Time: 08/30/19 12:10 PM   Result Value Ref Range    WBC 12.8 (H) 4.1 - 11.1 K/uL    RBC 5.64 4.10 - 5.70 M/uL    HGB 11.9 (L) 12.1 - 17.0 g/dL    HCT 40.3 36.6 - 50.3 %    MCV 71.5 (L) 80.0 - 99.0 FL    MCH 21.1 (L) 26.0 - 34.0 PG    MCHC 29.5 (L) 30.0 - 36.5 g/dL    RDW 19.8 (H) 11.5 - 14.5 %    PLATELET 432 (L) 900 - 400 K/uL    NRBC 0.0 0  WBC    ABSOLUTE NRBC 0.00 0.00 - 0.01 K/uL       Assessment:  1. VT storm  2. Acute on chronic combined systolic and diastolic CHF with EF 55-52%  3. CAD s/p remote CABG and PCI  4. Acute hypoxic respiratory failure requiring mechanical ventilation, now extubated  5. Chronic renal insufficiency  6.  Respiratory alkalosis with metabolic acidosis    Plan:  Decreased BP meds for orthostasis  Holding DAPT for hematoma  Cont other cardiac meds  Serial CBC  Hold on rehab for now

## 2019-08-30 NOTE — PROGRESS NOTES
Problem: Self Care Deficits Care Plan (Adult)  Goal: *Acute Goals and Plan of Care (Insert Text)  Description  FUNCTIONAL STATUS PRIOR TO ADMISSION: Patient was modified independent using a Single point cane for functional mobility. Patient was modified independent for basic and instrumental ADLs. Does not drive. Wife passed away in June. Daughters perform all instrumental ADLs due to patient memory loss. HOME SUPPORT: The patient lived with daughter but did not require assist.    Occupational Therapy Goals  Initiated 8/28/2019  1. Patient will perform ADLs standing 5 mins without fatigue or LOB with supervision/set-up within 5 day(s). 2.  Patient will perform lower body ADLs with supervision/set-up within 5 day(s). 3.  Patient will perform gathering ADL items high and low 2/2 with supervision/set-up within 5 day(s). 4.  Patient will perform toilet transfers with supervision/set-up within 5 day(s). 5.  Patient will perform all aspects of toileting with supervision/set-up within 5 day(s). Outcome: Progressing Towards Goal    OCCUPATIONAL THERAPY TREATMENT  Patient: Cleopatra Moran (65 y.o. male)  Date: 8/30/2019  Diagnosis: [facility] [I47.2]  VT (ventricular tachycardia) ([facility]) [I47.2] <principal problem not specified>  Procedure(s) (LRB):  INSERT ICD BIV MULTI (N/A) 4 Days Post-Op  Precautions: Fall(ICD precautions L UE)  Chart, occupational therapy assessment, plan of care, and goals were reviewed. ASSESSMENT  Based on the objective data described below, pt presents with continued orthostatic hypotension in standing with continued c/o dizziness. With upright activity, pt also demonstrates increased postural swaying (posterior> anterior), increasing pt's risk for falls. Pt overall with fair verbal recall for PPM precautions but did better today with adherence to precautions during functional transfers. Assisted to chair with min A x 1 and use of SPC in R.  Pt adjusted socks via cross leg/ER with supervision. Continues to require constant support for balance and A with ADLs due to hypotension but pt remains motivated to regain his functional independence. RN present at bedside and informed of BP. Patient Vitals for the past 4 hrs:   Pulse Resp BP SpO2   08/30/19 1113 61 -- 110/59 sitting after 5 min --   08/30/19 1108 69 16 100/57 sitting 100 %   08/30/19 1107 68 -- (!) 85/59 standing --   08/30/19 1100 60 -- 142/65 supine --          Current Level of Function Impacting Discharge (ADLs): orthostatic and symptomatic with positional changes, impaired balance 2* hypotension and limited mobility due to such    Other factors to consider for discharge: lives alone         PLAN :  Patient continues to benefit from skilled intervention to address the above impairments. Continue treatment per established plan of care. to address goals. Recommend with staff: OOB to chair for all meals    Recommend next OT session: attempt bathroom mobility and standing ADLs with chair close 2* BP issues    Recommendation for discharge: (in order for the patient to meet his/her long term goals)  Therapy 3 hours per day 5-7 days per week    This discharge recommendation:  Has been made in collaboration with the attending provider and/or case management    Equipment recommendations for successful discharge (if) home: TBD       SUBJECTIVE:   Patient stated I do feel a little dizzy.     OBJECTIVE DATA SUMMARY:   Cognitive/Behavioral Status:  Neurologic State: Alert;Confused  Orientation Level: Oriented to person;Oriented to place;Oriented to situation;Disoriented to time  Cognition: Memory loss;Decreased command following;Decreased attention/concentration  Perception: Appears intact  Perseveration: No perseveration noted  Safety/Judgement: Awareness of environment; Fall prevention    Functional Mobility and Transfers for ADLs:  Bed Mobility:       Transfers:  Sit to Stand: Minimum assistance;Assist x1     Bed to Chair: Minimum assistance;Assist x1;Additional time(SPC R hand)    Balance:  Sitting: Intact; Without support  Standing: Impaired; With support  Standing - Static: Good  Standing - Dynamic : Fair    ADL Intervention:        Pt recalled one PPM precaution (not to lift L UE >90*) but did not recall precaution to avoid WB. Decreased verbal recall but improved adherence during functional transfers. Cognitive Retraining  Safety/Judgement: Awareness of environment; Fall prevention      Pain:  No c/o    Activity Tolerance:   Fair and signs and symptoms of orthostatic hypotension  Please refer to the flowsheet for vital signs taken during this treatment.     After treatment patient left in no apparent distress:   Sitting in chair and Call bell within reach    COMMUNICATION/COLLABORATION:   The patients plan of care was discussed with: Registered Nurse    Bernice Curiel OT  Time Calculation: 20 mins

## 2019-08-30 NOTE — PROGRESS NOTES
0815: will hold ASA and plavix and speak with dr Gwynne Kocher. 3847: s/w dr Gume Livingston. Will hold plavix and asa for today. 1930:Bedside and Verbal shift change report given to lis jameson rn (oncoming nurse) by martin wilson rn (offgoing nurse). Report included the following information SBAR, Kardex, ED Summary, Procedure Summary, Intake/Output, MAR and Recent Results.

## 2019-08-30 NOTE — PROGRESS NOTES
Problem: Falls - Risk of  Goal: *Absence of Falls  Description  Document Marci Dial Fall Risk and appropriate interventions in the flowsheet. Outcome: Progressing Towards Goal  Note:   Fall Risk Interventions:  Mobility Interventions: Communicate number of staff needed for ambulation/transfer, Assess mobility with egress test    Mentation Interventions: Door open when patient unattended    Medication Interventions: Evaluate medications/consider consulting pharmacy, Patient to call before getting OOB, Teach patient to arise slowly, Utilize gait belt for transfers/ambulation, Assess postural VS orthostatic hypotension, Bed/chair exit alarm    Elimination Interventions: Call light in reach, Elevated toilet seat, Patient to call for help with toileting needs, Stay With Me (per policy), Toilet paper/wipes in reach, Toileting schedule/hourly rounds    History of Falls Interventions: Investigate reason for fall, Door open when patient unattended         Problem: Patient Education: Go to Patient Education Activity  Goal: Patient/Family Education  Outcome: Progressing Towards Goal     Problem: Pressure Injury - Risk of  Goal: *Prevention of pressure injury  Description  Document Iván Scale and appropriate interventions in the flowsheet. Outcome: Progressing Towards Goal  Note:   Pressure Injury Interventions:  Sensory Interventions: Assess changes in LOC, Check visual cues for pain, Turn and reposition approx.  every two hours (pillows and wedges if needed)    Moisture Interventions: Absorbent underpads    Activity Interventions: Increase time out of bed, Pressure redistribution bed/mattress(bed type), PT/OT evaluation    Mobility Interventions: PT/OT evaluation, Pressure redistribution bed/mattress (bed type), HOB 30 degrees or less    Nutrition Interventions: Document food/fluid/supplement intake, Discuss nutritional consult with provider, Offer support with meals,snacks and hydration    Friction and Shear Interventions: HOB 30 degrees or less                Problem: Patient Education: Go to Patient Education Activity  Goal: Patient/Family Education  Outcome: Progressing Towards Goal

## 2019-08-30 NOTE — PROGRESS NOTES
CM updated that patient developed hematoma at PM site overnight and per cardiologits Iban Peterson) will not be discharged today. CM updated IPRs that are following patient -  1. [facility] - [facility]ir [facility] IPR - will keep patient on radar for Tuesday admission (no bed available)  2. [facility] - acknowledged and concern regarding patient still orthostatic    1400 - CM received call from Tyra. Patient denied by MD. Jess Fam will continue to follow for discharge disposition.     Alley Avila, MPH

## 2019-08-31 LAB
ANION GAP SERPL CALC-SCNC: 9 MMOL/L (ref 5–15)
BUN SERPL-MCNC: 33 MG/DL (ref 6–20)
BUN/CREAT SERPL: 22 (ref 12–20)
CALCIUM SERPL-MCNC: 7.7 MG/DL (ref 8.5–10.1)
CHLORIDE SERPL-SCNC: 107 MMOL/L (ref 97–108)
CO2 SERPL-SCNC: 21 MMOL/L (ref 21–32)
CREAT SERPL-MCNC: 1.53 MG/DL (ref 0.7–1.3)
ERYTHROCYTE [DISTWIDTH] IN BLOOD BY AUTOMATED COUNT: 19.9 % (ref 11.5–14.5)
GLUCOSE BLD STRIP.AUTO-MCNC: 109 MG/DL (ref 65–100)
GLUCOSE BLD STRIP.AUTO-MCNC: 122 MG/DL (ref 65–100)
GLUCOSE BLD STRIP.AUTO-MCNC: 141 MG/DL (ref 65–100)
GLUCOSE BLD STRIP.AUTO-MCNC: 151 MG/DL (ref 65–100)
GLUCOSE SERPL-MCNC: 95 MG/DL (ref 65–100)
HCT VFR BLD AUTO: 36.3 % (ref 36.6–50.3)
HGB BLD-MCNC: 10.7 G/DL (ref 12.1–17)
MCH RBC QN AUTO: 21.3 PG (ref 26–34)
MCHC RBC AUTO-ENTMCNC: 29.5 G/DL (ref 30–36.5)
MCV RBC AUTO: 72.2 FL (ref 80–99)
NRBC # BLD: 0 K/UL (ref 0–0.01)
NRBC BLD-RTO: 0 PER 100 WBC
PLATELET # BLD AUTO: 140 K/UL (ref 150–400)
POTASSIUM SERPL-SCNC: 3.8 MMOL/L (ref 3.5–5.1)
RBC # BLD AUTO: 5.03 M/UL (ref 4.1–5.7)
SERVICE CMNT-IMP: ABNORMAL
SODIUM SERPL-SCNC: 137 MMOL/L (ref 136–145)
WBC # BLD AUTO: 11.7 K/UL (ref 4.1–11.1)

## 2019-08-31 PROCEDURE — 65660000001 HC RM ICU INTERMED STEPDOWN

## 2019-08-31 PROCEDURE — 80048 BASIC METABOLIC PNL TOTAL CA: CPT

## 2019-08-31 PROCEDURE — 74011250637 HC RX REV CODE- 250/637: Performed by: INTERNAL MEDICINE

## 2019-08-31 PROCEDURE — 74011636637 HC RX REV CODE- 636/637: Performed by: INTERNAL MEDICINE

## 2019-08-31 PROCEDURE — 36415 COLL VENOUS BLD VENIPUNCTURE: CPT

## 2019-08-31 PROCEDURE — 82962 GLUCOSE BLOOD TEST: CPT

## 2019-08-31 PROCEDURE — 85027 COMPLETE CBC AUTOMATED: CPT

## 2019-08-31 PROCEDURE — 65660000000 HC RM CCU STEPDOWN

## 2019-08-31 RX ADMIN — QUETIAPINE FUMARATE 25 MG: 25 TABLET ORAL at 21:44

## 2019-08-31 RX ADMIN — FAMOTIDINE 20 MG: 20 TABLET ORAL at 06:50

## 2019-08-31 RX ADMIN — PANTOPRAZOLE SODIUM 40 MG: 40 TABLET, DELAYED RELEASE ORAL at 06:50

## 2019-08-31 RX ADMIN — CARVEDILOL 3.12 MG: 3.12 TABLET, FILM COATED ORAL at 17:11

## 2019-08-31 RX ADMIN — ATORVASTATIN CALCIUM 80 MG: 40 TABLET, FILM COATED ORAL at 08:19

## 2019-08-31 RX ADMIN — TAMSULOSIN HYDROCHLORIDE 0.4 MG: 0.4 CAPSULE ORAL at 08:19

## 2019-08-31 RX ADMIN — LEVETIRACETAM 1000 MG: 500 TABLET, FILM COATED ORAL at 21:44

## 2019-08-31 RX ADMIN — ACETAMINOPHEN 650 MG: 325 TABLET, FILM COATED ORAL at 08:32

## 2019-08-31 RX ADMIN — ACETAMINOPHEN 650 MG: 325 TABLET, FILM COATED ORAL at 21:44

## 2019-08-31 RX ADMIN — Medication 10 ML: at 06:50

## 2019-08-31 RX ADMIN — ISOSORBIDE MONONITRATE 30 MG: 30 TABLET ORAL at 08:19

## 2019-08-31 RX ADMIN — EPLERENONE 25 MG: 25 TABLET, FILM COATED ORAL at 08:19

## 2019-08-31 RX ADMIN — INSULIN LISPRO 2 UNITS: 100 INJECTION, SOLUTION INTRAVENOUS; SUBCUTANEOUS at 11:57

## 2019-08-31 RX ADMIN — POTASSIUM CHLORIDE 20 MEQ: 750 TABLET, EXTENDED RELEASE ORAL at 08:19

## 2019-08-31 RX ADMIN — AMIODARONE HYDROCHLORIDE 400 MG: 200 TABLET ORAL at 17:11

## 2019-08-31 RX ADMIN — Medication 10 ML: at 21:45

## 2019-08-31 RX ADMIN — FUROSEMIDE 20 MG: 40 TABLET ORAL at 08:18

## 2019-08-31 RX ADMIN — VENLAFAXINE HYDROCHLORIDE 150 MG: 150 CAPSULE, EXTENDED RELEASE ORAL at 08:18

## 2019-08-31 RX ADMIN — FAMOTIDINE 20 MG: 20 TABLET ORAL at 17:10

## 2019-08-31 RX ADMIN — LISINOPRIL 2.5 MG: 5 TABLET ORAL at 08:20

## 2019-08-31 RX ADMIN — CLONAZEPAM 1 MG: 0.5 TABLET ORAL at 21:44

## 2019-08-31 RX ADMIN — CARVEDILOL 3.12 MG: 3.12 TABLET, FILM COATED ORAL at 08:20

## 2019-08-31 RX ADMIN — LEVETIRACETAM 1000 MG: 500 TABLET, FILM COATED ORAL at 08:19

## 2019-08-31 RX ADMIN — AMIODARONE HYDROCHLORIDE 400 MG: 200 TABLET ORAL at 08:19

## 2019-08-31 RX ADMIN — FINASTERIDE 5 MG: 5 TABLET, FILM COATED ORAL at 06:50

## 2019-08-31 NOTE — PROGRESS NOTES
[facility] Cardiology Progress Note    Date of service: 8/31/2019    Subjective:  Mr Rakan Luther seems to be doing well this morning, with no new complaints. Objective:    Visit Vitals  /54 (BP 1 Location: Right arm, BP Patient Position: At rest)   Pulse 60   Temp 97.7 °F (36.5 °C)   Resp 16   Ht 5' 9\" (1.753 m)   Wt 88 kg (194 lb 0.1 oz)   SpO2 99%   BMI 28.65 kg/m²       Physical Exam   Constitutional: He is oriented to person, place, and time. He appears well-developed and well-nourished. HENT:   Head: Normocephalic and atraumatic. Eyes: Conjunctivae are normal. No scleral icterus. Neck: No JVD present. Cardiovascular: Normal rate, regular rhythm and normal heart sounds. Exam reveals no gallop. No murmur heard. Pulmonary/Chest: Effort normal and breath sounds normal. No stridor. No respiratory distress. He has no wheezes. He has no rales. Abdominal: Soft. He exhibits no distension. Musculoskeletal: He exhibits no edema or deformity. Neurological: He is alert and oriented to person, place, and time. Skin: Skin is warm and dry. Psychiatric: He has a normal mood and affect. His behavior is normal.       Data reviewed:  Recent Results (from the past 12 hour(s))   CBC W/O DIFF    Collection Time: 08/31/19  3:39 AM   Result Value Ref Range    WBC 11.7 (H) 4.1 - 11.1 K/uL    RBC 5.03 4. 10 - 5.70 M/uL    HGB 10.7 (L) 12.1 - 17.0 g/dL    HCT 36.3 (L) 36.6 - 50.3 %    MCV 72.2 (L) 80.0 - 99.0 FL    MCH 21.3 (L) 26.0 - 34.0 PG    MCHC 29.5 (L) 30.0 - 36.5 g/dL    RDW 19.9 (H) 11.5 - 14.5 %    PLATELET 839 (L) 222 - 400 K/uL    NRBC 0.0 0  WBC    ABSOLUTE NRBC 0.00 0.00 - 4.91 K/uL   METABOLIC PANEL, BASIC    Collection Time: 08/31/19  3:39 AM   Result Value Ref Range    Sodium 137 136 - 145 mmol/L    Potassium 3.8 3.5 - 5.1 mmol/L    Chloride 107 97 - 108 mmol/L    CO2 21 21 - 32 mmol/L    Anion gap 9 5 - 15 mmol/L    Glucose 95 65 - 100 mg/dL    BUN 33 (H) 6 - 20 MG/DL    Creatinine 1.53 (H) 0.70 - 1.30 MG/DL    BUN/Creatinine ratio 22 (H) 12 - 20      GFR est AA 56 (L) >60 ml/min/1.73m2    GFR est non-AA 46 (L) >60 ml/min/1.73m2    Calcium 7.7 (L) 8.5 - 10.1 MG/DL   GLUCOSE, POC    Collection Time: 08/31/19  6:01 AM   Result Value Ref Range    Glucose (POC) 109 (H) 65 - 100 mg/dL    Performed by Ilda Mallory      Telemetry: paced rhythm    Assessment:  1. Ventricular tachycardia: stabilized on amiodarone, now s/p ICD  2. Acute on chronic systolic heart failure  3. CAD s/p CABG - stable  4. CKD stage III    Plan:  Continue current meds  Awaiting rehab      Signed:  Ranulfo Huerta MD  Interventional Cardiology  8/31/2019

## 2019-08-31 NOTE — PROGRESS NOTES
1930: Report received from [facility]. Shift Summary: No acute issues. Patient received PRN Tylenol for left shoulder pain. Hematoma remains but not expanding beyond markings. Ice placed on shoulder for patient comfort. 2330: Bedside shift change report given to [address] (oncoming nurse) by Winchester Thaddeus (offgoing nurse). Report included the following information SBAR, Intake/Output, MAR, Recent Results and Cardiac Rhythm Paced.

## 2019-08-31 NOTE — PROGRESS NOTES
Bedside and Verbal shift change report given to Alvina Kendrick (oncoming nurse) by Trent Chung RN (offgoing nurse). Report included the following information SBAR, Kardex, ED Summary, OR Summary, Procedure Summary, Intake/Output, MAR, Accordion and Cardiac Rhythm Paced.

## 2019-09-01 LAB
GLUCOSE BLD STRIP.AUTO-MCNC: 104 MG/DL (ref 65–100)
GLUCOSE BLD STRIP.AUTO-MCNC: 112 MG/DL (ref 65–100)
GLUCOSE BLD STRIP.AUTO-MCNC: 140 MG/DL (ref 65–100)
GLUCOSE BLD STRIP.AUTO-MCNC: 167 MG/DL (ref 65–100)
SERVICE CMNT-IMP: ABNORMAL

## 2019-09-01 PROCEDURE — 74011250637 HC RX REV CODE- 250/637: Performed by: INTERNAL MEDICINE

## 2019-09-01 PROCEDURE — 82962 GLUCOSE BLOOD TEST: CPT

## 2019-09-01 PROCEDURE — 65660000001 HC RM ICU INTERMED STEPDOWN

## 2019-09-01 PROCEDURE — 74011636637 HC RX REV CODE- 636/637: Performed by: INTERNAL MEDICINE

## 2019-09-01 RX ADMIN — Medication 10 ML: at 21:10

## 2019-09-01 RX ADMIN — CARVEDILOL 3.12 MG: 3.12 TABLET, FILM COATED ORAL at 08:12

## 2019-09-01 RX ADMIN — LISINOPRIL 2.5 MG: 5 TABLET ORAL at 08:12

## 2019-09-01 RX ADMIN — INSULIN LISPRO 2 UNITS: 100 INJECTION, SOLUTION INTRAVENOUS; SUBCUTANEOUS at 13:05

## 2019-09-01 RX ADMIN — POTASSIUM CHLORIDE 20 MEQ: 750 TABLET, EXTENDED RELEASE ORAL at 08:11

## 2019-09-01 RX ADMIN — PANTOPRAZOLE SODIUM 40 MG: 40 TABLET, DELAYED RELEASE ORAL at 06:30

## 2019-09-01 RX ADMIN — LEVETIRACETAM 1000 MG: 500 TABLET, FILM COATED ORAL at 08:11

## 2019-09-01 RX ADMIN — AMIODARONE HYDROCHLORIDE 400 MG: 200 TABLET ORAL at 17:15

## 2019-09-01 RX ADMIN — ASPIRIN 81 MG CHEWABLE TABLET 81 MG: 81 TABLET CHEWABLE at 08:11

## 2019-09-01 RX ADMIN — TAMSULOSIN HYDROCHLORIDE 0.4 MG: 0.4 CAPSULE ORAL at 08:12

## 2019-09-01 RX ADMIN — Medication 10 ML: at 13:06

## 2019-09-01 RX ADMIN — ATORVASTATIN CALCIUM 80 MG: 40 TABLET, FILM COATED ORAL at 08:11

## 2019-09-01 RX ADMIN — FAMOTIDINE 20 MG: 20 TABLET ORAL at 06:30

## 2019-09-01 RX ADMIN — AMIODARONE HYDROCHLORIDE 400 MG: 200 TABLET ORAL at 08:11

## 2019-09-01 RX ADMIN — Medication 10 ML: at 06:30

## 2019-09-01 RX ADMIN — FUROSEMIDE 20 MG: 40 TABLET ORAL at 08:11

## 2019-09-01 RX ADMIN — FAMOTIDINE 20 MG: 20 TABLET ORAL at 16:43

## 2019-09-01 RX ADMIN — ISOSORBIDE MONONITRATE 30 MG: 30 TABLET ORAL at 08:11

## 2019-09-01 RX ADMIN — INSULIN LISPRO 2 UNITS: 100 INJECTION, SOLUTION INTRAVENOUS; SUBCUTANEOUS at 16:44

## 2019-09-01 RX ADMIN — VENLAFAXINE HYDROCHLORIDE 150 MG: 150 CAPSULE, EXTENDED RELEASE ORAL at 08:11

## 2019-09-01 RX ADMIN — FINASTERIDE 5 MG: 5 TABLET, FILM COATED ORAL at 06:35

## 2019-09-01 RX ADMIN — LEVETIRACETAM 1000 MG: 500 TABLET, FILM COATED ORAL at 21:10

## 2019-09-01 RX ADMIN — EPLERENONE 25 MG: 25 TABLET, FILM COATED ORAL at 08:11

## 2019-09-01 RX ADMIN — LACTULOSE 45 ML: 20 SOLUTION ORAL at 21:10

## 2019-09-01 RX ADMIN — CLONAZEPAM 1 MG: 0.5 TABLET ORAL at 21:10

## 2019-09-01 RX ADMIN — CARVEDILOL 3.12 MG: 3.12 TABLET, FILM COATED ORAL at 17:15

## 2019-09-01 RX ADMIN — QUETIAPINE FUMARATE 25 MG: 25 TABLET ORAL at 21:10

## 2019-09-01 NOTE — PROGRESS NOTES
[facility] Cardiology Progress Note    Date of service: 9/1/2019    Subjective:  Mr Adriano Nagy has no new complaints. Feels fine. Objective:    Visit Vitals  /67 (BP 1 Location: Right arm, BP Patient Position: At rest;Supine)   Pulse 62   Temp 97 °F (36.1 °C)   Resp 16   Ht 5' 9\" (1.753 m)   Wt 89.2 kg (196 lb 10.4 oz)   SpO2 98%   BMI 29.04 kg/m²       Physical Exam   Constitutional: He is oriented to person, place, and time. He appears well-developed and well-nourished. HENT:   Head: Normocephalic and atraumatic. Eyes: Conjunctivae are normal. No scleral icterus. Neck: No JVD present. Cardiovascular: Normal rate, regular rhythm and normal heart sounds. Exam reveals no gallop. No murmur heard. Pulmonary/Chest: Effort normal and breath sounds normal. No stridor. No respiratory distress. He has no wheezes. He has no rales. Abdominal: Soft. He exhibits no distension. Musculoskeletal: He exhibits no edema or deformity. Neurological: He is alert and oriented to person, place, and time. Skin: Skin is warm and dry. Psychiatric: He has a normal mood and affect. His behavior is normal.       Data reviewed:  Recent Results (from the past 12 hour(s))   GLUCOSE, POC    Collection Time: 08/31/19  9:35 PM   Result Value Ref Range    Glucose (POC) 151 (H) 65 - 100 mg/dL    Performed by Shayna Anderson, POC    Collection Time: 09/01/19  6:25 AM   Result Value Ref Range    Glucose (POC) 112 (H) 65 - 100 mg/dL    Performed by Cristina Reese      Telemetry: paced rhythm    Assessment:  1. Ventricular tachycardia: stabilized on amiodarone, now s/p ICD  2. Acute on chronic systolic heart failure  3. CAD s/p CABG - stable  4. CKD stage III    Plan:  Continue current meds  Awaiting rehab    Signed:  Laquita Alfonso MD  Interventional Cardiology  9/1/2019

## 2019-09-01 NOTE — PROGRESS NOTES
0730: Bedside shift change report given to Laney MACIEL (oncoming nurse) by Bret Ro (offgoing nurse). Report included the following information SBAR, Kardex, Intake/Output and MAR.     1930: Bedside shift change report given to Bret Ro (oncoming nurse) by   Kell Monk RN (offgoing nurse). Report included the following information SBAR, Kardex, Procedure Summary, Intake/Output, MAR and Recent Results. Problem: Falls - Risk of  Goal: *Absence of Falls  Description  Document Kori Mackenzie Fall Risk and appropriate interventions in the flowsheet. Outcome: Progressing Towards Goal  Note:   Fall Risk Interventions:  Mobility Interventions: Patient to call before getting OOB, PT Consult for mobility concerns    Mentation Interventions: Adequate sleep, hydration, pain control    Medication Interventions: Assess postural VS orthostatic hypotension, Patient to call before getting OOB    Elimination Interventions: Call light in reach, Toilet paper/wipes in reach    History of Falls Interventions: Investigate reason for fall, Door open when patient unattended, Evaluate medications/consider consulting pharmacy, Utilize gait belt for transfer/ambulation         Problem: Patient Education: Go to Patient Education Activity  Goal: Patient/Family Education  Outcome: Progressing Towards Goal     Problem: Pressure Injury - Risk of  Goal: *Prevention of pressure injury  Description  Document Iván Scale and appropriate interventions in the flowsheet. Outcome: Progressing Towards Goal  Note:   Pressure Injury Interventions:  Sensory Interventions: Assess changes in LOC, Assess need for specialty bed, Chair cushion, Check visual cues for pain, Maintain/enhance activity level, Monitor skin under medical devices, Pad between skin to skin, Pressure redistribution bed/mattress (bed type), Sit a 90-degree angle/use footstool if needed, Turn and reposition approx.  every two hours (pillows and wedges if needed), Use 30-degree side-lying position    Moisture Interventions: Apply protective barrier, creams and emollients, Assess need for specialty bed, Limit adult briefs, Maintain skin hydration (lotion/cream), Minimize layers, Offer toileting Q_hr    Activity Interventions: Increase time out of bed, Pressure redistribution bed/mattress(bed type)    Mobility Interventions: HOB 30 degrees or less, Pressure redistribution bed/mattress (bed type)    Nutrition Interventions: Offer support with meals,snacks and hydration, Discuss nutritional consult with provider, Document food/fluid/supplement intake    Friction and Shear Interventions: Minimize layers                Problem: Patient Education: Go to Patient Education Activity  Goal: Patient/Family Education  Outcome: Progressing Towards Goal     Problem: Arrhythmia Pathway (Adult)  Goal: *Absence of arrhythmia  Outcome: Progressing Towards Goal     Problem: Heart Failure: Discharge Outcomes  Goal: *Verbalizes understanding and describes prescribed diet  Outcome: Progressing Towards Goal

## 2019-09-01 NOTE — PROGRESS NOTES
1930 Bedside shift change report given to [facility], RN and Marylen Button (oncoming nurse) by Dino Glass RN (offgoing nurse). Report included the following information SBAR, Kardex, Intake/Output, MAR, Recent Results and Cardiac Rhythm V Paced . Patient had uneventful shift. 0730 Bedside shift change report given to JANELL Davison (oncoming nurse) by [facility], RN (offgoing nurse). Report included the following information SBAR, Kardex, Intake/Output, MAR, Recent Results and Cardiac Rhythm Paced. Problem: Falls - Risk of  Goal: *Absence of Falls  Description  Document Nesha Sellers Fall Risk and appropriate interventions in the flowsheet. Outcome: Progressing Towards Goal  Note:   Fall Risk Interventions:  Mobility Interventions: Patient to call before getting OOB, PT Consult for mobility concerns, Communicate number of staff needed for ambulation/transfer    Mentation Interventions: Adequate sleep, hydration, pain control    Medication Interventions: Patient to call before getting OOB, Assess postural VS orthostatic hypotension    Elimination Interventions: Call light in reach    History of Falls Interventions: Investigate reason for fall, Door open when patient unattended, Evaluate medications/consider consulting pharmacy, Utilize gait belt for transfer/ambulation         Problem: Pressure Injury - Risk of  Goal: *Prevention of pressure injury  Description  Document Iván Scale and appropriate interventions in the flowsheet. Outcome: Progressing Towards Goal  Note:   Pressure Injury Interventions:  Sensory Interventions: Assess changes in LOC, Assess need for specialty bed, Chair cushion, Check visual cues for pain, Maintain/enhance activity level, Monitor skin under medical devices, Pad between skin to skin, Pressure redistribution bed/mattress (bed type), Sit a 90-degree angle/use footstool if needed, Turn and reposition approx.  every two hours (pillows and wedges if needed), Use 30-degree side-lying position    Moisture Interventions: Apply protective barrier, creams and emollients, Assess need for specialty bed, Limit adult briefs, Maintain skin hydration (lotion/cream), Minimize layers, Offer toileting Q_hr    Activity Interventions: Increase time out of bed    Mobility Interventions: PT/OT evaluation    Nutrition Interventions: Document food/fluid/supplement intake    Friction and Shear Interventions: Minimize layers                Problem: Arrhythmia Pathway (Adult)  Goal: *Absence of arrhythmia  Outcome: Progressing Towards Goal     Problem: Heart Failure: Discharge Outcomes  Goal: *Demonstrates ability to perform prescribed activity without shortness of breath or discomfort  Outcome: Progressing Towards Goal  Goal: *Left ventricular function assessment completed prior to or during stay, or planned for post-discharge  Outcome: Progressing Towards Goal  Goal: *ACEI prescribed if LVEF less than 40% and no contraindications or ARB prescribed  Outcome: Progressing Towards Goal  Goal: *Verbalizes understanding and describes prescribed diet  Outcome: Progressing Towards Goal  Goal: *Verbalizes understanding/describes prescribed medications  Outcome: Progressing Towards Goal  Goal: *Describes available resources and support systems  Description  (eg: Home Health, Palliative Care, Advanced Medical Directive)  Outcome: Progressing Towards Goal  Goal: *Describes smoking cessation resources  Outcome: Progressing Towards Goal  Goal: *Describes importance of continuing daily weights and changes to report to physician  Outcome: Progressing Towards Goal     Problem: Pacer/ICD: Discharge Outcomes  Goal: *Hemodynamically stable  Outcome: Progressing Towards Goal  Goal: *Stable cardiac rhythm  Outcome: Progressing Towards Goal  Goal: *Lungs clear or at baseline  Outcome: Progressing Towards Goal  Goal: *Demonstrates ability to perform prescribed activity without shortness of breath or discomfort  Outcome: Progressing Towards Goal  Goal: *Verbalizes home exercise program, activity guidelines, cardiac precautions  Outcome: Progressing Towards Goal  Goal: *Verbalizes understanding and describes prescribed diet  Outcome: Progressing Towards Goal  Goal: *Verbalizes understanding and describes medication purposes and frequencies  Outcome: Progressing Towards Goal  Goal: *Identifies cardiac risk factors  Outcome: Progressing Towards Goal  Goal: *No signs and symptoms of infection or wound complications  Outcome: Progressing Towards Goal

## 2019-09-02 LAB
GLUCOSE BLD STRIP.AUTO-MCNC: 105 MG/DL (ref 65–100)
GLUCOSE BLD STRIP.AUTO-MCNC: 125 MG/DL (ref 65–100)
GLUCOSE BLD STRIP.AUTO-MCNC: 152 MG/DL (ref 65–100)
GLUCOSE BLD STRIP.AUTO-MCNC: 167 MG/DL (ref 65–100)
SERVICE CMNT-IMP: ABNORMAL

## 2019-09-02 PROCEDURE — 74011250637 HC RX REV CODE- 250/637: Performed by: INTERNAL MEDICINE

## 2019-09-02 PROCEDURE — 65660000001 HC RM ICU INTERMED STEPDOWN

## 2019-09-02 PROCEDURE — 74011636637 HC RX REV CODE- 636/637: Performed by: INTERNAL MEDICINE

## 2019-09-02 PROCEDURE — 82962 GLUCOSE BLOOD TEST: CPT

## 2019-09-02 RX ADMIN — PANTOPRAZOLE SODIUM 40 MG: 40 TABLET, DELAYED RELEASE ORAL at 06:56

## 2019-09-02 RX ADMIN — ATORVASTATIN CALCIUM 80 MG: 40 TABLET, FILM COATED ORAL at 08:37

## 2019-09-02 RX ADMIN — CARVEDILOL 3.12 MG: 3.12 TABLET, FILM COATED ORAL at 16:05

## 2019-09-02 RX ADMIN — LEVETIRACETAM 1000 MG: 500 TABLET, FILM COATED ORAL at 08:37

## 2019-09-02 RX ADMIN — CARVEDILOL 3.12 MG: 3.12 TABLET, FILM COATED ORAL at 08:38

## 2019-09-02 RX ADMIN — LACTULOSE 45 ML: 20 SOLUTION ORAL at 08:38

## 2019-09-02 RX ADMIN — INSULIN LISPRO 2 UNITS: 100 INJECTION, SOLUTION INTRAVENOUS; SUBCUTANEOUS at 12:43

## 2019-09-02 RX ADMIN — FAMOTIDINE 20 MG: 20 TABLET ORAL at 06:56

## 2019-09-02 RX ADMIN — EPLERENONE 25 MG: 25 TABLET, FILM COATED ORAL at 08:38

## 2019-09-02 RX ADMIN — POTASSIUM CHLORIDE 20 MEQ: 750 TABLET, EXTENDED RELEASE ORAL at 08:38

## 2019-09-02 RX ADMIN — TAMSULOSIN HYDROCHLORIDE 0.4 MG: 0.4 CAPSULE ORAL at 08:38

## 2019-09-02 RX ADMIN — VENLAFAXINE HYDROCHLORIDE 150 MG: 150 CAPSULE, EXTENDED RELEASE ORAL at 08:38

## 2019-09-02 RX ADMIN — AMIODARONE HYDROCHLORIDE 400 MG: 200 TABLET ORAL at 17:50

## 2019-09-02 RX ADMIN — LACTULOSE 45 ML: 20 SOLUTION ORAL at 16:05

## 2019-09-02 RX ADMIN — LEVETIRACETAM 1000 MG: 500 TABLET, FILM COATED ORAL at 21:28

## 2019-09-02 RX ADMIN — CLONAZEPAM 1 MG: 0.5 TABLET ORAL at 21:28

## 2019-09-02 RX ADMIN — LISINOPRIL 2.5 MG: 5 TABLET ORAL at 08:38

## 2019-09-02 RX ADMIN — Medication 10 ML: at 06:59

## 2019-09-02 RX ADMIN — QUETIAPINE FUMARATE 25 MG: 25 TABLET ORAL at 21:28

## 2019-09-02 RX ADMIN — ASPIRIN 81 MG CHEWABLE TABLET 81 MG: 81 TABLET CHEWABLE at 08:38

## 2019-09-02 RX ADMIN — FINASTERIDE 5 MG: 5 TABLET, FILM COATED ORAL at 06:57

## 2019-09-02 RX ADMIN — FUROSEMIDE 20 MG: 40 TABLET ORAL at 08:37

## 2019-09-02 RX ADMIN — INSULIN LISPRO 2 UNITS: 100 INJECTION, SOLUTION INTRAVENOUS; SUBCUTANEOUS at 17:50

## 2019-09-02 RX ADMIN — ISOSORBIDE MONONITRATE 30 MG: 30 TABLET ORAL at 08:37

## 2019-09-02 RX ADMIN — FAMOTIDINE 20 MG: 20 TABLET ORAL at 16:05

## 2019-09-02 RX ADMIN — Medication 10 ML: at 23:22

## 2019-09-02 RX ADMIN — LACTULOSE 45 ML: 20 SOLUTION ORAL at 21:29

## 2019-09-02 RX ADMIN — AMIODARONE HYDROCHLORIDE 400 MG: 200 TABLET ORAL at 08:37

## 2019-09-02 NOTE — PROGRESS NOTES
[facility] Cardiology Progress Note    Date of service: 9/2/2019    Subjective:  Doing well. Luiz Mealing for DC    Objective:    Visit Vitals  /59 (BP 1 Location: Right arm, BP Patient Position: At rest)   Pulse 70   Temp 98.7 °F (37.1 °C)   Resp 18   Ht 5' 9\" (1.753 m)   Wt 87 kg (191 lb 12.8 oz)   SpO2 98%   BMI 28.32 kg/m²       Physical Exam   Constitutional: He is oriented to person, place, and time. He appears well-developed and well-nourished. HENT:   Head: Normocephalic and atraumatic. Eyes: Conjunctivae are normal. No scleral icterus. Neck: No JVD present. Cardiovascular: Normal rate, regular rhythm and normal heart sounds. Exam reveals no gallop. No murmur heard. Pulmonary/Chest: Effort normal and breath sounds normal. No stridor. No respiratory distress. He has no wheezes. He has no rales. Abdominal: Soft. He exhibits no distension. Musculoskeletal: He exhibits no edema or deformity. Neurological: He is alert and oriented to person, place, and time. Skin: Skin is warm and dry. Psychiatric: He has a normal mood and affect. His behavior is normal.       Data reviewed:  Recent Results (from the past 12 hour(s))   GLUCOSE, POC    Collection Time: 09/02/19  6:28 AM   Result Value Ref Range    Glucose (POC) 105 (H) 65 - 100 mg/dL    Performed by Lou DANIEL (CON)    GLUCOSE, POC    Collection Time: 09/02/19 11:21 AM   Result Value Ref Range    Glucose (POC) 167 (H) 65 - 100 mg/dL    Performed by Micheal FLEMING (CON)      Telemetry: paced rhythm    Assessment:  1. Ventricular tachycardia: stabilized on amiodarone, now s/p ICD  2. Acute on chronic systolic heart failure  3. CAD s/p CABG - stable  4.  CKD stage III    Plan:  No changes to meds  Hopeful DC to rehab tomorrow

## 2019-09-02 NOTE — PROGRESS NOTES
Problem: Falls - Risk of  Goal: *Absence of Falls  Description  Document Rena Payment Fall Risk and appropriate interventions in the flowsheet. Outcome: Progressing Towards Goal  Note:   Fall Risk Interventions:  Mobility Interventions: OT consult for ADLs, Patient to call before getting OOB, PT Consult for mobility concerns, PT Consult for assist device competence, Utilize walker, cane, or other assistive device, Utilize gait belt for transfers/ambulation    Mentation Interventions: Adequate sleep, hydration, pain control    Medication Interventions: Patient to call before getting OOB, Utilize gait belt for transfers/ambulation    Elimination Interventions: Call light in reach    History of Falls Interventions: Utilize gait belt for transfer/ambulation         Problem: Pressure Injury - Risk of  Goal: *Prevention of pressure injury  Description  Document Iván Scale and appropriate interventions in the flowsheet. Outcome: Progressing Towards Goal  Note:   Pressure Injury Interventions:  Sensory Interventions: Assess changes in LOC, Assess need for specialty bed, Chair cushion, Check visual cues for pain, Maintain/enhance activity level, Monitor skin under medical devices, Pad between skin to skin, Pressure redistribution bed/mattress (bed type), Sit a 90-degree angle/use footstool if needed, Turn and reposition approx.  every two hours (pillows and wedges if needed), Use 30-degree side-lying position    Moisture Interventions: Apply protective barrier, creams and emollients, Assess need for specialty bed, Limit adult briefs, Maintain skin hydration (lotion/cream), Minimize layers, Offer toileting Q_hr    Activity Interventions: Increase time out of bed, PT/OT evaluation    Mobility Interventions: PT/OT evaluation    Nutrition Interventions: Document food/fluid/supplement intake    Friction and Shear Interventions: Minimize layers                Problem: Arrhythmia Pathway (Adult)  Goal: *Absence of arrhythmia  Outcome: Progressing Towards Goal     Problem: Heart Failure: Discharge Outcomes  Goal: *Demonstrates ability to perform prescribed activity without shortness of breath or discomfort  Outcome: Progressing Towards Goal  Goal: *Left ventricular function assessment completed prior to or during stay, or planned for post-discharge  Outcome: Progressing Towards Goal  Goal: *Verbalizes understanding and describes prescribed diet  Outcome: Progressing Towards Goal  Goal: *Verbalizes understanding/describes prescribed medications  Outcome: Progressing Towards Goal  Goal: *Describes available resources and support systems  Description  (eg: Home Health, Palliative Care, Advanced Medical Directive)  Outcome: Progressing Towards Goal  Goal: *Describes smoking cessation resources  Outcome: Progressing Towards Goal     Problem: Pacer/ICD: Post-Procedure  Goal: Activity/Safety  Outcome: Progressing Towards Goal  Goal: Diagnostic Test/Procedures  Outcome: Progressing Towards Goal  Goal: Nutrition/Diet  Outcome: Progressing Towards Goal  Goal: Discharge Planning  Outcome: Progressing Towards Goal  Goal: Medications  Outcome: Progressing Towards Goal  Goal: Respiratory  Outcome: Progressing Towards Goal  Goal: Treatments/Interventions/Procedures  Outcome: Progressing Towards Goal  Goal: Psychosocial  Outcome: Progressing Towards Goal

## 2019-09-02 NOTE — PROGRESS NOTES
0730: Bedside shift change report given to Laney MACIEL (oncoming nurse) by Chantel Villa (offgoing nurse). Report included the following information SBAR, Kardex, Procedure Summary, Intake/Output, MAR and Recent Results. Pt had uneventful shift. 1930: Bedside shift change report given to 6[address] IAN Castillo (oncoming nurse) by Adrian Uriarte RN (offgoing nurse). Report included the following information SBAR, Kardex, Procedure Summary, Intake/Output, MAR and Recent Results. Problem: Falls - Risk of  Goal: *Absence of Falls  Description  Document Selam You Fall Risk and appropriate interventions in the flowsheet. Outcome: Progressing Towards Goal  Note:   Fall Risk Interventions:  Mobility Interventions: Communicate number of staff needed for ambulation/transfer, PT Consult for mobility concerns, Patient to call before getting OOB    Mentation Interventions: Adequate sleep, hydration, pain control    Medication Interventions: Assess postural VS orthostatic hypotension, Teach patient to arise slowly, Patient to call before getting OOB    Elimination Interventions: Call light in reach    History of Falls Interventions: Consult care management for discharge planning, Room close to nurse's station         Problem: Patient Education: Go to Patient Education Activity  Goal: Patient/Family Education  Outcome: Progressing Towards Goal     Problem: Pressure Injury - Risk of  Goal: *Prevention of pressure injury  Description  Document Iván Scale and appropriate interventions in the flowsheet. Outcome: Progressing Towards Goal  Note:   Pressure Injury Interventions:  Sensory Interventions: Assess changes in LOC, Assess need for specialty bed, Chair cushion, Check visual cues for pain, Maintain/enhance activity level, Monitor skin under medical devices, Pad between skin to skin, Pressure redistribution bed/mattress (bed type), Sit a 90-degree angle/use footstool if needed, Turn and reposition approx.  every two hours (pillows and wedges if needed), Use 30-degree side-lying position    Moisture Interventions: Apply protective barrier, creams and emollients, Assess need for specialty bed, Limit adult briefs, Maintain skin hydration (lotion/cream), Minimize layers, Offer toileting Q_hr    Activity Interventions: Increase time out of bed, Pressure redistribution bed/mattress(bed type), PT/OT evaluation    Mobility Interventions: HOB 30 degrees or less, Pressure redistribution bed/mattress (bed type)    Nutrition Interventions: Discuss nutritional consult with provider, Document food/fluid/supplement intake    Friction and Shear Interventions: Minimize layers                Problem: Patient Education: Go to Patient Education Activity  Goal: Patient/Family Education  Outcome: Progressing Towards Goal     Problem: Heart Failure: Discharge Outcomes  Goal: *Verbalizes understanding and describes prescribed diet  Outcome: Progressing Towards Goal

## 2019-09-02 NOTE — PROGRESS NOTES
1930 Bedside shift change report given to Shannan Spears RN (oncoming nurse) by Kathie Armendariz RN (offgoing nurse). Report included the following information SBAR, Kardex, Intake/Output, MAR, Recent Results and Cardiac Rhythm Paced. Patient had an uneventful shift      0730 Bedside shift change report given to JANELL Davison (oncoming nurse) by Shannan Spears RN (offgoing nurse). Report included the following information SBAR, Kardex, Intake/Output, MAR, Recent Results and Cardiac Rhythm Paced.

## 2019-09-03 ENCOUNTER — DOCUMENTATION ONLY (OUTPATIENT)
Dept: CASE MANAGEMENT | Age: 64
End: 2019-09-03

## 2019-09-03 VITALS
DIASTOLIC BLOOD PRESSURE: 69 MMHG | OXYGEN SATURATION: 100 % | TEMPERATURE: 97.9 F | WEIGHT: 196.21 LBS | BODY MASS INDEX: 29.06 KG/M2 | HEIGHT: 69 IN | SYSTOLIC BLOOD PRESSURE: 118 MMHG | RESPIRATION RATE: 18 BRPM | HEART RATE: 76 BPM

## 2019-09-03 LAB
GLUCOSE BLD STRIP.AUTO-MCNC: 107 MG/DL (ref 65–100)
GLUCOSE BLD STRIP.AUTO-MCNC: 150 MG/DL (ref 65–100)
SERVICE CMNT-IMP: ABNORMAL
SERVICE CMNT-IMP: ABNORMAL

## 2019-09-03 PROCEDURE — 74011250637 HC RX REV CODE- 250/637: Performed by: INTERNAL MEDICINE

## 2019-09-03 PROCEDURE — 97535 SELF CARE MNGMENT TRAINING: CPT

## 2019-09-03 PROCEDURE — 82962 GLUCOSE BLOOD TEST: CPT

## 2019-09-03 PROCEDURE — 74011636637 HC RX REV CODE- 636/637: Performed by: INTERNAL MEDICINE

## 2019-09-03 PROCEDURE — 97116 GAIT TRAINING THERAPY: CPT

## 2019-09-03 RX ORDER — AMIODARONE HYDROCHLORIDE 200 MG/1
200 TABLET ORAL DAILY
Qty: 30 TAB | Refills: 0 | Status: SHIPPED | OUTPATIENT
Start: 2019-09-04 | End: 2020-08-11

## 2019-09-03 RX ORDER — AMIODARONE HYDROCHLORIDE 200 MG/1
200 TABLET ORAL DAILY
Status: DISCONTINUED | OUTPATIENT
Start: 2019-09-04 | End: 2019-09-03 | Stop reason: HOSPADM

## 2019-09-03 RX ORDER — LISINOPRIL 2.5 MG/1
2.5 TABLET ORAL DAILY
Qty: 30 TAB | Refills: 0 | Status: SHIPPED | OUTPATIENT
Start: 2019-09-04 | End: 2020-05-24

## 2019-09-03 RX ORDER — CARVEDILOL 3.12 MG/1
3.12 TABLET ORAL 2 TIMES DAILY WITH MEALS
Qty: 30 TAB | Refills: 0 | Status: ON HOLD | OUTPATIENT
Start: 2019-09-03 | End: 2020-07-14

## 2019-09-03 RX ADMIN — VENLAFAXINE HYDROCHLORIDE 150 MG: 150 CAPSULE, EXTENDED RELEASE ORAL at 08:39

## 2019-09-03 RX ADMIN — INSULIN LISPRO 2 UNITS: 100 INJECTION, SOLUTION INTRAVENOUS; SUBCUTANEOUS at 12:26

## 2019-09-03 RX ADMIN — CARVEDILOL 3.12 MG: 3.12 TABLET, FILM COATED ORAL at 08:39

## 2019-09-03 RX ADMIN — FINASTERIDE 5 MG: 5 TABLET, FILM COATED ORAL at 07:10

## 2019-09-03 RX ADMIN — ATORVASTATIN CALCIUM 80 MG: 40 TABLET, FILM COATED ORAL at 08:39

## 2019-09-03 RX ADMIN — EPLERENONE 25 MG: 25 TABLET, FILM COATED ORAL at 08:39

## 2019-09-03 RX ADMIN — ASPIRIN 81 MG CHEWABLE TABLET 81 MG: 81 TABLET CHEWABLE at 08:39

## 2019-09-03 RX ADMIN — PANTOPRAZOLE SODIUM 40 MG: 40 TABLET, DELAYED RELEASE ORAL at 07:06

## 2019-09-03 RX ADMIN — FUROSEMIDE 20 MG: 40 TABLET ORAL at 08:39

## 2019-09-03 RX ADMIN — LEVETIRACETAM 1000 MG: 500 TABLET, FILM COATED ORAL at 08:38

## 2019-09-03 RX ADMIN — LACTULOSE 45 ML: 20 SOLUTION ORAL at 08:39

## 2019-09-03 RX ADMIN — AMIODARONE HYDROCHLORIDE 400 MG: 200 TABLET ORAL at 08:39

## 2019-09-03 RX ADMIN — ISOSORBIDE MONONITRATE 30 MG: 30 TABLET ORAL at 08:39

## 2019-09-03 RX ADMIN — LISINOPRIL 2.5 MG: 5 TABLET ORAL at 08:39

## 2019-09-03 RX ADMIN — POTASSIUM CHLORIDE 20 MEQ: 750 TABLET, EXTENDED RELEASE ORAL at 08:39

## 2019-09-03 RX ADMIN — TAMSULOSIN HYDROCHLORIDE 0.4 MG: 0.4 CAPSULE ORAL at 08:39

## 2019-09-03 RX ADMIN — FAMOTIDINE 20 MG: 20 TABLET ORAL at 07:06

## 2019-09-03 RX ADMIN — Medication 10 ML: at 07:06

## 2019-09-03 NOTE — PROGRESS NOTES
CM verified patient has a qualifying hospital stay for [facility] .  Patient coordination rounds with provider, , and nurse performed in patient's room.

## 2019-09-03 NOTE — PROGRESS NOTES
CM updated that patient ready for discharge today to inpatient rehab.    1050 - CM spoke with [facility] for bed availability. Confirmed bed availability. [facility] requested updated PT and OT notes. PT currently working with patient. Notes will be in patient's chart this AM.    1150 - CM received call from [address]. Patient potentially too high level for IPR. Alondra Poster is awaiting OT notes for final determination. 1253 - CM received update from [facility] member that patient is agreeable to SNF if cannot go to [facility]. Patient and family agreeable to [facility] or [facility]. CM will follow up with patient and patient's daughter, [employer]. 1300 - CM spoke with patient's daughter, [employer], and patient and daughter are agreeable to [facility] or [facility] for SNF. CM sent referral via [employer] to both. 1400 - CM received update from [facility] that patient has been accepted and bed is available. CM updated bedside RN.    1961 - CM spoke with patient's daughter, [employer]. Ms. Adeola Sow will be here between [phone] to transport patient to [facility]. CM to continue to follow.     Teja Berumen, MPH
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
## 2019-09-03 NOTE — DISCHARGE SUMMARY
Cardiology Discharge Summary     Patient ID:  Ranjana Goel  [id number]  71 y.o.  1955    Admit Date: 8/20/2019    Discharge Date: 9/3/2019     Admitting Physician: Philip Kaufman MD     Discharge Physician: Yohana Mckinney MD    Admission Diagnoses: V tach ([address] [I47.2]  VT (ventricular tachycardia) ([address] [I47.2]    Discharge Diagnoses: Active Problems:    V tach ([address] (8/20/2019)      VT (ventricular tachycardia) ([address] (8/20/2019)        Discharge Condition: Good    Cardiology Procedures this Admission:  Diagnostic left heart catheterization  EchoCardiogram, ICD placement     Hospital Course: Patient presented with VT storm. Had cardiac cath but did not have any acute lesion which was amenable to PCI. He was started on amiodarone and then lidocaine for suppression. He had some adjustments in his medications to optimize CHF therapy. Had an ICD placed for secondary prevention. Developed a hematoma a few days later but this was monitored and his DAPT was temporarily held with relief. He also had meds adjusted for orthostatic symptoms. Consults: EP    Discharge Exam:     Visit Vitals  /69 (BP 1 Location: Left arm, BP Patient Position: Sitting)   Pulse 76   Temp 97.9 °F (36.6 °C)   Resp 18   Ht 5' 9\" (1.753 m)   Wt 89 kg (196 lb 3.4 oz)   SpO2 100%   BMI 28.98 kg/m²     General Appearance:  Well developed, well nourished, in no acute distress. Ears/Nose/Mouth/Throat:   Hearing grossly normal.         Neck: Supple. Chest:   Lungs clear to auscultation bilaterally. Normal resp effort. Cardiovascular:  Regular rate and rhythm, S1, S2 normal, no new murmur. Abdomen:   Soft, non-tender, bowel sounds are present. Extremities: No edema bilaterally. Neuro:  Skin: Alert, grossly nonfocal. Normal mood/affect. Warm and dry.                Disposition: SNF    Patient Instructions:   Current Discharge Medication List      START taking these medications    Details   amiodarone (CORDARONE) 200 mg tablet Take 1 Tab by mouth daily. Qty: 30 Tab, Refills: 0         CONTINUE these medications which have CHANGED    Details   carvedilol (COREG) 3.125 mg tablet Take 1 Tab by mouth two (2) times daily (with meals). Qty: 30 Tab, Refills: 0      lisinopril (PRINIVIL, ZESTRIL) 2.5 mg tablet Take 1 Tab by mouth daily. Qty: 30 Tab, Refills: 0         CONTINUE these medications which have NOT CHANGED    Details   eplerenone (INSPRA) 25 mg tablet Take 25 mg by mouth daily. atorvastatin (LIPITOR) 80 mg tablet Take 80 mg by mouth daily. glimepiride (AMARYL) 4 mg tablet Take 4 mg by mouth two (2) times a day. 1/2 tab in AM 1/2 in PM      clonazePAM (KLONOPIN) 1 mg tablet Take 1 mg by mouth nightly. furosemide (LASIX) 20 mg tablet Take 1 Tab by mouth daily. Qty: 30 Tab, Refills: 0      potassium chloride (KLOR-CON) 20 mEq pack Take 1 Packet by mouth daily. Qty: 7 Packet, Refills: 0      finasteride (PROSCAR) 5 mg tablet Take 5 mg by mouth every morning. levETIRAcetam 1,000 mg tablet Take 1 Tab by mouth every twelve (12) hours. Qty: 60 Tab, Refills: 1      venlafaxine-SR (EFFEXOR-XR) 150 mg capsule Take 1 Cap by mouth daily (after breakfast). Qty: 30 Cap, Refills: 0      clopidogrel (PLAVIX) 75 mg tab Take 1 Tab by mouth daily. Qty: 30 Tab, Refills: 2      isosorbide mononitrate ER (IMDUR) 30 mg tablet Take 1 Tab by mouth daily. Qty: 30 Tab, Refills: 2      QUEtiapine (SEROQUEL) 100 mg tablet Take 25 mg by mouth nightly. nitroglycerin (NITROSTAT) 0.4 mg SL tablet 0.4 mg by SubLINGual route every five (5) minutes as needed for Chest Pain. May repeat every 5 minutes for a maximum of 3 doses if chest pain not relieved call MD      raNITIdine (ZANTAC) 150 mg tablet Take 150 mg by mouth two (2) times a day. Before Breakfast and in the afternoon (also takes Protonix at same time)      aspirin 81 mg chewable tablet Take 1 Tab by mouth daily.   Qty: 30 Tab, Refills: 0      pantoprazole (PROTONIX) 40 mg tablet Take 1 Tab by mouth Before breakfast and dinner. Before Breakfast and in the afternoon (also takes Zantac at same time)  Qty: 60 Tab, Refills: 0      pregabalin (LYRICA) 50 mg capsule Take 1 Cap by mouth three (3) times daily. Max Daily Amount: 150 mg.  Qty: 90 Cap, Refills: 0      tamsulosin (FLOMAX) 0.4 mg capsule Take 1 Cap by mouth Before breakfast and dinner. Before Breakfast and in the afternoon  Qty: 30 Cap, Refills: 0      lactulose (CHRONULAC) 10 gram/15 mL solution Take 45 mL by mouth three (3) times daily. Adjust dosage so that you have 2-3 bowel movements per day. Qty: 1 Bottle, Refills: 0         STOP taking these medications       ALPRAZolam (XANAX) 1 mg tablet Comments:   Reason for Stopping:         FLUoxetine (PROZAC) 20 mg capsule Comments:   Reason for Stopping:         amLODIPine (NORVASC) 2.5 mg tablet Comments:   Reason for Stopping:               Referenced discharge instructions provided by nursing for diet and activity.     Follow-up with Dr. Mily Olson will be scheduled    Signed:  Troy Mora MD DC took >30 minutes to complete

## 2019-09-03 NOTE — PROGRESS NOTES
Problem: Mobility Impaired (Adult and Pediatric)  Goal: *Acute Goals and Plan of Care (Insert Text)  Description  FUNCTIONAL STATUS PRIOR TO ADMISSION: Patient was modified independent using a Single point cane PRN for functional mobility. HOME SUPPORT PRIOR TO ADMISSION: The patient lived with his two daughters who completed all household chores. Patient reports he was able to get dressed and bathed without assist, but did suffer a fall in January of 2019 (still wearing a bandage on his R frontal forehead). States he also has fallen out of bed since then but unable to provide many other details around fall history. Per chart review, he has previously experience orthostatic hypotension on other admissions and reports this contributed to falls at home. Physical Therapy Goals  Initiated 8/27/2019  1. Patient will move from supine to sit and sit to supine , scoot up and down and roll side to side in bed with modified independence within 7 day(s). 2.  Patient will transfer from bed to chair and chair to bed with supervision/set-up using the least restrictive device within 7 day(s). 3.  Patient will perform sit to stand with supervision/set-up within 7 day(s). 4.  Patient will ambulate with supervision/set-up for 150 feet with the least restrictive device within 7 day(s). 5.  Patient will ascend/descend 3 stairs with bilateral handrail(s) with minimal assistance/contact guard assist within 7 day(s). 6.  Patient will improve Tinetti score by 4-5 points within 7 days. Outcome: Progressing Towards Goal   PHYSICAL THERAPY TREATMENT  Patient: Nan Pan (65 y.o. male)  Date: 9/3/2019  Diagnosis: [facility]ach ([facility] [I47.2]  VT (ventricular tachycardia) ([facility]) [I47.2] <principal problem not specified>  Procedure(s) (LRB):  INSERT ICD BIV MULTI (N/A) 8 Days Post-Op  Precautions: Fall(ICD precautions L )  Chart, physical therapy assessment, plan of care and goals were reviewed.     ASSESSMENT  Based on the objective data described below, patient presents with continued orthostatic hypotension (patient asymptomatic), impaired balance, decreased activity tolerance, and increased fall risk. Orthostatics as below. Mild path deviations and unsteadiness during gait requiring intermittent min A. SOB noted with gait and required seated rest break between toileting and ambulation in hallway. Vitals:    09/03/19 1010 09/03/19 1015 09/03/19 1018 09/03/19 1030   BP: 143/67 100/59 (!) 89/55 125/52   BP 1 Location: Right arm Right arm Right arm Right arm   BP Patient Position: Supine Sitting Standing Sitting;Post activity   Pulse: 67 75 81 80   Resp:       Temp:       SpO2:       Weight:       Height:           Current Level of Function Impacting Discharge (mobility/balance): min A with cane during gait, frequent rest breaks     Other factors to consider for discharge: recent fall with R head contusion         PLAN :  Patient continues to benefit from skilled intervention to address the above impairments. Continue treatment per established plan of care. to address goals. Recommendation for discharge: (in order for the patient to meet his/her long term goals)  Therapy 3 hours per day 5-7 days per week    This discharge recommendation:  Has been made in collaboration with the attending provider and/or case management    Equipment recommendations for successful discharge (if) home: to be determined by rehab facility       SUBJECTIVE:   Patient stated I think it would be very hard. I get dizzy in the shower anyway.  Re: discussion about how difficult it may be to shower/get ready for the day and still have energy left for activities the rest of the day     OBJECTIVE DATA SUMMARY:   Critical Behavior:  Neurologic State: Alert  Orientation Level: Oriented X4  Cognition: Appropriate decision making, Appropriate for age attention/concentration, Appropriate safety awareness  Safety/Judgement: Awareness of environment, Fall prevention  Functional Mobility Training:  Bed Mobility:  Supine to Sit: Supervision  Scooting: Supervision  Transfers:  Sit to Stand: Contact guard assistance  Stand to Sit: Minimum assistance  Balance:  Sitting: Intact; Without support  Standing: Impaired; With support  Standing - Static: Good  Standing - Dynamic : Fair  Ambulation/Gait Training:  Distance (ft): 40 Feet (ft)(x2)  Assistive Device: Gait belt;Cane, straight  Ambulation - Level of Assistance: Minimal assistance;Contact guard assistance  Gait Abnormalities: Decreased step clearance; Path deviations  Base of Support: Narrowed  Speed/Usha: Shuffled  Step Length: Right shortened;Left shortened    Activity Tolerance:   Asymptomatic orthostatic hypotension   Please refer to the flowsheet for vital signs taken during this treatment.     After treatment patient left in no apparent distress:   Sitting in chair and Call bell within reach    COMMUNICATION/COLLABORATION:   The patients plan of care was discussed with: Registered Nurse and     Allie Burgos PT, DPT   Time Calculation: 23 mins

## 2019-09-03 NOTE — PROGRESS NOTES
[facility] Cardiology Progress Note    Date of service: 9/3/2019    Subjective:  No c/o, awaiting placement    Objective:    Visit Vitals  /77 (BP 1 Location: Right arm, BP Patient Position: At rest)   Pulse 72   Temp 97.7 °F (36.5 °C)   Resp 16   Ht 5' 9\" (1.753 m)   Wt 89 kg (196 lb 3.4 oz)   SpO2 99%   BMI 28.98 kg/m²       Physical Exam   Constitutional: He is oriented to person, place, and time. He appears well-developed and well-nourished. HENT:   Head: Normocephalic and atraumatic. Eyes: Conjunctivae are normal. No scleral icterus. Neck: No JVD present. Cardiovascular: Normal rate, regular rhythm and normal heart sounds. Exam reveals no gallop. No murmur heard. Pulmonary/Chest: Effort normal and breath sounds normal. No stridor. No respiratory distress. He has no wheezes. He has no rales. Abdominal: Soft. He exhibits no distension. Musculoskeletal: He exhibits no edema or deformity. Neurological: He is alert and oriented to person, place, and time. Skin: Skin is warm and dry. Psychiatric: He has a normal mood and affect. His behavior is normal.       Data reviewed:  Recent Results (from the past 12 hour(s))   GLUCOSE, POC    Collection Time: 09/03/19  6:30 AM   Result Value Ref Range    Glucose (POC) 107 (H) 65 - 100 mg/dL    Performed by Karel Mcdonnell      Telemetry: paced rhythm    Assessment:  1. Ventricular tachycardia: stabilized on amiodarone, now s/p ICD  2. Acute on chronic systolic heart failure  3. CAD s/p CABG - stable  4.  CKD stage III    Plan:  No changes to meds  Hopeful DC to rehab

## 2019-09-03 NOTE — PROGRESS NOTES
Problem: Self Care Deficits Care Plan (Adult)  Goal: *Acute Goals and Plan of Care (Insert Text)  Description  FUNCTIONAL STATUS PRIOR TO ADMISSION: Patient was modified independent using a Single point cane for functional mobility. Patient was modified independent for basic and instrumental ADLs. Does not drive. Wife passed away in June. Daughters perform all instrumental ADLs due to patient memory loss. HOME SUPPORT: The patient lived with daughter but did not require assist.    Occupational Therapy Goals  Initiated 8/28/2019  1. Patient will perform ADLs standing 5 mins without fatigue or LOB with supervision/set-up within 5 day(s). 2.  Patient will perform lower body ADLs with supervision/set-up within 5 day(s). 3.  Patient will perform gathering ADL items high and low 2/2 with supervision/set-up within 5 day(s). 4.  Patient will perform toilet transfers with supervision/set-up within 5 day(s). 5.  Patient will perform all aspects of toileting with supervision/set-up within 5 day(s). Outcome: Progressing Towards Goal    OCCUPATIONAL THERAPY TREATMENT  Patient: Jason Castellanos (65 y.o. male)  Date: 9/3/2019  Diagnosis: V tach ([facility] [I47.2]  VT (ventricular tachycardia) ([facility]) [I47.2] <principal problem not specified>  Procedure(s) (LRB):  INSERT ICD BIV MULTI (N/A) 8 Days Post-Op  Precautions: Fall(ICD precautions L )  Chart, occupational therapy assessment, plan of care, and goals were reviewed. ASSESSMENT  Based on the objective data described below, pt   presents with impaired higher level balance, hypotension with positional changes (asymptomatic) this date, and fatigue during simulated ADL/IADL tasks. Patient demonstrated fair balance retrieving items from low/high al drawers, needing CGA to occasional min A for balance. Pt verbally recalled PPM precautions but requires intermittent verbal cues for adherence during functional transfers.  BP 90s/50s seated in chair and remained stable post seated UE/LE exercises. He remains as a fall risk during ADLs, with seated rest breaks needed after standing tasks x 5 minutes. Current Level of Function Impacting Discharge (ADLs): A x 1 CGA to min A for higher level balance, need for sitting rest break after activity x 5 minutes    Other factors to consider for discharge: lives alone, hx of falls         PLAN :  Patient continues to benefit from skilled intervention to address the above impairments. Continue treatment per established plan of care. to address goals. Recommend with staff: Rosanna Ortega to chair and bathroom with A x 1    Recommend next OT session: dressing, standing ADLs    Recommendation for discharge: (in order for the patient to meet his/her long term goals)  Therapy 3 hours per day 5-7 days per week    This discharge recommendation:  Has been made in collaboration with the attending provider and/or case management    Equipment recommendations for successful discharge (if) home: TBD       SUBJECTIVE:   Patient stated I do a lot of sitting during the day.     OBJECTIVE DATA SUMMARY:   Cognitive/Behavioral Status:  Neurologic State: Alert  Orientation Level: Oriented X4  Cognition: Follows commands  Perception: Appears intact  Perseveration: No perseveration noted  Safety/Judgement: Awareness of environment    Functional Mobility and Transfers for ADLs:  Bed Mobility:  Supine to Sit: Supervision  Scooting: Supervision    Transfers:  Sit to Stand: Contact guard assistance          Balance:  Sitting: Intact; Without support  Standing: Impaired; With support  Standing - Static: Good  Standing - Dynamic : Fair    ADL Intervention:     Pt completed bed mobility  with supervision, sit to stand with CGA. Retrieved items from high/low surfaces for simulated ADL/IADL tasks with CGA to min A. Pt required use of SPC for additional support and balance.  Recalled, without prompting, not to reach L UE above 90*, able to  dropped item from floor with min A. Folded blanket in standing after this with fair dynamic balance, required seated rest break after standing for consecutive tasks x 5 minutes. Cognitive Retraining  Safety/Judgement: Awareness of environment    Therapeutic Exercises:   20 marches in place in standing prior to active. BP soft 90s/50s. Encouraged long arc quads, marching, UE shoulder flexion and digit flexion/extension x 10    Pain:  No c/o    Activity Tolerance:   Fair  Please refer to the flowsheet for vital signs taken during this treatment.     After treatment patient left in no apparent distress:   Sitting in chair and Call bell within reach    COMMUNICATION/COLLABORATION:   The patients plan of care was discussed with: Physical Therapist, Registered Nurse and     Harmeet Pittman OT  Time Calculation: 20 mins
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
## 2019-09-03 NOTE — PROGRESS NOTES
1930 Bedside shift change report given to [facility], RN (oncoming nurse) by Love Strauss (offgoing nurse). Report included the following information SBAR, Kardex, Intake/Output, MAR, Recent Results and Cardiac Rhythm Paced. Uneventful Shift, VSS    0730 Bedside shift change report given to JANELL Davison (oncoming nurse) by [facility], RN (offgoing nurse). Report included the following information SBAR, Kardex, Intake/Output, MAR, Med Rec Status and Cardiac Rhythm Paced. Problem: Falls - Risk of  Goal: *Absence of Falls  Description  Document Brian Edmonds Fall Risk and appropriate interventions in the flowsheet. Outcome: Progressing Towards Goal  Note:   Fall Risk Interventions:  Mobility Interventions: OT consult for ADLs    Mentation Interventions: Adequate sleep, hydration, pain control    Medication Interventions: Patient to call before getting OOB    Elimination Interventions: Call light in reach    History of Falls Interventions: Investigate reason for fall         Problem: Pressure Injury - Risk of  Goal: *Prevention of pressure injury  Description  Document Iván Scale and appropriate interventions in the flowsheet. Outcome: Progressing Towards Goal  Note:   Pressure Injury Interventions:  Sensory Interventions: Assess changes in LOC, Assess need for specialty bed, Chair cushion, Check visual cues for pain, Maintain/enhance activity level, Monitor skin under medical devices, Pad between skin to skin, Pressure redistribution bed/mattress (bed type), Sit a 90-degree angle/use footstool if needed, Turn and reposition approx.  every two hours (pillows and wedges if needed), Use 30-degree side-lying position    Moisture Interventions: Apply protective barrier, creams and emollients, Assess need for specialty bed, Limit adult briefs, Maintain skin hydration (lotion/cream), Minimize layers, Offer toileting Q_hr    Activity Interventions: Increase time out of bed    Mobility Interventions: PT/OT evaluation    Nutrition Interventions: Document food/fluid/supplement intake    Friction and Shear Interventions: Minimize layers                Problem: Heart Failure: Discharge Outcomes  Goal: *Demonstrates ability to perform prescribed activity without shortness of breath or discomfort  Outcome: Progressing Towards Goal  Goal: *Left ventricular function assessment completed prior to or during stay, or planned for post-discharge  Outcome: Progressing Towards Goal  Goal: *Verbalizes understanding and describes prescribed diet  Outcome: Progressing Towards Goal  Goal: *Verbalizes understanding/describes prescribed medications  Outcome: Progressing Towards Goal  Goal: *Describes available resources and support systems  Description  (eg: Home Health, Palliative Care, Advanced Medical Directive)  Outcome: Progressing Towards Goal  Goal: *Describes importance of continuing daily weights and changes to report to physician  Outcome: Progressing Towards Goal     Problem: Pacer/ICD: Discharge Outcomes  Goal: *Hemodynamically stable  Outcome: Progressing Towards Goal  Goal: *Stable cardiac rhythm  Outcome: Progressing Towards Goal  Goal: *Lungs clear or at baseline  Outcome: Progressing Towards Goal  Goal: *Verbalizes home exercise program, activity guidelines, cardiac precautions  Outcome: Progressing Towards Goal  Goal: *Verbalizes understanding and describes prescribed diet  Outcome: Progressing Towards Goal  Goal: *Identifies cardiac risk factors  Outcome: Progressing Towards Goal  Goal: *No signs and symptoms of infection or wound complications  Outcome: Progressing Towards Goal

## 2019-09-04 ENCOUNTER — PATIENT OUTREACH (OUTPATIENT)
Dept: FAMILY MEDICINE CLINIC | Age: 64
End: 2019-09-04

## 2019-09-04 NOTE — PROGRESS NOTES
CM offered screening for Medicaid Long-Term Services & Supports. Patient Declined screening.     Kaleb Covington, MPH

## 2019-09-04 NOTE — PROGRESS NOTES
Patient admitted to [facility] 8/20-9/3/19 with V-tach. Patient is enrolled in the CHF bundle. He was discharged from hospital to [facility] for rehab. During inpatient rehab stay patient is to be followed by [facility]/sound team.  Once notified of patient discharge NN will manage patient for  remainder of 90 days.

## 2019-09-05 ENCOUNTER — PATIENT OUTREACH (OUTPATIENT)
Dept: CASE MANAGEMENT | Age: 64
End: 2019-09-05

## 2019-09-05 NOTE — PROGRESS NOTES
Community Care Team documentation for patient in [facility]  Initial Follow Up       Patient was discharged to [facility], [facility]. Information included in this progress note has been provided to SNF. Hospital Admission and Diagnosis: [facility] 8/20 - 9/3 ventricular tachycardia   RRAT Score:  26     Advance Care Planning:  Not on file      PCP : Myrtle Mcintosh MD    SNF Attending:  Lucia Mcdermott MD    Spoke with SNF team.  Provided needed hospital follow up appointments:   Cardiology Earline Varma MD call for appt. SNF Medical update:  Frankie Lunsford wt 9/3 196lbs 3.4oz. SNF admission weight 194.5lbs 9/3. PT/OT update: Currently stand by assist with bed mobility. Min assist with transfers. Ambulating 100 ft. Supervision with ADLs. LOS/ Disposition: 1-2 weeks LOS anticipated. Prior to admission patient lived with two daughters. Community Care Team will follow up weekly with [facility] until discharge. Medications were not reconciled and general patient assessment was not completed during this [facility] outreach.       Vamshi Bee, MSN, RN, ACNS-BC, [facility]  Nurse Navigator, [employer] [phone]

## 2019-09-12 ENCOUNTER — PATIENT OUTREACH (OUTPATIENT)
Dept: CASE MANAGEMENT | Age: 64
End: 2019-09-12

## 2019-09-12 NOTE — PROGRESS NOTES
Community Care Team Documentation for Patient in [facility]  Subsequent Follow up     [facility] ([facility]). CCT did not receive an update from SNF this week. Next CCT call 9/19. Medications were not reconciled and general patient assessment was not completed during this skilled nursing facility outreach.

## 2019-09-19 ENCOUNTER — PATIENT OUTREACH (OUTPATIENT)
Dept: CASE MANAGEMENT | Age: 64
End: 2019-09-19

## 2019-09-19 NOTE — PROGRESS NOTES
Community Care Team Documentation for Patient in [facility]  Subsequent Follow up     Patient remains at [facility] ([facility]). See previous [facility] Team notes. Spoke with SNF team.  SNF Medical update:  Needs daily weights for HF management. Hosp DC wt 9/3 196lbs 3.4oz. SNF admission weight 194.5lbs 9/3. SNF weight on 9/17 was 192 lbs. PT/OT update: Currently ambulating 300+ ft with cane or walker (due to fatigue). Supervision with ADLs. Up/down 4 stairs with supervision. LOS/ Disposition: Plan for discharge 10/3 to home with two daughters and [facility]. Medications were not reconciled and general patient assessment was not completed during this skilled nursing facility outreach.      Trinh Marquez, MSN, RN, ACNS-BC, [facility]  Manager of [facility] [phone]

## 2019-09-26 ENCOUNTER — PATIENT OUTREACH (OUTPATIENT)
Dept: CASE MANAGEMENT | Age: 64
End: 2019-09-26

## 2019-09-26 NOTE — PROGRESS NOTES
Community Care Team Documentation for Patient in [facility]  Subsequent Follow up     Patient remains at [facility] ([facility]). See previous [facility] Team notes. Spoke with SNF team.  SNF Medical update:  Needs daily weights for HF management. Hosp DC wt 9/3 196lbs 3.4oz. SNF admission weight 194.5lbs 9/3. SNF weight on 9/17 was 192 lbs. SNF weight 9/25 189lbs. PT/OT update: Currently ambulating 200ft with SPC and supervision. Modified independent with ADLs. Ascending and descending 6 stairs with supervision. LOS/ Disposition:  Plan for discharge 10/1 per pt choice home with two daughters and [facility]. Medications were not reconciled and general patient assessment was not completed during this skilled nursing facility outreach.

## 2019-10-04 ENCOUNTER — PATIENT OUTREACH (OUTPATIENT)
Dept: INTERNAL MEDICINE CLINIC | Age: 64
End: 2019-10-04

## 2019-10-04 ENCOUNTER — PATIENT OUTREACH (OUTPATIENT)
Dept: CASE MANAGEMENT | Age: 64
End: 2019-10-04

## 2019-10-04 NOTE — PROGRESS NOTES
Hospital Discharge Follow-Up      Date/Time:  10/4/2019 1:34 PM    Patient was discharged on 10/3/19 from [facility]. Top Challenges reviewed with the provider   -New AICD post v-tach event    -Daughter Hazel Shankar states he is \" doing wonderful\"  Advance Care Planning:   Does patient have an Advance Directive:  not on file        Method of communication with provider :none      Care Transition Nurse (CTN) contacted the family by telephone to perform post hospital discharge assessment. Verified name and  with family as identifiers. Provided introduction to self, and explanation of the CTN role. Family received hospital discharge instructions. CTN reviewed discharge instructions and red flags with family who verbalized understanding. Family given an opportunity to ask questions and does not have any further questions or concerns at this time. The family agrees to contact the PCP office for questions related to their healthcare. CTN provided contact information for future reference. Disease Specific:   CHF    Patients top risk factors for readmission:  lack of knowledge about disease    Home Health orders at discharge: PT, OT, SN, prior history with non [address] [place] [address]: [address]   Date of initial visit: 10/3/19    Durable Medical Equipment ordered at discharge: none  [address] received: n/a      Medication reconciliation was not performed on this call. Referral to Pharm D needed: no     Current Outpatient Medications   Medication Sig    carvedilol (COREG) 3.125 mg tablet Take 1 Tab by mouth two (2) times daily (with meals).  lisinopril (PRINIVIL, ZESTRIL) 2.5 mg tablet Take 1 Tab by mouth daily.  amiodarone (CORDARONE) 200 mg tablet Take 1 Tab by mouth daily.  eplerenone (INSPRA) 25 mg tablet Take 25 mg by mouth daily.  atorvastatin (LIPITOR) 80 mg tablet Take 80 mg by mouth daily.     glimepiride (AMARYL) 4 mg tablet Take 4 mg by mouth two (2) times a day. 1/2 tab in AM 1/2 in PM    clonazePAM (KLONOPIN) 1 mg tablet Take 1 mg by mouth nightly.  furosemide (LASIX) 20 mg tablet Take 1 Tab by mouth daily.  potassium chloride (KLOR-CON) 20 mEq pack Take 1 Packet by mouth daily.  finasteride (PROSCAR) 5 mg tablet Take 5 mg by mouth every morning.  levETIRAcetam 1,000 mg tablet Take 1 Tab by mouth every twelve (12) hours.  venlafaxine-SR (EFFEXOR-XR) 150 mg capsule Take 1 Cap by mouth daily (after breakfast). (Patient taking differently: Take 75 mg by mouth two (2) times a day. 2 tabs in AM 1 tab in PM)    clopidogrel (PLAVIX) 75 mg tab Take 1 Tab by mouth daily.  isosorbide mononitrate ER (IMDUR) 30 mg tablet Take 1 Tab by mouth daily.  QUEtiapine (SEROQUEL) 100 mg tablet Take 25 mg by mouth nightly.  nitroglycerin (NITROSTAT) 0.4 mg SL tablet 0.4 mg by SubLINGual route every five (5) minutes as needed for Chest Pain. May repeat every 5 minutes for a maximum of 3 doses if chest pain not relieved call MD    lactulose (CHRONULAC) 10 gram/15 mL solution Take 45 mL by mouth three (3) times daily. Adjust dosage so that you have 2-3 bowel movements per day.  raNITIdine (ZANTAC) 150 mg tablet Take 150 mg by mouth two (2) times a day. Before Breakfast and in the afternoon (also takes Protonix at same time)    aspirin 81 mg chewable tablet Take 1 Tab by mouth daily.  pantoprazole (PROTONIX) 40 mg tablet Take 1 Tab by mouth Before breakfast and dinner. Before Breakfast and in the afternoon (also takes Zantac at same time)    pregabalin (LYRICA) 50 mg capsule Take 1 Cap by mouth three (3) times daily. Max Daily Amount: 150 mg.    tamsulosin (FLOMAX) 0.4 mg capsule Take 1 Cap by mouth Before breakfast and dinner. Before Breakfast and in the afternoon     No current facility-administered medications for this visit. There are no discontinued medications.     [facility] follow up appointment(s): No future appointments. Non-[facility] follow up appointment(s): PCP 10/3/19  Critical access hospital:  [address] Attends follow-up appointments as directed. 10/4/19  -NN confirmed with daughter and PCP office that patient was seen on 10/4/19. No additional follow up scheduled at this time  -NN confrimed with daughter that patient attended follow up with ICD implanting physician Dr. Ricki Prieto on 9/25/19. Daughter Librado Wright states that he is to follow up in 3 months but she does not have her calender with her.   -will attend Cardiology follow up with Dr. Best Villalobos on 10/25/19  -NN to follow up in 1 week  Jessica Looney       Knowledge and adherence medication (ie. action, side effects, missed dose, etc.)      10/4/19  -NN educated daughter, Librado Wright on lasix, coreg and lisinopril. Librado Wright reports she is confused on \" why he is on so many different blood pressure medications. \"  NN educated her on his medications and explained that different drug classes of cardiac drugs work differently on his heart to help it work more efficiently. NN encourages her to verify with Cardiologist at next appointment  -NN to follow up in 1 week  [id number] [facility] daily weight.       10/4/19  -NN educated daughter on importance of weighing a person with heart failure daily to track fluid changes  -reports weight today in 195lbs  -will weight daily and monitor  -will report weight gain of >3lbs in 1 day or >5lbs in 1 week to cardiologist  -NN to follow up in 1 week  [address]

## 2019-10-04 NOTE — PROGRESS NOTES
Community Care Team Documentation for Patient in [facility]  Discharge Note    Patient has been discharged from [facility] ([facility]). See previous [facility] Team notes. PCP : Emanuel Leo MD  Ambulatory  or Care Transition Nurse: Pati Casanova  Note routed to [facility] or [facility]. Spoke with SNF team. Confirmed patient was discharged home on 10/1 with daughter and using [address]. Community Care Team will sign off at this time. Medications were not reconciled and general patient assessment was not completed during this skilled nursing facility outreach. Carey Sharp, DOMINICN, [address] Team  [phone]

## 2019-10-15 ENCOUNTER — TELEPHONE (OUTPATIENT)
Dept: CARDIAC REHAB | Age: 64
End: 2019-10-15

## 2019-10-15 NOTE — TELEPHONE ENCOUNTER
10/15/2019 Cardiac Rehab: Called Monica Ernesto Vika  to discuss participation in the Cardiac Rehab Program following HF admission on 8/20/2019. Left voicemail message.  Rupa Mahajan RN

## 2019-10-16 ENCOUNTER — TELEPHONE (OUTPATIENT)
Dept: CARDIAC REHAB | Age: 64
End: 2019-10-16

## 2019-10-16 NOTE — TELEPHONE ENCOUNTER
10/16/2019 Cardiac Rehab: Called Mr. Ernesto Sandy  to discuss participation in the Cardiac Rehab Program . Spoke today with his dtr. and he is receiving [address] and PTservices at home. Agreed to call 11/6. Dtr. Andrade Fajardo had a MI and stenting herself 3 years ago and would like information about enrolling. Rupa Mahajan RN        10/15/2019 Cardiac Rehab: Nancy Benitez Mr. Ernesto Bryan.  to discuss participation in the Cardiac Rehab Program following HF admission on 8/20/2019. Left voicemail message.  Rupa Mahajan RN

## 2020-05-12 ENCOUNTER — HOSPITAL ENCOUNTER (INPATIENT)
Age: 65
LOS: 12 days | Discharge: HOME | DRG: 035 | End: 2020-05-24
Attending: EMERGENCY MEDICINE | Admitting: INTERNAL MEDICINE
Payer: MEDICARE

## 2020-05-12 ENCOUNTER — APPOINTMENT (OUTPATIENT)
Dept: CT IMAGING | Age: 65
DRG: 035 | End: 2020-05-12
Attending: EMERGENCY MEDICINE
Payer: MEDICARE

## 2020-05-12 DIAGNOSIS — S10.93XD HEMATOMA OF NECK, SUBSEQUENT ENCOUNTER: ICD-10-CM

## 2020-05-12 DIAGNOSIS — I65.23 STENOSIS OF BOTH INTERNAL CAROTID ARTERIES: ICD-10-CM

## 2020-05-12 DIAGNOSIS — G43.109 OCULAR MIGRAINE: ICD-10-CM

## 2020-05-12 DIAGNOSIS — H53.132 VISION, LOSS, SUDDEN, LEFT: Primary | ICD-10-CM

## 2020-05-12 DIAGNOSIS — R51.9 NONINTRACTABLE EPISODIC HEADACHE, UNSPECIFIED HEADACHE TYPE: ICD-10-CM

## 2020-05-12 PROBLEM — I63.9 ACUTE CVA (CEREBROVASCULAR ACCIDENT) (HCC): Status: ACTIVE | Noted: 2020-05-12

## 2020-05-12 LAB
ALBUMIN SERPL-MCNC: 3.3 G/DL (ref 3.5–5)
ALBUMIN/GLOB SERPL: 0.7 {RATIO} (ref 1.1–2.2)
ALP SERPL-CCNC: 119 U/L (ref 45–117)
ALT SERPL-CCNC: 31 U/L (ref 12–78)
ANION GAP SERPL CALC-SCNC: 5 MMOL/L (ref 5–15)
AST SERPL-CCNC: 38 U/L (ref 15–37)
BASOPHILS # BLD: 0 K/UL (ref 0–0.1)
BASOPHILS NFR BLD: 0 % (ref 0–1)
BILIRUB SERPL-MCNC: 0.6 MG/DL (ref 0.2–1)
BUN SERPL-MCNC: 27 MG/DL (ref 6–20)
BUN/CREAT SERPL: 17 (ref 12–20)
CALCIUM SERPL-MCNC: 8.8 MG/DL (ref 8.5–10.1)
CHLORIDE SERPL-SCNC: 105 MMOL/L (ref 97–108)
CO2 SERPL-SCNC: 24 MMOL/L (ref 21–32)
COMMENT, HOLDF: NORMAL
CREAT SERPL-MCNC: 1.59 MG/DL (ref 0.7–1.3)
DIFFERENTIAL METHOD BLD: ABNORMAL
EOSINOPHIL # BLD: 0.3 K/UL (ref 0–0.4)
EOSINOPHIL NFR BLD: 3 % (ref 0–7)
ERYTHROCYTE [DISTWIDTH] IN BLOOD BY AUTOMATED COUNT: 19.2 % (ref 11.5–14.5)
GLOBULIN SER CALC-MCNC: 4.6 G/DL (ref 2–4)
GLUCOSE BLD STRIP.AUTO-MCNC: 170 MG/DL (ref 65–100)
GLUCOSE SERPL-MCNC: 143 MG/DL (ref 65–100)
HCT VFR BLD AUTO: 48 % (ref 36.6–50.3)
HGB BLD-MCNC: 14.7 G/DL (ref 12.1–17)
IMM GRANULOCYTES # BLD AUTO: 0.1 K/UL (ref 0–0.04)
IMM GRANULOCYTES NFR BLD AUTO: 1 % (ref 0–0.5)
LYMPHOCYTES # BLD: 1.5 K/UL (ref 0.8–3.5)
LYMPHOCYTES NFR BLD: 17 % (ref 12–49)
MCH RBC QN AUTO: 22.6 PG (ref 26–34)
MCHC RBC AUTO-ENTMCNC: 30.6 G/DL (ref 30–36.5)
MCV RBC AUTO: 73.7 FL (ref 80–99)
MONOCYTES # BLD: 0.4 K/UL (ref 0–1)
MONOCYTES NFR BLD: 5 % (ref 5–13)
NEUTS SEG # BLD: 6.8 K/UL (ref 1.8–8)
NEUTS SEG NFR BLD: 74 % (ref 32–75)
NRBC # BLD: 0 K/UL (ref 0–0.01)
NRBC BLD-RTO: 0 PER 100 WBC
PLATELET # BLD AUTO: 179 K/UL (ref 150–400)
POTASSIUM SERPL-SCNC: 5.9 MMOL/L (ref 3.5–5.1)
PROT SERPL-MCNC: 7.9 G/DL (ref 6.4–8.2)
RBC # BLD AUTO: 6.51 M/UL (ref 4.1–5.7)
SAMPLES BEING HELD,HOLD: NORMAL
SERVICE CMNT-IMP: ABNORMAL
SODIUM SERPL-SCNC: 134 MMOL/L (ref 136–145)
WBC # BLD AUTO: 9.1 K/UL (ref 4.1–11.1)

## 2020-05-12 PROCEDURE — 96375 TX/PRO/DX INJ NEW DRUG ADDON: CPT

## 2020-05-12 PROCEDURE — 70450 CT HEAD/BRAIN W/O DYE: CPT

## 2020-05-12 PROCEDURE — 82962 GLUCOSE BLOOD TEST: CPT

## 2020-05-12 PROCEDURE — 93005 ELECTROCARDIOGRAM TRACING: CPT

## 2020-05-12 PROCEDURE — 74011636320 HC RX REV CODE- 636/320: Performed by: RADIOLOGY

## 2020-05-12 PROCEDURE — 74011000258 HC RX REV CODE- 258: Performed by: RADIOLOGY

## 2020-05-12 PROCEDURE — 80053 COMPREHEN METABOLIC PANEL: CPT

## 2020-05-12 PROCEDURE — 96365 THER/PROPH/DIAG IV INF INIT: CPT

## 2020-05-12 PROCEDURE — 95816 EEG AWAKE AND DROWSY: CPT | Performed by: INTERNAL MEDICINE

## 2020-05-12 PROCEDURE — 70496 CT ANGIOGRAPHY HEAD: CPT

## 2020-05-12 PROCEDURE — 74011000258 HC RX REV CODE- 258: Performed by: EMERGENCY MEDICINE

## 2020-05-12 PROCEDURE — 36415 COLL VENOUS BLD VENIPUNCTURE: CPT

## 2020-05-12 PROCEDURE — 74011250636 HC RX REV CODE- 250/636: Performed by: EMERGENCY MEDICINE

## 2020-05-12 PROCEDURE — 74011250636 HC RX REV CODE- 250/636: Performed by: INTERNAL MEDICINE

## 2020-05-12 PROCEDURE — 99285 EMERGENCY DEPT VISIT HI MDM: CPT

## 2020-05-12 PROCEDURE — 65660000000 HC RM CCU STEPDOWN

## 2020-05-12 PROCEDURE — 74011250637 HC RX REV CODE- 250/637: Performed by: INTERNAL MEDICINE

## 2020-05-12 PROCEDURE — 85025 COMPLETE CBC W/AUTO DIFF WBC: CPT

## 2020-05-12 RX ORDER — CLONAZEPAM 1 MG/1
1 TABLET ORAL
Status: DISCONTINUED | OUTPATIENT
Start: 2020-05-12 | End: 2020-05-24 | Stop reason: HOSPADM

## 2020-05-12 RX ORDER — MAGNESIUM SULFATE 1 G/100ML
1 INJECTION INTRAVENOUS ONCE
Status: COMPLETED | OUTPATIENT
Start: 2020-05-12 | End: 2020-05-12

## 2020-05-12 RX ORDER — ACETAMINOPHEN 325 MG/1
650 TABLET ORAL
Status: DISCONTINUED | OUTPATIENT
Start: 2020-05-12 | End: 2020-05-24 | Stop reason: HOSPADM

## 2020-05-12 RX ORDER — CARVEDILOL 3.12 MG/1
3.12 TABLET ORAL 2 TIMES DAILY WITH MEALS
Status: DISCONTINUED | OUTPATIENT
Start: 2020-05-13 | End: 2020-05-24 | Stop reason: HOSPADM

## 2020-05-12 RX ORDER — FINASTERIDE 5 MG/1
5 TABLET, FILM COATED ORAL
Status: DISCONTINUED | OUTPATIENT
Start: 2020-05-13 | End: 2020-05-24 | Stop reason: HOSPADM

## 2020-05-12 RX ORDER — ATORVASTATIN CALCIUM 40 MG/1
80 TABLET, FILM COATED ORAL DAILY
Status: DISCONTINUED | OUTPATIENT
Start: 2020-05-13 | End: 2020-05-24 | Stop reason: HOSPADM

## 2020-05-12 RX ORDER — DIPHENHYDRAMINE HYDROCHLORIDE 50 MG/ML
25 INJECTION, SOLUTION INTRAMUSCULAR; INTRAVENOUS
Status: COMPLETED | OUTPATIENT
Start: 2020-05-12 | End: 2020-05-12

## 2020-05-12 RX ORDER — GUAIFENESIN 100 MG/5ML
81 LIQUID (ML) ORAL DAILY
Status: DISCONTINUED | OUTPATIENT
Start: 2020-05-13 | End: 2020-05-24 | Stop reason: HOSPADM

## 2020-05-12 RX ORDER — FUROSEMIDE 40 MG/1
20 TABLET ORAL DAILY
Status: DISCONTINUED | OUTPATIENT
Start: 2020-05-13 | End: 2020-05-15

## 2020-05-12 RX ORDER — HEPARIN SODIUM 5000 [USP'U]/ML
5000 INJECTION, SOLUTION INTRAVENOUS; SUBCUTANEOUS EVERY 8 HOURS
Status: DISCONTINUED | OUTPATIENT
Start: 2020-05-12 | End: 2020-05-24 | Stop reason: HOSPADM

## 2020-05-12 RX ORDER — DEXTROSE MONOHYDRATE 100 MG/ML
0-250 INJECTION, SOLUTION INTRAVENOUS AS NEEDED
Status: DISCONTINUED | OUTPATIENT
Start: 2020-05-12 | End: 2020-05-24 | Stop reason: HOSPADM

## 2020-05-12 RX ORDER — LISINOPRIL 5 MG/1
2.5 TABLET ORAL DAILY
Status: DISCONTINUED | OUTPATIENT
Start: 2020-05-13 | End: 2020-05-15

## 2020-05-12 RX ORDER — NITROGLYCERIN 0.4 MG/1
0.4 TABLET SUBLINGUAL
Status: DISCONTINUED | OUTPATIENT
Start: 2020-05-12 | End: 2020-05-24 | Stop reason: HOSPADM

## 2020-05-12 RX ORDER — CLOPIDOGREL BISULFATE 75 MG/1
75 TABLET ORAL DAILY
Status: DISCONTINUED | OUTPATIENT
Start: 2020-05-13 | End: 2020-05-24 | Stop reason: HOSPADM

## 2020-05-12 RX ORDER — EPLERENONE 25 MG/1
25 TABLET, FILM COATED ORAL DAILY
Status: DISCONTINUED | OUTPATIENT
Start: 2020-05-13 | End: 2020-05-16

## 2020-05-12 RX ORDER — QUETIAPINE FUMARATE 25 MG/1
25 TABLET, FILM COATED ORAL
Status: DISCONTINUED | OUTPATIENT
Start: 2020-05-12 | End: 2020-05-24 | Stop reason: HOSPADM

## 2020-05-12 RX ORDER — MAGNESIUM SULFATE 100 %
4 CRYSTALS MISCELLANEOUS AS NEEDED
Status: DISCONTINUED | OUTPATIENT
Start: 2020-05-12 | End: 2020-05-24 | Stop reason: HOSPADM

## 2020-05-12 RX ORDER — PANTOPRAZOLE SODIUM 40 MG/1
40 TABLET, DELAYED RELEASE ORAL
Status: DISCONTINUED | OUTPATIENT
Start: 2020-05-13 | End: 2020-05-24 | Stop reason: HOSPADM

## 2020-05-12 RX ORDER — PREGABALIN 50 MG/1
50 CAPSULE ORAL 3 TIMES DAILY
Status: DISCONTINUED | OUTPATIENT
Start: 2020-05-12 | End: 2020-05-24 | Stop reason: HOSPADM

## 2020-05-12 RX ORDER — SODIUM CHLORIDE 0.9 % (FLUSH) 0.9 %
10 SYRINGE (ML) INJECTION
Status: COMPLETED | OUTPATIENT
Start: 2020-05-12 | End: 2020-05-12

## 2020-05-12 RX ORDER — VENLAFAXINE HYDROCHLORIDE 37.5 MG/1
75 CAPSULE, EXTENDED RELEASE ORAL 2 TIMES DAILY
Status: DISCONTINUED | OUTPATIENT
Start: 2020-05-13 | End: 2020-05-24 | Stop reason: HOSPADM

## 2020-05-12 RX ORDER — ISOSORBIDE MONONITRATE 30 MG/1
30 TABLET, EXTENDED RELEASE ORAL DAILY
Status: DISCONTINUED | OUTPATIENT
Start: 2020-05-13 | End: 2020-05-15

## 2020-05-12 RX ORDER — AMIODARONE HYDROCHLORIDE 200 MG/1
200 TABLET ORAL DAILY
Status: DISCONTINUED | OUTPATIENT
Start: 2020-05-13 | End: 2020-05-24 | Stop reason: HOSPADM

## 2020-05-12 RX ORDER — FACIAL-BODY WIPES
10 EACH TOPICAL DAILY PRN
Status: DISCONTINUED | OUTPATIENT
Start: 2020-05-12 | End: 2020-05-24 | Stop reason: HOSPADM

## 2020-05-12 RX ORDER — INSULIN LISPRO 100 [IU]/ML
INJECTION, SOLUTION INTRAVENOUS; SUBCUTANEOUS
Status: DISCONTINUED | OUTPATIENT
Start: 2020-05-13 | End: 2020-05-24 | Stop reason: HOSPADM

## 2020-05-12 RX ORDER — LEVETIRACETAM 500 MG/1
1000 TABLET ORAL EVERY 12 HOURS
Status: DISCONTINUED | OUTPATIENT
Start: 2020-05-12 | End: 2020-05-24 | Stop reason: HOSPADM

## 2020-05-12 RX ORDER — TAMSULOSIN HYDROCHLORIDE 0.4 MG/1
0.4 CAPSULE ORAL
Status: DISCONTINUED | OUTPATIENT
Start: 2020-05-13 | End: 2020-05-24 | Stop reason: HOSPADM

## 2020-05-12 RX ADMIN — PREGABALIN 50 MG: 50 CAPSULE ORAL at 23:04

## 2020-05-12 RX ADMIN — HEPARIN SODIUM 5000 UNITS: 5000 INJECTION, SOLUTION INTRAVENOUS; SUBCUTANEOUS at 23:04

## 2020-05-12 RX ADMIN — MAGNESIUM SULFATE HEPTAHYDRATE 1 G: 1 INJECTION, SOLUTION INTRAVENOUS at 17:18

## 2020-05-12 RX ADMIN — DIPHENHYDRAMINE HYDROCHLORIDE 25 MG: 50 INJECTION, SOLUTION INTRAMUSCULAR; INTRAVENOUS at 19:09

## 2020-05-12 RX ADMIN — SODIUM CHLORIDE 100 ML: 900 INJECTION, SOLUTION INTRAVENOUS at 19:02

## 2020-05-12 RX ADMIN — IOPAMIDOL 80 ML: 755 INJECTION, SOLUTION INTRAVENOUS at 19:02

## 2020-05-12 RX ADMIN — CLONAZEPAM 1 MG: 1 TABLET ORAL at 23:04

## 2020-05-12 RX ADMIN — Medication 10 ML: at 19:02

## 2020-05-12 RX ADMIN — SODIUM CHLORIDE 1000 ML: 900 INJECTION, SOLUTION INTRAVENOUS at 17:23

## 2020-05-12 RX ADMIN — METOCLOPRAMIDE 10 MG: 5 INJECTION, SOLUTION INTRAMUSCULAR; INTRAVENOUS at 19:09

## 2020-05-12 RX ADMIN — QUETIAPINE FUMARATE 25 MG: 25 TABLET ORAL at 23:04

## 2020-05-12 RX ADMIN — LEVETIRACETAM 1000 MG: 500 TABLET, FILM COATED ORAL at 23:04

## 2020-05-12 NOTE — ED TRIAGE NOTES
He says he had loss of vision in his left eye about 4:15pm He then started to get a headache left side. He says he is on a blood thinner.  He says he has had ocular migraines in the past.
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
## 2020-05-13 ENCOUNTER — APPOINTMENT (OUTPATIENT)
Dept: NON INVASIVE DIAGNOSTICS | Age: 65
DRG: 035 | End: 2020-05-13
Attending: INTERNAL MEDICINE
Payer: MEDICARE

## 2020-05-13 ENCOUNTER — APPOINTMENT (OUTPATIENT)
Dept: CT IMAGING | Age: 65
DRG: 035 | End: 2020-05-13
Attending: INTERNAL MEDICINE
Payer: MEDICARE

## 2020-05-13 ENCOUNTER — APPOINTMENT (OUTPATIENT)
Dept: VASCULAR SURGERY | Age: 65
DRG: 035 | End: 2020-05-13
Attending: INTERNAL MEDICINE
Payer: MEDICARE

## 2020-05-13 LAB
ALBUMIN SERPL-MCNC: 3 G/DL (ref 3.5–5)
ALBUMIN/GLOB SERPL: 0.8 {RATIO} (ref 1.1–2.2)
ALP SERPL-CCNC: 101 U/L (ref 45–117)
ALT SERPL-CCNC: 24 U/L (ref 12–78)
ANION GAP SERPL CALC-SCNC: 8 MMOL/L (ref 5–15)
AST SERPL-CCNC: 14 U/L (ref 15–37)
BASOPHILS # BLD: 0 K/UL (ref 0–0.1)
BASOPHILS NFR BLD: 0 % (ref 0–1)
BILIRUB SERPL-MCNC: 0.4 MG/DL (ref 0.2–1)
BUN SERPL-MCNC: 26 MG/DL (ref 6–20)
BUN/CREAT SERPL: 18 (ref 12–20)
CALCIUM SERPL-MCNC: 8.4 MG/DL (ref 8.5–10.1)
CHLORIDE SERPL-SCNC: 104 MMOL/L (ref 97–108)
CHOLEST SERPL-MCNC: 120 MG/DL
CO2 SERPL-SCNC: 21 MMOL/L (ref 21–32)
CREAT SERPL-MCNC: 1.45 MG/DL (ref 0.7–1.3)
CRP SERPL-MCNC: 0.68 MG/DL (ref 0–0.6)
DIFFERENTIAL METHOD BLD: ABNORMAL
ECHO LA MAJOR AXIS: 5.47 CM
ECHO LV E' LATERAL VELOCITY: 5.43 CM/S
ECHO LV E' SEPTAL VELOCITY: 2.86 CM/S
ECHO LV INTERNAL DIMENSION DIASTOLIC: 4.68 CM (ref 4.2–5.9)
ECHO LV INTERNAL DIMENSION SYSTOLIC: 4.37 CM
ECHO LV IVSD: 1.23 CM (ref 0.6–1)
ECHO LV MASS 2D: 257.1 G (ref 88–224)
ECHO LV MASS INDEX 2D: 123.3 G/M2 (ref 49–115)
ECHO LV POSTERIOR WALL DIASTOLIC: 1.23 CM (ref 0.6–1)
ECHO LVOT DIAM: 2.17 CM
ECHO MV A VELOCITY: 95.11 CM/S
ECHO MV E VELOCITY: 44.43 CM/S
ECHO MV E/A RATIO: 0.47
ECHO MV E/E' LATERAL: 8.18
ECHO MV E/E' RATIO (AVERAGED): 11.86
ECHO MV E/E' SEPTAL: 15.53
EOSINOPHIL # BLD: 0.3 K/UL (ref 0–0.4)
EOSINOPHIL NFR BLD: 4 % (ref 0–7)
ERYTHROCYTE [DISTWIDTH] IN BLOOD BY AUTOMATED COUNT: 18.5 % (ref 11.5–14.5)
EST. AVERAGE GLUCOSE BLD GHB EST-MCNC: 143 MG/DL
FOLATE SERPL-MCNC: 11.2 NG/ML (ref 5–21)
GLOBULIN SER CALC-MCNC: 3.9 G/DL (ref 2–4)
GLUCOSE BLD STRIP.AUTO-MCNC: 132 MG/DL (ref 65–100)
GLUCOSE BLD STRIP.AUTO-MCNC: 187 MG/DL (ref 65–100)
GLUCOSE BLD STRIP.AUTO-MCNC: 198 MG/DL (ref 65–100)
GLUCOSE BLD STRIP.AUTO-MCNC: 79 MG/DL (ref 65–100)
GLUCOSE SERPL-MCNC: 140 MG/DL (ref 65–100)
HBA1C MFR BLD: 6.6 % (ref 4–5.6)
HCT VFR BLD AUTO: 43.1 % (ref 36.6–50.3)
HDLC SERPL-MCNC: 20 MG/DL
HDLC SERPL: 6 {RATIO} (ref 0–5)
HGB BLD-MCNC: 13.1 G/DL (ref 12.1–17)
IMM GRANULOCYTES # BLD AUTO: 0 K/UL (ref 0–0.04)
IMM GRANULOCYTES NFR BLD AUTO: 0 % (ref 0–0.5)
LDLC SERPL CALC-MCNC: 46.2 MG/DL (ref 0–100)
LIPID PROFILE,FLP: ABNORMAL
LVFS 2D: 6.72 %
LYMPHOCYTES # BLD: 1.8 K/UL (ref 0.8–3.5)
LYMPHOCYTES NFR BLD: 19 % (ref 12–49)
MAGNESIUM SERPL-MCNC: 1.8 MG/DL (ref 1.6–2.4)
MCH RBC QN AUTO: 22.4 PG (ref 26–34)
MCHC RBC AUTO-ENTMCNC: 30.4 G/DL (ref 30–36.5)
MCV RBC AUTO: 73.5 FL (ref 80–99)
MONOCYTES # BLD: 0.5 K/UL (ref 0–1)
MONOCYTES NFR BLD: 5 % (ref 5–13)
NEUTS SEG # BLD: 6.7 K/UL (ref 1.8–8)
NEUTS SEG NFR BLD: 72 % (ref 32–75)
NRBC # BLD: 0 K/UL (ref 0–0.01)
NRBC BLD-RTO: 0 PER 100 WBC
PHOSPHATE SERPL-MCNC: 3.4 MG/DL (ref 2.6–4.7)
PLATELET # BLD AUTO: 161 K/UL (ref 150–400)
PMV BLD AUTO: 10.4 FL (ref 8.9–12.9)
POTASSIUM SERPL-SCNC: 4.4 MMOL/L (ref 3.5–5.1)
PROT SERPL-MCNC: 6.9 G/DL (ref 6.4–8.2)
RBC # BLD AUTO: 5.86 M/UL (ref 4.1–5.7)
SERVICE CMNT-IMP: ABNORMAL
SERVICE CMNT-IMP: NORMAL
SODIUM SERPL-SCNC: 133 MMOL/L (ref 136–145)
TRIGL SERPL-MCNC: 269 MG/DL (ref ?–150)
TROPONIN I SERPL-MCNC: <0.05 NG/ML
TROPONIN I SERPL-MCNC: <0.05 NG/ML
TSH SERPL DL<=0.05 MIU/L-ACNC: 2.99 UIU/ML (ref 0.36–3.74)
VIT B12 SERPL-MCNC: 611 PG/ML (ref 193–986)
VLDLC SERPL CALC-MCNC: 53.8 MG/DL
WBC # BLD AUTO: 9.4 K/UL (ref 4.1–11.1)

## 2020-05-13 PROCEDURE — 97535 SELF CARE MNGMENT TRAINING: CPT

## 2020-05-13 PROCEDURE — 36415 COLL VENOUS BLD VENIPUNCTURE: CPT

## 2020-05-13 PROCEDURE — 82607 VITAMIN B-12: CPT

## 2020-05-13 PROCEDURE — 86140 C-REACTIVE PROTEIN: CPT

## 2020-05-13 PROCEDURE — 97162 PT EVAL MOD COMPLEX 30 MIN: CPT

## 2020-05-13 PROCEDURE — 93306 TTE W/DOPPLER COMPLETE: CPT

## 2020-05-13 PROCEDURE — 83735 ASSAY OF MAGNESIUM: CPT

## 2020-05-13 PROCEDURE — 84100 ASSAY OF PHOSPHORUS: CPT

## 2020-05-13 PROCEDURE — 82962 GLUCOSE BLOOD TEST: CPT

## 2020-05-13 PROCEDURE — 97165 OT EVAL LOW COMPLEX 30 MIN: CPT

## 2020-05-13 PROCEDURE — 97116 GAIT TRAINING THERAPY: CPT

## 2020-05-13 PROCEDURE — 74011250637 HC RX REV CODE- 250/637: Performed by: INTERNAL MEDICINE

## 2020-05-13 PROCEDURE — 94760 N-INVAS EAR/PLS OXIMETRY 1: CPT

## 2020-05-13 PROCEDURE — 92610 EVALUATE SWALLOWING FUNCTION: CPT | Performed by: SPEECH-LANGUAGE PATHOLOGIST

## 2020-05-13 PROCEDURE — 74011250636 HC RX REV CODE- 250/636: Performed by: INTERNAL MEDICINE

## 2020-05-13 PROCEDURE — 85025 COMPLETE CBC W/AUTO DIFF WBC: CPT

## 2020-05-13 PROCEDURE — 82746 ASSAY OF FOLIC ACID SERUM: CPT

## 2020-05-13 PROCEDURE — 84484 ASSAY OF TROPONIN QUANT: CPT

## 2020-05-13 PROCEDURE — 74176 CT ABD & PELVIS W/O CONTRAST: CPT

## 2020-05-13 PROCEDURE — 84443 ASSAY THYROID STIM HORMONE: CPT

## 2020-05-13 PROCEDURE — 65660000000 HC RM CCU STEPDOWN

## 2020-05-13 PROCEDURE — 80053 COMPREHEN METABOLIC PANEL: CPT

## 2020-05-13 PROCEDURE — 74011636637 HC RX REV CODE- 636/637: Performed by: INTERNAL MEDICINE

## 2020-05-13 PROCEDURE — 83036 HEMOGLOBIN GLYCOSYLATED A1C: CPT

## 2020-05-13 PROCEDURE — 97112 NEUROMUSCULAR REEDUCATION: CPT

## 2020-05-13 PROCEDURE — 80061 LIPID PANEL: CPT

## 2020-05-13 RX ORDER — VENLAFAXINE HYDROCHLORIDE 75 MG/1
150 CAPSULE, EXTENDED RELEASE ORAL DAILY
COMMUNITY
End: 2020-08-11

## 2020-05-13 RX ORDER — VENLAFAXINE HYDROCHLORIDE 75 MG/1
25 CAPSULE, EXTENDED RELEASE ORAL 3 TIMES DAILY
Status: ON HOLD | COMMUNITY
End: 2020-05-13

## 2020-05-13 RX ADMIN — VENLAFAXINE HYDROCHLORIDE 75 MG: 37.5 CAPSULE, EXTENDED RELEASE ORAL at 08:38

## 2020-05-13 RX ADMIN — PANTOPRAZOLE SODIUM 40 MG: 40 TABLET, DELAYED RELEASE ORAL at 06:31

## 2020-05-13 RX ADMIN — TAMSULOSIN HYDROCHLORIDE 0.4 MG: 0.4 CAPSULE ORAL at 06:31

## 2020-05-13 RX ADMIN — LISINOPRIL 2.5 MG: 5 TABLET ORAL at 08:39

## 2020-05-13 RX ADMIN — PREGABALIN 50 MG: 50 CAPSULE ORAL at 17:48

## 2020-05-13 RX ADMIN — CLONAZEPAM 1 MG: 1 TABLET ORAL at 21:28

## 2020-05-13 RX ADMIN — HEPARIN SODIUM 5000 UNITS: 5000 INJECTION, SOLUTION INTRAVENOUS; SUBCUTANEOUS at 22:39

## 2020-05-13 RX ADMIN — HEPARIN SODIUM 5000 UNITS: 5000 INJECTION, SOLUTION INTRAVENOUS; SUBCUTANEOUS at 06:31

## 2020-05-13 RX ADMIN — CARVEDILOL 3.12 MG: 3.12 TABLET, FILM COATED ORAL at 08:38

## 2020-05-13 RX ADMIN — EPLERENONE 25 MG: 25 TABLET, FILM COATED ORAL at 08:38

## 2020-05-13 RX ADMIN — INSULIN LISPRO 2 UNITS: 100 INJECTION, SOLUTION INTRAVENOUS; SUBCUTANEOUS at 17:47

## 2020-05-13 RX ADMIN — PREGABALIN 50 MG: 50 CAPSULE ORAL at 21:28

## 2020-05-13 RX ADMIN — HEPARIN SODIUM 5000 UNITS: 5000 INJECTION, SOLUTION INTRAVENOUS; SUBCUTANEOUS at 15:00

## 2020-05-13 RX ADMIN — CARVEDILOL 3.12 MG: 3.12 TABLET, FILM COATED ORAL at 17:49

## 2020-05-13 RX ADMIN — AMIODARONE HYDROCHLORIDE 200 MG: 200 TABLET ORAL at 08:38

## 2020-05-13 RX ADMIN — CLOPIDOGREL BISULFATE 75 MG: 75 TABLET ORAL at 08:37

## 2020-05-13 RX ADMIN — LEVETIRACETAM 1000 MG: 500 TABLET, FILM COATED ORAL at 20:40

## 2020-05-13 RX ADMIN — ISOSORBIDE MONONITRATE 30 MG: 30 TABLET ORAL at 08:38

## 2020-05-13 RX ADMIN — PANTOPRAZOLE SODIUM 40 MG: 40 TABLET, DELAYED RELEASE ORAL at 17:48

## 2020-05-13 RX ADMIN — LEVETIRACETAM 1000 MG: 500 TABLET, FILM COATED ORAL at 08:37

## 2020-05-13 RX ADMIN — TAMSULOSIN HYDROCHLORIDE 0.4 MG: 0.4 CAPSULE ORAL at 17:49

## 2020-05-13 RX ADMIN — FINASTERIDE 5 MG: 5 TABLET, FILM COATED ORAL at 06:31

## 2020-05-13 RX ADMIN — ATORVASTATIN CALCIUM 80 MG: 40 TABLET, FILM COATED ORAL at 08:37

## 2020-05-13 RX ADMIN — PREGABALIN 50 MG: 50 CAPSULE ORAL at 08:37

## 2020-05-13 RX ADMIN — VENLAFAXINE HYDROCHLORIDE 75 MG: 37.5 CAPSULE, EXTENDED RELEASE ORAL at 17:48

## 2020-05-13 RX ADMIN — QUETIAPINE FUMARATE 25 MG: 25 TABLET ORAL at 21:28

## 2020-05-13 RX ADMIN — ASPIRIN 81 MG 81 MG: 81 TABLET ORAL at 08:37

## 2020-05-13 RX ADMIN — FUROSEMIDE 20 MG: 40 TABLET ORAL at 08:39

## 2020-05-13 NOTE — ROUTINE PROCESS
TRANSFER - OUT REPORT: 
 
Verbal report given to [address], RN(name) on Kevin Simmons.  being transferred to NSTU(unit) for routine progression of care Report consisted of patients Situation, Background, Assessment and  
Recommendations(SBAR). Information from the following report(s) SBAR, Kardex, Intake/Output, MAR and Recent Results was reviewed with the receiving nurse. Lines:  
Peripheral IV 05/12/20 Right Antecubital (Active) Site Assessment Clean, dry, & intact 5/12/2020  5:17 PM  
Phlebitis Assessment 0 5/12/2020  5:17 PM  
Infiltration Assessment 0 5/12/2020  5:17 PM  
Dressing Status Clean, dry, & intact 5/12/2020  5:17 PM  
Hub Color/Line Status Pink;Capped;Flushed;Patent 5/12/2020  5:17 PM  
Action Taken Blood drawn 5/12/2020  5:17 PM  
  
 
Opportunity for questions and clarification was provided. Patient transported with: 
 Monitor Registered Nurse
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
## 2020-05-13 NOTE — PROGRESS NOTES
NUTRITION     Nutrition screening referral was triggered based on results obtained during nursing admission assessment for past nutrition support. The patient's chart was reviewed and nutrition assessment is not indicated at this time. Plan to see patient for rescreen as indicated. Thank you.      Koffi Bates, 65 [address], [address] [place] [address]

## 2020-05-13 NOTE — PROGRESS NOTES
Problem: Falls - Risk of  Goal: *Absence of Falls  Description: Document Earma Jax Fall Risk and appropriate interventions in the flowsheet. Outcome: Progressing Towards Goal  Note: Fall Risk Interventions:  Mobility Interventions: Bed/chair exit alarm, Patient to call before getting OOB         Medication Interventions: Bed/chair exit alarm, Teach patient to arise slowly    Elimination Interventions: Call light in reach, Toileting schedule/hourly rounds    History of Falls Interventions: Bed/chair exit alarm, Room close to nurse's station         Problem: Pressure Injury - Risk of  Goal: *Prevention of pressure injury  Description: Document Iván Scale and appropriate interventions in the flowsheet.   Outcome: Progressing Towards Goal  Note: Pressure Injury Interventions:  Sensory Interventions: Maintain/enhance activity level, Float heels, Keep linens dry and wrinkle-free    Moisture Interventions: Absorbent underpads, Moisture barrier, Minimize layers    Activity Interventions: Increase time out of bed    Mobility Interventions: Float heels, HOB 30 degrees or less    Nutrition Interventions: Document food/fluid/supplement intake, Offer support with meals,snacks and hydration    Friction and Shear Interventions: HOB 30 degrees or less, Lift sheet                Problem: TIA/CVA Stroke: Day 2 Until Discharge  Goal: Activity/Safety  Outcome: Progressing Towards Goal  Goal: *Absence of aspiration  Outcome: Progressing Towards Goal  Goal: *Optimal pain control at patient's stated goal  Outcome: Progressing Towards Goal  Goal: *Tolerating diet  Outcome: Progressing Towards Goal

## 2020-05-13 NOTE — PROGRESS NOTES
Occupational Therapy    Patient chart reviewed, pt cleared for therapy. Attempted to see pt for OT evaluation, however pt currently unavailable 2/2 EEG testing in room. Will defer at this time and follow-up as able. Thank you.   Cathleen Zapata, ELVAR/L
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
## 2020-05-13 NOTE — PROCEDURES
ELECTROENCEPHALOGRAM REPORT     Patient Name: Urbano Watkins. : 1955  Age: 76 y.o. Ordering physician:   Date of EEG: May 13th, 2020  Interpreting physician: Williams Estrella MD      PROCEDURE: Routine video electroencephalogram (vEEG)    CLINICAL INDICATION: The patient is a 76 y.o. male who is being evaluated for monocular vision loss with headache    DESCRIPTION OF THE RECORD:   During wakefulness, the background consists of symmetric, 8 Hz alpha with preserved anterior to posterior AP gradient without interposed frequencies or focal slowing. During transition to drowsiness, there is fragmentation of the waking background with progressive increase in diffuse theta. No discrete sleep architecture is noted. No epileptiform discharges or electrographic seizures are appreciated. Reactivity is present. Single-lead ECG demonstrates normal sinus rhythm. INTERPRETATION:     This is a normal awake and drowsy routine vEEG.       Indira Manzano MD

## 2020-05-13 NOTE — PROGRESS NOTES
Transition of Care Plan:                -Ongoing medical/stroke -up; Awaiting recommendations   -Expected to discharge home when stable. Need home health orders for PT/OT. Prefers [facility]. -Family will provide transportation. Reason for Admission:  Difficulty with vision                RUR Score:     31%    PCP: First and Last name: Dr. Aneudy Vaz MD    Name of Practice: [facility]   Are you a current patient: Yes/No: YES   Approximate date of last visit:  Unable to recall              Resources/supports as identified by patient/family:   Supportive family                 Top Challenges facing patient (as identified by patient/family and CM): Finances/Medication cost?     No concerns noted. Verified insurance coverage; Have Medicare Part A & B and Blue Cross Supplement. Transportation? Daughters provide transportation               Support system or lack thereof? Supportive family members                      Living arrangements? Lives with his two daughters in a private residence (1st floor master) and two steps to enter. Self-care/ADLs/Cognition? Independent with ADLs/IADLs. Uses a cane to ambulate           Current Advanced Directive/Advance Care Plan:   Not on file                           Plan for utilizing home health:    Home Health recommended. Patient used [facility] in the past. Need Home Health orders. Care Management Interventions  PCP Verified by CM: Yes  Palliative Care Criteria Met (RRAT>21 & CHF Dx)?: No  Mode of Transport at Discharge: Other (see comment)  Transition of Care Consult (CM Consult): Discharge Planning  Discharge Durable Medical Equipment: No  Physical Therapy Consult: Yes  Occupational Therapy Consult: Yes  Speech Therapy Consult: Yes  Current Support Network: Lives with Caregiver(Lives with daughters )  Confirm Follow Up Transport: Family  Discharge Location  Discharge Placement: (TBD )    CM to follow.  Chandu Gonsalves MSW,CRM

## 2020-05-13 NOTE — PROGRESS NOTES
Bedside shift change report given to Fahad Zacarias RN (oncoming nurse) by Anthony Lawrence (offgoing nurse). Report included the following information SBAR, Kardex, ED Summary, Accordion, Cardiac Rhythm NSR and Dual Neuro Assessment.
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
## 2020-05-13 NOTE — H&P
[facility] [place] [facility]  HISTORY AND PHYSICAL    Name:  Trevor Smith  MR#:  [id number]  :  1955  ACCOUNT #:  [de-identified]  ADMIT DATE:  2020      The patient was seen, evaluated and admitted by me on 2020. PRIMARY CARE PHYSICIAN:  Seferino Almanzar MD    SOURCE OF INFORMATION:  The patient and from review of medical records. CHIEF COMPLAINT:  Difficulty with vision. HISTORY OF PRESENT ILLNESS:  This is a 29-year-old man with a past medical history significant for coronary artery disease, status post CABG and multiple stent placements; dyslipidemia; hypertension; type 2 diabetes; seizure disorder; benign prostatic hyperplasia; obstructive sleep apnea; chronic systolic congestive heart failure; chronic kidney disease; status post pacemaker insertion, was in his usual state of health until the day of presentation at the emergency room when the patient developed sudden loss of vision in the left eye. This episode lasted a few seconds. It was followed by headache. The headache is located at the left side of the head, constant throbbing headache, 6/10 in severity. No known aggravating or relieving factors. The patient stated that he has a history of ocular migraine and this present episode is similar to his attack of ocular migraine. The patient also stated that he fell a couple of times at home. Denies loss of consciousness. The circumstances surrounding the fall is not clear. The patient denies fever, rigors, and chills. No sick contact or contact with any person with COVID-19 virus infection. When the patient arrived at the emergency room, CT of the head was obtained. The CT scan did not show any acute pathology. CTA of the head and neck was also performed. This shows no acute large vessel occlusion, but shows severe stenosis, bilateral internal carotid arteries, left greater than right.   The patient was subsequently referred to the hospitalist service for evaluation for admission. The patient was last admitted to this hospital from 08/20/2019 to 09/03/2019. The patient was admitted to the Cardiology Service for evaluation and treatment of ventricular tachycardia. The patient subsequently underwent ICD placement with adjustments to his medication. PAST MEDICAL HISTORY:  Coronary artery disease, status post CABG and stent placement; dyslipidemia; hypertension; type 2 diabetes; seizure disorder; benign prostatic hyperplasia; obstructive sleep apnea; chronic systolic congestive heart failure; chronic kidney disease; status post pacemaker insertion; liver cirrhosis. ALLERGIES:  THE PATIENT IS ALLERGIC TO LATEX, CODEINE, DEMEROL, MUSHROOM, METFORMIN, AND WELLBUTRIN. MEDICATIONS:  Amiodarone 200 mg daily, aspirin 81 mg daily, Lipitor 80 mg daily, Coreg 3.125 mg twice daily, Klonopin 1 mg daily at bedtime, Plavix 75 mg daily, Inspra 25 mg daily, Proscar 5 mg daily in the morning, Lasix 20 mg daily, Amaryl 4 mg twice daily, Imdur 30 mg daily, lactulose 45 mL three times daily, Keppra 1000 mg every 12 hours, lisinopril 2.5 mg daily, Nitrostat 0.4 mg sublingual every 5 minutes as needed for chest pain, Protonix 40 mg daily, potassium chloride 20 mEq daily, Lyrica 50 mg three times daily, Seroquel 25 mg daily at bedtime, Zantac 150 mg twice daily, Flomax 0.4 mg twice daily, Effexor XR 75 mg twice daily. FAMILY HISTORY:  This was reviewed. His father had heart disease and pancreatic cancer. His mother had a stroke. PAST SURGICAL HISTORY:  This is significant for CABG, multiple cardiac stent placements, cholecystectomy, pacemaker insertion. SOCIAL HISTORY:  The patient is a former smoker, quit tobacco abuse in 10/2016. REVIEW OF SYSTEMS:  HEAD, EYES, EARS, NOSE AND THROAT:  This is positive for headache. No dizziness, no blurring of vision, no photophobia. RESPIRATORY SYSTEM:  No cough, no shortness of breath, no hemoptysis.   CARDIOVASCULAR SYSTEM:  No chest pain, no orthopnea, no palpitation. GASTROINTESTINAL SYSTEM:  No nausea or vomiting. No diarrhea. No constipation. GENITOURINARY SYSTEM:  No dysuria, no urgency, and no frequency. All other systems are reviewed and they are negative. PHYSICAL EXAMINATION:  GENERAL APPEARANCE:  The patient appeared ill, in moderate distress. VITAL SIGNS:  On arrival at the emergency room, temperature 98.1, pulse 79, respiratory rate 16, blood pressure 130/82, oxygen saturation 100% on room air. HEENT:  Head:  Normocephalic, atraumatic. Eyes:  Normal eye movements. No redness, no drainage, no discharge. Ears:  Normal external ears with no evidence of drainage. Nose:  No deformity and no drainage. Mouth and Throat:  No visible oral lesion. NECK:  Neck is supple. No JVD, no thyromegaly. CHEST:  Clear breath sounds. No wheezing, no crackles. HEART:  Normal S1 and S2, regular. No clinically appreciable murmur. ABDOMEN:  Soft, nontender. Normal bowel sounds. CNS:  Alert, oriented x3. No gross focal neurological deficit. EXTREMITIES:  Edema 2+. Pulses 2+ bilaterally. MUSCULOSKELETAL SYSTEM:  No evidence of joint deformity and swelling. SKIN:  No active skin lesions seen in the exposed part of the body. PSYCHIATRY:  Normal mood and affect. LYMPHATIC SYSTEM:  No cervical lymphadenopathy. DIAGNOSTIC DATA:  EKG shows pacemaker rhythm with no significant ST and T-wave abnormalities. CT scan of the head without contrast is negative. CTA of the head and neck, no acute large vessel occlusion; severe stenosis, bilateral internal carotid arteries, left greater than right. LABORATORY DATA:  Hematology:  WBC 9.1, hemoglobin 14.7, hematocrit 48.0, platelets 076. Chemistry:  Sodium 134, potassium 5.9, chloride 105, CO2 24, glucose 143, BUN 27, creatinine 1.59, calcium 8.8, total bilirubin 0.6, ALT 31, AST 38, alkaline phosphatase 119, total protein 7.9, albumin level 3.3, globulin 4.6. ASSESSMENT:  1. Suspected acute cerebrovascular accident. 2.  Stenosis of both internal carotid arteries. 3.  Coronary artery disease, status post coronary artery bypass graft and stent placement. 4.  Dyslipidemia. 5.  Hypertension. 6.  Type 2 diabetes. 7.  Seizure disorder. 8.  Benign prostatic hyperplasia. 9.  Obstructive sleep apnea. 10.  Status post pacemaker insertion. 11.  Chronic systolic congestive heart failure. 12.  Hyperkalemia. 13.  Chronic kidney disease, stage III. 15.  Fall at home. 15.  Bilateral leg swelling. PLAN:  1. Suspected acute CVA. We will admit the patient for further evaluation and treatment. We will obtain echocardiogram.  We will check lipid profile. We will check hemoglobin A1c level. Inpatient Neurology consult will be requested to assist in further evaluation and treatment of suspected acute CVA. The patient will not be able to undergo MRI of the brain because of the presence of pacemaker. The patient's presentation could also be as a result of ocular migraine. We will carry out headache control while awaiting further recommendation from the neurologist.  We will check a C-reactive protein level to evaluate the patient for systemic inflammation. We will also check K48 and folic acid level. 2.  Stenosis of both internal carotid arteries. Vascular Surgery consult will be requested to assist in further evaluation and treatment. We will continue aspirin and Plavix. 3.  Coronary artery disease, status post CABG and stent placement. We will check cardiac markers to rule out acute myocardial infarction. We will continue with preadmission cardiac medication. 4.  Dyslipidemia. We will resume home medication. We will check lipid profile as stated above. 5.  Type 2 diabetes. The patient will be placed on sliding scale with insulin coverage. We will check hemoglobin A1c level as stated above. 6.  Seizure disorder. We will continue with Keppra. We will obtain EEG.   The patient's presentation could also be due to seizure. We will also await further recommendation from the neurologist.  7.  Benign prostatic hyperplasia. We will continue with preadmission medication. This is without urinary obstruction. 8.  Obstructive sleep apnea. We will place the patient on CPAP with home setting. 9.  Status post pacemaker insertion. This is secondary to ventricular tachycardia. We will continue to monitor. The patient may require Cardiology consult for pacemaker interrogation. 10.  Chronic systolic congestive heart failure. The patient's echocardiogram according to the record is 16%-20%. We will continue with preadmission medication. The patient appears to be well compensated in terms of his congestive heart failure. 11.  Hyperkalemia. This was reported as hemolyzed sample. We will repeat potassium level. 12.  Chronic kidney disease, stage III. We will monitor the patient's renal function. 15.  Fall at home. CT scan of the head is negative. We will obtain a CT scan of the abdomen and pelvis to evaluate the patient for fracture and intra-abdominal bleeding. 14.  Bilateral leg swelling. We will check ultrasound of the lower extremity for DVT. OTHER ISSUES:  Code status: The patient is a full code. We will place the patient on heparin for DVT prophylaxis. FUNCTIONAL STATUS PRIOR TO ADMISSION:  The patient came from home. The patient is ambulatory with a cane. COVID PRECAUTION:  The patient was wearing a mask. I was wearing a cap, gloves, and face mask for this patient's encounter.         Rivera Rankin MD      [id number]  D:  05/13/2020 4:14  T:  05/13/2020 5:50  JOB #:  [id number]  CC:  Lasha Richard MD
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
## 2020-05-13 NOTE — PROGRESS NOTES
Hospitalist Progress Note  Joan Whitney MD  Answering service: [phone] from in house phone        Date of Service:  2020  NAME:  rUbano Watkins. :  1955  MRN:  [id number]      Admission Summary:   As per initial admission summary  This is a 50-year-old man with a past medical history significant for coronary artery disease, status post CABG and multiple stent placements; dyslipidemia; hypertension; type 2 diabetes; seizure disorder; benign prostatic hyperplasia; obstructive sleep apnea; chronic systolic congestive heart failure; chronic kidney disease; status post pacemaker insertion, was in his usual state of health until the day of presentation at the emergency room when the patient developed sudden loss of vision in the left eye. This episode lasted a few seconds. It was followed by headache. The headache is located at the left side of the head, constant throbbing headache, 6/10 in severity. No known aggravating or relieving factors. The patient stated that he has a history of ocular migraine and this present episode is similar to his attack of ocular migraine. The patient also stated that he fell a couple of times at home. Denies loss of consciousness. The circumstances surrounding the fall is not clear. The patient denies fever, rigors, and chills. No sick contact or contact with any person with COVID-19 virus infection. When the patient arrived at the emergency room, CT of the head was obtained. The CT scan did not show any acute pathology. CTA of the head and neck was also performed. This shows no acute large vessel occlusion, but shows severe stenosis, bilateral internal carotid arteries, left greater than right. The patient was subsequently referred to the hospitalist service for evaluation for admission. The patient was last admitted to this hospital from 2019 to 2019.   The patient was admitted to the Cardiology Service for evaluation and treatment of ventricular tachycardia. The patient subsequently underwent ICD placement with adjustments to his medication. Interval history / Subjective:     Patient seen for Follow up of suspected cva    Patient seen and examined by the bedside, Labs, images and notes reviewed  Patient is comfortable, denies any chest pain, SOB, abdominal pain, fevers, chills. No N/V/D  Discussed with nursing staff, no acute issues overnight, orders reviewed. I talked to ophthalmology Dr. Jesus Alberto Pisano, ([phone]). Discussed with him regarding patient condition. Recommended to contact him if there is recurrent vision issue. appears to be ocular migraine      Assessment & Plan:     1. Ocular migraine. We will admit the patient for further evaluation and treatment. The patient will not be able to undergo MRI of the brain because of the presence of pacemaker. The patient's presentation could also be as a result of ocular migraine. We will carry out headache control while awaiting further recommendation from the neurologist.    Neurology consulted   Recommended to continue with DAPT, along with statin     2. Stenosis of both internal carotid arteries. Vascular surgery consulted  Recommended TCAR procedure   On aspirin plavix     3. Coronary artery disease, status post CABG and stent placement. We will check cardiac markers to rule out acute myocardial infarction. We will continue with preadmission cardiac medication. 4.  Dyslipidemia. We will resume home medication. We will check lipid profile as stated above. 5.  Type 2 diabetes. The patient will be placed on sliding scale with insulin coverage. We will check hemoglobin A1c level as stated above. 6.  Seizure disorder. We will continue with Keppra. We will obtain EEG. The patient's presentation could also be due to seizure.   We will also await further recommendation from the neurologist.  EEG pending     7. Benign prostatic hyperplasia. We will continue with preadmission medication. This is without urinary obstruction. 8.  Obstructive sleep apnea. We will place the patient on CPAP with home setting. 9.  Status post pacemaker insertion. This is secondary to ventricular tachycardia. We will continue to monitor. The patient may require Cardiology consult for pacemaker interrogation  . 10. Chronic systolic congestive heart failure. The patient's echocardiogram according to the record is 16%-20%. We will continue with preadmission medication. The patient appears to be well compensated in terms of his congestive heart failure. 11.  Hyperkalemia. This was reported as hemolyzed sample. We will repeat potassium level. 12.  Chronic kidney disease, stage III. We will monitor the patient's renal function. 15.  Fall at home. CT scan of the head is negative. We will obtain a CT scan of the abdomen and pelvis to evaluate the patient for fracture and intra-abdominal bleeding. 14.  Bilateral leg swelling. We will check ultrasound of the lower extremity for DVT. Code status: full code  DVT prophylaxis: heparin SC     Care Plan discussed with: Patient/Family  Disposition: Home w/Family and TBD     Hospital Problems  Date Reviewed: 5/12/2020          Codes Class Noted POA    * (Principal) Acute CVA (cerebrovascular accident) [facility]) ICD-10-CM: I63.9  ICD-9-CM: 434.91  5/12/2020 Yes                Review of Systems:   A comprehensive review of systems was negative except for that written in the HPI. Vital Signs:    Last 24hrs VS reviewed since prior progress note.  Most recent are:  Visit Vitals  /57 (BP 1 Location: Right arm, BP Patient Position: At rest;Sitting)   Pulse 82   Temp 98.3 °F (36.8 °C)   Resp 15   Ht 5' 9\" (1.753 m)   Wt 92.7 kg (204 lb 6.4 oz)   SpO2 95%   BMI 30.18 kg/m²       No intake or output data in the 24 hours ending 05/13/20 6593 Physical Examination:             Constitutional:  No acute distress, cooperative, pleasant    ENT:  Oral mucous moist, oropharynx benign. Neck supple,    Resp:  CTA bilaterally. No wheezing/rhonchi/rales. No accessory muscle use   CV:  Regular rhythm, normal rate, no murmurs, gallops, rubs    GI:  Soft, non distended, non tender. normoactive bowel sounds, no hepatosplenomegaly     Musculoskeletal:  No edema, warm, 2+ pulses throughout    Neurologic:  Moves all extremities. AAOx3, CN II-XII reviewed     Psych:  Good insight, Not anxious nor agitated. Skin:  Good turgor, no rashes or ulcers  Hematologic/Lymphatic/Immunlogic:  No jaundice nor lymph node swelling  Eyes:  EOMI. Anicteric sclerae, PERRL. Data Review:    Review and/or order of clinical lab test  Review and/or order of tests in the radiology section of [facility]      Labs:     Recent Labs     05/13/20 0027 05/12/20  1716   WBC 9.4 9.1   HGB 13.1 14.7   HCT 43.1 48.0    179     Recent Labs     05/13/20 0027 05/12/20  1716   * 134*   K 4.4 5.9*    105   CO2 21 24   BUN 26* 27*   CREA 1.45* 1.59*   * 143*   CA 8.4* 8.8   MG 1.8  --    PHOS 3.4  --      Recent Labs     05/13/20 0027 05/12/20  1716   SGOT 14* 38*   ALT 24 31    119*   TBILI 0.4 0.6   TP 6.9 7.9   ALB 3.0* 3.3*   GLOB 3.9 4.6*     No results for input(s): INR, PTP, APTT, INREXT in the last 72 hours. No results for input(s): FE, TIBC, PSAT, FERR in the last 72 hours. Lab Results   Component Value Date/Time    Folate 11.2 05/13/2020 12:27 AM      No results for input(s): PH, PCO2, PO2 in the last 72 hours.   Recent Labs     05/13/20  0458 05/13/20 0027   TROIQ <0.05 <0.05     Lab Results   Component Value Date/Time    Cholesterol, total 120 05/13/2020 12:27 AM    HDL Cholesterol 20 05/13/2020 12:27 AM    LDL, calculated 46.2 05/13/2020 12:27 AM    Triglyceride 269 (H) 05/13/2020 12:27 AM    CHOL/HDL Ratio 6.0 (H) 05/13/2020 12:27 AM     Lab Results Component Value Date/Time    Glucose (POC) 132 (H) 05/13/2020 12:08 PM    Glucose (POC) 79 05/13/2020 06:39 AM    Glucose (POC) 170 (H) 05/12/2020 04:54 PM    Glucose (POC) 150 (H) 09/03/2019 11:25 AM    Glucose (POC) 107 (H) 09/03/2019 06:30 AM     Lab Results   Component Value Date/Time    Color YELLOW/STRAW 08/21/2019 01:17 PM    Appearance CLEAR 08/21/2019 01:17 PM    Specific gravity 1.020 08/21/2019 01:17 PM    Specific gravity 1.020 04/05/2019 07:25 PM    pH (UA) 5.5 08/21/2019 01:17 PM    Protein 100 (A) 08/21/2019 01:17 PM    Glucose NEGATIVE  08/21/2019 01:17 PM    Ketone NEGATIVE  08/21/2019 01:17 PM    Bilirubin NEGATIVE  08/21/2019 01:17 PM    Urobilinogen 1.0 08/21/2019 01:17 PM    Nitrites NEGATIVE  08/21/2019 01:17 PM    Leukocyte Esterase TRACE (A) 08/21/2019 01:17 PM    Epithelial cells FEW 08/21/2019 01:17 PM    Bacteria 1+ (A) 08/21/2019 01:17 PM    WBC 5-10 08/21/2019 01:17 PM    RBC >100 (H) 08/21/2019 01:17 PM         Medications Reviewed:     Current Facility-Administered Medications   Medication Dose Route Frequency    amiodarone (CORDARONE) tablet 200 mg  200 mg Oral DAILY    aspirin chewable tablet 81 mg  81 mg Oral DAILY    atorvastatin (LIPITOR) tablet 80 mg  80 mg Oral DAILY    carvediloL (COREG) tablet 3.125 mg  3.125 mg Oral BID WITH MEALS    clonazePAM (KlonoPIN) tablet 1 mg  1 mg Oral QHS    clopidogreL (PLAVIX) tablet 75 mg  75 mg Oral DAILY    eplerenone (INSPRA) tablet 25 mg  25 mg Oral DAILY    finasteride (PROSCAR) tablet 5 mg  5 mg Oral 7am    furosemide (LASIX) tablet 20 mg  20 mg Oral DAILY    isosorbide mononitrate ER (IMDUR) tablet 30 mg  30 mg Oral DAILY    lactulose (CHRONULAC) 10 gram/15 mL solution 45 mL  30 g Oral TID    levETIRAcetam (KEPPRA) tablet 1,000 mg  1,000 mg Oral Q12H    lisinopriL (PRINIVIL, ZESTRIL) tablet 2.5 mg  2.5 mg Oral DAILY    nitroglycerin (NITROSTAT) tablet 0.4 mg  0.4 mg SubLINGual Q5MIN PRN    pantoprazole (PROTONIX) tablet 40 mg  40 mg Oral ACB&D    pregabalin (LYRICA) capsule 50 mg  50 mg Oral TID    QUEtiapine (SEROquel) tablet 25 mg  25 mg Oral QHS    tamsulosin (FLOMAX) capsule 0.4 mg  0.4 mg Oral ACB&D    venlafaxine-SR (EFFEXOR-XR) capsule 75 mg  75 mg Oral BID    insulin lispro (HUMALOG) injection   SubCUTAneous AC&HS    glucose chewable tablet 16 g  4 Tab Oral PRN    glucagon (GLUCAGEN) injection 1 mg  1 mg IntraMUSCular PRN    dextrose 10% infusion 0-250 mL  0-250 mL IntraVENous PRN    acetaminophen (TYLENOL) tablet 650 mg  650 mg Oral Q4H PRN    heparin (porcine) injection 5,000 Units  5,000 Units SubCUTAneous Q8H    bisacodyL (DULCOLAX) suppository 10 mg  10 mg Rectal DAILY PRN     ______________________________________________________________________  EXPECTED LENGTH OF STAY: 2d 7h  ACTUAL LENGTH OF STAY:          1                 Isaac Ayon MD

## 2020-05-14 ENCOUNTER — APPOINTMENT (OUTPATIENT)
Dept: VASCULAR SURGERY | Age: 65
DRG: 035 | End: 2020-05-14
Attending: INTERNAL MEDICINE
Payer: MEDICARE

## 2020-05-14 LAB
ATRIAL RATE: 64 BPM
BACTERIA SPEC CULT: NORMAL
BACTERIA SPEC CULT: NORMAL
CALCULATED P AXIS, ECG09: 9 DEGREES
CALCULATED R AXIS, ECG10: 60 DEGREES
CALCULATED T AXIS, ECG11: 119 DEGREES
DIAGNOSIS, 93000: NORMAL
GLUCOSE BLD STRIP.AUTO-MCNC: 121 MG/DL (ref 65–100)
GLUCOSE BLD STRIP.AUTO-MCNC: 146 MG/DL (ref 65–100)
GLUCOSE BLD STRIP.AUTO-MCNC: 153 MG/DL (ref 65–100)
GLUCOSE BLD STRIP.AUTO-MCNC: 182 MG/DL (ref 65–100)
P-R INTERVAL, ECG05: 168 MS
Q-T INTERVAL, ECG07: 460 MS
QRS DURATION, ECG06: 126 MS
QTC CALCULATION (BEZET), ECG08: 474 MS
SERVICE CMNT-IMP: ABNORMAL
SERVICE CMNT-IMP: NORMAL
VENTRICULAR RATE, ECG03: 64 BPM

## 2020-05-14 PROCEDURE — 97116 GAIT TRAINING THERAPY: CPT

## 2020-05-14 PROCEDURE — 74011250637 HC RX REV CODE- 250/637: Performed by: INTERNAL MEDICINE

## 2020-05-14 PROCEDURE — 74011636637 HC RX REV CODE- 636/637: Performed by: INTERNAL MEDICINE

## 2020-05-14 PROCEDURE — 97535 SELF CARE MNGMENT TRAINING: CPT

## 2020-05-14 PROCEDURE — 74011250636 HC RX REV CODE- 250/636: Performed by: INTERNAL MEDICINE

## 2020-05-14 PROCEDURE — 65660000000 HC RM CCU STEPDOWN

## 2020-05-14 PROCEDURE — 94760 N-INVAS EAR/PLS OXIMETRY 1: CPT

## 2020-05-14 PROCEDURE — 93970 EXTREMITY STUDY: CPT

## 2020-05-14 PROCEDURE — 82962 GLUCOSE BLOOD TEST: CPT

## 2020-05-14 PROCEDURE — 97530 THERAPEUTIC ACTIVITIES: CPT

## 2020-05-14 RX ADMIN — PANTOPRAZOLE SODIUM 40 MG: 40 TABLET, DELAYED RELEASE ORAL at 06:57

## 2020-05-14 RX ADMIN — CLONAZEPAM 1 MG: 1 TABLET ORAL at 21:38

## 2020-05-14 RX ADMIN — VENLAFAXINE HYDROCHLORIDE 75 MG: 37.5 CAPSULE, EXTENDED RELEASE ORAL at 17:08

## 2020-05-14 RX ADMIN — TAMSULOSIN HYDROCHLORIDE 0.4 MG: 0.4 CAPSULE ORAL at 17:08

## 2020-05-14 RX ADMIN — PREGABALIN 50 MG: 50 CAPSULE ORAL at 21:38

## 2020-05-14 RX ADMIN — ASPIRIN 81 MG 81 MG: 81 TABLET ORAL at 09:00

## 2020-05-14 RX ADMIN — ATORVASTATIN CALCIUM 80 MG: 40 TABLET, FILM COATED ORAL at 08:59

## 2020-05-14 RX ADMIN — QUETIAPINE FUMARATE 25 MG: 25 TABLET ORAL at 21:38

## 2020-05-14 RX ADMIN — CARVEDILOL 3.12 MG: 3.12 TABLET, FILM COATED ORAL at 09:01

## 2020-05-14 RX ADMIN — HEPARIN SODIUM 5000 UNITS: 5000 INJECTION, SOLUTION INTRAVENOUS; SUBCUTANEOUS at 13:43

## 2020-05-14 RX ADMIN — PREGABALIN 50 MG: 50 CAPSULE ORAL at 17:09

## 2020-05-14 RX ADMIN — AMIODARONE HYDROCHLORIDE 200 MG: 200 TABLET ORAL at 09:03

## 2020-05-14 RX ADMIN — EPLERENONE 25 MG: 25 TABLET, FILM COATED ORAL at 08:59

## 2020-05-14 RX ADMIN — FINASTERIDE 5 MG: 5 TABLET, FILM COATED ORAL at 06:57

## 2020-05-14 RX ADMIN — CLOPIDOGREL BISULFATE 75 MG: 75 TABLET ORAL at 09:00

## 2020-05-14 RX ADMIN — PANTOPRAZOLE SODIUM 40 MG: 40 TABLET, DELAYED RELEASE ORAL at 17:09

## 2020-05-14 RX ADMIN — LEVETIRACETAM 1000 MG: 500 TABLET, FILM COATED ORAL at 08:59

## 2020-05-14 RX ADMIN — LEVETIRACETAM 1000 MG: 500 TABLET, FILM COATED ORAL at 21:38

## 2020-05-14 RX ADMIN — CARVEDILOL 3.12 MG: 3.12 TABLET, FILM COATED ORAL at 17:09

## 2020-05-14 RX ADMIN — HEPARIN SODIUM 5000 UNITS: 5000 INJECTION, SOLUTION INTRAVENOUS; SUBCUTANEOUS at 21:38

## 2020-05-14 RX ADMIN — LISINOPRIL 2.5 MG: 5 TABLET ORAL at 09:00

## 2020-05-14 RX ADMIN — FUROSEMIDE 20 MG: 40 TABLET ORAL at 09:00

## 2020-05-14 RX ADMIN — INSULIN LISPRO 2 UNITS: 100 INJECTION, SOLUTION INTRAVENOUS; SUBCUTANEOUS at 17:09

## 2020-05-14 RX ADMIN — VENLAFAXINE HYDROCHLORIDE 75 MG: 37.5 CAPSULE, EXTENDED RELEASE ORAL at 09:00

## 2020-05-14 RX ADMIN — TAMSULOSIN HYDROCHLORIDE 0.4 MG: 0.4 CAPSULE ORAL at 06:57

## 2020-05-14 RX ADMIN — PREGABALIN 50 MG: 50 CAPSULE ORAL at 09:03

## 2020-05-14 RX ADMIN — HEPARIN SODIUM 5000 UNITS: 5000 INJECTION, SOLUTION INTRAVENOUS; SUBCUTANEOUS at 06:57

## 2020-05-14 RX ADMIN — INSULIN LISPRO 2 UNITS: 100 INJECTION, SOLUTION INTRAVENOUS; SUBCUTANEOUS at 12:20

## 2020-05-14 RX ADMIN — ISOSORBIDE MONONITRATE 30 MG: 30 TABLET ORAL at 08:59

## 2020-05-14 NOTE — PROGRESS NOTES
Vascular    No new events, visual sxs reviewed  Visit Vitals  /74 (BP 1 Location: Left arm, BP Patient Position: At rest)   Pulse 85   Temp 98.4 °F (36.9 °C)   Resp 19   Ht 5' 9\" (1.753 m)   Wt 92.7 kg (204 lb 5.9 oz)   SpO2 95%   BMI 30.18 kg/m²     Neuro intact    77 y/o WM with occular migraines and high grade bilateral carotid stenosis  - Appreciate everyones input, chart reviewed. Seems the occular migraine is the likely culprit here. I discussed the findings of the CT scan further with him. He is generally in favorable of setting up an outpatient procedure future stroke prevention for his high grade carotid stenosis. I will discuss with his daughter further. Spencer Maloney for discharge and we will set up as an outpatient.  Please continue asa/plavix

## 2020-05-14 NOTE — PROGRESS NOTES
Problem: TIA/CVA Stroke: Day 2 Until Discharge  Goal: Psychosocial  Outcome: Progressing Towards Goal  Goal: *Absence of aspiration  Outcome: Progressing Towards Goal  Goal: *Tolerating diet  Outcome: Progressing Towards Goal  Goal: *Ability to perform ADLs and demonstrates progressive mobility and function  Outcome: Progressing Towards Goal

## 2020-05-14 NOTE — PROGRESS NOTES
Problem: Diabetes Self-Management  Goal: *Disease process and treatment process  Description: Define diabetes and identify own type of diabetes; list 3 options for treating diabetes. Outcome: Progressing Towards Goal  Goal: *Developing strategies to address psychosocial issues  Description: Describe feelings about living with diabetes; identify support needed and support network  Outcome: Progressing Towards Goal     Problem: Falls - Risk of  Goal: *Absence of Falls  Description: Document Prema Billings Fall Risk and appropriate interventions in the flowsheet. Outcome: Progressing Towards Goal  Note: Fall Risk Interventions:  Mobility Interventions: Patient to call before getting OOB, PT Consult for mobility concerns, PT Consult for assist device competence, Communicate number of staff needed for ambulation/transfer, OT consult for ADLs, Utilize walker, cane, or other assistive device         Medication Interventions: Patient to call before getting OOB, Teach patient to arise slowly    Elimination Interventions:  Toileting schedule/hourly rounds, Toilet paper/wipes in reach, Stay With Me (per policy), Patient to call for help with toileting needs, Call light in reach    History of Falls Interventions: Consult care management for discharge planning, Door open when patient unattended, Room close to nurse's station, Vital signs minimum Q4HRs X 24 hrs (comment for end date), Assess for delayed presentation/identification of injury for 48 hrs (comment for end date)

## 2020-05-14 NOTE — PROGRESS NOTES
Bedside shift change report given to Nikki Anderson RN (oncoming nurse) by Helga Wang RN (offgoing nurse). Report included the following information SBAR, Kardex, MAR, Accordion, Recent Results, Cardiac Rhythm Paced and Dual Neuro Assessment.

## 2020-05-14 NOTE — PROGRESS NOTES
Hospitalist Progress Note  Joan Whitney MD  Answering service: [phone] from in house phone        Date of Service:  2020  NAME:  Urbano Watkins. :  1955  MRN:  [id number]      Admission Summary:   As per initial admission summary  This is a 69-year-old man with a past medical history significant for coronary artery disease, status post CABG and multiple stent placements; dyslipidemia; hypertension; type 2 diabetes; seizure disorder; benign prostatic hyperplasia; obstructive sleep apnea; chronic systolic congestive heart failure; chronic kidney disease; status post pacemaker insertion, was in his usual state of health until the day of presentation at the emergency room when the patient developed sudden loss of vision in the left eye. This episode lasted a few seconds. It was followed by headache. The headache is located at the left side of the head, constant throbbing headache, 6/10 in severity. No known aggravating or relieving factors. The patient stated that he has a history of ocular migraine and this present episode is similar to his attack of ocular migraine. The patient also stated that he fell a couple of times at home. Denies loss of consciousness. The circumstances surrounding the fall is not clear. The patient denies fever, rigors, and chills. No sick contact or contact with any person with COVID-19 virus infection. When the patient arrived at the emergency room, CT of the head was obtained. The CT scan did not show any acute pathology. CTA of the head and neck was also performed. This shows no acute large vessel occlusion, but shows severe stenosis, bilateral internal carotid arteries, left greater than right. The patient was subsequently referred to the hospitalist service for evaluation for admission. The patient was last admitted to this hospital from 2019 to 2019.   The patient was admitted to the Cardiology Service for evaluation and treatment of ventricular tachycardia. The patient subsequently underwent ICD placement with adjustments to his medication. Interval history / Subjective:     Patient seen for Follow up of suspected cva    Patient seen and examined by the bedside, Labs, images and notes reviewed  Patient is comfortable, denies any chest pain, SOB, abdominal pain, fevers, chills. No N/V/D  Discussed with nursing staff, no acute issues overnight, orders reviewed. I talked to ophthalmology Dr. Johnathan Morelos, ([phone]). Discussed with him regarding patient condition. Recommended to contact him if there is recurrent vision issue. appears to be ocular migraine   Plan for TCAR, as per vascular surgery . No acute issues overnight      Assessment & Plan:     1. Ocular migraine. We will admit the patient for further evaluation and treatment. The patient will not be able to undergo MRI of the brain because of the presence of pacemaker. The patient's presentation could also be as a result of ocular migraine. We will carry out headache control while awaiting further recommendation from the neurologist.    Neurology consulted   Recommended to continue with DAPT, along with statin   Please call Ophthalmology if patient develops recurrent vision loss     2. Stenosis of both internal carotid arteries. Vascular surgery consulted  Recommended TCAR procedure   On aspirin plavix     3. Coronary artery disease, status post CABG and stent placement. We will check cardiac markers to rule out acute myocardial infarction. We will continue with preadmission cardiac medication. 4.  Dyslipidemia. We will resume home medication. We will check lipid profile as stated above. 5.  Type 2 diabetes. The patient will be placed on sliding scale with insulin coverage. We will check hemoglobin A1c level as stated above. 6.  Seizure disorder. We will continue with Keppra.   We will obtain EEG. The patient's presentation could also be due to seizure. We will also await further recommendation from the neurologist.  EEG pending     7. Benign prostatic hyperplasia. We will continue with preadmission medication. This is without urinary obstruction. 8.  Obstructive sleep apnea. We will place the patient on CPAP with home setting. 9.  Status post pacemaker insertion. This is secondary to ventricular tachycardia. We will continue to monitor. The patient may require Cardiology consult for pacemaker interrogation  . 10. Chronic systolic congestive heart failure. The patient's echocardiogram according to the record is 16%-20%. We will continue with preadmission medication. The patient appears to be well compensated in terms of his congestive heart failure. 11.  Hyperkalemia. This was reported as hemolyzed sample. We will repeat potassium level. 12.  Chronic kidney disease, stage III. We will monitor the patient's renal function. 15.  Fall at home. CT scan of the head is negative. We will obtain a CT scan of the abdomen and pelvis to evaluate the patient for fracture and intra-abdominal bleeding. 14.  Bilateral leg swelling. We will check ultrasound of the lower extremity for DVT. Code status: full code  DVT prophylaxis: heparin SC     Care Plan discussed with: Patient/Family  Disposition: Home w/Family and TBD     Hospital Problems  Date Reviewed: 5/12/2020          Codes Class Noted POA    * (Principal) Acute CVA (cerebrovascular accident) [facility]) ICD-10-CM: I63.9  ICD-9-CM: 434.91  5/12/2020 Yes                Review of Systems:   A comprehensive review of systems was negative except for that written in the HPI. Vital Signs:    Last 24hrs VS reviewed since prior progress note.  Most recent are:  Visit Vitals  /90   Pulse 88   Temp 98.6 °F (37 °C)   Resp 16   Ht 5' 9\" (1.753 m)   Wt 92.7 kg (204 lb 5.9 oz)   SpO2 96%   BMI 30.18 kg/m² Intake/Output Summary (Last 24 hours) at 5/14/2020 0951  Last data filed at 5/14/2020 0935  Gross per 24 hour   Intake    Output 700 ml   Net -700 ml        Physical Examination:             Constitutional:  No acute distress, cooperative, pleasant    ENT:  Oral mucous moist, oropharynx benign. Neck supple,    Resp:  CTA bilaterally. No wheezing/rhonchi/rales. No accessory muscle use   CV:  Regular rhythm, normal rate, no murmurs, gallops, rubs    GI:  Soft, non distended, non tender. normoactive bowel sounds, no hepatosplenomegaly     Musculoskeletal:  No edema, warm, 2+ pulses throughout    Neurologic:  Moves all extremities. AAOx3, CN II-XII reviewed     Psych:  Good insight, Not anxious nor agitated. Skin:  Good turgor, no rashes or ulcers  Hematologic/Lymphatic/Immunlogic:  No jaundice nor lymph node swelling  Eyes:  EOMI. Anicteric sclerae, PERRL. Data Review:    Review and/or order of clinical lab test  Review and/or order of tests in the radiology section of [facility]      Labs:     Recent Labs     05/13/20  0027 05/12/20  1716   WBC 9.4 9.1   HGB 13.1 14.7   HCT 43.1 48.0    179     Recent Labs     05/13/20  0027 05/12/20  1716   * 134*   K 4.4 5.9*    105   CO2 21 24   BUN 26* 27*   CREA 1.45* 1.59*   * 143*   CA 8.4* 8.8   MG 1.8  --    PHOS 3.4  --      Recent Labs     05/13/20  0027 05/12/20  1716   SGOT 14* 38*   ALT 24 31    119*   TBILI 0.4 0.6   TP 6.9 7.9   ALB 3.0* 3.3*   GLOB 3.9 4.6*     No results for input(s): INR, PTP, APTT, INREXT, INREXT in the last 72 hours. No results for input(s): FE, TIBC, PSAT, FERR in the last 72 hours. Lab Results   Component Value Date/Time    Folate 11.2 05/13/2020 12:27 AM      No results for input(s): PH, PCO2, PO2 in the last 72 hours.   Recent Labs     05/13/20  0458 05/13/20  0027   TROIQ <0.05 <0.05     Lab Results   Component Value Date/Time    Cholesterol, total 120 05/13/2020 12:27 AM    HDL Cholesterol 20 05/13/2020 12:27 AM    LDL, calculated 46.2 05/13/2020 12:27 AM    Triglyceride 269 (H) 05/13/2020 12:27 AM    CHOL/HDL Ratio 6.0 (H) 05/13/2020 12:27 AM     Lab Results   Component Value Date/Time    Glucose (POC) 121 (H) 05/14/2020 07:11 AM    Glucose (POC) 187 (H) 05/13/2020 09:22 PM    Glucose (POC) 198 (H) 05/13/2020 04:56 PM    Glucose (POC) 132 (H) 05/13/2020 12:08 PM    Glucose (POC) 79 05/13/2020 06:39 AM     Lab Results   Component Value Date/Time    Color YELLOW/STRAW 08/21/2019 01:17 PM    Appearance CLEAR 08/21/2019 01:17 PM    Specific gravity 1.020 08/21/2019 01:17 PM    Specific gravity 1.020 04/05/2019 07:25 PM    pH (UA) 5.5 08/21/2019 01:17 PM    Protein 100 (A) 08/21/2019 01:17 PM    Glucose NEGATIVE  08/21/2019 01:17 PM    Ketone NEGATIVE  08/21/2019 01:17 PM    Bilirubin NEGATIVE  08/21/2019 01:17 PM    Urobilinogen 1.0 08/21/2019 01:17 PM    Nitrites NEGATIVE  08/21/2019 01:17 PM    Leukocyte Esterase TRACE (A) 08/21/2019 01:17 PM    Epithelial cells FEW 08/21/2019 01:17 PM    Bacteria 1+ (A) 08/21/2019 01:17 PM    WBC 5-10 08/21/2019 01:17 PM    RBC >100 (H) 08/21/2019 01:17 PM         Medications Reviewed:     Current Facility-Administered Medications   Medication Dose Route Frequency    amiodarone (CORDARONE) tablet 200 mg  200 mg Oral DAILY    aspirin chewable tablet 81 mg  81 mg Oral DAILY    atorvastatin (LIPITOR) tablet 80 mg  80 mg Oral DAILY    carvediloL (COREG) tablet 3.125 mg  3.125 mg Oral BID WITH MEALS    clonazePAM (KlonoPIN) tablet 1 mg  1 mg Oral QHS    clopidogreL (PLAVIX) tablet 75 mg  75 mg Oral DAILY    eplerenone (INSPRA) tablet 25 mg  25 mg Oral DAILY    finasteride (PROSCAR) tablet 5 mg  5 mg Oral 7am    furosemide (LASIX) tablet 20 mg  20 mg Oral DAILY    isosorbide mononitrate ER (IMDUR) tablet 30 mg  30 mg Oral DAILY    lactulose (CHRONULAC) 10 gram/15 mL solution 45 mL  30 g Oral TID    levETIRAcetam (KEPPRA) tablet 1,000 mg  1,000 mg Oral Q12H    lisinopriL (PRINIVIL, ZESTRIL) tablet 2.5 mg  2.5 mg Oral DAILY    nitroglycerin (NITROSTAT) tablet 0.4 mg  0.4 mg SubLINGual Q5MIN PRN    pantoprazole (PROTONIX) tablet 40 mg  40 mg Oral ACB&D    pregabalin (LYRICA) capsule 50 mg  50 mg Oral TID    QUEtiapine (SEROquel) tablet 25 mg  25 mg Oral QHS    tamsulosin (FLOMAX) capsule 0.4 mg  0.4 mg Oral ACB&D    venlafaxine-SR (EFFEXOR-XR) capsule 75 mg  75 mg Oral BID    insulin lispro (HUMALOG) injection   SubCUTAneous AC&HS    glucose chewable tablet 16 g  4 Tab Oral PRN    glucagon (GLUCAGEN) injection 1 mg  1 mg IntraMUSCular PRN    dextrose 10% infusion 0-250 mL  0-250 mL IntraVENous PRN    acetaminophen (TYLENOL) tablet 650 mg  650 mg Oral Q4H PRN    heparin (porcine) injection 5,000 Units  5,000 Units SubCUTAneous Q8H    bisacodyL (DULCOLAX) suppository 10 mg  10 mg Rectal DAILY PRN     ______________________________________________________________________  EXPECTED LENGTH OF STAY: 2d 7h  ACTUAL LENGTH OF STAY:          2                 Elba Vargas MD

## 2020-05-14 NOTE — PROGRESS NOTES
Problem: Mobility Impaired (Adult and Pediatric) Goal: *Acute Goals and Plan of Care (Insert Text) Description: FUNCTIONAL STATUS PRIOR TO ADMISSION: Patient was modified independent using a single point cane for functional mobility. HOME SUPPORT PRIOR TO ADMISSION: The patient lived with children who provide 24/7 assistance. Physical Therapy Goals Initiated 5/13/2020 1. Patient will move from supine to sit and sit to supine  in bed with independence within 7 day(s). 2.  Patient will transfer from bed to chair and chair to bed with modified independence using the least restrictive device within 7 day(s). 3.  Patient will perform sit to stand with minimal assistance/contact guard assist within 7 day(s). 4.  Patient will ambulate with modified independence for 150 feet with the least restrictive device within 7 day(s). 5.  Patient will ascend/descend 2 stairs with 1 handrail(s) with modified independence within 7 day(s). Outcome: Progressing Towards Goal 
PHYSICAL THERAPY TREATMENT Patient: Prasad Meneses (67 y.o. male) Date: 5/14/2020 Diagnosis: Acute CVA (cerebrovascular accident) ([facility] [address] [I63.9] Acute CVA (cerebrovascular accident) ([facility] [address] Precautions:   
Chart, physical therapy assessment, plan of care and goals were reviewed. ASSESSMENT Patient continues with skilled PT services and is progressing towards goals. Current Level of Function Impacting Discharge (mobility/balance): SBA-Min A for functional transfers and gait w/ RW Other factors to consider for discharge: orthostatic hypotension, fall risk PLAN : 
Patient continues to benefit from skilled intervention to address the above impairments. Continue treatment per established plan of care. to address goals. Recommendation for discharge: (in order for the patient to meet his/her long term goals) Physical therapy at least 2 days/week in the home AND ensure assist and/or supervision for safety with mobility This discharge recommendation: 
Has been made in collaboration with the attending provider and/or case management IF patient discharges home will need the following DME: patient owns DME required for discharge; gait belt provided for home use SUBJECTIVE:  
Patient stated I could sleep through a hurricane.  OBJECTIVE DATA SUMMARY:  
Critical Behavior: 
Neurologic State: Alert Orientation Level: Oriented X4 Cognition: Follows commands Safety/Judgement: Home safety, Fall prevention, Insight into deficits Functional Mobility Training: 
Bed Mobility: 
  
Supine to Sit: Stand-by assistance Sit to Supine: Stand-by assistance Transfers: 
Sit to Stand: Contact guard assistance Stand to Sit: Contact guard assistance Balance: 
Sitting: Intact Standing: Impaired Standing - Static: Constant support; Fair 
Standing - Dynamic : Constant support; Wallie Found Ambulation/Gait Training: 
Distance (ft): 40 Feet (ft) Assistive Device: Cane, straight;Gait belt Ambulation - Level of Assistance: Contact guard assistance;Minimal assistance;Assist x1 Gait Abnormalities: Decreased step clearance Base of Support: Widened Speed/Usha: Shuffled; Slow Step Length: Left shortened;Right shortened *** Stairs: Therapeutic Exercises:  
*** Pain Rating: 
*** Activity Tolerance:  
{therapy activity tolerance:73202:::0} Please refer to the flowsheet for vital signs taken during this treatment. After treatment patient left in no apparent distress:  
{AFTER therapy treatment:89177:::0} 
 
COMMUNICATION/COLLABORATION:  
The patients plan of care was discussed with: {POC discussed with:39259:::0}. [facility] Time Calculation: 31 mins

## 2020-05-14 NOTE — PROGRESS NOTES
Problem: Mobility Impaired (Adult and Pediatric)  Goal: *Acute Goals and Plan of Care (Insert Text)  Description: FUNCTIONAL STATUS PRIOR TO ADMISSION: Patient was modified independent using a single point cane for functional mobility. HOME SUPPORT PRIOR TO ADMISSION: The patient lived with children who provide 24/7 assistance. Physical Therapy Goals  Initiated 5/13/2020  1. Patient will move from supine to sit and sit to supine  in bed with independence within 7 day(s). 2.  Patient will transfer from bed to chair and chair to bed with modified independence using the least restrictive device within 7 day(s). 3.  Patient will perform sit to stand with minimal assistance/contact guard assist within 7 day(s). 4.  Patient will ambulate with modified independence for 150 feet with the least restrictive device within 7 day(s). 5.  Patient will ascend/descend 2 stairs with 1 handrail(s) with modified independence within 7 day(s). 5/14/2020 1152 by Means, Eduarda CARTER  Outcome: Progressing Towards Goal  PHYSICAL THERAPY TREATMENT  Patient: Carol Carrasco (67 y.o. male)  Date: 5/14/2020  Diagnosis: Acute CVA (cerebrovascular accident) [facility]) [I63.9]   Acute CVA (cerebrovascular accident) [facility])       Precautions:    Chart, physical therapy assessment, plan of care and goals were reviewed. ASSESSMENT  Patient continues with skilled PT services and is progressing towards goals. Continues to experience orthostatic hypotension w/ standing which then improved w/ return to sit and short gait (see VS below). Continues to demonstrate SBA-CGA for functional transfers and CGA-Min A for short gait w/ SPC. Mild path deviations but improved since last session.      (See VS during Pt session between 11:20-11:38)  Patient Vitals for the past 4 hrs:   Temp Pulse Resp BP SpO2   05/14/20 1347 98.6 °F (37 °C) 89 19 93/56 95 %   05/14/20 1200 -- 89 -- -- --   05/14/20 1138 -- 88 -- 128/76 --   05/14/20 1131 -- 74 -- 115/73 --   05/14/20 1127 -- (!) 104 -- (!) 84/56 --   05/14/20 1126 -- 94 -- 115/74 --   05/14/20 1121 -- 98 -- 98/72 --   05/14/20 1120 -- 98 -- 119/72 --   05/14/20 1014 98.4 °F (36.9 °C) 85 19 113/74 95 %              Current Level of Function Impacting Discharge (mobility/balance): SBA-Min A for functional transfers and gait w/ SPC             PLAN :  Patient continues to benefit from skilled intervention to address the above impairments. Continue treatment per established plan of care. to address goals. Recommendation for discharge: (in order for the patient to meet his/her long term goals)  Physical therapy at least 2 days/week in the home AND ensure assist and/or supervision for safety with all mobility/transfers and I/ADL's     This discharge recommendation:  Has been made in collaboration with the attending provider and/or case management    IF patient discharges home will need the following DME: patient owns DME required for discharge [facility]); gait belt provided       SUBJECTIVE:   Patient stated I feel a little wishy-washy.     OBJECTIVE DATA SUMMARY:   Critical Behavior:  Neurologic State: Alert  Orientation Level: Oriented X4  Cognition: Follows commands  Safety/Judgement: Home safety, Fall prevention, Insight into deficits  Functional Mobility Training:  Bed Mobility:     Supine to Sit: Stand-by assistance  Sit to Supine: Stand-by assistance           Transfers:  Sit to Stand: Contact guard assistance  Stand to Sit: Contact guard assistance                             Balance:  Sitting: Intact  Standing: Impaired  Standing - Static: Constant support; Fair  Standing - Dynamic : Constant support; Fair  Ambulation/Gait Training:  Distance (ft): 40 Feet (ft)  Assistive Device: Cane, straight;Gait belt  Ambulation - Level of Assistance: Contact guard assistance;Minimal assistance;Assist x1        Gait Abnormalities: Decreased step clearance        Base of Support: Widened     Speed/Usha: Shuffled; Slow  Step Length: Left shortened;Right shortened             Therapeutic Exercises:   Seated marches  Ankles pump  Pain Rating:  No c/o pain    Activity Tolerance:   Fair  Please refer to the flowsheet for vital signs taken during this treatment. After treatment patient left in no apparent distress:   Supine in bed, Call bell within reach, and Side rails x 3    COMMUNICATION/COLLABORATION:   The patients plan of care was discussed with: Registered nurse.      Eduarda Tai,PTA   Time Calculation: 31 mins

## 2020-05-14 NOTE — PROGRESS NOTES
Problem: Self Care Deficits Care Plan (Adult)  Goal: *Acute Goals and Plan of Care (Insert Text)  Description: FUNCTIONAL STATUS PRIOR TO ADMISSION: Patient was modified independent using a single point cane for functional mobility. HOME SUPPORT: The patient lived with children but did not require assistance with ADLs. Per patient report, adult children will be able to provide 24/7 supervision and assist at discharge. Occupational Therapy Goals  Initiated 5/13/2020  1. Patient will perform ADLs in standing for 5 mins without fatigue or LOB with supervision/set-up within 7 day(s) using SPC for balance prn.  2.  Patient will complete supine to sit transfer within sternal precautions with supervision and verbal cues within 7 day(s) in order to alleviate discomfort when preparing for OOB activities. 3.  Patient will perform toilet transfers with supervision/set-up within 7 day(s) using SPC and grab bars prn.  4.  Patient will perform all aspects of toileting with supervision/set-up within 7 day(s) using SPC and grab bars prn. Outcome: Progressing Towards Goal    OCCUPATIONAL THERAPY TREATMENT  Patient: Sekou Steven (67 y.o. male)  Date: 5/14/2020  Diagnosis: Acute CVA (cerebrovascular accident) [facility]) [I63.9]   Acute CVA (cerebrovascular accident) [facility])       Precautions:    Chart, occupational therapy assessment, plan of care, and goals were reviewed. ASSESSMENT  Patient continues with skilled OT services and is progressing towards goals. Patient cleared by RN to be seen, received in bed and agreeable to treatment. Patient continues to be limited by orthostatic hypotension (verbalized being dizzy, VS listed below), impaired balance and decreased functional activity tolerance. Patient completed bed mobility with SBA and was able to sit EOB while completing ADLs. Patient completed EOB bathing with supervision. Patient able to change socks in tailor sitting with supervision.  Patient completed grooming sitting EOB with setup. Patient noted to have scratched back to bleeding, RN aware and placed dressing over area. Patient completed functional mobility to stretcher for transport at end of session with up to min A 2* mild LOB while using cane. Will continue to follow, recommend HHOT once medically cleared for D/C. Vitals:    05/14/20 1347 05/14/20 1435 05/14/20 1440 05/14/20 1455   BP: 93/56 111/50 94/61 106/74   BP 1 Location: Left arm Right arm Right arm Right arm   BP Patient Position:  Supine; At rest At rest;Sitting At rest;Sitting;Post activity  Comment: EOB bathing   Pulse: 89 96 96 90   Resp: 19      Temp: 98.6 °F (37 °C)      SpO2: 95%      Weight:       Height:             Current Level of Function Impacting Discharge (ADLs): up to min A 2* balance and orthostatics. Other factors to consider for discharge: was mod I at baseline, fall risk         PLAN :  Patient continues to benefit from skilled intervention to address the above impairments. Continue treatment per established plan of care. to address goals. Recommend with staff: Recommend with nursing patient to complete as able in order to maintain strength, endurance and independence: ADLs with supervision/setup and once Egress Test completed OOB to chair 3x/day and mobilizing to the bathroom for toileting with 1 assist. Thank you for your assistance. Recommend next OT session: ADLs at sink    Recommendation for discharge: (in order for the patient to meet his/her long term goals)  Occupational therapy at least 2 days/week in the home AND ensure assist and/or supervision for safety with IADLs     This discharge recommendation:  Has been made in collaboration with the attending provider and/or case management    IF patient discharges home will need the following DME: shower chair       SUBJECTIVE:   Patient stated Jeniffer Comfort you so much, I feel so much better.     OBJECTIVE DATA SUMMARY:   Cognitive/Behavioral Status:  Neurologic State: Alert  Orientation Level: Oriented X4  Cognition: Appropriate for age attention/concentration; Follows commands  Perception: Appears intact  Perseveration: No perseveration noted  Safety/Judgement: Awareness of environment    Functional Mobility and Transfers for ADLs:  Bed Mobility:  Supine to Sit: Supervision  Sit to Supine: Stand-by assistance    Transfers:  Sit to Stand: Contact guard assistance    Balance:  Sitting: Intact  Standing: Impaired  Standing - Static: Constant support; Fair  Standing - Dynamic : Constant support; Fair    ADL Intervention:    Grooming  Position Performed: Seated edge of bed  Washing Face: Set-up  Brushing Teeth: Set-up  Brushing/Combing Hair: Set-up    Upper Body Bathing  Bathing Assistance: Minimum assistance(back only)  Position Performed: Seated edge of bed  Cues: Physical assistance;Verbal cues provided    Lower Body Bathing  Lower Body : Supervision  Position Performed: Seated edge of bed  Cues: Verbal cues provided    Upper Body [address]: Supervision    Lower Body Dressing Assistance  Socks: Supervision  Leg Crossed Method Used: Yes  Position Performed: Seated edge of bed  Cues: Don;Doff;Verbal cues provided    Cognitive Retraining  Orientation Retraining: Use of calendar  Safety/Judgement: Awareness of environment    Pain:  Reporting no pain    Activity Tolerance:   Good, SpO2 stable on RA, requires rest breaks, and signs and symptoms of orthostatic hypotension  Please refer to the flowsheet for vital signs taken during this treatment. After treatment patient left in no apparent distress:   Supine in bed, Side rails x 3, and on stretcher with transport, RN aware     COMMUNICATION/COLLABORATION:   The patients plan of care was discussed with: Physical therapist and Registered nurse.      Billy Keyes OT  Time Calculation: 34 mins

## 2020-05-15 LAB
GLUCOSE BLD STRIP.AUTO-MCNC: 127 MG/DL (ref 65–100)
GLUCOSE BLD STRIP.AUTO-MCNC: 135 MG/DL (ref 65–100)
GLUCOSE BLD STRIP.AUTO-MCNC: 148 MG/DL (ref 65–100)
GLUCOSE BLD STRIP.AUTO-MCNC: 208 MG/DL (ref 65–100)
SERVICE CMNT-IMP: ABNORMAL

## 2020-05-15 PROCEDURE — 74011250636 HC RX REV CODE- 250/636: Performed by: INTERNAL MEDICINE

## 2020-05-15 PROCEDURE — 94760 N-INVAS EAR/PLS OXIMETRY 1: CPT

## 2020-05-15 PROCEDURE — 97530 THERAPEUTIC ACTIVITIES: CPT

## 2020-05-15 PROCEDURE — 74011250637 HC RX REV CODE- 250/637: Performed by: INTERNAL MEDICINE

## 2020-05-15 PROCEDURE — 82962 GLUCOSE BLOOD TEST: CPT

## 2020-05-15 PROCEDURE — 65660000000 HC RM CCU STEPDOWN

## 2020-05-15 PROCEDURE — 74011636637 HC RX REV CODE- 636/637: Performed by: INTERNAL MEDICINE

## 2020-05-15 PROCEDURE — 97535 SELF CARE MNGMENT TRAINING: CPT

## 2020-05-15 RX ADMIN — PREGABALIN 50 MG: 50 CAPSULE ORAL at 08:58

## 2020-05-15 RX ADMIN — QUETIAPINE FUMARATE 25 MG: 25 TABLET ORAL at 21:29

## 2020-05-15 RX ADMIN — CLONAZEPAM 1 MG: 1 TABLET ORAL at 21:29

## 2020-05-15 RX ADMIN — LEVETIRACETAM 1000 MG: 500 TABLET, FILM COATED ORAL at 21:29

## 2020-05-15 RX ADMIN — PREGABALIN 50 MG: 50 CAPSULE ORAL at 21:29

## 2020-05-15 RX ADMIN — FUROSEMIDE 20 MG: 40 TABLET ORAL at 08:58

## 2020-05-15 RX ADMIN — PANTOPRAZOLE SODIUM 40 MG: 40 TABLET, DELAYED RELEASE ORAL at 16:01

## 2020-05-15 RX ADMIN — PREGABALIN 50 MG: 50 CAPSULE ORAL at 16:01

## 2020-05-15 RX ADMIN — ATORVASTATIN CALCIUM 80 MG: 40 TABLET, FILM COATED ORAL at 08:57

## 2020-05-15 RX ADMIN — PANTOPRAZOLE SODIUM 40 MG: 40 TABLET, DELAYED RELEASE ORAL at 07:40

## 2020-05-15 RX ADMIN — INSULIN LISPRO 3 UNITS: 100 INJECTION, SOLUTION INTRAVENOUS; SUBCUTANEOUS at 13:57

## 2020-05-15 RX ADMIN — HEPARIN SODIUM 5000 UNITS: 5000 INJECTION, SOLUTION INTRAVENOUS; SUBCUTANEOUS at 23:04

## 2020-05-15 RX ADMIN — AMIODARONE HYDROCHLORIDE 200 MG: 200 TABLET ORAL at 08:57

## 2020-05-15 RX ADMIN — TAMSULOSIN HYDROCHLORIDE 0.4 MG: 0.4 CAPSULE ORAL at 16:01

## 2020-05-15 RX ADMIN — INSULIN LISPRO 2 UNITS: 100 INJECTION, SOLUTION INTRAVENOUS; SUBCUTANEOUS at 17:00

## 2020-05-15 RX ADMIN — CARVEDILOL 3.12 MG: 3.12 TABLET, FILM COATED ORAL at 08:59

## 2020-05-15 RX ADMIN — LACTULOSE 45 ML: 20 SOLUTION ORAL at 08:57

## 2020-05-15 RX ADMIN — LISINOPRIL 2.5 MG: 5 TABLET ORAL at 09:00

## 2020-05-15 RX ADMIN — VENLAFAXINE HYDROCHLORIDE 75 MG: 37.5 CAPSULE, EXTENDED RELEASE ORAL at 08:58

## 2020-05-15 RX ADMIN — LACTULOSE 45 ML: 20 SOLUTION ORAL at 16:02

## 2020-05-15 RX ADMIN — ASPIRIN 81 MG 81 MG: 81 TABLET ORAL at 08:59

## 2020-05-15 RX ADMIN — CLOPIDOGREL BISULFATE 75 MG: 75 TABLET ORAL at 08:58

## 2020-05-15 RX ADMIN — ISOSORBIDE MONONITRATE 30 MG: 30 TABLET ORAL at 08:58

## 2020-05-15 RX ADMIN — TAMSULOSIN HYDROCHLORIDE 0.4 MG: 0.4 CAPSULE ORAL at 07:39

## 2020-05-15 RX ADMIN — HEPARIN SODIUM 5000 UNITS: 5000 INJECTION, SOLUTION INTRAVENOUS; SUBCUTANEOUS at 16:01

## 2020-05-15 RX ADMIN — LEVETIRACETAM 1000 MG: 500 TABLET, FILM COATED ORAL at 09:00

## 2020-05-15 RX ADMIN — HEPARIN SODIUM 5000 UNITS: 5000 INJECTION, SOLUTION INTRAVENOUS; SUBCUTANEOUS at 05:58

## 2020-05-15 RX ADMIN — VENLAFAXINE HYDROCHLORIDE 75 MG: 37.5 CAPSULE, EXTENDED RELEASE ORAL at 19:31

## 2020-05-15 RX ADMIN — FINASTERIDE 5 MG: 5 TABLET, FILM COATED ORAL at 07:39

## 2020-05-15 RX ADMIN — EPLERENONE 25 MG: 25 TABLET, FILM COATED ORAL at 08:58

## 2020-05-15 NOTE — PROGRESS NOTES
Bedside and Verbal shift change report given to Chandu Pelayo RN (oncoming nurse) by Nik Pleitez RN (offgoing nurse). Report included the following information SBAR, Kardex, Intake/Output, MAR, Recent Results and Cardiac Rhythm paced.

## 2020-05-15 NOTE — PROGRESS NOTES
Bedside and Verbal shift change report given to Virgie Oliva RN (oncoming nurse) by Alber Amezcua RN (offgoing nurse). Report included the following information SBAR, Kardex, ED Summary, Intake/Output, Recent Results, Cardiac Rhythm Paced and Dual Neuro Assessment.

## 2020-05-15 NOTE — PROGRESS NOTES
Problem: Mobility Impaired (Adult and Pediatric)  Goal: *Acute Goals and Plan of Care (Insert Text)  Description: FUNCTIONAL STATUS PRIOR TO ADMISSION: Patient was modified independent using a single point cane for functional mobility. HOME SUPPORT PRIOR TO ADMISSION: The patient lived with children who provide 24/7 assistance. Physical Therapy Goals  Initiated 5/13/2020  1. Patient will move from supine to sit and sit to supine  in bed with independence within 7 day(s). 2.  Patient will transfer from bed to chair and chair to bed with modified independence using the least restrictive device within 7 day(s). 3.  Patient will perform sit to stand with minimal assistance/contact guard assist within 7 day(s). 4.  Patient will ambulate with modified independence for 150 feet with the least restrictive device within 7 day(s). 5.  Patient will ascend/descend 2 stairs with 1 handrail(s) with modified independence within 7 day(s). Outcome: Progressing Towards Goal   PHYSICAL THERAPY TREATMENT  Patient: Debbie Meehan (67 y.o. male)  Date: 5/15/2020  Diagnosis: Acute CVA (cerebrovascular accident) [facility]) [I63.9] Acute CVA (cerebrovascular accident) ([facility] [address]      Precautions:  fall bed alarm  Chart, physical therapy assessment, plan of care and goals were reviewed. ASSESSMENT  Patient continues with skilled PT services and is slowly  progressing towards goals. Again 3/3 days in a row pt experienced orthostatic hypotension (was symptomatic) during PT session, see chart below. He reported becoming symptomatic after supine to sit with symptoms about the same with stand and transfer over to the chair. Did not progress him beyond a transfer given above, discussed with his nurse. MD was present with pt in sitting at the EOB and mentioned possible discharge today. I recommend that orthostatic hypotension continue to be addressed prior to discharge.      For the weekend, recommend patient to complete as able in order to maintain strength, endurance and independence with nursing: OOB to chair 3x/day with assist X 1 using the gait belt and cane and ambulating with assist x1 using the gait belt and cane. Check orthostatics as you mobilize from supine. PT to follow-up with patient after the weekend. Current Level of Function Impacting Discharge (mobility/balance): pt is moving well for sit <>stand and bed to chair transfer, only provided contact guard with cane support. Other factors to consider for discharge: fall history        Vitals:     05/15/20 0934 05/15/20 0938 05/15/20 0942 05/15/20 0944   BP: 108/67 101/73 (!) 81/65 134/66   BP 1 Location: Right arm Right arm Left arm Left arm   BP Patient Position: Supine; At rest Sitting Standing Sitting;Post activity  Comment: LEs elevated   Pulse: 79 77 76 72   Resp: 12   23 16   Temp:           SpO2: on room air 93% 95% 95% 95%                    PLAN :  Patient continues to benefit from skilled intervention to address the above impairments. Continue treatment per established plan of care. to address goals. Recommendation for discharge: (in order for the patient to meet his/her long term goals)  Physical therapy at least 2 days/week in the home AND ensure assist and/or supervision for safety with mobility. This discharge recommendation:  A follow-up discussion with the attending provider and/or case management is planned    IF patient discharges home will need the following DME: none       SUBJECTIVE:   Patient stated both, lightheaded and dizzy.     OBJECTIVE DATA SUMMARY:   Chart checked, pt cleared by nursing  Critical Behavior:  Neurologic State: Alert  Orientation Level: Oriented to person, Oriented to place, Oriented to situation, Disoriented to time  Cognition: Appropriate for age attention/concentration, Appropriate safety awareness, Follows commands  Safety/Judgement: Awareness of environment  Functional Mobility Training:  Bed Mobility: Supine to Sit: Supervision              Transfers:  Sit to Stand: Contact guard assistance  Stand to Sit: Contact guard assistance        Bed to Chair: Contact guard assistance                    Balance:  Sitting: Intact; Without support  Standing: Impaired; With support  Standing - Static: Constant support;Good  Standing - Dynamic : Constant support;Good  Ambulation/Gait Training:                                                        Stairs: Therapeutic Exercises: Ankle pumps  Pain Rating:  Non rated    Activity Tolerance:   Fair, signs and symptoms of orthostatic hypotension and see assessment  Please refer to the flowsheet for vital signs taken during this treatment. After treatment patient left in no apparent distress:   Sitting in chair, Call bell within reach, Bed / chair alarm activated and LEs elevated    COMMUNICATION/COLLABORATION:   The patients plan of care was discussed with: Occupational therapist, Registered nurse and Physician.      Pao Soler   Time Calculation: 18 mins

## 2020-05-15 NOTE — PROGRESS NOTES
TRICIA:    Patient not medically stable for discharge at this time. Possible discharge tomorrow pending disposition. Home Health recommended. Family prefers [facility]. Need home health orders. Awaiting orders. Family will provide transportation. CM to follow.  Dontae Smith MSW,CRM

## 2020-05-15 NOTE — PROGRESS NOTES
Hospitalist Progress Note  Wood Márquez MD  Answering service: [phone] from in house phone        Date of Service:  5/15/2020  NAME:  Rocio Hairston. :  1955  MRN:  [id number]      Admission Summary:   As per initial admission summary  This is a 48-year-old man with a past medical history significant for coronary artery disease, status post CABG and multiple stent placements; dyslipidemia; hypertension; type 2 diabetes; seizure disorder; benign prostatic hyperplasia; obstructive sleep apnea; chronic systolic congestive heart failure; chronic kidney disease; status post pacemaker insertion, was in his usual state of health until the day of presentation at the emergency room when the patient developed sudden loss of vision in the left eye. This episode lasted a few seconds. It was followed by headache. The headache is located at the left side of the head, constant throbbing headache, 6/10 in severity. No known aggravating or relieving factors. The patient stated that he has a history of ocular migraine and this present episode is similar to his attack of ocular migraine. The patient also stated that he fell a couple of times at home. Denies loss of consciousness. The circumstances surrounding the fall is not clear. The patient denies fever, rigors, and chills. No sick contact or contact with any person with COVID-19 virus infection. When the patient arrived at the emergency room, CT of the head was obtained. The CT scan did not show any acute pathology. CTA of the head and neck was also performed. This shows no acute large vessel occlusion, but shows severe stenosis, bilateral internal carotid arteries, left greater than right. The patient was subsequently referred to the hospitalist service for evaluation for admission. The patient was last admitted to this hospital from 2019 to 2019.   The patient was admitted to the Cardiology Service for evaluation and treatment of ventricular tachycardia. The patient subsequently underwent ICD placement with adjustments to his medication. Interval history / Subjective:     Patient seen for Follow up of suspected cva    Patient seen and examined by the bedside, Labs, images and notes reviewed  Patient is comfortable, denies any chest pain, SOB, abdominal pain, fevers, chills. No N/V/D  Discussed with nursing staff, no acute issues overnight, orders reviewed. I talked to ophthalmology Dr. Mary Molina, ([phone]). Discussed with him regarding patient condition. Recommended to contact him if there is recurrent vision issue. appears to be ocular migraine   Plan for TCAR, as per vascular surgery as an outpatient . Today patient had a significant drop in the BP with physical therapy. Symptomatic orthostatic hypotension. consulted cardiology. Assessment & Plan:     1. Ocular migraine. We will admit the patient for further evaluation and treatment. The patient will not be able to undergo MRI of the brain because of the presence of pacemaker. The patient's presentation could also be as a result of ocular migraine. We will carry out headache control while awaiting further recommendation from the neurologist.    Neurology consulted   Recommended to continue with DAPT, along with statin   Please call Ophthalmology if patient develops recurrent vision loss     #orthostatic hypotension  Symptomatic, and significant drop in BP  Cardiology consulted  Lasix, lisinopril and imdur stopped  Will keep patient for one more day. Patient may be less likely orthostatic tomorrow  Will discharge. 2.  Stenosis of both internal carotid arteries. Vascular surgery consulted  Recommended TCAR procedure as an outpatient   On aspirin plavix     3. Coronary artery disease, status post CABG and stent placement.   We will check cardiac markers to rule out acute myocardial infarction. We will continue with preadmission cardiac medication. 4.  Dyslipidemia. We will resume home medication. We will check lipid profile as stated above. 5.  Type 2 diabetes. The patient will be placed on sliding scale with insulin coverage. We will check hemoglobin A1c level as stated above. 6.  Seizure disorder. We will continue with Keppra. We will obtain EEG. The patient's presentation could also be due to seizure. We will also await further recommendation from the neurologist.  EEG pending     7. Benign prostatic hyperplasia. We will continue with preadmission medication. This is without urinary obstruction. 8.  Obstructive sleep apnea. We will place the patient on CPAP with home setting. 9.  Status post pacemaker insertion. This is secondary to ventricular tachycardia. We will continue to monitor. The patient may require Cardiology consult for pacemaker interrogation  . 10. Chronic systolic congestive heart failure. The patient's echocardiogram according to the record is 16%-20%. We will continue with preadmission medication. The patient appears to be well compensated in terms of his congestive heart failure. 11.  Hyperkalemia. This was reported as hemolyzed sample. We will repeat potassium level. 12.  Chronic kidney disease, stage III. We will monitor the patient's renal function. 15.  Fall at home. CT scan of the head is negative. We will obtain a CT scan of the abdomen and pelvis to evaluate the patient for fracture and intra-abdominal bleeding. 14.  Bilateral leg swelling. We will check ultrasound of the lower extremity for DVT.     Code status: full code  DVT prophylaxis: heparin SC     Care Plan discussed with: Patient/Family  Disposition: Home w/Family and TBD     Hospital Problems  Date Reviewed: 5/12/2020          Codes Class Noted POA    * (Principal) Acute CVA (cerebrovascular accident) ([facility] [address] ICD-10-CM: I63.9  ICD-9-CM: 434.91  5/12/2020 Yes                Review of Systems:   A comprehensive review of systems was negative except for that written in the HPI. Vital Signs:    Last 24hrs VS reviewed since prior progress note. Most recent are:  Visit Vitals  BP (!) 80/53 (BP 1 Location: Left arm, BP Patient Position: Standing)   Pulse 84   Temp 97.8 °F (36.6 °C)   Resp 17   Ht 5' 9\" (1.753 m)   Wt 92.8 kg (204 lb 9.4 oz)   SpO2 94%   BMI 30.21 kg/m²         Intake/Output Summary (Last 24 hours) at 5/15/2020 1340  Last data filed at 5/15/2020 1149  Gross per 24 hour   Intake 250 ml   Output 1435 ml   Net -1185 ml        Physical Examination:             Constitutional:  No acute distress, cooperative, pleasant    ENT:  Oral mucous moist, oropharynx benign. Neck supple,    Resp:  CTA bilaterally. No wheezing/rhonchi/rales. No accessory muscle use   CV:  Regular rhythm, normal rate, no murmurs, gallops, rubs    GI:  Soft, non distended, non tender. normoactive bowel sounds, no hepatosplenomegaly     Musculoskeletal:  No edema, warm, 2+ pulses throughout    Neurologic:  Moves all extremities. AAOx3, CN II-XII reviewed     Psych:  Good insight, Not anxious nor agitated. Skin:  Good turgor, no rashes or ulcers  Hematologic/Lymphatic/Immunlogic:  No jaundice nor lymph node swelling  Eyes:  EOMI. Anicteric sclerae, PERRL.        Data Review:    Review and/or order of clinical lab test  Review and/or order of tests in the radiology section of CPT      Labs:     Recent Labs     05/13/20 0027 05/12/20  1716   WBC 9.4 9.1   HGB 13.1 14.7   HCT 43.1 48.0    179     Recent Labs     05/13/20 0027 05/12/20  1716   * 134*   K 4.4 5.9*    105   CO2 21 24   BUN 26* 27*   CREA 1.45* 1.59*   * 143*   CA 8.4* 8.8   MG 1.8  --    PHOS 3.4  --      Recent Labs     05/13/20 0027 05/12/20  1716   SGOT 14* 38*   ALT 24 31    119*   TBILI 0.4 0.6   TP 6.9 7.9   ALB 3.0* 3.3*   GLOB 3.9 4.6*     No results for input(s): INR, PTP, APTT, INREXT, INREXT in the last 72 hours. No results for input(s): FE, TIBC, PSAT, FERR in the last 72 hours. Lab Results   Component Value Date/Time    Folate 11.2 05/13/2020 12:27 AM      No results for input(s): PH, PCO2, PO2 in the last 72 hours.   Recent Labs     05/13/20  0458 05/13/20  0027   TROIQ <0.05 <0.05     Lab Results   Component Value Date/Time    Cholesterol, total 120 05/13/2020 12:27 AM    HDL Cholesterol 20 05/13/2020 12:27 AM    LDL, calculated 46.2 05/13/2020 12:27 AM    Triglyceride 269 (H) 05/13/2020 12:27 AM    CHOL/HDL Ratio 6.0 (H) 05/13/2020 12:27 AM     Lab Results   Component Value Date/Time    Glucose (POC) 208 (H) 05/15/2020 11:52 AM    Glucose (POC) 127 (H) 05/15/2020 07:34 AM    Glucose (POC) 153 (H) 05/14/2020 09:37 PM    Glucose (POC) 182 (H) 05/14/2020 04:39 PM    Glucose (POC) 146 (H) 05/14/2020 11:30 AM     Lab Results   Component Value Date/Time    Color YELLOW/STRAW 08/21/2019 01:17 PM    Appearance CLEAR 08/21/2019 01:17 PM    Specific gravity 1.020 08/21/2019 01:17 PM    Specific gravity 1.020 04/05/2019 07:25 PM    pH (UA) 5.5 08/21/2019 01:17 PM    Protein 100 (A) 08/21/2019 01:17 PM    Glucose NEGATIVE  08/21/2019 01:17 PM    Ketone NEGATIVE  08/21/2019 01:17 PM    Bilirubin NEGATIVE  08/21/2019 01:17 PM    Urobilinogen 1.0 08/21/2019 01:17 PM    Nitrites NEGATIVE  08/21/2019 01:17 PM    Leukocyte Esterase TRACE (A) 08/21/2019 01:17 PM    Epithelial cells FEW 08/21/2019 01:17 PM    Bacteria 1+ (A) 08/21/2019 01:17 PM    WBC 5-10 08/21/2019 01:17 PM    RBC >100 (H) 08/21/2019 01:17 PM         Medications Reviewed:     Current Facility-Administered Medications   Medication Dose Route Frequency    amiodarone (CORDARONE) tablet 200 mg  200 mg Oral DAILY    aspirin chewable tablet 81 mg  81 mg Oral DAILY    atorvastatin (LIPITOR) tablet 80 mg  80 mg Oral DAILY    carvediloL (COREG) tablet 3.125 mg  3.125 mg Oral BID WITH MEALS    clonazePAM (KlonoPIN) tablet 1 mg  1 mg Oral QHS    clopidogreL (PLAVIX) tablet 75 mg  75 mg Oral DAILY    eplerenone (INSPRA) tablet 25 mg  25 mg Oral DAILY    finasteride (PROSCAR) tablet 5 mg  5 mg Oral 7am    lactulose (CHRONULAC) 10 gram/15 mL solution 45 mL  30 g Oral TID    levETIRAcetam (KEPPRA) tablet 1,000 mg  1,000 mg Oral Q12H    nitroglycerin (NITROSTAT) tablet 0.4 mg  0.4 mg SubLINGual Q5MIN PRN    pantoprazole (PROTONIX) tablet 40 mg  40 mg Oral ACB&D    pregabalin (LYRICA) capsule 50 mg  50 mg Oral TID    QUEtiapine (SEROquel) tablet 25 mg  25 mg Oral QHS    tamsulosin (FLOMAX) capsule 0.4 mg  0.4 mg Oral ACB&D    venlafaxine-SR (EFFEXOR-XR) capsule 75 mg  75 mg Oral BID    insulin lispro (HUMALOG) injection   SubCUTAneous AC&HS    glucose chewable tablet 16 g  4 Tab Oral PRN    glucagon (GLUCAGEN) injection 1 mg  1 mg IntraMUSCular PRN    dextrose 10% infusion 0-250 mL  0-250 mL IntraVENous PRN    acetaminophen (TYLENOL) tablet 650 mg  650 mg Oral Q4H PRN    heparin (porcine) injection 5,000 Units  5,000 Units SubCUTAneous Q8H    bisacodyL (DULCOLAX) suppository 10 mg  10 mg Rectal DAILY PRN     ______________________________________________________________________  EXPECTED LENGTH OF STAY: 2d 7h  ACTUAL LENGTH OF STAY:          3                 Perla Alvarez MD

## 2020-05-15 NOTE — PROGRESS NOTES
Problem: Self Care Deficits Care Plan (Adult)  Goal: *Acute Goals and Plan of Care (Insert Text)  Description: FUNCTIONAL STATUS PRIOR TO ADMISSION: Patient was modified independent using a single point cane for functional mobility. HOME SUPPORT: The patient lived with children but did not require assistance with ADLs. Per patient report, adult children will be able to provide 24/7 supervision and assist at discharge. Occupational Therapy Goals  Initiated 5/13/2020  1. Patient will perform ADLs in standing for 5 mins without fatigue or LOB with supervision/set-up within 7 day(s) using SPC for balance prn.  2.  Patient will complete supine to sit transfer within sternal precautions with supervision and verbal cues within 7 day(s) in order to alleviate discomfort when preparing for OOB activities. 3.  Patient will perform toilet transfers with supervision/set-up within 7 day(s) using SPC and grab bars prn.  4.  Patient will perform all aspects of toileting with supervision/set-up within 7 day(s) using SPC and grab bars prn. Outcome: Progressing Towards Goal     OCCUPATIONAL THERAPY TREATMENT  Patient: Dylan Garza (67 y.o. male)  Date: 5/15/2020  Diagnosis: Acute CVA (cerebrovascular accident) [facility]) [I63.9]   Acute CVA (cerebrovascular accident) [facility])       Precautions:    Chart, occupational therapy assessment, plan of care, and goals were reviewed. ASSESSMENT  Patient continues with skilled OT services and is progressing towards goals. He continues to demo +OH with standing tasks, requiring increased assist for brief toilet transfers and standing lower body dressing tasks with toileting. Difficulty noted coordinating standing balance and SPC, requiring up to Mod A for brief management.  Returned to supine, educated on increasing time EOB while remaining in hospital (grooming, bathing, OOB to chair), as well as safety & adaptive techniques for ADLs upon return home, patient acknowledging understanding. Continue to recommend d/c home with [facility] and family support, would benefit from initial 24/7 supervision with all ADLs and OOB mobility to maximize safety & fall prevention. Current Level of Function Impacting Discharge (ADLs): setup-Mod A for ADLs, SPV-Min A for mobility with SPC    Other factors to consider for discharge: fall risk, +OH, lives with daughters but reports they work, leaving him alone for periods of the day, hx of syncope with falls         PLAN :  Patient continues to benefit from skilled intervention to address the above impairments. Continue treatment per established plan of care. to address goals. Recommend with staff: Recommend with nursing patient to complete as able in order to maintain strength, endurance and independence: ADLs with supervision/setup, OOB to chair 3x/day and mobilizing to the bathroom for toileting with 1 assist & SPC as appropriate per vital stabilization. Thank you for your assistance. Recommend next OT session: Continued EC education, standing balance & tolerance with ADLs as able    Recommendation for discharge: (in order for the patient to meet his/her long term goals)  Occupational therapy at least 2 days/week in the home AND ensure assist and/or supervision for safety with ADLs and mobility     This discharge recommendation:  Has been made in collaboration with the attending provider and/or case management    IF patient discharges home will need the following DME: Shower chair/stool       SUBJECTIVE:   Patient stated Elisabet Rai had a few falls in the last year.     OBJECTIVE DATA SUMMARY:   Cognitive/Behavioral Status:  Neurologic State: Alert  Orientation Level: Oriented to person;Oriented to place;Oriented to situation;Disoriented to time  Cognition: Appropriate for age attention/concentration; Follows commands  Perception: Appears intact  Perseveration: No perseveration noted  Safety/Judgement: Awareness of environment;Decreased awareness of need for assistance;Decreased awareness of need for safety;Decreased insight into deficits    Functional Mobility and Transfers for ADLs:  Bed Mobility:  Supine to Sit: Supervision; Additional time  Sit to Supine: Supervision  Scooting: Stand-by assistance; Additional time    Transfers:  Sit to Stand: Minimum assistance;Contact guard assistance(CGA EOB, Min A from toilet)  Functional Transfers  Toilet Transfer : Minimum assistance; Additional time; Adaptive equipment(SPC, fair coordination with SPC)  Cues: Physical assistance;Verbal cues provided;Visual cues provided; Tactile cues provided  Adaptive Equipment: Cane (comment)  Bed to Chair: Contact guard assistance    Balance:  Sitting: Intact  Standing: Impaired; With support(SPC)  Standing - Static: Good;Constant support  Standing - Dynamic : Good;Fair;Constant support    ADL Intervention:       Grooming  Washing Hands: Set-up    Lower Body Dressing Assistance  Dressing Assistance: Moderate assistance  Protective Undergarmet: Moderate assistance(difficulty coordinating standing with SPC & pulling up brief)  Socks: Supervision  Leg Crossed Method Used: Yes  Position Performed: Seated edge of bed;Standing  Cues: Physical assistance; Tactile cues provided;Verbal cues provided;Visual cues provided    Toileting  Toileting Assistance: Moderate assistance  Bladder Hygiene: Supervision  Bowel Hygiene: Minimum assistance(simulated, fair standing balance)  Clothing Management: Moderate assistance  Cues: Physical assistance for pants down;Physical assistance for pants up;Verbal cues provided;Visual cues provided; Tactile cues provided  Adaptive Equipment: (SPC, decr coordination with standing)    Cognitive Retraining  Safety/Judgement: Awareness of environment;Decreased awareness of need for assistance;Decreased awareness of need for safety;Decreased insight into deficits    Therapeutic Exercises:   Bed<>toilet with SPC, CGA to stand from EOB, Min A & mod cues for [facility] coordination to stand from toilet and manage brief, dizzy with all standing but improved with extended sitting    Pain:  None    Activity Tolerance:   Fair and signs and symptoms of orthostatic hypotension  Please refer to the flowsheet for vital signs taken during this treatment. After treatment patient left in no apparent distress:   Supine in bed, Call bell within reach, and Side rails x 3    COMMUNICATION/COLLABORATION:   The patients plan of care was discussed with: Physical therapist and Registered nurse.      LIOR Pringle, OTR/L  Time Calculation: 25 mins

## 2020-05-15 NOTE — PROGRESS NOTES
Full note to follow. Vitals:    05/15/20 0934 05/15/20 0938 05/15/20 0942 05/15/20 0944   BP: 108/67 101/73 (!) 81/65 134/66   BP 1 Location: Right arm Right arm Left arm Left arm   BP Patient Position: Supine; At rest Sitting Standing Sitting;Post activity  Comment: LEs elevated   Pulse: 79 77 76 72   Resp: 12  23 16   Temp:       SpO2: on room air 93% 95% 95% 95%                     Zehra Yin, PT

## 2020-05-15 NOTE — PROGRESS NOTES
Problem: Diabetes Self-Management  Goal: *Disease process and treatment process  Description: Define diabetes and identify own type of diabetes; list 3 options for treating diabetes. Outcome: Progressing Towards Goal  Goal: *Developing strategies to address psychosocial issues  Description: Describe feelings about living with diabetes; identify support needed and support network  Outcome: Progressing Towards Goal     Problem: Falls - Risk of  Goal: *Absence of Falls  Description: Document David Blackman Fall Risk and appropriate interventions in the flowsheet. Outcome: Progressing Towards Goal  Note: Fall Risk Interventions:  Mobility Interventions: Communicate number of staff needed for ambulation/transfer, OT consult for ADLs, Patient to call before getting OOB, PT Consult for mobility concerns, PT Consult for assist device competence         Medication Interventions: Evaluate medications/consider consulting pharmacy, Patient to call before getting OOB, Teach patient to arise slowly    Elimination Interventions: Call light in reach, Patient to call for help with toileting needs, Urinal in reach    History of Falls Interventions: Door open when patient unattended, Vital signs minimum Q4HRs X 24 hrs (comment for end date), Consult care management for discharge planning         Problem: Pressure Injury - Risk of  Goal: *Prevention of pressure injury  Description: Document Iván Scale and appropriate interventions in the flowsheet.   Outcome: Progressing Towards Goal  Note: Pressure Injury Interventions:  Sensory Interventions: Assess need for specialty bed, Avoid rigorous massage over bony prominences, Discuss PT/OT consult with provider, Float heels, Keep linens dry and wrinkle-free, Pressure redistribution bed/mattress (bed type), Minimize linen layers    Moisture Interventions: Absorbent underpads, Apply protective barrier, creams and emollients, Check for incontinence Q2 hours and as needed, Maintain skin hydration (lotion/cream), Minimize layers    Activity Interventions: Increase time out of bed, PT/OT evaluation, Pressure redistribution bed/mattress(bed type)    Mobility Interventions: Float heels, HOB 30 degrees or less, Pressure redistribution bed/mattress (bed type), PT/OT evaluation    Nutrition Interventions: Document food/fluid/supplement intake    Friction and Shear Interventions: Lift sheet, Minimize layers, Apply protective barrier, creams and emollients                Problem: TIA/CVA Stroke: Day 2 Until Discharge  Goal: Activity/Safety  Outcome: Progressing Towards Goal  Goal: Psychosocial  Outcome: Progressing Towards Goal  Goal: *Absence of aspiration  Outcome: Progressing Towards Goal  Goal: *Optimal pain control at patient's stated goal  Outcome: Progressing Towards Goal  Goal: *Tolerating diet  Outcome: Progressing Towards Goal  Goal: *Ability to perform ADLs and demonstrates progressive mobility and function  Outcome: Progressing Towards Goal
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
## 2020-05-15 NOTE — PROGRESS NOTES
Problem: Diabetes Self-Management  Goal: *Disease process and treatment process  Description: Define diabetes and identify own type of diabetes; list 3 options for treating diabetes. Outcome: Progressing Towards Goal  Goal: *Incorporating nutritional management into lifestyle  Description: Describe effect of type, amount and timing of food on blood glucose; list 3 methods for planning meals. Outcome: Progressing Towards Goal  Goal: *Incorporating physical activity into lifestyle  Description: State effect of exercise on blood glucose levels. Outcome: Progressing Towards Goal  Goal: *Developing strategies to promote health/change behavior  Description: Define the ABC's of diabetes; identify appropriate screenings, schedule and personal plan for screenings. Outcome: Progressing Towards Goal  Goal: *Using medications safely  Description: State effect of diabetes medications on diabetes; name diabetes medication taking, action and side effects. Outcome: Progressing Towards Goal  Goal: *Monitoring blood glucose, interpreting and using results  Description: Identify recommended blood glucose targets  and personal targets. Outcome: Progressing Towards Goal  Goal: *Prevention, detection, treatment of acute complications  Description: List symptoms of hyper- and hypoglycemia; describe how to treat low blood sugar and actions for lowering  high blood glucose level. Outcome: Progressing Towards Goal  Goal: *Prevention, detection and treatment of chronic complications  Description: Define the natural course of diabetes and describe the relationship of blood glucose levels to long term complications of diabetes.   Outcome: Progressing Towards Goal  Goal: *Developing strategies to address psychosocial issues  Description: Describe feelings about living with diabetes; identify support needed and support network  Outcome: Progressing Towards Goal  Goal: *Insulin pump training  Outcome: Progressing Towards Goal  Goal: *Sick day guidelines  Outcome: Progressing Towards Goal  Goal: *Patient Specific Goal (EDIT GOAL, INSERT TEXT)  Outcome: Progressing Towards Goal     Problem: Patient Education: Go to Patient Education Activity  Goal: Patient/Family Education  Outcome: Progressing Towards Goal     Problem: Falls - Risk of  Goal: *Absence of Falls  Description: Document Jess Yates Fall Risk and appropriate interventions in the flowsheet. Outcome: Progressing Towards Goal  Note: Fall Risk Interventions:  Mobility Interventions: Assess mobility with egress test, Bed/chair exit alarm, OT consult for ADLs, Patient to call before getting OOB, Strengthening exercises (ROM-active/passive), Utilize walker, cane, or other assistive device    Mentation Interventions: Adequate sleep, hydration, pain control, Bed/chair exit alarm, Door open when patient unattended, Eyeglasses and hearing aids, Increase mobility, Room close to nurse's station, Reorient patient    Medication Interventions: Assess postural VS orthostatic hypotension, Bed/chair exit alarm, Evaluate medications/consider consulting pharmacy, Patient to call before getting OOB, Teach patient to arise slowly, Utilize gait belt for transfers/ambulation    Elimination Interventions: Bed/chair exit alarm, Call light in reach, Stay With Me (per policy), Urinal in reach    History of Falls Interventions: Bed/chair exit alarm, Consult care management for discharge planning, Door open when patient unattended, Room close to nurse's station    Problem: Patient Education: Go to Patient Education Activity  Goal: Patient/Family Education  Outcome: Progressing Towards Goal     Problem: Pressure Injury - Risk of  Goal: *Prevention of pressure injury  Description: Document Iván Scale and appropriate interventions in the flowsheet.   Outcome: Progressing Towards Goal  Note: Pressure Injury Interventions:  Sensory Interventions: Assess changes in LOC, Check visual cues for pain, Float heels, Maintain/enhance activity level, Minimize linen layers, Keep linens dry and wrinkle-free, Monitor skin under medical devices, Pad between skin to skin    Moisture Interventions: Absorbent underpads, Check for incontinence Q2 hours and as needed, Minimize layers, Moisture barrier    Activity Interventions: Assess need for specialty bed, Increase time out of bed, PT/OT evaluation    Mobility Interventions: Float heels, PT/OT evaluation    Nutrition Interventions: Document food/fluid/supplement intake    Friction and Shear Interventions: Apply protective barrier, creams and emollients, Lift sheet, Minimize layers    Problem: Patient Education: Go to Patient Education Activity  Goal: Patient/Family Education  Outcome: Progressing Towards Goal     Problem: Risk for Spread of Infection  Goal: Prevent transmission of infectious organism to others  Description: Prevent the transmission of infectious organisms to other patients, staff members, and visitors.   Outcome: Progressing Towards Goal     Problem: Patient Education:  Go to Education Activity  Goal: Patient/Family Education  Outcome: Progressing Towards Goal     Problem: Patient Education: Go to Patient Education Activity  Goal: Patient/Family Education  Outcome: Progressing Towards Goal     Problem: Patient Education: Go to Patient Education Activity  Goal: Patient/Family Education  Outcome: Progressing Towards Goal     Problem: TIA/CVA Stroke: 0-24 hours  Goal: Off Pathway (Use only if patient is Off Pathway)  Outcome: Progressing Towards Goal  Goal: Activity/Safety  Outcome: Progressing Towards Goal  Goal: Consults, if ordered  Outcome: Progressing Towards Goal  Goal: Diagnostic Test/Procedures  Outcome: Progressing Towards Goal  Goal: Nutrition/Diet  Outcome: Progressing Towards Goal  Goal: Discharge Planning  Outcome: Progressing Towards Goal  Goal: Medications  Outcome: Progressing Towards Goal  Goal: Respiratory  Outcome: Progressing Towards Goal  Goal: Treatments/Interventions/Procedures  Outcome: Progressing Towards Goal  Goal: Minimize risk of bleeding post-thrombolytic infusion  Outcome: Progressing Towards Goal  Goal: Monitor for complications post-thrombolytic infusion  Outcome: Progressing Towards Goal  Goal: Psychosocial  Outcome: Progressing Towards Goal  Goal: *Hemodynamically stable  Outcome: Progressing Towards Goal  Goal: *Neurologically stable  Description: Absence of additional neurological deficits    Outcome: Progressing Towards Goal  Goal: *Verbalizes anxiety and depression are reduced or absent  Outcome: Progressing Towards Goal  Goal: *Absence of Signs of Aspiration on Current Diet  Outcome: Progressing Towards Goal  Goal: *Absence of deep venous thrombosis signs and symptoms(Stroke Metric)  Outcome: Progressing Towards Goal  Goal: *Ability to perform ADLs and demonstrates progressive mobility and function  Outcome: Progressing Towards Goal  Goal: *Stroke education started(Stroke Metric)  Outcome: Progressing Towards Goal  Goal: *Dysphagia screen performed(Stroke Metric)  Outcome: Progressing Towards Goal  Goal: *Rehab consulted(Stroke Metric)  Outcome: Progressing Towards Goal     Problem: TIA/CVA Stroke: Day 2 Until Discharge  Goal: Off Pathway (Use only if patient is Off Pathway)  Outcome: Progressing Towards Goal  Goal: Activity/Safety  Outcome: Progressing Towards Goal  Goal: Diagnostic Test/Procedures  Outcome: Progressing Towards Goal  Goal: Nutrition/Diet  Outcome: Progressing Towards Goal  Goal: Discharge Planning  Outcome: Progressing Towards Goal  Goal: Medications  Outcome: Progressing Towards Goal  Goal: Respiratory  Outcome: Progressing Towards Goal  Goal: Treatments/Interventions/Procedures  Outcome: Progressing Towards Goal  Goal: Psychosocial  Outcome: Progressing Towards Goal  Goal: *Verbalizes anxiety and depression are reduced or absent  Outcome: Progressing Towards Goal  Goal: *Absence of aspiration  Outcome: Progressing Towards Goal  Goal: *Absence of deep venous thrombosis signs and symptoms(Stroke Metric)  Outcome: Progressing Towards Goal  Goal: *Optimal pain control at patient's stated goal  Outcome: Progressing Towards Goal  Goal: *Tolerating diet  Outcome: Progressing Towards Goal  Goal: *Ability to perform ADLs and demonstrates progressive mobility and function  Outcome: Progressing Towards Goal  Goal: *Stroke education continued(Stroke Metric)  Outcome: Progressing Towards Goal     Problem: Ischemic Stroke: Discharge Outcomes  Goal: *Verbalizes anxiety and depression are reduced or absent  Outcome: Progressing Towards Goal  Goal: *Verbalize understanding of risk factor modification(Stroke Metric)  Outcome: Progressing Towards Goal  Goal: *Hemodynamically stable  Outcome: Progressing Towards Goal  Goal: *Absence of aspiration pneumonia  Outcome: Progressing Towards Goal  Goal: *Aware of needed dietary changes  Outcome: Progressing Towards Goal  Goal: *Verbalize understanding of prescribed medications including anti-coagulants, anti-lipid, and/or anti-platelets(Stroke Metric)  Outcome: Progressing Towards Goal  Goal: *Tolerating diet  Outcome: Progressing Towards Goal  Goal: *Aware of follow-up diagnostics related to anticoagulants  Outcome: Progressing Towards Goal  Goal: *Ability to perform ADLs and demonstrates progressive mobility and function  Outcome: Progressing Towards Goal  Goal: *Absence of DVT(Stroke Metric)  Outcome: Progressing Towards Goal  Goal: *Absence of aspiration  Outcome: Progressing Towards Goal  Goal: *Optimal pain control at patient's stated goal  Outcome: Progressing Towards Goal  Goal: *Home safety concerns addressed  Outcome: Progressing Towards Goal  Goal: *Describes available resources and support systems  Outcome: Progressing Towards Goal  Goal: *Verbalizes understanding of activation of EMS(911) for stroke symptoms(Stroke Metric)  Outcome: Progressing Towards Goal  Goal: *Understands and describes signs and symptoms to report to providers(Stroke Metric)  Outcome: Progressing Towards Goal  Goal: *Neurolgocially stable (absence of additional neurological deficits)  Outcome: Progressing Towards Goal  Goal: *Verbalizes importance of follow-up with primary care physician(Stroke Metric)  Outcome: Progressing Towards Goal  Goal: *Smoking cessation discussed,if applicable(Stroke Metric)  Outcome: Progressing Towards Goal  Goal: *Depression screening completed(Stroke Metric)  Outcome: Progressing Towards Goal

## 2020-05-15 NOTE — PROGRESS NOTES
Vascular    He is scheduled for this Tuesday at 730am in the [facility] room for his left sided TCAR procedure. Rachel Naidu for discharge when able or he can stay if appropriate.

## 2020-05-16 LAB
GLUCOSE BLD STRIP.AUTO-MCNC: 120 MG/DL (ref 65–100)
GLUCOSE BLD STRIP.AUTO-MCNC: 134 MG/DL (ref 65–100)
GLUCOSE BLD STRIP.AUTO-MCNC: 164 MG/DL (ref 65–100)
GLUCOSE BLD STRIP.AUTO-MCNC: 171 MG/DL (ref 65–100)
SERVICE CMNT-IMP: ABNORMAL

## 2020-05-16 PROCEDURE — 74011636637 HC RX REV CODE- 636/637: Performed by: INTERNAL MEDICINE

## 2020-05-16 PROCEDURE — 74011250636 HC RX REV CODE- 250/636: Performed by: INTERNAL MEDICINE

## 2020-05-16 PROCEDURE — 74011250637 HC RX REV CODE- 250/637: Performed by: INTERNAL MEDICINE

## 2020-05-16 PROCEDURE — 65660000000 HC RM CCU STEPDOWN

## 2020-05-16 PROCEDURE — 82962 GLUCOSE BLOOD TEST: CPT

## 2020-05-16 PROCEDURE — 94760 N-INVAS EAR/PLS OXIMETRY 1: CPT

## 2020-05-16 RX ORDER — SODIUM CHLORIDE 9 MG/ML
75 INJECTION, SOLUTION INTRAVENOUS CONTINUOUS
Status: DISCONTINUED | OUTPATIENT
Start: 2020-05-16 | End: 2020-05-20

## 2020-05-16 RX ORDER — MIDODRINE HYDROCHLORIDE 5 MG/1
5 TABLET ORAL
Status: DISCONTINUED | OUTPATIENT
Start: 2020-05-16 | End: 2020-05-17

## 2020-05-16 RX ADMIN — CLONAZEPAM 1 MG: 1 TABLET ORAL at 21:21

## 2020-05-16 RX ADMIN — INSULIN LISPRO 2 UNITS: 100 INJECTION, SOLUTION INTRAVENOUS; SUBCUTANEOUS at 16:17

## 2020-05-16 RX ADMIN — QUETIAPINE FUMARATE 25 MG: 25 TABLET ORAL at 21:21

## 2020-05-16 RX ADMIN — LEVETIRACETAM 1000 MG: 500 TABLET, FILM COATED ORAL at 21:22

## 2020-05-16 RX ADMIN — PANTOPRAZOLE SODIUM 40 MG: 40 TABLET, DELAYED RELEASE ORAL at 07:07

## 2020-05-16 RX ADMIN — ASPIRIN 81 MG 81 MG: 81 TABLET ORAL at 08:37

## 2020-05-16 RX ADMIN — CARVEDILOL 3.12 MG: 3.12 TABLET, FILM COATED ORAL at 08:38

## 2020-05-16 RX ADMIN — PREGABALIN 50 MG: 50 CAPSULE ORAL at 08:41

## 2020-05-16 RX ADMIN — CLOPIDOGREL BISULFATE 75 MG: 75 TABLET ORAL at 08:37

## 2020-05-16 RX ADMIN — HEPARIN SODIUM 5000 UNITS: 5000 INJECTION, SOLUTION INTRAVENOUS; SUBCUTANEOUS at 16:02

## 2020-05-16 RX ADMIN — VENLAFAXINE HYDROCHLORIDE 75 MG: 37.5 CAPSULE, EXTENDED RELEASE ORAL at 08:37

## 2020-05-16 RX ADMIN — VENLAFAXINE HYDROCHLORIDE 75 MG: 37.5 CAPSULE, EXTENDED RELEASE ORAL at 18:09

## 2020-05-16 RX ADMIN — PREGABALIN 50 MG: 50 CAPSULE ORAL at 21:21

## 2020-05-16 RX ADMIN — HEPARIN SODIUM 5000 UNITS: 5000 INJECTION, SOLUTION INTRAVENOUS; SUBCUTANEOUS at 07:07

## 2020-05-16 RX ADMIN — TAMSULOSIN HYDROCHLORIDE 0.4 MG: 0.4 CAPSULE ORAL at 07:07

## 2020-05-16 RX ADMIN — SODIUM CHLORIDE 50 ML/HR: 900 INJECTION, SOLUTION INTRAVENOUS at 18:09

## 2020-05-16 RX ADMIN — LACTULOSE 45 ML: 20 SOLUTION ORAL at 08:38

## 2020-05-16 RX ADMIN — LEVETIRACETAM 1000 MG: 500 TABLET, FILM COATED ORAL at 08:37

## 2020-05-16 RX ADMIN — HEPARIN SODIUM 5000 UNITS: 5000 INJECTION, SOLUTION INTRAVENOUS; SUBCUTANEOUS at 23:02

## 2020-05-16 RX ADMIN — CARVEDILOL 3.12 MG: 3.12 TABLET, FILM COATED ORAL at 18:09

## 2020-05-16 RX ADMIN — FINASTERIDE 5 MG: 5 TABLET, FILM COATED ORAL at 07:07

## 2020-05-16 RX ADMIN — PREGABALIN 50 MG: 50 CAPSULE ORAL at 16:05

## 2020-05-16 RX ADMIN — ATORVASTATIN CALCIUM 80 MG: 40 TABLET, FILM COATED ORAL at 08:38

## 2020-05-16 RX ADMIN — MIDODRINE HYDROCHLORIDE 5 MG: 5 TABLET ORAL at 16:03

## 2020-05-16 RX ADMIN — MIDODRINE HYDROCHLORIDE 5 MG: 5 TABLET ORAL at 12:14

## 2020-05-16 RX ADMIN — PANTOPRAZOLE SODIUM 40 MG: 40 TABLET, DELAYED RELEASE ORAL at 16:03

## 2020-05-16 RX ADMIN — AMIODARONE HYDROCHLORIDE 200 MG: 200 TABLET ORAL at 08:41

## 2020-05-16 RX ADMIN — TAMSULOSIN HYDROCHLORIDE 0.4 MG: 0.4 CAPSULE ORAL at 16:03

## 2020-05-16 RX ADMIN — LACTULOSE 45 ML: 20 SOLUTION ORAL at 16:03

## 2020-05-16 RX ADMIN — INSULIN LISPRO 2 UNITS: 100 INJECTION, SOLUTION INTRAVENOUS; SUBCUTANEOUS at 12:15

## 2020-05-16 RX ADMIN — EPLERENONE 25 MG: 25 TABLET, FILM COATED ORAL at 08:37

## 2020-05-16 NOTE — PROGRESS NOTES
Orthostatic blood pressures assessed and results were positive, Dr. Johana Lebron notified. Pts daughter called and explained that they have been giving him 12.5mg of Inspra, not 25mg. Dr. Johana Lebron also notified.

## 2020-05-16 NOTE — PROGRESS NOTES
Hospitalist Progress Note  Jens Mata MD  Answering service: [phone] from in house phone        Date of Service:  2020  NAME:  Jensen Contreras. :  1955  MRN:  [id number]      Admission Summary:   As per initial admission summary  This is a 69-year-old man with a past medical history significant for coronary artery disease, status post CABG and multiple stent placements; dyslipidemia; hypertension; type 2 diabetes; seizure disorder; benign prostatic hyperplasia; obstructive sleep apnea; chronic systolic congestive heart failure; chronic kidney disease; status post pacemaker insertion, was in his usual state of health until the day of presentation at the emergency room when the patient developed sudden loss of vision in the left eye. This episode lasted a few seconds. It was followed by headache. The headache is located at the left side of the head, constant throbbing headache, 6/10 in severity. No known aggravating or relieving factors. The patient stated that he has a history of ocular migraine and this present episode is similar to his attack of ocular migraine. The patient also stated that he fell a couple of times at home. Denies loss of consciousness. The circumstances surrounding the fall is not clear. The patient denies fever, rigors, and chills. No sick contact or contact with any person with COVID-19 virus infection. When the patient arrived at the emergency room, CT of the head was obtained. The CT scan did not show any acute pathology. CTA of the head and neck was also performed. This shows no acute large vessel occlusion, but shows severe stenosis, bilateral internal carotid arteries, left greater than right. The patient was subsequently referred to the hospitalist service for evaluation for admission. The patient was last admitted to this hospital from 2019 to 2019.   The patient was admitted to the Cardiology Service for evaluation and treatment of ventricular tachycardia. The patient subsequently underwent ICD placement with adjustments to his medication. Interval history / Subjective:     Patient seen for Follow up of suspected cva    Patient seen and examined by the bedside, Labs, images and notes reviewed  Patient is comfortable, denies any chest pain, SOB, abdominal pain, fevers, chills. No N/V/D  Discussed with nursing staff, no acute issues overnight, orders reviewed. I talked to ophthalmology Dr. Colton Castro, ([phone]). Discussed with him regarding patient condition. Recommended to contact him if there is recurrent vision issue. appears to be ocular migraine   Plan for TCAR, as per vascular surgery as an outpatient . Today again patient had a significant drop in the BP with physical therapy. Symptomatic orthostatic hypotension. Updated cardiology, added midodrine. eplerenone discontinued   likely discharge tomorrow if doing better        Assessment & Plan:     1. Ocular migraine. We will admit the patient for further evaluation and treatment. The patient will not be able to undergo MRI of the brain because of the presence of pacemaker. The patient's presentation could also be as a result of ocular migraine. We will carry out headache control while awaiting further recommendation from the neurologist.    Neurology consulted   Recommended to continue with DAPT, along with statin   Please call Ophthalmology if patient develops recurrent vision loss     #orthostatic hypotension  Symptomatic, and significant drop in BP  Cardiology consulted  Lasix, lisinopril and imdur stopped. Today eplerenone was also stopped. Midodrine added  likely discharge tomorrow if doing clinically better   Will also add gentle hydration     2. Stenosis of both internal carotid arteries. Vascular surgery consulted  Recommended TCAR procedure as an outpatient   On aspirin plavix     3. Coronary artery disease, status post CABG and stent placement. We will check cardiac markers to rule out acute myocardial infarction. We will continue with preadmission cardiac medication. 4.  Dyslipidemia. We will resume home medication. We will check lipid profile as stated above. 5.  Type 2 diabetes. The patient will be placed on sliding scale with insulin coverage. We will check hemoglobin A1c level as stated above. 6.  Seizure disorder. We will continue with Keppra. We will obtain EEG. The patient's presentation could also be due to seizure. We will also await further recommendation from the neurologist.  EEG pending     7. Benign prostatic hyperplasia. We will continue with preadmission medication. This is without urinary obstruction. 8.  Obstructive sleep apnea. We will place the patient on CPAP with home setting. 9.  Status post pacemaker insertion. This is secondary to ventricular tachycardia. We will continue to monitor. The patient may require Cardiology consult for pacemaker interrogation  . 10. Chronic systolic congestive heart failure. The patient's echocardiogram according to the record is 16%-20%. We will continue with preadmission medication. The patient appears to be well compensated in terms of his congestive heart failure. Most recent echo showed much improvement   On gentle hydration as patient is persistently having orthostatic hypotension     11. Hyperkalemia. This was reported as hemolyzed sample. We will repeat potassium level. 12.  Chronic kidney disease, stage III. We will monitor the patient's renal function. 15.  Fall at home. CT scan of the head is negative. We will obtain a CT scan of the abdomen and pelvis to evaluate the patient for fracture and intra-abdominal bleeding. 14.  Bilateral leg swelling. We will check ultrasound of the lower extremity for DVT.     Code status: full code  DVT prophylaxis: heparin SC     Care Plan discussed with: Patient/Family  Disposition: Home w/Family and TBD     Hospital Problems  Date Reviewed: 5/12/2020          Codes Class Noted POA    * (Principal) Acute CVA (cerebrovascular accident) [facility]) ICD-10-CM: I63.9  ICD-9-CM: 434.91  5/12/2020 Yes                Review of Systems:   A comprehensive review of systems was negative except for that written in the HPI. Vital Signs:    Last 24hrs VS reviewed since prior progress note. Most recent are:  Visit Vitals  /77 (BP 1 Location: Left arm, BP Patient Position: Supine)   Pulse 79   Temp 98.2 °F (36.8 °C)   Resp 19   Ht 5' 9\" (1.753 m)   Wt 85.2 kg (187 lb 13.3 oz)   SpO2 96%   BMI 27.74 kg/m²       No intake or output data in the 24 hours ending 05/16/20 1638     Physical Examination:             Constitutional:  No acute distress, cooperative, pleasant    ENT:  Oral mucous moist, oropharynx benign. Neck supple,    Resp:  CTA bilaterally. No wheezing/rhonchi/rales. No accessory muscle use   CV:  Regular rhythm, normal rate, no murmurs, gallops, rubs    GI:  Soft, non distended, non tender. normoactive bowel sounds, no hepatosplenomegaly     Musculoskeletal:  No edema, warm, 2+ pulses throughout    Neurologic:  Moves all extremities. AAOx3, CN II-XII reviewed     Psych:  Good insight, Not anxious nor agitated. Skin:  Good turgor, no rashes or ulcers  Hematologic/Lymphatic/Immunlogic:  No jaundice nor lymph node swelling  Eyes:  EOMI. Anicteric sclerae, PERRL. Data Review:    Review and/or order of clinical lab test  Review and/or order of tests in the radiology section of CPT      Labs:     No results for input(s): WBC, HGB, HCT, PLT, HGBEXT, HCTEXT, PLTEXT, HGBEXT, HCTEXT, PLTEXT in the last 72 hours. No results for input(s): NA, K, CL, CO2, BUN, CREA, GLU, CA, MG, PHOS, URICA in the last 72 hours. No results for input(s): SGOT, GPT, ALT, AP, TBIL, TBILI, TP, ALB, GLOB, GGT, AML, LPSE in the last 72 hours.     No lab exists for component: AMYP, HLPSE  No results for input(s): INR, PTP, APTT, INREXT, INREXT in the last 72 hours. No results for input(s): FE, TIBC, PSAT, FERR in the last 72 hours. Lab Results   Component Value Date/Time    Folate 11.2 05/13/2020 12:27 AM      No results for input(s): PH, PCO2, PO2 in the last 72 hours. No results for input(s): CPK, CKNDX, TROIQ in the last 72 hours.     No lab exists for component: CPKMB  Lab Results   Component Value Date/Time    Cholesterol, total 120 05/13/2020 12:27 AM    HDL Cholesterol 20 05/13/2020 12:27 AM    LDL, calculated 46.2 05/13/2020 12:27 AM    Triglyceride 269 (H) 05/13/2020 12:27 AM    CHOL/HDL Ratio 6.0 (H) 05/13/2020 12:27 AM     Lab Results   Component Value Date/Time    Glucose (POC) 171 (H) 05/16/2020 04:12 PM    Glucose (POC) 164 (H) 05/16/2020 12:13 PM    Glucose (POC) 120 (H) 05/16/2020 07:27 AM    Glucose (POC) 135 (H) 05/15/2020 09:37 PM    Glucose (POC) 148 (H) 05/15/2020 04:33 PM     Lab Results   Component Value Date/Time    Color YELLOW/STRAW 08/21/2019 01:17 PM    Appearance CLEAR 08/21/2019 01:17 PM    Specific gravity 1.020 08/21/2019 01:17 PM    Specific gravity 1.020 04/05/2019 07:25 PM    pH (UA) 5.5 08/21/2019 01:17 PM    Protein 100 (A) 08/21/2019 01:17 PM    Glucose NEGATIVE  08/21/2019 01:17 PM    Ketone NEGATIVE  08/21/2019 01:17 PM    Bilirubin NEGATIVE  08/21/2019 01:17 PM    Urobilinogen 1.0 08/21/2019 01:17 PM    Nitrites NEGATIVE  08/21/2019 01:17 PM    Leukocyte Esterase TRACE (A) 08/21/2019 01:17 PM    Epithelial cells FEW 08/21/2019 01:17 PM    Bacteria 1+ (A) 08/21/2019 01:17 PM    WBC 5-10 08/21/2019 01:17 PM    RBC >100 (H) 08/21/2019 01:17 PM         Medications Reviewed:     Current Facility-Administered Medications   Medication Dose Route Frequency    midodrine (PROAMATINE) tablet 5 mg  5 mg Oral TID WITH MEALS    amiodarone (CORDARONE) tablet 200 mg  200 mg Oral DAILY    aspirin chewable tablet 81 mg  81 mg Oral DAILY    atorvastatin (LIPITOR) tablet 80 mg  80 mg Oral DAILY    carvediloL (COREG) tablet 3.125 mg  3.125 mg Oral BID WITH MEALS    clonazePAM (KlonoPIN) tablet 1 mg  1 mg Oral QHS    clopidogreL (PLAVIX) tablet 75 mg  75 mg Oral DAILY    finasteride (PROSCAR) tablet 5 mg  5 mg Oral 7am    lactulose (CHRONULAC) 10 gram/15 mL solution 45 mL  30 g Oral TID    levETIRAcetam (KEPPRA) tablet 1,000 mg  1,000 mg Oral Q12H    nitroglycerin (NITROSTAT) tablet 0.4 mg  0.4 mg SubLINGual Q5MIN PRN    pantoprazole (PROTONIX) tablet 40 mg  40 mg Oral ACB&D    pregabalin (LYRICA) capsule 50 mg  50 mg Oral TID    QUEtiapine (SEROquel) tablet 25 mg  25 mg Oral QHS    tamsulosin (FLOMAX) capsule 0.4 mg  0.4 mg Oral ACB&D    venlafaxine-SR (EFFEXOR-XR) capsule 75 mg  75 mg Oral BID    insulin lispro (HUMALOG) injection   SubCUTAneous AC&HS    glucose chewable tablet 16 g  4 Tab Oral PRN    glucagon (GLUCAGEN) injection 1 mg  1 mg IntraMUSCular PRN    dextrose 10% infusion 0-250 mL  0-250 mL IntraVENous PRN    acetaminophen (TYLENOL) tablet 650 mg  650 mg Oral Q4H PRN    heparin (porcine) injection 5,000 Units  5,000 Units SubCUTAneous Q8H    bisacodyL (DULCOLAX) suppository 10 mg  10 mg Rectal DAILY PRN     ______________________________________________________________________  EXPECTED LENGTH OF STAY: 2d 7h  ACTUAL LENGTH OF STAY:          4                 Soto Cracamo MD

## 2020-05-16 NOTE — PROGRESS NOTES
Cardiology Progress Note                                        Admit Date: 5/12/2020    Assessment/Plan:     Hypotension; improved since I stopped some of his regimen  Cardiomyopathy; resolved  CAD; s/p CABGs and stents; asymptomatic    Timur Mckay is a 76 y.o. male with     PROBLEM LIST:  Patient Active Problem List    Diagnosis Date Noted   Shira Mix tach ([facility] 08/20/2019     Priority: 1 - One    Acute CVA (cerebrovascular accident) ([facility] 05/12/2020    VT (ventricular tachycardia) ([facility] 08/20/2019    CHF exacerbation ([address] 06/13/2019    Fall 01/10/2019    TACOS (acute kidney injury) ([facility] 01/10/2019    Chest pain 01/11/2018    Acute chest pain 01/10/2018    Hepatic encephalopathy ([facility] 07/17/2017    Neuropathy 04/14/2017    Cirrhosis ([facility] 04/14/2017    CAD (coronary artery disease) 04/14/2017    S/P coronary artery stent placement 04/14/2017    S/P CABG (coronary artery bypass graft) 04/14/2017    Thrombocytopenia ([facility] 04/14/2017    MRSA infection 04/14/2017    S/P cholecystectomy 65/50/5812    Metabolic encephalopathy 16/11/7945    Seizure ([facility] 11/21/2016    Sinusitis     Joint pain     Low back pain     GERD (gastroesophageal reflux disease)     Diabetes mellitus, type 2 (HCC)          Subjective:     Timur Mckay reports none. Visit Vitals  /80   Pulse 84   Temp 97.8 °F (36.6 °C)   Resp 19   Ht 5' 9\" (1.753 m)   Wt 85.2 kg (187 lb 13.3 oz)   SpO2 99%   BMI 27.74 kg/m²       Intake/Output Summary (Last 24 hours) at 5/16/2020 0709  Last data filed at 5/15/2020 1149  Gross per 24 hour   Intake    Output 260 ml   Net -260 ml       Objective:      Physical Exam:  HEENT: Perrla, EOMI  Neck: No JVD,  No thyroidmegaly  Resp: CTA bilaterally;  No wheezes or rales  CV: RRR s1s2 No murmur no s3  Abd:Soft, Nontender  Ext: No edema  Neuro: Alert and oriented; Nonfocal  Skin: Warm, Dry, Intact  Pulses: 2+ DP/PT/Rad      Telemetry: normal sinus rhythm    Current Facility-Administered Medications   Medication Dose Route Frequency    amiodarone (CORDARONE) tablet 200 mg  200 mg Oral DAILY    aspirin chewable tablet 81 mg  81 mg Oral DAILY    atorvastatin (LIPITOR) tablet 80 mg  80 mg Oral DAILY    carvediloL (COREG) tablet 3.125 mg  3.125 mg Oral BID WITH MEALS    clonazePAM (KlonoPIN) tablet 1 mg  1 mg Oral QHS    clopidogreL (PLAVIX) tablet 75 mg  75 mg Oral DAILY    eplerenone (INSPRA) tablet 25 mg  25 mg Oral DAILY    finasteride (PROSCAR) tablet 5 mg  5 mg Oral 7am    lactulose (CHRONULAC) 10 gram/15 mL solution 45 mL  30 g Oral TID    levETIRAcetam (KEPPRA) tablet 1,000 mg  1,000 mg Oral Q12H    nitroglycerin (NITROSTAT) tablet 0.4 mg  0.4 mg SubLINGual Q5MIN PRN    pantoprazole (PROTONIX) tablet 40 mg  40 mg Oral ACB&D    pregabalin (LYRICA) capsule 50 mg  50 mg Oral TID    QUEtiapine (SEROquel) tablet 25 mg  25 mg Oral QHS    tamsulosin (FLOMAX) capsule 0.4 mg  0.4 mg Oral ACB&D    venlafaxine-SR (EFFEXOR-XR) capsule 75 mg  75 mg Oral BID    insulin lispro (HUMALOG) injection   SubCUTAneous AC&HS    glucose chewable tablet 16 g  4 Tab Oral PRN    glucagon (GLUCAGEN) injection 1 mg  1 mg IntraMUSCular PRN    dextrose 10% infusion 0-250 mL  0-250 mL IntraVENous PRN    acetaminophen (TYLENOL) tablet 650 mg  650 mg Oral Q4H PRN    heparin (porcine) injection 5,000 Units  5,000 Units SubCUTAneous Q8H    bisacodyL (DULCOLAX) suppository 10 mg  10 mg Rectal DAILY PRN         Data Review:   Labs:    Recent Results (from the past 24 hour(s))   GLUCOSE, POC    Collection Time: 05/15/20  7:34 AM   Result Value Ref Range    Glucose (POC) 127 (H) 65 - 100 mg/dL    Performed by Wayne Olmos 1841, POC    Collection Time: 05/15/20 11:52 AM   Result Value Ref Range    Glucose (POC) 208 (H) 65 - 100 mg/dL    Performed by Wayne Olmos 1841, POC    Collection Time: 05/15/20  4:33 PM   Result Value Ref Range    Glucose (POC) 148 (H) 65 - 100 mg/dL Performed by Marlena Pitts    GLUCOSE, POC    Collection Time: 05/15/20  9:37 PM   Result Value Ref Range    Glucose (POC) 135 (H) 65 - 100 mg/dL    Performed by Nevin Valderrama

## 2020-05-16 NOTE — PROGRESS NOTES
Problem: Diabetes Self-Management  Goal: *Disease process and treatment process  Description: Define diabetes and identify own type of diabetes; list 3 options for treating diabetes. Outcome: Progressing Towards Goal  Goal: *Incorporating nutritional management into lifestyle  Description: Describe effect of type, amount and timing of food on blood glucose; list 3 methods for planning meals. Outcome: Progressing Towards Goal  Goal: *Incorporating physical activity into lifestyle  Description: State effect of exercise on blood glucose levels. Outcome: Progressing Towards Goal  Goal: *Developing strategies to promote health/change behavior  Description: Define the ABC's of diabetes; identify appropriate screenings, schedule and personal plan for screenings. Outcome: Progressing Towards Goal  Goal: *Using medications safely  Description: State effect of diabetes medications on diabetes; name diabetes medication taking, action and side effects. Outcome: Progressing Towards Goal  Goal: *Monitoring blood glucose, interpreting and using results  Description: Identify recommended blood glucose targets  and personal targets. Outcome: Progressing Towards Goal  Goal: *Prevention, detection, treatment of acute complications  Description: List symptoms of hyper- and hypoglycemia; describe how to treat low blood sugar and actions for lowering  high blood glucose level. Outcome: Progressing Towards Goal  Goal: *Prevention, detection and treatment of chronic complications  Description: Define the natural course of diabetes and describe the relationship of blood glucose levels to long term complications of diabetes.   Outcome: Progressing Towards Goal  Goal: *Developing strategies to address psychosocial issues  Description: Describe feelings about living with diabetes; identify support needed and support network  Outcome: Progressing Towards Goal     Problem: Falls - Risk of  Goal: *Absence of Falls  Description: Document Ami Fall Risk and appropriate interventions in the flowsheet. Outcome: Progressing Towards Goal  Note: Fall Risk Interventions:  Mobility Interventions: Communicate number of staff needed for ambulation/transfer, Patient to call before getting OOB, OT consult for ADLs, PT Consult for mobility concerns, PT Consult for assist device competence, Utilize walker, cane, or other assistive device    Mentation Interventions: Adequate sleep, hydration, pain control, Door open when patient unattended, More frequent rounding, Reorient patient, Room close to nurse's station, Update white board    Medication Interventions: Assess postural VS orthostatic hypotension, Patient to call before getting OOB, Teach patient to arise slowly    Elimination Interventions: Call light in reach, Patient to call for help with toileting needs, Urinal in reach    History of Falls Interventions: Consult care management for discharge planning, Investigate reason for fall, Room close to nurse's station, Vital signs minimum Q4HRs X 24 hrs (comment for end date)         Problem: Pressure Injury - Risk of  Goal: *Prevention of pressure injury  Description: Document Vián Scale and appropriate interventions in the flowsheet.   Outcome: Progressing Towards Goal  Note: Pressure Injury Interventions:  Sensory Interventions: Assess changes in LOC, Float heels, Keep linens dry and wrinkle-free, Minimize linen layers, Pressure redistribution bed/mattress (bed type)    Moisture Interventions: Absorbent underpads, Apply protective barrier, creams and emollients, Limit adult briefs    Activity Interventions: Assess need for specialty bed, Pressure redistribution bed/mattress(bed type), PT/OT evaluation    Mobility Interventions: Float heels, HOB 30 degrees or less, Pressure redistribution bed/mattress (bed type), PT/OT evaluation    Nutrition Interventions: Document food/fluid/supplement intake    Friction and Shear Interventions: Apply protective barrier, creams and emollients, HOB 30 degrees or less, Lift sheet, Minimize layers

## 2020-05-17 LAB
GLUCOSE BLD STRIP.AUTO-MCNC: 109 MG/DL (ref 65–100)
GLUCOSE BLD STRIP.AUTO-MCNC: 132 MG/DL (ref 65–100)
GLUCOSE BLD STRIP.AUTO-MCNC: 141 MG/DL (ref 65–100)
GLUCOSE BLD STRIP.AUTO-MCNC: 264 MG/DL (ref 65–100)
SERVICE CMNT-IMP: ABNORMAL

## 2020-05-17 PROCEDURE — 94760 N-INVAS EAR/PLS OXIMETRY 1: CPT

## 2020-05-17 PROCEDURE — 74011250636 HC RX REV CODE- 250/636: Performed by: INTERNAL MEDICINE

## 2020-05-17 PROCEDURE — 82962 GLUCOSE BLOOD TEST: CPT

## 2020-05-17 PROCEDURE — 74011636637 HC RX REV CODE- 636/637: Performed by: INTERNAL MEDICINE

## 2020-05-17 PROCEDURE — 74011250637 HC RX REV CODE- 250/637: Performed by: INTERNAL MEDICINE

## 2020-05-17 PROCEDURE — 65660000000 HC RM CCU STEPDOWN

## 2020-05-17 RX ORDER — MIDODRINE HYDROCHLORIDE 5 MG/1
10 TABLET ORAL
Status: DISCONTINUED | OUTPATIENT
Start: 2020-05-17 | End: 2020-05-24 | Stop reason: HOSPADM

## 2020-05-17 RX ADMIN — LEVETIRACETAM 1000 MG: 500 TABLET, FILM COATED ORAL at 21:53

## 2020-05-17 RX ADMIN — ASPIRIN 81 MG 81 MG: 81 TABLET ORAL at 08:30

## 2020-05-17 RX ADMIN — AMIODARONE HYDROCHLORIDE 200 MG: 200 TABLET ORAL at 08:29

## 2020-05-17 RX ADMIN — CARVEDILOL 3.12 MG: 3.12 TABLET, FILM COATED ORAL at 18:22

## 2020-05-17 RX ADMIN — VENLAFAXINE HYDROCHLORIDE 75 MG: 37.5 CAPSULE, EXTENDED RELEASE ORAL at 08:29

## 2020-05-17 RX ADMIN — PANTOPRAZOLE SODIUM 40 MG: 40 TABLET, DELAYED RELEASE ORAL at 07:02

## 2020-05-17 RX ADMIN — LACTULOSE 45 ML: 20 SOLUTION ORAL at 18:21

## 2020-05-17 RX ADMIN — FINASTERIDE 5 MG: 5 TABLET, FILM COATED ORAL at 07:02

## 2020-05-17 RX ADMIN — HEPARIN SODIUM 5000 UNITS: 5000 INJECTION, SOLUTION INTRAVENOUS; SUBCUTANEOUS at 15:10

## 2020-05-17 RX ADMIN — QUETIAPINE FUMARATE 25 MG: 25 TABLET ORAL at 21:53

## 2020-05-17 RX ADMIN — VENLAFAXINE HYDROCHLORIDE 75 MG: 37.5 CAPSULE, EXTENDED RELEASE ORAL at 18:21

## 2020-05-17 RX ADMIN — MIDODRINE HYDROCHLORIDE 10 MG: 5 TABLET ORAL at 12:15

## 2020-05-17 RX ADMIN — MIDODRINE HYDROCHLORIDE 5 MG: 5 TABLET ORAL at 08:29

## 2020-05-17 RX ADMIN — PANTOPRAZOLE SODIUM 40 MG: 40 TABLET, DELAYED RELEASE ORAL at 15:54

## 2020-05-17 RX ADMIN — LACTULOSE 45 ML: 20 SOLUTION ORAL at 08:31

## 2020-05-17 RX ADMIN — Medication: at 21:59

## 2020-05-17 RX ADMIN — ATORVASTATIN CALCIUM 80 MG: 40 TABLET, FILM COATED ORAL at 08:29

## 2020-05-17 RX ADMIN — PREGABALIN 50 MG: 50 CAPSULE ORAL at 21:53

## 2020-05-17 RX ADMIN — PREGABALIN 50 MG: 50 CAPSULE ORAL at 15:54

## 2020-05-17 RX ADMIN — TAMSULOSIN HYDROCHLORIDE 0.4 MG: 0.4 CAPSULE ORAL at 07:02

## 2020-05-17 RX ADMIN — LEVETIRACETAM 1000 MG: 500 TABLET, FILM COATED ORAL at 08:29

## 2020-05-17 RX ADMIN — TAMSULOSIN HYDROCHLORIDE 0.4 MG: 0.4 CAPSULE ORAL at 15:54

## 2020-05-17 RX ADMIN — INSULIN LISPRO 5 UNITS: 100 INJECTION, SOLUTION INTRAVENOUS; SUBCUTANEOUS at 12:18

## 2020-05-17 RX ADMIN — CARVEDILOL 3.12 MG: 3.12 TABLET, FILM COATED ORAL at 08:29

## 2020-05-17 RX ADMIN — CLONAZEPAM 1 MG: 1 TABLET ORAL at 21:53

## 2020-05-17 RX ADMIN — HEPARIN SODIUM 5000 UNITS: 5000 INJECTION, SOLUTION INTRAVENOUS; SUBCUTANEOUS at 21:54

## 2020-05-17 RX ADMIN — CLOPIDOGREL BISULFATE 75 MG: 75 TABLET ORAL at 08:29

## 2020-05-17 RX ADMIN — PREGABALIN 50 MG: 50 CAPSULE ORAL at 08:30

## 2020-05-17 RX ADMIN — LACTULOSE 45 ML: 20 SOLUTION ORAL at 21:54

## 2020-05-17 RX ADMIN — HEPARIN SODIUM 5000 UNITS: 5000 INJECTION, SOLUTION INTRAVENOUS; SUBCUTANEOUS at 07:02

## 2020-05-17 RX ADMIN — SODIUM CHLORIDE 75 ML/HR: 900 INJECTION, SOLUTION INTRAVENOUS at 15:10

## 2020-05-17 RX ADMIN — MIDODRINE HYDROCHLORIDE 10 MG: 5 TABLET ORAL at 18:21

## 2020-05-17 RX ADMIN — Medication: at 15:51

## 2020-05-17 NOTE — PROGRESS NOTES
Problem: Diabetes Self-Management  Goal: *Disease process and treatment process  Description: Define diabetes and identify own type of diabetes; list 3 options for treating diabetes. Outcome: Progressing Towards Goal  Goal: *Incorporating nutritional management into lifestyle  Description: Describe effect of type, amount and timing of food on blood glucose; list 3 methods for planning meals. Outcome: Progressing Towards Goal  Goal: *Incorporating physical activity into lifestyle  Description: State effect of exercise on blood glucose levels. Outcome: Progressing Towards Goal  Goal: *Developing strategies to promote health/change behavior  Description: Define the ABC's of diabetes; identify appropriate screenings, schedule and personal plan for screenings. Outcome: Progressing Towards Goal  Goal: *Using medications safely  Description: State effect of diabetes medications on diabetes; name diabetes medication taking, action and side effects. Outcome: Progressing Towards Goal  Goal: *Monitoring blood glucose, interpreting and using results  Description: Identify recommended blood glucose targets  and personal targets. Outcome: Progressing Towards Goal  Goal: *Prevention, detection, treatment of acute complications  Description: List symptoms of hyper- and hypoglycemia; describe how to treat low blood sugar and actions for lowering  high blood glucose level. Outcome: Progressing Towards Goal  Goal: *Prevention, detection and treatment of chronic complications  Description: Define the natural course of diabetes and describe the relationship of blood glucose levels to long term complications of diabetes.   Outcome: Progressing Towards Goal  Goal: *Developing strategies to address psychosocial issues  Description: Describe feelings about living with diabetes; identify support needed and support network  Outcome: Progressing Towards Goal  Goal: *Insulin pump training  Outcome: Progressing Towards Goal  Goal: *Sick day guidelines  Outcome: Progressing Towards Goal  Goal: *Patient Specific Goal (EDIT GOAL, INSERT TEXT)  Outcome: Progressing Towards Goal     Problem: Patient Education: Go to Patient Education Activity  Goal: Patient/Family Education  Outcome: Progressing Towards Goal     Problem: Falls - Risk of  Goal: *Absence of Falls  Description: Document Earma Jax Fall Risk and appropriate interventions in the flowsheet. Outcome: Progressing Towards Goal  Note: Fall Risk Interventions:  Mobility Interventions: Assess mobility with egress test, Communicate number of staff needed for ambulation/transfer, OT consult for ADLs, Patient to call before getting OOB, PT Consult for mobility concerns, PT Consult for assist device competence, Strengthening exercises (ROM-active/passive), Utilize walker, cane, or other assistive device    Mentation Interventions: Adequate sleep, hydration, pain control, Door open when patient unattended, More frequent rounding, Reorient patient, Room close to nurse's station, Update white board    Medication Interventions: Assess postural VS orthostatic hypotension, Patient to call before getting OOB, Teach patient to arise slowly    Elimination Interventions: Bed/chair exit alarm, Patient to call for help with toileting needs, Stay With Me (per policy), Urinal in reach    History of Falls Interventions: Bed/chair exit alarm, Door open when patient unattended, Room close to nurse's station, Investigate reason for fall, Vital signs minimum Q4HRs X 24 hrs (comment for end date)    Problem: Patient Education: Go to Patient Education Activity  Goal: Patient/Family Education  Outcome: Progressing Towards Goal     Problem: Pressure Injury - Risk of  Goal: *Prevention of pressure injury  Description: Document Iván Scale and appropriate interventions in the flowsheet.   Outcome: Progressing Towards Goal  Note: Pressure Injury Interventions:  Sensory Interventions: Assess changes in LOC, Minimize linen layers, Monitor skin under medical devices, Pressure redistribution bed/mattress (bed type), Float heels, Keep linens dry and wrinkle-free    Moisture Interventions: Absorbent underpads, Apply protective barrier, creams and emollients, Internal/External urinary devices, Maintain skin hydration (lotion/cream), Minimize layers, Check for incontinence Q2 hours and as needed    Activity Interventions: Assess need for specialty bed, Pressure redistribution bed/mattress(bed type), PT/OT evaluation    Mobility Interventions: Float heels, HOB 30 degrees or less, Pressure redistribution bed/mattress (bed type), PT/OT evaluation    Nutrition Interventions: Document food/fluid/supplement intake    Friction and Shear Interventions: Apply protective barrier, creams and emollients, Lift sheet, Minimize layers    Problem: Patient Education: Go to Patient Education Activity  Goal: Patient/Family Education  Outcome: Progressing Towards Goal     Problem: Risk for Spread of Infection  Goal: Prevent transmission of infectious organism to others  Description: Prevent the transmission of infectious organisms to other patients, staff members, and visitors.   Outcome: Progressing Towards Goal     Problem: Patient Education:  Go to Education Activity  Goal: Patient/Family Education  Outcome: Progressing Towards Goal     Problem: Patient Education: Go to Patient Education Activity  Goal: Patient/Family Education  Outcome: Progressing Towards Goal     Problem: Patient Education: Go to Patient Education Activity  Goal: Patient/Family Education  Outcome: Progressing Towards Goal     Problem: TIA/CVA Stroke: Day 2 Until Discharge  Goal: Off Pathway (Use only if patient is Off Pathway)  Outcome: Progressing Towards Goal  Goal: Activity/Safety  Outcome: Progressing Towards Goal  Goal: Diagnostic Test/Procedures  Outcome: Progressing Towards Goal  Goal: Nutrition/Diet  Outcome: Progressing Towards Goal  Goal: Discharge Planning  Outcome: Progressing Towards Goal  Goal: Medications  Outcome: Progressing Towards Goal  Goal: Respiratory  Outcome: Progressing Towards Goal  Goal: Treatments/Interventions/Procedures  Outcome: Progressing Towards Goal  Goal: Psychosocial  Outcome: Progressing Towards Goal  Goal: *Verbalizes anxiety and depression are reduced or absent  Outcome: Progressing Towards Goal  Goal: *Absence of aspiration  Outcome: Progressing Towards Goal  Goal: *Absence of deep venous thrombosis signs and symptoms(Stroke Metric)  Outcome: Progressing Towards Goal  Goal: *Optimal pain control at patient's stated goal  Outcome: Progressing Towards Goal  Goal: *Tolerating diet  Outcome: Progressing Towards Goal  Goal: *Ability to perform ADLs and demonstrates progressive mobility and function  Outcome: Progressing Towards Goal  Goal: *Stroke education continued(Stroke Metric)  Outcome: Progressing Towards Goal     Problem: Ischemic Stroke: Discharge Outcomes  Goal: *Verbalizes anxiety and depression are reduced or absent  Outcome: Progressing Towards Goal  Goal: *Verbalize understanding of risk factor modification(Stroke Metric)  Outcome: Progressing Towards Goal  Goal: *Hemodynamically stable  Outcome: Progressing Towards Goal  Goal: *Absence of aspiration pneumonia  Outcome: Progressing Towards Goal  Goal: *Aware of needed dietary changes  Outcome: Progressing Towards Goal  Goal: *Verbalize understanding of prescribed medications including anti-coagulants, anti-lipid, and/or anti-platelets(Stroke Metric)  Outcome: Progressing Towards Goal  Goal: *Tolerating diet  Outcome: Progressing Towards Goal  Goal: *Aware of follow-up diagnostics related to anticoagulants  Outcome: Progressing Towards Goal  Goal: *Ability to perform ADLs and demonstrates progressive mobility and function  Outcome: Progressing Towards Goal  Goal: *Absence of DVT(Stroke Metric)  Outcome: Progressing Towards Goal  Goal: *Absence of aspiration  Outcome: Progressing Towards Goal  Goal: *Optimal pain control at patient's stated goal  Outcome: Progressing Towards Goal  Goal: *Home safety concerns addressed  Outcome: Progressing Towards Goal  Goal: *Describes available resources and support systems  Outcome: Progressing Towards Goal  Goal: *Verbalizes understanding of activation of EMS(911) for stroke symptoms(Stroke Metric)  Outcome: Progressing Towards Goal  Goal: *Understands and describes signs and symptoms to report to providers(Stroke Metric)  Outcome: Progressing Towards Goal  Goal: *Neurolgocially stable (absence of additional neurological deficits)  Outcome: Progressing Towards Goal  Goal: *Verbalizes importance of follow-up with primary care physician(Stroke Metric)  Outcome: Progressing Towards Goal  Goal: *Smoking cessation discussed,if applicable(Stroke Metric)  Outcome: Progressing Towards Goal  Goal: *Depression screening completed(Stroke Metric)  Outcome: Progressing Towards Goal

## 2020-05-17 NOTE — PROGRESS NOTES
Cardiology Progress Note                                        Admit Date: 5/12/2020    Assessment/Plan:     Hypotension; corrected; if orthostatic hypotension is improved he could go home and followed by Dr. Yisel Carlos in two weeks  Orthostatic hypotension; we stopped which we were apparently giving at a higher dose here and started Midodrine; waiting for orthostatic BP check this am  Cardiomyopathy; resolved  CAD; asymptomatic    Myla Balbuena. is a 76 y.o. male with     PROBLEM LIST:  Patient Active Problem List    Diagnosis Date Noted   Humberto Perezheim tach ([facility] 08/20/2019     Priority: 1 - One    Acute CVA (cerebrovascular accident) ([address] 05/12/2020    VT (ventricular tachycardia) ([address] 08/20/2019    CHF exacerbation ([address] 06/13/2019    Fall 01/10/2019    TACOS (acute kidney injury) ([address] 01/10/2019    Chest pain 01/11/2018    Acute chest pain 01/10/2018    Hepatic encephalopathy ([address] 07/17/2017    Neuropathy 04/14/2017    Cirrhosis ([address] 04/14/2017    CAD (coronary artery disease) 04/14/2017    S/P coronary artery stent placement 04/14/2017    S/P CABG (coronary artery bypass graft) 04/14/2017    Thrombocytopenia ([address] 04/14/2017    MRSA infection 04/14/2017    S/P cholecystectomy 93/61/0884    Metabolic encephalopathy 28/25/7449    Seizure ([address] 11/21/2016    Sinusitis     Joint pain     Low back pain     GERD (gastroesophageal reflux disease)     Diabetes mellitus, type 2 (HCC)          Subjective:     Myla Balbuena. reports none. Visit Vitals  /76 (BP 1 Location: Left arm, BP Patient Position: At rest)   Pulse 74   Temp 98.2 °F (36.8 °C)   Resp 16   Ht 5' 9\" (1.753 m)   Wt 90.3 kg (199 lb 1.2 oz)   SpO2 96%   BMI 29.40 kg/m²     No intake or output data in the 24 hours ending 05/17/20 0637    Objective:      Physical Exam:  HEENT: Perrla, EOMI  Neck: No JVD,  No thyroidmegaly  Resp: CTA bilaterally;  No wheezes or rales  CV: RRR s1s2 No murmur no s3  Abd:Soft, Nontender  Ext: No edema  Neuro: Alert and oriented; Nonfocal  Skin: Warm, Dry, Intact  Pulses: 2+ DP/PT/Rad      Telemetry: normal sinus rhythm    Current Facility-Administered Medications   Medication Dose Route Frequency    midodrine (PROAMATINE) tablet 5 mg  5 mg Oral TID WITH MEALS    0.9% sodium chloride infusion  50 mL/hr IntraVENous CONTINUOUS    amiodarone (CORDARONE) tablet 200 mg  200 mg Oral DAILY    aspirin chewable tablet 81 mg  81 mg Oral DAILY    atorvastatin (LIPITOR) tablet 80 mg  80 mg Oral DAILY    carvediloL (COREG) tablet 3.125 mg  3.125 mg Oral BID WITH MEALS    clonazePAM (KlonoPIN) tablet 1 mg  1 mg Oral QHS    clopidogreL (PLAVIX) tablet 75 mg  75 mg Oral DAILY    finasteride (PROSCAR) tablet 5 mg  5 mg Oral 7am    lactulose (CHRONULAC) 10 gram/15 mL solution 45 mL  30 g Oral TID    levETIRAcetam (KEPPRA) tablet 1,000 mg  1,000 mg Oral Q12H    nitroglycerin (NITROSTAT) tablet 0.4 mg  0.4 mg SubLINGual Q5MIN PRN    pantoprazole (PROTONIX) tablet 40 mg  40 mg Oral ACB&D    pregabalin (LYRICA) capsule 50 mg  50 mg Oral TID    QUEtiapine (SEROquel) tablet 25 mg  25 mg Oral QHS    tamsulosin (FLOMAX) capsule 0.4 mg  0.4 mg Oral ACB&D    venlafaxine-SR (EFFEXOR-XR) capsule 75 mg  75 mg Oral BID    insulin lispro (HUMALOG) injection   SubCUTAneous AC&HS    glucose chewable tablet 16 g  4 Tab Oral PRN    glucagon (GLUCAGEN) injection 1 mg  1 mg IntraMUSCular PRN    dextrose 10% infusion 0-250 mL  0-250 mL IntraVENous PRN    acetaminophen (TYLENOL) tablet 650 mg  650 mg Oral Q4H PRN    heparin (porcine) injection 5,000 Units  5,000 Units SubCUTAneous Q8H    bisacodyL (DULCOLAX) suppository 10 mg  10 mg Rectal DAILY PRN         Data Review:   Labs:    Recent Results (from the past 24 hour(s))   GLUCOSE, POC    Collection Time: 05/16/20  7:27 AM   Result Value Ref Range    Glucose (POC) 120 (H) 65 - 100 mg/dL    Performed by Martina Malagon    GLUCOSE, POC    Collection Time: 05/16/20 12:13 PM   Result Value Ref Range    Glucose (POC) 164 (H) 65 - 100 mg/dL    Performed by Laya Marinelli, POC    Collection Time: 05/16/20  4:12 PM   Result Value Ref Range    Glucose (POC) 171 (H) 65 - 100 mg/dL    Performed by Laya Marinelli, POC    Collection Time: 05/16/20  9:46 PM   Result Value Ref Range    Glucose (POC) 134 (H) 65 - 100 mg/dL    Performed by Benny Garcia

## 2020-05-17 NOTE — PROGRESS NOTES
Problem: Diabetes Self-Management  Goal: *Disease process and treatment process  Description: Define diabetes and identify own type of diabetes; list 3 options for treating diabetes. Outcome: Progressing Towards Goal  Goal: *Incorporating nutritional management into lifestyle  Description: Describe effect of type, amount and timing of food on blood glucose; list 3 methods for planning meals. Outcome: Progressing Towards Goal  Goal: *Incorporating physical activity into lifestyle  Description: State effect of exercise on blood glucose levels. Outcome: Progressing Towards Goal  Goal: *Using medications safely  Description: State effect of diabetes medications on diabetes; name diabetes medication taking, action and side effects. Outcome: Progressing Towards Goal  Goal: *Monitoring blood glucose, interpreting and using results  Description: Identify recommended blood glucose targets  and personal targets. Outcome: Progressing Towards Goal  Goal: *Prevention, detection, treatment of acute complications  Description: List symptoms of hyper- and hypoglycemia; describe how to treat low blood sugar and actions for lowering  high blood glucose level. Outcome: Progressing Towards Goal     Problem: Falls - Risk of  Goal: *Absence of Falls  Description: Document Braydon Mack Fall Risk and appropriate interventions in the flowsheet.   Outcome: Progressing Towards Goal  Note: Fall Risk Interventions:  Mobility Interventions: Assess mobility with egress test, Communicate number of staff needed for ambulation/transfer, OT consult for ADLs, Patient to call before getting OOB, PT Consult for mobility concerns, PT Consult for assist device competence, Strengthening exercises (ROM-active/passive), Utilize walker, cane, or other assistive device    Mentation Interventions: Adequate sleep, hydration, pain control, Door open when patient unattended, More frequent rounding, Reorient patient, Room close to nurse's station, Update white board    Medication Interventions: Assess postural VS orthostatic hypotension, Patient to call before getting OOB, Teach patient to arise slowly    Elimination Interventions: Bed/chair exit alarm, Patient to call for help with toileting needs, Stay With Me (per policy), Urinal in reach    History of Falls Interventions: Bed/chair exit alarm, Door open when patient unattended, Room close to nurse's station, Investigate reason for fall, Vital signs minimum Q4HRs X 24 hrs (comment for end date)         Problem: Pressure Injury - Risk of  Goal: *Prevention of pressure injury  Description: Document Iván Scale and appropriate interventions in the flowsheet.   Outcome: Progressing Towards Goal  Note: Pressure Injury Interventions:  Sensory Interventions: Assess changes in LOC, Minimize linen layers, Monitor skin under medical devices, Pressure redistribution bed/mattress (bed type), Float heels, Keep linens dry and wrinkle-free    Moisture Interventions: Absorbent underpads, Apply protective barrier, creams and emollients, Internal/External urinary devices, Maintain skin hydration (lotion/cream), Minimize layers, Check for incontinence Q2 hours and as needed    Activity Interventions: Assess need for specialty bed, Pressure redistribution bed/mattress(bed type), PT/OT evaluation    Mobility Interventions: Float heels, HOB 30 degrees or less, Pressure redistribution bed/mattress (bed type), PT/OT evaluation    Nutrition Interventions: Document food/fluid/supplement intake    Friction and Shear Interventions: Apply protective barrier, creams and emollients, Lift sheet, Minimize layers                Problem: Patient Education: Go to Patient Education Activity  Goal: Patient/Family Education  Outcome: Progressing Towards Goal     Problem: Ischemic Stroke: Discharge Outcomes  Goal: *Verbalizes anxiety and depression are reduced or absent  Outcome: Progressing Towards Goal  Goal: *Verbalize understanding of risk factor modification(Stroke Metric)  Outcome: Progressing Towards Goal  Goal: *Hemodynamically stable  Outcome: Progressing Towards Goal  Goal: *Absence of aspiration pneumonia  Outcome: Progressing Towards Goal  Goal: *Aware of needed dietary changes  Outcome: Progressing Towards Goal  Goal: *Verbalize understanding of prescribed medications including anti-coagulants, anti-lipid, and/or anti-platelets(Stroke Metric)  Outcome: Progressing Towards Goal  Goal: *Tolerating diet  Outcome: Progressing Towards Goal  Goal: *Aware of follow-up diagnostics related to anticoagulants  Outcome: Progressing Towards Goal  Goal: *Ability to perform ADLs and demonstrates progressive mobility and function  Outcome: Progressing Towards Goal  Goal: *Absence of DVT(Stroke Metric)  Outcome: Progressing Towards Goal  Goal: *Absence of aspiration  Outcome: Progressing Towards Goal

## 2020-05-17 NOTE — PROGRESS NOTES
Hospitalist Progress Note  Fahad Shirley MD  Answering service: [phone] from in house phone        Date of Service:  2020  NAME:  Melissa Diaz. :  1955  MRN:  [id number]      Admission Summary:   As per initial admission summary  This is a 77-year-old man with a past medical history significant for coronary artery disease, status post CABG and multiple stent placements; dyslipidemia; hypertension; type 2 diabetes; seizure disorder; benign prostatic hyperplasia; obstructive sleep apnea; chronic systolic congestive heart failure; chronic kidney disease; status post pacemaker insertion, was in his usual state of health until the day of presentation at the emergency room when the patient developed sudden loss of vision in the left eye. This episode lasted a few seconds. It was followed by headache. The headache is located at the left side of the head, constant throbbing headache, 6/10 in severity. No known aggravating or relieving factors. The patient stated that he has a history of ocular migraine and this present episode is similar to his attack of ocular migraine. The patient also stated that he fell a couple of times at home. Denies loss of consciousness. The circumstances surrounding the fall is not clear. The patient denies fever, rigors, and chills. No sick contact or contact with any person with COVID-19 virus infection. When the patient arrived at the emergency room, CT of the head was obtained. The CT scan did not show any acute pathology. CTA of the head and neck was also performed. This shows no acute large vessel occlusion, but shows severe stenosis, bilateral internal carotid arteries, left greater than right. The patient was subsequently referred to the hospitalist service for evaluation for admission. The patient was last admitted to this hospital from 2019 to 2019.   The patient was admitted to the Cardiology Service for evaluation and treatment of ventricular tachycardia. The patient subsequently underwent ICD placement with adjustments to his medication. Interval history / Subjective:     Patient seen for Follow up of suspected cva    Patient seen and examined by the bedside, Labs, images and notes reviewed  Patient is comfortable, denies any chest pain, SOB, abdominal pain, fevers, chills. No N/V/D  Discussed with nursing staff, no acute issues overnight, orders reviewed. I talked to ophthalmology Dr. Lacey Torrez, ([phone]). Discussed with him regarding patient condition. Recommended to contact him if there is recurrent vision issue. appears to be ocular migraine   Plan for TCAR, as per vascular surgery as an outpatient . Patient had a significant drop in BP (from 242 systolic to 90 systolic)  50 during orthostatic measurement. D/s cardiology. Need to keep the patient for one more days   Midodrine increased and fluids rate increased as well        Assessment & Plan:     1. Ocular migraine. resolved   We will admit the patient for further evaluation and treatment. The patient will not be able to undergo MRI of the brain because of the presence of pacemaker. The patient's presentation could also be as a result of ocular migraine. We will carry out headache control while awaiting further recommendation from the neurologist.    Neurology consulted   Recommended to continue with DAPT, along with statin   Please call Ophthalmology if patient develops recurrent vision loss     #orthostatic hypotension, persistent   Symptomatic, and significant drop in BP  Cardiology consulted  Lasix, lisinopril and imdur stopped. Today eplerenone was also stopped. Midodrine added, today increased to 10 mg tod   likely discharge tomorrow if doing clinically better   Increased hydration     2. Stenosis of both internal carotid arteries.     Vascular surgery consulted  Recommended TCAR procedure as an outpatient   On aspirin plavix     3. Coronary artery disease, status post CABG and stent placement. We will check cardiac markers to rule out acute myocardial infarction. We will continue with preadmission cardiac medication. 4.  Dyslipidemia. We will resume home medication. We will check lipid profile as stated above. 5.  Type 2 diabetes. The patient will be placed on sliding scale with insulin coverage. We will check hemoglobin A1c level as stated above. 6.  Seizure disorder. We will continue with Keppra. We will obtain EEG. The patient's presentation could also be due to seizure. We will also await further recommendation from the neurologist.  EEG pending     7. Benign prostatic hyperplasia. We will continue with preadmission medication. This is without urinary obstruction. 8.  Obstructive sleep apnea. We will place the patient on CPAP with home setting. 9.  Status post pacemaker insertion. This is secondary to ventricular tachycardia. We will continue to monitor. The patient may require Cardiology consult for pacemaker interrogation  . 10. Chronic systolic congestive heart failure. The patient's echocardiogram according to the record is 16%-20%. We will continue with preadmission medication. The patient appears to be well compensated in terms of his congestive heart failure. Most recent echo showed much improvement   On gentle hydration as patient is persistently having orthostatic hypotension     11. Hyperkalemia. Resolved     12. Chronic kidney disease, stage III. We will monitor the patient's renal function. 15.  Fall at home. CT scan of the head is negative. We will obtain a CT scan of the abdomen and pelvis to evaluate the patient for fracture and intra-abdominal bleeding. 14.  Bilateral leg swelling. We will check ultrasound of the lower extremity for DVT.     Code status: full code  DVT prophylaxis: heparin SC     Care Plan discussed with: Patient/Family  Disposition: Home w/Family and TBD     Hospital Problems  Date Reviewed: 5/12/2020          Codes Class Noted POA    * (Principal) Acute CVA (cerebrovascular accident) [facility]) ICD-10-CM: I63.9  ICD-9-CM: 434.91  5/12/2020 Yes                Review of Systems:   A comprehensive review of systems was negative except for that written in the HPI. Vital Signs:    Last 24hrs VS reviewed since prior progress note. Most recent are:  Visit Vitals  /78 (BP 1 Location: Right arm, BP Patient Position: At rest)   Pulse 92   Temp 97.5 °F (36.4 °C)   Resp 20   Ht 5' 9\" (1.753 m)   Wt 90.3 kg (199 lb 1.2 oz)   SpO2 97%   BMI 29.40 kg/m²         Intake/Output Summary (Last 24 hours) at 5/17/2020 1417  Last data filed at 5/17/2020 0640  Gross per 24 hour   Intake    Output 800 ml   Net -800 ml        Physical Examination:             Constitutional:  No acute distress, cooperative, pleasant    ENT:  Oral mucous moist, oropharynx benign. Neck supple,    Resp:  CTA bilaterally. No wheezing/rhonchi/rales. No accessory muscle use   CV:  Regular rhythm, normal rate, no murmurs, gallops, rubs    GI:  Soft, non distended, non tender. normoactive bowel sounds, no hepatosplenomegaly     Musculoskeletal:  No edema, warm, 2+ pulses throughout    Neurologic:  Moves all extremities. AAOx3, CN II-XII reviewed     Psych:  Good insight, Not anxious nor agitated. Skin:  Good turgor, no rashes or ulcers  Hematologic/Lymphatic/Immunlogic:  No jaundice nor lymph node swelling  Eyes:  EOMI. Anicteric sclerae, PERRL. Data Review:    Review and/or order of clinical lab test  Review and/or order of tests in the radiology section of CPT      Labs:     No results for input(s): WBC, HGB, HCT, PLT, HGBEXT, HCTEXT, PLTEXT, HGBEXT, HCTEXT, PLTEXT in the last 72 hours. No results for input(s): NA, K, CL, CO2, BUN, CREA, GLU, CA, MG, PHOS, URICA in the last 72 hours.   No results for input(s): SGOT, GPT, ALT, AP, TBIL, TBILI, TP, ALB, GLOB, GGT, AML, LPSE in the last 72 hours. No lab exists for component: AMYP, HLPSE  No results for input(s): INR, PTP, APTT, INREXT, INREXT in the last 72 hours. No results for input(s): FE, TIBC, PSAT, FERR in the last 72 hours. Lab Results   Component Value Date/Time    Folate 11.2 05/13/2020 12:27 AM      No results for input(s): PH, PCO2, PO2 in the last 72 hours. No results for input(s): CPK, CKNDX, TROIQ in the last 72 hours.     No lab exists for component: CPKMB  Lab Results   Component Value Date/Time    Cholesterol, total 120 05/13/2020 12:27 AM    HDL Cholesterol 20 05/13/2020 12:27 AM    LDL, calculated 46.2 05/13/2020 12:27 AM    Triglyceride 269 (H) 05/13/2020 12:27 AM    CHOL/HDL Ratio 6.0 (H) 05/13/2020 12:27 AM     Lab Results   Component Value Date/Time    Glucose (POC) 264 (H) 05/17/2020 11:36 AM    Glucose (POC) 109 (H) 05/17/2020 07:23 AM    Glucose (POC) 134 (H) 05/16/2020 09:46 PM    Glucose (POC) 171 (H) 05/16/2020 04:12 PM    Glucose (POC) 164 (H) 05/16/2020 12:13 PM     Lab Results   Component Value Date/Time    Color YELLOW/STRAW 08/21/2019 01:17 PM    Appearance CLEAR 08/21/2019 01:17 PM    Specific gravity 1.020 08/21/2019 01:17 PM    Specific gravity 1.020 04/05/2019 07:25 PM    pH (UA) 5.5 08/21/2019 01:17 PM    Protein 100 (A) 08/21/2019 01:17 PM    Glucose NEGATIVE  08/21/2019 01:17 PM    Ketone NEGATIVE  08/21/2019 01:17 PM    Bilirubin NEGATIVE  08/21/2019 01:17 PM    Urobilinogen 1.0 08/21/2019 01:17 PM    Nitrites NEGATIVE  08/21/2019 01:17 PM    Leukocyte Esterase TRACE (A) 08/21/2019 01:17 PM    Epithelial cells FEW 08/21/2019 01:17 PM    Bacteria 1+ (A) 08/21/2019 01:17 PM    WBC 5-10 08/21/2019 01:17 PM    RBC >100 (H) 08/21/2019 01:17 PM         Medications Reviewed:     Current Facility-Administered Medications   Medication Dose Route Frequency    midodrine (PROAMATINE) tablet 10 mg  10 mg Oral TID WITH MEALS    0.9% sodium chloride infusion  75 mL/hr IntraVENous CONTINUOUS    amiodarone (CORDARONE) tablet 200 mg  200 mg Oral DAILY    aspirin chewable tablet 81 mg  81 mg Oral DAILY    atorvastatin (LIPITOR) tablet 80 mg  80 mg Oral DAILY    carvediloL (COREG) tablet 3.125 mg  3.125 mg Oral BID WITH MEALS    clonazePAM (KlonoPIN) tablet 1 mg  1 mg Oral QHS    clopidogreL (PLAVIX) tablet 75 mg  75 mg Oral DAILY    finasteride (PROSCAR) tablet 5 mg  5 mg Oral 7am    lactulose (CHRONULAC) 10 gram/15 mL solution 45 mL  30 g Oral TID    levETIRAcetam (KEPPRA) tablet 1,000 mg  1,000 mg Oral Q12H    nitroglycerin (NITROSTAT) tablet 0.4 mg  0.4 mg SubLINGual Q5MIN PRN    pantoprazole (PROTONIX) tablet 40 mg  40 mg Oral ACB&D    pregabalin (LYRICA) capsule 50 mg  50 mg Oral TID    QUEtiapine (SEROquel) tablet 25 mg  25 mg Oral QHS    tamsulosin (FLOMAX) capsule 0.4 mg  0.4 mg Oral ACB&D    venlafaxine-SR (EFFEXOR-XR) capsule 75 mg  75 mg Oral BID    insulin lispro (HUMALOG) injection   SubCUTAneous AC&HS    glucose chewable tablet 16 g  4 Tab Oral PRN    glucagon (GLUCAGEN) injection 1 mg  1 mg IntraMUSCular PRN    dextrose 10% infusion 0-250 mL  0-250 mL IntraVENous PRN    acetaminophen (TYLENOL) tablet 650 mg  650 mg Oral Q4H PRN    heparin (porcine) injection 5,000 Units  5,000 Units SubCUTAneous Q8H    bisacodyL (DULCOLAX) suppository 10 mg  10 mg Rectal DAILY PRN     ______________________________________________________________________  EXPECTED LENGTH OF STAY: 2d 7h  ACTUAL LENGTH OF STAY:          Karen Lu MD

## 2020-05-17 NOTE — PROGRESS NOTES
Bedside and Verbal shift change report given to Flor Fuentes RN (oncoming nurse) by Michelle Otero RN (offgoing nurse). Report included the following information SBAR, Kardex, ED Summary, Intake/Output, Accordion, Recent Results, Cardiac Rhythm PACED and Dual Neuro Assessment.

## 2020-05-18 LAB
ANION GAP SERPL CALC-SCNC: 9 MMOL/L (ref 5–15)
BUN SERPL-MCNC: 32 MG/DL (ref 6–20)
BUN/CREAT SERPL: 20 (ref 12–20)
CALCIUM SERPL-MCNC: 8.2 MG/DL (ref 8.5–10.1)
CHLORIDE SERPL-SCNC: 107 MMOL/L (ref 97–108)
CO2 SERPL-SCNC: 22 MMOL/L (ref 21–32)
COMMENT, HOLDF: NORMAL
CREAT SERPL-MCNC: 1.61 MG/DL (ref 0.7–1.3)
GLUCOSE BLD STRIP.AUTO-MCNC: 130 MG/DL (ref 65–100)
GLUCOSE BLD STRIP.AUTO-MCNC: 144 MG/DL (ref 65–100)
GLUCOSE BLD STRIP.AUTO-MCNC: 147 MG/DL (ref 65–100)
GLUCOSE BLD STRIP.AUTO-MCNC: 149 MG/DL (ref 65–100)
GLUCOSE SERPL-MCNC: 123 MG/DL (ref 65–100)
POTASSIUM SERPL-SCNC: 4.3 MMOL/L (ref 3.5–5.1)
SAMPLES BEING HELD,HOLD: NORMAL
SERVICE CMNT-IMP: ABNORMAL
SODIUM SERPL-SCNC: 138 MMOL/L (ref 136–145)

## 2020-05-18 PROCEDURE — 74011250637 HC RX REV CODE- 250/637: Performed by: INTERNAL MEDICINE

## 2020-05-18 PROCEDURE — 74011250636 HC RX REV CODE- 250/636: Performed by: INTERNAL MEDICINE

## 2020-05-18 PROCEDURE — 97116 GAIT TRAINING THERAPY: CPT

## 2020-05-18 PROCEDURE — 97530 THERAPEUTIC ACTIVITIES: CPT

## 2020-05-18 PROCEDURE — C1758 CATHETER, URETERAL: HCPCS

## 2020-05-18 PROCEDURE — 82962 GLUCOSE BLOOD TEST: CPT

## 2020-05-18 PROCEDURE — 74011636637 HC RX REV CODE- 636/637: Performed by: INTERNAL MEDICINE

## 2020-05-18 PROCEDURE — 80048 BASIC METABOLIC PNL TOTAL CA: CPT

## 2020-05-18 PROCEDURE — 36415 COLL VENOUS BLD VENIPUNCTURE: CPT

## 2020-05-18 PROCEDURE — 94760 N-INVAS EAR/PLS OXIMETRY 1: CPT

## 2020-05-18 PROCEDURE — 65660000000 HC RM CCU STEPDOWN

## 2020-05-18 RX ORDER — SODIUM CHLORIDE 0.9 % (FLUSH) 0.9 %
SYRINGE (ML) INJECTION
Status: COMPLETED
Start: 2020-05-18 | End: 2020-05-18

## 2020-05-18 RX ADMIN — LEVETIRACETAM 1000 MG: 500 TABLET, FILM COATED ORAL at 08:35

## 2020-05-18 RX ADMIN — Medication 10 ML: at 21:26

## 2020-05-18 RX ADMIN — LACTULOSE 45 ML: 20 SOLUTION ORAL at 08:36

## 2020-05-18 RX ADMIN — Medication: at 21:25

## 2020-05-18 RX ADMIN — INSULIN LISPRO 2 UNITS: 100 INJECTION, SOLUTION INTRAVENOUS; SUBCUTANEOUS at 12:30

## 2020-05-18 RX ADMIN — PANTOPRAZOLE SODIUM 40 MG: 40 TABLET, DELAYED RELEASE ORAL at 17:20

## 2020-05-18 RX ADMIN — QUETIAPINE FUMARATE 25 MG: 25 TABLET ORAL at 21:24

## 2020-05-18 RX ADMIN — ATORVASTATIN CALCIUM 80 MG: 40 TABLET, FILM COATED ORAL at 08:35

## 2020-05-18 RX ADMIN — PREGABALIN 50 MG: 50 CAPSULE ORAL at 21:24

## 2020-05-18 RX ADMIN — TAMSULOSIN HYDROCHLORIDE 0.4 MG: 0.4 CAPSULE ORAL at 17:20

## 2020-05-18 RX ADMIN — MIDODRINE HYDROCHLORIDE 10 MG: 5 TABLET ORAL at 08:35

## 2020-05-18 RX ADMIN — CLONAZEPAM 1 MG: 1 TABLET ORAL at 21:24

## 2020-05-18 RX ADMIN — FINASTERIDE 5 MG: 5 TABLET, FILM COATED ORAL at 06:23

## 2020-05-18 RX ADMIN — HEPARIN SODIUM 5000 UNITS: 5000 INJECTION, SOLUTION INTRAVENOUS; SUBCUTANEOUS at 15:37

## 2020-05-18 RX ADMIN — HEPARIN SODIUM 5000 UNITS: 5000 INJECTION, SOLUTION INTRAVENOUS; SUBCUTANEOUS at 21:29

## 2020-05-18 RX ADMIN — VENLAFAXINE HYDROCHLORIDE 75 MG: 37.5 CAPSULE, EXTENDED RELEASE ORAL at 08:35

## 2020-05-18 RX ADMIN — CLOPIDOGREL BISULFATE 75 MG: 75 TABLET ORAL at 08:35

## 2020-05-18 RX ADMIN — VENLAFAXINE HYDROCHLORIDE 75 MG: 37.5 CAPSULE, EXTENDED RELEASE ORAL at 17:20

## 2020-05-18 RX ADMIN — AMIODARONE HYDROCHLORIDE 200 MG: 200 TABLET ORAL at 08:35

## 2020-05-18 RX ADMIN — CARVEDILOL 3.12 MG: 3.12 TABLET, FILM COATED ORAL at 08:35

## 2020-05-18 RX ADMIN — ASPIRIN 81 MG 81 MG: 81 TABLET ORAL at 08:35

## 2020-05-18 RX ADMIN — INSULIN LISPRO 2 UNITS: 100 INJECTION, SOLUTION INTRAVENOUS; SUBCUTANEOUS at 17:22

## 2020-05-18 RX ADMIN — SODIUM CHLORIDE 75 ML/HR: 900 INJECTION, SOLUTION INTRAVENOUS at 15:37

## 2020-05-18 RX ADMIN — CARVEDILOL 3.12 MG: 3.12 TABLET, FILM COATED ORAL at 17:20

## 2020-05-18 RX ADMIN — TAMSULOSIN HYDROCHLORIDE 0.4 MG: 0.4 CAPSULE ORAL at 06:22

## 2020-05-18 RX ADMIN — PREGABALIN 50 MG: 50 CAPSULE ORAL at 17:20

## 2020-05-18 RX ADMIN — HEPARIN SODIUM 5000 UNITS: 5000 INJECTION, SOLUTION INTRAVENOUS; SUBCUTANEOUS at 06:22

## 2020-05-18 RX ADMIN — Medication: at 11:42

## 2020-05-18 RX ADMIN — PREGABALIN 50 MG: 50 CAPSULE ORAL at 08:35

## 2020-05-18 RX ADMIN — LEVETIRACETAM 1000 MG: 500 TABLET, FILM COATED ORAL at 21:24

## 2020-05-18 RX ADMIN — SODIUM CHLORIDE 75 ML/HR: 900 INJECTION, SOLUTION INTRAVENOUS at 17:21

## 2020-05-18 RX ADMIN — SODIUM CHLORIDE 75 ML/HR: 900 INJECTION, SOLUTION INTRAVENOUS at 02:23

## 2020-05-18 RX ADMIN — PANTOPRAZOLE SODIUM 40 MG: 40 TABLET, DELAYED RELEASE ORAL at 06:23

## 2020-05-18 RX ADMIN — LACTULOSE 45 ML: 20 SOLUTION ORAL at 17:21

## 2020-05-18 NOTE — PROGRESS NOTES
Problem: Mobility Impaired (Adult and Pediatric)  Goal: *Acute Goals and Plan of Care (Insert Text)  Description: FUNCTIONAL STATUS PRIOR TO ADMISSION: Patient was modified independent using a single point cane for functional mobility. HOME SUPPORT PRIOR TO ADMISSION: The patient lived with children who provide 24/7 assistance. Physical Therapy Goals  Initiated 5/13/2020  1. Patient will move from supine to sit and sit to supine  in bed with independence within 7 day(s). 2.  Patient will transfer from bed to chair and chair to bed with modified independence using the least restrictive device within 7 day(s). 3.  Patient will perform sit to stand with minimal assistance/contact guard assist within 7 day(s). 4.  Patient will ambulate with modified independence for 150 feet with the least restrictive device within 7 day(s). 5.  Patient will ascend/descend 2 stairs with 1 handrail(s) with modified independence within 7 day(s). Outcome: Progressing Towards Goal  PHYSICAL THERAPY TREATMENT  Patient: Anitra Esquivel. (67 y.o. male)  Date: 5/18/2020  Diagnosis: Acute CVA (cerebrovascular accident) ([facility] [address] [I63.9]   Acute CVA (cerebrovascular accident) ([facility] [address]  Procedure(s) (LRB):  LEFT TRANSCAROTID ARTERY REVASCULARIZATION (URGENT) (Left)    Precautions:    Chart, physical therapy assessment, plan of care and goals were reviewed. ASSESSMENT  Patient continues with skilled PT services and is progressing towards goals. Pt received in bed, pleasant and agreeable to work with therapy. He transferred supine to sit with CGA, sit<>stand with Kesha, and ambulated with Kesha using SPC. Pt demonstrated wide BERNARDO, increased trunk sway, and overall unsteadiness during gait with 1 overt LOB while turning. VSS and patient ended session seated in chair with all needs met. Note tentative plan for TCAR tomorrow. Recommending d/c home with [facility] PT and assist from family once medically cleared.        Current Level of Function Impacting Discharge (mobility/balance): Kesha for short distance ambulation     Other factors to consider for discharge: hx orthostatic hypotension, fall risk         PLAN :  Patient continues to benefit from skilled intervention to address the above impairments. Continue treatment per established plan of care. to address goals. Recommendation for discharge: (in order for the patient to meet his/her long term goals)  Physical therapy at least 2 days/week in the home AND ensure assist and/or supervision for safety with mobilty      This discharge recommendation:  Has not yet been discussed the attending provider and/or case management    IF patient discharges home will need the following DME: patient owns DME required for discharge       SUBJECTIVE:   Patient stated I'll do whatever you tell me to.    OBJECTIVE DATA SUMMARY:   Critical Behavior:  Neurologic State: Alert  Orientation Level: Oriented X4  Cognition: Appropriate for age attention/concentration, Follows commands  Safety/Judgement: Awareness of environment, Decreased awareness of need for assistance, Decreased awareness of need for safety, Decreased insight into deficits  Functional Mobility Training:  Bed Mobility:     Supine to Sit: Contact guard assistance  Sit to Supine: (up in chair)  Scooting: Contact guard assistance        Transfers:  Sit to Stand: Minimum assistance  Stand to Sit: Minimum assistance        Bed to Chair: Minimum assistance                    Balance:  Sitting: Intact  Standing: Impaired; With support  Standing - Static: Fair;Constant support  Standing - Dynamic : Fair;Constant support  Ambulation/Gait Training:  Distance (ft): 20 Feet (ft)  Assistive Device: Cane, straight;Gait belt  Ambulation - Level of Assistance: Minimal assistance        Gait Abnormalities: Decreased step clearance;Shuffling gait;Trunk sway increased        Base of Support: Widened     Speed/Usah: Slow;Shuffled  Step Length: Right shortened;Left shortened         Activity Tolerance:   Good  Please refer to the flowsheet for vital signs taken during this treatment. After treatment patient left in no apparent distress:   Sitting in chair and Call bell within reach    COMMUNICATION/COLLABORATION:   The patients plan of care was discussed with: Occupational therapist and Registered nurse.      Delmi Napier, PT, DPT   Time Calculation: 23 mins

## 2020-05-18 NOTE — PROGRESS NOTES
Spiritual Care Assessment/Progress Note  [address]      NAME: Dennie Scot. MRN: [id number]  AGE: 76 y.o. SEX: male  Confucianism Affiliation: Church   Language: English     5/18/2020     Total Time (in minutes): 19     Spiritual Assessment begun in [facility] through conversation with:         [x]Patient        [] Family    [] Friend(s)        Reason for Consult:  Follow-up, routine     Spiritual beliefs: (Please include comment if needed)     [x] Identifies with a dipika tradition:    Church     [] Supported by a dipika community:            [] Claims no spiritual orientation:           [] Seeking spiritual identity:                [] Adheres to an individual form of spirituality:           [] Not able to assess:                           Identified resources for coping:      [x] Prayer                               [] Music                  [] Guided Imagery     [] Family/friends                 [] Pet visits     [] Devotional reading                         [] Unknown     [] Other:                                               Interventions offered during this visit: (See comments for more details)    Patient Interventions: Affirmation of emotions/emotional suffering, Affirmation of dipika, Catharsis/review of pertinent events in supportive environment, Iconic (affirming the presence of God/Higher Power), Initial/Spiritual assessment, patient floor, Normalization of emotional/spiritual concerns, Prayer (assurance of)           Plan of Care:     [x] Support spiritual and/or cultural needs    [] Support AMD and/or advance care planning process      [] Support grieving process   [] Coordinate Rites and/or Rituals    [] Coordination with community clergy   [] No spiritual needs identified at this time   [] Detailed Plan of Care below (See Comments)  [] Make referral to Music Therapy  [] Make referral to Pet Therapy     [] Make referral to Addiction services  [] Make referral to [facility]  [] Make referral to Spiritual Care Partner  [] No future visits requested        [] Follow up visits as needed     Comments: Initial visit with patient who was sitting up in bed. Patient shared of his procedure scheduled for tomorrow - he was unclear whether he was going home first or not before he returned for procedure. Patient shared of his heart problems over the years. Comfort and listening presence offered. Assured pt of prayers and care. Pt is a [employer] and has been loosely affiliated with a [employer] in the past but is not currently affiliated anywhere. Assured pt of support. Pastoral care is available to follow as needed. Please page [phone]. Visit by: Michael Shepherd.  Danielle Mckeon D.Min, MA, [facility]    Lead  Profession Development & Advancement

## 2020-05-18 NOTE — CONSULTS
Duplicate consult request-please see consult by Dr. Gordillo Doctor from 5/13. Please call if further questions.      Mirela Calle,   05/18/20

## 2020-05-18 NOTE — PROGRESS NOTES
Spiritual Care Assessment/Progress Note  ST. [address]      NAME: Rony Donaldson MRN: [id number]  AGE: 76 y.o. SEX: male  Nondenominational Affiliation: Congregation   Language: English     5/18/2020     Total Time (in minutes): 5     Spiritual Assessment begun in [facility] through conversation with:         []Patient        [] Family    [] Friend(s)        Reason for Consult: Initial visit     Spiritual beliefs: (Please include comment if needed)     [] Identifies with a dipika tradition:         [] Supported by a dipika community:            [] Claims no spiritual orientation:           [] Seeking spiritual identity:                [] Adheres to an individual form of spirituality:           [x] Not able to assess:                           Identified resources for coping:      [] Prayer                               [] Music                  [] Guided Imagery     [] Family/friends                 [] Pet visits     [] Devotional reading                         [x] Unknown     [] Other:                                               Interventions offered during this visit: (See comments for more details)    Patient Interventions: Initial visit           Plan of Care:     [] Support spiritual and/or cultural needs    [] Support AMD and/or advance care planning process      [] Support grieving process   [] Coordinate Rites and/or Rituals    [] Coordination with community clergy   [] No spiritual needs identified at this time   [] Detailed Plan of Care below (See Comments)  [] Make referral to Music Therapy  [] Make referral to Pet Therapy     [] Make referral to Addiction services  [] Make referral to [facility]  [] Make referral to Spiritual Care Partner  [] No future visits requested        [x] Follow up visits as needed     Attempted to visit pt without success. Pt sleeping and no family present. Chaplains will continue to offer support as needed.   Chaplain Carbajal MDiv, MS, [phone]  [phone])

## 2020-05-18 NOTE — PROGRESS NOTES
Problem: TIA/CVA Stroke: 0-24 hours  Goal: Activity/Safety  Outcome: Progressing Towards Goal  Goal: Consults, if ordered  Outcome: Progressing Towards Goal  Goal: Diagnostic Test/Procedures  Outcome: Progressing Towards Goal  Goal: Nutrition/Diet  Outcome: Progressing Towards Goal  Goal: Discharge Planning  Outcome: Progressing Towards Goal  Goal: Medications  Outcome: Progressing Towards Goal

## 2020-05-18 NOTE — PROGRESS NOTES
TRICIA:    Patient not medically stable for discharge at this time. Ongoing medical evaluation; Tentative plan for TCAR procedure tomorrow pending BP levels stable. Therapy to assess and evaluation when appropriate. CM to follow.  CHRISTIANE Osorio,CRM

## 2020-05-18 NOTE — PROGRESS NOTES
Bedside shift change report given to Radha Horne RN (oncoming nurse) by Sinan Tom RN (offgoing nurse). Report included the following information SBAR, Cardiac Rhythm paced and Dual Neuro Assessment.

## 2020-05-18 NOTE — PROGRESS NOTES
Bedside shift change report given to Sinan Tom (oncoming nurse) by Babita Russell (offgoing nurse). Report included the following information SBAR, Intake/Output, Recent Results, Cardiac Rhythm Pace and Capture and Dual Neuro Assessment.

## 2020-05-18 NOTE — PROGRESS NOTES
Hospitalist Progress Note  Anabel Pelayo MD  Answering service: [phone] OR [phone] from in house phone        Date of Service:  2020  NAME:  Anthony Benitez. :  1955  MRN:  [id number]      Admission Summary:   As per initial admission summary  This is a 21-year-old man with a past medical history significant for coronary artery disease, status post CABG and multiple stent placements; dyslipidemia; hypertension; type 2 diabetes; seizure disorder; benign prostatic hyperplasia; obstructive sleep apnea; chronic systolic congestive heart failure; chronic kidney disease; status post pacemaker insertion, was in his usual state of health until the day of presentation at the emergency room when the patient developed sudden loss of vision in the left eye. This episode lasted a few seconds. It was followed by headache. The headache is located at the left side of the head, constant throbbing headache, 6/10 in severity. No known aggravating or relieving factors. The patient stated that he has a history of ocular migraine and this present episode is similar to his attack of ocular migraine. The patient also stated that he fell a couple of times at home. Denies loss of consciousness. The circumstances surrounding the fall is not clear. The patient denies fever, rigors, and chills. No sick contact or contact with any person with COVID-19 virus infection. When the patient arrived at the emergency room, CT of the head was obtained. The CT scan did not show any acute pathology. CTA of the head and neck was also performed. This shows no acute large vessel occlusion, but shows severe stenosis, bilateral internal carotid arteries, left greater than right. The patient was subsequently referred to the hospitalist service for evaluation for admission. The patient was last admitted to this hospital from 2019 to 2019.   The patient was admitted to the Cardiology Service for evaluation and treatment of ventricular tachycardia. The patient subsequently underwent ICD placement with adjustments to his medication. Interval history / Subjective:     Patient is overall doing well. No recurrence of visual abnormalities. Assessment & Plan:     Ocular migraine  -Symptoms appear to have resolved  -Neurology evaluated the patient, EEG with no seizure focus  -MRI cannot be done due to presence of pacemaker  -Ophthalmology also discussed over the phone    Orthostatic hypotension  -Symptomatic, and significant drop in BP  -Lasix, lisinopril, eplerenone and Imdur on hold  -Middaugh drain dose adjusted by cardiology    Bilateral internal carotic artery stenosis  -Vascular surgery following, discussed with Dr Donald Colin  -Tentative plan for TCAR procedure tomorrow, if BP stable. -Plan to keep patient here in the hospital for reevaluation tomorrow    Coronary artery disease  -status post CABG and stent placement  -Continue Coreg, aspirin, Plavix and statin    Dyslipidemia  -Continue statin    Type 2 diabetes  -Continue insulin sliding scale coverage  -Blood sugar stable    Seizure disorder  -Stable, EEG without seizure focus  -Continue Keppra    Benign prostatic hyperplasia  -Continue Flomax and finasteride    Obstructive sleep apnea  -Continue CPAP per home settings    Ventricular tachycardia  -Status post pacemaker placement    Chronic systolic congestive heart failure  -Echocardiogram with EF of 16 to 20%.   -Appears compensated  -Diuretics on hold due to hypotension    Chronic kidney disease, stage III  -Continue monitor renal function    Fall  -Status post mechanical fall  -PT recommending home PT    Code status: FULL   DVT prophylaxis: heparin SC     Care Plan discussed with: Patient/Family  Disposition: Home health and home PT     Hospital Problems  Date Reviewed: 5/12/2020          Codes Class Noted POA    * (Principal) Acute CVA (cerebrovascular accident) [facility]) ICD-10-CM: I63.9  ICD-9-CM: 434.91  5/12/2020 Yes                Review of Systems:   A comprehensive review of systems was negative except for that written in the HPI. Vital Signs:    Last 24hrs VS reviewed since prior progress note. Most recent are:  Visit Vitals  BP (!) 134/99   Pulse 71   Temp 98.3 °F (36.8 °C)   Resp 19   Ht 5' 9\" (1.753 m)   Wt 93.5 kg (206 lb 2.1 oz)   SpO2 98%   BMI 30.44 kg/m²         Intake/Output Summary (Last 24 hours) at 5/18/2020 1356  Last data filed at 5/18/2020 0021  Gross per 24 hour   Intake    Output 1100 ml   Net -1100 ml        Physical Examination:             Constitutional:  No acute distress, cooperative, pleasant    ENT:  Oral mucous moist, oropharynx benign. Neck supple,    Resp:  CTA bilaterally. No wheezing/rhonchi/rales. No accessory muscle use   CV:  Regular rhythm, normal rate, no murmurs, gallops, rubs    GI:  Soft, non distended, non tender. normoactive bowel sounds, no hepatosplenomegaly     Musculoskeletal:  No edema, warm, 2+ pulses throughout    Neurologic:  Moves all extremities. AAOx3, CN II-XII reviewed     Psych:  Good insight, Not anxious nor agitated. Skin:  Good turgor, no rashes or ulcers  Hematologic/Lymphatic/Immunlogic:  No jaundice nor lymph node swelling  Eyes:  EOMI. Anicteric sclerae, PERRL. Data Review:    Review and/or order of clinical lab test  Review and/or order of tests in the radiology section of CPT      Labs:     No results for input(s): WBC, HGB, HCT, PLT, HGBEXT, HCTEXT, PLTEXT, HGBEXT, HCTEXT, PLTEXT in the last 72 hours. Recent Labs     05/18/20  0326      K 4.3      CO2 22   BUN 32*   CREA 1.61*   *   CA 8.2*     No results for input(s): SGOT, GPT, ALT, AP, TBIL, TBILI, TP, ALB, GLOB, GGT, AML, LPSE in the last 72 hours. No lab exists for component: AMYP, HLPSE  No results for input(s): INR, PTP, APTT, INREXT, INREXT in the last 72 hours.    No results for input(s): FE, TIBC, PSAT, FERR in the last 72 hours. Lab Results   Component Value Date/Time    Folate 11.2 05/13/2020 12:27 AM      No results for input(s): PH, PCO2, PO2 in the last 72 hours. No results for input(s): CPK, CKNDX, TROIQ in the last 72 hours.     No lab exists for component: CPKMB  Lab Results   Component Value Date/Time    Cholesterol, total 120 05/13/2020 12:27 AM    HDL Cholesterol 20 05/13/2020 12:27 AM    LDL, calculated 46.2 05/13/2020 12:27 AM    Triglyceride 269 (H) 05/13/2020 12:27 AM    CHOL/HDL Ratio 6.0 (H) 05/13/2020 12:27 AM     Lab Results   Component Value Date/Time    Glucose (POC) 149 (H) 05/18/2020 12:20 PM    Glucose (POC) 130 (H) 05/18/2020 08:33 AM    Glucose (POC) 141 (H) 05/17/2020 09:52 PM    Glucose (POC) 132 (H) 05/17/2020 04:48 PM    Glucose (POC) 264 (H) 05/17/2020 11:36 AM     Lab Results   Component Value Date/Time    Color YELLOW/STRAW 08/21/2019 01:17 PM    Appearance CLEAR 08/21/2019 01:17 PM    Specific gravity 1.020 08/21/2019 01:17 PM    Specific gravity 1.020 04/05/2019 07:25 PM    pH (UA) 5.5 08/21/2019 01:17 PM    Protein 100 (A) 08/21/2019 01:17 PM    Glucose NEGATIVE  08/21/2019 01:17 PM    Ketone NEGATIVE  08/21/2019 01:17 PM    Bilirubin NEGATIVE  08/21/2019 01:17 PM    Urobilinogen 1.0 08/21/2019 01:17 PM    Nitrites NEGATIVE  08/21/2019 01:17 PM    Leukocyte Esterase TRACE (A) 08/21/2019 01:17 PM    Epithelial cells FEW 08/21/2019 01:17 PM    Bacteria 1+ (A) 08/21/2019 01:17 PM    WBC 5-10 08/21/2019 01:17 PM    RBC >100 (H) 08/21/2019 01:17 PM         Medications Reviewed:     Current Facility-Administered Medications   Medication Dose Route Frequency    midodrine (PROAMATINE) tablet 10 mg  10 mg Oral TID WITH MEALS    diphenhydrAMINE-zinc acetate 1%-0.1% (BENADRYL) cream   Topical TID PRN    0.9% sodium chloride infusion  75 mL/hr IntraVENous CONTINUOUS    amiodarone (CORDARONE) tablet 200 mg  200 mg Oral DAILY    aspirin chewable tablet 81 mg  81 mg Oral DAILY    atorvastatin (LIPITOR) tablet 80 mg  80 mg Oral DAILY    carvediloL (COREG) tablet 3.125 mg  3.125 mg Oral BID WITH MEALS    clonazePAM (KlonoPIN) tablet 1 mg  1 mg Oral QHS    clopidogreL (PLAVIX) tablet 75 mg  75 mg Oral DAILY    finasteride (PROSCAR) tablet 5 mg  5 mg Oral 7am    lactulose (CHRONULAC) 10 gram/15 mL solution 45 mL  30 g Oral TID    levETIRAcetam (KEPPRA) tablet 1,000 mg  1,000 mg Oral Q12H    nitroglycerin (NITROSTAT) tablet 0.4 mg  0.4 mg SubLINGual Q5MIN PRN    pantoprazole (PROTONIX) tablet 40 mg  40 mg Oral ACB&D    pregabalin (LYRICA) capsule 50 mg  50 mg Oral TID    QUEtiapine (SEROquel) tablet 25 mg  25 mg Oral QHS    tamsulosin (FLOMAX) capsule 0.4 mg  0.4 mg Oral ACB&D    venlafaxine-SR (EFFEXOR-XR) capsule 75 mg  75 mg Oral BID    insulin lispro (HUMALOG) injection   SubCUTAneous AC&HS    glucose chewable tablet 16 g  4 Tab Oral PRN    glucagon (GLUCAGEN) injection 1 mg  1 mg IntraMUSCular PRN    dextrose 10% infusion 0-250 mL  0-250 mL IntraVENous PRN    acetaminophen (TYLENOL) tablet 650 mg  650 mg Oral Q4H PRN    heparin (porcine) injection 5,000 Units  5,000 Units SubCUTAneous Q8H    bisacodyL (DULCOLAX) suppository 10 mg  10 mg Rectal DAILY PRN     ______________________________________________________________________  EXPECTED LENGTH OF STAY: 2d 7h  ACTUAL LENGTH OF STAY:          6                 Jimbo Burns MD

## 2020-05-19 ENCOUNTER — ANESTHESIA (OUTPATIENT)
Dept: CARDIOTHORACIC SURGERY | Age: 65
DRG: 035 | End: 2020-05-19
Payer: MEDICARE

## 2020-05-19 ENCOUNTER — APPOINTMENT (OUTPATIENT)
Dept: GENERAL RADIOLOGY | Age: 65
DRG: 035 | End: 2020-05-19
Attending: SURGERY
Payer: MEDICARE

## 2020-05-19 ENCOUNTER — ANESTHESIA EVENT (OUTPATIENT)
Dept: CARDIOTHORACIC SURGERY | Age: 65
DRG: 035 | End: 2020-05-19
Payer: MEDICARE

## 2020-05-19 LAB
GLUCOSE BLD STRIP.AUTO-MCNC: 117 MG/DL (ref 65–100)
GLUCOSE BLD STRIP.AUTO-MCNC: 127 MG/DL (ref 65–100)
GLUCOSE BLD STRIP.AUTO-MCNC: 130 MG/DL (ref 65–100)
GLUCOSE BLD STRIP.AUTO-MCNC: 170 MG/DL (ref 65–100)
GLUCOSE BLD STRIP.AUTO-MCNC: 170 MG/DL (ref 65–100)
SERVICE CMNT-IMP: ABNORMAL

## 2020-05-19 PROCEDURE — 74011636320 HC RX REV CODE- 636/320: Performed by: SURGERY

## 2020-05-19 PROCEDURE — 77030004561 HC CATH ANGI DX COBRA ANGI -B: Performed by: SURGERY

## 2020-05-19 PROCEDURE — 77030002933 HC SUT MCRYL J&J -A: Performed by: SURGERY

## 2020-05-19 PROCEDURE — 65610000003 HC RM ICU SURGICAL

## 2020-05-19 PROCEDURE — 76010000162 HC OR TIME 1.5 TO 2 HR INTENSV-TIER 1: Performed by: SURGERY

## 2020-05-19 PROCEDURE — 77030019702 HC WRP THER MENM -C: Performed by: SURGERY

## 2020-05-19 PROCEDURE — 74011250637 HC RX REV CODE- 250/637: Performed by: INTERNAL MEDICINE

## 2020-05-19 PROCEDURE — C1769 GUIDE WIRE: HCPCS | Performed by: SURGERY

## 2020-05-19 PROCEDURE — 74011250636 HC RX REV CODE- 250/636: Performed by: NURSE ANESTHETIST, CERTIFIED REGISTERED

## 2020-05-19 PROCEDURE — 74011250636 HC RX REV CODE- 250/636: Performed by: SURGERY

## 2020-05-19 PROCEDURE — 77030014008 HC SPNG HEMSTAT J&J -C: Performed by: SURGERY

## 2020-05-19 PROCEDURE — 76060000034 HC ANESTHESIA 1.5 TO 2 HR: Performed by: SURGERY

## 2020-05-19 PROCEDURE — 77030011640 HC PAD GRND REM COVD -A: Performed by: SURGERY

## 2020-05-19 PROCEDURE — C1894 INTRO/SHEATH, NON-LASER: HCPCS | Performed by: SURGERY

## 2020-05-19 PROCEDURE — 77030040922 HC BLNKT HYPOTHRM STRY -A

## 2020-05-19 PROCEDURE — 82962 GLUCOSE BLOOD TEST: CPT

## 2020-05-19 PROCEDURE — 77030010507 HC ADH SKN DERMBND J&J -B: Performed by: SURGERY

## 2020-05-19 PROCEDURE — C1892 INTRO/SHEATH,FIXED,PEEL-AWAY: HCPCS | Performed by: SURGERY

## 2020-05-19 PROCEDURE — 77030031139 HC SUT VCRL2 J&J -A: Performed by: SURGERY

## 2020-05-19 PROCEDURE — C1876 STENT, NON-COA/NON-COV W/DEL: HCPCS | Performed by: SURGERY

## 2020-05-19 PROCEDURE — 76210000003 HC OR PH I REC 3.5 TO 4 HR: Performed by: SURGERY

## 2020-05-19 PROCEDURE — 74011000258 HC RX REV CODE- 258: Performed by: SURGERY

## 2020-05-19 PROCEDURE — 74011250636 HC RX REV CODE- 250/636: Performed by: ANESTHESIOLOGY

## 2020-05-19 PROCEDURE — 037L0DZ DILATION OF LEFT INTERNAL CAROTID ARTERY WITH INTRALUMINAL DEVICE, OPEN APPROACH: ICD-10-PCS | Performed by: SURGERY

## 2020-05-19 PROCEDURE — 74011636637 HC RX REV CODE- 636/637: Performed by: INTERNAL MEDICINE

## 2020-05-19 PROCEDURE — 77030013060 HC DEV INFL PRSS MRTM -B: Performed by: SURGERY

## 2020-05-19 PROCEDURE — 77030020256 HC SOL INJ NACL 0.9%  500ML: Performed by: SURGERY

## 2020-05-19 PROCEDURE — 74011000250 HC RX REV CODE- 250: Performed by: NURSE ANESTHETIST, CERTIFIED REGISTERED

## 2020-05-19 PROCEDURE — 74011250637 HC RX REV CODE- 250/637: Performed by: FAMILY MEDICINE

## 2020-05-19 PROCEDURE — 74011250636 HC RX REV CODE- 250/636: Performed by: INTERNAL MEDICINE

## 2020-05-19 PROCEDURE — 74011250637 HC RX REV CODE- 250/637: Performed by: SURGERY

## 2020-05-19 PROCEDURE — 77030002986 HC SUT PROL J&J -A: Performed by: SURGERY

## 2020-05-19 PROCEDURE — 74011000250 HC RX REV CODE- 250: Performed by: SURGERY

## 2020-05-19 PROCEDURE — 77030018846 HC SOL IRR STRL H20 ICUM -A: Performed by: SURGERY

## 2020-05-19 PROCEDURE — 77030002996 HC SUT SLK J&J -A: Performed by: SURGERY

## 2020-05-19 DEVICE — 9 MM X 30 MM
Type: IMPLANTABLE DEVICE | Site: CAROTID | Status: FUNCTIONAL
Brand: ENROUTE TRANSCAROTID STENT

## 2020-05-19 RX ORDER — PROTAMINE SULFATE 10 MG/ML
10 INJECTION, SOLUTION INTRAVENOUS
Status: COMPLETED | OUTPATIENT
Start: 2020-05-19 | End: 2020-05-19

## 2020-05-19 RX ORDER — ONDANSETRON 2 MG/ML
4 INJECTION INTRAMUSCULAR; INTRAVENOUS AS NEEDED
Status: DISCONTINUED | OUTPATIENT
Start: 2020-05-19 | End: 2020-05-19 | Stop reason: HOSPADM

## 2020-05-19 RX ORDER — ONDANSETRON 2 MG/ML
4 INJECTION INTRAMUSCULAR; INTRAVENOUS
Status: DISCONTINUED | OUTPATIENT
Start: 2020-05-19 | End: 2020-05-24 | Stop reason: HOSPADM

## 2020-05-19 RX ORDER — PROPOFOL 10 MG/ML
INJECTION, EMULSION INTRAVENOUS
Status: DISCONTINUED | OUTPATIENT
Start: 2020-05-19 | End: 2020-05-19 | Stop reason: HOSPADM

## 2020-05-19 RX ORDER — OXYCODONE HYDROCHLORIDE 5 MG/1
10 TABLET ORAL
Status: DISCONTINUED | OUTPATIENT
Start: 2020-05-19 | End: 2020-05-24 | Stop reason: HOSPADM

## 2020-05-19 RX ORDER — OXYCODONE HYDROCHLORIDE 5 MG/1
5 TABLET ORAL AS NEEDED
Status: DISCONTINUED | OUTPATIENT
Start: 2020-05-19 | End: 2020-05-19 | Stop reason: HOSPADM

## 2020-05-19 RX ORDER — LIDOCAINE HYDROCHLORIDE 10 MG/ML
0.1 INJECTION, SOLUTION EPIDURAL; INFILTRATION; INTRACAUDAL; PERINEURAL AS NEEDED
Status: DISCONTINUED | OUTPATIENT
Start: 2020-05-19 | End: 2020-05-19 | Stop reason: HOSPADM

## 2020-05-19 RX ORDER — DIPHENHYDRAMINE HYDROCHLORIDE 50 MG/ML
12.5 INJECTION, SOLUTION INTRAMUSCULAR; INTRAVENOUS AS NEEDED
Status: DISCONTINUED | OUTPATIENT
Start: 2020-05-19 | End: 2020-05-19 | Stop reason: HOSPADM

## 2020-05-19 RX ORDER — CEFAZOLIN SODIUM 1 G/3ML
INJECTION, POWDER, FOR SOLUTION INTRAMUSCULAR; INTRAVENOUS AS NEEDED
Status: DISCONTINUED | OUTPATIENT
Start: 2020-05-19 | End: 2020-05-19 | Stop reason: HOSPADM

## 2020-05-19 RX ORDER — GLYCOPYRROLATE 0.2 MG/ML
INJECTION INTRAMUSCULAR; INTRAVENOUS AS NEEDED
Status: DISCONTINUED | OUTPATIENT
Start: 2020-05-19 | End: 2020-05-19 | Stop reason: HOSPADM

## 2020-05-19 RX ORDER — FENTANYL CITRATE 50 UG/ML
25 INJECTION, SOLUTION INTRAMUSCULAR; INTRAVENOUS
Status: DISCONTINUED | OUTPATIENT
Start: 2020-05-19 | End: 2020-05-19 | Stop reason: HOSPADM

## 2020-05-19 RX ORDER — MIDAZOLAM HYDROCHLORIDE 1 MG/ML
0.5 INJECTION, SOLUTION INTRAMUSCULAR; INTRAVENOUS
Status: DISCONTINUED | OUTPATIENT
Start: 2020-05-19 | End: 2020-05-19 | Stop reason: HOSPADM

## 2020-05-19 RX ORDER — LIDOCAINE HYDROCHLORIDE 20 MG/ML
INJECTION, SOLUTION EPIDURAL; INFILTRATION; INTRACAUDAL; PERINEURAL AS NEEDED
Status: DISCONTINUED | OUTPATIENT
Start: 2020-05-19 | End: 2020-05-19 | Stop reason: HOSPADM

## 2020-05-19 RX ORDER — BUPIVACAINE HYDROCHLORIDE 5 MG/ML
INJECTION, SOLUTION EPIDURAL; INTRACAUDAL AS NEEDED
Status: DISCONTINUED | OUTPATIENT
Start: 2020-05-19 | End: 2020-05-19 | Stop reason: HOSPADM

## 2020-05-19 RX ORDER — SODIUM CHLORIDE, SODIUM LACTATE, POTASSIUM CHLORIDE, CALCIUM CHLORIDE 600; 310; 30; 20 MG/100ML; MG/100ML; MG/100ML; MG/100ML
25 INJECTION, SOLUTION INTRAVENOUS CONTINUOUS
Status: DISCONTINUED | OUTPATIENT
Start: 2020-05-19 | End: 2020-05-19 | Stop reason: HOSPADM

## 2020-05-19 RX ORDER — SODIUM CHLORIDE 0.9 % (FLUSH) 0.9 %
5-40 SYRINGE (ML) INJECTION EVERY 8 HOURS
Status: DISCONTINUED | OUTPATIENT
Start: 2020-05-19 | End: 2020-05-19 | Stop reason: HOSPADM

## 2020-05-19 RX ORDER — PROTAMINE SULFATE 10 MG/ML
INJECTION, SOLUTION INTRAVENOUS AS NEEDED
Status: DISCONTINUED | OUTPATIENT
Start: 2020-05-19 | End: 2020-05-19 | Stop reason: HOSPADM

## 2020-05-19 RX ORDER — SODIUM CHLORIDE 9 MG/ML
25 INJECTION, SOLUTION INTRAVENOUS CONTINUOUS
Status: DISCONTINUED | OUTPATIENT
Start: 2020-05-19 | End: 2020-05-19 | Stop reason: HOSPADM

## 2020-05-19 RX ORDER — SODIUM CHLORIDE, SODIUM LACTATE, POTASSIUM CHLORIDE, CALCIUM CHLORIDE 600; 310; 30; 20 MG/100ML; MG/100ML; MG/100ML; MG/100ML
100 INJECTION, SOLUTION INTRAVENOUS CONTINUOUS
Status: DISCONTINUED | OUTPATIENT
Start: 2020-05-19 | End: 2020-05-19 | Stop reason: HOSPADM

## 2020-05-19 RX ORDER — OXYCODONE HYDROCHLORIDE 5 MG/1
5 TABLET ORAL
Status: DISCONTINUED | OUTPATIENT
Start: 2020-05-19 | End: 2020-05-24 | Stop reason: HOSPADM

## 2020-05-19 RX ORDER — ACETAMINOPHEN 325 MG/1
650 TABLET ORAL ONCE
Status: DISCONTINUED | OUTPATIENT
Start: 2020-05-19 | End: 2020-05-19 | Stop reason: HOSPADM

## 2020-05-19 RX ORDER — FENTANYL CITRATE 50 UG/ML
50 INJECTION, SOLUTION INTRAMUSCULAR; INTRAVENOUS
Status: DISCONTINUED | OUTPATIENT
Start: 2020-05-19 | End: 2020-05-22

## 2020-05-19 RX ORDER — MIDAZOLAM HYDROCHLORIDE 1 MG/ML
1 INJECTION, SOLUTION INTRAMUSCULAR; INTRAVENOUS AS NEEDED
Status: DISCONTINUED | OUTPATIENT
Start: 2020-05-19 | End: 2020-05-19 | Stop reason: HOSPADM

## 2020-05-19 RX ORDER — DEXTROSE MONOHYDRATE AND SODIUM CHLORIDE 5; .9 G/100ML; G/100ML
50 INJECTION, SOLUTION INTRAVENOUS CONTINUOUS
Status: DISCONTINUED | OUTPATIENT
Start: 2020-05-19 | End: 2020-05-20

## 2020-05-19 RX ORDER — SODIUM CHLORIDE, SODIUM LACTATE, POTASSIUM CHLORIDE, CALCIUM CHLORIDE 600; 310; 30; 20 MG/100ML; MG/100ML; MG/100ML; MG/100ML
INJECTION, SOLUTION INTRAVENOUS
Status: DISCONTINUED | OUTPATIENT
Start: 2020-05-19 | End: 2020-05-19 | Stop reason: HOSPADM

## 2020-05-19 RX ORDER — EPHEDRINE SULFATE/0.9% NACL/PF 50 MG/5 ML
SYRINGE (ML) INTRAVENOUS AS NEEDED
Status: DISCONTINUED | OUTPATIENT
Start: 2020-05-19 | End: 2020-05-19 | Stop reason: HOSPADM

## 2020-05-19 RX ORDER — PROTAMINE SULFATE 10 MG/ML
INJECTION, SOLUTION INTRAVENOUS
Status: DISPENSED
Start: 2020-05-19 | End: 2020-05-19

## 2020-05-19 RX ORDER — SODIUM CHLORIDE 0.9 % (FLUSH) 0.9 %
5-40 SYRINGE (ML) INJECTION AS NEEDED
Status: DISCONTINUED | OUTPATIENT
Start: 2020-05-19 | End: 2020-05-19 | Stop reason: HOSPADM

## 2020-05-19 RX ORDER — ROPIVACAINE HYDROCHLORIDE 5 MG/ML
30 INJECTION, SOLUTION EPIDURAL; INFILTRATION; PERINEURAL AS NEEDED
Status: DISCONTINUED | OUTPATIENT
Start: 2020-05-19 | End: 2020-05-19 | Stop reason: HOSPADM

## 2020-05-19 RX ORDER — MORPHINE SULFATE 10 MG/ML
2 INJECTION, SOLUTION INTRAMUSCULAR; INTRAVENOUS
Status: DISCONTINUED | OUTPATIENT
Start: 2020-05-19 | End: 2020-05-19 | Stop reason: HOSPADM

## 2020-05-19 RX ORDER — SODIUM CHLORIDE 0.9 % (FLUSH) 0.9 %
5-40 SYRINGE (ML) INJECTION AS NEEDED
Status: DISCONTINUED | OUTPATIENT
Start: 2020-05-19 | End: 2020-05-24 | Stop reason: HOSPADM

## 2020-05-19 RX ORDER — FENTANYL CITRATE 50 UG/ML
25 INJECTION, SOLUTION INTRAMUSCULAR; INTRAVENOUS
Status: DISCONTINUED | OUTPATIENT
Start: 2020-05-19 | End: 2020-05-19

## 2020-05-19 RX ORDER — HYDROMORPHONE HYDROCHLORIDE 1 MG/ML
0.2 INJECTION, SOLUTION INTRAMUSCULAR; INTRAVENOUS; SUBCUTANEOUS
Status: DISCONTINUED | OUTPATIENT
Start: 2020-05-19 | End: 2020-05-19 | Stop reason: HOSPADM

## 2020-05-19 RX ORDER — HEPARIN SODIUM 1000 [USP'U]/ML
INJECTION, SOLUTION INTRAVENOUS; SUBCUTANEOUS AS NEEDED
Status: DISCONTINUED | OUTPATIENT
Start: 2020-05-19 | End: 2020-05-19 | Stop reason: HOSPADM

## 2020-05-19 RX ORDER — FENTANYL CITRATE 50 UG/ML
50 INJECTION, SOLUTION INTRAMUSCULAR; INTRAVENOUS AS NEEDED
Status: DISCONTINUED | OUTPATIENT
Start: 2020-05-19 | End: 2020-05-19 | Stop reason: HOSPADM

## 2020-05-19 RX ORDER — SODIUM CHLORIDE 0.9 % (FLUSH) 0.9 %
5-40 SYRINGE (ML) INJECTION EVERY 8 HOURS
Status: DISCONTINUED | OUTPATIENT
Start: 2020-05-19 | End: 2020-05-24 | Stop reason: HOSPADM

## 2020-05-19 RX ORDER — ONDANSETRON 4 MG/1
4 TABLET, ORALLY DISINTEGRATING ORAL
Status: DISCONTINUED | OUTPATIENT
Start: 2020-05-19 | End: 2020-05-24 | Stop reason: HOSPADM

## 2020-05-19 RX ADMIN — FENTANYL CITRATE 50 MCG: 50 INJECTION INTRAMUSCULAR; INTRAVENOUS at 22:14

## 2020-05-19 RX ADMIN — ONDANSETRON 4 MG: 4 TABLET, ORALLY DISINTEGRATING ORAL at 13:45

## 2020-05-19 RX ADMIN — ACETAMINOPHEN 650 MG: 325 TABLET ORAL at 22:14

## 2020-05-19 RX ADMIN — CEFAZOLIN 2 G: 330 INJECTION, POWDER, FOR SOLUTION INTRAMUSCULAR; INTRAVENOUS at 08:03

## 2020-05-19 RX ADMIN — FENTANYL CITRATE 25 MCG: 50 INJECTION, SOLUTION INTRAMUSCULAR; INTRAVENOUS at 14:06

## 2020-05-19 RX ADMIN — PREGABALIN 50 MG: 50 CAPSULE ORAL at 21:15

## 2020-05-19 RX ADMIN — Medication: at 22:18

## 2020-05-19 RX ADMIN — PHENYLEPHRINE HYDROCHLORIDE 30 MCG/MIN: 10 INJECTION INTRAVENOUS at 12:47

## 2020-05-19 RX ADMIN — ONDANSETRON 4 MG: 2 INJECTION INTRAMUSCULAR; INTRAVENOUS at 22:14

## 2020-05-19 RX ADMIN — MIDAZOLAM 2 MG: 1 INJECTION INTRAMUSCULAR; INTRAVENOUS at 07:17

## 2020-05-19 RX ADMIN — HEPARIN SODIUM 11000 UNITS: 1000 INJECTION, SOLUTION INTRAVENOUS; SUBCUTANEOUS at 08:09

## 2020-05-19 RX ADMIN — FENTANYL CITRATE 25 MCG: 50 INJECTION INTRAMUSCULAR; INTRAVENOUS at 11:25

## 2020-05-19 RX ADMIN — LEVETIRACETAM 1000 MG: 500 TABLET, FILM COATED ORAL at 21:15

## 2020-05-19 RX ADMIN — DEXTROSE MONOHYDRATE AND SODIUM CHLORIDE 50 ML/HR: 5; .9 INJECTION, SOLUTION INTRAVENOUS at 10:12

## 2020-05-19 RX ADMIN — HEPARIN SODIUM 5000 UNITS: 5000 INJECTION, SOLUTION INTRAVENOUS; SUBCUTANEOUS at 21:20

## 2020-05-19 RX ADMIN — FENTANYL CITRATE 25 MCG: 50 INJECTION INTRAMUSCULAR; INTRAVENOUS at 11:34

## 2020-05-19 RX ADMIN — OXYCODONE 10 MG: 5 TABLET ORAL at 16:33

## 2020-05-19 RX ADMIN — INSULIN LISPRO 2 UNITS: 100 INJECTION, SOLUTION INTRAVENOUS; SUBCUTANEOUS at 19:46

## 2020-05-19 RX ADMIN — CLONAZEPAM 1 MG: 1 TABLET ORAL at 21:15

## 2020-05-19 RX ADMIN — PROPOFOL 75 MCG/KG/MIN: 10 INJECTION, EMULSION INTRAVENOUS at 07:49

## 2020-05-19 RX ADMIN — GLYCOPYRROLATE 0.4 MG: 0.2 INJECTION INTRAMUSCULAR; INTRAVENOUS at 07:52

## 2020-05-19 RX ADMIN — CARVEDILOL 3.12 MG: 3.12 TABLET, FILM COATED ORAL at 19:37

## 2020-05-19 RX ADMIN — Medication 10 ML: at 16:43

## 2020-05-19 RX ADMIN — TAMSULOSIN HYDROCHLORIDE 0.4 MG: 0.4 CAPSULE ORAL at 16:34

## 2020-05-19 RX ADMIN — PROTAMINE SULFATE 10 MG: 10 INJECTION, SOLUTION INTRAVENOUS at 11:29

## 2020-05-19 RX ADMIN — ATORVASTATIN CALCIUM 80 MG: 40 TABLET, FILM COATED ORAL at 21:18

## 2020-05-19 RX ADMIN — PHENYLEPHRINE HYDROCHLORIDE 20 MCG/MIN: 10 INJECTION INTRAVENOUS at 07:58

## 2020-05-19 RX ADMIN — OXYCODONE 10 MG: 5 TABLET ORAL at 21:15

## 2020-05-19 RX ADMIN — Medication 5 MG: at 08:16

## 2020-05-19 RX ADMIN — SODIUM CHLORIDE, POTASSIUM CHLORIDE, SODIUM LACTATE AND CALCIUM CHLORIDE: 600; 310; 30; 20 INJECTION, SOLUTION INTRAVENOUS at 07:49

## 2020-05-19 RX ADMIN — PROTAMINE SULFATE 20 MG: 10 INJECTION, SOLUTION INTRAVENOUS at 08:48

## 2020-05-19 RX ADMIN — ONDANSETRON 4 MG: 2 INJECTION INTRAMUSCULAR; INTRAVENOUS at 14:23

## 2020-05-19 RX ADMIN — FENTANYL CITRATE 25 MCG: 50 INJECTION INTRAMUSCULAR; INTRAVENOUS at 15:01

## 2020-05-19 RX ADMIN — Medication 5 MG: at 08:07

## 2020-05-19 RX ADMIN — LIDOCAINE HYDROCHLORIDE 40 MG: 20 INJECTION, SOLUTION EPIDURAL; INFILTRATION; INTRACAUDAL; PERINEURAL at 07:49

## 2020-05-19 NOTE — PERIOP NOTES
Patient: Deidra Ta. MRN: [id number]  SSN: [id number]   YOB: 1955  Age: 76 y.o. Sex: male     Patient is status post Procedure(s):  LEFT TRANSCAROTID ARTERY REVASCULARIZATION (URGENT). Surgeon(s) and Role:     * Breanne Rendon MD - Primary    Local/Dose/Irrigation:  See mar                  Peripheral IV 05/16/20 Anterior;Right Wrist (Active)   Site Assessment Clean, dry, & intact 5/18/2020 12:00 PM   Phlebitis Assessment 0 5/18/2020 12:00 PM   Infiltration Assessment 0 5/18/2020 12:00 PM   Dressing Status Clean, dry, & intact 5/18/2020 12:00 PM   Dressing Type Transparent;Tape 5/18/2020 12:00 PM   Hub Color/Line Status Pink; Infusing 5/18/2020 12:00 PM   Action Taken Open ports on tubing capped 5/18/2020 12:00 PM   Alcohol Cap Used Yes 5/18/2020 12:00 PM      Arterial Line 05/19/20 Left Radial artery (Active)        Condom Catheter 05/16/20 (Active)   Indications for Use Accurate measurement of urinary output 5/18/2020  4:00 PM   Status Patent 5/18/2020  4:00 PM   Site Condition No abnormalities 5/18/2020  4:00 PM   Drainage Tube Clipped to Bed Yes 5/18/2020  4:00 PM   Catheter Secured to Thigh No 5/18/2020  4:00 PM   Tamper Seal Intact No 5/18/2020  4:00 PM   Bag Below Bladder/Not on Floor Yes 5/18/2020  4:00 PM   Lack of Dependent Loop in Tubing Yes 5/18/2020  4:00 PM   Drainage Bag Less Than Half Full Yes 5/18/2020  4:00 PM   Sterile Solution Used for  Irrigation N/A 5/18/2020  4:00 PM   Urine Output (mL) 1000 ml 5/19/2020  5:41 AM                     Dressing/Packing:  Wound Neck Left-Dressing Type: Topical skin adhesive/glue (05/19/20 0900)  Wound Groin Right-Dressing Type: Topical skin adhesive/glue (05/19/20 0900)  Wound Back Lower scatch marks 05/15/20-Dressing Type: Open to air (05/18/20 1600)    Splint/Cast:  ]    Other:       .

## 2020-05-19 NOTE — PERIOP NOTES
1020: Dr. Charley Segura office contacted regarding site assessments, per MD order, manual pressure to groin site, will continue to monitor neck site, marked for reference. 1115: MD office notified of increasing swelling to left neck incision site, no new orders given, will continue to monitor. VSS, no changes to neuro checks, patient denies difficulty swallowing. 1126: new orders given by Dr. Charley Segura will continue to monitor. Patient VSS. 1208: Dr. Charley Segura at bedside, aware of patient status, ok with patient transfer to floor. TRANSFER - OUT REPORT:    Verbal report given to [facility] (name) on Deidra Ta.  being transferred to CVICU (unit) for routine post - op       Report consisted of patients Situation, Background, Assessment and   Recommendations(SBAR). Time Pre op antibiotic given:0803  Anesthesia Stop time: 7092  Enamorado Present on Transfer to floor:no  Order for Enamorado on Chart:no  Discharge Prescriptions with Chart:no    Information from the following report(s) SBAR, OR Summary, Procedure Summary, Intake/Output, MAR, Recent Results and Cardiac Rhythm PACED was reviewed with the receiving nurse. Opportunity for questions and clarification was provided. Is the patient on 02? NO    Is the patient on a monitor? YES    Is the nurse transporting with the patient? YES    Surgical Waiting Area notified of patient's transfer from PACU? YES      The following personal items collected during your admission accompanied patient upon transfer: **no belongings checked in to PACU; inpatient**  Dental Appliance: Dental Appliances: None  Vision:    Hearing Aid:    Jewelry: Jewelry: Ring, Watch(2 rings)  Clothing: Clothing: At bedside  Other Valuables:  Other Valuables: Nandinin Rule, At bedside  Valuables sent to safe:

## 2020-05-19 NOTE — ANESTHESIA PROCEDURE NOTES
Arterial Line Placement    Start time: 5/19/2020 7:14 AM  End time: 5/19/2020 7:18 AM  Performed by: Porfirio Siu MD  Authorized by:  Porfirio Siu MD     Pre-Procedure  Indications:  Arterial pressure monitoring  Preanesthetic Checklist: patient identified, risks and benefits discussed, anesthesia consent, site marked, patient being monitored, timeout performed and patient being monitored      Procedure:   Prep:  Chlorhexidine  Seldinger Technique?: Yes    Orientation:  Left  Location:  Radial artery  Catheter size:  20 G  Number of attempts:  1  Cont Cardiac Output Sensor: Yes      Assessment:   Post-procedure:  Line secured and sterile dressing applied  Patient Tolerance:  Patient tolerated the procedure well with no immediate complications

## 2020-05-19 NOTE — ANESTHESIA PREPROCEDURE EVALUATION
Relevant Problems   No relevant active problems       Anesthetic History   No history of anesthetic complications            Review of Systems / Medical History  Patient summary reviewed, nursing notes reviewed and pertinent labs reviewed    Pulmonary        Sleep apnea: CPAP           Neuro/Psych     seizures  CVA  TIA     Cardiovascular    Hypertension      CHF  Dysrhythmias (h/o VT)   CAD and CABG    Exercise tolerance: <4 METS     GI/Hepatic/Renal     GERD    Renal disease: CRI       Endo/Other    Diabetes: type 2         Other Findings              Physical Exam    Airway  Mallampati: III  TM Distance: 4 - 6 cm  Neck ROM: normal range of motion   Mouth opening: Normal     Cardiovascular    Rhythm: regular  Rate: normal         Dental    Dentition: Upper dentition intact and Lower dentition intact     Pulmonary  Breath sounds clear to auscultation               Abdominal  GI exam deferred       Other Findings            Anesthetic Plan    ASA: 4  Anesthesia type: MAC    Monitoring Plan: Arterial line      Induction: Intravenous  Anesthetic plan and risks discussed with: Patient

## 2020-05-19 NOTE — PROGRESS NOTES
Bedside shift change report given to Lorie Chao RN (oncoming nurse) by Elder Bernstein RN (offgoing nurse). Report included the following information SBAR, Cardiac Rhythm paced and Dual Neuro Assessment.

## 2020-05-19 NOTE — PROGRESS NOTES
Hospitalist Progress Note  Katie Mcmanus MD  Answering service: [phone] OR [phone] from in house phone        Date of Service:  2020  NAME:  Prasad Diallo. :  1955  MRN:  [id number]      Admission Summary:   As per initial admission summary  This is a 59-year-old man with a past medical history significant for coronary artery disease, status post CABG and multiple stent placements; dyslipidemia; hypertension; type 2 diabetes; seizure disorder; benign prostatic hyperplasia; obstructive sleep apnea; chronic systolic congestive heart failure; chronic kidney disease; status post pacemaker insertion, was in his usual state of health until the day of presentation at the emergency room when the patient developed sudden loss of vision in the left eye. This episode lasted a few seconds. It was followed by headache. The headache is located at the left side of the head, constant throbbing headache, 6/10 in severity. No known aggravating or relieving factors. The patient stated that he has a history of ocular migraine and this present episode is similar to his attack of ocular migraine. The patient also stated that he fell a couple of times at home. Denies loss of consciousness. The circumstances surrounding the fall is not clear. The patient denies fever, rigors, and chills. No sick contact or contact with any person with COVID-19 virus infection. When the patient arrived at the emergency room, CT of the head was obtained. The CT scan did not show any acute pathology. CTA of the head and neck was also performed. This shows no acute large vessel occlusion, but shows severe stenosis, bilateral internal carotid arteries, left greater than right. The patient was subsequently referred to the hospitalist service for evaluation for admission. The patient was last admitted to this hospital from 2019 to 2019.   The patient was admitted to the Cardiology Service for evaluation and treatment of ventricular tachycardia. The patient subsequently underwent ICD placement with adjustments to his medication. Interval history / Subjective:     Patient is overall doing well. No recurrence of visual abnormalities. Assessment & Plan:     Ocular migraine  -Symptoms appear to have resolved  -Neurology evaluated the patient, EEG with no seizure focus  -MRI cannot be done due to presence of pacemaker  -Ophthalmology also discussed over the phone    Orthostatic hypotension  -Symptomatic, and significant drop in BP  -Lasix, lisinopril, eplerenone and Imdur on hold  -Midodrine drain dose adjusted by cardiology    Bilateral internal carotic artery stenosis  -Vascular surgery following, discussed with Dr Cm Livingston  -Status post left carotic revascularization today    Coronary artery disease  -status post CABG and stent placement  -Continue Coreg, aspirin, Plavix and statin    Dyslipidemia  -Continue statin    Type 2 diabetes  -Continue insulin sliding scale coverage  -Blood sugar stable    Seizure disorder  -Stable, EEG without seizure focus  -Continue Keppra    Benign prostatic hyperplasia  -Continue Flomax and finasteride    Obstructive sleep apnea  -Continue CPAP per home settings    Ventricular tachycardia  -Status post pacemaker placement    Chronic systolic congestive heart failure  -Echocardiogram with EF of 16 to 20%.   -Appears compensated  -Diuretics on hold due to hypotension    Chronic kidney disease, stage III  -Continue monitor renal function    Fall  -Status post mechanical fall  -PT recommending home PT    Code status: FULL   DVT prophylaxis: heparin SC     Care Plan discussed with: Patient/Family  Disposition: Home health and home PT     Hospital Problems  Date Reviewed: 5/19/2020          Codes Class Noted POA    * (Principal) Acute CVA (cerebrovascular accident) ([facility] [address] ICD-10-CM: I63.9  ICD-9-CM: 434.91  5/12/2020 Yes Review of Systems:   A comprehensive review of systems was negative except for that written in the HPI. Vital Signs:    Last 24hrs VS reviewed since prior progress note. Most recent are:  Visit Vitals  /62   Pulse 60   Temp 97.5 °F (36.4 °C)   Resp 12   Ht 5' 9\" (1.753 m)   Wt 93 kg (205 lb)   SpO2 97%   BMI 30.27 kg/m²         Intake/Output Summary (Last 24 hours) at 5/19/2020 1520  Last data filed at 5/19/2020 5408  Gross per 24 hour   Intake 500 ml   Output 1000 ml   Net -500 ml        Physical Examination:             Constitutional:  No acute distress, cooperative, pleasant    ENT:  Oral mucous moist, oropharynx benign. Neck supple,    Resp:  CTA bilaterally. No wheezing/rhonchi/rales. No accessory muscle use   CV:  Regular rhythm, normal rate, no murmurs, gallops, rubs    GI:  Soft, non distended, non tender. normoactive bowel sounds, no hepatosplenomegaly     Musculoskeletal:  No edema, warm, 2+ pulses throughout    Neurologic:  Moves all extremities. Awake but somewhat drowsy     Psych:  Good insight, Not anxious nor agitated. Skin: Ecchymosis around the left neck  Hematologic/Lymphatic/Immunlogic:  No jaundice nor lymph node swelling  Eyes:  EOMI. Anicteric sclerae, PERRL. Data Review:    Review and/or order of clinical lab test  Review and/or order of tests in the radiology section of [facility]      Labs:     No results for input(s): WBC, HGB, HCT, PLT, HGBEXT, HCTEXT, PLTEXT, HGBEXT, HCTEXT, PLTEXT in the last 72 hours. Recent Labs     05/18/20  0326      K 4.3      CO2 22   BUN 32*   CREA 1.61*   *   CA 8.2*     No results for input(s): SGOT, GPT, ALT, AP, TBIL, TBILI, TP, ALB, GLOB, GGT, AML, LPSE in the last 72 hours. No lab exists for component: AMYP, HLPSE  No results for input(s): INR, PTP, APTT, INREXT, INREXT in the last 72 hours. No results for input(s): FE, TIBC, PSAT, FERR in the last 72 hours.    Lab Results   Component Value Date/Time    Folate 11.2 05/13/2020 12:27 AM      No results for input(s): PH, PCO2, PO2 in the last 72 hours. No results for input(s): CPK, CKNDX, TROIQ in the last 72 hours.     No lab exists for component: CPKMB  Lab Results   Component Value Date/Time    Cholesterol, total 120 05/13/2020 12:27 AM    HDL Cholesterol 20 05/13/2020 12:27 AM    LDL, calculated 46.2 05/13/2020 12:27 AM    Triglyceride 269 (H) 05/13/2020 12:27 AM    CHOL/HDL Ratio 6.0 (H) 05/13/2020 12:27 AM     Lab Results   Component Value Date/Time    Glucose (POC) 127 (H) 05/19/2020 01:17 PM    Glucose (POC) 130 (H) 05/19/2020 09:30 AM    Glucose (POC) 117 (H) 05/19/2020 06:55 AM    Glucose (POC) 144 (H) 05/18/2020 09:32 PM    Glucose (POC) 147 (H) 05/18/2020 05:13 PM     Lab Results   Component Value Date/Time    Color YELLOW/STRAW 08/21/2019 01:17 PM    Appearance CLEAR 08/21/2019 01:17 PM    Specific gravity 1.020 08/21/2019 01:17 PM    Specific gravity 1.020 04/05/2019 07:25 PM    pH (UA) 5.5 08/21/2019 01:17 PM    Protein 100 (A) 08/21/2019 01:17 PM    Glucose NEGATIVE  08/21/2019 01:17 PM    Ketone NEGATIVE  08/21/2019 01:17 PM    Bilirubin NEGATIVE  08/21/2019 01:17 PM    Urobilinogen 1.0 08/21/2019 01:17 PM    Nitrites NEGATIVE  08/21/2019 01:17 PM    Leukocyte Esterase TRACE (A) 08/21/2019 01:17 PM    Epithelial cells FEW 08/21/2019 01:17 PM    Bacteria 1+ (A) 08/21/2019 01:17 PM    WBC 5-10 08/21/2019 01:17 PM    RBC >100 (H) 08/21/2019 01:17 PM         Medications Reviewed:     Current Facility-Administered Medications   Medication Dose Route Frequency    PHENYLephrine (ZAHIDA-SYNEPHRINE) 30 mg in 0.9% sodium chloride 250 mL infusion   mcg/min IntraVENous TITRATE    sodium chloride (NS) flush 5-40 mL  5-40 mL IntraVENous Q8H    sodium chloride (NS) flush 5-40 mL  5-40 mL IntraVENous PRN    dextrose 5% and 0.9% NaCl infusion  50 mL/hr IntraVENous CONTINUOUS    ondansetron (ZOFRAN ODT) tablet 4 mg  4 mg Oral Q6H PRN    ondansetron (ZOFRAN) injection 4 mg  4 mg IntraVENous Q4H PRN    fentaNYL citrate (PF) injection 50 mcg  50 mcg IntraVENous Q4H PRN    oxyCODONE IR (ROXICODONE) tablet 5 mg  5 mg Oral Q4H PRN    oxyCODONE IR (ROXICODONE) tablet 10 mg  10 mg Oral Q4H PRN    midodrine (PROAMATINE) tablet 10 mg  10 mg Oral TID WITH MEALS    diphenhydrAMINE-zinc acetate 1%-0.1% (BENADRYL) cream   Topical TID PRN    0.9% sodium chloride infusion  75 mL/hr IntraVENous CONTINUOUS    amiodarone (CORDARONE) tablet 200 mg  200 mg Oral DAILY    aspirin chewable tablet 81 mg  81 mg Oral DAILY    atorvastatin (LIPITOR) tablet 80 mg  80 mg Oral DAILY    carvediloL (COREG) tablet 3.125 mg  3.125 mg Oral BID WITH MEALS    clonazePAM (KlonoPIN) tablet 1 mg  1 mg Oral QHS    clopidogreL (PLAVIX) tablet 75 mg  75 mg Oral DAILY    finasteride (PROSCAR) tablet 5 mg  5 mg Oral 7am    lactulose (CHRONULAC) 10 gram/15 mL solution 45 mL  30 g Oral TID    levETIRAcetam (KEPPRA) tablet 1,000 mg  1,000 mg Oral Q12H    nitroglycerin (NITROSTAT) tablet 0.4 mg  0.4 mg SubLINGual Q5MIN PRN    pantoprazole (PROTONIX) tablet 40 mg  40 mg Oral ACB&D    pregabalin (LYRICA) capsule 50 mg  50 mg Oral TID    QUEtiapine (SEROquel) tablet 25 mg  25 mg Oral QHS    tamsulosin (FLOMAX) capsule 0.4 mg  0.4 mg Oral ACB&D    venlafaxine-SR (EFFEXOR-XR) capsule 75 mg  75 mg Oral BID    insulin lispro (HUMALOG) injection   SubCUTAneous AC&HS    glucose chewable tablet 16 g  4 Tab Oral PRN    glucagon (GLUCAGEN) injection 1 mg  1 mg IntraMUSCular PRN    dextrose 10% infusion 0-250 mL  0-250 mL IntraVENous PRN    acetaminophen (TYLENOL) tablet 650 mg  650 mg Oral Q4H PRN    heparin (porcine) injection 5,000 Units  5,000 Units SubCUTAneous Q8H    bisacodyL (DULCOLAX) suppository 10 mg  10 mg Rectal DAILY PRN     ______________________________________________________________________  EXPECTED LENGTH OF STAY: 2d 2h  ACTUAL LENGTH OF STAY:          7                 Marianna Pollen, MD

## 2020-05-19 NOTE — PROGRESS NOTES
Physical Therapy: Defer    Chart reviewed in prep for PT treatment, noted patient currently HUGO in OR for TCAR. Will defer and f/u later as able and medically appropriate. Thank you.      Eric Medrano, PT, DPT

## 2020-05-19 NOTE — PROGRESS NOTES
[address] [facility]  Hospitalist Group                                      Advance Care Planning Note    Name: Amarilys Pena. YOB: 1955  MRN: [id number]  Admission Date: 5/12/2020  4:56 PM    Date of discussion: 5/19/2020    Active Diagnoses:    Hospital Problems  Date Reviewed: 5/19/2020          Codes Class Noted POA    * (Principal) Acute CVA (cerebrovascular accident) [facility]) ICD-10-CM: I63.9  ICD-9-CM: 434.91  5/12/2020 Yes              These active diagnoses are of sufficient risk that focused discussion on advance care planning is indicated in order to allow the patient to thoughtfully consider personal goals of care, and if situations arise that prevent the ability to personally give input, to ensure appropriate representation of their personal desires for different levels and aggressiveness of care. Discussion:     Persons present and participating in discussion: Amarilys Pena., Elier Pompa MD    Discussion:   Discussed with patient his wish for resuscitation in case of cardiac or respiratory arrest.  Resuscitative efforts were explained including chest compression, intubation and mechanical ventilation, shock, IV medications. Patient wishes to remain full code at this time. Time Spent:     Total time spent face-to-face in education and discussion: 15 minutes.      Elier Pompa MD  Date of Service:  5/19/2020  4:30 PM

## 2020-05-19 NOTE — OP NOTES
[address]   OPERATIVE REPORT    Name:  Dorota Oliveros  MR#:  [id number]  :  1955  ACCOUNT #:  [de-identified]  DATE OF SERVICE:  2020    PREOPERATIVE DIAGNOSIS:  Symptomatic left internal carotid artery stenosis. POSTOPERATIVE DIAGNOSIS:  Symptomatic left internal carotid artery stenosis. PROCEDURES PERFORMED:  1. Left internal carotid artery stent with reversal of flow (transcarotid artery revascularization) procedure. 2.  Left common carotid artery cutdown. SURGEON:  Elaine Jean-Baptiste MD    ASSISTANT:  None. ANESTHESIA:  MAC with local.    COMPLICATIONS:  None. SPECIMENS REMOVED:  None. IMPLANTS:  9 x 30 self-expanding stent. ESTIMATED BLOOD LOSS:  25 mL. OPERATIVE INDICATIONS:  The patient is a 44-year-old gentleman with multiple medical conditions. This includes advanced congestive heart failure with a diminished ejection fraction as well as known coronary artery disease. He presented with left eye blindness. He then was admitted for subsequent workup and was found to have bilateral high-grade carotid artery stenosis, left greater than right. He was already on aspirin and Plavix at the time of his event. Because of the concern for possible symptomatic carotid artery stenosis, I recommended proceeding with a left transcarotid artery revascularization procedure. This was done given his high risk for general anesthesia. Prior to the procedure, the nature of the procedure as well as the risks and benefits were explained to the patient as well as his daughter, and they elected to proceed. OPERATIVE DETAILS:   The patient was identified in the preoperative holding area. His left neck was marked. Consents were signed. The patient was brought back to the operating room where MAC anesthesia was induced. The left neck was then prepped and draped in the usual standard fashion. Time-out was then performed.     I began the procedure by obtaining ultrasound-guided access to the right common femoral vein. A micro sheath was used for this. I then inserted an 8-Swedish venous sheath. This was secured to the skin. I then made a 1.5 cm incision overlying the sternocleidomastoid and superior to the clavicle on the left neck. Dissection was carried down through the platysma muscle. 1% lidocaine was infiltrated prior to this. I then was able to split the sternocleidomastoid muscle fibers. Dissection was then deepened to where the internal jugular vein was identified and dissected. On the medial side of this, the carotid artery was identified and dissected free. This was looped. The patient was then given 11,000 units of IV heparin. After 3 minutes, the ACT was greater than 350. I then looped the carotid artery and placed a 5-0 Prolene pursestring suture into the base of the common carotid artery. I then accessed with a microneedle and inserted a micro sheath. A nonselective angiogram was performed which showed a high-grade internal carotid artery stenosis with a patent external iliac artery. I then engaged the external carotid artery with a micro sheath and then advanced a stiff wire into the external iliac artery. I then inserted an 8-Swedish arterial sheath into the common carotid artery. This was then secured to the skin. I then connected the flow reversal system which is a [address] neuroprotection system. Once I had this secured, I then clamped the common carotid artery and ensured reversal flow was working. It was, so I proceeded. Utilizing a pursestring catheter, I was able to cross the internal carotid artery stenosis without great difficulty. I then predilated with a 6 x 30 balloon. I then brought on the field a 9 x 30 balloon to placed a stent across the internal carotid artery stenosis. No post dilation was needed. I was satisfied with the results of the procedure and a completion angiogram showed widely patent stent in two views. There was some spasm of the distal internal carotid artery that should resolve. I then removed the catheters and wires and closed my pursestring suture in the neck. This was hemostatic. No repair sutures were needed. I then held manual pressure. 20 mg of IV protamine were then given. The venous sheath was removed and manual pressure was held. Once I was satisfied with hemostasis, I then copiously irrigated the wound and closed the platysma layer and then closed the skin with a running subcuticular stitch. Sterile glue was then applied. At the completion of the procedure, all needle, sponge, and instrument counts were correct x2. The patient tolerated the procedure well under MAC anesthesia. The total flow reversal time was 7 minutes. The contrast volume was 9 mL. The total fluoroscopy time was 2.7 minutes. The dose area was 358 Gy/sq cm. The procedure time was 56 minutes. The patient was then brought to the recovery area in stable condition.       Lili Neil MD      [id number]  D:  05/19/2020 14:35  T:  05/19/2020 19:04  JOB #:  [id number]

## 2020-05-19 NOTE — ROUTINE PROCESS
Occupational Therapy 6557 -  
[phone] Chart reviewed in prep for OT treatment, noted patient currently HUGO in OR for TCAR. Will defer and f/u later in AM/PM as able and medically appropriate. Thank you. Wyatt Calabrese MS, OTR/L

## 2020-05-19 NOTE — BRIEF OP NOTE
Brief Postoperative Note    Patient: Lilia Patterson. YOB: 1955  MRN: [id number]    Date of Procedure: 5/19/2020     Pre-Op Diagnosis: CAROTID STENOSIS    Post-Op Diagnosis: Same as preoperative diagnosis. Procedure(s):  LEFT TRANSCAROTID ARTERY REVASCULARIZATION (URGENT)    Surgeon(s):  Sierra Sinha MD    Surgical Assistant: None    Anesthesia: MAC     Estimated Blood Loss (mL): less than 50     Complications: None    Specimens: * No specimens in log *     Implants:   Implant Name Type Inv.  Item Serial No.  Lot No. LRB No. Used Action   enroute transcarotid stent 9mm x 30mm   NA  [id number] Left 1 Implanted       Drains:   Condom Catheter 05/16/20 (Active)   Indications for Use Accurate measurement of urinary output 5/18/2020  4:00 PM   Status Patent 5/18/2020  4:00 PM   Site Condition No abnormalities 5/18/2020  4:00 PM   Drainage Tube Clipped to Bed Yes 5/18/2020  4:00 PM   Catheter Secured to Thigh No 5/18/2020  4:00 PM   Tamper Seal Intact No 5/18/2020  4:00 PM   Bag Below Bladder/Not on Floor Yes 5/18/2020  4:00 PM   Lack of Dependent Loop in Tubing Yes 5/18/2020  4:00 PM   Drainage Bag Less Than Half Full Yes 5/18/2020  4:00 PM   Sterile Solution Used for  Irrigation N/A 5/18/2020  4:00 PM   Urine Output (mL) 1000 ml 5/19/2020  5:41 AM       [REMOVED] Orogastric Tube 08/20/19 (Removed)       [REMOVED] Fecal Management (Removed)       [REMOVED] Condom Catheter 08/20/19 (Removed)       [REMOVED] Condom Catheter 08/26/19 (Removed)       Findings: High grade stenosis, nice result    Electronically Signed by Radha Trent MD on 5/19/2020 at 9:08 AM

## 2020-05-19 NOTE — ROUTINE PROCESS
Bedside shift change report given to [facility] (oncoming nurse) by Radha Horne RN (offgoing nurse). Report included the following information SBAR, Kardex, ED Summary, Intake/Output, Cardiac Rhythm Paced  and Dual Neuro Assessment.

## 2020-05-19 NOTE — PROGRESS NOTES
TRANSFER - IN REPORT:    Verbal report received from [id number] [address] [place] name) on Timur Stauffer.  being received from [employer]) for routine post - op      Report consisted of patients Situation, Background, Assessment and   Recommendations(SBAR). Information from the following report(s) SBAR, OR Summary, MAR, Recent Results and Cardiac Rhythm PACED was reviewed with the receiving nurse. Opportunity for questions and clarification was provided. Assessment completed upon patients arrival to unit and care assumed. At 1330    1345: Dr. Dolly Hodgkin called for pain meds update given   1700 Dr. Cesar Cesar here to see pt. Daughter updated per phone. Area of ecchymosis marked no new areas since admission from PACU.  1800: Pt wants to eat but does not have his teeth. He does this all the time at home. 2000: Bedside and Verbal shift change report given to [facility] (oncoming nurse) by Eduardo Gardner RN (offgoing nurse). Report included the following information SBAR, OR Summary and Cardiac Rhythm PACED.

## 2020-05-19 NOTE — ANESTHESIA POSTPROCEDURE EVALUATION
Post-Anesthesia Evaluation and Assessment    Patient: Crystal Bray MRN: [id number]  SSN: [id number]    YOB: 1955  Age: 76 y.o. Sex: male      I have evaluated the patient and they are stable and ready for discharge from the PACU. Cardiovascular Function/Vital Signs  Visit Vitals  /73   Pulse 66   Temp 36.4 °C (97.5 °F)   Resp 15   Ht 5' 9\" (1.753 m)   Wt 93 kg (205 lb)   SpO2 97%   BMI 30.27 kg/m²       Patient is status post MAC anesthesia for Procedure(s):  LEFT TRANSCAROTID ARTERY REVASCULARIZATION (URGENT). Nausea/Vomiting: None    Postoperative hydration reviewed and adequate. Pain:  Pain Scale 1: Numeric (0 - 10) (05/19/20 0918)  Pain Intensity 1: 0 (05/19/20 0918)   Managed    Neurological Status:   Neuro  Neurologic State: Drowsy (05/19/20 0165)  Orientation Level: Oriented to person;Oriented to place;Oriented to situation (05/19/20 0918)  Cognition: Follows commands (05/19/20 0918)  Speech: Clear (05/19/20 0918)  Assessment L Pupil: Sluggish;Round (05/19/20 9379)  Size L Pupil (mm): 3 (05/19/20 0918)  Assessment R Pupil: Sluggish;Round (05/19/20 0918)  Size R Pupil (mm): 3 (05/19/20 0918)  LUE Motor Response: Purposeful (05/19/20 0918)  LLE Motor Response: Purposeful (05/19/20 0918)  RUE Motor Response: Purposeful (05/19/20 7098)  RLE Motor Response: Purposeful (05/19/20 0918)   At baseline    Mental Status, Level of Consciousness: Alert and  oriented to person, place, and time    Pulmonary Status:   O2 Device: Nasal cannula (05/19/20 0918)   Adequate oxygenation and airway patent    Complications related to anesthesia: None    Post-anesthesia assessment completed. No concerns    Signed By: Meet Leon MD     May 19, 2020              Procedure(s):  LEFT TRANSCAROTID ARTERY REVASCULARIZATION (URGENT).     MAC    <BSHSIANPOST>    Vitals Value Taken Time   /75 5/19/2020 11:00 AM   Temp 36.4 °C (97.5 °F) 5/19/2020  9:18 AM   Pulse 65 5/19/2020 11:12 AM   Resp 16 5/19/2020 11:12 AM   SpO2 98 % 5/19/2020 11:12 AM   Vitals shown include unvalidated device data.

## 2020-05-19 NOTE — PROGRESS NOTES
Transitions of Care: 33% risk of re-admission      -TBD pending progress and medical POC, prior to TCAR, PT/OT was recommending home health- referral sent to [facility]    -PT/OT to continue to evaluate s/p TCAR   -Patient lives at home with his two daughters and mobilized with cane prior to admission     The CM reviewed previous CM notes- patient transferred to CVICU s/p TCAR. PT/OT assessed patient prior to TCAR and recommending home health services, per previous CM notes family provided choice for [facility]- CM sent referral via cclink. PT/OT will assess s/p TCAR. Per previous CM notes, the patient lives at home with his two daughters (1st floor maser bedroom), and ambulated with a cane at baseline. CM will follow for transitions of care.  CHRISTIANE Berrios

## 2020-05-20 ENCOUNTER — HOME HEALTH ADMISSION (OUTPATIENT)
Dept: HOME HEALTH SERVICES | Facility: HOME HEALTH | Age: 65
End: 2020-05-20
Payer: MEDICARE

## 2020-05-20 ENCOUNTER — APPOINTMENT (OUTPATIENT)
Dept: CT IMAGING | Age: 65
DRG: 035 | End: 2020-05-20
Attending: FAMILY MEDICINE
Payer: MEDICARE

## 2020-05-20 LAB
ANION GAP SERPL CALC-SCNC: 4 MMOL/L (ref 5–15)
BASOPHILS # BLD: 0 K/UL (ref 0–0.1)
BASOPHILS NFR BLD: 0 % (ref 0–1)
BUN SERPL-MCNC: 21 MG/DL (ref 6–20)
BUN/CREAT SERPL: 18 (ref 12–20)
CALCIUM SERPL-MCNC: 7.8 MG/DL (ref 8.5–10.1)
CHLORIDE SERPL-SCNC: 109 MMOL/L (ref 97–108)
CO2 SERPL-SCNC: 25 MMOL/L (ref 21–32)
CREAT SERPL-MCNC: 1.19 MG/DL (ref 0.7–1.3)
DIFFERENTIAL METHOD BLD: ABNORMAL
EOSINOPHIL # BLD: 0.2 K/UL (ref 0–0.4)
EOSINOPHIL NFR BLD: 3 % (ref 0–7)
ERYTHROCYTE [DISTWIDTH] IN BLOOD BY AUTOMATED COUNT: 17.4 % (ref 11.5–14.5)
GLUCOSE BLD STRIP.AUTO-MCNC: 126 MG/DL (ref 65–100)
GLUCOSE BLD STRIP.AUTO-MCNC: 130 MG/DL (ref 65–100)
GLUCOSE BLD STRIP.AUTO-MCNC: 143 MG/DL (ref 65–100)
GLUCOSE BLD STRIP.AUTO-MCNC: 152 MG/DL (ref 65–100)
GLUCOSE SERPL-MCNC: 111 MG/DL (ref 65–100)
HCT VFR BLD AUTO: 34.9 % (ref 36.6–50.3)
HGB BLD-MCNC: 10.9 G/DL (ref 12.1–17)
IMM GRANULOCYTES # BLD AUTO: 0 K/UL (ref 0–0.04)
IMM GRANULOCYTES NFR BLD AUTO: 0 % (ref 0–0.5)
LYMPHOCYTES # BLD: 0.9 K/UL (ref 0.8–3.5)
LYMPHOCYTES NFR BLD: 12 % (ref 12–49)
MCH RBC QN AUTO: 23.6 PG (ref 26–34)
MCHC RBC AUTO-ENTMCNC: 31.2 G/DL (ref 30–36.5)
MCV RBC AUTO: 75.5 FL (ref 80–99)
MONOCYTES # BLD: 0.4 K/UL (ref 0–1)
MONOCYTES NFR BLD: 5 % (ref 5–13)
NEUTS SEG # BLD: 5.7 K/UL (ref 1.8–8)
NEUTS SEG NFR BLD: 80 % (ref 32–75)
NRBC # BLD: 0 K/UL (ref 0–0.01)
NRBC BLD-RTO: 0 PER 100 WBC
PLATELET # BLD AUTO: 133 K/UL (ref 150–400)
PMV BLD AUTO: 10.2 FL (ref 8.9–12.9)
POTASSIUM SERPL-SCNC: 4.2 MMOL/L (ref 3.5–5.1)
RBC # BLD AUTO: 4.62 M/UL (ref 4.1–5.7)
SERVICE CMNT-IMP: ABNORMAL
SODIUM SERPL-SCNC: 138 MMOL/L (ref 136–145)
WBC # BLD AUTO: 7.2 K/UL (ref 4.1–11.1)

## 2020-05-20 PROCEDURE — 74011000250 HC RX REV CODE- 250

## 2020-05-20 PROCEDURE — 74011250637 HC RX REV CODE- 250/637: Performed by: INTERNAL MEDICINE

## 2020-05-20 PROCEDURE — 70450 CT HEAD/BRAIN W/O DYE: CPT

## 2020-05-20 PROCEDURE — 82962 GLUCOSE BLOOD TEST: CPT

## 2020-05-20 PROCEDURE — 74011636637 HC RX REV CODE- 636/637: Performed by: INTERNAL MEDICINE

## 2020-05-20 PROCEDURE — 74011250636 HC RX REV CODE- 250/636: Performed by: SURGERY

## 2020-05-20 PROCEDURE — 97168 OT RE-EVAL EST PLAN CARE: CPT

## 2020-05-20 PROCEDURE — 74011250637 HC RX REV CODE- 250/637: Performed by: SURGERY

## 2020-05-20 PROCEDURE — 77010033678 HC OXYGEN DAILY

## 2020-05-20 PROCEDURE — 97530 THERAPEUTIC ACTIVITIES: CPT

## 2020-05-20 PROCEDURE — 97116 GAIT TRAINING THERAPY: CPT

## 2020-05-20 PROCEDURE — 74011250636 HC RX REV CODE- 250/636: Performed by: INTERNAL MEDICINE

## 2020-05-20 PROCEDURE — 74011250637 HC RX REV CODE- 250/637: Performed by: FAMILY MEDICINE

## 2020-05-20 PROCEDURE — 80048 BASIC METABOLIC PNL TOTAL CA: CPT

## 2020-05-20 PROCEDURE — 65610000003 HC RM ICU SURGICAL

## 2020-05-20 PROCEDURE — 85025 COMPLETE CBC W/AUTO DIFF WBC: CPT

## 2020-05-20 PROCEDURE — 36415 COLL VENOUS BLD VENIPUNCTURE: CPT

## 2020-05-20 RX ORDER — HYDRALAZINE HYDROCHLORIDE 20 MG/ML
10 INJECTION INTRAMUSCULAR; INTRAVENOUS ONCE
Status: COMPLETED | OUTPATIENT
Start: 2020-05-20 | End: 2020-05-20

## 2020-05-20 RX ORDER — BACITRACIN 500 UNIT/G
PACKET (EA) TOPICAL
Status: COMPLETED
Start: 2020-05-20 | End: 2020-05-20

## 2020-05-20 RX ADMIN — ONDANSETRON 4 MG: 4 TABLET, ORALLY DISINTEGRATING ORAL at 05:40

## 2020-05-20 RX ADMIN — AMIODARONE HYDROCHLORIDE 200 MG: 200 TABLET ORAL at 08:35

## 2020-05-20 RX ADMIN — VENLAFAXINE HYDROCHLORIDE 75 MG: 37.5 CAPSULE, EXTENDED RELEASE ORAL at 08:35

## 2020-05-20 RX ADMIN — INSULIN LISPRO 2 UNITS: 100 INJECTION, SOLUTION INTRAVENOUS; SUBCUTANEOUS at 12:23

## 2020-05-20 RX ADMIN — FENTANYL CITRATE 50 MCG: 50 INJECTION INTRAMUSCULAR; INTRAVENOUS at 03:18

## 2020-05-20 RX ADMIN — INSULIN LISPRO 2 UNITS: 100 INJECTION, SOLUTION INTRAVENOUS; SUBCUTANEOUS at 17:22

## 2020-05-20 RX ADMIN — CARVEDILOL 3.12 MG: 3.12 TABLET, FILM COATED ORAL at 08:35

## 2020-05-20 RX ADMIN — OXYCODONE 5 MG: 5 TABLET ORAL at 12:23

## 2020-05-20 RX ADMIN — BACITRACIN: 500 OINTMENT TOPICAL at 17:00

## 2020-05-20 RX ADMIN — TAMSULOSIN HYDROCHLORIDE 0.4 MG: 0.4 CAPSULE ORAL at 07:30

## 2020-05-20 RX ADMIN — OXYCODONE 5 MG: 5 TABLET ORAL at 20:40

## 2020-05-20 RX ADMIN — CLONAZEPAM 1 MG: 1 TABLET ORAL at 21:13

## 2020-05-20 RX ADMIN — LEVETIRACETAM 1000 MG: 500 TABLET, FILM COATED ORAL at 20:40

## 2020-05-20 RX ADMIN — QUETIAPINE FUMARATE 25 MG: 25 TABLET ORAL at 21:13

## 2020-05-20 RX ADMIN — PREGABALIN 50 MG: 50 CAPSULE ORAL at 21:13

## 2020-05-20 RX ADMIN — LEVETIRACETAM 1000 MG: 500 TABLET, FILM COATED ORAL at 08:36

## 2020-05-20 RX ADMIN — VENLAFAXINE HYDROCHLORIDE 75 MG: 37.5 CAPSULE, EXTENDED RELEASE ORAL at 19:19

## 2020-05-20 RX ADMIN — OXYCODONE 10 MG: 5 TABLET ORAL at 05:40

## 2020-05-20 RX ADMIN — OXYCODONE 5 MG: 5 TABLET ORAL at 08:43

## 2020-05-20 RX ADMIN — TAMSULOSIN HYDROCHLORIDE 0.4 MG: 0.4 CAPSULE ORAL at 16:39

## 2020-05-20 RX ADMIN — HEPARIN SODIUM 5000 UNITS: 5000 INJECTION, SOLUTION INTRAVENOUS; SUBCUTANEOUS at 16:39

## 2020-05-20 RX ADMIN — PANTOPRAZOLE SODIUM 40 MG: 40 TABLET, DELAYED RELEASE ORAL at 07:30

## 2020-05-20 RX ADMIN — ACETAMINOPHEN 650 MG: 325 TABLET ORAL at 17:22

## 2020-05-20 RX ADMIN — FINASTERIDE 5 MG: 5 TABLET, FILM COATED ORAL at 08:35

## 2020-05-20 RX ADMIN — ATORVASTATIN CALCIUM 80 MG: 40 TABLET, FILM COATED ORAL at 08:36

## 2020-05-20 RX ADMIN — PREGABALIN 50 MG: 50 CAPSULE ORAL at 08:35

## 2020-05-20 RX ADMIN — CARVEDILOL 3.12 MG: 3.12 TABLET, FILM COATED ORAL at 16:39

## 2020-05-20 RX ADMIN — TAMSULOSIN HYDROCHLORIDE 0.4 MG: 0.4 CAPSULE ORAL at 05:40

## 2020-05-20 RX ADMIN — PANTOPRAZOLE SODIUM 40 MG: 40 TABLET, DELAYED RELEASE ORAL at 05:40

## 2020-05-20 RX ADMIN — Medication 10 ML: at 21:13

## 2020-05-20 RX ADMIN — HYDRALAZINE HYDROCHLORIDE 10 MG: 20 INJECTION INTRAMUSCULAR; INTRAVENOUS at 06:38

## 2020-05-20 RX ADMIN — HEPARIN SODIUM 5000 UNITS: 5000 INJECTION, SOLUTION INTRAVENOUS; SUBCUTANEOUS at 21:35

## 2020-05-20 RX ADMIN — PANTOPRAZOLE SODIUM 40 MG: 40 TABLET, DELAYED RELEASE ORAL at 16:39

## 2020-05-20 RX ADMIN — HEPARIN SODIUM 5000 UNITS: 5000 INJECTION, SOLUTION INTRAVENOUS; SUBCUTANEOUS at 08:38

## 2020-05-20 RX ADMIN — ACETAMINOPHEN 650 MG: 325 TABLET ORAL at 08:43

## 2020-05-20 RX ADMIN — ACETAMINOPHEN 650 MG: 325 TABLET ORAL at 23:08

## 2020-05-20 RX ADMIN — ASPIRIN 81 MG 81 MG: 81 TABLET ORAL at 08:35

## 2020-05-20 RX ADMIN — CLOPIDOGREL BISULFATE 75 MG: 75 TABLET ORAL at 08:36

## 2020-05-20 RX ADMIN — PREGABALIN 50 MG: 50 CAPSULE ORAL at 16:39

## 2020-05-20 RX ADMIN — ONDANSETRON 4 MG: 2 INJECTION INTRAMUSCULAR; INTRAVENOUS at 12:23

## 2020-05-20 NOTE — PROGRESS NOTES
Vascular    Doing ok. Neck hematoma stable. Still with neck pain. Neuro intact  Visit Vitals  /62   Pulse 68   Temp 98 °F (36.7 °C)   Resp 13   Ht 5' 9\" (1.753 m)   Wt 94.7 kg (208 lb 12.4 oz)   SpO2 98%   BMI 30.83 kg/m²     Left neck with moderate sized hematoma. Bruising. No tracheal shift. Neuro intact  Incision clean    75 y/o WM with symptomatic left ICA stenosis, s/p Left TCAR  - There is a moderate neck hematoma that is stable. Will need to watch. [id number] [address] for OOB and transfer to CV floor for blood pressure control. Goal -160. Continue pain medications. Continue aspirin/plavix. subq heparin. OOB to chair.  D/c IVF
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
## 2020-05-20 NOTE — PROGRESS NOTES
2000: Received report from Sandeep Ruggiero RN. Gtt dual verified. Dual RN reviewed hematoma on L side of pt's neck, marked with sharpie around borders. 2100: Hospitalist paged to clarify heparin order. Carol Esquivel NP on telephone, gave orders to give dose for DVT prophylaxis despite bilateral SCD's in place. 0600: Dr. Alex Norton at bedside, updated on pt status overnight and visualized L neck hematoma. Orders to get pt OOB and stop IV fluids. 0630: Resting 's, Dr. Alex Norton on telephone. Updated on current BP, orders to give 10 mg hydralazine now and hold midodrine this morning. Will continue to monitor and give pain medications per orders. 6323: Pt OOB to chair x2 assist without difficulty, SBP remains 170's. 10 mg hydralazine given. 0650: BP: 114/59, automatic BP cuff and arterial line BP match. 0800: Bedside and Verbal shift change report given to America Baker (oncoming nurse) by Madhavi Mcfarland (offgoing nurse). Report included the following information SBAR, ED Summary, OR Summary, Intake/Output and Cardiac Rhythm paced.

## 2020-05-20 NOTE — PROGRESS NOTES
Problem: Self Care Deficits Care Plan (Adult)  Goal: *Acute Goals and Plan of Care (Insert Text)  Description: FUNCTIONAL STATUS PRIOR TO ADMISSION: Patient was modified independent using a single point cane for functional mobility. HOME SUPPORT: The patient lived with children but did not require assistance with ADLs. Per patient report, adult children will be able to provide 24/7 supervision and assist at discharge. Occupational Therapy Goals  Initiated 5/13/2020  1. Patient will perform ADLs in standing for 5 mins without fatigue or LOB with supervision/set-up within 7 day(s) using SPC for balance prn.  2.  Patient will complete supine to sit transfer within sternal precautions with supervision and verbal cues within 7 day(s) in order to alleviate discomfort when preparing for OOB activities. 3.  Patient will perform toilet transfers with supervision/set-up within 7 day(s) using SPC and grab bars prn.  4.  Patient will perform all aspects of toileting with supervision/set-up within 7 day(s) using SPC and grab bars prn. Outcome: Progressing Towards Goal       OCCUPATIONAL THERAPY RE-EVALUATION  Patient: Melissa Winters (67 y.o. male)  Date: 5/20/2020  Diagnosis: Acute CVA (cerebrovascular accident) ([facility] [address] [I63.9]   Acute CVA (cerebrovascular accident) ([facility] [address]  Procedure(s) (LRB):  LEFT TRANSCAROTID ARTERY REVASCULARIZATION (URGENT) (Left) 1 Day Post-Op  Precautions: Fall  Chart, occupational therapy assessment, plan of care, and goals were reviewed. ASSESSMENT  Based on the objective data described below, patient presents with L neck hematoma, generalized weakness, impaired higher level balance and mild decline in functional status s/p POD 1 L TCAR. Pt required CGA for transfers with up to min A for balance loss. Pt able to demonstrate tailor sit for socks. Currently on 3.5L O2 with SPO2 remaining >90% with activity via 4L (on portable tank).  He continues to demonstrate hypotension via arterial line but improves with increased standing time and LE/UE exercises. He remains a fall risk due to his orthostatic hypotension but demonstrated good insight into deficits. Suspect he will be able to discharge home with HHOT/PT and family assist.    Current Level of Function Impacting Discharge (ADLs): CGA to min A ADLs, mildly unsteady    Other factors to consider for discharge: mod I using SPC         PLAN :  Recommendations and Planned Interventions: self care training, functional mobility training, therapeutic exercise, balance training, therapeutic activities, patient education, and home safety training    Frequency/Duration: Patient will be followed by occupational therapy 5 times a week to address goals. Recommend with staff: OOB to chair, Commode with A x 1    Recommend next OT session: standing ADLs    Recommendation for discharge: (in order for the patient to meet his/her long term goals)  Occupational therapy at least 2 days/week in the home     This discharge recommendation:  Has not yet been discussed the attending provider and/or case management    Equipment recommendations for successful discharge (if) home: none       SUBJECTIVE:   Patient stated I have neuropathy.     OBJECTIVE DATA SUMMARY:   Hospital course since last seen and reason for reevaluation: POD 1 TCAR    Cognitive/Behavioral Status:                      Skin: L neck hematoma with bruising    Edema: L neck    Hearing: Auditory  Auditory Impairment: None    Vision/Perceptual:                      Diplopia: No    Acuity: Within Defined Limits    Corrective Lenses: Reading glasses    Range of Motion:    AROM: Within functional limits                         Strength:    Strength: Within functional limits                Coordination:  Coordination: Generally decreased, functional  Fine Motor Skills-Upper: Left Intact; Right Intact(intermittent tremors bilterallyl, baseline)    Gross Motor Skills-Upper: Left Intact; Right Intact    Tone & Sensation:    Tone: Normal  Sensation: Impaired(hx neuropathy)                        Functional Mobility and Transfers for ADLs:  Bed Mobility:   OOb in chair    Transfers:  Sit to Stand: Contact guard assistance  Functional Transfers  Toilet Transfer : Contact guard assistance(inferred transfer to chair)  Bed to Chair: Contact guard assistance;Minimum assistance(for one posterior LOB)    Balance:  Sitting: Intact  Standing: Impaired; Without support  Standing - Static: Fair  Standing - Dynamic : Fair    ADL Assessment:  Feeding: Independent    Oral Facial Hygiene/Grooming: Contact guard assistance    Bathing: Minimum assistance    Upper Body Dressing: Setup    Lower Body Dressing: Contact guard assistance(able to tailor sit)    Toileting: Contact guard assistance                ADL Intervention and task modifications:                                          Therapeutic Exercises:   Encouraged ankle pumps, long arc quads and hand squeezes to assist with BP control 2* orthostatic hypotension     Functional Measure:  Barthel Index:    Bathin  Bladder: 10  Bowels: 10  Groomin  Dressin  Feeding: 10  Mobility: 0  Stairs: 0  Toilet Use: 5  Transfer (Bed to Chair and Back): 10  Total: 55/100        The Barthel ADL Index: Guidelines  1. The index should be used as a record of what a patient does, not as a record of what a patient could do. 2. The main aim is to establish degree of independence from any help, physical or verbal, however minor and for whatever reason. 3. The need for supervision renders the patient not independent. 4. A patient's performance should be established using the best available evidence. Asking the patient, friends/relatives and nurses are the usual sources, but direct observation and common sense are also important. However direct testing is not needed.   5. Usually the patient's performance over the preceding 24-48 hours is important, but occasionally longer periods will be relevant. 6. Middle categories imply that the patient supplies over 50 per cent of the effort. 7. Use of aids to be independent is allowed. Elvi Reed., BarthelSUKUMAR. (1773). Functional evaluation: the Barthel Index. [address] [facility] (14)2. TRISHA Fernandes, Richard Carmen., Archana Bill, Reyna, [address] Measuring the change indisability after inpatient rehabilitation; comparison of the responsiveness of the Barthel Index and Functional [place] Measure. Journal of Neurology, Neurosurgery, and Psychiatry, 66(4), 678-566. Brent Barfield, N.J.A, BHARAT Joyner, & Delano Marie MMonicaA. (2004.) Assessment of post-stroke quality of life in cost-effectiveness studies: The usefulness of the Barthel Index and the EuroQoL-5D. Quality of Life Research, 13, 427-43         Pain:  none    Activity Tolerance:   Good and desaturates with exertion and requires oxygen  Please refer to the flowsheet for vital signs taken during this treatment. After treatment patient left in no apparent distress:   Sitting in chair and Call bell within reach    COMMUNICATION/COLLABORATION:   The patients plan of care was discussed with: Physical therapist and Registered nurse.      Yetta Apgar, OT  Time Calculation: 20 mins

## 2020-05-20 NOTE — PROGRESS NOTES
Transitions of Care: 35% risk of re-admission      -Patient anticipated to discharge home with home health services, PT/OT- CM will request RN for medication reconciliation and education   -Patient lives at home with his two daughters and has family support   -Independent with ADLs at baseline, mobilizes with cane    Patient does not wear Oxygen at baseline- will need to be weaned prior to discharge. The CM met with the patient at bedside, he verified that he is agreeable for [facility]- PT/OT recommendation today recommending  PT/OT, anticipate good progression with therapy. The patient is not medically stable for discharge today. The patient lives with his two daughters Amando José and Lou Pagan, patient gave consent for CM to call daughter Amando José to provide update. Amando José confirmed that her and her sister lives with the patient, patient does not have any Oxygen at baseline- Amando José is agreeable for home health services, interested in any cognitive work/exercises- OT can do additional neuro/MOCA for [facility] OT, daughter would like medical update from MD and would like notice prior to discharge to have time to have the house cleaned/room arranged for the patient. CM will send request to MD.     CM notified bedside RN of above- hospitalist called family, deferring to Dr. Junie Vasquez regarding discharge date, CM informed RN that family would like call from Dr. Junie Vasquez if able. PAULA spoke with Delores, Liaison with [facility]- [facility] has accepted the patient for home health services, will need [facility] PT/OT and possible RN orders if MD agreeable.  CHRISTIANE Abraham

## 2020-05-20 NOTE — PROGRESS NOTES
Hospitalist Progress Note  Beatriz Garcia MD  Answering service: [phone] OR [phone] from in house phone        Date of Service:  2020  NAME:  Chris Campbell. :  1955  MRN:  [id number]      Admission Summary:   As per initial admission summary  This is a 54-year-old man with a past medical history significant for coronary artery disease, status post CABG and multiple stent placements; dyslipidemia; hypertension; type 2 diabetes; seizure disorder; benign prostatic hyperplasia; obstructive sleep apnea; chronic systolic congestive heart failure; chronic kidney disease; status post pacemaker insertion, was in his usual state of health until the day of presentation at the emergency room when the patient developed sudden loss of vision in the left eye. This episode lasted a few seconds. It was followed by headache. The headache is located at the left side of the head, constant throbbing headache, 6/10 in severity. No known aggravating or relieving factors. The patient stated that he has a history of ocular migraine and this present episode is similar to his attack of ocular migraine. The patient also stated that he fell a couple of times at home. Denies loss of consciousness. The circumstances surrounding the fall is not clear. The patient denies fever, rigors, and chills. No sick contact or contact with any person with COVID-19 virus infection. When the patient arrived at the emergency room, CT of the head was obtained. The CT scan did not show any acute pathology. CTA of the head and neck was also performed. This shows no acute large vessel occlusion, but shows severe stenosis, bilateral internal carotid arteries, left greater than right. The patient was subsequently referred to the hospitalist service for evaluation for admission. The patient was last admitted to this hospital from 2019 to 2019.   The patient was admitted to the Cardiology Service for evaluation and treatment of ventricular tachycardia. The patient subsequently underwent ICD placement with adjustments to his medication. Interval history / Subjective:     Patient is overall doing well. No recurrence of visual abnormalities. Assessment & Plan:     Ocular migraine  -Symptoms appear to have resolved  -Neurology evaluated the patient, EEG with no seizure focus  -MRI cannot be done due to presence of pacemaker  -Ophthalmology also discussed over the phone    Orthostatic hypotension  -Symptomatic, and significant drop in BP  -Lasix, lisinopril, eplerenone and Imdur on hold  -Midodrine drain dose adjusted by cardiology  -Pressure goal, systolic 430-000 per vascular    Bilateral internal carotic artery stenosis  -Vascular surgery following, discussed with Dr Brianna Carver  -Status post left carotic revascularization 5/20/2020    Coronary artery disease  -status post CABG and stent placement  -Continue Coreg, aspirin, Plavix and statin    Dyslipidemia  -Continue statin    Type 2 diabetes  -Continue insulin sliding scale coverage  -Blood sugar stable    Seizure disorder  -Stable, EEG without seizure focus  -Continue Keppra    Benign prostatic hyperplasia  -Continue Flomax and finasteride    Obstructive sleep apnea  -Continue CPAP per home settings    Ventricular tachycardia  -Status post pacemaker placement    Chronic systolic congestive heart failure  -Echocardiogram with EF of 16 to 20%.   -Appears compensated  -Diuretics on hold due to hypotension    Chronic kidney disease, stage III  -Continue monitor renal function    Fall  -Status post mechanical fall  -PT recommending home PT    Code status: FULL   DVT prophylaxis: heparin SC     Care Plan discussed with: Patient/Family  Disposition: Home health and home PT     Hospital Problems  Date Reviewed: 5/19/2020          Codes Class Noted POA    * (Principal) Acute CVA (cerebrovascular accident) ([facility] [address] ICD-10-CM: I63.9  ICD-9-CM: 434.91  5/12/2020 Yes                Review of Systems:   A comprehensive review of systems was negative except for that written in the HPI. Vital Signs:    Last 24hrs VS reviewed since prior progress note. Most recent are:  Visit Vitals  /65 (BP 1 Location: Right arm, BP Patient Position: Sitting)   Pulse 63   Temp 97.7 °F (36.5 °C)   Resp 11   Ht 5' 9\" (1.753 m)   Wt 94.7 kg (208 lb 12.4 oz)   SpO2 98%   BMI 30.83 kg/m²         Intake/Output Summary (Last 24 hours) at 5/20/2020 1239  Last data filed at 5/20/2020 0400  Gross per 24 hour   Intake 964.88 ml   Output 1800 ml   Net -835.12 ml        Physical Examination:             Constitutional:  No acute distress, cooperative, pleasant    ENT:  Oral mucous moist, oropharynx benign. Neck supple,    Resp:  CTA bilaterally. No wheezing/rhonchi/rales. No accessory muscle use   CV:  Regular rhythm, normal rate, no murmurs, gallops, rubs    GI:  Soft, non distended, non tender. normoactive bowel sounds, no hepatosplenomegaly     Musculoskeletal:  No edema, warm, 2+ pulses throughout    Neurologic:  Moves all extremities. Awake but somewhat drowsy     Psych:  Good insight, Not anxious nor agitated. Skin: Ecchymosis around the left neck  Hematologic/Lymphatic/Immunlogic:  No jaundice nor lymph node swelling  Eyes:  EOMI. Anicteric sclerae, PERRL. Data Review:    Review and/or order of clinical lab test  Review and/or order of tests in the radiology section of CPT      Labs:     Recent Labs     05/20/20  0325   WBC 7.2   HGB 10.9*   HCT 34.9*   *     Recent Labs     05/20/20  0325 05/18/20  0326    138   K 4.2 4.3   * 107   CO2 25 22   BUN 21* 32*   CREA 1.19 1.61*   * 123*   CA 7.8* 8.2*     No results for input(s): SGOT, GPT, ALT, AP, TBIL, TBILI, TP, ALB, GLOB, GGT, AML, LPSE in the last 72 hours.     No lab exists for component: AMYP, HLPSE  No results for input(s): INR, PTP, APTT, INREXT, INREXT in the last 72 hours. No results for input(s): FE, TIBC, PSAT, FERR in the last 72 hours. Lab Results   Component Value Date/Time    Folate 11.2 05/13/2020 12:27 AM      No results for input(s): PH, PCO2, PO2 in the last 72 hours. No results for input(s): CPK, CKNDX, TROIQ in the last 72 hours.     No lab exists for component: CPKMB  Lab Results   Component Value Date/Time    Cholesterol, total 120 05/13/2020 12:27 AM    HDL Cholesterol 20 05/13/2020 12:27 AM    LDL, calculated 46.2 05/13/2020 12:27 AM    Triglyceride 269 (H) 05/13/2020 12:27 AM    CHOL/HDL Ratio 6.0 (H) 05/13/2020 12:27 AM     Lab Results   Component Value Date/Time    Glucose (POC) 152 (H) 05/20/2020 12:09 PM    Glucose (POC) 130 (H) 05/20/2020 06:48 AM    Glucose (POC) 170 (H) 05/19/2020 09:21 PM    Glucose (POC) 170 (H) 05/19/2020 07:42 PM    Glucose (POC) 127 (H) 05/19/2020 01:17 PM     Lab Results   Component Value Date/Time    Color YELLOW/STRAW 08/21/2019 01:17 PM    Appearance CLEAR 08/21/2019 01:17 PM    Specific gravity 1.020 08/21/2019 01:17 PM    Specific gravity 1.020 04/05/2019 07:25 PM    pH (UA) 5.5 08/21/2019 01:17 PM    Protein 100 (A) 08/21/2019 01:17 PM    Glucose NEGATIVE  08/21/2019 01:17 PM    Ketone NEGATIVE  08/21/2019 01:17 PM    Bilirubin NEGATIVE  08/21/2019 01:17 PM    Urobilinogen 1.0 08/21/2019 01:17 PM    Nitrites NEGATIVE  08/21/2019 01:17 PM    Leukocyte Esterase TRACE (A) 08/21/2019 01:17 PM    Epithelial cells FEW 08/21/2019 01:17 PM    Bacteria 1+ (A) 08/21/2019 01:17 PM    WBC 5-10 08/21/2019 01:17 PM    RBC >100 (H) 08/21/2019 01:17 PM         Medications Reviewed:     Current Facility-Administered Medications   Medication Dose Route Frequency    sodium chloride (NS) flush 5-40 mL  5-40 mL IntraVENous Q8H    sodium chloride (NS) flush 5-40 mL  5-40 mL IntraVENous PRN    ondansetron (ZOFRAN ODT) tablet 4 mg  4 mg Oral Q6H PRN    ondansetron (ZOFRAN) injection 4 mg  4 mg IntraVENous Q4H PRN    fentaNYL citrate (PF) injection 50 mcg  50 mcg IntraVENous Q4H PRN    oxyCODONE IR (ROXICODONE) tablet 5 mg  5 mg Oral Q4H PRN    oxyCODONE IR (ROXICODONE) tablet 10 mg  10 mg Oral Q4H PRN    midodrine (PROAMATINE) tablet 10 mg  10 mg Oral TID WITH MEALS    diphenhydrAMINE-zinc acetate 1%-0.1% (BENADRYL) cream   Topical TID PRN    amiodarone (CORDARONE) tablet 200 mg  200 mg Oral DAILY    aspirin chewable tablet 81 mg  81 mg Oral DAILY    atorvastatin (LIPITOR) tablet 80 mg  80 mg Oral DAILY    carvediloL (COREG) tablet 3.125 mg  3.125 mg Oral BID WITH MEALS    clonazePAM (KlonoPIN) tablet 1 mg  1 mg Oral QHS    clopidogreL (PLAVIX) tablet 75 mg  75 mg Oral DAILY    finasteride (PROSCAR) tablet 5 mg  5 mg Oral 7am    lactulose (CHRONULAC) 10 gram/15 mL solution 45 mL  30 g Oral TID    levETIRAcetam (KEPPRA) tablet 1,000 mg  1,000 mg Oral Q12H    nitroglycerin (NITROSTAT) tablet 0.4 mg  0.4 mg SubLINGual Q5MIN PRN    pantoprazole (PROTONIX) tablet 40 mg  40 mg Oral ACB&D    pregabalin (LYRICA) capsule 50 mg  50 mg Oral TID    QUEtiapine (SEROquel) tablet 25 mg  25 mg Oral QHS    tamsulosin (FLOMAX) capsule 0.4 mg  0.4 mg Oral ACB&D    venlafaxine-SR (EFFEXOR-XR) capsule 75 mg  75 mg Oral BID    insulin lispro (HUMALOG) injection   SubCUTAneous AC&HS    glucose chewable tablet 16 g  4 Tab Oral PRN    glucagon (GLUCAGEN) injection 1 mg  1 mg IntraMUSCular PRN    dextrose 10% infusion 0-250 mL  0-250 mL IntraVENous PRN    acetaminophen (TYLENOL) tablet 650 mg  650 mg Oral Q4H PRN    heparin (porcine) injection 5,000 Units  5,000 Units SubCUTAneous Q8H    bisacodyL (DULCOLAX) suppository 10 mg  10 mg Rectal DAILY PRN     ______________________________________________________________________  EXPECTED LENGTH OF STAY: 2d 2h  ACTUAL LENGTH OF STAY:          8                 Vickey Jeff MD

## 2020-05-20 NOTE — PROGRESS NOTES
0800: Assumed care of patient from off going nurse. Patient up to chair. Noted Hematoma on left neck at surgical site. 0900: Eating breakfast. VSS. 1000: Worked with PT, tolerated well. 1200: Back to bed for rest period. 1300: Up for lunch. Hematoma on neck is unchanged  [id number]: Patient sleeping  1500: spoke with Dr. Amelia Camilo nurse in the office. Will have Dr. Charley Segura call the unit. 1600: Spoke with Dr. Charley Segura: Orders to transfer to CVSU and remove arterial line. Updated about hematoma, central density is unchanged, increase in bruising surrounding surgical site, tender to touch  [id number]: Patient reports blurred vision, bilaterally. RN performed NIH which is negative. Second CVICU nurse performed NIH, remains negative. BG is appropriate. Patient reports unilateral headache, and recent diagnosis of occipital migraine. Gave Tylenol and Soda. Paged [address] to bedside, agrees that patient is not presenting as a stroke, and has a negative NIH. Ordered Head CT. Paging Vascular Surgery to update  0281: Spoke with Dr. Kleber Merino, updated with current events, agrees that it seems like a migraine. But is fine with getting Head CT. Orders to have patient remain on CVICU and to maintain SBP<160. Call vascular if needed for BP supportive order. [id number]: Off floor for head CT. 2000: Bedside and Verbal shift change report given to [address] Leighann Canchola (oncoming nurse) by Reggie Dueñas RN (offgoing nurse). Report included the following information SBAR.

## 2020-05-21 LAB
GLUCOSE BLD STRIP.AUTO-MCNC: 131 MG/DL (ref 65–100)
GLUCOSE BLD STRIP.AUTO-MCNC: 134 MG/DL (ref 65–100)
GLUCOSE BLD STRIP.AUTO-MCNC: 158 MG/DL (ref 65–100)
GLUCOSE BLD STRIP.AUTO-MCNC: 163 MG/DL (ref 65–100)
SERVICE CMNT-IMP: ABNORMAL

## 2020-05-21 PROCEDURE — 65660000001 HC RM ICU INTERMED STEPDOWN

## 2020-05-21 PROCEDURE — 74011636637 HC RX REV CODE- 636/637: Performed by: INTERNAL MEDICINE

## 2020-05-21 PROCEDURE — 74011250636 HC RX REV CODE- 250/636: Performed by: INTERNAL MEDICINE

## 2020-05-21 PROCEDURE — 97110 THERAPEUTIC EXERCISES: CPT

## 2020-05-21 PROCEDURE — 74011250637 HC RX REV CODE- 250/637: Performed by: FAMILY MEDICINE

## 2020-05-21 PROCEDURE — 97530 THERAPEUTIC ACTIVITIES: CPT

## 2020-05-21 PROCEDURE — 82962 GLUCOSE BLOOD TEST: CPT

## 2020-05-21 PROCEDURE — 74011250636 HC RX REV CODE- 250/636: Performed by: SURGERY

## 2020-05-21 PROCEDURE — 74011250637 HC RX REV CODE- 250/637: Performed by: INTERNAL MEDICINE

## 2020-05-21 RX ORDER — SODIUM CHLORIDE 9 MG/ML
50 INJECTION, SOLUTION INTRAVENOUS CONTINUOUS
Status: DISCONTINUED | OUTPATIENT
Start: 2020-05-21 | End: 2020-05-24 | Stop reason: HOSPADM

## 2020-05-21 RX ORDER — HYDRALAZINE HYDROCHLORIDE 20 MG/ML
10 INJECTION INTRAMUSCULAR; INTRAVENOUS
Status: DISCONTINUED | OUTPATIENT
Start: 2020-05-21 | End: 2020-05-24 | Stop reason: HOSPADM

## 2020-05-21 RX ADMIN — FINASTERIDE 5 MG: 5 TABLET, FILM COATED ORAL at 06:25

## 2020-05-21 RX ADMIN — VENLAFAXINE HYDROCHLORIDE 75 MG: 37.5 CAPSULE, EXTENDED RELEASE ORAL at 10:14

## 2020-05-21 RX ADMIN — PREGABALIN 50 MG: 50 CAPSULE ORAL at 17:19

## 2020-05-21 RX ADMIN — Medication 10 ML: at 06:06

## 2020-05-21 RX ADMIN — HEPARIN SODIUM 5000 UNITS: 5000 INJECTION, SOLUTION INTRAVENOUS; SUBCUTANEOUS at 06:06

## 2020-05-21 RX ADMIN — VENLAFAXINE HYDROCHLORIDE 75 MG: 37.5 CAPSULE, EXTENDED RELEASE ORAL at 17:20

## 2020-05-21 RX ADMIN — CLONAZEPAM 1 MG: 1 TABLET ORAL at 22:20

## 2020-05-21 RX ADMIN — MIDODRINE HYDROCHLORIDE 10 MG: 5 TABLET ORAL at 17:20

## 2020-05-21 RX ADMIN — OXYCODONE 10 MG: 5 TABLET ORAL at 15:31

## 2020-05-21 RX ADMIN — FENTANYL CITRATE 50 MCG: 50 INJECTION INTRAMUSCULAR; INTRAVENOUS at 21:17

## 2020-05-21 RX ADMIN — LEVETIRACETAM 1000 MG: 500 TABLET, FILM COATED ORAL at 10:13

## 2020-05-21 RX ADMIN — INSULIN LISPRO 2 UNITS: 100 INJECTION, SOLUTION INTRAVENOUS; SUBCUTANEOUS at 07:36

## 2020-05-21 RX ADMIN — PANTOPRAZOLE SODIUM 40 MG: 40 TABLET, DELAYED RELEASE ORAL at 17:20

## 2020-05-21 RX ADMIN — SODIUM CHLORIDE 50 ML/HR: 900 INJECTION, SOLUTION INTRAVENOUS at 12:22

## 2020-05-21 RX ADMIN — Medication 10 ML: at 15:31

## 2020-05-21 RX ADMIN — MIDODRINE HYDROCHLORIDE 10 MG: 5 TABLET ORAL at 12:39

## 2020-05-21 RX ADMIN — PREGABALIN 50 MG: 50 CAPSULE ORAL at 22:20

## 2020-05-21 RX ADMIN — OXYCODONE 10 MG: 5 TABLET ORAL at 00:11

## 2020-05-21 RX ADMIN — LEVETIRACETAM 1000 MG: 500 TABLET, FILM COATED ORAL at 20:21

## 2020-05-21 RX ADMIN — ASPIRIN 81 MG 81 MG: 81 TABLET ORAL at 10:14

## 2020-05-21 RX ADMIN — QUETIAPINE FUMARATE 25 MG: 25 TABLET ORAL at 22:20

## 2020-05-21 RX ADMIN — HEPARIN SODIUM 5000 UNITS: 5000 INJECTION, SOLUTION INTRAVENOUS; SUBCUTANEOUS at 22:21

## 2020-05-21 RX ADMIN — ACETAMINOPHEN 650 MG: 325 TABLET ORAL at 20:21

## 2020-05-21 RX ADMIN — CLOPIDOGREL BISULFATE 75 MG: 75 TABLET ORAL at 10:13

## 2020-05-21 RX ADMIN — ONDANSETRON 4 MG: 2 INJECTION INTRAMUSCULAR; INTRAVENOUS at 20:28

## 2020-05-21 RX ADMIN — INSULIN LISPRO 2 UNITS: 100 INJECTION, SOLUTION INTRAVENOUS; SUBCUTANEOUS at 12:39

## 2020-05-21 RX ADMIN — ATORVASTATIN CALCIUM 80 MG: 40 TABLET, FILM COATED ORAL at 10:13

## 2020-05-21 RX ADMIN — Medication 10 ML: at 22:21

## 2020-05-21 RX ADMIN — AMIODARONE HYDROCHLORIDE 200 MG: 200 TABLET ORAL at 10:14

## 2020-05-21 RX ADMIN — CARVEDILOL 3.12 MG: 3.12 TABLET, FILM COATED ORAL at 17:19

## 2020-05-21 RX ADMIN — ACETAMINOPHEN 650 MG: 325 TABLET ORAL at 07:32

## 2020-05-21 RX ADMIN — TAMSULOSIN HYDROCHLORIDE 0.4 MG: 0.4 CAPSULE ORAL at 17:19

## 2020-05-21 RX ADMIN — PANTOPRAZOLE SODIUM 40 MG: 40 TABLET, DELAYED RELEASE ORAL at 06:23

## 2020-05-21 RX ADMIN — CARVEDILOL 3.12 MG: 3.12 TABLET, FILM COATED ORAL at 10:14

## 2020-05-21 RX ADMIN — TAMSULOSIN HYDROCHLORIDE 0.4 MG: 0.4 CAPSULE ORAL at 06:23

## 2020-05-21 RX ADMIN — PREGABALIN 50 MG: 50 CAPSULE ORAL at 10:13

## 2020-05-21 RX ADMIN — HEPARIN SODIUM 5000 UNITS: 5000 INJECTION, SOLUTION INTRAVENOUS; SUBCUTANEOUS at 14:55

## 2020-05-21 NOTE — PROGRESS NOTES
Hospitalist Progress Note  Peter Almazan MD  Answering service: [phone] from in house phone        Date of Service:  2020  NAME:  Oralia Nielson. :  1955  MRN:  [id number]      Admission Summary:   As per initial admission summary  This is a 21-year-old man with a past medical history significant for coronary artery disease, status post CABG and multiple stent placements; dyslipidemia; hypertension; type 2 diabetes; seizure disorder; benign prostatic hyperplasia; obstructive sleep apnea; chronic systolic congestive heart failure; chronic kidney disease; status post pacemaker insertion, was in his usual state of health until the day of presentation at the emergency room when the patient developed sudden loss of vision in the left eye. This episode lasted a few seconds. It was followed by headache. The headache is located at the left side of the head, constant throbbing headache, 6/10 in severity. No known aggravating or relieving factors. The patient stated that he has a history of ocular migraine and this present episode is similar to his attack of ocular migraine. The patient also stated that he fell a couple of times at home. Denies loss of consciousness. The circumstances surrounding the fall is not clear. The patient denies fever, rigors, and chills. No sick contact or contact with any person with COVID-19 virus infection. When the patient arrived at the emergency room, CT of the head was obtained. The CT scan did not show any acute pathology. CTA of the head and neck was also performed. This shows no acute large vessel occlusion, but shows severe stenosis, bilateral internal carotid arteries, left greater than right. The patient was subsequently referred to the hospitalist service for evaluation for admission. The patient was last admitted to this hospital from 2019 to 2019.   The patient was admitted to the Cardiology Service for evaluation and treatment of ventricular tachycardia. The patient subsequently underwent ICD placement with adjustments to his medication. Interval history / Subjective:     Blurring of vision is better, still has orthostatic hypotension with PT, Pain due to neck hematoma may be the cause of his overall symptoms. Discussed with Dr Brianna Carver. Assessment & Plan:     Ocular migraine  -Symptoms appear to have resolved  -Neurology evaluated the patient, EEG with no seizure focus  -MRI cannot be done due to presence of pacemaker  -Ophthalmology also discussed over the phone    Blurry vision  Better today    Orthostatic hypotension  -Symptomatic, and significant drop in BP  -Lasix, lisinopril, eplerenone and Imdur on hold  -Midodrine drain dose adjusted by cardiology  -Pressure goal, systolic 139-184 per vascular  -Coreg stopped     Bilateral internal carotic artery stenosis  -Vascular surgery following, discussed with Dr Brianna Carver  -Status post left carotic revascularization 5/20/2020    Coronary artery disease  -status post CABG and stent placement  -Continue Coreg, aspirin, Plavix and statin    Dyslipidemia  -Continue statin    Type 2 diabetes  -Controlled  -Continue insulin sliding scale coverage  -Blood sugar stable    Seizure disorder  -Stable, EEG without seizure focus  -Continue Keppra    Benign prostatic hyperplasia  -Continue Flomax and finasteride    Obstructive sleep apnea  -Continue CPAP per home settings    Ventricular tachycardia  -Status post pacemaker placement    Chronic systolic congestive heart failure  -Echocardiogram with EF of 16 to 20%.   -Appears compensated  -Diuretics on hold due to hypotension    Chronic kidney disease, stage III  -Continue monitor renal function    Fall  -Status post mechanical fall  -PT recommending home PT    Code status: FULL   DVT prophylaxis: heparin SC     Care Plan discussed with: Patient/Family  Disposition: Home health and home PT     Hospital Problems  Date Reviewed: 5/19/2020          Codes Class Noted POA    * (Principal) Acute CVA (cerebrovascular accident) [facility]) ICD-10-CM: I63.9  ICD-9-CM: 434.91  5/12/2020 Yes                Review of Systems:   A comprehensive review of systems was negative except for that written in the HPI. Vital Signs:    Last 24hrs VS reviewed since prior progress note. Most recent are:  Visit Vitals  /68 (BP 1 Location: Right arm, BP Patient Position: At rest)   Pulse 70   Temp 97.7 °F (36.5 °C)   Resp 13   Ht 5' 9\" (1.753 m)   Wt 96.1 kg (211 lb 14.4 oz)   SpO2 99%   BMI 31.29 kg/m²         Intake/Output Summary (Last 24 hours) at 5/21/2020 1740  Last data filed at 5/21/2020 0800  Gross per 24 hour   Intake 920 ml   Output 725 ml   Net 195 ml        Physical Examination:             Constitutional:  No acute distress, cooperative, pleasant    ENT:  Oral mucous moist, oropharynx benign. Neck supple,    Resp:  CTA bilaterally. No wheezing/rhonchi/rales. No accessory muscle use   CV:  Regular rhythm, normal rate, no murmurs, gallops, rubs    GI:  Soft, non distended, non tender. normoactive bowel sounds, no hepatosplenomegaly     Musculoskeletal:  No edema, warm, 2+ pulses throughout    Neurologic:  Moves all extremities. Awake but somewhat drowsy     Psych:  Good insight, Not anxious nor agitated. Skin: Ecchymosis around the left neck  Hematologic/Lymphatic/Immunlogic:  No jaundice nor lymph node swelling  Eyes:  EOMI. Anicteric sclerae, PERRL. Data Review:    Review and/or order of clinical lab test  Review and/or order of tests in the radiology section of CPT      Labs:     Recent Labs     05/20/20  0325   WBC 7.2   HGB 10.9*   HCT 34.9*   *     Recent Labs     05/20/20  0325      K 4.2   *   CO2 25   BUN 21*   CREA 1.19   *   CA 7.8*     No results for input(s): SGOT, GPT, ALT, AP, TBIL, TBILI, TP, ALB, GLOB, GGT, AML, LPSE in the last 72 hours.     No lab exists for component: AMYP, HLPSE  No results for input(s): INR, PTP, APTT, INREXT, INREXT in the last 72 hours. No results for input(s): FE, TIBC, PSAT, FERR in the last 72 hours. Lab Results   Component Value Date/Time    Folate 11.2 05/13/2020 12:27 AM      No results for input(s): PH, PCO2, PO2 in the last 72 hours. No results for input(s): CPK, CKNDX, TROIQ in the last 72 hours.     No lab exists for component: CPKMB  Lab Results   Component Value Date/Time    Cholesterol, total 120 05/13/2020 12:27 AM    HDL Cholesterol 20 05/13/2020 12:27 AM    LDL, calculated 46.2 05/13/2020 12:27 AM    Triglyceride 269 (H) 05/13/2020 12:27 AM    CHOL/HDL Ratio 6.0 (H) 05/13/2020 12:27 AM     Lab Results   Component Value Date/Time    Glucose (POC) 134 (H) 05/21/2020 04:46 PM    Glucose (POC) 158 (H) 05/21/2020 12:33 PM    Glucose (POC) 163 (H) 05/21/2020 07:34 AM    Glucose (POC) 126 (H) 05/20/2020 09:33 PM    Glucose (POC) 143 (H) 05/20/2020 04:52 PM     Lab Results   Component Value Date/Time    Color YELLOW/STRAW 08/21/2019 01:17 PM    Appearance CLEAR 08/21/2019 01:17 PM    Specific gravity 1.020 08/21/2019 01:17 PM    Specific gravity 1.020 04/05/2019 07:25 PM    pH (UA) 5.5 08/21/2019 01:17 PM    Protein 100 (A) 08/21/2019 01:17 PM    Glucose NEGATIVE  08/21/2019 01:17 PM    Ketone NEGATIVE  08/21/2019 01:17 PM    Bilirubin NEGATIVE  08/21/2019 01:17 PM    Urobilinogen 1.0 08/21/2019 01:17 PM    Nitrites NEGATIVE  08/21/2019 01:17 PM    Leukocyte Esterase TRACE (A) 08/21/2019 01:17 PM    Epithelial cells FEW 08/21/2019 01:17 PM    Bacteria 1+ (A) 08/21/2019 01:17 PM    WBC 5-10 08/21/2019 01:17 PM    RBC >100 (H) 08/21/2019 01:17 PM         Medications Reviewed:     Current Facility-Administered Medications   Medication Dose Route Frequency    hydrALAZINE (APRESOLINE) 20 mg/mL injection 10 mg  10 mg IntraVENous Q2H PRN    0.9% sodium chloride infusion  50 mL/hr IntraVENous CONTINUOUS    sodium chloride (NS) flush 5-40 mL  5-40 mL IntraVENous Q8H    sodium chloride (NS) flush 5-40 mL  5-40 mL IntraVENous PRN    ondansetron (ZOFRAN ODT) tablet 4 mg  4 mg Oral Q6H PRN    ondansetron (ZOFRAN) injection 4 mg  4 mg IntraVENous Q4H PRN    fentaNYL citrate (PF) injection 50 mcg  50 mcg IntraVENous Q4H PRN    oxyCODONE IR (ROXICODONE) tablet 5 mg  5 mg Oral Q4H PRN    oxyCODONE IR (ROXICODONE) tablet 10 mg  10 mg Oral Q4H PRN    midodrine (PROAMATINE) tablet 10 mg  10 mg Oral TID WITH MEALS    diphenhydrAMINE-zinc acetate 1%-0.1% (BENADRYL) cream   Topical TID PRN    amiodarone (CORDARONE) tablet 200 mg  200 mg Oral DAILY    aspirin chewable tablet 81 mg  81 mg Oral DAILY    atorvastatin (LIPITOR) tablet 80 mg  80 mg Oral DAILY    carvediloL (COREG) tablet 3.125 mg  3.125 mg Oral BID WITH MEALS    clonazePAM (KlonoPIN) tablet 1 mg  1 mg Oral QHS    clopidogreL (PLAVIX) tablet 75 mg  75 mg Oral DAILY    finasteride (PROSCAR) tablet 5 mg  5 mg Oral 7am    lactulose (CHRONULAC) 10 gram/15 mL solution 45 mL  30 g Oral TID    levETIRAcetam (KEPPRA) tablet 1,000 mg  1,000 mg Oral Q12H    nitroglycerin (NITROSTAT) tablet 0.4 mg  0.4 mg SubLINGual Q5MIN PRN    pantoprazole (PROTONIX) tablet 40 mg  40 mg Oral ACB&D    pregabalin (LYRICA) capsule 50 mg  50 mg Oral TID    QUEtiapine (SEROquel) tablet 25 mg  25 mg Oral QHS    tamsulosin (FLOMAX) capsule 0.4 mg  0.4 mg Oral ACB&D    venlafaxine-SR (EFFEXOR-XR) capsule 75 mg  75 mg Oral BID    insulin lispro (HUMALOG) injection   SubCUTAneous AC&HS    glucose chewable tablet 16 g  4 Tab Oral PRN    glucagon (GLUCAGEN) injection 1 mg  1 mg IntraMUSCular PRN    dextrose 10% infusion 0-250 mL  0-250 mL IntraVENous PRN    acetaminophen (TYLENOL) tablet 650 mg  650 mg Oral Q4H PRN    heparin (porcine) injection 5,000 Units  5,000 Units SubCUTAneous Q8H    bisacodyL (DULCOLAX) suppository 10 mg  10 mg Rectal DAILY PRN ______________________________________________________________________  EXPECTED LENGTH OF STAY: 2d 2h  ACTUAL LENGTH OF STAY:          9                 Primitivo Wilson MD

## 2020-05-21 NOTE — PROGRESS NOTES
1945: Bedside shift change report given to Anju Brown RN (oncoming nurse) by Kay Godwin RN (offgoing nurse). Report included the following information SBAR, Kardex, Procedure Summary, Intake/Output, MAR, Accordion, Recent Results, Med Rec Status, and Cardiac Rhythm NSR and paced . 2000: Assumed care of patient resting quietly in bed, AO x 5, mild complaints of pain (headache), VSS, hematoma noted on L neck (not new). 2100: Pt had episode of urinary incontinence, condom cath placed on patient. Will continue to monitor. 0000: No change from previous assessment. Pt still complaining of moderate headache. Pain medications administered PRN. 0400: No change from previous assessment. 3922: Pt OOB to chair with standby assist. Pt did complain of mild dizziness. Will continue to monitor. 0645: Dr. Diony Hsieh at bedside to assess patient. Orders received for PRN hydralazine for SBP > 160 and to transfer patient. 9798: Bedside shift change report given to José Miguel Angulo RN (oncoming nurse) by Anju Brown RN (offgoing nurse). Report included the following information SBAR, Kardex, Procedure Summary, Intake/Output, MAR, Accordion, Recent Results, Med Rec Status and Cardiac Rhythm NSR paced. Problem: Diabetes Self-Management  Goal: *Disease process and treatment process  Description: Define diabetes and identify own type of diabetes; list 3 options for treating diabetes. Outcome: Progressing Towards Goal  Goal: *Incorporating nutritional management into lifestyle  Description: Describe effect of type, amount and timing of food on blood glucose; list 3 methods for planning meals. Outcome: Progressing Towards Goal  Goal: *Incorporating physical activity into lifestyle  Description: State effect of exercise on blood glucose levels. Outcome: Progressing Towards Goal     Problem: Falls - Risk of  Goal: *Absence of Falls  Description: Document Danney Mess Fall Risk and appropriate interventions in the flowsheet.   Outcome: Progressing Towards Goal  Note: Fall Risk Interventions:  Mobility Interventions: Communicate number of staff needed for ambulation/transfer, Patient to call before getting OOB, PT Consult for mobility concerns, PT Consult for assist device competence, OT consult for ADLs, Strengthening exercises (ROM-active/passive)    Mentation Interventions: Door open when patient unattended, Update white board    Medication Interventions: Evaluate medications/consider consulting pharmacy, Patient to call before getting OOB, Teach patient to arise slowly    Elimination Interventions: Call light in reach, Patient to call for help with toileting needs, Toilet paper/wipes in reach, Toileting schedule/hourly rounds, Urinal in reach    History of Falls Interventions: Consult care management for discharge planning, Door open when patient unattended, Evaluate medications/consider consulting pharmacy, Investigate reason for fall, Room close to nurse's station         Problem: Pressure Injury - Risk of  Goal: *Prevention of pressure injury  Description: Document Iván Scale and appropriate interventions in the flowsheet.   Outcome: Progressing Towards Goal  Note: Pressure Injury Interventions:  Sensory Interventions: Assess changes in LOC, Avoid rigorous massage over bony prominences, Check visual cues for pain, Discuss PT/OT consult with provider, Float heels, Keep linens dry and wrinkle-free, Maintain/enhance activity level, Minimize linen layers, Monitor skin under medical devices, Pad between skin to skin, Pressure redistribution bed/mattress (bed type)    Moisture Interventions: Absorbent underpads, Check for incontinence Q2 hours and as needed    Activity Interventions: Assess need for specialty bed, Increase time out of bed, Pressure redistribution bed/mattress(bed type), PT/OT evaluation    Mobility Interventions: Assess need for specialty bed, Float heels, Pressure redistribution bed/mattress (bed type), Turn and reposition approx. every two hours(pillow and wedges), PT/OT evaluation    Nutrition Interventions: Document food/fluid/supplement intake    Friction and Shear Interventions: Apply protective barrier, creams and emollients, Lift sheet                Problem: Ischemic Stroke: Discharge Outcomes  Goal: *Verbalizes anxiety and depression are reduced or absent  Outcome: Progressing Towards Goal  Goal: *Verbalize understanding of risk factor modification(Stroke Metric)  Outcome: Progressing Towards Goal  Goal: *Hemodynamically stable  Outcome: Progressing Towards Goal  Goal: *Absence of aspiration pneumonia  Outcome: Progressing Towards Goal  Goal: *Tolerating diet  Outcome: Progressing Towards Goal     Problem: Heart Failure: Discharge Outcomes  Goal: *Demonstrates ability to perform prescribed activity without shortness of breath or discomfort  Outcome: Progressing Towards Goal  Goal: *Verbalizes understanding and describes prescribed diet  Outcome: Progressing Towards Goal  Goal: *Verbalizes understanding/describes prescribed medications  Outcome: Progressing Towards Goal     Problem: Carotid Endarterectomy Pathway: Post-Op Day 1  Goal: *Optimal pain control at patient's stated goal  Outcome: Resolved/Not Met  Goal: *Adequate urinary output (equal to or greater than 30 milliliters/hour)  Outcome: Resolved/Not Met     Problem: Risk for Spread of Infection  Goal: Prevent transmission of infectious organism to others  Description: Prevent the transmission of infectious organisms to other patients, staff members, and visitors.   Outcome: Resolved/Met     Problem: Patient Education:  Go to Education Activity  Goal: Patient/Family Education  Outcome: Resolved/Met     Problem: Carotid Endarterectomy Pathway: Post-Op Day 1  Goal: Off Pathway (Use only if patient is Off Pathway)  Outcome: Resolved/Met  Goal: Activity/Safety  Outcome: Resolved/Met  Goal: Diagnostic Test/Procedures  Outcome: Resolved/Met  Goal: Nutrition/Diet  Outcome: Resolved/Met  Goal: Discharge Planning  Outcome: Resolved/Met  Goal: Medications  Outcome: Resolved/Met  Goal: Respiratory  Outcome: Resolved/Met  Goal: Treatments/Interventions/Procedures  Outcome: Resolved/Met  Goal: Psychosocial  Outcome: Resolved/Met  Goal: *No signs and symptoms of infection or wound complications  Outcome: Resolved/Met  Goal: *Hemodynamically stable  Outcome: Resolved/Met  Goal: *Tolerating diet  Outcome: Resolved/Met  Goal: *Demonstrates progressive activity  Outcome: Resolved/Met  Goal: *Lungs clear or at baseline  Outcome: Resolved/Met

## 2020-05-21 NOTE — PROGRESS NOTES
Vascular    Doing ok, 4/10 headache and neck pain which is better. No further blurry vision  Visit Vitals  /66 (BP 1 Location: Left arm, BP Patient Position: At rest)   Pulse 66   Temp 98.6 °F (37 °C)   Resp 16   Ht 5' 9\" (1.753 m)   Wt 96.1 kg (211 lb 14.4 oz)   SpO2 96%   BMI 31.29 kg/m²     Neuro intact  Neck bruised and stable hematoma       77 y/o WM with symptomatic left ICA stenosis, s/p Left TCAR  - Moderate neck hematoma is stable. Pain improving slowly and swallowing/breathing ok.  Continue pain medications.  - SBP <160 with hydralazine PRN   - Continue aspirin/plavix and subq heparin.    - PT/OT   - Hopefully home tomorrow if has good day/night

## 2020-05-21 NOTE — PROGRESS NOTES
Problem: Mobility Impaired (Adult and Pediatric)  Goal: *Acute Goals and Plan of Care (Insert Text)  Description: FUNCTIONAL STATUS PRIOR TO ADMISSION: Patient was modified independent using a single point cane for functional mobility. HOME SUPPORT PRIOR TO ADMISSION: The patient lived with children who provide 24/7 assistance. Physical Therapy Goals  Initiated 5/13/2020; goals reviewed and remain appropriate 5/20/2020  1. Patient will move from supine to sit and sit to supine  in bed with independence within 7 day(s). 2.  Patient will transfer from bed to chair and chair to bed with modified independence using the least restrictive device within 7 day(s). 3.  Patient will perform sit to stand with minimal assistance/contact guard assist within 7 day(s). 4.  Patient will ambulate with modified independence for 150 feet with the least restrictive device within 7 day(s). 5.  Patient will ascend/descend 2 stairs with 1 handrail(s) with modified independence within 7 day(s). Outcome: Progressing Towards Goal       PHYSICAL THERAPY TREATMENT  Patient: Medina Carlton. (67 y.o. male)  Date: 5/21/2020  Diagnosis: Acute CVA (cerebrovascular accident) ([facility] [address] [I63.9]   Acute CVA (cerebrovascular accident) ([facility] [address]  Procedure(s) (LRB):  LEFT TRANSCAROTID ARTERY REVASCULARIZATION (URGENT) (Left) 2 Days Post-Op  Precautions: Fall  Chart, physical therapy assessment, plan of care and goals were reviewed. ASSESSMENT  Patient continues with skilled PT services and is progressing towards goals. Pt enthusiastic regarding mobility, however noted to have increased orthostasis today. Pt BP intially 147/73, bottoms out supine to sit with 79/59. Pt rested initially and retaken believing this to be an error. Pt 128/82. Pt stood and noted to drop out again, with symptomatic response, attempted sitting rest break and once recovered attempted again.  On the second stand patient appeared to have eyes rolling back in his head, remained conscious, unable to keep eyes open. Returned pt to sitting then supine. Pt is inappropriate for gait training at this time. Pt also complaining of HA in scalp, supraorbital and forehead. With noted significant hematoma, migrating from L carotid areas to wrap around L side of neck and R sternocladomastoid area, PT investigated tactilely patients musculature of neck, found heavy spasms, trigger points, symptomatic referral of pain from SCM, Occiput, trap, scalenes and likely other musculature. Investigation limited by limiting palpation of migrated hematoma areas and not palpating near surgical incision. Due to the repeatable nature of referred pain, expect that at least part of the HA is comprised of referred pain from hematoma pressing on spasms and trigger points in muscles. Pt received some relief from occiput release, will continue to assess. Recommend icepacks for ms spasms and hematoma management. Patient Vitals for the past 4 hrs:   position Pulse Resp BP SpO2   05/21/20 1130 Supine post tx 67 13 148/68 95 %   05/21/20 1122 Standing #2 81 18 92/59 --   05/21/20 1120 Sitting  #2 75 15 138/72 96 %   05/21/20 1115 Standing #1 83 14 98/60 98 %   05/21/20 1111 Sitting post rest 78 16 128/82 93 %   05/21/20 1109 Sitting intial from supine 82 15 (!) 79/57 98 %   05/21/20 1100 Supine prior to tx 72 12 147/73 95 %     Current Level of Function Impacting Discharge (mobility/balance): limited today by significant orthostatic hypotension    Other factors to consider for discharge: TBD         PLAN :  Patient continues to benefit from skilled intervention to address the above impairments. Continue treatment per established plan of care. to address goals.     Recommendation for discharge: (in order for the patient to meet his/her long term goals)  Physical therapy at least 2 days/week in the home     This discharge recommendation:  Has been made in collaboration with the attending provider and/or case management    IF patient discharges home will need the following DME: rolling walker       SUBJECTIVE:   Patient stated I just dont feel good.     OBJECTIVE DATA SUMMARY:   Critical Behavior:  Neurologic State: Alert, Appropriate for age  Orientation Level: Oriented X4  Cognition: Appropriate decision making, Poor safety awareness, Appropriate for age attention/concentration, Follows commands  Safety/Judgement: Awareness of environment, Decreased awareness of need for assistance, Decreased awareness of need for safety, Decreased insight into deficits  Functional Mobility Training:  Bed Mobility:     Supine to Sit: Contact guard assistance;Minimum assistance  Sit to Supine: Moderate assistance  Scooting: Contact guard assistance        Transfers:  Sit to Stand: Contact guard assistance  Stand to Sit: Contact guard assistance                             Balance:  Sitting: Intact  Standing: Impaired; With support  Standing - Static: Poor; Fair  Standing - Dynamic : Poor; Fair  Ambulation/Gait Training:           Therapeutic Exercises:   Marching EOB to raise BP, STM to occiput and neck musculature where able. Pain Rating:  Unrated HA pain    Activity Tolerance:   Fair, requires frequent rest breaks, and signs and symptoms of orthostatic hypotension  Please refer to the flowsheet for vital signs taken during this treatment. After treatment patient left in no apparent distress:   Supine in bed and Call bell within reach    COMMUNICATION/COLLABORATION:   The patients plan of care was discussed with: Registered nurse and Case management.      Kalyan Mejias, PT   Time Calculation: 40 mins

## 2020-05-21 NOTE — PROGRESS NOTES
0900 Update given to Dr. Dolly Hodgkin about level of care and unit placement. Pt now back in bed.    1100: Hospitalist here during PT and ordered IV fluids for orthostatic issues. Neurology ordered to revisit pt today. [facility] Dr. Dolly Hodgkin updated again on pt's orthostatic BP   1200 Spoke to Dr. Alysia Johnson after speaking to Neurology. UPDATE given. Neurology is not coming to see pt at this time. 1430: TRANSFER - OUT REPORT:    Verbal report given to 97 Monika Raymond RN(name) on Timur Stauffer.  being transferred to NSTU(unit) for routine progression of care       Report consisted of patients Situation, Background, Assessment and   Recommendations(SBAR). Information from the following report(s) SBAR, OR Summary, Intake/Output and Cardiac Rhythm PACED VVI was reviewed with the receiving nurse. Lines:   Peripheral IV 05/16/20 Anterior;Right Wrist (Active)   Phlebitis Assessment 0 5/21/2020  8:00 AM   Infiltration Assessment 0 5/21/2020  8:00 AM   Dressing Status Intact 5/21/2020  8:00 AM   Dressing Type Transparent 5/21/2020  8:00 AM   Hub Color/Line Status Pink;Capped;Flushed 5/21/2020  8:00 AM   Action Taken Open ports on tubing capped 5/21/2020  8:00 AM   Alcohol Cap Used Yes 5/21/2020  8:00 AM       Peripheral IV 05/21/20 Right; Anterior (Active)   Site Assessment Clean, dry, & intact 5/21/2020 12:41 PM   Phlebitis Assessment 0 5/21/2020 12:41 PM   Infiltration Assessment 0 5/21/2020 12:41 PM   Dressing Status Clean, dry, & intact 5/21/2020 12:41 PM   Dressing Type Transparent;Tape 5/21/2020 12:41 PM   Hub Color/Line Status Blue;Capped;Flushed;Patent 5/21/2020 12:41 PM   Action Taken Open ports on tubing capped 5/21/2020 12:41 PM   Alcohol Cap Used Yes 5/21/2020 12:41 PM        Opportunity for questions and clarification was provided.       Patient transported with:   Monitor

## 2020-05-21 NOTE — PROGRESS NOTES
Transitions of Care: 33% risk of re-admission      -Patient will discharge home where he lives with his two daughters Talon Parson and Claude Call with home health PT/OT with [facility]   -Family to transport   -Vascular Surgery follow-up    The CM participated in [id number] rounds, patient is now on room air- PT/OT recommending home health. The patient has significant family support at home, lives with two daughters Claude Call and Talon Parson. The patient is accepted for home health with [facility] for PT/OT- CM called and notified Delores Liaison, of anticipated discharge tomorrow, 5/22. The CM called the patient's daughter Talon Parson and notified her of anticipated discharge tomorrow, she is agreeable and appreciative of notice so she can have the patient's room prepared. CM will follow for transitions of care needs.  CHRISTIANE Mariee

## 2020-05-21 NOTE — PROGRESS NOTES
Occupational Therapy  Chart reviewed. Attempted to see pt earlier but PT just finished working with patient. PT reported pt was very orthostatic, symptomatic. Pt to receive fluid bolus, will follow.  Yvonne Longoria MS, OTR/L

## 2020-05-21 NOTE — PROGRESS NOTES
Problem: Diabetes Self-Management  Goal: *Disease process and treatment process  Description: Define diabetes and identify own type of diabetes; list 3 options for treating diabetes. 5/21/2020 1834 by Ragena Ing  Outcome: Progressing Towards Goal  5/21/2020 1833 by Ragena Ing  Outcome: Progressing Towards Goal  Goal: *Incorporating nutritional management into lifestyle  Description: Describe effect of type, amount and timing of food on blood glucose; list 3 methods for planning meals. 5/21/2020 1834 by Ragena Ing  Outcome: Progressing Towards Goal  5/21/2020 1833 by Ragena Ing  Outcome: Progressing Towards Goal  Goal: *Incorporating physical activity into lifestyle  Description: State effect of exercise on blood glucose levels. 5/21/2020 1834 by Ragena Ing  Outcome: Progressing Towards Goal  5/21/2020 1833 by Ragena Ing  Outcome: Progressing Towards Goal  Goal: *Developing strategies to promote health/change behavior  Description: Define the ABC's of diabetes; identify appropriate screenings, schedule and personal plan for screenings. 5/21/2020 1834 by Ragena Ing  Outcome: Progressing Towards Goal  5/21/2020 1833 by Ragena Ing  Outcome: Progressing Towards Goal  Goal: *Using medications safely  Description: State effect of diabetes medications on diabetes; name diabetes medication taking, action and side effects. 5/21/2020 1834 by Ragena Ing  Outcome: Progressing Towards Goal  5/21/2020 1833 by Ragena Ing  Outcome: Progressing Towards Goal  Goal: *Monitoring blood glucose, interpreting and using results  Description: Identify recommended blood glucose targets  and personal targets.   5/21/2020 1834 by Ragena Ing  Outcome: Progressing Towards Goal  5/21/2020 1833 by Ragena Ing  Outcome: Progressing Towards Goal  Goal: *Prevention, detection, treatment of acute complications  Description: List symptoms of hyper- and hypoglycemia; describe how to treat low blood sugar and actions for lowering  high blood glucose level.  5/21/2020 1834 by Babatunde Watson  Outcome: Progressing Towards Goal  5/21/2020 1833 by Babatunde Watson  Outcome: Progressing Towards Goal  Goal: *Prevention, detection and treatment of chronic complications  Description: Define the natural course of diabetes and describe the relationship of blood glucose levels to long term complications of diabetes. 5/21/2020 1834 by Babatunde Watson  Outcome: Progressing Towards Goal  5/21/2020 1833 by Babatunde Watson  Outcome: Progressing Towards Goal  Goal: *Developing strategies to address psychosocial issues  Description: Describe feelings about living with diabetes; identify support needed and support network  5/21/2020 1834 by Babatunde Watson  Outcome: Progressing Towards Goal  5/21/2020 1833 by Babatunde Watson  Outcome: Progressing Towards Goal  Goal: *Insulin pump training  5/21/2020 1834 by Babatunde Watson  Outcome: Progressing Towards Goal  5/21/2020 1833 by Babatunde Watson  Outcome: Progressing Towards Goal  Goal: *Sick day guidelines  5/21/2020 1834 by Babatunde Watson  Outcome: Progressing Towards Goal  5/21/2020 1833 by Babatunde Watson  Outcome: Progressing Towards Goal  Goal: *Patient Specific Goal (EDIT GOAL, INSERT TEXT)  5/21/2020 1834 by Babatunde Watson  Outcome: Progressing Towards Goal  5/21/2020 1833 by Babatunde Watson  Outcome: Progressing Towards Goal     Problem: Patient Education: Go to Patient Education Activity  Goal: Patient/Family Education  5/21/2020 1834 by Babatunde Watson  Outcome: Progressing Towards Goal  5/21/2020 1833 by Babatunde Watson  Outcome: Progressing Towards Goal     Problem: Falls - Risk of  Goal: *Absence of Falls  Description: Document Ami Fall Risk and appropriate interventions in the flowsheet.   5/21/2020 1834 by Babatunde Watson  Outcome: Progressing Towards Goal  Note: Fall Risk Interventions:  Mobility Interventions: Assess mobility with egress test, Bed/chair exit alarm, OT consult for ADLs, Patient to call before getting OOB, PT Consult for mobility concerns, Utilize gait belt for transfers/ambulation    Mentation Interventions: Adequate sleep, hydration, pain control, Bed/chair exit alarm, Door open when patient unattended, Gait belt with transfers/ambulation, More frequent rounding, Update white board    Medication Interventions: Assess postural VS orthostatic hypotension, Bed/chair exit alarm, Patient to call before getting OOB, Teach patient to arise slowly    Elimination Interventions: Call light in reach, Patient to call for help with toileting needs, Stay With Me (per policy), Toilet paper/wipes in reach, Toileting schedule/hourly rounds, Urinal in reach    History of Falls Interventions: Door open when patient unattended, Evaluate medications/consider consulting pharmacy, Room close to nurse's station, Utilize gait belt for transfer/ambulation      5/21/2020 1833 by Laura Sham  Outcome: Progressing Towards Goal  Note: Fall Risk Interventions:  Mobility Interventions: Assess mobility with egress test, Bed/chair exit alarm, OT consult for ADLs, Patient to call before getting OOB, PT Consult for mobility concerns, Utilize gait belt for transfers/ambulation    Mentation Interventions: Adequate sleep, hydration, pain control, Bed/chair exit alarm, Door open when patient unattended, Gait belt with transfers/ambulation, More frequent rounding, Update white board    Medication Interventions: Assess postural VS orthostatic hypotension, Bed/chair exit alarm, Patient to call before getting OOB, Teach patient to arise slowly    Elimination Interventions: Call light in reach, Patient to call for help with toileting needs, Stay With Me (per policy), Toilet paper/wipes in reach, Toileting schedule/hourly rounds, Urinal in reach    History of Falls Interventions: Door open when patient unattended, Evaluate medications/consider consulting pharmacy, Room close to nurse's station, Utilize gait belt for transfer/ambulation         Problem: Patient Education: Go to Patient Education Activity  Goal: Patient/Family Education  5/21/2020 1834 by Concepcion Andre  Outcome: Progressing Towards Goal  5/21/2020 1833 by Concepcion Andre  Outcome: Progressing Towards Goal     Problem: Pressure Injury - Risk of  Goal: *Prevention of pressure injury  Description: Document Iván Scale and appropriate interventions in the flowsheet. 5/21/2020 1834 by Concepcion Andre  Outcome: Progressing Towards Goal  Note: Pressure Injury Interventions:  Sensory Interventions: Assess changes in LOC, Check visual cues for pain, Discuss PT/OT consult with provider, Float heels, Keep linens dry and wrinkle-free, Maintain/enhance activity level, Minimize linen layers, Pressure redistribution bed/mattress (bed type), Turn and reposition approx. every two hours (pillows and wedges if needed)    Moisture Interventions: Absorbent underpads, Apply protective barrier, creams and emollients, Offer toileting Q_hr, Minimize layers    Activity Interventions: Increase time out of bed, Pressure redistribution bed/mattress(bed type)    Mobility Interventions: Pressure redistribution bed/mattress (bed type)    Nutrition Interventions: Document food/fluid/supplement intake    Friction and Shear Interventions: Lift sheet, Minimize layers             5/21/2020 1833 by Concepcion Andre  Outcome: Progressing Towards Goal  Note: Pressure Injury Interventions:  Sensory Interventions: Assess changes in LOC, Check visual cues for pain, Discuss PT/OT consult with provider, Float heels, Keep linens dry and wrinkle-free, Maintain/enhance activity level, Minimize linen layers, Pressure redistribution bed/mattress (bed type), Turn and reposition approx.  every two hours (pillows and wedges if needed)    Moisture Interventions: Absorbent underpads, Apply protective barrier, creams and emollients, Offer toileting Q_hr, Minimize layers    Activity Interventions: Increase time out of bed, Pressure redistribution bed/mattress(bed type)    Mobility Interventions: Pressure redistribution bed/mattress (bed type)    Nutrition Interventions: Document food/fluid/supplement intake    Friction and Shear Interventions: Lift sheet, Minimize layers                Problem: Patient Education: Go to Patient Education Activity  Goal: Patient/Family Education  5/21/2020 1834 by Rachel Lyles  Outcome: Progressing Towards Goal  5/21/2020 1833 by Rachel Lyles  Outcome: Progressing Towards Goal     Problem: Patient Education: Go to Patient Education Activity  Goal: Patient/Family Education  5/21/2020 1834 by Rachel Lyles  Outcome: Progressing Towards Goal  5/21/2020 1833 by Rachel Lyles  Outcome: Progressing Towards Goal     Problem: Patient Education: Go to Patient Education Activity  Goal: Patient/Family Education  5/21/2020 1834 by Rachel Lyles  Outcome: Progressing Towards Goal  5/21/2020 1833 by Rachel Lyles  Outcome: Progressing Towards Goal     Problem: Ischemic Stroke: Discharge Outcomes  Goal: *Verbalizes anxiety and depression are reduced or absent  5/21/2020 1834 by Rachel Lyles  Outcome: Progressing Towards Goal  5/21/2020 1833 by Rachel Lyles  Outcome: Progressing Towards Goal  Goal: *Verbalize understanding of risk factor modification(Stroke Metric)  5/21/2020 1834 by Rachel Lyles  Outcome: Progressing Towards Goal  5/21/2020 1833 by Rachel Lyles  Outcome: Progressing Towards Goal  Goal: *Hemodynamically stable  5/21/2020 1834 by Rachel Lyles  Outcome: Progressing Towards Goal  5/21/2020 1833 by Rachel Lyles  Outcome: Progressing Towards Goal  Goal: *Absence of aspiration pneumonia  5/21/2020 1834 by Rachel Lyles  Outcome: Progressing Towards Goal  5/21/2020 1833 by Rachel Lyles  Outcome: Progressing Towards Goal  Goal: *Aware of needed dietary changes  5/21/2020 1834 by Babatunde Watson  Outcome: Progressing Towards Goal  5/21/2020 1833 by Babatunde Watson  Outcome: Progressing Towards Goal  Goal: *Verbalize understanding of prescribed medications including anti-coagulants, anti-lipid, and/or anti-platelets(Stroke Metric)  5/21/2020 1834 by Babatunde Watson  Outcome: Progressing Towards Goal  5/21/2020 1833 by Babatunde Watson  Outcome: Progressing Towards Goal  Goal: *Tolerating diet  5/21/2020 1834 by Babatunde Watson  Outcome: Progressing Towards Goal  5/21/2020 1833 by Babatunde Watson  Outcome: Progressing Towards Goal  Goal: *Aware of follow-up diagnostics related to anticoagulants  5/21/2020 1834 by Babatunde Watson  Outcome: Progressing Towards Goal  5/21/2020 1833 by Babatunde Watson  Outcome: Progressing Towards Goal  Goal: *Ability to perform ADLs and demonstrates progressive mobility and function  5/21/2020 1834 by Babatunde Watson  Outcome: Progressing Towards Goal  5/21/2020 1833 by Babatunde Watson  Outcome: Progressing Towards Goal  Goal: *Absence of DVT(Stroke Metric)  5/21/2020 1834 by Babatunde Watson  Outcome: Progressing Towards Goal  5/21/2020 1833 by Babatunde Watson  Outcome: Progressing Towards Goal  Goal: *Absence of aspiration  5/21/2020 1834 by Babatunde Watson  Outcome: Progressing Towards Goal  5/21/2020 1833 by Babatunde Watson  Outcome: Progressing Towards Goal  Goal: *Optimal pain control at patient's stated goal  5/21/2020 1834 by Babatunde Watson  Outcome: Progressing Towards Goal  5/21/2020 1833 by Babatunde Watson  Outcome: Progressing Towards Goal  Goal: *Home safety concerns addressed  5/21/2020 1834 by Babatunde Watson  Outcome: Progressing Towards Goal  5/21/2020 1833 by Babatunde Watson  Outcome: Progressing Towards Goal  Goal: *Describes available resources and support systems  5/21/2020 1834 by Babatunde Watson  Outcome: Progressing Towards Goal  5/21/2020 1833 by Amairani Valverde Silvana  Outcome: Progressing Towards Goal  Goal: *Verbalizes understanding of activation of EMS(911) for stroke symptoms(Stroke Metric)  5/21/2020 1834 by Mike Mittal  Outcome: Progressing Towards Goal  5/21/2020 1833 by Mike Mittal  Outcome: Progressing Towards Goal  Goal: *Understands and describes signs and symptoms to report to providers(Stroke Metric)  5/21/2020 1834 by Mike Mittal  Outcome: Progressing Towards Goal  5/21/2020 1833 by Mike Mittal  Outcome: Progressing Towards Goal  Goal: *Neurolgocially stable (absence of additional neurological deficits)  5/21/2020 1834 by Mike Mittal  Outcome: Progressing Towards Goal  5/21/2020 1833 by Mike Mittal  Outcome: Progressing Towards Goal  Goal: *Verbalizes importance of follow-up with primary care physician(Stroke Metric)  5/21/2020 1834 by Mike Mittal  Outcome: Progressing Towards Goal  5/21/2020 1833 by Mike Mittal  Outcome: Progressing Towards Goal  Goal: *Smoking cessation discussed,if applicable(Stroke Metric)  5/21/2020 1834 by Mike Mittal  Outcome: Progressing Towards Goal  5/21/2020 1833 by Mike Mittal  Outcome: Progressing Towards Goal  Goal: *Depression screening completed(Stroke Metric)  5/21/2020 1834 by Mike Mittal  Outcome: Progressing Towards Goal  5/21/2020 1833 by Mike Mittal  Outcome: Progressing Towards Goal     Problem: Patient Education: Go to Patient Education Activity  Goal: Patient/Family Education  5/21/2020 1834 by Mike Mittal  Outcome: Progressing Towards Goal  5/21/2020 1833 by Mike Mittal  Outcome: Progressing Towards Goal     Problem: Patient Education: Go to Patient Education Activity  Goal: Patient/Family Education  5/21/2020 1834 by Mike Mittal  Outcome: Progressing Towards Goal  5/21/2020 1833 by Mike Mittal  Outcome: Progressing Towards Goal     Problem: Heart Failure: Discharge Outcomes  Goal: *Demonstrates ability to perform prescribed activity without shortness of breath or discomfort  5/21/2020 1834 by Josh Hudson  Outcome: Progressing Towards Goal  5/21/2020 1833 by Josh Hudson  Outcome: Progressing Towards Goal  Goal: *Left ventricular function assessment completed prior to or during stay, or planned for post-discharge  5/21/2020 1834 by Josh Hudson  Outcome: Progressing Towards Goal  5/21/2020 1833 by Josh Hudson  Outcome: Progressing Towards Goal  Goal: *ACEI prescribed if LVEF less than 40% and no contraindications or ARB prescribed  5/21/2020 1834 by Josh Hudson  Outcome: Progressing Towards Goal  5/21/2020 1833 by Josh Hudson  Outcome: Progressing Towards Goal  Goal: *Verbalizes understanding and describes prescribed diet  5/21/2020 1834 by Josh Hudson  Outcome: Progressing Towards Goal  5/21/2020 1833 by Josh Hudson  Outcome: Progressing Towards Goal  Goal: *Verbalizes understanding/describes prescribed medications  5/21/2020 1834 by Josh Hudson  Outcome: Progressing Towards Goal  5/21/2020 1833 by Josh Hudson  Outcome: Progressing Towards Goal  Goal: *Describes available resources and support systems  Description: (eg: Home Health, Palliative Care, [employer]ive)  5/21/2020 1834 by Josh Hudson  Outcome: Progressing Towards Goal  5/21/2020 1833 by Josh Hudson  Outcome: Progressing Towards Goal  Goal: *Describes smoking cessation resources  5/21/2020 1834 by Josh Hudson  Outcome: Progressing Towards Goal  5/21/2020 1833 by Josh Hudson  Outcome: Progressing Towards Goal  Goal: *Understands and describes signs and symptoms to report to providers(Stroke Metric)  5/21/2020 1834 by Josh Hudson  Outcome: Progressing Towards Goal  5/21/2020 1833 by Josh Hudson  Outcome: Progressing Towards Goal  Goal: *Describes/verbalizes understanding of follow-up/return appt  Description: (eg: to physicians, diabetes treatment coordinator, and other resources  5/21/2020 1834 by Laurel Myers  Outcome: Progressing Towards Goal  5/21/2020 1833 by Laurel Myers  Outcome: Progressing Towards Goal  Goal: *Describes importance of continuing daily weights and changes to report to physician  5/21/2020 1834 by Laurel Myers  Outcome: Progressing Towards Goal  5/21/2020 1833 by Laurel Myers  Outcome: Progressing Towards Goal     Problem: Patient Education: Go to Patient Education Activity  Goal: Patient/Family Education  5/21/2020 1834 by Laurel Meyrs  Outcome: Progressing Towards Goal  5/21/2020 1833 by Laurel Myers  Outcome: Progressing Towards Goal     Problem: Carotid Endarterectomy Pathway: Post-Op Day 1  Goal: *Optimal pain control at patient's stated goal  Outcome: Progressing Towards Goal  Goal: *Adequate urinary output (equal to or greater than 30 milliliters/hour)  Outcome: Progressing Towards Goal     Problem: Carotid Endarterectomy Pathway: Post-Op Day 2  Goal: Off Pathway (Use only if patient is Off Pathway)  Outcome: Progressing Towards Goal  Goal: Activity/Safety  Outcome: Progressing Towards Goal  Goal: Nutrition/Diet  5/21/2020 1834 by Laurel Myers  Outcome: Progressing Towards Goal  5/21/2020 1833 by Laurel Myers  Outcome: Progressing Towards Goal  Goal: Discharge Planning  5/21/2020 1834 by Laurel Myers  Outcome: Progressing Towards Goal  5/21/2020 1833 by Laurel Myers  Outcome: Progressing Towards Goal  Goal: Medications  5/21/2020 1834 by Laurel Myers  Outcome: Progressing Towards Goal  5/21/2020 1833 by Laurel Myers  Outcome: Progressing Towards Goal  Goal: Respiratory  5/21/2020 1834 by Laurel Myers  Outcome: Progressing Towards Goal  5/21/2020 1833 by Laurel Myers  Outcome: Progressing Towards Goal  Goal: Treatments/Interventions/Procedures  5/21/2020 1834 by Laurel Myers  Outcome: Progressing Towards Goal  5/21/2020 1833 by Laurel Myers  Outcome: Progressing Towards Goal  Goal: Psychosocial  5/21/2020 1834 by Mammie Deonte  Outcome: Progressing Towards Goal  5/21/2020 1833 by Mammie Deonte  Outcome: Progressing Towards Goal     Problem: Carotid Endarterectomy: Discharge Outcomes  Goal: *Hemodynamically stable  5/21/2020 1834 by Mammie Deonte  Outcome: Progressing Towards Goal  5/21/2020 1833 by Mammie Deonte  Outcome: Progressing Towards Goal  Goal: *Lungs clear or at baseline  5/21/2020 1834 by Mammie Deonte  Outcome: Progressing Towards Goal  5/21/2020 1833 by Mammie Deonte  Outcome: Progressing Towards Goal  Goal: *Demonstrates independent activity or return to baseline  5/21/2020 1834 by Mammie Deonte  Outcome: Progressing Towards Goal  5/21/2020 1833 by Mammie Deonte  Outcome: Progressing Towards Goal  Goal: *Optimal pain control at patient's stated goal  5/21/2020 1834 by Mammie Deonte  Outcome: Progressing Towards Goal  5/21/2020 1833 by Mammie Deonte  Outcome: Progressing Towards Goal  Goal: *Verbalizes understanding and describes prescribed diet  5/21/2020 1834 by Mammie Deonte  Outcome: Progressing Towards Goal  5/21/2020 1833 by Mammie Deonte  Outcome: Progressing Towards Goal  Goal: *Tolerating diet  5/21/2020 1834 by Mammie Deonte  Outcome: Progressing Towards Goal  5/21/2020 1833 by Mammie Deonte  Outcome: Progressing Towards Goal  Goal: *Verbalizes name, dosage, time, side effects, and number of days to continue medications  5/21/2020 1834 by Mammie Deonte  Outcome: Progressing Towards Goal  5/21/2020 1833 by Mammie Deonte  Outcome: Progressing Towards Goal  Goal: *No signs and symptoms of infection or wound complications  3/97/6268 1834 by Mammie Deonte  Outcome: Progressing Towards Goal  5/21/2020 1833 by Mammie Deonte  Outcome: Progressing Towards Goal  Goal: *Anxiety reduced or absent  5/21/2020 1834 by Mammie Deonte  Outcome: Progressing Towards Goal  5/21/2020 1833 by Sam Hoyos  Outcome: Progressing Towards Goal  Goal: *Understands and describes signs and symptoms to report to providers(Stroke Metric)  5/21/2020 1834 by Sam Hoyos  Outcome: Progressing Towards Goal  5/21/2020 1833 by Sam Hoyos  Outcome: Progressing Towards Goal  Goal: *Describes follow-up/return visits to physicians  5/21/2020 1834 by Sam Hoyos  Outcome: Progressing Towards Goal  5/21/2020 1833 by Sam Hoyos  Outcome: Progressing Towards Goal  Goal: *Describes available resources and support systems  5/21/2020 1834 by Sam Hoyos  Outcome: Progressing Towards Goal  5/21/2020 1833 by Sam Hoyos  Outcome: Progressing Towards Goal

## 2020-05-22 LAB
ANION GAP SERPL CALC-SCNC: 5 MMOL/L (ref 5–15)
BUN SERPL-MCNC: 21 MG/DL (ref 6–20)
BUN/CREAT SERPL: 17 (ref 12–20)
CALCIUM SERPL-MCNC: 8 MG/DL (ref 8.5–10.1)
CHLORIDE SERPL-SCNC: 105 MMOL/L (ref 97–108)
CO2 SERPL-SCNC: 24 MMOL/L (ref 21–32)
CREAT SERPL-MCNC: 1.21 MG/DL (ref 0.7–1.3)
GLUCOSE BLD STRIP.AUTO-MCNC: 157 MG/DL (ref 65–100)
GLUCOSE BLD STRIP.AUTO-MCNC: 163 MG/DL (ref 65–100)
GLUCOSE BLD STRIP.AUTO-MCNC: 171 MG/DL (ref 65–100)
GLUCOSE BLD STRIP.AUTO-MCNC: 203 MG/DL (ref 65–100)
GLUCOSE SERPL-MCNC: 160 MG/DL (ref 65–100)
POTASSIUM SERPL-SCNC: 4 MMOL/L (ref 3.5–5.1)
SERVICE CMNT-IMP: ABNORMAL
SODIUM SERPL-SCNC: 134 MMOL/L (ref 136–145)

## 2020-05-22 PROCEDURE — 74011250637 HC RX REV CODE- 250/637: Performed by: INTERNAL MEDICINE

## 2020-05-22 PROCEDURE — 97535 SELF CARE MNGMENT TRAINING: CPT

## 2020-05-22 PROCEDURE — 80048 BASIC METABOLIC PNL TOTAL CA: CPT

## 2020-05-22 PROCEDURE — 97530 THERAPEUTIC ACTIVITIES: CPT

## 2020-05-22 PROCEDURE — 74011250636 HC RX REV CODE- 250/636: Performed by: SURGERY

## 2020-05-22 PROCEDURE — 74011250636 HC RX REV CODE- 250/636: Performed by: INTERNAL MEDICINE

## 2020-05-22 PROCEDURE — 65660000000 HC RM CCU STEPDOWN

## 2020-05-22 PROCEDURE — 94760 N-INVAS EAR/PLS OXIMETRY 1: CPT

## 2020-05-22 PROCEDURE — 82962 GLUCOSE BLOOD TEST: CPT

## 2020-05-22 PROCEDURE — 36415 COLL VENOUS BLD VENIPUNCTURE: CPT

## 2020-05-22 PROCEDURE — 74011636637 HC RX REV CODE- 636/637: Performed by: INTERNAL MEDICINE

## 2020-05-22 PROCEDURE — 74011250637 HC RX REV CODE- 250/637: Performed by: FAMILY MEDICINE

## 2020-05-22 RX ADMIN — ASPIRIN 81 MG 81 MG: 81 TABLET ORAL at 08:20

## 2020-05-22 RX ADMIN — CARVEDILOL 3.12 MG: 3.12 TABLET, FILM COATED ORAL at 08:19

## 2020-05-22 RX ADMIN — PANTOPRAZOLE SODIUM 40 MG: 40 TABLET, DELAYED RELEASE ORAL at 06:11

## 2020-05-22 RX ADMIN — INSULIN LISPRO 2 UNITS: 100 INJECTION, SOLUTION INTRAVENOUS; SUBCUTANEOUS at 12:47

## 2020-05-22 RX ADMIN — TAMSULOSIN HYDROCHLORIDE 0.4 MG: 0.4 CAPSULE ORAL at 06:11

## 2020-05-22 RX ADMIN — PREGABALIN 50 MG: 50 CAPSULE ORAL at 17:12

## 2020-05-22 RX ADMIN — CARVEDILOL 3.12 MG: 3.12 TABLET, FILM COATED ORAL at 17:13

## 2020-05-22 RX ADMIN — Medication: at 15:22

## 2020-05-22 RX ADMIN — PREGABALIN 50 MG: 50 CAPSULE ORAL at 21:33

## 2020-05-22 RX ADMIN — MIDODRINE HYDROCHLORIDE 10 MG: 5 TABLET ORAL at 17:13

## 2020-05-22 RX ADMIN — FENTANYL CITRATE 50 MCG: 50 INJECTION INTRAMUSCULAR; INTRAVENOUS at 01:44

## 2020-05-22 RX ADMIN — FINASTERIDE 5 MG: 5 TABLET, FILM COATED ORAL at 06:10

## 2020-05-22 RX ADMIN — QUETIAPINE FUMARATE 25 MG: 25 TABLET ORAL at 21:30

## 2020-05-22 RX ADMIN — AMIODARONE HYDROCHLORIDE 200 MG: 200 TABLET ORAL at 08:18

## 2020-05-22 RX ADMIN — PANTOPRAZOLE SODIUM 40 MG: 40 TABLET, DELAYED RELEASE ORAL at 17:13

## 2020-05-22 RX ADMIN — INSULIN LISPRO 2 UNITS: 100 INJECTION, SOLUTION INTRAVENOUS; SUBCUTANEOUS at 08:20

## 2020-05-22 RX ADMIN — Medication 10 ML: at 06:10

## 2020-05-22 RX ADMIN — HEPARIN SODIUM 5000 UNITS: 5000 INJECTION, SOLUTION INTRAVENOUS; SUBCUTANEOUS at 15:39

## 2020-05-22 RX ADMIN — OXYCODONE 10 MG: 5 TABLET ORAL at 21:29

## 2020-05-22 RX ADMIN — ATORVASTATIN CALCIUM 80 MG: 40 TABLET, FILM COATED ORAL at 08:19

## 2020-05-22 RX ADMIN — LEVETIRACETAM 1000 MG: 500 TABLET, FILM COATED ORAL at 21:29

## 2020-05-22 RX ADMIN — TAMSULOSIN HYDROCHLORIDE 0.4 MG: 0.4 CAPSULE ORAL at 17:13

## 2020-05-22 RX ADMIN — PREGABALIN 50 MG: 50 CAPSULE ORAL at 08:19

## 2020-05-22 RX ADMIN — ACETAMINOPHEN 650 MG: 325 TABLET ORAL at 17:12

## 2020-05-22 RX ADMIN — OXYCODONE 10 MG: 5 TABLET ORAL at 08:24

## 2020-05-22 RX ADMIN — HEPARIN SODIUM 5000 UNITS: 5000 INJECTION, SOLUTION INTRAVENOUS; SUBCUTANEOUS at 06:09

## 2020-05-22 RX ADMIN — VENLAFAXINE HYDROCHLORIDE 75 MG: 37.5 CAPSULE, EXTENDED RELEASE ORAL at 17:12

## 2020-05-22 RX ADMIN — FENTANYL CITRATE 50 MCG: 50 INJECTION INTRAMUSCULAR; INTRAVENOUS at 06:13

## 2020-05-22 RX ADMIN — CLOPIDOGREL BISULFATE 75 MG: 75 TABLET ORAL at 08:19

## 2020-05-22 RX ADMIN — INSULIN LISPRO 3 UNITS: 100 INJECTION, SOLUTION INTRAVENOUS; SUBCUTANEOUS at 17:13

## 2020-05-22 RX ADMIN — MIDODRINE HYDROCHLORIDE 10 MG: 5 TABLET ORAL at 12:48

## 2020-05-22 RX ADMIN — Medication 10 ML: at 21:31

## 2020-05-22 RX ADMIN — LEVETIRACETAM 1000 MG: 500 TABLET, FILM COATED ORAL at 08:20

## 2020-05-22 RX ADMIN — Medication 10 ML: at 15:42

## 2020-05-22 RX ADMIN — CLONAZEPAM 1 MG: 1 TABLET ORAL at 22:00

## 2020-05-22 RX ADMIN — MIDODRINE HYDROCHLORIDE 10 MG: 5 TABLET ORAL at 08:20

## 2020-05-22 RX ADMIN — HEPARIN SODIUM 5000 UNITS: 5000 INJECTION, SOLUTION INTRAVENOUS; SUBCUTANEOUS at 21:29

## 2020-05-22 RX ADMIN — VENLAFAXINE HYDROCHLORIDE 75 MG: 37.5 CAPSULE, EXTENDED RELEASE ORAL at 08:19

## 2020-05-22 NOTE — PROGRESS NOTES
Late note    Cardiology Progress Note                                        Admit Date: 5/12/2020    Assessment/Plan:     Hypotension; corrected;   Orthostatic hypotension; cont BB and midodrine  Cardiomyopathy; resolved  CAD; asymptomatic    Rocio Eid is a 76 y.o. male with     PROBLEM LIST:  Patient Active Problem List    Diagnosis Date Noted   Kezia pollock ([facility] 08/20/2019     Priority: 1 - One    Acute CVA (cerebrovascular accident) ([facility] 05/12/2020    VT (ventricular tachycardia) ([facility] 08/20/2019    CHF exacerbation ([facility] 06/13/2019    Fall 01/10/2019    [facility]) [facility] 01/10/2019    Chest pain 01/11/2018    Acute chest pain 01/10/2018    Hepatic encephalopathy ([facility] 07/17/2017    Neuropathy 04/14/2017    Cirrhosis ([facility] 04/14/2017    CAD (coronary artery disease) 04/14/2017    S/P coronary artery stent placement 04/14/2017    S/P CABG (coronary artery bypass graft) 04/14/2017    Thrombocytopenia ([facility] 04/14/2017    MRSA infection 04/14/2017    S/P cholecystectomy 53/12/8186    Metabolic encephalopathy 00/48/9130    Seizure ([facility] 11/21/2016    Sinusitis     Joint pain     Low back pain     GERD (gastroesophageal reflux disease)     Diabetes mellitus, type 2 (HCC)          Subjective:     Rocio Hairston. reports none. Visit Vitals  BP (!) 146/101 (BP 1 Location: Left arm, BP Patient Position: At rest)   Pulse 72   Temp 97.8 °F (36.6 °C)   Resp 14   Ht 5' 9\" (1.753 m)   Wt 105.4 kg (232 lb 6.4 oz)   SpO2 99%   BMI 34.32 kg/m²       Intake/Output Summary (Last 24 hours) at 5/22/2020 1052  Last data filed at 5/22/2020 0921  Gross per 24 hour   Intake    Output 1100 ml   Net -1100 ml       Objective:      Physical Exam:  HEENT: Perrla, EOMI  Neck: No JVD,  No thyroidmegaly  Resp: CTA bilaterally;  No wheezes or rales  CV: RRR s1s2 No murmur no s3  Abd:Soft, Nontender  Ext: No edema  Neuro: Alert and oriented; Nonfocal  Skin: Warm, Dry, Intact  Pulses: 2+ DP/PT/Rad      Telemetry: normal sinus rhythm    Current Facility-Administered Medications   Medication Dose Route Frequency    hydrALAZINE (APRESOLINE) 20 mg/mL injection 10 mg  10 mg IntraVENous Q2H PRN    0.9% sodium chloride infusion  50 mL/hr IntraVENous CONTINUOUS    sodium chloride (NS) flush 5-40 mL  5-40 mL IntraVENous Q8H    sodium chloride (NS) flush 5-40 mL  5-40 mL IntraVENous PRN    ondansetron (ZOFRAN ODT) tablet 4 mg  4 mg Oral Q6H PRN    ondansetron (ZOFRAN) injection 4 mg  4 mg IntraVENous Q4H PRN    fentaNYL citrate (PF) injection 50 mcg  50 mcg IntraVENous Q4H PRN    oxyCODONE IR (ROXICODONE) tablet 5 mg  5 mg Oral Q4H PRN    oxyCODONE IR (ROXICODONE) tablet 10 mg  10 mg Oral Q4H PRN    midodrine (PROAMATINE) tablet 10 mg  10 mg Oral TID WITH MEALS    diphenhydrAMINE-zinc acetate 1%-0.1% (BENADRYL) cream   Topical TID PRN    amiodarone (CORDARONE) tablet 200 mg  200 mg Oral DAILY    aspirin chewable tablet 81 mg  81 mg Oral DAILY    atorvastatin (LIPITOR) tablet 80 mg  80 mg Oral DAILY    carvediloL (COREG) tablet 3.125 mg  3.125 mg Oral BID WITH MEALS    clonazePAM (KlonoPIN) tablet 1 mg  1 mg Oral QHS    clopidogreL (PLAVIX) tablet 75 mg  75 mg Oral DAILY    finasteride (PROSCAR) tablet 5 mg  5 mg Oral 7am    lactulose (CHRONULAC) 10 gram/15 mL solution 45 mL  30 g Oral TID    levETIRAcetam (KEPPRA) tablet 1,000 mg  1,000 mg Oral Q12H    nitroglycerin (NITROSTAT) tablet 0.4 mg  0.4 mg SubLINGual Q5MIN PRN    pantoprazole (PROTONIX) tablet 40 mg  40 mg Oral ACB&D    pregabalin (LYRICA) capsule 50 mg  50 mg Oral TID    QUEtiapine (SEROquel) tablet 25 mg  25 mg Oral QHS    tamsulosin (FLOMAX) capsule 0.4 mg  0.4 mg Oral ACB&D    venlafaxine-SR (EFFEXOR-XR) capsule 75 mg  75 mg Oral BID    insulin lispro (HUMALOG) injection   SubCUTAneous AC&HS    glucose chewable tablet 16 g  4 Tab Oral PRN    glucagon (GLUCAGEN) injection 1 mg  1 mg IntraMUSCular PRN    dextrose 10% infusion 0-250 mL  0-250 mL IntraVENous PRN    acetaminophen (TYLENOL) tablet 650 mg  650 mg Oral Q4H PRN    heparin (porcine) injection 5,000 Units  5,000 Units SubCUTAneous Q8H    bisacodyL (DULCOLAX) suppository 10 mg  10 mg Rectal DAILY PRN         Data Review:   Labs:    Recent Results (from the past 24 hour(s))   GLUCOSE, POC    Collection Time: 05/21/20 12:33 PM   Result Value Ref Range    Glucose (POC) 158 (H) 65 - 100 mg/dL    Performed by Simi Drummond, POC    Collection Time: 05/21/20  4:46 PM   Result Value Ref Range    Glucose (POC) 134 (H) 65 - 100 mg/dL    Performed by Oleksandr Canales    GLUCOSE, POC    Collection Time: 05/21/20  9:04 PM   Result Value Ref Range    Glucose (POC) 131 (H) 65 - 100 mg/dL    Performed by Trina Tafoya    METABOLIC PANEL, BASIC    Collection Time: 05/22/20  6:20 AM   Result Value Ref Range    Sodium 134 (L) 136 - 145 mmol/L    Potassium 4.0 3.5 - 5.1 mmol/L    Chloride 105 97 - 108 mmol/L    CO2 24 21 - 32 mmol/L    Anion gap 5 5 - 15 mmol/L    Glucose 160 (H) 65 - 100 mg/dL    BUN 21 (H) 6 - 20 MG/DL    Creatinine 1.21 0.70 - 1.30 MG/DL    BUN/Creatinine ratio 17 12 - 20      GFR est AA >60 >60 ml/min/1.73m2    GFR est non-AA >60 >60 ml/min/1.73m2    Calcium 8.0 (L) 8.5 - 10.1 MG/DL   GLUCOSE, POC    Collection Time: 05/22/20  7:39 AM   Result Value Ref Range    Glucose (POC) 157 (H) 65 - 100 mg/dL    Performed by Trina Tafoya

## 2020-05-22 NOTE — PROGRESS NOTES
Bedside shift change report given to Roderick (oncoming nurse) by Patricia Saravia (offgoing nurse). Report included the following information SBAR, Kardex, Procedure Summary, MAR, Accordion, Recent Results, Cardiac Rhythm Paced and Dual Neuro Assessment.

## 2020-05-22 NOTE — PROGRESS NOTES
Problem: Mobility Impaired (Adult and Pediatric)  Goal: *Acute Goals and Plan of Care (Insert Text)  Description: FUNCTIONAL STATUS PRIOR TO ADMISSION: Patient was modified independent using a single point cane for functional mobility. HOME SUPPORT PRIOR TO ADMISSION: The patient lived with children who provide 24/7 assistance. Physical Therapy Goals  Initiated 2020; goals reviewed and remain appropriate 2020  1. Patient will move from supine to sit and sit to supine  in bed with independence within 7 day(s). 2.  Patient will transfer from bed to chair and chair to bed with modified independence using the least restrictive device within 7 day(s). 3.  Patient will perform sit to stand with minimal assistance/contact guard assist within 7 day(s). 4.  Patient will ambulate with modified independence for 150 feet with the least restrictive device within 7 day(s). 5.  Patient will ascend/descend 2 stairs with 1 handrail(s) with modified independence within 7 day(s). Outcome: Progressing Towards Goal  PHYSICAL THERAPY TREATMENT  Patient: Marcela Watkins (67 y.o. male)  Date: 2020  Diagnosis: Acute CVA (cerebrovascular accident) ([facility] [I63.9]   Acute CVA (cerebrovascular accident) ([facility]  Procedure(s) (LRB):  LEFT TRANSCAROTID ARTERY REVASCULARIZATION (URGENT) (Left) 3 Days Post-Op  Precautions: Fall  Chart, physical therapy assessment, plan of care and goals were reviewed. ASSESSMENT  Patient continues with skilled PT services and is progressing towards goals. Mobility continues to be limited by orthostatic hypotension. Pt transferred supine to sit with modA, sit<>stand with CGA, and took a few steps bed to chair with CGA. Pt reported mild dizziness with limited mobility. Continue to recommend [facility] PT and assistance from family as needed.        Supine: 146/75, HR 66  Sittin/84, HR 80  Standin/61, HR 81  Sitting post transfer: 118/94, HR 71      Current Level of Function Impacting Discharge (mobility/balance): modA for supine>sit transfer    Other factors to consider for discharge: orthostatic hypotension          PLAN :  Patient continues to benefit from skilled intervention to address the above impairments. Continue treatment per established plan of care. to address goals. Recommendation for discharge: (in order for the patient to meet his/her long term goals)  Physical therapy at least 2 days/week in the home AND ensure assist and/or supervision for safety with mobility      This discharge recommendation:  Has not yet been discussed the attending provider and/or case management    IF patient discharges home will need the following DME: patient owns DME required for discharge       SUBJECTIVE:   Patient stated I'm just sleepy. But I'll do whatever you tell me to do.     OBJECTIVE DATA SUMMARY:   Critical Behavior:  Neurologic State: Alert  Orientation Level: Oriented X4  Cognition: Follows commands, Appropriate for age attention/concentration  Safety/Judgement: Awareness of environment  Functional Mobility Training:  Bed Mobility:  Supine to Sit: Moderate assistance;Assist x1;Bed Modified; Adaptive equipment(HOB elevated to approx 25 degrees)    Transfers:  Sit to Stand: Assist x1;Contact guard assistance  Stand to Sit: Contact guard assistance;Assist x1  Bed to Chair: Assist x1;Contact guard assistance    Balance:  Sitting: Intact; Without support  Standing: Impaired; Without support  Standing - Static: Fair  Standing - Dynamic : Fair    Ambulation/Gait Training:  Distance (ft): 3 Feet (ft)  Assistive Device: Gait belt  Ambulation - Level of Assistance: Contact guard assistance  Gait Abnormalities: Decreased step clearance;Shuffling gait  Base of Support: Widened  Speed/Usha: Slow;Shuffled  Step Length: Left shortened;Right shortened    Activity Tolerance:   Poor, orthostatic   Please refer to the flowsheet for vital signs taken during this treatment.     After treatment patient left in no apparent distress:   Sitting in chair, Call bell within reach, and Bed / chair alarm activated    COMMUNICATION/COLLABORATION:   The patients plan of care was discussed with: Occupational therapy assistant and Registered nurse.      Natalia Davis, PT, DPT   Time Calculation: 25 mins

## 2020-05-22 NOTE — PROGRESS NOTES
Problem: Diabetes Self-Management  Goal: *Disease process and treatment process  Description: Define diabetes and identify own type of diabetes; list 3 options for treating diabetes. Outcome: Progressing Towards Goal  Goal: *Incorporating nutritional management into lifestyle  Description: Describe effect of type, amount and timing of food on blood glucose; list 3 methods for planning meals. Outcome: Progressing Towards Goal  Goal: *Incorporating physical activity into lifestyle  Description: State effect of exercise on blood glucose levels. Outcome: Progressing Towards Goal  Goal: *Developing strategies to promote health/change behavior  Description: Define the ABC's of diabetes; identify appropriate screenings, schedule and personal plan for screenings. Outcome: Progressing Towards Goal  Goal: *Using medications safely  Description: State effect of diabetes medications on diabetes; name diabetes medication taking, action and side effects. Outcome: Progressing Towards Goal  Goal: *Monitoring blood glucose, interpreting and using results  Description: Identify recommended blood glucose targets  and personal targets. Outcome: Progressing Towards Goal  Goal: *Prevention, detection, treatment of acute complications  Description: List symptoms of hyper- and hypoglycemia; describe how to treat low blood sugar and actions for lowering  high blood glucose level. Outcome: Progressing Towards Goal  Goal: *Prevention, detection and treatment of chronic complications  Description: Define the natural course of diabetes and describe the relationship of blood glucose levels to long term complications of diabetes.   Outcome: Progressing Towards Goal  Goal: *Developing strategies to address psychosocial issues  Description: Describe feelings about living with diabetes; identify support needed and support network  Outcome: Progressing Towards Goal  Goal: *Insulin pump training  Outcome: Progressing Towards Goal  Goal: *Sick day guidelines  Outcome: Progressing Towards Goal  Goal: *Patient Specific Goal (EDIT GOAL, INSERT TEXT)  Outcome: Progressing Towards Goal     Problem: Patient Education: Go to Patient Education Activity  Goal: Patient/Family Education  Outcome: Progressing Towards Goal     Problem: Falls - Risk of  Goal: *Absence of Falls  Description: Document Jess Yates Fall Risk and appropriate interventions in the flowsheet. Outcome: Progressing Towards Goal  Note: Fall Risk Interventions:  Mobility Interventions: Bed/chair exit alarm, Patient to call before getting OOB, Communicate number of staff needed for ambulation/transfer, PT Consult for mobility concerns, Utilize gait belt for transfers/ambulation    Mentation Interventions: Adequate sleep, hydration, pain control, Bed/chair exit alarm, Door open when patient unattended, More frequent rounding, Toileting rounds, Update white board, Evaluate medications/consider consulting pharmacy    Medication Interventions: Bed/chair exit alarm, Evaluate medications/consider consulting pharmacy, Patient to call before getting OOB, Teach patient to arise slowly    Elimination Interventions: Bed/chair exit alarm, Call light in reach, Patient to call for help with toileting needs, Stay With Me (per policy), Toileting schedule/hourly rounds, Urinal in reach    History of Falls Interventions: Bed/chair exit alarm, Consult care management for discharge planning, Door open when patient unattended, Room close to nurse's station         Problem: Patient Education: Go to Patient Education Activity  Goal: Patient/Family Education  Outcome: Progressing Towards Goal     Problem: Pressure Injury - Risk of  Goal: *Prevention of pressure injury  Description: Document Iván Scale and appropriate interventions in the flowsheet.   Outcome: Progressing Towards Goal  Note: Pressure Injury Interventions:  Sensory Interventions: Assess changes in LOC, Check visual cues for pain, Keep linens dry and wrinkle-free, Maintain/enhance activity level, Minimize linen layers, Turn and reposition approx.  every two hours (pillows and wedges if needed)    Moisture Interventions: Absorbent underpads, Apply protective barrier, creams and emollients, Check for incontinence Q2 hours and as needed, Minimize layers, Offer toileting Q_hr    Activity Interventions: Pressure redistribution bed/mattress(bed type), Increase time out of bed    Mobility Interventions: Pressure redistribution bed/mattress (bed type)    Nutrition Interventions: Document food/fluid/supplement intake, Offer support with meals,snacks and hydration    Friction and Shear Interventions: Apply protective barrier, creams and emollients, HOB 30 degrees or less, Lift sheet                Problem: Patient Education: Go to Patient Education Activity  Goal: Patient/Family Education  Outcome: Progressing Towards Goal     Problem: Patient Education: Go to Patient Education Activity  Goal: Patient/Family Education  Outcome: Progressing Towards Goal     Problem: Patient Education: Go to Patient Education Activity  Goal: Patient/Family Education  Outcome: Progressing Towards Goal     Problem: Ischemic Stroke: Discharge Outcomes  Goal: *Verbalizes anxiety and depression are reduced or absent  Outcome: Progressing Towards Goal  Goal: *Verbalize understanding of risk factor modification(Stroke Metric)  Outcome: Progressing Towards Goal  Goal: *Hemodynamically stable  Outcome: Progressing Towards Goal  Goal: *Absence of aspiration pneumonia  Outcome: Progressing Towards Goal  Goal: *Aware of needed dietary changes  Outcome: Progressing Towards Goal  Goal: *Verbalize understanding of prescribed medications including anti-coagulants, anti-lipid, and/or anti-platelets(Stroke Metric)  Outcome: Progressing Towards Goal  Goal: *Tolerating diet  Outcome: Progressing Towards Goal  Goal: *Aware of follow-up diagnostics related to anticoagulants  Outcome: Progressing Towards Goal  Goal: *Ability to perform ADLs and demonstrates progressive mobility and function  Outcome: Progressing Towards Goal  Goal: *Absence of DVT(Stroke Metric)  Outcome: Progressing Towards Goal  Goal: *Absence of aspiration  Outcome: Progressing Towards Goal  Goal: *Optimal pain control at patient's stated goal  Outcome: Progressing Towards Goal  Goal: *Home safety concerns addressed  Outcome: Progressing Towards Goal  Goal: *Describes available resources and support systems  Outcome: Progressing Towards Goal  Goal: *Verbalizes understanding of activation of EMS(911) for stroke symptoms(Stroke Metric)  Outcome: Progressing Towards Goal  Goal: *Understands and describes signs and symptoms to report to providers(Stroke Metric)  Outcome: Progressing Towards Goal  Goal: *Neurolgocially stable (absence of additional neurological deficits)  Outcome: Progressing Towards Goal  Goal: *Verbalizes importance of follow-up with primary care physician(Stroke Metric)  Outcome: Progressing Towards Goal  Goal: *Smoking cessation discussed,if applicable(Stroke Metric)  Outcome: Progressing Towards Goal  Goal: *Depression screening completed(Stroke Metric)  Outcome: Progressing Towards Goal     Problem: Patient Education: Go to Patient Education Activity  Goal: Patient/Family Education  Outcome: Progressing Towards Goal     Problem: Patient Education: Go to Patient Education Activity  Goal: Patient/Family Education  Outcome: Progressing Towards Goal     Problem: Heart Failure: Discharge Outcomes  Goal: *Demonstrates ability to perform prescribed activity without shortness of breath or discomfort  Outcome: Progressing Towards Goal  Goal: *Left ventricular function assessment completed prior to or during stay, or planned for post-discharge  Outcome: Progressing Towards Goal  Goal: *ACEI prescribed if LVEF less than 40% and no contraindications or ARB prescribed  Outcome: Progressing Towards Goal  Goal: *Verbalizes understanding and describes prescribed diet  Outcome: Progressing Towards Goal  Goal: *Verbalizes understanding/describes prescribed medications  Outcome: Progressing Towards Goal  Goal: *Describes available resources and support systems  Description: (eg: Home Health, Palliative Care, Advanced Medical Directive)  Outcome: Progressing Towards Goal  Goal: *Describes smoking cessation resources  Outcome: Progressing Towards Goal  Goal: *Understands and describes signs and symptoms to report to providers(Stroke Metric)  Outcome: Progressing Towards Goal  Goal: *Describes/verbalizes understanding of follow-up/return appt  Description: (eg: to physicians, diabetes treatment coordinator, and other resources  Outcome: Progressing Towards Goal  Goal: *Describes importance of continuing daily weights and changes to report to physician  Outcome: Progressing Towards Goal     Problem: Patient Education: Go to Patient Education Activity  Goal: Patient/Family Education  Outcome: Progressing Towards Goal     Problem: Carotid Endarterectomy Pathway: Post-Op Day 2  Goal: Activity/Safety  Outcome: Progressing Towards Goal  Goal: Nutrition/Diet  Outcome: Progressing Towards Goal  Goal: Discharge Planning  Outcome: Progressing Towards Goal  Goal: Medications  Outcome: Progressing Towards Goal  Goal: Respiratory  Outcome: Progressing Towards Goal  Goal: Treatments/Interventions/Procedures  Outcome: Progressing Towards Goal  Goal: Psychosocial  Outcome: Progressing Towards Goal     Problem: Carotid Endarterectomy: Discharge Outcomes  Goal: *Hemodynamically stable  Outcome: Progressing Towards Goal  Goal: *Lungs clear or at baseline  Outcome: Progressing Towards Goal  Goal: *Demonstrates independent activity or return to baseline  Outcome: Progressing Towards Goal  Goal: *Optimal pain control at patient's stated goal  Outcome: Progressing Towards Goal  Goal: *Verbalizes understanding and describes prescribed diet  Outcome: Progressing Towards Goal  Goal: *Tolerating diet  Outcome: Progressing Towards Goal  Goal: *Verbalizes name, dosage, time, side effects, and number of days to continue medications  Outcome: Progressing Towards Goal  Goal: *No signs and symptoms of infection or wound complications  Outcome: Progressing Towards Goal  Goal: *Anxiety reduced or absent  Outcome: Progressing Towards Goal  Goal: *Understands and describes signs and symptoms to report to providers(Stroke Metric)  Outcome: Progressing Towards Goal  Goal: *Describes follow-up/return visits to physicians  Outcome: Progressing Towards Goal  Goal: *Describes available resources and support systems  Outcome: Progressing Towards Goal

## 2020-05-22 NOTE — PROGRESS NOTES
Problem: Self Care Deficits Care Plan (Adult)  Goal: *Acute Goals and Plan of Care (Insert Text)  Description: FUNCTIONAL STATUS PRIOR TO ADMISSION: Patient was modified independent using a single point cane for functional mobility. HOME SUPPORT: The patient lived with children but did not require assistance with ADLs. Per patient report, adult children will be able to provide 24/7 supervision and assist at discharge. Occupational Therapy Goals  Initiated 5/13/2020  1. Patient will perform ADLs in standing for 5 mins without fatigue or LOB with supervision/set-up within 7 day(s) using SPC for balance prn.  2.  Patient will complete supine to sit transfer within sternal precautions with supervision and verbal cues within 7 day(s) in order to alleviate discomfort when preparing for OOB activities. 3.  Patient will perform toilet transfers with supervision/set-up within 7 day(s) using SPC and grab bars prn.  4.  Patient will perform all aspects of toileting with supervision/set-up within 7 day(s) using SPC and grab bars prn. Outcome: Progressing Towards Goal     Problem: Patient Education: Go to Patient Education Activity  Goal: Patient/Family Education  Outcome: Progressing Towards Goal   OCCUPATIONAL THERAPY TREATMENT  Patient: Theron Ansari (67 y.o. male)  Date: 5/22/2020  Diagnosis: Acute CVA (cerebrovascular accident) ([facility] [address] [I63.9]   Acute CVA (cerebrovascular accident) ([facility] [address]  Procedure(s) (LRB):  LEFT TRANSCAROTID ARTERY REVASCULARIZATION (URGENT) (Left) 3 Days Post-Op  Precautions: Fall  Chart, occupational therapy assessment, plan of care, and goals were reviewed. ASSESSMENT  Patient continues with skilled OT services and is progressing towards goals. Patient received in bed and on room air. Completed orthostatic BPs which demonstrate continued drop in BP in standing without symptoms. See readings below.  Patient demonstrating ability to reach extremities for self care with set up in sitting. Recommend CGA to min assist for brief standing for self care and functional transfers. Recommend use of BSC with 1 to 1 supervision at this time. Patient continues to demonstrate decreased cervical AROM due to recent surgery (Tcar) along with bruising over neck and chest.     Current Level of Function Impacting Discharge (ADLs): Set up for UE and LE bathing and CGA to min assist for brief standing for self care, BSC transfer with CGA of 1, orthostatic BP in standing. Other factors to consider for discharge: orthostatic BP in standing         PLAN :  Patient continues to benefit from skilled intervention to address the above impairments. Continue treatment per established plan of care. to address goals. Recommend with staff: Kerrie [place] in chair for meals and self care; elevate LEs in recliner based on BP, set up for UE and LE bathing in sitting, CGA to min assist for [facility] transfer with 1 to 1 supervision due to orthostatic BPs    Recommend next OT session: LE self care, toileting     Recommendation for discharge: (in order for the patient to meet his/her long term goals)  Occupational therapy at least 2 days/week in the home AND ensure assist and/or supervision for safety with mobility and self care based on improvement with orthostatic BPs    This discharge recommendation:  Has been made in collaboration with the attending provider and/or case management    IF patient discharges home will need the following DME: none       SUBJECTIVE:   Patient stated I'm easy to get along with.     OBJECTIVE DATA SUMMARY:   Cognitive/Behavioral Status:  Neurologic State: Alert  Orientation Level: Oriented X4  Cognition: Follows commands; Appropriate for age attention/concentration  Perception: Appears intact  Perseveration: No perseveration noted  Safety/Judgement: Awareness of environment    Functional Mobility and Transfers for ADLs:  Bed Mobility:  Supine to Sit: Moderate assistance;Assist x1;Bed Modified; Adaptive equipment(HOB elevated to approx 25 degrees)    Transfers:  Sit to Stand: Assist x1;Contact guard assistance  Functional Transfers  Toilet Transfer : Contact guard assistance;Assist x1(inferred to [facility] based on transfer to chair; recommend 1 to 1 supervision on BSC secondary to orthostatic BP in standing)  Adaptive Equipment: Bedside commode  Bed to Chair: Assist x1;Contact guard assistance    Balance:  Sitting: Intact; Without support  Standing: Impaired; Without support  Standing - Static: Fair  Standing - Dynamic : Fair    ADL Intervention:            Upper Body Bathing  Bathing Assistance: Set-up(in simulation)  Position Performed: Seated edge of bed    Lower Body Bathing  Lower Body : Set-up(in simulation)  Position Performed: Seated edge of bed         Lower Body Dressing Assistance  Pants With Elastic Waist: Minimum assistance(inferred from mobility)  Socks: Set-up  Leg Crossed Method Used: Yes  Position Performed: Seated edge of bed;Standing  Cues: Physical assistance;Verbal cues provided         Cognitive Retraining  Following Commands: Follows one step commands/directions  Safety/Judgement: Awareness of environment  Cues: Verbal cues provided    Vitals:    05/22/20 1512 05/22/20 1516 05/22/20 1521 05/22/20 1530   BP: 146/75 135/84 91/62 (!) 118/94   BP 1 Location: Right arm Right arm Right arm Right arm   BP Patient Position: At rest Sitting Standing Sitting  Comment: LEs elevated   Pulse: 67 81  71   Resp:       Temp:       SpO2:       Weight:       Height:          Activity Tolerance:   Fair  Please refer to the flowsheet for vital signs taken during this treatment. After treatment patient left in no apparent distress:   Sitting in chair, Call bell within reach, Caregiver / family present, and LEs elevated in recliner     COMMUNICATION/COLLABORATION:   The patients plan of care was discussed with: Physical therapist and Registered nurse.      BRANDEN Bermudez  Time Calculation: 26 mins

## 2020-05-22 NOTE — PROGRESS NOTES
Problem: Diabetes Self-Management  Goal: *Disease process and treatment process  Description: Define diabetes and identify own type of diabetes; list 3 options for treating diabetes. Outcome: Progressing Towards Goal  Goal: *Incorporating nutritional management into lifestyle  Description: Describe effect of type, amount and timing of food on blood glucose; list 3 methods for planning meals. Outcome: Progressing Towards Goal  Goal: *Incorporating physical activity into lifestyle  Description: State effect of exercise on blood glucose levels. Outcome: Progressing Towards Goal  Goal: *Developing strategies to promote health/change behavior  Description: Define the ABC's of diabetes; identify appropriate screenings, schedule and personal plan for screenings. Outcome: Progressing Towards Goal  Goal: *Using medications safely  Description: State effect of diabetes medications on diabetes; name diabetes medication taking, action and side effects. Outcome: Progressing Towards Goal  Goal: *Monitoring blood glucose, interpreting and using results  Description: Identify recommended blood glucose targets  and personal targets. Outcome: Progressing Towards Goal  Goal: *Prevention, detection, treatment of acute complications  Description: List symptoms of hyper- and hypoglycemia; describe how to treat low blood sugar and actions for lowering  high blood glucose level. Outcome: Progressing Towards Goal  Goal: *Prevention, detection and treatment of chronic complications  Description: Define the natural course of diabetes and describe the relationship of blood glucose levels to long term complications of diabetes.   Outcome: Progressing Towards Goal  Goal: *Developing strategies to address psychosocial issues  Description: Describe feelings about living with diabetes; identify support needed and support network  Outcome: Progressing Towards Goal     Problem: Patient Education: Go to Patient Education Activity  Goal: Patient/Family Education  Outcome: Progressing Towards Goal     Problem: Falls - Risk of  Goal: *Absence of Falls  Description: Document Bard Mondragon Fall Risk and appropriate interventions in the flowsheet. Outcome: Progressing Towards Goal  Note: Fall Risk Interventions:  Mobility Interventions: Communicate number of staff needed for ambulation/transfer, OT consult for ADLs, Patient to call before getting OOB, PT Consult for mobility concerns    Mentation Interventions: Adequate sleep, hydration, pain control, Bed/chair exit alarm, Door open when patient unattended, Gait belt with transfers/ambulation, More frequent rounding, Update white board    Medication Interventions: Patient to call before getting OOB, Teach patient to arise slowly, Evaluate medications/consider consulting pharmacy    Elimination Interventions: Call light in reach, Patient to call for help with toileting needs, Toileting schedule/hourly rounds, Stay With Me (per policy)    History of Falls Interventions: Room close to nurse's station, Investigate reason for fall, Consult care management for discharge planning, Door open when patient unattended, Evaluate medications/consider consulting pharmacy         Problem: Patient Education: Go to Patient Education Activity  Goal: Patient/Family Education  Outcome: Progressing Towards Goal     Problem: Pressure Injury - Risk of  Goal: *Prevention of pressure injury  Description: Document Iván Scale and appropriate interventions in the flowsheet.   Outcome: Progressing Towards Goal  Note: Pressure Injury Interventions:  Sensory Interventions: Assess changes in LOC, Keep linens dry and wrinkle-free, Maintain/enhance activity level, Minimize linen layers, Pressure redistribution bed/mattress (bed type)    Moisture Interventions: Absorbent underpads, Minimize layers    Activity Interventions: Pressure redistribution bed/mattress(bed type), Increase time out of bed, PT/OT evaluation    Mobility Interventions: HOB 30 degrees or less, Pressure redistribution bed/mattress (bed type), Turn and reposition approx.  every two hours(pillow and wedges)    Nutrition Interventions: Document food/fluid/supplement intake    Friction and Shear Interventions: Lift sheet, Minimize layers                Problem: Patient Education: Go to Patient Education Activity  Goal: Patient/Family Education  Outcome: Progressing Towards Goal     Problem: Patient Education: Go to Patient Education Activity  Goal: Patient/Family Education  Outcome: Progressing Towards Goal     Problem: Patient Education: Go to Patient Education Activity  Goal: Patient/Family Education  Outcome: Progressing Towards Goal     Problem: Pain  Goal: *Control of Pain  Outcome: Progressing Towards Goal     Problem: Patient Education: Go to Patient Education Activity  Goal: Patient/Family Education  Outcome: Progressing Towards Goal

## 2020-05-22 NOTE — PROGRESS NOTES
Hospitalist Progress Note  Aspen Nayak MD  Answering service: [phone] from in house phone        Date of Service:  2020  NAME:  Dylan Gonzalez. :  1955  MRN:  [id number]      Admission Summary:   As per initial admission summary  This is a 68-year-old man with a past medical history significant for coronary artery disease, status post CABG and multiple stent placements; dyslipidemia; hypertension; type 2 diabetes; seizure disorder; benign prostatic hyperplasia; obstructive sleep apnea; chronic systolic congestive heart failure; chronic kidney disease; status post pacemaker insertion, was in his usual state of health until the day of presentation at the emergency room when the patient developed sudden loss of vision in the left eye. This episode lasted a few seconds. It was followed by headache. The headache is located at the left side of the head, constant throbbing headache, 6/10 in severity. No known aggravating or relieving factors. The patient stated that he has a history of ocular migraine and this present episode is similar to his attack of ocular migraine. The patient also stated that he fell a couple of times at home. Denies loss of consciousness. The circumstances surrounding the fall is not clear. The patient denies fever, rigors, and chills. No sick contact or contact with any person with COVID-19 virus infection. When the patient arrived at the emergency room, CT of the head was obtained. The CT scan did not show any acute pathology. CTA of the head and neck was also performed. This shows no acute large vessel occlusion, but shows severe stenosis, bilateral internal carotid arteries, left greater than right. The patient was subsequently referred to the hospitalist service for evaluation for admission. The patient was last admitted to this hospital from 2019 to 2019.   The patient was admitted to the Cardiology Service for evaluation and treatment of ventricular tachycardia. The patient subsequently underwent ICD placement with adjustments to his medication. Interval history / Subjective:     Says wants to sleep. Nurse reported pt complains of headache. Discussed with neurology, seems like mainly from his neck hematoma. Assessment & Plan:     Ocular migraine  -Symptoms appear to have resolved  -Neurology evaluated the patient, EEG with no seizure focus  -MRI cannot be done due to presence of pacemaker  -Ophthalmology also discussed over the phone    Neck hematoma pain  Pain Mx as needed  Stop IV continue PO medicines    Orthostatic hypotension  -Symptomatic, and significant drop in BP  -Lasix, lisinopril, eplerenone and Imdur on hold  -Midodrine drain dose adjusted by cardiology  -Pressure goal, systolic [id number] per vascular  -Coreg restarted on lower dose, continue Midodrine    Bilateral internal carotic artery stenosis  -Vascular surgery following, discussed with Dr Charley Segura  -Status post left carotic revascularization 5/20/2020    Coronary artery disease  -status post CABG and stent placement  -Continue Coreg, aspirin, Plavix and statin    Dyslipidemia  -continue statin    Type 2 diabetes  -Controlled  -Continue insulin sliding scale coverage  -Blood sugar stable    Seizure disorder  -Stable, EEG without seizure focus  -Continue Keppra    Benign prostatic hyperplasia  -Continue Flomax and finasteride    Obstructive sleep apnea  -Continue CPAP per home settings    Ventricular tachycardia  -Status post pacemaker placement    Chronic systolic congestive heart failure  -Echocardiogram with EF of 16 to 20%.   -Appears compensated  -Diuretics on hold due to hypotension    Chronic kidney disease, stage III  -Continue monitor renal function    Fall  -Status post mechanical fall  -PT recommending home PT    Code status: FULL   DVT prophylaxis: heparin SC     Care Plan discussed with: Patient/Family  Disposition: PT recommending may need Rehab, discussed with CM     Hospital Problems  Date Reviewed: 5/19/2020          Codes Class Noted POA    * (Principal) Acute CVA (cerebrovascular accident) [facility]) ICD-10-CM: I63.9  ICD-9-CM: 434.91  5/12/2020 Yes                Review of Systems:   A comprehensive review of systems was negative except for that written in the HPI. Vital Signs:    Last 24hrs VS reviewed since prior progress note. Most recent are:  Visit Vitals  /69 (BP 1 Location: Left arm, BP Patient Position: At rest;Supine)   Pulse 78   Temp 97.7 °F (36.5 °C)   Resp 16   Ht 5' 9\" (1.753 m)   Wt 105.4 kg (232 lb 6.4 oz)   SpO2 100%   BMI 34.32 kg/m²         Intake/Output Summary (Last 24 hours) at 5/22/2020 1501  Last data filed at 5/22/2020 1426  Gross per 24 hour   Intake    Output 1550 ml   Net -1550 ml        Physical Examination:             Constitutional:  No acute distress, cooperative, pleasant    ENT:  Oral mucous moist, oropharynx benign. Neck supple,    Resp:  CTA bilaterally. No wheezing/rhonchi/rales. No accessory muscle use   CV:  Regular rhythm, normal rate, no murmurs, gallops, rubs    GI:  Soft, non distended, non tender. normoactive bowel sounds, no hepatosplenomegaly     Musculoskeletal:  No edema, warm, 2+ pulses throughout    Neurologic:  Moves all extremities. Awake but somewhat drowsy     Psych:  Good insight, Not anxious nor agitated. Skin: Ecchymosis around the left neck  Hematologic/Lymphatic/Immunlogic:  No jaundice nor lymph node swelling  Eyes:  EOMI. Anicteric sclerae, PERRL.        Data Review:    Review and/or order of clinical lab test  Review and/or order of tests in the radiology section of CPT      Labs:     Recent Labs     05/20/20  0325   WBC 7.2   HGB 10.9*   HCT 34.9*   *     Recent Labs     05/22/20  0620 05/20/20  0325   * 138   K 4.0 4.2    109*   CO2 24 25   BUN 21* 21*   CREA 1.21 1.19   * 111*   CA 8.0* 7.8* No results for input(s): SGOT, GPT, ALT, AP, TBIL, TBILI, TP, ALB, GLOB, GGT, AML, LPSE in the last 72 hours. No lab exists for component: AMYP, HLPSE  No results for input(s): INR, PTP, APTT, INREXT, INREXT in the last 72 hours. No results for input(s): FE, TIBC, PSAT, FERR in the last 72 hours. Lab Results   Component Value Date/Time    Folate 11.2 05/13/2020 12:27 AM      No results for input(s): PH, PCO2, PO2 in the last 72 hours. No results for input(s): CPK, CKNDX, TROIQ in the last 72 hours.     No lab exists for component: CPKMB  Lab Results   Component Value Date/Time    Cholesterol, total 120 05/13/2020 12:27 AM    HDL Cholesterol 20 05/13/2020 12:27 AM    LDL, calculated 46.2 05/13/2020 12:27 AM    Triglyceride 269 (H) 05/13/2020 12:27 AM    CHOL/HDL Ratio 6.0 (H) 05/13/2020 12:27 AM     Lab Results   Component Value Date/Time    Glucose (POC) 163 (H) 05/22/2020 11:45 AM    Glucose (POC) 157 (H) 05/22/2020 07:39 AM    Glucose (POC) 131 (H) 05/21/2020 09:04 PM    Glucose (POC) 134 (H) 05/21/2020 04:46 PM    Glucose (POC) 158 (H) 05/21/2020 12:33 PM     Lab Results   Component Value Date/Time    Color YELLOW/STRAW 08/21/2019 01:17 PM    Appearance CLEAR 08/21/2019 01:17 PM    Specific gravity 1.020 08/21/2019 01:17 PM    Specific gravity 1.020 04/05/2019 07:25 PM    pH (UA) 5.5 08/21/2019 01:17 PM    Protein 100 (A) 08/21/2019 01:17 PM    Glucose NEGATIVE  08/21/2019 01:17 PM    Ketone NEGATIVE  08/21/2019 01:17 PM    Bilirubin NEGATIVE  08/21/2019 01:17 PM    Urobilinogen 1.0 08/21/2019 01:17 PM    Nitrites NEGATIVE  08/21/2019 01:17 PM    Leukocyte Esterase TRACE (A) 08/21/2019 01:17 PM    Epithelial cells FEW 08/21/2019 01:17 PM    Bacteria 1+ (A) 08/21/2019 01:17 PM    WBC 5-10 08/21/2019 01:17 PM    RBC >100 (H) 08/21/2019 01:17 PM         Medications Reviewed:     Current Facility-Administered Medications   Medication Dose Route Frequency    hydrALAZINE (APRESOLINE) 20 mg/mL injection 10 mg  10 mg IntraVENous Q2H PRN    0.9% sodium chloride infusion  50 mL/hr IntraVENous CONTINUOUS    sodium chloride (NS) flush 5-40 mL  5-40 mL IntraVENous Q8H    sodium chloride (NS) flush 5-40 mL  5-40 mL IntraVENous PRN    ondansetron (ZOFRAN ODT) tablet 4 mg  4 mg Oral Q6H PRN    ondansetron (ZOFRAN) injection 4 mg  4 mg IntraVENous Q4H PRN    oxyCODONE IR (ROXICODONE) tablet 5 mg  5 mg Oral Q4H PRN    oxyCODONE IR (ROXICODONE) tablet 10 mg  10 mg Oral Q4H PRN    midodrine (PROAMATINE) tablet 10 mg  10 mg Oral TID WITH MEALS    diphenhydrAMINE-zinc acetate 1%-0.1% (BENADRYL) cream   Topical TID PRN    amiodarone (CORDARONE) tablet 200 mg  200 mg Oral DAILY    aspirin chewable tablet 81 mg  81 mg Oral DAILY    atorvastatin (LIPITOR) tablet 80 mg  80 mg Oral DAILY    carvediloL (COREG) tablet 3.125 mg  3.125 mg Oral BID WITH MEALS    clonazePAM (KlonoPIN) tablet 1 mg  1 mg Oral QHS    clopidogreL (PLAVIX) tablet 75 mg  75 mg Oral DAILY    finasteride (PROSCAR) tablet 5 mg  5 mg Oral 7am    lactulose (CHRONULAC) 10 gram/15 mL solution 45 mL  30 g Oral TID    levETIRAcetam (KEPPRA) tablet 1,000 mg  1,000 mg Oral Q12H    nitroglycerin (NITROSTAT) tablet 0.4 mg  0.4 mg SubLINGual Q5MIN PRN    pantoprazole (PROTONIX) tablet 40 mg  40 mg Oral ACB&D    pregabalin (LYRICA) capsule 50 mg  50 mg Oral TID    QUEtiapine (SEROquel) tablet 25 mg  25 mg Oral QHS    tamsulosin (FLOMAX) capsule 0.4 mg  0.4 mg Oral ACB&D    venlafaxine-SR (EFFEXOR-XR) capsule 75 mg  75 mg Oral BID    insulin lispro (HUMALOG) injection   SubCUTAneous AC&HS    glucose chewable tablet 16 g  4 Tab Oral PRN    glucagon (GLUCAGEN) injection 1 mg  1 mg IntraMUSCular PRN    dextrose 10% infusion 0-250 mL  0-250 mL IntraVENous PRN    acetaminophen (TYLENOL) tablet 650 mg  650 mg Oral Q4H PRN    heparin (porcine) injection 5,000 Units  5,000 Units SubCUTAneous Q8H    bisacodyL (DULCOLAX) suppository 10 mg  10 mg Rectal DAILY PRN ______________________________________________________________________  EXPECTED LENGTH OF STAY: 2d 2h  ACTUAL LENGTH OF STAY:          Maria Ines Cotton MD

## 2020-05-22 NOTE — PROGRESS NOTES
Vascular    Orthostatic yesterday with walking. Got some sleep overnight and pain is improving. No visual sxs this am. No difficulty swallowing/breathing. Visit Vitals  /70 (BP 1 Location: Left arm, BP Patient Position: At rest)   Pulse 75   Temp 98 °F (36.7 °C)   Resp 16   Ht 5' 9\" (1.753 m)   Wt 105.4 kg (232 lb 6.4 oz)   SpO2 96%   BMI 34.32 kg/m²     Neuro intact  Neck bruised and stable hematoma  No skin changes        77 y/o WM with symptomatic left ICA stenosis, s/p Left TCAR  - Moderate neck hematoma is stable. Pain improving slowly and swallowing/breathing ok. Continue pain medications.  - SBP <160 with hydralazine PRN, Orthostatic hypotension yesterday  - Continue aspirin/plavix and subq heparin.    - PT/OT   - Will need one week followup with me after discharge to check his incision.

## 2020-05-23 LAB
GLUCOSE BLD STRIP.AUTO-MCNC: 128 MG/DL (ref 65–100)
GLUCOSE BLD STRIP.AUTO-MCNC: 138 MG/DL (ref 65–100)
GLUCOSE BLD STRIP.AUTO-MCNC: 146 MG/DL (ref 65–100)
GLUCOSE BLD STRIP.AUTO-MCNC: 177 MG/DL (ref 65–100)
SERVICE CMNT-IMP: ABNORMAL

## 2020-05-23 PROCEDURE — 74011250637 HC RX REV CODE- 250/637: Performed by: INTERNAL MEDICINE

## 2020-05-23 PROCEDURE — 74011250636 HC RX REV CODE- 250/636: Performed by: INTERNAL MEDICINE

## 2020-05-23 PROCEDURE — 74011250637 HC RX REV CODE- 250/637: Performed by: FAMILY MEDICINE

## 2020-05-23 PROCEDURE — 94760 N-INVAS EAR/PLS OXIMETRY 1: CPT

## 2020-05-23 PROCEDURE — 74011636637 HC RX REV CODE- 636/637: Performed by: INTERNAL MEDICINE

## 2020-05-23 PROCEDURE — 65660000000 HC RM CCU STEPDOWN

## 2020-05-23 PROCEDURE — 74011250637 HC RX REV CODE- 250/637: Performed by: NURSE PRACTITIONER

## 2020-05-23 PROCEDURE — 94660 CPAP INITIATION&MGMT: CPT

## 2020-05-23 PROCEDURE — 82962 GLUCOSE BLOOD TEST: CPT

## 2020-05-23 RX ORDER — CALCIUM CARBONATE 200(500)MG
400 TABLET,CHEWABLE ORAL
Status: DISCONTINUED | OUTPATIENT
Start: 2020-05-23 | End: 2020-05-24 | Stop reason: HOSPADM

## 2020-05-23 RX ADMIN — CLONAZEPAM 1 MG: 1 TABLET ORAL at 21:19

## 2020-05-23 RX ADMIN — CARVEDILOL 3.12 MG: 3.12 TABLET, FILM COATED ORAL at 08:29

## 2020-05-23 RX ADMIN — LEVETIRACETAM 1000 MG: 500 TABLET, FILM COATED ORAL at 08:28

## 2020-05-23 RX ADMIN — INSULIN LISPRO 2 UNITS: 100 INJECTION, SOLUTION INTRAVENOUS; SUBCUTANEOUS at 08:27

## 2020-05-23 RX ADMIN — Medication 10 ML: at 06:00

## 2020-05-23 RX ADMIN — Medication 10 ML: at 21:20

## 2020-05-23 RX ADMIN — QUETIAPINE FUMARATE 25 MG: 25 TABLET ORAL at 21:19

## 2020-05-23 RX ADMIN — OXYCODONE 5 MG: 5 TABLET ORAL at 17:23

## 2020-05-23 RX ADMIN — Medication 10 ML: at 13:08

## 2020-05-23 RX ADMIN — HEPARIN SODIUM 5000 UNITS: 5000 INJECTION, SOLUTION INTRAVENOUS; SUBCUTANEOUS at 13:11

## 2020-05-23 RX ADMIN — PANTOPRAZOLE SODIUM 40 MG: 40 TABLET, DELAYED RELEASE ORAL at 06:58

## 2020-05-23 RX ADMIN — OXYCODONE 5 MG: 5 TABLET ORAL at 21:19

## 2020-05-23 RX ADMIN — AMIODARONE HYDROCHLORIDE 200 MG: 200 TABLET ORAL at 08:29

## 2020-05-23 RX ADMIN — FINASTERIDE 5 MG: 5 TABLET, FILM COATED ORAL at 06:58

## 2020-05-23 RX ADMIN — INSULIN LISPRO 2 UNITS: 100 INJECTION, SOLUTION INTRAVENOUS; SUBCUTANEOUS at 12:45

## 2020-05-23 RX ADMIN — PANTOPRAZOLE SODIUM 40 MG: 40 TABLET, DELAYED RELEASE ORAL at 17:17

## 2020-05-23 RX ADMIN — PREGABALIN 50 MG: 50 CAPSULE ORAL at 17:16

## 2020-05-23 RX ADMIN — HEPARIN SODIUM 5000 UNITS: 5000 INJECTION, SOLUTION INTRAVENOUS; SUBCUTANEOUS at 06:57

## 2020-05-23 RX ADMIN — OXYCODONE 5 MG: 5 TABLET ORAL at 13:26

## 2020-05-23 RX ADMIN — CLOPIDOGREL BISULFATE 75 MG: 75 TABLET ORAL at 08:29

## 2020-05-23 RX ADMIN — HEPARIN SODIUM 5000 UNITS: 5000 INJECTION, SOLUTION INTRAVENOUS; SUBCUTANEOUS at 21:20

## 2020-05-23 RX ADMIN — MIDODRINE HYDROCHLORIDE 10 MG: 5 TABLET ORAL at 12:45

## 2020-05-23 RX ADMIN — TAMSULOSIN HYDROCHLORIDE 0.4 MG: 0.4 CAPSULE ORAL at 06:58

## 2020-05-23 RX ADMIN — MIDODRINE HYDROCHLORIDE 10 MG: 5 TABLET ORAL at 17:18

## 2020-05-23 RX ADMIN — OXYCODONE 5 MG: 5 TABLET ORAL at 07:00

## 2020-05-23 RX ADMIN — PREGABALIN 50 MG: 50 CAPSULE ORAL at 08:29

## 2020-05-23 RX ADMIN — MIDODRINE HYDROCHLORIDE 10 MG: 5 TABLET ORAL at 08:30

## 2020-05-23 RX ADMIN — VENLAFAXINE HYDROCHLORIDE 75 MG: 37.5 CAPSULE, EXTENDED RELEASE ORAL at 08:28

## 2020-05-23 RX ADMIN — LEVETIRACETAM 1000 MG: 500 TABLET, FILM COATED ORAL at 21:19

## 2020-05-23 RX ADMIN — TAMSULOSIN HYDROCHLORIDE 0.4 MG: 0.4 CAPSULE ORAL at 17:17

## 2020-05-23 RX ADMIN — ATORVASTATIN CALCIUM 80 MG: 40 TABLET, FILM COATED ORAL at 08:28

## 2020-05-23 RX ADMIN — ASPIRIN 81 MG 81 MG: 81 TABLET ORAL at 08:29

## 2020-05-23 RX ADMIN — CALCIUM CARBONATE (ANTACID) CHEW TAB 500 MG 400 MG: 500 CHEW TAB at 21:19

## 2020-05-23 RX ADMIN — VENLAFAXINE HYDROCHLORIDE 75 MG: 37.5 CAPSULE, EXTENDED RELEASE ORAL at 17:16

## 2020-05-23 RX ADMIN — CARVEDILOL 3.12 MG: 3.12 TABLET, FILM COATED ORAL at 17:17

## 2020-05-23 RX ADMIN — PREGABALIN 50 MG: 50 CAPSULE ORAL at 21:19

## 2020-05-23 NOTE — PROGRESS NOTES
Bedside and Verbal shift change report given to [facility] (oncoming nurse) by Francia Abraham (offgoing nurse). Report included the following information SBAR, Kardex, Procedure Summary, Intake/Output, MAR, Recent Results, Cardiac Rhythm paced and Dual Neuro Assessment.

## 2020-05-23 NOTE — PROGRESS NOTES
Vascular  VSS  Impressive neck hematoma  Displacement of trachea to the right of the midline but very little if any symptoms  Swallowing well  miild hoarseness  Neuro intact  following

## 2020-05-23 NOTE — PROGRESS NOTES
[facility] NP Progress note    Name: Karin Kebede. YOB: 1955  MRN: [id number]  Admission Date: 5/12/2020  4:56 PM    Date of Service: 5/22/2020 9:14 PM                                Overnight Update:        Complaint: 10 beat run V. tach reported by primary RN  Paged by:   Subjective: Patient has no complaints. Denies chest pain shortness of breath, difficulty breathing. Objective: Examined in private patient room. Patient seated in chair. Alert and oriented, pleasant, cooperative. Vital signs as charted. Electrolytes normal.  Physical exam unremarkable. Monitor shows ventricular paced rhythm. Patient has history of V. tach, status post BiV ICD . Cards following. Plan: Monitor, continue current plan of care.      Emaline Lefort, MSN, RN, NP-C  [phone] or via Perfect Serve

## 2020-05-23 NOTE — PROGRESS NOTES
Hospitalist Progress Note  Kenzie Rodriguez MD  Answering service: [phone] from in house phone        Date of Service:  2020  NAME:  Crystal Bo. :  1955  MRN:  [id number]      Admission Summary:   As per initial admission summary  This is a 70-year-old man with a past medical history significant for coronary artery disease, status post CABG and multiple stent placements; dyslipidemia; hypertension; type 2 diabetes; seizure disorder; benign prostatic hyperplasia; obstructive sleep apnea; chronic systolic congestive heart failure; chronic kidney disease; status post pacemaker insertion, was in his usual state of health until the day of presentation at the emergency room when the patient developed sudden loss of vision in the left eye. This episode lasted a few seconds. It was followed by headache. The headache is located at the left side of the head, constant throbbing headache, 6/10 in severity. No known aggravating or relieving factors. The patient stated that he has a history of ocular migraine and this present episode is similar to his attack of ocular migraine. The patient also stated that he fell a couple of times at home. Denies loss of consciousness. The circumstances surrounding the fall is not clear. The patient denies fever, rigors, and chills. No sick contact or contact with any person with COVID-19 virus infection. When the patient arrived at the emergency room, CT of the head was obtained. The CT scan did not show any acute pathology. CTA of the head and neck was also performed. This shows no acute large vessel occlusion, but shows severe stenosis, bilateral internal carotid arteries, left greater than right. The patient was subsequently referred to the hospitalist service for evaluation for admission. The patient was last admitted to this hospital from 2019 to 2019.   The patient was admitted to the Cardiology Service for evaluation and treatment of ventricular tachycardia. The patient subsequently underwent ICD placement with adjustments to his medication. Interval history / Subjective: Follow up Madhav  Patient seen and examined by the bedside, Labs, images and notes reviewed  Decreased pain at neck hematoma site  Orthostatics positive yesterday  Pending today  Discussed with nursing staff, orders reviewed. Plan discussed with patient/Family       Assessment & Plan:     Ocular migraine  -Symptoms appear to have resolved  -Neurology evaluated the patient, EEG with no seizure focus  -MRI cannot be done due to presence of pacemaker  -Ophthalmology also discussed over the phone    Neck hematoma pain  Pain Mx as needed  continue PO medicines as needed    Orthostatic hypotension  -Symptomatic, and significant drop in BP  -Lasix, lisinopril, eplerenone and Imdur on hold  -Midodrine drain dose adjusted by cardiology  -Pressure goal, systolic 240-021 per vascular  -Coreg restarted on lower dose, continue Midodrine    Bilateral internal carotic artery stenosis  -Vascular surgery following, discussed with Dr Shaquille Naylor  -Status post left carotic revascularization 5/20/2020    Coronary artery disease  -status post CABG and stent placement  -Continue Coreg, aspirin, Plavix and statin    Dyslipidemia  -Continue statin    Type 2 diabetes  -Controlled  -Continue insulin sliding scale coverage  -Blood sugar stable    Seizure disorder  -Stable, EEG without seizure focus  -Continue Keppra    Benign prostatic hyperplasia  -Continue Flomax and finasteride    Obstructive sleep apnea  -Continue CPAP per home settings    Ventricular tachycardia  -Status post pacemaker placement    Chronic systolic congestive heart failure  -Echocardiogram with EF of 16 to 20%.   -Appears compensated  -Diuretics on hold due to hypotension    Chronic kidney disease, stage III  -continue monitor renal function    Fall  -Status post mechanical fall  -PT recommending home PT    Code status: FULL   DVT prophylaxis: heparin SC     Care Plan discussed with: Patient/Family  Disposition: PT recommending may need Rehab due to orthostatic hypotension     Hospital Problems  Date Reviewed: 5/19/2020          Codes Class Noted POA    * (Principal) Acute CVA (cerebrovascular accident) [facility]) ICD-10-CM: I63.9  ICD-9-CM: 434.91  5/12/2020 Yes                Review of Systems:   A comprehensive review of systems was negative except for that written in the HPI. Vital Signs:    Last 24hrs VS reviewed since prior progress note. Most recent are:  Visit Vitals  /79 (BP 1 Location: Left arm, BP Patient Position: At rest;Supine)   Pulse 74   Temp 97.9 °F (36.6 °C)   Resp 16   Ht 5' 9\" (1.753 m)   Wt 101.4 kg (223 lb 8.7 oz)   SpO2 98%   BMI 33.01 kg/m²         Intake/Output Summary (Last 24 hours) at 5/23/2020 1455  Last data filed at 5/23/2020 1321  Gross per 24 hour   Intake 364.17 ml   Output 975 ml   Net -610.83 ml        Physical Examination:             Constitutional:  No acute distress, cooperative, pleasant    ENT:  Oral mucous moist, oropharynx benign. Neck supple,    Resp:  CTA bilaterally. No wheezing/rhonchi/rales. No accessory muscle use   CV:  Regular rhythm, normal rate, no murmurs, gallops, rubs    GI:  Soft, non distended, non tender. normoactive bowel sounds, no hepatosplenomegaly     Musculoskeletal:  No edema, warm, 2+ pulses throughout    Neurologic:  Moves all extremities. Awake but somewhat drowsy     Psych:  Good insight, Not anxious nor agitated. Skin: Ecchymosis around the left neck  Hematologic/Lymphatic/Immunlogic:  No jaundice nor lymph node swelling  Eyes:  EOMI. Anicteric sclerae, PERRL.        Data Review:    Review and/or order of clinical lab test  Review and/or order of tests in the radiology section of CPT      Labs:     No results for input(s): WBC, HGB, HCT, PLT, HGBEXT, HCTEXT, PLTEXT, HGBEXT, HCTEXT, PLTEXT in the last 72 hours. Recent Labs     05/22/20  0620   *   K 4.0      CO2 24   BUN 21*   CREA 1.21   *   CA 8.0*     No results for input(s): SGOT, GPT, ALT, AP, TBIL, TBILI, TP, ALB, GLOB, GGT, AML, LPSE in the last 72 hours. No lab exists for component: AMYP, HLPSE  No results for input(s): INR, PTP, APTT, INREXT, INREXT in the last 72 hours. No results for input(s): FE, TIBC, PSAT, FERR in the last 72 hours. Lab Results   Component Value Date/Time    Folate 11.2 05/13/2020 12:27 AM      No results for input(s): PH, PCO2, PO2 in the last 72 hours. No results for input(s): CPK, CKNDX, TROIQ in the last 72 hours.     No lab exists for component: CPKMB  Lab Results   Component Value Date/Time    Cholesterol, total 120 05/13/2020 12:27 AM    HDL Cholesterol 20 05/13/2020 12:27 AM    LDL, calculated 46.2 05/13/2020 12:27 AM    Triglyceride 269 (H) 05/13/2020 12:27 AM    CHOL/HDL Ratio 6.0 (H) 05/13/2020 12:27 AM     Lab Results   Component Value Date/Time    Glucose (POC) 146 (H) 05/23/2020 12:28 PM    Glucose (POC) 177 (H) 05/23/2020 07:13 AM    Glucose (POC) 171 (H) 05/22/2020 08:59 PM    Glucose (POC) 203 (H) 05/22/2020 04:53 PM    Glucose (POC) 163 (H) 05/22/2020 11:45 AM     Lab Results   Component Value Date/Time    Color YELLOW/STRAW 08/21/2019 01:17 PM    Appearance CLEAR 08/21/2019 01:17 PM    Specific gravity 1.020 08/21/2019 01:17 PM    Specific gravity 1.020 04/05/2019 07:25 PM    pH (UA) 5.5 08/21/2019 01:17 PM    Protein 100 (A) 08/21/2019 01:17 PM    Glucose NEGATIVE  08/21/2019 01:17 PM    Ketone NEGATIVE  08/21/2019 01:17 PM    Bilirubin NEGATIVE  08/21/2019 01:17 PM    Urobilinogen 1.0 08/21/2019 01:17 PM    Nitrites NEGATIVE  08/21/2019 01:17 PM    Leukocyte Esterase TRACE (A) 08/21/2019 01:17 PM    Epithelial cells FEW 08/21/2019 01:17 PM    Bacteria 1+ (A) 08/21/2019 01:17 PM    WBC 5-10 08/21/2019 01:17 PM    RBC >100 (H) 08/21/2019 01:17 PM         Medications Reviewed:     Current Facility-Administered Medications   Medication Dose Route Frequency    hydrALAZINE (APRESOLINE) 20 mg/mL injection 10 mg  10 mg IntraVENous Q2H PRN    0.9% sodium chloride infusion  50 mL/hr IntraVENous CONTINUOUS    sodium chloride (NS) flush 5-40 mL  5-40 mL IntraVENous Q8H    sodium chloride (NS) flush 5-40 mL  5-40 mL IntraVENous PRN    ondansetron (ZOFRAN ODT) tablet 4 mg  4 mg Oral Q6H PRN    ondansetron (ZOFRAN) injection 4 mg  4 mg IntraVENous Q4H PRN    oxyCODONE IR (ROXICODONE) tablet 5 mg  5 mg Oral Q4H PRN    oxyCODONE IR (ROXICODONE) tablet 10 mg  10 mg Oral Q4H PRN    midodrine (PROAMATINE) tablet 10 mg  10 mg Oral TID WITH MEALS    diphenhydrAMINE-zinc acetate 1%-0.1% (BENADRYL) cream   Topical TID PRN    amiodarone (CORDARONE) tablet 200 mg  200 mg Oral DAILY    aspirin chewable tablet 81 mg  81 mg Oral DAILY    atorvastatin (LIPITOR) tablet 80 mg  80 mg Oral DAILY    carvediloL (COREG) tablet 3.125 mg  3.125 mg Oral BID WITH MEALS    clonazePAM (KlonoPIN) tablet 1 mg  1 mg Oral QHS    clopidogreL (PLAVIX) tablet 75 mg  75 mg Oral DAILY    finasteride (PROSCAR) tablet 5 mg  5 mg Oral 7am    lactulose (CHRONULAC) 10 gram/15 mL solution 45 mL  30 g Oral TID    levETIRAcetam (KEPPRA) tablet 1,000 mg  1,000 mg Oral Q12H    nitroglycerin (NITROSTAT) tablet 0.4 mg  0.4 mg SubLINGual Q5MIN PRN    pantoprazole (PROTONIX) tablet 40 mg  40 mg Oral ACB&D    pregabalin (LYRICA) capsule 50 mg  50 mg Oral TID    QUEtiapine (SEROquel) tablet 25 mg  25 mg Oral QHS    tamsulosin (FLOMAX) capsule 0.4 mg  0.4 mg Oral ACB&D    venlafaxine-SR (EFFEXOR-XR) capsule 75 mg  75 mg Oral BID    insulin lispro (HUMALOG) injection   SubCUTAneous AC&HS    glucose chewable tablet 16 g  4 Tab Oral PRN    glucagon (GLUCAGEN) injection 1 mg  1 mg IntraMUSCular PRN    dextrose 10% infusion 0-250 mL  0-250 mL IntraVENous PRN    acetaminophen (TYLENOL) tablet 650 mg  650 mg Oral Q4H PRN    heparin (porcine) injection 5,000 Units  5,000 Units SubCUTAneous Q8H    bisacodyL (DULCOLAX) suppository 10 mg  10 mg Rectal DAILY PRN     ______________________________________________________________________  EXPECTED LENGTH OF STAY: 2d 2h  ACTUAL LENGTH OF STAY:          Dung Prater MD

## 2020-05-23 NOTE — PROGRESS NOTES
Problem: Diabetes Self-Management  Goal: *Disease process and treatment process  Description: Define diabetes and identify own type of diabetes; list 3 options for treating diabetes. Outcome: Progressing Towards Goal  Goal: *Incorporating nutritional management into lifestyle  Description: Describe effect of type, amount and timing of food on blood glucose; list 3 methods for planning meals. Outcome: Progressing Towards Goal  Goal: *Incorporating physical activity into lifestyle  Description: State effect of exercise on blood glucose levels. Outcome: Progressing Towards Goal  Goal: *Developing strategies to promote health/change behavior  Description: Define the ABC's of diabetes; identify appropriate screenings, schedule and personal plan for screenings. Outcome: Progressing Towards Goal  Goal: *Using medications safely  Description: State effect of diabetes medications on diabetes; name diabetes medication taking, action and side effects. Outcome: Progressing Towards Goal  Goal: *Monitoring blood glucose, interpreting and using results  Description: Identify recommended blood glucose targets  and personal targets. Outcome: Progressing Towards Goal  Goal: *Prevention, detection, treatment of acute complications  Description: List symptoms of hyper- and hypoglycemia; describe how to treat low blood sugar and actions for lowering  high blood glucose level. Outcome: Progressing Towards Goal  Goal: *Prevention, detection and treatment of chronic complications  Description: Define the natural course of diabetes and describe the relationship of blood glucose levels to long term complications of diabetes.   Outcome: Progressing Towards Goal  Goal: *Developing strategies to address psychosocial issues  Description: Describe feelings about living with diabetes; identify support needed and support network  Outcome: Progressing Towards Goal  Goal: *Sick day guidelines  Outcome: Progressing Towards Goal     Problem: Patient Education: Go to Patient Education Activity  Goal: Patient/Family Education  Outcome: Progressing Towards Goal     Problem: Falls - Risk of  Goal: *Absence of Falls  Description: Document Paula Erickson Fall Risk and appropriate interventions in the flowsheet.   Outcome: Progressing Towards Goal  Note: Fall Risk Interventions:  Mobility Interventions: Assess mobility with egress test, Communicate number of staff needed for ambulation/transfer, OT consult for ADLs, Patient to call before getting OOB, PT Consult for mobility concerns, PT Consult for assist device competence, Strengthening exercises (ROM-active/passive), Utilize walker, cane, or other assistive device, Utilize gait belt for transfers/ambulation    Mentation Interventions: Adequate sleep, hydration, pain control, Door open when patient unattended, Evaluate medications/consider consulting pharmacy, Eyeglasses and hearing aids, Gait belt with transfers/ambulation, Increase mobility, More frequent rounding, Room close to nurse's station, Toileting rounds, Update white board    Medication Interventions: Assess postural VS orthostatic hypotension, Evaluate medications/consider consulting pharmacy, Teach patient to arise slowly, Utilize gait belt for transfers/ambulation, Patient to call before getting OOB    Elimination Interventions: Call light in reach, Patient to call for help with toileting needs, Stay With Me (per policy), Toilet paper/wipes in reach, Toileting schedule/hourly rounds, Urinal in reach    History of Falls Interventions: Consult care management for discharge planning, Door open when patient unattended, Evaluate medications/consider consulting pharmacy, Investigate reason for fall, Room close to nurse's station, Utilize gait belt for transfer/ambulation, Assess for delayed presentation/identification of injury for 48 hrs (comment for end date), Vital signs minimum Q4HRs X 24 hrs (comment for end date)         Problem: Patient Education: Go to Patient Education Activity  Goal: Patient/Family Education  Outcome: Progressing Towards Goal     Problem: Pressure Injury - Risk of  Goal: *Prevention of pressure injury  Description: Document Iván Scale and appropriate interventions in the flowsheet. Outcome: Progressing Towards Goal  Note: Pressure Injury Interventions:  Sensory Interventions: Assess changes in LOC, Assess need for specialty bed, Avoid rigorous massage over bony prominences, Check visual cues for pain, Discuss PT/OT consult with provider, Float heels, Keep linens dry and wrinkle-free, Maintain/enhance activity level, Monitor skin under medical devices, Sit a 90-degree angle/use footstool if needed, Turn and reposition approx. every two hours (pillows and wedges if needed), Use 30-degree side-lying position    Moisture Interventions: Absorbent underpads, Apply protective barrier, creams and emollients, Assess need for specialty bed, Check for incontinence Q2 hours and as needed, Limit adult briefs, Maintain skin hydration (lotion/cream), Minimize layers, Offer toileting Q_hr    Activity Interventions: Assess need for specialty bed, Increase time out of bed, PT/OT evaluation    Mobility Interventions: Assess need for specialty bed, Float heels, HOB 30 degrees or less, PT/OT evaluation, Turn and reposition approx.  every two hours(pillow and wedges)    Nutrition Interventions: Document food/fluid/supplement intake, Discuss nutritional consult with provider, Offer support with meals,snacks and hydration    Friction and Shear Interventions: Apply protective barrier, creams and emollients, Feet elevated on foot rest, HOB 30 degrees or less, Lift sheet, Minimize layers, Sit at 90-degree angle                Problem: Patient Education: Go to Patient Education Activity  Goal: Patient/Family Education  Outcome: Progressing Towards Goal     Problem: Patient Education: Go to Patient Education Activity  Goal: Patient/Family Education  Outcome: Progressing Towards Goal     Problem: Ischemic Stroke: Discharge Outcomes  Goal: *Verbalizes anxiety and depression are reduced or absent  Outcome: Progressing Towards Goal  Goal: *Verbalize understanding of risk factor modification(Stroke Metric)  Outcome: Progressing Towards Goal  Goal: *Hemodynamically stable  Outcome: Progressing Towards Goal  Goal: *Absence of aspiration pneumonia  Outcome: Progressing Towards Goal  Goal: *Aware of needed dietary changes  Outcome: Progressing Towards Goal  Goal: *Verbalize understanding of prescribed medications including anti-coagulants, anti-lipid, and/or anti-platelets(Stroke Metric)  Outcome: Progressing Towards Goal  Goal: *Tolerating diet  Outcome: Progressing Towards Goal  Goal: *Aware of follow-up diagnostics related to anticoagulants  Outcome: Progressing Towards Goal  Goal: *Ability to perform ADLs and demonstrates progressive mobility and function  Outcome: Progressing Towards Goal  Goal: *Absence of DVT(Stroke Metric)  Outcome: Progressing Towards Goal  Goal: *Absence of aspiration  Outcome: Progressing Towards Goal  Goal: *Optimal pain control at patient's stated goal  Outcome: Progressing Towards Goal  Goal: *Home safety concerns addressed  Outcome: Progressing Towards Goal  Goal: *Describes available resources and support systems  Outcome: Progressing Towards Goal  Goal: *Verbalizes understanding of activation of EMS(911) for stroke symptoms(Stroke Metric)  Outcome: Progressing Towards Goal  Goal: *Understands and describes signs and symptoms to report to providers(Stroke Metric)  Outcome: Progressing Towards Goal  Goal: *Neurolgocially stable (absence of additional neurological deficits)  Outcome: Progressing Towards Goal  Goal: *Verbalizes importance of follow-up with primary care physician(Stroke Metric)  Outcome: Progressing Towards Goal  Goal: *Smoking cessation discussed,if applicable(Stroke Metric)  Outcome: Progressing Towards Goal  Goal: *Depression screening completed(Stroke Metric)  Outcome: Progressing Towards Goal     Problem: Patient Education: Go to Patient Education Activity  Goal: Patient/Family Education  Outcome: Progressing Towards Goal     Problem: Heart Failure: Discharge Outcomes  Goal: *Demonstrates ability to perform prescribed activity without shortness of breath or discomfort  Outcome: Progressing Towards Goal  Goal: *Left ventricular function assessment completed prior to or during stay, or planned for post-discharge  Outcome: Progressing Towards Goal  Goal: *ACEI prescribed if LVEF less than 40% and no contraindications or ARB prescribed  Outcome: Progressing Towards Goal  Goal: *Verbalizes understanding and describes prescribed diet  Outcome: Progressing Towards Goal  Goal: *Verbalizes understanding/describes prescribed medications  Outcome: Progressing Towards Goal  Goal: *Describes available resources and support systems  Description: (eg: Home Health, Palliative Care, Advanced Medical Directive)  Outcome: Progressing Towards Goal  Goal: *Describes smoking cessation resources  Outcome: Progressing Towards Goal  Goal: *Understands and describes signs and symptoms to report to providers(Stroke Metric)  Outcome: Progressing Towards Goal  Goal: *Describes/verbalizes understanding of follow-up/return appt  Description: (eg: to physicians, diabetes treatment coordinator, and other resources  Outcome: Progressing Towards Goal  Goal: *Describes importance of continuing daily weights and changes to report to physician  Outcome: Progressing Towards Goal     Problem: Carotid Endarterectomy: Discharge Outcomes  Goal: *Hemodynamically stable  Outcome: Progressing Towards Goal  Goal: *Lungs clear or at baseline  Outcome: Progressing Towards Goal  Goal: *Demonstrates independent activity or return to baseline  Outcome: Progressing Towards Goal  Goal: *Optimal pain control at patient's stated goal  Outcome: Progressing Towards Goal  Goal: *Verbalizes understanding and describes prescribed diet  Outcome: Progressing Towards Goal  Goal: *Tolerating diet  Outcome: Progressing Towards Goal  Goal: *Verbalizes name, dosage, time, side effects, and number of days to continue medications  Outcome: Progressing Towards Goal  Goal: *No signs and symptoms of infection or wound complications  Outcome: Progressing Towards Goal  Goal: *Anxiety reduced or absent  Outcome: Progressing Towards Goal  Goal: *Understands and describes signs and symptoms to report to providers(Stroke Metric)  Outcome: Progressing Towards Goal  Goal: *Describes follow-up/return visits to physicians  Outcome: Progressing Towards Goal  Goal: *Describes available resources and support systems  Outcome: Progressing Towards Goal     Problem: Pain  Goal: *Control of Pain  Outcome: Progressing Towards Goal     Problem: Patient Education: Go to Patient Education Activity  Goal: Patient/Family Education  Outcome: Progressing Towards Goal

## 2020-05-24 ENCOUNTER — HOSPITAL ENCOUNTER (EMERGENCY)
Age: 65
Discharge: HOME | End: 2020-05-24
Attending: EMERGENCY MEDICINE | Admitting: EMERGENCY MEDICINE
Payer: MEDICARE

## 2020-05-24 ENCOUNTER — APPOINTMENT (OUTPATIENT)
Dept: CT IMAGING | Age: 65
End: 2020-05-24
Attending: EMERGENCY MEDICINE
Payer: MEDICARE

## 2020-05-24 VITALS
DIASTOLIC BLOOD PRESSURE: 77 MMHG | HEIGHT: 69 IN | TEMPERATURE: 98.1 F | RESPIRATION RATE: 15 BRPM | SYSTOLIC BLOOD PRESSURE: 123 MMHG | BODY MASS INDEX: 33.34 KG/M2 | WEIGHT: 225.1 LBS | HEART RATE: 77 BPM | OXYGEN SATURATION: 94 %

## 2020-05-24 VITALS
TEMPERATURE: 98 F | RESPIRATION RATE: 16 BRPM | DIASTOLIC BLOOD PRESSURE: 64 MMHG | OXYGEN SATURATION: 98 % | SYSTOLIC BLOOD PRESSURE: 122 MMHG | HEART RATE: 80 BPM

## 2020-05-24 DIAGNOSIS — R51.9 ACUTE NONINTRACTABLE HEADACHE, UNSPECIFIED HEADACHE TYPE: Primary | ICD-10-CM

## 2020-05-24 LAB
ANION GAP SERPL CALC-SCNC: 8 MMOL/L (ref 5–15)
BASOPHILS # BLD: 0 K/UL (ref 0–0.1)
BASOPHILS NFR BLD: 0 % (ref 0–1)
BUN SERPL-MCNC: 16 MG/DL (ref 6–20)
BUN/CREAT SERPL: 14 (ref 12–20)
CALCIUM SERPL-MCNC: 8.8 MG/DL (ref 8.5–10.1)
CHLORIDE SERPL-SCNC: 102 MMOL/L (ref 97–108)
CO2 SERPL-SCNC: 23 MMOL/L (ref 21–32)
COMMENT, HOLDF: NORMAL
CREAT SERPL-MCNC: 1.15 MG/DL (ref 0.7–1.3)
DIFFERENTIAL METHOD BLD: ABNORMAL
EOSINOPHIL # BLD: 0.2 K/UL (ref 0–0.4)
EOSINOPHIL NFR BLD: 3 % (ref 0–7)
ERYTHROCYTE [DISTWIDTH] IN BLOOD BY AUTOMATED COUNT: 18.6 % (ref 11.5–14.5)
GLUCOSE BLD STRIP.AUTO-MCNC: 142 MG/DL (ref 65–100)
GLUCOSE BLD STRIP.AUTO-MCNC: 171 MG/DL (ref 65–100)
GLUCOSE BLD STRIP.AUTO-MCNC: 189 MG/DL (ref 65–100)
GLUCOSE SERPL-MCNC: 148 MG/DL (ref 65–100)
HCT VFR BLD AUTO: 37 % (ref 36.6–50.3)
HGB BLD-MCNC: 11.3 G/DL (ref 12.1–17)
IMM GRANULOCYTES # BLD AUTO: 0 K/UL (ref 0–0.04)
IMM GRANULOCYTES NFR BLD AUTO: 0 % (ref 0–0.5)
LYMPHOCYTES # BLD: 1.2 K/UL (ref 0.8–3.5)
LYMPHOCYTES NFR BLD: 14 % (ref 12–49)
MCH RBC QN AUTO: 23.4 PG (ref 26–34)
MCHC RBC AUTO-ENTMCNC: 30.5 G/DL (ref 30–36.5)
MCV RBC AUTO: 76.6 FL (ref 80–99)
MONOCYTES # BLD: 0.4 K/UL (ref 0–1)
MONOCYTES NFR BLD: 5 % (ref 5–13)
NEUTS SEG # BLD: 6.3 K/UL (ref 1.8–8)
NEUTS SEG NFR BLD: 78 % (ref 32–75)
NRBC # BLD: 0 K/UL (ref 0–0.01)
NRBC BLD-RTO: 0 PER 100 WBC
PLATELET # BLD AUTO: 175 K/UL (ref 150–400)
PMV BLD AUTO: 10 FL (ref 8.9–12.9)
POTASSIUM SERPL-SCNC: 4 MMOL/L (ref 3.5–5.1)
RBC # BLD AUTO: 4.83 M/UL (ref 4.1–5.7)
SAMPLES BEING HELD,HOLD: NORMAL
SERVICE CMNT-IMP: ABNORMAL
SODIUM SERPL-SCNC: 133 MMOL/L (ref 136–145)
WBC # BLD AUTO: 8.1 K/UL (ref 4.1–11.1)

## 2020-05-24 PROCEDURE — 80048 BASIC METABOLIC PNL TOTAL CA: CPT

## 2020-05-24 PROCEDURE — 74011250636 HC RX REV CODE- 250/636: Performed by: SURGERY

## 2020-05-24 PROCEDURE — 36415 COLL VENOUS BLD VENIPUNCTURE: CPT

## 2020-05-24 PROCEDURE — 74011250637 HC RX REV CODE- 250/637: Performed by: INTERNAL MEDICINE

## 2020-05-24 PROCEDURE — 74011636637 HC RX REV CODE- 636/637: Performed by: INTERNAL MEDICINE

## 2020-05-24 PROCEDURE — 74011250636 HC RX REV CODE- 250/636: Performed by: INTERNAL MEDICINE

## 2020-05-24 PROCEDURE — 82962 GLUCOSE BLOOD TEST: CPT

## 2020-05-24 PROCEDURE — 74011250637 HC RX REV CODE- 250/637: Performed by: NURSE PRACTITIONER

## 2020-05-24 PROCEDURE — 70450 CT HEAD/BRAIN W/O DYE: CPT

## 2020-05-24 PROCEDURE — 99284 EMERGENCY DEPT VISIT MOD MDM: CPT

## 2020-05-24 PROCEDURE — 74011250637 HC RX REV CODE- 250/637: Performed by: FAMILY MEDICINE

## 2020-05-24 PROCEDURE — 74011250637 HC RX REV CODE- 250/637: Performed by: EMERGENCY MEDICINE

## 2020-05-24 PROCEDURE — 85025 COMPLETE CBC W/AUTO DIFF WBC: CPT

## 2020-05-24 RX ORDER — CALCIUM CARBONATE 200(500)MG
400 TABLET,CHEWABLE ORAL
Qty: 30 TAB | Refills: 0 | Status: ON HOLD | OUTPATIENT
Start: 2020-05-24 | End: 2020-07-14

## 2020-05-24 RX ORDER — FACIAL-BODY WIPES
10 EACH TOPICAL
Qty: 10 EACH | Refills: 0 | Status: SHIPPED | OUTPATIENT
Start: 2020-05-24 | End: 2020-06-23

## 2020-05-24 RX ORDER — OXYCODONE HYDROCHLORIDE 5 MG/1
5 TABLET ORAL
Qty: 9 TAB | Refills: 0 | Status: SHIPPED | OUTPATIENT
Start: 2020-05-24 | End: 2020-05-27

## 2020-05-24 RX ORDER — MIDODRINE HYDROCHLORIDE 10 MG/1
10 TABLET ORAL
Qty: 90 TAB | Refills: 0 | Status: SHIPPED | OUTPATIENT
Start: 2020-05-24 | End: 2020-06-23

## 2020-05-24 RX ORDER — NALOXONE HYDROCHLORIDE 4 MG/.1ML
SPRAY NASAL
Qty: 1 EACH | Refills: 0 | Status: SHIPPED | OUTPATIENT
Start: 2020-05-24

## 2020-05-24 RX ORDER — ACETAMINOPHEN 500 MG
1000 TABLET ORAL
Status: COMPLETED | OUTPATIENT
Start: 2020-05-24 | End: 2020-05-24

## 2020-05-24 RX ADMIN — PANTOPRAZOLE SODIUM 40 MG: 40 TABLET, DELAYED RELEASE ORAL at 06:41

## 2020-05-24 RX ADMIN — CALCIUM CARBONATE (ANTACID) CHEW TAB 500 MG 400 MG: 500 CHEW TAB at 06:42

## 2020-05-24 RX ADMIN — Medication 10 ML: at 06:42

## 2020-05-24 RX ADMIN — VENLAFAXINE HYDROCHLORIDE 75 MG: 37.5 CAPSULE, EXTENDED RELEASE ORAL at 09:06

## 2020-05-24 RX ADMIN — CLOPIDOGREL BISULFATE 75 MG: 75 TABLET ORAL at 09:05

## 2020-05-24 RX ADMIN — HYDRALAZINE HYDROCHLORIDE 10 MG: 20 INJECTION INTRAMUSCULAR; INTRAVENOUS at 02:10

## 2020-05-24 RX ADMIN — INSULIN LISPRO 2 UNITS: 100 INJECTION, SOLUTION INTRAVENOUS; SUBCUTANEOUS at 09:03

## 2020-05-24 RX ADMIN — OXYCODONE 10 MG: 5 TABLET ORAL at 01:52

## 2020-05-24 RX ADMIN — FINASTERIDE 5 MG: 5 TABLET, FILM COATED ORAL at 06:41

## 2020-05-24 RX ADMIN — ATORVASTATIN CALCIUM 80 MG: 40 TABLET, FILM COATED ORAL at 09:03

## 2020-05-24 RX ADMIN — OXYCODONE 5 MG: 5 TABLET ORAL at 06:41

## 2020-05-24 RX ADMIN — MIDODRINE HYDROCHLORIDE 10 MG: 5 TABLET ORAL at 09:06

## 2020-05-24 RX ADMIN — TAMSULOSIN HYDROCHLORIDE 0.4 MG: 0.4 CAPSULE ORAL at 06:41

## 2020-05-24 RX ADMIN — CARVEDILOL 3.12 MG: 3.12 TABLET, FILM COATED ORAL at 09:05

## 2020-05-24 RX ADMIN — INSULIN LISPRO 2 UNITS: 100 INJECTION, SOLUTION INTRAVENOUS; SUBCUTANEOUS at 12:29

## 2020-05-24 RX ADMIN — LEVETIRACETAM 1000 MG: 500 TABLET, FILM COATED ORAL at 09:04

## 2020-05-24 RX ADMIN — AMIODARONE HYDROCHLORIDE 200 MG: 200 TABLET ORAL at 09:10

## 2020-05-24 RX ADMIN — HEPARIN SODIUM 5000 UNITS: 5000 INJECTION, SOLUTION INTRAVENOUS; SUBCUTANEOUS at 06:41

## 2020-05-24 RX ADMIN — MIDODRINE HYDROCHLORIDE 10 MG: 5 TABLET ORAL at 12:25

## 2020-05-24 RX ADMIN — ASPIRIN 81 MG 81 MG: 81 TABLET ORAL at 09:05

## 2020-05-24 RX ADMIN — PREGABALIN 50 MG: 50 CAPSULE ORAL at 09:10

## 2020-05-24 RX ADMIN — LACTULOSE 45 ML: 20 SOLUTION ORAL at 09:05

## 2020-05-24 RX ADMIN — OXYCODONE 5 MG: 5 TABLET ORAL at 12:24

## 2020-05-24 RX ADMIN — ACETAMINOPHEN 1000 MG: 500 TABLET, FILM COATED ORAL at 22:35

## 2020-05-24 NOTE — PROGRESS NOTES
Bedside RN performed patient education and medication education. Discharge concerns initiated and discussed with patient, including clarification on \"who\" assists the patient at their home and instructions for when the home going patient should call their provider after discharge. Opportunity for questions and clarification was provided. Patient receptive to education: YES  Patient stated: \"I will get my prescriptions filled\"  Barriers to Education: None   Diagnosis Education given:  YES    Length of stay: 12  Expected Day of Discharge: 5/24/20  Ask if they have \"Help at Home\" & add to white board? YES    Education Day #: 12    Medication Education Given:  YES  M in the box Medication name: Midodrine    Pt aware of HCAHPS survey: YES    Stroke Education documented in Patient Education: YES  Core Measures Documented in [facility]:  Risk Factors: YES  Warning signs of stroke: YES  When to Activate 911: YES  Medication Education for Risk Factors: YES  Smoking cessation if applicable:N/A  Written Education Given:  YES    Discharge NIH Completed: YES  Score: 0    BRAINS: YES    Follow Up Appointment Made: YES  Date/Time if applicable: See discharge paperwork.

## 2020-05-24 NOTE — PROGRESS NOTES
Problem: Diabetes Self-Management  Goal: *Disease process and treatment process  Description: Define diabetes and identify own type of diabetes; list 3 options for treating diabetes. Outcome: Progressing Towards Goal  Goal: *Incorporating nutritional management into lifestyle  Description: Describe effect of type, amount and timing of food on blood glucose; list 3 methods for planning meals. Outcome: Progressing Towards Goal  Goal: *Incorporating physical activity into lifestyle  Description: State effect of exercise on blood glucose levels. Outcome: Progressing Towards Goal  Goal: *Developing strategies to promote health/change behavior  Description: Define the ABC's of diabetes; identify appropriate screenings, schedule and personal plan for screenings. Outcome: Progressing Towards Goal  Goal: *Using medications safely  Description: State effect of diabetes medications on diabetes; name diabetes medication taking, action and side effects. Outcome: Progressing Towards Goal  Goal: *Monitoring blood glucose, interpreting and using results  Description: Identify recommended blood glucose targets  and personal targets. Outcome: Progressing Towards Goal  Goal: *Prevention, detection, treatment of acute complications  Description: List symptoms of hyper- and hypoglycemia; describe how to treat low blood sugar and actions for lowering  high blood glucose level. Outcome: Progressing Towards Goal     Problem: Falls - Risk of  Goal: *Absence of Falls  Description: Document Arturoarun Pierce Fall Risk and appropriate interventions in the flowsheet.   Outcome: Progressing Towards Goal  Note: Fall Risk Interventions:  Mobility Interventions: Communicate number of staff needed for ambulation/transfer, OT consult for ADLs, Patient to call before getting OOB, PT Consult for mobility concerns, PT Consult for assist device competence    Mentation Interventions: Door open when patient unattended, Increase mobility, More frequent rounding, Reorient patient, Room close to nurse's station    Medication Interventions: Evaluate medications/consider consulting pharmacy, Patient to call before getting OOB, Teach patient to arise slowly    Elimination Interventions: Call light in reach, Patient to call for help with toileting needs, Stay With Me (per policy), Toilet paper/wipes in reach, Toileting schedule/hourly rounds    History of Falls Interventions: Evaluate medications/consider consulting pharmacy, Investigate reason for fall, Room close to nurse's station         Problem: Pressure Injury - Risk of  Goal: *Prevention of pressure injury  Description: Document Iván Scale and appropriate interventions in the flowsheet.   Outcome: Progressing Towards Goal  Note: Pressure Injury Interventions:  Sensory Interventions: Assess changes in LOC, Assess need for specialty bed, Float heels, Maintain/enhance activity level, Minimize linen layers, Keep linens dry and wrinkle-free    Moisture Interventions: Check for incontinence Q2 hours and as needed, Limit adult briefs, Maintain skin hydration (lotion/cream), Minimize layers    Activity Interventions: Increase time out of bed, Pressure redistribution bed/mattress(bed type), PT/OT evaluation    Mobility Interventions: HOB 30 degrees or less, Pressure redistribution bed/mattress (bed type), PT/OT evaluation    Nutrition Interventions: Document food/fluid/supplement intake, Discuss nutritional consult with provider    Friction and Shear Interventions: HOB 30 degrees or less, Lift sheet, Lift team/patient mobility team, Apply protective barrier, creams and emollients                Problem: Ischemic Stroke: Discharge Outcomes  Goal: *Verbalizes anxiety and depression are reduced or absent  Outcome: Progressing Towards Goal  Goal: *Verbalize understanding of risk factor modification(Stroke Metric)  Outcome: Progressing Towards Goal  Goal: *Hemodynamically stable  Outcome: Progressing Towards Goal  Goal: *Absence of aspiration pneumonia  Outcome: Progressing Towards Goal  Goal: *Aware of needed dietary changes  Outcome: Progressing Towards Goal  Goal: *Verbalize understanding of prescribed medications including anti-coagulants, anti-lipid, and/or anti-platelets(Stroke Metric)  Outcome: Progressing Towards Goal  Goal: *Tolerating diet  Outcome: Progressing Towards Goal  Goal: *Aware of follow-up diagnostics related to anticoagulants  Outcome: Progressing Towards Goal  Goal: *Ability to perform ADLs and demonstrates progressive mobility and function  Outcome: Progressing Towards Goal  Goal: *Absence of DVT(Stroke Metric)  Outcome: Progressing Towards Goal  Goal: *Absence of aspiration  Outcome: Progressing Towards Goal  Goal: *Optimal pain control at patient's stated goal  Outcome: Progressing Towards Goal  Goal: *Home safety concerns addressed  Outcome: Progressing Towards Goal  Goal: *Describes available resources and support systems  Outcome: Progressing Towards Goal     Problem: Heart Failure: Discharge Outcomes  Goal: *Demonstrates ability to perform prescribed activity without shortness of breath or discomfort  Outcome: Progressing Towards Goal  Goal: *Left ventricular function assessment completed prior to or during stay, or planned for post-discharge  Outcome: Progressing Towards Goal  Goal: *ACEI prescribed if LVEF less than 40% and no contraindications or ARB prescribed  Outcome: Progressing Towards Goal  Goal: *Verbalizes understanding/describes prescribed medications  Outcome: Progressing Towards Goal  Goal: *Describes available resources and support systems  Description: (eg: Home Health, Palliative Care, Advanced Medical Directive)  Outcome: Progressing Towards Goal  Goal: *Describes smoking cessation resources  Outcome: Progressing Towards Goal  Goal: *Understands and describes signs and symptoms to report to providers(Stroke Metric)  Outcome: Progressing Towards Goal     Problem: Carotid Endarterectomy: Discharge Outcomes  Goal: *Hemodynamically stable  Outcome: Progressing Towards Goal  Goal: *Lungs clear or at baseline  Outcome: Progressing Towards Goal  Goal: *Demonstrates independent activity or return to baseline  Outcome: Progressing Towards Goal  Goal: *Optimal pain control at patient's stated goal  Outcome: Progressing Towards Goal  Goal: *Verbalizes understanding and describes prescribed diet  Outcome: Progressing Towards Goal  Goal: *Tolerating diet  Outcome: Progressing Towards Goal  Goal: *Verbalizes name, dosage, time, side effects, and number of days to continue medications  Outcome: Progressing Towards Goal  Goal: *No signs and symptoms of infection or wound complications  Outcome: Progressing Towards Goal  Goal: *Anxiety reduced or absent  Outcome: Progressing Towards Goal  Goal: *Understands and describes signs and symptoms to report to providers(Stroke Metric)  Outcome: Progressing Towards Goal  Goal: *Describes follow-up/return visits to physicians  Outcome: Progressing Towards Goal     Problem: Pain  Goal: *Control of Pain  Outcome: Progressing Towards Goal

## 2020-05-24 NOTE — PROGRESS NOTES
Problem: Diabetes Self-Management  Goal: *Disease process and treatment process  Description: Define diabetes and identify own type of diabetes; list 3 options for treating diabetes. Outcome: Resolved/Met  Goal: *Incorporating nutritional management into lifestyle  Description: Describe effect of type, amount and timing of food on blood glucose; list 3 methods for planning meals. Outcome: Resolved/Met  Goal: *Incorporating physical activity into lifestyle  Description: State effect of exercise on blood glucose levels. Outcome: Resolved/Met  Goal: *Developing strategies to promote health/change behavior  Description: Define the ABC's of diabetes; identify appropriate screenings, schedule and personal plan for screenings. Outcome: Resolved/Met  Goal: *Using medications safely  Description: State effect of diabetes medications on diabetes; name diabetes medication taking, action and side effects. Outcome: Resolved/Met  Goal: *Monitoring blood glucose, interpreting and using results  Description: Identify recommended blood glucose targets  and personal targets. Outcome: Resolved/Met  Goal: *Prevention, detection, treatment of acute complications  Description: List symptoms of hyper- and hypoglycemia; describe how to treat low blood sugar and actions for lowering  high blood glucose level. Outcome: Resolved/Met  Goal: *Prevention, detection and treatment of chronic complications  Description: Define the natural course of diabetes and describe the relationship of blood glucose levels to long term complications of diabetes.   Outcome: Resolved/Met  Goal: *Developing strategies to address psychosocial issues  Description: Describe feelings about living with diabetes; identify support needed and support network  Outcome: Resolved/Met  Goal: *Insulin pump training  Outcome: Resolved/Met  Goal: *Sick day guidelines  Outcome: Resolved/Met  Goal: *Patient Specific Goal (EDIT GOAL, INSERT TEXT)  Outcome: Resolved/Met Problem: Patient Education: Go to Patient Education Activity  Goal: Patient/Family Education  Outcome: Resolved/Met     Problem: Falls - Risk of  Goal: *Absence of Falls  Description: Document Leafy Aggarwal Fall Risk and appropriate interventions in the flowsheet. Outcome: Resolved/Met  Note: Fall Risk Interventions:  Mobility Interventions: Assess mobility with egress test, Bed/chair exit alarm, Communicate number of staff needed for ambulation/transfer, OT consult for ADLs, Patient to call before getting OOB, PT Consult for mobility concerns    Mentation Interventions: Adequate sleep, hydration, pain control, Bed/chair exit alarm, Door open when patient unattended, Eyeglasses and hearing aids, Increase mobility, Reorient patient, Room close to nurse's station    Medication Interventions: Assess postural VS orthostatic hypotension, Bed/chair exit alarm, Patient to call before getting OOB    Elimination Interventions: Bed/chair exit alarm, Call light in reach, Patient to call for help with toileting needs    History of Falls Interventions: Bed/chair exit alarm, Consult care management for discharge planning, Door open when patient unattended, Room close to nurse's station         Problem: Patient Education: Go to Patient Education Activity  Goal: Patient/Family Education  Outcome: Resolved/Met     Problem: Pressure Injury - Risk of  Goal: *Prevention of pressure injury  Description: Document Iván Scale and appropriate interventions in the flowsheet.   Outcome: Resolved/Met  Note: Pressure Injury Interventions:  Sensory Interventions: Assess changes in LOC, Assess need for specialty bed, Check visual cues for pain, Discuss PT/OT consult with provider, Float heels, Keep linens dry and wrinkle-free, Maintain/enhance activity level, Minimize linen layers, Monitor skin under medical devices, Pad between skin to skin    Moisture Interventions: Absorbent underpads, Check for incontinence Q2 hours and as needed, Limit adult briefs, Maintain skin hydration (lotion/cream)    Activity Interventions: Increase time out of bed, PT/OT evaluation    Mobility Interventions: Float heels, PT/OT evaluation    Nutrition Interventions: Document food/fluid/supplement intake    Friction and Shear Interventions: HOB 30 degrees or less, Lift sheet, Lift team/patient mobility team    Problem: Patient Education: Go to Patient Education Activity  Goal: Patient/Family Education  Outcome: Resolved/Met     Problem: Patient Education: Go to Patient Education Activity  Goal: Patient/Family Education  Outcome: Resolved/Met     Problem: Patient Education: Go to Patient Education Activity  Goal: Patient/Family Education  Outcome: Resolved/Met     Problem: Ischemic Stroke: Discharge Outcomes  Goal: *Verbalizes anxiety and depression are reduced or absent  Outcome: Resolved/Met  Goal: *Verbalize understanding of risk factor modification(Stroke Metric)  Outcome: Resolved/Met  Goal: *Hemodynamically stable  Outcome: Resolved/Met  Goal: *Absence of aspiration pneumonia  Outcome: Resolved/Met  Goal: *Aware of needed dietary changes  Outcome: Resolved/Met  Goal: *Verbalize understanding of prescribed medications including anti-coagulants, anti-lipid, and/or anti-platelets(Stroke Metric)  Outcome: Resolved/Met  Goal: *Tolerating diet  Outcome: Resolved/Met  Goal: *Aware of follow-up diagnostics related to anticoagulants  Outcome: Resolved/Met  Goal: *Ability to perform ADLs and demonstrates progressive mobility and function  Outcome: Resolved/Met  Goal: *Absence of DVT(Stroke Metric)  Outcome: Resolved/Met  Goal: *Absence of aspiration  Outcome: Resolved/Met  Goal: *Optimal pain control at patient's stated goal  Outcome: Resolved/Met  Goal: *Home safety concerns addressed  Outcome: Resolved/Met  Goal: *Describes available resources and support systems  Outcome: Resolved/Met  Goal: *Verbalizes understanding of activation of EMS(911) for stroke symptoms(Stroke Metric)  Outcome: Resolved/Met  Goal: *Understands and describes signs and symptoms to report to providers(Stroke Metric)  Outcome: Resolved/Met  Goal: *Neurolgocially stable (absence of additional neurological deficits)  Outcome: Resolved/Met  Goal: *Verbalizes importance of follow-up with primary care physician(Stroke Metric)  Outcome: Resolved/Met  Goal: *Smoking cessation discussed,if applicable(Stroke Metric)  Outcome: Resolved/Met  Goal: *Depression screening completed(Stroke Metric)  Outcome: Resolved/Met     Problem: Patient Education: Go to Patient Education Activity  Goal: Patient/Family Education  Outcome: Resolved/Met     Problem: Patient Education: Go to Patient Education Activity  Goal: Patient/Family Education  Outcome: Resolved/Met     Problem: Heart Failure: Discharge Outcomes  Goal: *Demonstrates ability to perform prescribed activity without shortness of breath or discomfort  Outcome: Resolved/Met  Goal: *Left ventricular function assessment completed prior to or during stay, or planned for post-discharge  Outcome: Resolved/Met  Goal: *ACEI prescribed if LVEF less than 40% and no contraindications or ARB prescribed  Outcome: Resolved/Met  Goal: *Verbalizes understanding and describes prescribed diet  Outcome: Resolved/Met  Goal: *Verbalizes understanding/describes prescribed medications  Outcome: Resolved/Met  Goal: *Describes available resources and support systems  Description: (eg: Home Health, Palliative Care, Advanced Medical Directive)  Outcome: Resolved/Met  Goal: *Describes smoking cessation resources  Outcome: Resolved/Met  Goal: *Understands and describes signs and symptoms to report to providers(Stroke Metric)  Outcome: Resolved/Met  Goal: *Describes/verbalizes understanding of follow-up/return appt  Description: (eg: to physicians, diabetes treatment coordinator, and other resources  Outcome: Resolved/Met  Goal: *Describes importance of continuing daily weights and changes to report to physician  Outcome: Resolved/Met     Problem: Patient Education: Go to Patient Education Activity  Goal: Patient/Family Education  Outcome: Resolved/Met     Problem: Carotid Endarterectomy Pathway: Post-Op Day 1  Goal: *Optimal pain control at patient's stated goal  Outcome: Resolved/Met  Goal: *Adequate urinary output (equal to or greater than 30 milliliters/hour)  Outcome: Resolved/Met     Problem: Carotid Endarterectomy Pathway: Post-Op Day 2  Goal: Off Pathway (Use only if patient is Off Pathway)  Outcome: Resolved/Met  Goal: Activity/Safety  Outcome: Resolved/Met  Goal: Nutrition/Diet  Outcome: Resolved/Met  Goal: Discharge Planning  Outcome: Resolved/Met  Goal: Medications  Outcome: Resolved/Met  Goal: Respiratory  Outcome: Resolved/Met  Goal: Treatments/Interventions/Procedures  Outcome: Resolved/Met  Goal: Psychosocial  Outcome: Resolved/Met     Problem: Carotid Endarterectomy: Discharge Outcomes  Goal: *Hemodynamically stable  Outcome: Resolved/Met  Goal: *Lungs clear or at baseline  Outcome: Resolved/Met  Goal: *Demonstrates independent activity or return to baseline  Outcome: Resolved/Met  Goal: *Optimal pain control at patient's stated goal  Outcome: Resolved/Met  Goal: *Verbalizes understanding and describes prescribed diet  Outcome: Resolved/Met  Goal: *Tolerating diet  Outcome: Resolved/Met  Goal: *Verbalizes name, dosage, time, side effects, and number of days to continue medications  Outcome: Resolved/Met  Goal: *No signs and symptoms of infection or wound complications  Outcome: Resolved/Met  Goal: *Anxiety reduced or absent  Outcome: Resolved/Met  Goal: *Understands and describes signs and symptoms to report to providers(Stroke Metric)  Outcome: Resolved/Met  Goal: *Describes follow-up/return visits to physicians  Outcome: Resolved/Met  Goal: *Describes available resources and support systems  Outcome: Resolved/Met

## 2020-05-24 NOTE — DISCHARGE INSTRUCTIONS
Patient Education   Patient Education   Learning About Coronavirus ([facility]-67)  Coronavirus [phone]): Overview  What is coronavirus ([facility] The coronavirus disease (COVID-19) is caused by a virus. It is an illness that was first found in [place], [place], in December 2019. It has since spread worldwide. The virus can cause fever, cough, and trouble breathing. In severe cases, it can cause pneumonia and make it hard to breathe without help. It can cause death. Coronaviruses are a large group of viruses. They cause the common cold. They also cause more serious illnesses like Middle East respiratory syndrome (MERS) and severe acute respiratory syndrome (SARS). COVID-19 is caused by a novel coronavirus. That means it's a new type that has not been seen in people before. This virus spreads person-to-person through droplets from coughing and sneezing. It can also spread when you are close to someone who is infected. And it can spread when you touch something that has the virus on it, such as a doorknob or a tabletop. What can you do to protect yourself from coronavirus (COVID-19)? The best way to protect yourself from getting sick is to: Avoid areas where there is an outbreak. Avoid contact with people who may be infected. Wash your hands often with soap or alcohol-based hand sanitizers. Avoid crowds and try to stay at least 6 feet away from other people. Wash your hands often, especially after you cough or sneeze. Use soap and water, and scrub for at least 20 seconds. If soap and water aren't available, use an alcohol-based hand . Avoid touching your mouth, nose, and eyes. What can you do to avoid spreading the virus to others? To help avoid spreading the virus to others:  Cover your mouth with a tissue when you cough or sneeze. Then throw the tissue in the trash. Use a disinfectant to clean things that you touch often. Stay home if you are sick or have been exposed to the virus.  Don't go to school, work, or public areas. And don't use public transportation. If you are sick:  Leave your home only if you need to get medical care. But call the doctor's office first so they know you're coming. And wear a face mask, if you have one. If you have a face mask, wear it whenever you're around other people. It can help stop the spread of the virus when you cough or sneeze. Clean and disinfect your home every day. Use household  and disinfectant wipes or sprays. Take special care to clean things that you grab with your hands. These include doorknobs, remote controls, phones, and handles on your refrigerator and microwave. And don't forget countertops, tabletops, bathrooms, and computer keyboards. When to call for help  Call 911 anytime you think you may need emergency care. For example, call if:  You have severe trouble breathing. (You can't talk at all.)  You have constant chest pain or pressure. You are severely dizzy or lightheaded. You are confused or can't think clearly. Your face and lips have a blue color. You pass out (lose consciousness) or are very hard to wake up. Call your doctor now if you develop symptoms such as:  Shortness of breath. Fever. Cough. If you need to get care, call ahead to the doctor's office for instructions before you go. Make sure you wear a face mask, if you have one, to prevent exposing other people to the virus. Where can you get the latest information? The following health organizations are tracking and studying this virus. Their websites contain the most up-to-date information. Reinier Hernandez also learn what to do if you think you may have been exposed to the virus. U.S. Centers for Disease Control and Prevention (CDC): The CDC provides updated news about the disease and travel advice. The website also tells you how to prevent the spread of infection. www.cdc.gov  World Health Organization [facility]): WHO offers information about the virus outbreaks.  WHO also has travel advice. www.who.int  Current as of: April 1, 2020               Content Version: 12.4  © 2006-2020 [employer]. Care instructions adapted under license by your healthcare professional. If you have questions about a medical condition or this instruction, always ask your healthcare professional. [address] any warranty or liability for your use of this information. Headache: Care Instructions  Your Care Instructions    Headaches have many possible causes. Most headaches aren't a sign of a more serious problem, and they will get better on their own. Home treatment may help you feel better faster. The doctor has checked you carefully, but problems can develop later. If you notice any problems or new symptoms, get medical treatment right away. Follow-up care is a key part of your treatment and safety. Be sure to make and go to all appointments, and call your doctor if you are having problems. It's also a good idea to know your test results and keep a list of the medicines you take. How can you care for yourself at home? · Do not drive if you have taken a prescription pain medicine. · Rest in a quiet, dark room until your headache is gone. Close your eyes and try to relax or go to sleep. Don't watch TV or read. · Put a cold, moist cloth or cold pack on the painful area for 10 to 20 minutes at a time. Put a thin cloth between the cold pack and your skin. · Use a warm, moist towel or a heating pad set on low to relax tight shoulder and neck muscles. · Have someone gently massage your neck and shoulders. · Take pain medicines exactly as directed. ? If the doctor gave you a prescription medicine for pain, take it as prescribed. ? If you are not taking a prescription pain medicine, ask your doctor if you can take an over-the-counter medicine.   · Be careful not to take pain medicine more often than the instructions allow, because you may get worse or more frequent headaches when the medicine wears off. · Do not ignore new symptoms that occur with a headache, such as a fever, weakness or numbness, vision changes, or confusion. These may be signs of a more serious problem. To prevent headaches  · Keep a headache diary so you can figure out what triggers your headaches. Avoiding triggers may help you prevent headaches. Record when each headache began, how long it lasted, and what the pain was like (throbbing, aching, stabbing, or dull). Write down any other symptoms you had with the headache, such as nausea, flashing lights or dark spots, or sensitivity to bright light or loud noise. Note if the headache occurred near your period. List anything that might have triggered the headache, such as certain foods (chocolate, cheese, wine) or odors, smoke, bright light, stress, or lack of sleep. · Find healthy ways to deal with stress. Headaches are most common during or right after stressful times. Take time to relax before and after you do something that has caused a headache in the past.  · Try to keep your muscles relaxed by keeping good posture. Check your jaw, face, neck, and shoulder muscles for tension, and try relaxing them. When sitting at a desk, change positions often, and stretch for 30 seconds each hour. · Get plenty of sleep and exercise. · Eat regularly and well. Long periods without food can trigger a headache. · Treat yourself to a massage. Some people find that regular massages are very helpful in relieving tension. · Limit caffeine by not drinking too much coffee, tea, or soda. But don't quit caffeine suddenly, because that can also give you headaches. · Reduce eyestrain from computers by blinking frequently and looking away from the computer screen every so often. Make sure you have proper eyewear and that your monitor is set up properly, about an arm's length away. · Seek help if you have depression or anxiety.  Your headaches may be linked to these conditions. Treatment can both prevent headaches and help with symptoms of anxiety or depression. When should you call for help? Call 911 anytime you think you may need emergency care. For example, call if:    · You have signs of a stroke. These may include:  ? Sudden numbness, paralysis, or weakness in your face, arm, or leg, especially on only one side of your body. ? Sudden vision changes. ? Sudden trouble speaking. ? Sudden confusion or trouble understanding simple statements. ? Sudden problems with walking or balance. ? A sudden, severe headache that is different from past headaches.    Call your doctor now or seek immediate medical care if:    · You have a new or worse headache.     · Your headache gets much worse. Where can you learn more? Go to http://gordo-melinda.info/  Enter M271 in the search box to learn more about \"Headache: Care Instructions. \"  Current as of: November 19, 2019Content Version: 12.4  © 1521-6373 [employer]. Care instructions adapted under license by [employer] (which disclaims liability or warranty for this information). If you have questions about a medical condition or this instruction, always ask your healthcare professional. [place] [address] any warranty or liability for your use of this information. Discharge Instructions       PATIENT ID: Deidra Ta. MRN: [id number]   YOB: 1955    DATE OF ADMISSION: 5/12/2020  4:56 PM    DATE OF DISCHARGE: 5/24/2020    PRIMARY CARE PROVIDER: Zoltan Darden MD     ATTENDING PHYSICIAN: Jayden Johnston MD  DISCHARGING PROVIDER: Delores Wheat MD    To contact this individual call [phone] and ask the  to page. If unavailable ask to be transferred the Adult Hospitalist Department.     DISCHARGE DIAGNOSES     S/p Lt TCAR      CONSULTATIONS: IP CONSULT TO VASCULAR SURGERY  IP CONSULT TO OPHTHALMOLOGY  IP CONSULT TO NEUROLOGY  IP CONSULT TO CARDIOLOGY    PROCEDURES/SURGERIES: Procedure(s):  LEFT TRANSCAROTID ARTERY REVASCULARIZATION (URGENT)    PENDING TEST RESULTS:   At the time of discharge the following test results are still pending: ***    FOLLOW UP APPOINTMENTS:   Follow-up Information     Follow up With Specialties Details Why Adelita Gaitan  Go to Call [phone] for appointment time & location. [facility]    Jhoan Hernandez MD Family Practice In 2 days  [address] [address] [phone]      [facility] EMERGENCY [address] [facility] Emergency Medicine  If symptoms worsen [address] [facility]    350 [facility]  Home health PT/OT. Call agency if you have not heard from them by 12:00 p.m. [facility] [phone] [facility]    Gume Diaz MD Vascular Surgery In 1 week  [address]  [phone]      Phani Lawton MD Cardiology In 1 week  [address]  [phone]             ADDITIONAL CARE RECOMMENDATIONS:   · It is important that you take the medication exactly as they are prescribed. · Keep your medication in the bottles provided by the pharmacist and keep a list of the medication names, dosages, and times to be taken in your wallet. · Do not take other medications without consulting your doctor. · No drinking alcohol or driving car or operating machinery if you are on narcotic pain medications. Donot take sedating mediations if you are sleepy or confused. · Fall Precautions  · Keep Well Hydrated  · Report to your medical provider if you feel you have  developed allergies to medications  · Follow up with your PCP or Consultant for medication adjustments and refills  · Monitor for signs of fevers,chills,bleeding,chest pain and seek medical attention if you do so.    ·          DIET: Diabetic Diet      ACTIVITY: Activity as tolerated    WOUND CARE: none    EQUIPMENT needed: none      DISCHARGE MEDICATIONS:   See Medication Reconciliation Form    · It is important that you take the medication exactly as they are prescribed. · Keep your medication in the bottles provided by the pharmacist and keep a list of the medication names, dosages, and times to be taken in your wallet. · Do not take other medications without consulting your doctor. NOTIFY YOUR PHYSICIAN FOR ANY OF THE FOLLOWING:   Fever over 101 degrees for 24 hours. Chest pain, shortness of breath, fever, chills, nausea, vomiting, diarrhea, change in mentation, falling, weakness, bleeding. Severe pain or pain not relieved by medications. Or, any other signs or symptoms that you may have questions about.       DISPOSITION:  x  Home With:   OT  PT  HH  RN       SNF/Inpatient Rehab/LTAC    Independent/assisted living    Hospice    Other:     CDMP Checked:   Yes x     PROBLEM LIST Updated:  Yes x       Signed:   Aspen Nayak MD  5/24/2020  10:23 AM

## 2020-05-24 NOTE — DISCHARGE SUMMARY
Discharge Summary       PATIENT ID: Marcela Watkins MRN: [id number]   YOB: 1955    DATE OF ADMISSION: 5/12/2020  4:56 PM    DATE OF DISCHARGE: 05/24/20    PRIMARY CARE PROVIDER: Rae Lane MD     ATTENDING PHYSICIAN: Kaley Rob MD    DISCHARGING PROVIDER: Kaley Rob MD    To contact this individual call [phone] and ask the  to page. If unavailable ask to be transferred the Adult Hospitalist Department.     CONSULTATIONS: IP CONSULT TO VASCULAR SURGERY  IP CONSULT TO OPHTHALMOLOGY  IP CONSULT TO NEUROLOGY  IP CONSULT TO CARDIOLOGY    PROCEDURES/SURGERIES: Procedure(s):  LEFT TRANSCAROTID ARTERY REVASCULARIZATION (URGENT)    [address] COURSE:     This is a 24-year-old man with a past medical history significant for coronary artery disease, status post CABG and multiple stent placements; dyslipidemia; hypertension; type 2 diabetes; seizure disorder; benign prostatic hyperplasia; obstructive sleep apnea; chronic systolic congestive heart failure; chronic kidney disease; status post pacemaker insertion, was in his usual state of health until the day of presentation at the emergency room when the patient developed sudden loss of vision in the left eye.  This episode lasted a few seconds.  It was followed by headache.  The headache is located at the left side of the head, constant throbbing headache, 6/10 in severity.  No known aggravating or relieving factors.  The patient stated that he has a history of ocular migraine and this present episode is similar to his attack of ocular migraine.  The patient also stated that he fell a couple of times at home.  Denies loss of consciousness.  The circumstances surrounding the fall is not clear.  The patient denies fever, rigors, and chills.  No sick contact or contact with any person with COVID-19 virus infection.  When the patient arrived at the emergency room, CT of the head was obtained.  The CT scan did not show any acute pathology.  CTA of the head and neck was also performed.  This shows no acute large vessel occlusion, but shows severe stenosis, bilateral internal carotid arteries, left greater than right.  The patient was subsequently referred to the hospitalist service for evaluation for admission. Phil Tan patient was last admitted to this hospital from 08/20/2019 to 09/03/2019.  The patient was admitted to the Cardiology Service for evaluation and treatment of ventricular tachycardia.  The patient subsequently underwent ICD placement with adjustments to his medication. Ocular migraine  -Symptoms appear to have resolved  -Neurology evaluated the patient, EEG with no seizure focus  -MRI cannot be done due to presence of pacemaker  -Ophthalmology also consulted     Neck hematoma pain  Pain Mx as needed  continue PO medicines as needed     Orthostatic hypotension  -Symptomatic, and significant drop in BP  -Lasix, lisinopril, eplerenone and Imdur on hold  -Midodrine drain dose adjusted by cardiology  -Pressure goal, systolic [phone] per vascular  -Coreg restarted on lower dose, continue Midodrine on discharge     Bilateral internal carotic artery stenosis  -Vascular surgery following, discussed with Dr Adam Dominguez  -Status post left carotic revascularization 5/20/2020  -Follow up with Vascular surgery     Coronary artery disease  -status post CABG and stent placement  -Continue Coreg, aspirin, Plavix and statin     Dyslipidemia  -Continue statin     Type 2 diabetes  -Controlled  -Continue insulin sliding scale coverage  -Blood sugar stable     Seizure disorder  -Stable, EEG without seizure focus  -Continue Keppra     Benign prostatic hyperplasia  -Continue Flomax and finasteride     Obstructive sleep apnea  -Continue CPAP per home settings     Ventricular tachycardia  -Status post pacemaker placement     Chronic systolic congestive heart failure  -Echocardiogram with EF of 16 to 20%.   -Appears compensated  -Diuretics on hold due to hypotension     Chronic kidney disease, stage III  -continue monitor renal function     Fall  -Status post mechanical fall  -PT recommending home PT         PENDING TEST RESULTS:   At the time of discharge the following test results are still pending: none    FOLLOW UP APPOINTMENTS:    Follow-up Information     Follow up With Specialties Details Why [address] to Call [phone] for appointment time & location. [facility]    Timoteo Lopez MD Family Practice In 2 days  [address] [address] [phone]      [facility] EMERGENCY [address] [facility] Emergency Medicine  If symptoms worsen [address]  Home health PT/OT. Call agency if you have not heard from them by 12:00 p.m. [facility] [phone]    Phuong Sifuentes MD Vascular Surgery In 1 week  [address]  [phone]      Michael Harrison MD Cardiology In 1 week  [address]  [phone]           ADDITIONAL CARE RECOMMENDATIONS:   · It is important that you take the medication exactly as they are prescribed. · Keep your medication in the bottles provided by the pharmacist and keep a list of the medication names, dosages, and times to be taken in your wallet. · Do not take other medications without consulting your doctor. · No drinking alcohol or driving car or operating machinery if you are on narcotic pain medications. Donot take sedating mediations if you are sleepy or confused.    · Fall Precautions  · Keep Well Hydrated  · Report to your medical provider if you feel you have  developed allergies to medications  · Follow up with your PCP or Consultant for medication adjustments and refills  · Monitor for signs of fevers,chills,bleeding,chest pain and seek medical attention if you do so. ·          DIET: Diabetic Diet      ACTIVITY: Activity as tolerated    WOUND CARE: none    EQUIPMENT needed: none            DISCHARGE MEDICATIONS:  Current Discharge Medication List      START taking these medications    Details   bisacodyL (DULCOLAX) 10 mg suppository Insert 10 mg into rectum daily as needed for Constipation for up to 30 days. Qty: 10 Each, Refills: 0      calcium carbonate (TUMS) 200 mg calcium (500 mg) chew Take 2 Tabs by mouth three (3) times daily as needed for Pain (abdominal pain). Qty: 30 Tab, Refills: 0      midodrine (PROAMATINE) 10 mg tablet Take 1 Tab by mouth three (3) times daily (with meals) for 30 days. Qty: 90 Tab, Refills: 0      oxyCODONE IR (ROXICODONE) 5 mg immediate release tablet Take 1 Tab by mouth every eight (8) hours as needed for Pain (severe pain) for up to 3 days. Max Daily Amount: 15 mg.  Qty: 9 Tab, Refills: 0    Associated Diagnoses: Hematoma of neck, subsequent encounter      naloxone (NARCAN) 4 mg/actuation nasal spray Use 1 spray intranasally, then discard. Repeat with new spray every 2 min as needed for opioid overdose symptoms, alternating nostrils. Qty: 1 Each, Refills: 0         CONTINUE these medications which have NOT CHANGED    Details   venlafaxine-SR (EFFEXOR-XR) 75 mg capsule Take 75 mg by mouth two (2) times a day. carvedilol (COREG) 3.125 mg tablet Take 1 Tab by mouth two (2) times daily (with meals). Qty: 30 Tab, Refills: 0      amiodarone (CORDARONE) 200 mg tablet Take 1 Tab by mouth daily. Qty: 30 Tab, Refills: 0      atorvastatin (LIPITOR) 80 mg tablet Take 80 mg by mouth daily. glimepiride (AMARYL) 4 mg tablet Take 2 mg by mouth two (2) times a day. 1/2 tab in AM 1/2 in PM       clonazePAM (KLONOPIN) 1 mg tablet Take 1 mg by mouth nightly. finasteride (PROSCAR) 5 mg tablet Take 5 mg by mouth every morning. levETIRAcetam 1,000 mg tablet Take 1 Tab by mouth every twelve (12) hours.   Qty: 60 Tab, Refills: 1      clopidogrel (PLAVIX) 75 mg tab Take 1 Tab by mouth daily. Qty: 30 Tab, Refills: 2      QUEtiapine (SEROQUEL) 100 mg tablet Take 25 mg by mouth nightly. nitroglycerin (NITROSTAT) 0.4 mg SL tablet 0.4 mg by SubLINGual route every five (5) minutes as needed for Chest Pain. May repeat every 5 minutes for a maximum of 3 doses if chest pain not relieved call MD      lactulose (CHRONULAC) 10 gram/15 mL solution Take 45 mL by mouth three (3) times daily. Adjust dosage so that you have 2-3 bowel movements per day. Qty: 1 Bottle, Refills: 0      aspirin 81 mg chewable tablet Take 1 Tab by mouth daily. Qty: 30 Tab, Refills: 0      pantoprazole (PROTONIX) 40 mg tablet Take 1 Tab by mouth Before breakfast and dinner. Before Breakfast and in the afternoon (also takes Zantac at same time)  Qty: 60 Tab, Refills: 0      pregabalin (LYRICA) 50 mg capsule Take 1 Cap by mouth three (3) times daily. Max Daily Amount: 150 mg.  Qty: 90 Cap, Refills: 0      tamsulosin (FLOMAX) 0.4 mg capsule Take 1 Cap by mouth Before breakfast and dinner. Before Breakfast and in the afternoon  Qty: 30 Cap, Refills: 0         STOP taking these medications       lisinopril (PRINIVIL, ZESTRIL) 2.5 mg tablet Comments:   Reason for Stopping:         eplerenone (INSPRA) 25 mg tablet Comments:   Reason for Stopping:         furosemide (LASIX) 20 mg tablet Comments:   Reason for Stopping:         potassium chloride (KLOR-CON) 20 mEq pack Comments:   Reason for Stopping:         isosorbide mononitrate ER (IMDUR) 30 mg tablet Comments:   Reason for Stopping:         raNITIdine (ZANTAC) 150 mg tablet Comments:   Reason for Stopping:                 NOTIFY YOUR PHYSICIAN FOR ANY OF THE FOLLOWING:   Fever over 101 degrees for 24 hours. Chest pain, shortness of breath, fever, chills, nausea, vomiting, diarrhea, change in mentation, falling, weakness, bleeding. Severe pain or pain not relieved by medications.   Or, any other signs or symptoms that you may have questions about. DISPOSITION:   x Home With:  x OT x PT x HH  RN       Long term SNF/Inpatient Rehab    Independent/assisted living    Hospice    Other:       PATIENT CONDITION AT DISCHARGE:     Functional status    Poor     Deconditioned     Independent      Cognition     Lucid     Forgetful     Dementia      Catheters/lines (plus indication)    Enamorado     PICC     PEG     None      Code status    x Full code     DNR      PHYSICAL EXAMINATION AT DISCHARGE:  General:          Alert, cooperative, no distress, appears stated age. HEENT:           Atraumatic, anicteric sclerae, pink conjunctivae                          No oral ulcers, mucosa moist, throat clear, dentition fair  Neck:               Supple, symmetrical  Lungs:             Clear to auscultation bilaterally. No Wheezing or Rhonchi. No rales. Chest wall:      No tenderness  No Accessory muscle use. Heart:              Regular  rhythm,  No  murmur   No edema  Abdomen:        Soft, non-tender. Not distended. Bowel sounds normal  Extremities:     No cyanosis. No clubbing,                            Skin turgor normal, Capillary refill normal  Skin:                Not pale. Not Jaundiced  No rashes   Psych:             Not anxious or agitated. Neurologic:      Alert, moves all extremities, answers questions appropriately and responds to commands       CHRONIC MEDICAL DIAGNOSES:  Problem List as of 5/24/2020 Date Reviewed: 5/19/2020          Codes Class Noted - Resolved    V tach ([facility] ICD-10-CM: I47.2  ICD-9-CM: 427.1  8/20/2019 - Present        * (Principal) Acute CVA (cerebrovascular accident) ([facility] ICD-10-CM: I63.9  ICD-9-CM: 434.91  5/12/2020 - Present        VT (ventricular tachycardia) ([facility] ICD-10-CM: I47.2  ICD-9-CM: 427.1  8/20/2019 - Present        CHF exacerbation ([facility] ICD-10-CM: I50.9  ICD-9-CM: 428.0  6/13/2019 - Present        Fall ICD-10-CM: W19. Roderick Cool  ICD-9-CM: O299.3  1/10/2019 - Present        TACOS (acute kidney injury) [facility]) ICD-10-CM: N17.9  ICD-9-CM: 584.9  1/10/2019 - Present        Chest pain ICD-10-CM: R07.9  ICD-9-CM: 786.50  1/11/2018 - Present        Acute chest pain ICD-10-CM: R07.9  ICD-9-CM: 786.50  1/10/2018 - Present        Hepatic encephalopathy ([facility] ICD-10-CM: K72.90  ICD-9-CM: 572.2  7/17/2017 - Present        Neuropathy ICD-10-CM: G62.9  ICD-9-CM: 355.9  4/14/2017 - Present        Cirrhosis ([facility] ICD-10-CM: K74.60  ICD-9-CM: 571.5  4/14/2017 - Present        CAD (coronary artery disease) ICD-10-CM: I25.10  ICD-9-CM: 414.00  4/14/2017 - Present        S/P coronary artery stent placement ICD-10-CM: Z95.5  ICD-9-CM: V45.82  4/14/2017 - Present        S/P CABG (coronary artery bypass graft) ICD-10-CM: Z95.1  ICD-9-CM: V45.81  4/14/2017 - Present    Overview Signed 4/14/2017 11:27 AM by Haseeb Buchanan MD     2002 and 2013             Thrombocytopenia ([facility] ICD-10-CM: D69.6  ICD-9-CM: 287.5  4/14/2017 - Present        MRSA infection ICD-10-CM: A49.02  ICD-9-CM: 041.12  4/14/2017 - Present    Overview Signed 4/14/2017 11:28 AM by Haseeb Buchanan MD     2016             S/P cholecystectomy ICD-10-CM: Z90.49  ICD-9-CM: V45.79  4/14/2017 - Present        Metabolic encephalopathy LMY-47-WS: G93.41  ICD-9-CM: 348.31  12/19/2016 - Present        Seizure ([facility] ICD-10-CM: R56.9  ICD-9-CM: 780.39  11/21/2016 - Present        Sinusitis ICD-10-CM: J32.9  ICD-9-CM: 473.9  Unknown - Present        Joint pain ICD-10-CM: M25.50  ICD-9-CM: 719.40  Unknown - Present        Low back pain ICD-10-CM: M54.5  ICD-9-CM: 724.2  Unknown - Present        GERD (gastroesophageal reflux disease) ICD-10-CM: K21.9  ICD-9-CM: 530.81  Unknown - Present        Diabetes mellitus, type 2 ([facility] ICD-10-CM: E11.9  ICD-9-CM: 250.00  Unknown - Present        RESOLVED: Pneumonia ICD-10-CM: J18.9  ICD-9-CM: 521  3/3/2017 - 4/14/2017        RESOLVED: Fever ICD-10-CM: R50.9  ICD-9-CM: 780.60  3/2/2017 - 4/14/2017        RESOLVED: Abscess ICD-10-CM: L02.91  ICD-9-CM: 682.9  1/5/2017 - 4/14/2017        RESOLVED: Altered mental status ICD-10-CM: R41.82  ICD-9-CM: 780.97  12/19/2016 - 4/14/2017        RESOLVED: Debility ICD-10-CM: R53.81  ICD-9-CM: 799.3  11/22/2016 - 4/14/2017        RESOLVED: Cellulitis ICD-10-CM: L03.90  ICD-9-CM: 682.9  11/7/2016 - 4/14/2017        RESOLVED: Snoring ICD-10-CM: R06.83  ICD-9-CM: 786.09  Unknown - 4/14/2017        RESOLVED: Night sweats ICD-10-CM: R61  ICD-9-CM: 780.8  Unknown - 4/14/2017        RESOLVED: Chest pain ICD-10-CM: 786.5  ICD-9-CM: 786.5  Unknown - 4/14/2017        RESOLVED: Sore throat ICD-10-CM: J02.9  ICD-9-CM: 329  Unknown - 4/14/2017        RESOLVED: Dysphagia ICD-10-CM: 787.2  ICD-9-CM: 787.2  Unknown - 4/14/2017        RESOLVED: Tingling sensation ICD-10-CM: R20.2  ICD-9-CM: 782.0  Unknown - 4/14/2017        RESOLVED: Nausea ICD-10-CM: R11.0  ICD-9-CM: 787.02  Unknown - 4/14/2017        RESOLVED: Diarrhea ICD-10-CM: R19.7  ICD-9-CM: 787.91  Unknown - 4/14/2017        RESOLVED: Snoring ICD-10-CM: R06.83  ICD-9-CM: 786.09  Unknown - 4/14/2017        RESOLVED: Abdominal pain, epigastric ICD-10-CM: R10.13  ICD-9-CM: 789.06  9/13/2010 - 4/14/2017              Greater than 45 minutes were spent with the patient on counseling and coordination of care    Signed:   Naina Ware MD  5/24/2020  10:29 AM

## 2020-05-24 NOTE — PROGRESS NOTES
Care Management -   Transition of Care Plan:  · RUR: 28%  · Disposition: [address] [place] [address] for PT and OT  · Transportation: daughter - Emy Mondragon      Received call from Dr. Raven Watson. Patient to be discharged today and will need home health arranged. Reviewed chart. Weekday CM sent referral to [address] [place] [address] and they had already accepted. Received call from patient's nurse. She said they had not been able to reach patient's daughter yesterday and asked CM to contact her. CM called daughter-Anais Ty ([phone]). She has some things to do to be ready for patient, but can be to hospital a little after 1:00 PM. Notified floor RN who will tell patient's nurse. [address] [facility] [address] [facility] [address] and spoke with Seferino Almanzar in intake. Notified her of patient's discharge. She will put him on the list to be seen.      CHRISTIANE Mojica

## 2020-05-24 NOTE — PROGRESS NOTES
Vascular  Neck stable  OK to go home and followup with Dr. Donald Colin in one week  Anxious to have his own CPAP machine

## 2020-05-25 ENCOUNTER — HOME CARE VISIT (OUTPATIENT)
Dept: HOME HEALTH SERVICES | Facility: HOME HEALTH | Age: 65
End: 2020-05-25

## 2020-05-25 ENCOUNTER — PATIENT OUTREACH (OUTPATIENT)
Dept: INTERNAL MEDICINE CLINIC | Age: 65
End: 2020-05-25
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
## 2020-05-25 NOTE — ED TRIAGE NOTES
Patient arrives from home with CC left arm swelling, migraine and shaking. Pt reports he was discharged from [facility] about 3 hours ago after an \"ocular migraine made him lose his vision\". Pt states he was eating dinner, felt like he got choked on a sandwhich and started coughing, the headache started immediately after with sided vision loss which has since returned. Patient also states he has been feeling very \"shakey\".

## 2020-05-25 NOTE — ED NOTES
Patient received discharge instructions by MD and nurse. Patient verbalized understanding of medication use and other discharge instructions. Updated vitals, IV DC and patient wheeled out ED, showing no signs of distress. Pt reports relief of most intense pain. All questions answered.

## 2020-05-26 ENCOUNTER — TELEPHONE (OUTPATIENT)
Dept: PHARMACY | Age: 65
End: 2020-05-26

## 2020-05-26 RX ORDER — GLIMEPIRIDE 1 MG/1
1 TABLET ORAL 2 TIMES DAILY
COMMUNITY
End: 2020-08-20

## 2020-05-26 RX ORDER — ALPRAZOLAM 1 MG/1
1 TABLET ORAL 2 TIMES DAILY
Status: ON HOLD | COMMUNITY
End: 2020-08-11 | Stop reason: SDUPTHER

## 2020-05-26 RX ORDER — FLUOXETINE HYDROCHLORIDE 20 MG/1
30 CAPSULE ORAL DAILY
COMMUNITY

## 2020-05-26 NOTE — TELEPHONE ENCOUNTER
Pharmacy Note:  Medication Reconciliation    S/O:  Placed an outgoing telephone call to patient's daughter Clyde Chaparro to answer medication related questions after patient was discharged from the hospital.   Patient lives with daughter and she helps administer patient's medications to him. In addition to his PCP, he is followed by cardiology and psychiatry. Daughter states that both metoprolol, fluoxetine, and alprazolam were not on his discharge report and were not being given to him during his hospitalization. There were also doses that were not correct. When patient was in the ED, his daughter provided him with a list of his medications since she couldn't go back with him, but she is unsure if patient remembered. Medication information provided by: patient's daughter, dispense report was not available    Daughter verified that she is:  Holding: metoprolol, lisinopril, eplerenone, furosemide, potassium, isosorbide mononitrate, ranitidine  Started: new dose of carvedilol/ midodrine, oxycodone, calcium and has naloxone in the house     During interview the following was noted:  Metoprolol:  Takes 12.5 mg twice daily in addition to carvedilol per daughter, no record found in chart or outpt notes but he previously taking this dose of carvedilol-daughter wasn't home to clarify  Calcium carbonate: not working on his acid reflux since ranitidine stopped  Bisacodyl:  Not giving as patient is using lactulose which often causes diarrhea    Medications added:   Alprazolam 1 mg twice daily  Fluoxetine 20 mg daily    Medications removed:  None    Medications changed:  Clonazepam:  changed from scheduled to prn bedtime  Glimepiride: changed from 2 mg bid to 1 mg bid  Venlafaxine:  changed from 75 mg bid to 150 mg am/75 mg evening  Quetiapine: changed from scheduled to prn bedtime     Patient's daughter reports that lactulose dose cause diarrhea so his dose varies.         A/P: Medication Reconciliation:  Reviewed the following with patient's daughter:    Indication for medications   Changes made to medications during hospitalizations   Importance of following up with patient's PCP and cardiologist regarding patient's medication changes and to continue with what was prescribed on discharge. Told her I thought it was unlikely that patient was on both metoprolol and carvedilol together. Patient has follow-up with cardiologist this Friday and PCP next week.  Discussed the importance of having naloxone house since patient is on 2 different BZD and oxycodone which all could increase risk of respiratory depression   Recommended that daughter ask PCP about famotidine as a replacement for calcium carbonate if patient is still experiencing breakthrough GERD on protonix. Patient's list was updated with above changes. Answered all of daughter's questions and advised her to contact patient's PCP if she has any questions or concerns. Recommended they bring a list of medications to every MD appointment. Corie Rodríguez, GerryD, BCACP       CLINICAL PHARMACY CONSULT: MED RECONCILIATION/REVIEW ADDENDUM    For Pharmacy Admin Tracking Only    PHSO: Scot Reveles [id number] Patient?: No  Total # of Interventions Recommended: Count: 3  - Updated Order #: 6 Updated Order Reason(s):  Other  Total Interventions Accepted: 3  Time Spent (min): 30    Jennifer Haq, GERRYD, BCACP

## 2020-05-28 ENCOUNTER — HOME CARE VISIT (OUTPATIENT)
Dept: SCHEDULING | Facility: HOME HEALTH | Age: 65
End: 2020-05-28
Payer: MEDICARE

## 2020-05-28 ENCOUNTER — HOME CARE VISIT (OUTPATIENT)
Dept: HOME HEALTH SERVICES | Facility: HOME HEALTH | Age: 65
End: 2020-05-28

## 2020-05-28 VITALS
HEART RATE: 78 BPM | RESPIRATION RATE: 18 BRPM | DIASTOLIC BLOOD PRESSURE: 78 MMHG | SYSTOLIC BLOOD PRESSURE: 142 MMHG | OXYGEN SATURATION: 98 % | TEMPERATURE: 98.1 F

## 2020-05-28 VITALS
SYSTOLIC BLOOD PRESSURE: 142 MMHG | DIASTOLIC BLOOD PRESSURE: 78 MMHG | HEART RATE: 88 BPM | BODY MASS INDEX: 28.92 KG/M2 | WEIGHT: 202 LBS | TEMPERATURE: 98.1 F | HEIGHT: 70 IN | OXYGEN SATURATION: 97 %

## 2020-05-28 PROCEDURE — 3331090002 HH PPS REVENUE DEBIT

## 2020-05-28 PROCEDURE — G0152 HHCP-SERV OF OT,EA 15 MIN: HCPCS

## 2020-05-28 PROCEDURE — 400013 HH SOC

## 2020-05-28 PROCEDURE — G0151 HHCP-SERV OF PT,EA 15 MIN: HCPCS

## 2020-05-28 PROCEDURE — 3331090001 HH PPS REVENUE CREDIT

## 2020-05-29 PROCEDURE — 3331090002 HH PPS REVENUE DEBIT

## 2020-05-29 PROCEDURE — 3331090001 HH PPS REVENUE CREDIT

## 2020-05-30 PROCEDURE — 3331090001 HH PPS REVENUE CREDIT

## 2020-05-30 PROCEDURE — 3331090002 HH PPS REVENUE DEBIT

## 2020-05-31 PROCEDURE — 3331090002 HH PPS REVENUE DEBIT

## 2020-05-31 PROCEDURE — 3331090001 HH PPS REVENUE CREDIT

## 2020-06-01 PROCEDURE — 3331090001 HH PPS REVENUE CREDIT

## 2020-06-01 PROCEDURE — 3331090002 HH PPS REVENUE DEBIT

## 2020-06-02 ENCOUNTER — HOME CARE VISIT (OUTPATIENT)
Dept: SCHEDULING | Facility: HOME HEALTH | Age: 65
End: 2020-06-02
Payer: MEDICARE

## 2020-06-02 PROCEDURE — 3331090001 HH PPS REVENUE CREDIT

## 2020-06-02 PROCEDURE — G0157 HHC PT ASSISTANT EA 15: HCPCS

## 2020-06-02 PROCEDURE — 3331090002 HH PPS REVENUE DEBIT

## 2020-06-03 ENCOUNTER — HOME CARE VISIT (OUTPATIENT)
Dept: SCHEDULING | Facility: HOME HEALTH | Age: 65
End: 2020-06-03
Payer: MEDICARE

## 2020-06-03 ENCOUNTER — HOME CARE VISIT (OUTPATIENT)
Dept: HOME HEALTH SERVICES | Facility: HOME HEALTH | Age: 65
End: 2020-06-03
Payer: MEDICARE

## 2020-06-03 VITALS — TEMPERATURE: 98.8 F | RESPIRATION RATE: 20 BRPM | OXYGEN SATURATION: 97 % | HEART RATE: 85 BPM

## 2020-06-03 PROCEDURE — 3331090002 HH PPS REVENUE DEBIT

## 2020-06-03 PROCEDURE — 3331090001 HH PPS REVENUE CREDIT

## 2020-06-03 PROCEDURE — G0152 HHCP-SERV OF OT,EA 15 MIN: HCPCS

## 2020-06-04 ENCOUNTER — HOME CARE VISIT (OUTPATIENT)
Dept: SCHEDULING | Facility: HOME HEALTH | Age: 65
End: 2020-06-04
Payer: MEDICARE

## 2020-06-04 ENCOUNTER — HOME CARE VISIT (OUTPATIENT)
Dept: HOME HEALTH SERVICES | Facility: HOME HEALTH | Age: 65
End: 2020-06-04
Payer: MEDICARE

## 2020-06-04 VITALS
TEMPERATURE: 96.9 F | HEART RATE: 66 BPM | RESPIRATION RATE: 18 BRPM | OXYGEN SATURATION: 96 % | SYSTOLIC BLOOD PRESSURE: 138 MMHG | DIASTOLIC BLOOD PRESSURE: 68 MMHG

## 2020-06-04 PROCEDURE — 3331090002 HH PPS REVENUE DEBIT

## 2020-06-04 PROCEDURE — 3331090001 HH PPS REVENUE CREDIT

## 2020-06-04 PROCEDURE — G0152 HHCP-SERV OF OT,EA 15 MIN: HCPCS

## 2020-06-05 PROCEDURE — 3331090001 HH PPS REVENUE CREDIT

## 2020-06-05 PROCEDURE — 3331090002 HH PPS REVENUE DEBIT

## 2020-06-06 PROCEDURE — 3331090002 HH PPS REVENUE DEBIT

## 2020-06-06 PROCEDURE — 3331090001 HH PPS REVENUE CREDIT

## 2020-06-07 PROCEDURE — 3331090001 HH PPS REVENUE CREDIT

## 2020-06-07 PROCEDURE — 3331090002 HH PPS REVENUE DEBIT

## 2020-06-08 ENCOUNTER — HOME CARE VISIT (OUTPATIENT)
Dept: SCHEDULING | Facility: HOME HEALTH | Age: 65
End: 2020-06-08
Payer: MEDICARE

## 2020-06-08 ENCOUNTER — HOME CARE VISIT (OUTPATIENT)
Dept: HOME HEALTH SERVICES | Facility: HOME HEALTH | Age: 65
End: 2020-06-08
Payer: MEDICARE

## 2020-06-08 VITALS
RESPIRATION RATE: 18 BRPM | HEART RATE: 77 BPM | TEMPERATURE: 97.6 F | SYSTOLIC BLOOD PRESSURE: 142 MMHG | DIASTOLIC BLOOD PRESSURE: 72 MMHG | OXYGEN SATURATION: 98 %

## 2020-06-08 PROCEDURE — 3331090002 HH PPS REVENUE DEBIT

## 2020-06-08 PROCEDURE — 3331090001 HH PPS REVENUE CREDIT

## 2020-06-09 PROCEDURE — 3331090002 HH PPS REVENUE DEBIT

## 2020-06-09 PROCEDURE — 3331090001 HH PPS REVENUE CREDIT

## 2020-06-10 ENCOUNTER — HOME CARE VISIT (OUTPATIENT)
Dept: SCHEDULING | Facility: HOME HEALTH | Age: 65
End: 2020-06-10
Payer: MEDICARE

## 2020-06-10 PROCEDURE — 3331090001 HH PPS REVENUE CREDIT

## 2020-06-10 PROCEDURE — 3331090002 HH PPS REVENUE DEBIT

## 2020-07-13 ENCOUNTER — HOSPITAL ENCOUNTER (INPATIENT)
Age: 65
LOS: 3 days | Discharge: HOME HEALTH | DRG: 291 | End: 2020-07-17
Attending: EMERGENCY MEDICINE | Admitting: INTERNAL MEDICINE
Payer: MEDICARE

## 2020-07-13 DIAGNOSIS — J18.9 PNEUMONIA OF LEFT LUNG DUE TO INFECTIOUS ORGANISM, UNSPECIFIED PART OF LUNG: Primary | ICD-10-CM

## 2020-07-13 DIAGNOSIS — J90 PLEURAL EFFUSION: ICD-10-CM

## 2020-07-13 PROCEDURE — 80053 COMPREHEN METABOLIC PANEL: CPT

## 2020-07-13 PROCEDURE — 85379 FIBRIN DEGRADATION QUANT: CPT

## 2020-07-13 PROCEDURE — 84484 ASSAY OF TROPONIN QUANT: CPT

## 2020-07-13 PROCEDURE — 93005 ELECTROCARDIOGRAM TRACING: CPT

## 2020-07-13 PROCEDURE — 83880 ASSAY OF NATRIURETIC PEPTIDE: CPT

## 2020-07-13 PROCEDURE — 99284 EMERGENCY DEPT VISIT MOD MDM: CPT

## 2020-07-13 PROCEDURE — 84145 PROCALCITONIN (PCT): CPT

## 2020-07-13 PROCEDURE — 87040 BLOOD CULTURE FOR BACTERIA: CPT

## 2020-07-13 PROCEDURE — 85025 COMPLETE CBC W/AUTO DIFF WBC: CPT

## 2020-07-14 ENCOUNTER — APPOINTMENT (OUTPATIENT)
Dept: GENERAL RADIOLOGY | Age: 65
DRG: 291 | End: 2020-07-14
Attending: EMERGENCY MEDICINE
Payer: MEDICARE

## 2020-07-14 ENCOUNTER — APPOINTMENT (OUTPATIENT)
Dept: CT IMAGING | Age: 65
DRG: 291 | End: 2020-07-14
Attending: INTERNAL MEDICINE
Payer: MEDICARE

## 2020-07-14 PROBLEM — J15.9 BACTERIAL PNEUMONIA: Status: ACTIVE | Noted: 2020-07-14

## 2020-07-14 LAB
ALBUMIN SERPL-MCNC: 2.6 G/DL (ref 3.5–5)
ALBUMIN SERPL-MCNC: 2.9 G/DL (ref 3.5–5)
ALBUMIN/GLOB SERPL: 0.7 {RATIO} (ref 1.1–2.2)
ALBUMIN/GLOB SERPL: 0.7 {RATIO} (ref 1.1–2.2)
ALP SERPL-CCNC: 100 U/L (ref 45–117)
ALP SERPL-CCNC: 90 U/L (ref 45–117)
ALT SERPL-CCNC: 18 U/L (ref 12–78)
ALT SERPL-CCNC: 20 U/L (ref 12–78)
ANION GAP SERPL CALC-SCNC: 8 MMOL/L (ref 5–15)
ANION GAP SERPL CALC-SCNC: 8 MMOL/L (ref 5–15)
APPEARANCE UR: CLEAR
AST SERPL-CCNC: 14 U/L (ref 15–37)
AST SERPL-CCNC: 18 U/L (ref 15–37)
ATRIAL RATE: 94 BPM
BACTERIA URNS QL MICRO: NEGATIVE /HPF
BASOPHILS # BLD: 0 K/UL (ref 0–0.1)
BASOPHILS # BLD: 0 K/UL (ref 0–0.1)
BASOPHILS NFR BLD: 0 % (ref 0–1)
BASOPHILS NFR BLD: 0 % (ref 0–1)
BILIRUB SERPL-MCNC: 1.1 MG/DL (ref 0.2–1)
BILIRUB SERPL-MCNC: 1.1 MG/DL (ref 0.2–1)
BILIRUB UR QL: NEGATIVE
BNP SERPL-MCNC: 4446 PG/ML
BUN SERPL-MCNC: 20 MG/DL (ref 6–20)
BUN SERPL-MCNC: 20 MG/DL (ref 6–20)
BUN/CREAT SERPL: 12 (ref 12–20)
BUN/CREAT SERPL: 14 (ref 12–20)
CALCIUM SERPL-MCNC: 7.8 MG/DL (ref 8.5–10.1)
CALCIUM SERPL-MCNC: 7.8 MG/DL (ref 8.5–10.1)
CALCULATED P AXIS, ECG09: 58 DEGREES
CALCULATED R AXIS, ECG10: 167 DEGREES
CALCULATED T AXIS, ECG11: 87 DEGREES
CHLORIDE SERPL-SCNC: 101 MMOL/L (ref 97–108)
CHLORIDE SERPL-SCNC: 104 MMOL/L (ref 97–108)
CHOLEST SERPL-MCNC: 138 MG/DL
CO2 SERPL-SCNC: 23 MMOL/L (ref 21–32)
CO2 SERPL-SCNC: 25 MMOL/L (ref 21–32)
COLOR UR: ABNORMAL
COMMENT, HOLDF: NORMAL
CREAT SERPL-MCNC: 1.41 MG/DL (ref 0.7–1.3)
CREAT SERPL-MCNC: 1.61 MG/DL (ref 0.7–1.3)
D DIMER PPP FEU-MCNC: 2.18 MG/L FEU (ref 0–0.65)
DIAGNOSIS, 93000: NORMAL
DIFFERENTIAL METHOD BLD: ABNORMAL
DIFFERENTIAL METHOD BLD: ABNORMAL
EOSINOPHIL # BLD: 0.2 K/UL (ref 0–0.4)
EOSINOPHIL # BLD: 0.3 K/UL (ref 0–0.4)
EOSINOPHIL NFR BLD: 2 % (ref 0–7)
EOSINOPHIL NFR BLD: 2 % (ref 0–7)
EPITH CASTS URNS QL MICRO: ABNORMAL /LPF
ERYTHROCYTE [DISTWIDTH] IN BLOOD BY AUTOMATED COUNT: 18.5 % (ref 11.5–14.5)
ERYTHROCYTE [DISTWIDTH] IN BLOOD BY AUTOMATED COUNT: 19.1 % (ref 11.5–14.5)
GLOBULIN SER CALC-MCNC: 4 G/DL (ref 2–4)
GLOBULIN SER CALC-MCNC: 4.4 G/DL (ref 2–4)
GLUCOSE BLD STRIP.AUTO-MCNC: 127 MG/DL (ref 65–100)
GLUCOSE BLD STRIP.AUTO-MCNC: 140 MG/DL (ref 65–100)
GLUCOSE BLD STRIP.AUTO-MCNC: 155 MG/DL (ref 65–100)
GLUCOSE BLD STRIP.AUTO-MCNC: 181 MG/DL (ref 65–100)
GLUCOSE SERPL-MCNC: 141 MG/DL (ref 65–100)
GLUCOSE SERPL-MCNC: 183 MG/DL (ref 65–100)
GLUCOSE UR STRIP.AUTO-MCNC: NEGATIVE MG/DL
HCT VFR BLD AUTO: 38.3 % (ref 36.6–50.3)
HCT VFR BLD AUTO: 41.2 % (ref 36.6–50.3)
HDLC SERPL-MCNC: 23 MG/DL
HDLC SERPL: 6 {RATIO} (ref 0–5)
HGB BLD-MCNC: 11.7 G/DL (ref 12.1–17)
HGB BLD-MCNC: 12.6 G/DL (ref 12.1–17)
HGB UR QL STRIP: ABNORMAL
IMM GRANULOCYTES # BLD AUTO: 0.1 K/UL (ref 0–0.04)
IMM GRANULOCYTES # BLD AUTO: 0.1 K/UL (ref 0–0.04)
IMM GRANULOCYTES NFR BLD AUTO: 1 % (ref 0–0.5)
IMM GRANULOCYTES NFR BLD AUTO: 1 % (ref 0–0.5)
KETONES UR QL STRIP.AUTO: NEGATIVE MG/DL
LACTATE SERPL-SCNC: 1.4 MMOL/L (ref 0.4–2)
LDLC SERPL CALC-MCNC: 83.6 MG/DL (ref 0–100)
LEUKOCYTE ESTERASE UR QL STRIP.AUTO: NEGATIVE
LIPID PROFILE,FLP: ABNORMAL
LYMPHOCYTES # BLD: 1 K/UL (ref 0.8–3.5)
LYMPHOCYTES # BLD: 1 K/UL (ref 0.8–3.5)
LYMPHOCYTES NFR BLD: 8 % (ref 12–49)
LYMPHOCYTES NFR BLD: 9 % (ref 12–49)
MAGNESIUM SERPL-MCNC: 1.4 MG/DL (ref 1.6–2.4)
MCH RBC QN AUTO: 22.7 PG (ref 26–34)
MCH RBC QN AUTO: 22.7 PG (ref 26–34)
MCHC RBC AUTO-ENTMCNC: 30.5 G/DL (ref 30–36.5)
MCHC RBC AUTO-ENTMCNC: 30.6 G/DL (ref 30–36.5)
MCV RBC AUTO: 74.4 FL (ref 80–99)
MCV RBC AUTO: 74.4 FL (ref 80–99)
MONOCYTES # BLD: 0.5 K/UL (ref 0–1)
MONOCYTES # BLD: 0.5 K/UL (ref 0–1)
MONOCYTES NFR BLD: 4 % (ref 5–13)
MONOCYTES NFR BLD: 5 % (ref 5–13)
NEUTS SEG # BLD: 10.4 K/UL (ref 1.8–8)
NEUTS SEG # BLD: 9.3 K/UL (ref 1.8–8)
NEUTS SEG NFR BLD: 83 % (ref 32–75)
NEUTS SEG NFR BLD: 85 % (ref 32–75)
NITRITE UR QL STRIP.AUTO: NEGATIVE
NRBC # BLD: 0 K/UL (ref 0–0.01)
NRBC # BLD: 0 K/UL (ref 0–0.01)
NRBC BLD-RTO: 0 PER 100 WBC
NRBC BLD-RTO: 0 PER 100 WBC
P-R INTERVAL, ECG05: 168 MS
PH UR STRIP: 6 [PH] (ref 5–8)
PHOSPHATE SERPL-MCNC: 3.1 MG/DL (ref 2.6–4.7)
PLATELET # BLD AUTO: 213 K/UL (ref 150–400)
PLATELET # BLD AUTO: 234 K/UL (ref 150–400)
PMV BLD AUTO: 10.2 FL (ref 8.9–12.9)
PMV BLD AUTO: 10.7 FL (ref 8.9–12.9)
POTASSIUM SERPL-SCNC: 3.1 MMOL/L (ref 3.5–5.1)
POTASSIUM SERPL-SCNC: 3.3 MMOL/L (ref 3.5–5.1)
PROCALCITONIN SERPL-MCNC: 0.11 NG/ML
PROT SERPL-MCNC: 6.6 G/DL (ref 6.4–8.2)
PROT SERPL-MCNC: 7.3 G/DL (ref 6.4–8.2)
PROT UR STRIP-MCNC: >300 MG/DL
Q-T INTERVAL, ECG07: 418 MS
QRS DURATION, ECG06: 138 MS
QTC CALCULATION (BEZET), ECG08: 522 MS
RBC # BLD AUTO: 5.15 M/UL (ref 4.1–5.7)
RBC # BLD AUTO: 5.54 M/UL (ref 4.1–5.7)
RBC #/AREA URNS HPF: ABNORMAL /HPF (ref 0–5)
SAMPLES BEING HELD,HOLD: NORMAL
SARS-COV-2, COV2: NOT DETECTED
SERVICE CMNT-IMP: ABNORMAL
SODIUM SERPL-SCNC: 134 MMOL/L (ref 136–145)
SODIUM SERPL-SCNC: 135 MMOL/L (ref 136–145)
SOURCE, COVRS: NORMAL
SP GR UR REFRACTOMETRY: 1.02 (ref 1–1.03)
SPECIMEN SOURCE, FCOV2M: NORMAL
TRIGL SERPL-MCNC: 157 MG/DL (ref ?–150)
TROPONIN I SERPL-MCNC: 0.06 NG/ML
TROPONIN I SERPL-MCNC: <0.05 NG/ML
TROPONIN I SERPL-MCNC: <0.05 NG/ML
TSH SERPL DL<=0.05 MIU/L-ACNC: 1.24 UIU/ML (ref 0.36–3.74)
UR CULT HOLD, URHOLD: NORMAL
UROBILINOGEN UR QL STRIP.AUTO: >8 EU/DL (ref 0.2–1)
VENTRICULAR RATE, ECG03: 94 BPM
VLDLC SERPL CALC-MCNC: 31.4 MG/DL
WBC # BLD AUTO: 11.1 K/UL (ref 4.1–11.1)
WBC # BLD AUTO: 12.3 K/UL (ref 4.1–11.1)
WBC URNS QL MICRO: ABNORMAL /HPF (ref 0–4)

## 2020-07-14 PROCEDURE — 74011250636 HC RX REV CODE- 250/636: Performed by: EMERGENCY MEDICINE

## 2020-07-14 PROCEDURE — 74011250637 HC RX REV CODE- 250/637: Performed by: EMERGENCY MEDICINE

## 2020-07-14 PROCEDURE — 74011000258 HC RX REV CODE- 258: Performed by: INTERNAL MEDICINE

## 2020-07-14 PROCEDURE — 74011636637 HC RX REV CODE- 636/637: Performed by: INTERNAL MEDICINE

## 2020-07-14 PROCEDURE — 84443 ASSAY THYROID STIM HORMONE: CPT

## 2020-07-14 PROCEDURE — 74011250637 HC RX REV CODE- 250/637: Performed by: INTERNAL MEDICINE

## 2020-07-14 PROCEDURE — 74011250636 HC RX REV CODE- 250/636: Performed by: NURSE PRACTITIONER

## 2020-07-14 PROCEDURE — 70450 CT HEAD/BRAIN W/O DYE: CPT

## 2020-07-14 PROCEDURE — 83605 ASSAY OF LACTIC ACID: CPT

## 2020-07-14 PROCEDURE — 81001 URINALYSIS AUTO W/SCOPE: CPT

## 2020-07-14 PROCEDURE — 36415 COLL VENOUS BLD VENIPUNCTURE: CPT

## 2020-07-14 PROCEDURE — 74011250637 HC RX REV CODE- 250/637: Performed by: NURSE PRACTITIONER

## 2020-07-14 PROCEDURE — 73130 X-RAY EXAM OF HAND: CPT

## 2020-07-14 PROCEDURE — 87635 SARS-COV-2 COVID-19 AMP PRB: CPT

## 2020-07-14 PROCEDURE — 82962 GLUCOSE BLOOD TEST: CPT

## 2020-07-14 PROCEDURE — 96374 THER/PROPH/DIAG INJ IV PUSH: CPT

## 2020-07-14 PROCEDURE — 80061 LIPID PANEL: CPT

## 2020-07-14 PROCEDURE — 80053 COMPREHEN METABOLIC PANEL: CPT

## 2020-07-14 PROCEDURE — 84100 ASSAY OF PHOSPHORUS: CPT

## 2020-07-14 PROCEDURE — 74011250636 HC RX REV CODE- 250/636: Performed by: INTERNAL MEDICINE

## 2020-07-14 PROCEDURE — 71045 X-RAY EXAM CHEST 1 VIEW: CPT

## 2020-07-14 PROCEDURE — 83735 ASSAY OF MAGNESIUM: CPT

## 2020-07-14 PROCEDURE — 65660000000 HC RM CCU STEPDOWN

## 2020-07-14 RX ORDER — POTASSIUM CHLORIDE 750 MG/1
40 TABLET, FILM COATED, EXTENDED RELEASE ORAL DAILY
Status: DISCONTINUED | OUTPATIENT
Start: 2020-07-14 | End: 2020-07-17 | Stop reason: HOSPADM

## 2020-07-14 RX ORDER — QUETIAPINE FUMARATE 25 MG/1
25 TABLET, FILM COATED ORAL
Status: DISCONTINUED | OUTPATIENT
Start: 2020-07-14 | End: 2020-07-14

## 2020-07-14 RX ORDER — LEVETIRACETAM 500 MG/1
1000 TABLET ORAL EVERY 12 HOURS
Status: DISCONTINUED | OUTPATIENT
Start: 2020-07-14 | End: 2020-07-17 | Stop reason: HOSPADM

## 2020-07-14 RX ORDER — VENLAFAXINE HYDROCHLORIDE 75 MG/1
75 CAPSULE, EXTENDED RELEASE ORAL EVERY EVENING
Status: ON HOLD | COMMUNITY
End: 2020-08-11 | Stop reason: SDUPTHER

## 2020-07-14 RX ORDER — FUROSEMIDE 10 MG/ML
INJECTION INTRAMUSCULAR; INTRAVENOUS
Status: DISPENSED
Start: 2020-07-14 | End: 2020-07-14

## 2020-07-14 RX ORDER — GUAIFENESIN 100 MG/5ML
81 LIQUID (ML) ORAL DAILY
Status: DISCONTINUED | OUTPATIENT
Start: 2020-07-14 | End: 2020-07-17 | Stop reason: HOSPADM

## 2020-07-14 RX ORDER — MAGNESIUM SULFATE 1 G/100ML
1 INJECTION INTRAVENOUS ONCE
Status: COMPLETED | OUTPATIENT
Start: 2020-07-14 | End: 2020-07-14

## 2020-07-14 RX ORDER — OXYCODONE HYDROCHLORIDE 5 MG/1
5 TABLET ORAL
Status: DISCONTINUED | OUTPATIENT
Start: 2020-07-14 | End: 2020-07-14

## 2020-07-14 RX ORDER — INSULIN LISPRO 100 [IU]/ML
INJECTION, SOLUTION INTRAVENOUS; SUBCUTANEOUS
Status: DISCONTINUED | OUTPATIENT
Start: 2020-07-14 | End: 2020-07-17 | Stop reason: HOSPADM

## 2020-07-14 RX ORDER — ALBUTEROL SULFATE 0.83 MG/ML
2.5 SOLUTION RESPIRATORY (INHALATION)
COMMUNITY

## 2020-07-14 RX ORDER — VENLAFAXINE HYDROCHLORIDE 37.5 MG/1
150 CAPSULE, EXTENDED RELEASE ORAL DAILY
Status: DISCONTINUED | OUTPATIENT
Start: 2020-07-15 | End: 2020-07-17 | Stop reason: HOSPADM

## 2020-07-14 RX ORDER — POTASSIUM CHLORIDE 750 MG/1
20 TABLET, FILM COATED, EXTENDED RELEASE ORAL DAILY
COMMUNITY

## 2020-07-14 RX ORDER — ALPRAZOLAM 0.5 MG/1
1 TABLET ORAL
Status: COMPLETED | OUTPATIENT
Start: 2020-07-14 | End: 2020-07-14

## 2020-07-14 RX ORDER — HEPARIN SODIUM 5000 [USP'U]/ML
5000 INJECTION, SOLUTION INTRAVENOUS; SUBCUTANEOUS EVERY 8 HOURS
Status: DISCONTINUED | OUTPATIENT
Start: 2020-07-14 | End: 2020-07-17 | Stop reason: HOSPADM

## 2020-07-14 RX ORDER — AMIODARONE HYDROCHLORIDE 200 MG/1
200 TABLET ORAL DAILY
Status: DISCONTINUED | OUTPATIENT
Start: 2020-07-14 | End: 2020-07-17 | Stop reason: HOSPADM

## 2020-07-14 RX ORDER — DEXTROSE 50 % IN WATER (D50W) INTRAVENOUS SYRINGE
12.5-25 AS NEEDED
Status: DISCONTINUED | OUTPATIENT
Start: 2020-07-14 | End: 2020-07-17 | Stop reason: HOSPADM

## 2020-07-14 RX ORDER — ACETAMINOPHEN 325 MG/1
650 TABLET ORAL
Status: DISCONTINUED | OUTPATIENT
Start: 2020-07-14 | End: 2020-07-17 | Stop reason: HOSPADM

## 2020-07-14 RX ORDER — LEVOFLOXACIN 5 MG/ML
750 INJECTION, SOLUTION INTRAVENOUS
Status: COMPLETED | OUTPATIENT
Start: 2020-07-14 | End: 2020-07-14

## 2020-07-14 RX ORDER — ALPRAZOLAM 0.5 MG/1
1 TABLET ORAL 2 TIMES DAILY
Status: DISCONTINUED | OUTPATIENT
Start: 2020-07-14 | End: 2020-07-17 | Stop reason: HOSPADM

## 2020-07-14 RX ORDER — METOPROLOL SUCCINATE 25 MG/1
12.5 TABLET, EXTENDED RELEASE ORAL DAILY
Status: DISCONTINUED | OUTPATIENT
Start: 2020-07-15 | End: 2020-07-17 | Stop reason: HOSPADM

## 2020-07-14 RX ORDER — OLANZAPINE 5 MG/1
5 TABLET ORAL
COMMUNITY
End: 2020-08-11

## 2020-07-14 RX ORDER — VENLAFAXINE HYDROCHLORIDE 37.5 MG/1
75 CAPSULE, EXTENDED RELEASE ORAL EVERY EVENING
Status: DISCONTINUED | OUTPATIENT
Start: 2020-07-14 | End: 2020-07-17 | Stop reason: HOSPADM

## 2020-07-14 RX ORDER — METOPROLOL SUCCINATE 25 MG/1
12.5 TABLET, EXTENDED RELEASE ORAL DAILY
COMMUNITY
End: 2020-08-11

## 2020-07-14 RX ORDER — MAGNESIUM SULFATE 100 %
4 CRYSTALS MISCELLANEOUS AS NEEDED
Status: DISCONTINUED | OUTPATIENT
Start: 2020-07-14 | End: 2020-07-17 | Stop reason: HOSPADM

## 2020-07-14 RX ORDER — SODIUM CHLORIDE 0.9 % (FLUSH) 0.9 %
5-40 SYRINGE (ML) INJECTION EVERY 8 HOURS
Status: DISCONTINUED | OUTPATIENT
Start: 2020-07-14 | End: 2020-07-17 | Stop reason: HOSPADM

## 2020-07-14 RX ORDER — ATORVASTATIN CALCIUM 40 MG/1
80 TABLET, FILM COATED ORAL DAILY
Status: DISCONTINUED | OUTPATIENT
Start: 2020-07-14 | End: 2020-07-17 | Stop reason: HOSPADM

## 2020-07-14 RX ORDER — FINASTERIDE 5 MG/1
5 TABLET, FILM COATED ORAL
Status: DISCONTINUED | OUTPATIENT
Start: 2020-07-14 | End: 2020-07-17 | Stop reason: HOSPADM

## 2020-07-14 RX ORDER — CALCIUM CARBONATE 200(500)MG
400 TABLET,CHEWABLE ORAL
Status: DISCONTINUED | OUTPATIENT
Start: 2020-07-14 | End: 2020-07-17 | Stop reason: HOSPADM

## 2020-07-14 RX ORDER — ONDANSETRON 2 MG/ML
4 INJECTION INTRAMUSCULAR; INTRAVENOUS
Status: DISCONTINUED | OUTPATIENT
Start: 2020-07-14 | End: 2020-07-17 | Stop reason: HOSPADM

## 2020-07-14 RX ORDER — SODIUM CHLORIDE 0.9 % (FLUSH) 0.9 %
5-40 SYRINGE (ML) INJECTION AS NEEDED
Status: DISCONTINUED | OUTPATIENT
Start: 2020-07-14 | End: 2020-07-17 | Stop reason: HOSPADM

## 2020-07-14 RX ORDER — OLANZAPINE 2.5 MG/1
2.5 TABLET ORAL
Status: DISCONTINUED | OUTPATIENT
Start: 2020-07-14 | End: 2020-07-17 | Stop reason: HOSPADM

## 2020-07-14 RX ORDER — PREGABALIN 50 MG/1
50 CAPSULE ORAL 3 TIMES DAILY
Status: DISCONTINUED | OUTPATIENT
Start: 2020-07-14 | End: 2020-07-17 | Stop reason: HOSPADM

## 2020-07-14 RX ORDER — FUROSEMIDE 10 MG/ML
40 INJECTION INTRAMUSCULAR; INTRAVENOUS DAILY
Status: DISCONTINUED | OUTPATIENT
Start: 2020-07-14 | End: 2020-07-17 | Stop reason: HOSPADM

## 2020-07-14 RX ORDER — CARVEDILOL 3.12 MG/1
3.12 TABLET ORAL 2 TIMES DAILY WITH MEALS
Status: DISCONTINUED | OUTPATIENT
Start: 2020-07-14 | End: 2020-07-14 | Stop reason: ALTCHOICE

## 2020-07-14 RX ORDER — NITROGLYCERIN 0.4 MG/1
0.4 TABLET SUBLINGUAL
Status: DISCONTINUED | OUTPATIENT
Start: 2020-07-14 | End: 2020-07-17 | Stop reason: HOSPADM

## 2020-07-14 RX ORDER — PANTOPRAZOLE SODIUM 40 MG/1
40 TABLET, DELAYED RELEASE ORAL
Status: DISCONTINUED | OUTPATIENT
Start: 2020-07-14 | End: 2020-07-17 | Stop reason: HOSPADM

## 2020-07-14 RX ORDER — VENLAFAXINE HYDROCHLORIDE 37.5 MG/1
75 CAPSULE, EXTENDED RELEASE ORAL DAILY
Status: DISCONTINUED | OUTPATIENT
Start: 2020-07-14 | End: 2020-07-14

## 2020-07-14 RX ORDER — FLUOXETINE HYDROCHLORIDE 20 MG/1
20 CAPSULE ORAL DAILY
Status: DISCONTINUED | OUTPATIENT
Start: 2020-07-14 | End: 2020-07-17 | Stop reason: HOSPADM

## 2020-07-14 RX ORDER — POTASSIUM CHLORIDE 750 MG/1
40 TABLET, FILM COATED, EXTENDED RELEASE ORAL
Status: COMPLETED | OUTPATIENT
Start: 2020-07-14 | End: 2020-07-14

## 2020-07-14 RX ORDER — FUROSEMIDE 20 MG/1
20 TABLET ORAL
COMMUNITY
End: 2020-08-11

## 2020-07-14 RX ORDER — CLOPIDOGREL BISULFATE 75 MG/1
75 TABLET ORAL DAILY
Status: DISCONTINUED | OUTPATIENT
Start: 2020-07-14 | End: 2020-07-17 | Stop reason: HOSPADM

## 2020-07-14 RX ORDER — TAMSULOSIN HYDROCHLORIDE 0.4 MG/1
0.4 CAPSULE ORAL
Status: DISCONTINUED | OUTPATIENT
Start: 2020-07-14 | End: 2020-07-17 | Stop reason: HOSPADM

## 2020-07-14 RX ORDER — MIDODRINE HYDROCHLORIDE 10 MG/1
10 TABLET ORAL 2 TIMES DAILY
Status: ON HOLD | COMMUNITY
End: 2020-07-17 | Stop reason: SDUPTHER

## 2020-07-14 RX ADMIN — PANTOPRAZOLE SODIUM 40 MG: 40 TABLET, DELAYED RELEASE ORAL at 06:43

## 2020-07-14 RX ADMIN — FUROSEMIDE 40 MG: 10 INJECTION, SOLUTION INTRAMUSCULAR; INTRAVENOUS at 09:23

## 2020-07-14 RX ADMIN — VENLAFAXINE HYDROCHLORIDE 75 MG: 37.5 CAPSULE, EXTENDED RELEASE ORAL at 09:22

## 2020-07-14 RX ADMIN — ASPIRIN 81 MG CHEWABLE TABLET 81 MG: 81 TABLET CHEWABLE at 09:21

## 2020-07-14 RX ADMIN — LEVOFLOXACIN 750 MG: 750 INJECTION, SOLUTION INTRAVENOUS at 00:52

## 2020-07-14 RX ADMIN — AMIODARONE HYDROCHLORIDE 200 MG: 200 TABLET ORAL at 09:24

## 2020-07-14 RX ADMIN — Medication 10 ML: at 06:45

## 2020-07-14 RX ADMIN — FINASTERIDE 5 MG: 5 TABLET, FILM COATED ORAL at 07:12

## 2020-07-14 RX ADMIN — Medication 10 ML: at 22:12

## 2020-07-14 RX ADMIN — CLOPIDOGREL BISULFATE 75 MG: 75 TABLET ORAL at 09:24

## 2020-07-14 RX ADMIN — HEPARIN SODIUM 5000 UNITS: 5000 INJECTION INTRAVENOUS; SUBCUTANEOUS at 20:47

## 2020-07-14 RX ADMIN — DOXYCYCLINE 100 MG: 100 INJECTION, POWDER, LYOPHILIZED, FOR SOLUTION INTRAVENOUS at 22:12

## 2020-07-14 RX ADMIN — TAMSULOSIN HYDROCHLORIDE 0.4 MG: 0.4 CAPSULE ORAL at 06:43

## 2020-07-14 RX ADMIN — POTASSIUM CHLORIDE 40 MEQ: 750 TABLET, FILM COATED, EXTENDED RELEASE ORAL at 06:44

## 2020-07-14 RX ADMIN — MAGNESIUM SULFATE HEPTAHYDRATE 1 G: 1 INJECTION, SOLUTION INTRAVENOUS at 06:44

## 2020-07-14 RX ADMIN — CEFTRIAXONE 1 G: 1 INJECTION, POWDER, FOR SOLUTION INTRAMUSCULAR; INTRAVENOUS at 04:41

## 2020-07-14 RX ADMIN — Medication 10 ML: at 15:06

## 2020-07-14 RX ADMIN — HEPARIN SODIUM 5000 UNITS: 5000 INJECTION INTRAVENOUS; SUBCUTANEOUS at 04:41

## 2020-07-14 RX ADMIN — FLUOXETINE 20 MG: 20 CAPSULE ORAL at 09:24

## 2020-07-14 RX ADMIN — VENLAFAXINE HYDROCHLORIDE 75 MG: 37.5 CAPSULE, EXTENDED RELEASE ORAL at 18:08

## 2020-07-14 RX ADMIN — LEVETIRACETAM 1000 MG: 500 TABLET ORAL at 22:12

## 2020-07-14 RX ADMIN — CARVEDILOL 3.12 MG: 3.12 TABLET, FILM COATED ORAL at 09:24

## 2020-07-14 RX ADMIN — HEPARIN SODIUM 5000 UNITS: 5000 INJECTION INTRAVENOUS; SUBCUTANEOUS at 12:56

## 2020-07-14 RX ADMIN — PREGABALIN 50 MG: 50 CAPSULE ORAL at 09:23

## 2020-07-14 RX ADMIN — INSULIN LISPRO 2 UNITS: 100 INJECTION, SOLUTION INTRAVENOUS; SUBCUTANEOUS at 12:57

## 2020-07-14 RX ADMIN — PANTOPRAZOLE SODIUM 40 MG: 40 TABLET, DELAYED RELEASE ORAL at 16:07

## 2020-07-14 RX ADMIN — ALPRAZOLAM 1 MG: 0.5 TABLET ORAL at 09:23

## 2020-07-14 RX ADMIN — INSULIN LISPRO 2 UNITS: 100 INJECTION, SOLUTION INTRAVENOUS; SUBCUTANEOUS at 18:11

## 2020-07-14 RX ADMIN — ALPRAZOLAM 1 MG: 0.5 TABLET ORAL at 00:13

## 2020-07-14 RX ADMIN — POTASSIUM CHLORIDE 40 MEQ: 750 TABLET, FILM COATED, EXTENDED RELEASE ORAL at 12:58

## 2020-07-14 RX ADMIN — ALPRAZOLAM 1 MG: 0.5 TABLET ORAL at 18:08

## 2020-07-14 RX ADMIN — LEVETIRACETAM 1000 MG: 500 TABLET ORAL at 09:23

## 2020-07-14 RX ADMIN — DOXYCYCLINE 100 MG: 100 INJECTION, POWDER, LYOPHILIZED, FOR SOLUTION INTRAVENOUS at 09:26

## 2020-07-14 RX ADMIN — PREGABALIN 50 MG: 50 CAPSULE ORAL at 22:11

## 2020-07-14 RX ADMIN — OLANZAPINE 2.5 MG: 2.5 TABLET, FILM COATED ORAL at 22:12

## 2020-07-14 RX ADMIN — ATORVASTATIN CALCIUM 80 MG: 40 TABLET, FILM COATED ORAL at 09:23

## 2020-07-14 RX ADMIN — PREGABALIN 50 MG: 50 CAPSULE ORAL at 16:07

## 2020-07-14 RX ADMIN — TAMSULOSIN HYDROCHLORIDE 0.4 MG: 0.4 CAPSULE ORAL at 16:07
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
## 2020-07-14 NOTE — ROUTINE PROCESS
TRANSFER - OUT REPORT:    Verbal report given to Aamir Araiza RN(name) on Fadia Lynch.  being transferred to [facility]) for routine progression of care       Report consisted of patients Situation, Background, Assessment and   Recommendations(SBAR). Information from the following report(s) SBAR, ED Summary, [facility] and Recent Results was reviewed with the receiving nurse. Lines:   Peripheral IV 07/14/20 Right Antecubital (Active)        Opportunity for questions and clarification was provided.       Patient transported with:   [employer] App

## 2020-07-14 NOTE — PROGRESS NOTES
Bedside shift change report given to Siri Camacho (oncoming nurse) by Nkechi Leiva (offgoing nurse). Report included the following information SBAR, Kardex, Intake/Output, MAR, Recent Results, Med Rec Status and Cardiac Rhythm Paced   . Problem: Breathing Pattern - Ineffective  Goal: Absence of hypoxia  Outcome: Progressing Towards Goal  Note- O2 sats within defined limits on 2L NC    Problem: Anxiety  Goal: Alleviation of anxiety  Outcome: Progressing Towards Goal      K 3.1, provider notified.  Potassium and magnesium sulfate ordered

## 2020-07-14 NOTE — ED TRIAGE NOTES
Pt arrives ambulatory from home via EMS with CC of SOB. Pt has a pacer/defibrillator.   Pt states SOB began about 30 min prior to EMS arrival.   Denies cp, N/V,  +cough

## 2020-07-14 NOTE — PROGRESS NOTES
Reason for Admission:  Admitted  from home with complaints of breath. Has a history of CHF and CAD with CABG. RUR Score:   35%-High      PCP: First and Last name: Katie Yusuf   Name of Practice:   Are you a current patient: Yes/No: Yes   Approximate date of last visit: 6/2020   Can you do a virtual visit with your PCP: Yes              Resources/supports as identified by patient/family:   Demi Hebert lives with patient. Insured with Medicare A/B and BCBS supplement. Top Challenges facing patient (as identified by patient/family and CM): Finances/Medication cost?    None         Transportation? Daughter provides        Support system or lack thereof? Lives with one daughter and has a second daughter who is in the area. Living arrangements? Lives in a one story home. Self-care/ADLs/Cognition? Reports that he is independent with ADLs prior to admit. Uses a cane to assist ambulation. Current Advanced Directive/Advance Care Plan:  Does not have. Encouraged to complete but he declines. Has designated decision maker                          Plan for utilizing home health:  Depending on progress may need to be cleared by PT/OT to formulated discharge plan. Transition of Care Plan:   COVID-pending  Supplement oxygen weaning currently on 2lpm.  Anticipate that he will likely discharge home with family and HH.   Care Management Interventions  PCP Verified by CM: Yes(6/2020)  Palliative Care Criteria Met (RRAT>21 & CHF Dx)?: No  Mode of Transport at Discharge: (private car)  Current Support Network: Own Home(Daughter lives with patient)  The Patient and/or Patient Representative was Provided with a Choice of Provider and Agrees with the Discharge Plan?: (demi Hebert)  [address] Provided?: No  Discharge Location  Discharge Placement: Home with family assistance     Suzanna Salvador RN CRM  Ext [phone]  BCPI-A letter regarding Heart Failure has been delivered to the patient/caregiver.

## 2020-07-14 NOTE — PROGRESS NOTES
Problem: Breathing Pattern - Ineffective  Goal: *Absence of hypoxia  Outcome: Progressing Towards Goal     Problem: Falls - Risk of  Goal: *Absence of Falls  Description: Document Ami Fall Risk and appropriate interventions in the flowsheet. Outcome: Progressing Towards Goal  Note: Fall Risk Interventions:  Mobility Interventions: Patient to call before getting OOB, Utilize gait belt for transfers/ambulation, Communicate number of staff needed for ambulation/transfer         Medication Interventions: Teach patient to arise slowly, Patient to call before getting OOB    Elimination Interventions: Call light in reach, Stay With Me (per policy), Patient to call for help with toileting needs, Toilet paper/wipes in reach, Toileting schedule/hourly rounds, Urinal in reach    History of Falls Interventions: Vital signs minimum Q4HRs X 24 hrs (comment for end date), Assess for delayed presentation/identification of injury for 48 hrs (comment for end date), Utilize gait belt for transfer/ambulation    Problem: Heart Failure: Day 1  Goal: Medications  Outcome: Progressing Towards Goal  Pt on 1500 mL FR, monitoring I&O. Pt getting diuresed with IV lasix this shift. Goal: Respiratory  Outcome: Progressing Towards Goal   Pt on 1 LNC this shift, continuing to monitor and wean as tolerated. 1000: Notified Dr. Rod Silence patient states his chest is aching, rates pain 1/10. Pt states this pain is not uncommon for him as he has previous CABGs. Drawing serial troponins and continuing to monitor. No orders received. 1930: Bedside shift change report given to JANELL Cosby (oncoming nurse) by Celeste Bella RN (offgoing nurse). Report included the following information SBAR, ED Summary, Intake/Output, MAR, Recent Results, Med Rec Status and Cardiac Rhythm paced.

## 2020-07-14 NOTE — NURSE NAVIGATOR
Chart reviewed by Heart Failure Nurse Navigator. Heart Failure database completed. EF:  55/60    ACEi/ARB/ARNi: Not indicated    BB: Coreg    Aldosterone Antagonist: Not indicated    Obstructive Sleep Apnea Screening:No   STOP-BANG score:   Referred to Sleep Medicine:     CRT not indicated    NYHA Functional Class Requested via provider message on [employer]. Heart Failure Teach Back in Patient Education. Heart Failure Avoiding Triggers on Discharge Instructions. Cardiologist:       Post discharge follow up phone call to be made within 48-72 hours of discharge.
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
## 2020-07-14 NOTE — ACP (ADVANCE CARE PLANNING)
Advance Care Planning     Advance Care Planning Activator (Inpatient)  Conversation Note      Date of ACP Conversation: 07/14/20     Conversation Conducted with:   Patient with Decision Making Capacity    ACP Activator: Geoff Brown RN    *When Decision Maker makes decisions on behalf of the incapacitated patient: Decision Maker is asked to consider and make decisions based on patient values, known preferences, or best interests. Health Care Decision Maker:    Current Designated Health Care Decision Maker:   Primary Decision Maker: Herbert Tripp - Daughter - [phone]    Secondary Decision Maker: Adelita Aviles - Daughter - [phone]  (If there is a [address] named in the \"Healthcare Decision Makers\" box in the ACP activity, but it is not visible above, be sure to open that field and then select the health care decision maker relationship (ie \"primary\") in the blank space to the right of the name.) Validate  this information as still accurate & up-to-date; edit [place] field as needed.)    Note: Assess and validate information in current ACP documents, as indicated. If no Decision Maker listed above or available through scanned documents, then:    If no Authorized Decision Maker has previously been identified, then patient chooses [place]:  \"Who would you like to name as your primary health care decision-maker? \"    Name: Rebeca Fams   Relationship: Daughter Phone number: [phone]  Yohana Tse this person be reached easily? \" YES  \"Who would you like to name as your back-up decision maker? \"   Care Preferences    Ventilation: \"If you were in your present state of health and suddenly became very ill and were unable to breathe on your own, what would your preference be about the use of a ventilator (breathing machine) if it were available to you? \"      If patient would desire the use of a ventilator (breathing machine), answer \"yes\", if not \"no\":yes    \"If your health worsens and it becomes clear that your chance of recovery is unlikely, what would your preference be about the use of a ventilator (breathing machine) if it were available to you? \"     Would the patient desire the use of a ventilator (breathing machine)? YES      Resuscitation  \"CPR works best to restart the heart when there is a sudden event, like a heart attack, in someone who is otherwise healthy. Unfortunately, CPR does not typically restart the heart for people who have serious health conditions or who are very sick. \"    \"In the event your heart stopped as a result of an underlying serious health condition, would you want attempts to be made to restart your heart (answer \"yes\" for attempt to resuscitate) or would you prefer a natural death (answer \"no\" for do not attempt to resuscitate)? \" yes      NOTE: If the patient has a valid advance directive AND now provides care preference(s) that are inconsistent with that prior directive, advise the patient to consider either: creating a new advance directive that complies with state-specific requirements; or, if that is not possible, orally revoking that prior directive in accordance with state-specific requirements, which must be documented in the EHR. [] Yes  [] No   Educated Patient / Juan Carlos Parnell regarding differences between Advance Directives and portable DNR orders.     Length of ACP Conversation in minutes:      Conversation Outcomes:  [] ACP discussion completed  [] Existing advance directive reviewed with patient; no changes to patient's previously recorded wishes     [] New Advance Directive completed   [] Portable Do Not Resuscitate prepared for Provider review and signature  [] POLST/POST/MOLST/MOST prepared for Provider review and signature      Follow-up plan:    [] Schedule follow-up conversation to continue planning  [] Referred individual to Provider for additional questions/concerns   [] Advised patient/agent/surrogate to review completed ACP document and update if needed with changes in condition, patient preferences or care setting     [] This note routed to one or more involved healthcare providers

## 2020-07-14 NOTE — PROGRESS NOTES
Pt seen and examined at bedside, history revisited, charts reviewed. Agree with Dr Dimas Goddard p/w SOB- COVID test pending. Appears very comfortable. On 2L sats 100%, I cut it down to 1L.

## 2020-07-15 ENCOUNTER — APPOINTMENT (OUTPATIENT)
Dept: NUCLEAR MEDICINE | Age: 65
DRG: 291 | End: 2020-07-15
Attending: INTERNAL MEDICINE
Payer: MEDICARE

## 2020-07-15 LAB
ALBUMIN SERPL-MCNC: 2.4 G/DL (ref 3.5–5)
ALBUMIN/GLOB SERPL: 0.6 {RATIO} (ref 1.1–2.2)
ALP SERPL-CCNC: 83 U/L (ref 45–117)
ALT SERPL-CCNC: 15 U/L (ref 12–78)
ANION GAP SERPL CALC-SCNC: 8 MMOL/L (ref 5–15)
AST SERPL-CCNC: 10 U/L (ref 15–37)
BASOPHILS # BLD: 0 K/UL (ref 0–0.1)
BASOPHILS NFR BLD: 0 % (ref 0–1)
BILIRUB SERPL-MCNC: 0.7 MG/DL (ref 0.2–1)
BUN SERPL-MCNC: 23 MG/DL (ref 6–20)
BUN/CREAT SERPL: 16 (ref 12–20)
CALCIUM SERPL-MCNC: 7.7 MG/DL (ref 8.5–10.1)
CHLORIDE SERPL-SCNC: 103 MMOL/L (ref 97–108)
CHOLEST SERPL-MCNC: 136 MG/DL
CO2 SERPL-SCNC: 23 MMOL/L (ref 21–32)
CREAT SERPL-MCNC: 1.48 MG/DL (ref 0.7–1.3)
DIFFERENTIAL METHOD BLD: ABNORMAL
EOSINOPHIL # BLD: 0.3 K/UL (ref 0–0.4)
EOSINOPHIL NFR BLD: 4 % (ref 0–7)
ERYTHROCYTE [DISTWIDTH] IN BLOOD BY AUTOMATED COUNT: 18.1 % (ref 11.5–14.5)
GLOBULIN SER CALC-MCNC: 3.8 G/DL (ref 2–4)
GLUCOSE BLD STRIP.AUTO-MCNC: 125 MG/DL (ref 65–100)
GLUCOSE BLD STRIP.AUTO-MCNC: 155 MG/DL (ref 65–100)
GLUCOSE BLD STRIP.AUTO-MCNC: 180 MG/DL (ref 65–100)
GLUCOSE BLD STRIP.AUTO-MCNC: 199 MG/DL (ref 65–100)
GLUCOSE SERPL-MCNC: 154 MG/DL (ref 65–100)
HCT VFR BLD AUTO: 36.8 % (ref 36.6–50.3)
HDLC SERPL-MCNC: 20 MG/DL
HDLC SERPL: 6.8 {RATIO} (ref 0–5)
HGB BLD-MCNC: 11.1 G/DL (ref 12.1–17)
IMM GRANULOCYTES # BLD AUTO: 0 K/UL (ref 0–0.04)
IMM GRANULOCYTES NFR BLD AUTO: 0 % (ref 0–0.5)
LDLC SERPL CALC-MCNC: 75.2 MG/DL (ref 0–100)
LIPID PROFILE,FLP: ABNORMAL
LYMPHOCYTES # BLD: 1.2 K/UL (ref 0.8–3.5)
LYMPHOCYTES NFR BLD: 16 % (ref 12–49)
MCH RBC QN AUTO: 22.4 PG (ref 26–34)
MCHC RBC AUTO-ENTMCNC: 30.2 G/DL (ref 30–36.5)
MCV RBC AUTO: 74.3 FL (ref 80–99)
MONOCYTES # BLD: 0.4 K/UL (ref 0–1)
MONOCYTES NFR BLD: 5 % (ref 5–13)
NEUTS SEG # BLD: 5.9 K/UL (ref 1.8–8)
NEUTS SEG NFR BLD: 75 % (ref 32–75)
NRBC # BLD: 0 K/UL (ref 0–0.01)
NRBC BLD-RTO: 0 PER 100 WBC
PLATELET # BLD AUTO: 199 K/UL (ref 150–400)
PMV BLD AUTO: 10.6 FL (ref 8.9–12.9)
POTASSIUM SERPL-SCNC: 3.1 MMOL/L (ref 3.5–5.1)
PROT SERPL-MCNC: 6.2 G/DL (ref 6.4–8.2)
RBC # BLD AUTO: 4.95 M/UL (ref 4.1–5.7)
SERVICE CMNT-IMP: ABNORMAL
SODIUM SERPL-SCNC: 134 MMOL/L (ref 136–145)
TRIGL SERPL-MCNC: 204 MG/DL (ref ?–150)
VLDLC SERPL CALC-MCNC: 40.8 MG/DL
WBC # BLD AUTO: 7.9 K/UL (ref 4.1–11.1)

## 2020-07-15 PROCEDURE — 65660000000 HC RM CCU STEPDOWN

## 2020-07-15 PROCEDURE — 74011250636 HC RX REV CODE- 250/636: Performed by: INTERNAL MEDICINE

## 2020-07-15 PROCEDURE — 74011250637 HC RX REV CODE- 250/637: Performed by: INTERNAL MEDICINE

## 2020-07-15 PROCEDURE — 74011000258 HC RX REV CODE- 258: Performed by: INTERNAL MEDICINE

## 2020-07-15 PROCEDURE — 94760 N-INVAS EAR/PLS OXIMETRY 1: CPT

## 2020-07-15 PROCEDURE — 85025 COMPLETE CBC W/AUTO DIFF WBC: CPT

## 2020-07-15 PROCEDURE — 77010033678 HC OXYGEN DAILY

## 2020-07-15 PROCEDURE — 80061 LIPID PANEL: CPT

## 2020-07-15 PROCEDURE — 82962 GLUCOSE BLOOD TEST: CPT

## 2020-07-15 PROCEDURE — 74011250637 HC RX REV CODE- 250/637: Performed by: NURSE PRACTITIONER

## 2020-07-15 PROCEDURE — 74011636637 HC RX REV CODE- 636/637: Performed by: INTERNAL MEDICINE

## 2020-07-15 PROCEDURE — 5A09357 ASSISTANCE WITH RESPIRATORY VENTILATION, LESS THAN 24 CONSECUTIVE HOURS, CONTINUOUS POSITIVE AIRWAY PRESSURE: ICD-10-PCS | Performed by: INTERNAL MEDICINE

## 2020-07-15 PROCEDURE — A9558 XE133 XENON 10MCI: HCPCS

## 2020-07-15 PROCEDURE — 94660 CPAP INITIATION&MGMT: CPT

## 2020-07-15 PROCEDURE — 36415 COLL VENOUS BLD VENIPUNCTURE: CPT

## 2020-07-15 PROCEDURE — 80053 COMPREHEN METABOLIC PANEL: CPT

## 2020-07-15 RX ORDER — POTASSIUM CHLORIDE 750 MG/1
40 TABLET, FILM COATED, EXTENDED RELEASE ORAL
Status: COMPLETED | OUTPATIENT
Start: 2020-07-15 | End: 2020-07-15

## 2020-07-15 RX ORDER — LANOLIN ALCOHOL/MO/W.PET/CERES
3 CREAM (GRAM) TOPICAL
Status: DISCONTINUED | OUTPATIENT
Start: 2020-07-15 | End: 2020-07-17 | Stop reason: HOSPADM

## 2020-07-15 RX ADMIN — MELATONIN 3 MG: at 22:23

## 2020-07-15 RX ADMIN — INSULIN LISPRO 2 UNITS: 100 INJECTION, SOLUTION INTRAVENOUS; SUBCUTANEOUS at 12:21

## 2020-07-15 RX ADMIN — INSULIN LISPRO 2 UNITS: 100 INJECTION, SOLUTION INTRAVENOUS; SUBCUTANEOUS at 17:41

## 2020-07-15 RX ADMIN — LEVETIRACETAM 1000 MG: 500 TABLET ORAL at 08:34

## 2020-07-15 RX ADMIN — PANTOPRAZOLE SODIUM 40 MG: 40 TABLET, DELAYED RELEASE ORAL at 17:41

## 2020-07-15 RX ADMIN — PREGABALIN 50 MG: 50 CAPSULE ORAL at 08:35

## 2020-07-15 RX ADMIN — METOPROLOL SUCCINATE 12.5 MG: 25 TABLET, EXTENDED RELEASE ORAL at 08:34

## 2020-07-15 RX ADMIN — HEPARIN SODIUM 5000 UNITS: 5000 INJECTION INTRAVENOUS; SUBCUTANEOUS at 20:55

## 2020-07-15 RX ADMIN — ALPRAZOLAM 1 MG: 0.5 TABLET ORAL at 17:57

## 2020-07-15 RX ADMIN — ALPRAZOLAM 1 MG: 0.5 TABLET ORAL at 08:34

## 2020-07-15 RX ADMIN — PREGABALIN 50 MG: 50 CAPSULE ORAL at 21:33

## 2020-07-15 RX ADMIN — ATORVASTATIN CALCIUM 80 MG: 40 TABLET, FILM COATED ORAL at 08:35

## 2020-07-15 RX ADMIN — PANTOPRAZOLE SODIUM 40 MG: 40 TABLET, DELAYED RELEASE ORAL at 06:36

## 2020-07-15 RX ADMIN — CLOPIDOGREL BISULFATE 75 MG: 75 TABLET ORAL at 08:35

## 2020-07-15 RX ADMIN — CEFTRIAXONE 1 G: 1 INJECTION, POWDER, FOR SOLUTION INTRAMUSCULAR; INTRAVENOUS at 03:49

## 2020-07-15 RX ADMIN — Medication 10 ML: at 06:36

## 2020-07-15 RX ADMIN — TAMSULOSIN HYDROCHLORIDE 0.4 MG: 0.4 CAPSULE ORAL at 17:41

## 2020-07-15 RX ADMIN — Medication 10 ML: at 22:31

## 2020-07-15 RX ADMIN — FUROSEMIDE 40 MG: 10 INJECTION, SOLUTION INTRAMUSCULAR; INTRAVENOUS at 08:36

## 2020-07-15 RX ADMIN — ASPIRIN 81 MG CHEWABLE TABLET 81 MG: 81 TABLET CHEWABLE at 08:34

## 2020-07-15 RX ADMIN — HEPARIN SODIUM 5000 UNITS: 5000 INJECTION INTRAVENOUS; SUBCUTANEOUS at 13:19

## 2020-07-15 RX ADMIN — PREGABALIN 50 MG: 50 CAPSULE ORAL at 17:41

## 2020-07-15 RX ADMIN — DOXYCYCLINE 100 MG: 100 INJECTION, POWDER, LYOPHILIZED, FOR SOLUTION INTRAVENOUS at 22:23

## 2020-07-15 RX ADMIN — AMIODARONE HYDROCHLORIDE 200 MG: 200 TABLET ORAL at 08:35

## 2020-07-15 RX ADMIN — LEVETIRACETAM 1000 MG: 500 TABLET ORAL at 20:55

## 2020-07-15 RX ADMIN — FINASTERIDE 5 MG: 5 TABLET, FILM COATED ORAL at 08:35

## 2020-07-15 RX ADMIN — POTASSIUM CHLORIDE 40 MEQ: 750 TABLET, FILM COATED, EXTENDED RELEASE ORAL at 13:19

## 2020-07-15 RX ADMIN — DOXYCYCLINE 100 MG: 100 INJECTION, POWDER, LYOPHILIZED, FOR SOLUTION INTRAVENOUS at 08:36

## 2020-07-15 RX ADMIN — VENLAFAXINE HYDROCHLORIDE 150 MG: 150 CAPSULE, EXTENDED RELEASE ORAL at 08:36

## 2020-07-15 RX ADMIN — FLUOXETINE 20 MG: 20 CAPSULE ORAL at 08:35

## 2020-07-15 RX ADMIN — OLANZAPINE 2.5 MG: 2.5 TABLET, FILM COATED ORAL at 21:32

## 2020-07-15 RX ADMIN — HEPARIN SODIUM 5000 UNITS: 5000 INJECTION INTRAVENOUS; SUBCUTANEOUS at 03:48

## 2020-07-15 RX ADMIN — VENLAFAXINE HYDROCHLORIDE 75 MG: 37.5 CAPSULE, EXTENDED RELEASE ORAL at 17:41

## 2020-07-15 RX ADMIN — POTASSIUM CHLORIDE 40 MEQ: 750 TABLET, FILM COATED, EXTENDED RELEASE ORAL at 08:35

## 2020-07-15 RX ADMIN — TAMSULOSIN HYDROCHLORIDE 0.4 MG: 0.4 CAPSULE ORAL at 06:36

## 2020-07-15 NOTE — PROGRESS NOTES
Hospitalist Progress Note  Redge Holter, MD  Answering service: [phone] from in house phone      Date of Service:  7/15/2020  NAME:  Alicia Mills. :  1955  MRN:  [id number]    Admission Summary:   65M hx of sCHF a/w sob  Interval history / Subjective:   Patient seen and examined at bedside, feels better, comfortable, off oxygen, sats low-mid90s. HR stable 80s. Responding to lasix iv. Assessment & Plan:     #. CAP: as seen on CXR. Empiric Abx course, COVID test -ve. #. Acute on Chronic systolic CHF: POA, NYHA 4 on admit- s/p AICD  #. Elevated D-dimer: V/Q scan pending, Duplex LEs pending, elevated Cr-   #. Acute hypoxic Respiratory failure: 2nd to above. Resolved. Off oxygen now. - Supplemental O2 PRN, PRN nebs, iv Diuretics, monitor and replace lytes. Trops -ve. - Cardiology following, strict I&Os, daily weight. No plans for ischemic work up this admit or echo. #. CAD: stable, home regimen, Cardio following. monitor  #. HypoKalemia: Acute, replace, monitor  #. Seizure disorder: stable, home regimen. #. ROSELIA: CPAP at night  #. Ambulatory dysfunction: recent falls, CT head: NAD, PT/OT eval.  #. Dep: chronic, stable, home regimen  #. HLD: chronic, Stable, Home regimen    Code status: Full  DVT prophylaxis: Heparin   Care Plan discussed with: Patient/Family and Nurse  Disposition: TBD     Hospital Problems  Date Reviewed: 2020          Codes Class Noted POA    * (Principal) Bacterial pneumonia ICD-10-CM: J15.9  ICD-9-CM: 482.9  2020 Yes            Review of Systems:   Pertinent items are mentioned in interval history. Vital Signs:    Last 24hrs VS reviewed since prior progress note.  Most recent are:  Visit Vitals  /74   Pulse 82   Temp 98.5 °F (36.9 °C)   Resp 17   Wt 95.1 kg (209 lb 11.2 oz)   SpO2 90%   BMI 30.09 kg/m²         Intake/Output Summary (Last 24 hours) at 7/15/2020 1111  Last data filed at 7/15/2020 0403  Gross per 24 hour   Intake 820 ml   Output 1725 ml   Net -905 ml        Physical Examination:   General:  Alert, oriented, No acute distress, obese WM  Card:  S1, S2 without murmurs, good peripheral perfusion  Resp:  No accessory muscle use, no wheezes, mild crepitations  Abd:  Soft, non-tender, non-distended  Extremities:  No cyanosis or clubbing, no significant edema  Neuro:  Grossly normal, no focal neuro deficits, follows commands   Psych:  Good insight, AAOx3, not agitated. Data Review:    Review and/or order of clinical lab test  Review and/or order of tests in the radiology section of CPT  Review and/or order of tests in the medicine section of [facility]  Labs:     Recent Labs     07/15/20  0400 07/14/20  0415   WBC 7.9 11.1   HGB 11.1* 11.7*   HCT 36.8 38.3    213     Recent Labs     07/15/20  0400 07/14/20  0415 07/13/20  2358   * 135* 134*   K 3.1* 3.1* 3.3*    104 101   CO2 23 23 25   BUN 23* 20 20   CREA 1.48* 1.41* 1.61*   * 141* 183*   CA 7.7* 7.8* 7.8*   MG  --  1.4*  --    PHOS  --  3.1  --      Recent Labs     07/15/20  0400 07/14/20  0415 07/13/20  2358   ALT 15 18 20   AP 83 90 100   TBILI 0.7 1.1* 1.1*   TP 6.2* 6.6 7.3   ALB 2.4* 2.6* 2.9*   GLOB 3.8 4.0 4.4*     No results for input(s): INR, PTP, APTT, INREXT in the last 72 hours. No results for input(s): FE, TIBC, PSAT, FERR in the last 72 hours. Lab Results   Component Value Date/Time    Folate 11.2 05/13/2020 12:27 AM      No results for input(s): PH, PCO2, PO2 in the last 72 hours.   Recent Labs     07/14/20  1010 07/14/20 0415 07/13/20 2358   TROIQ <0.05 0.06* <0.05     Lab Results   Component Value Date/Time    Cholesterol, total 136 07/15/2020 04:00 AM    HDL Cholesterol 20 07/15/2020 04:00 AM    LDL, calculated 75.2 07/15/2020 04:00 AM    Triglyceride 204 (H) 07/15/2020 04:00 AM    CHOL/HDL Ratio 6.8 (H) 07/15/2020 04:00 AM     Lab Results   Component Value Date/Time    Glucose (POC) 125 (H) 07/15/2020 08:30 AM    Glucose (POC) 155 (H) 07/14/2020 09:23 PM    Glucose (POC) 140 (H) 07/14/2020 06:06 PM    Glucose (POC) 181 (H) 07/14/2020 11:59 AM    Glucose (POC) 127 (H) 07/14/2020 08:56 AM     Lab Results   Component Value Date/Time    Color YELLOW/STRAW 07/14/2020 06:20 AM    Appearance CLEAR 07/14/2020 06:20 AM    Specific gravity 1.025 07/14/2020 06:20 AM    Specific gravity 1.020 04/05/2019 07:25 PM    pH (UA) 6.0 07/14/2020 06:20 AM    Protein >300 (A) 07/14/2020 06:20 AM    Glucose Negative 07/14/2020 06:20 AM    Ketone Negative 07/14/2020 06:20 AM    Bilirubin Negative 07/14/2020 06:20 AM    Urobilinogen >8.0 (H) 07/14/2020 06:20 AM    Nitrites Negative 07/14/2020 06:20 AM    Leukocyte Esterase Negative 07/14/2020 06:20 AM    Epithelial cells FEW 07/14/2020 06:20 AM    Bacteria Negative 07/14/2020 06:20 AM    WBC 0-4 07/14/2020 06:20 AM    RBC 0-5 07/14/2020 06:20 AM     Medications Reviewed:     Current Facility-Administered Medications   Medication Dose Route Frequency    ALPRAZolam (XANAX) tablet 1 mg  1 mg Oral BID    amiodarone (CORDARONE) tablet 200 mg  200 mg Oral DAILY    aspirin chewable tablet 81 mg  81 mg Oral DAILY    atorvastatin (LIPITOR) tablet 80 mg  80 mg Oral DAILY    calcium carbonate (TUMS) chewable tablet 400 mg [elemental]  400 mg Oral TID PRN    clopidogreL (PLAVIX) tablet 75 mg  75 mg Oral DAILY    finasteride (PROSCAR) tablet 5 mg  5 mg Oral 7am    FLUoxetine (PROzac) capsule 20 mg  20 mg Oral DAILY    lactulose (CHRONULAC) 10 gram/15 mL solution 45 mL  30 g Oral TID    levETIRAcetam (KEPPRA) tablet 1,000 mg  1,000 mg Oral Q12H    nitroglycerin (NITROSTAT) tablet 0.4 mg  0.4 mg SubLINGual Q5MIN PRN    pantoprazole (PROTONIX) tablet 40 mg  40 mg Oral ACB&D    pregabalin (LYRICA) capsule 50 mg  50 mg Oral TID    tamsulosin (FLOMAX) capsule 0.4 mg  0.4 mg Oral ACB&D    sodium chloride (NS) flush 5-40 mL  5-40 mL IntraVENous Q8H    sodium chloride (NS) flush 5-40 mL  5-40 mL IntraVENous PRN    acetaminophen (TYLENOL) tablet 650 mg  650 mg Oral Q4H PRN    ondansetron (ZOFRAN) injection 4 mg  4 mg IntraVENous Q4H PRN    heparin (porcine) injection 5,000 Units  5,000 Units SubCUTAneous Q8H    cefTRIAXone (ROCEPHIN) 1 g in 0.9% sodium chloride (MBP/ADV) 50 mL  1 g IntraVENous Q24H    insulin lispro (HUMALOG) injection   SubCUTAneous AC&HS    glucose chewable tablet 16 g  4 Tab Oral PRN    dextrose (D50W) injection syrg 12.5-25 g  12.5-25 g IntraVENous PRN    glucagon (GLUCAGEN) injection 1 mg  1 mg IntraMUSCular PRN    furosemide (LASIX) injection 40 mg  40 mg IntraVENous DAILY    doxycycline (VIBRAMYCIN) 100 mg in 0.9% sodium chloride (MBP/ADV) 100 mL  100 mg IntraVENous Q12H    potassium chloride SR (KLOR-CON 10) tablet 40 mEq  40 mEq Oral DAILY    metoprolol succinate (TOPROL-XL) XL tablet 12.5 mg  12.5 mg Oral DAILY    OLANZapine (ZyPREXA) tablet 2.5 mg  2.5 mg Oral QHS    venlafaxine-SR (EFFEXOR-XR) capsule 150 mg  150 mg Oral DAILY    venlafaxine-SR (EFFEXOR-XR) capsule 75 mg  75 mg Oral QPM   ______________________________________________________________________  EXPECTED LENGTH OF STAY: 4d 2h  ACTUAL LENGTH OF STAY:          1               Amelie Berger MD

## 2020-07-15 NOTE — PROGRESS NOTES
Verbal shift change report given to [address] (oncoming nurse) by Lupillo Elder RN(offgoing nurse). Report included the following information SBAR, Kardex, Intake/Output, MAR and Recent Results.

## 2020-07-15 NOTE — PROGRESS NOTES
Cardiology Progress Note  7/15/2020     Admit Date: 2020  Admit Diagnosis: Bacterial pneumonia [J15.9]  CC: none currently    Assessment:   Principal Problem:    Bacterial pneumonia (2020)      Plan:     Cont current cardiac meds  Cont diuretic  BMP, BNP in AM    Subjective:      Carmen Postal. cont to feel better. Objective:    Physical Exam:  Overall VSSAF;    Visit Vitals  /81 (BP 1 Location: Left arm, BP Patient Position: At rest)   Pulse 84   Temp 97.7 °F (36.5 °C)   Resp 13   Wt 95.1 kg (209 lb 11.2 oz)   SpO2 97%   BMI 30.09 kg/m²     Temp (24hrs), Av °F (36.7 °C), Min:97.7 °F (36.5 °C), Max:98.5 °F (36.9 °C)    Patient Vitals for the past 8 hrs:   Pulse   07/15/20 1137 84   07/15/20 0834 82   07/15/20 0643 81    Patient Vitals for the past 8 hrs:   Resp   07/15/20 1137 13   07/15/20 0643 17    Patient Vitals for the past 8 hrs:   BP   07/15/20 1137 158/81   07/15/20 0834 131/74   07/15/20 0643 127/73      07/ 1901 - 07/15 0700  In: 1210 [P.O.:960; I.V.:250]  Out: 1725 [Urine:1725]      General Appearance: Well developed, well nourished, no acute distress. Ears/Nose/Mouth/Throat:   Normal MM; anicteric. JVP: WNL   Resp:   Lungs clear to auscultation bilaterally. Nl resp effort. Cardiovascular:  RRR, S1, S2 normal, no new murmur. No gallop or rub. Abdomen:   Soft, non-tender, bowel sounds are present. Extremities: No edema bilaterally. Skin:  Neuro: Warm and dry.   A/O x3, grossly nonfocal                         Data Review:     Telemetry independently reviewed :   normal sinus rhythm         Labs:   Recent Results (from the past 24 hour(s))   GLUCOSE, POC    Collection Time: 20  6:06 PM   Result Value Ref Range    Glucose (POC) 140 (H) 65 - 100 mg/dL    Performed by Sukhjinder Campo    GLUCOSE, POC    Collection Time: 20  9:23 PM   Result Value Ref Range    Glucose (POC) 155 (H) 65 - 100 mg/dL    Performed by Misti Godwin RN    METABOLIC PANEL, COMPREHENSIVE    Collection Time: 07/15/20  4:00 AM   Result Value Ref Range    Sodium 134 (L) 136 - 145 mmol/L    Potassium 3.1 (L) 3.5 - 5.1 mmol/L    Chloride 103 97 - 108 mmol/L    CO2 23 21 - 32 mmol/L    Anion gap 8 5 - 15 mmol/L    Glucose 154 (H) 65 - 100 mg/dL    BUN 23 (H) 6 - 20 MG/DL    Creatinine 1.48 (H) 0.70 - 1.30 MG/DL    BUN/Creatinine ratio 16 12 - 20      GFR est AA 58 (L) >60 ml/min/1.73m2    GFR est non-AA 48 (L) >60 ml/min/1.73m2    Calcium 7.7 (L) 8.5 - 10.1 MG/DL    Bilirubin, total 0.7 0.2 - 1.0 MG/DL    ALT (SGPT) 15 12 - 78 U/L    AST (SGOT) 10 (L) 15 - 37 U/L    Alk. phosphatase 83 45 - 117 U/L    Protein, total 6.2 (L) 6.4 - 8.2 g/dL    Albumin 2.4 (L) 3.5 - 5.0 g/dL    Globulin 3.8 2.0 - 4.0 g/dL    A-G Ratio 0.6 (L) 1.1 - 2.2     LIPID PANEL    Collection Time: 07/15/20  4:00 AM   Result Value Ref Range    LIPID PROFILE          Cholesterol, total 136 <200 MG/DL    Triglyceride 204 (H) <150 MG/DL    HDL Cholesterol 20 MG/DL    LDL, calculated 75.2 0 - 100 MG/DL    VLDL, calculated 40.8 MG/DL    CHOL/HDL Ratio 6.8 (H) 0.0 - 5.0     CBC WITH AUTOMATED DIFF    Collection Time: 07/15/20  4:00 AM   Result Value Ref Range    WBC 7.9 4.1 - 11.1 K/uL    RBC 4.95 4.10 - 5.70 M/uL    HGB 11.1 (L) 12.1 - 17.0 g/dL    HCT 36.8 36.6 - 50.3 %    MCV 74.3 (L) 80.0 - 99.0 FL    MCH 22.4 (L) 26.0 - 34.0 PG    MCHC 30.2 30.0 - 36.5 g/dL    RDW 18.1 (H) 11.5 - 14.5 %    PLATELET 584 647 - 136 K/uL    MPV 10.6 8.9 - 12.9 FL    NRBC 0.0 0  WBC    ABSOLUTE NRBC 0.00 0.00 - 0.01 K/uL    NEUTROPHILS 75 32 - 75 %    LYMPHOCYTES 16 12 - 49 %    MONOCYTES 5 5 - 13 %    EOSINOPHILS 4 0 - 7 %    BASOPHILS 0 0 - 1 %    IMMATURE GRANULOCYTES 0 0.0 - 0.5 %    ABS. NEUTROPHILS 5.9 1.8 - 8.0 K/UL    ABS. LYMPHOCYTES 1.2 0.8 - 3.5 K/UL    ABS. MONOCYTES 0.4 0.0 - 1.0 K/UL    ABS. EOSINOPHILS 0.3 0.0 - 0.4 K/UL    ABS. BASOPHILS 0.0 0.0 - 0.1 K/UL    ABS. IMM.  GRANS. 0.0 0.00 - 0.04 K/UL    DF AUTOMATED GLUCOSE, POC    Collection Time: 07/15/20  8:30 AM   Result Value Ref Range    Glucose (POC) 125 (H) 65 - 100 mg/dL    Performed by Farhat Sahni    GLUCOSE, POC    Collection Time: 07/15/20 11:48 AM   Result Value Ref Range    Glucose (POC) 155 (H) 65 - 100 mg/dL    Performed by Geo HANSEN       Current medications reviewed       Kennedi Mcfarlane MD

## 2020-07-15 NOTE — PROGRESS NOTES
Problem: Heart Failure: Day 2  Goal: Medications  Outcome: Progressing Towards Goal  Goal: Respiratory  Outcome: Progressing Towards Goal   Pt tolerating RA this shift. Pt states he has some SOB with exertion, but oxygen saturations remained >90. Pt getting diuresed with IV lasix, on 1500 mL FR.     1850: TRANSFER - OUT REPORT:    Verbal report given to [facility], RN(name) on Lizzy Osorio.  being transferred to (unit) for routine progression of care       Report consisted of patients Situation, Background, Assessment and   Recommendations(SBAR). Information from the following report(s) SBAR, ED Summary, Intake/Output, MAR, Recent Results, Med Rec Status and Cardiac Rhythm Paced was reviewed with the receiving nurse. Lines:   Peripheral IV 07/14/20 Right Antecubital (Active)   Site Assessment Clean, dry, & intact 07/15/20 1504   Phlebitis Assessment 0 07/15/20 1504   Infiltration Assessment 0 07/15/20 1504   Dressing Status Clean, dry, & intact 07/15/20 1504   Dressing Type Transparent;Tape 07/15/20 1504   Hub Color/Line Status Pink;Capped 07/15/20 1504   Action Taken Open ports on tubing capped 07/15/20 1504   Alcohol Cap Used Yes 07/15/20 1504        Opportunity for questions and clarification was provided.       Patient transported with:  Bed  Pt chart  Pt belongings

## 2020-07-16 ENCOUNTER — APPOINTMENT (OUTPATIENT)
Dept: VASCULAR SURGERY | Age: 65
DRG: 291 | End: 2020-07-16
Attending: INTERNAL MEDICINE
Payer: MEDICARE

## 2020-07-16 LAB
ANION GAP SERPL CALC-SCNC: 9 MMOL/L (ref 5–15)
BNP SERPL-MCNC: 1182 PG/ML
BUN SERPL-MCNC: 25 MG/DL (ref 6–20)
BUN/CREAT SERPL: 15 (ref 12–20)
CALCIUM SERPL-MCNC: 8.1 MG/DL (ref 8.5–10.1)
CHLORIDE SERPL-SCNC: 106 MMOL/L (ref 97–108)
CO2 SERPL-SCNC: 23 MMOL/L (ref 21–32)
CREAT SERPL-MCNC: 1.72 MG/DL (ref 0.7–1.3)
GLUCOSE BLD STRIP.AUTO-MCNC: 139 MG/DL (ref 65–100)
GLUCOSE BLD STRIP.AUTO-MCNC: 162 MG/DL (ref 65–100)
GLUCOSE BLD STRIP.AUTO-MCNC: 183 MG/DL (ref 65–100)
GLUCOSE BLD STRIP.AUTO-MCNC: 189 MG/DL (ref 65–100)
GLUCOSE SERPL-MCNC: 150 MG/DL (ref 65–100)
POTASSIUM SERPL-SCNC: 4 MMOL/L (ref 3.5–5.1)
SERVICE CMNT-IMP: ABNORMAL
SODIUM SERPL-SCNC: 138 MMOL/L (ref 136–145)

## 2020-07-16 PROCEDURE — 74011250637 HC RX REV CODE- 250/637: Performed by: INTERNAL MEDICINE

## 2020-07-16 PROCEDURE — 36415 COLL VENOUS BLD VENIPUNCTURE: CPT

## 2020-07-16 PROCEDURE — 80048 BASIC METABOLIC PNL TOTAL CA: CPT

## 2020-07-16 PROCEDURE — 65660000000 HC RM CCU STEPDOWN

## 2020-07-16 PROCEDURE — 97165 OT EVAL LOW COMPLEX 30 MIN: CPT

## 2020-07-16 PROCEDURE — 93970 EXTREMITY STUDY: CPT

## 2020-07-16 PROCEDURE — 82962 GLUCOSE BLOOD TEST: CPT

## 2020-07-16 PROCEDURE — 74011000258 HC RX REV CODE- 258: Performed by: INTERNAL MEDICINE

## 2020-07-16 PROCEDURE — 97116 GAIT TRAINING THERAPY: CPT

## 2020-07-16 PROCEDURE — 74011250636 HC RX REV CODE- 250/636: Performed by: INTERNAL MEDICINE

## 2020-07-16 PROCEDURE — 74011636637 HC RX REV CODE- 636/637: Performed by: INTERNAL MEDICINE

## 2020-07-16 PROCEDURE — 74011250637 HC RX REV CODE- 250/637: Performed by: NURSE PRACTITIONER

## 2020-07-16 PROCEDURE — 97535 SELF CARE MNGMENT TRAINING: CPT

## 2020-07-16 PROCEDURE — 83880 ASSAY OF NATRIURETIC PEPTIDE: CPT

## 2020-07-16 PROCEDURE — 97161 PT EVAL LOW COMPLEX 20 MIN: CPT

## 2020-07-16 RX ADMIN — PANTOPRAZOLE SODIUM 40 MG: 40 TABLET, DELAYED RELEASE ORAL at 16:33

## 2020-07-16 RX ADMIN — VENLAFAXINE HYDROCHLORIDE 150 MG: 150 CAPSULE, EXTENDED RELEASE ORAL at 09:33

## 2020-07-16 RX ADMIN — METOPROLOL SUCCINATE 12.5 MG: 25 TABLET, EXTENDED RELEASE ORAL at 09:34

## 2020-07-16 RX ADMIN — DOXYCYCLINE 100 MG: 100 INJECTION, POWDER, LYOPHILIZED, FOR SOLUTION INTRAVENOUS at 09:32

## 2020-07-16 RX ADMIN — AMIODARONE HYDROCHLORIDE 200 MG: 200 TABLET ORAL at 09:33

## 2020-07-16 RX ADMIN — CEFTRIAXONE 1 G: 1 INJECTION, POWDER, FOR SOLUTION INTRAMUSCULAR; INTRAVENOUS at 04:03

## 2020-07-16 RX ADMIN — MELATONIN 3 MG: at 21:52

## 2020-07-16 RX ADMIN — DOXYCYCLINE 100 MG: 100 INJECTION, POWDER, LYOPHILIZED, FOR SOLUTION INTRAVENOUS at 20:01

## 2020-07-16 RX ADMIN — VENLAFAXINE HYDROCHLORIDE 75 MG: 37.5 CAPSULE, EXTENDED RELEASE ORAL at 18:07

## 2020-07-16 RX ADMIN — POTASSIUM CHLORIDE 40 MEQ: 750 TABLET, FILM COATED, EXTENDED RELEASE ORAL at 09:34

## 2020-07-16 RX ADMIN — TAMSULOSIN HYDROCHLORIDE 0.4 MG: 0.4 CAPSULE ORAL at 16:33

## 2020-07-16 RX ADMIN — HEPARIN SODIUM 5000 UNITS: 5000 INJECTION INTRAVENOUS; SUBCUTANEOUS at 12:53

## 2020-07-16 RX ADMIN — ATORVASTATIN CALCIUM 80 MG: 40 TABLET, FILM COATED ORAL at 09:34

## 2020-07-16 RX ADMIN — CLOPIDOGREL BISULFATE 75 MG: 75 TABLET ORAL at 09:34

## 2020-07-16 RX ADMIN — TAMSULOSIN HYDROCHLORIDE 0.4 MG: 0.4 CAPSULE ORAL at 07:36

## 2020-07-16 RX ADMIN — ALPRAZOLAM 1 MG: 0.5 TABLET ORAL at 09:33

## 2020-07-16 RX ADMIN — PANTOPRAZOLE SODIUM 40 MG: 40 TABLET, DELAYED RELEASE ORAL at 07:36

## 2020-07-16 RX ADMIN — ALPRAZOLAM 1 MG: 0.5 TABLET ORAL at 18:07

## 2020-07-16 RX ADMIN — Medication 10 ML: at 20:00

## 2020-07-16 RX ADMIN — LEVETIRACETAM 1000 MG: 500 TABLET ORAL at 20:00

## 2020-07-16 RX ADMIN — PREGABALIN 50 MG: 50 CAPSULE ORAL at 09:34

## 2020-07-16 RX ADMIN — HEPARIN SODIUM 5000 UNITS: 5000 INJECTION INTRAVENOUS; SUBCUTANEOUS at 19:59

## 2020-07-16 RX ADMIN — PREGABALIN 50 MG: 50 CAPSULE ORAL at 16:33

## 2020-07-16 RX ADMIN — LEVETIRACETAM 1000 MG: 500 TABLET ORAL at 09:34

## 2020-07-16 RX ADMIN — Medication 10 ML: at 16:34

## 2020-07-16 RX ADMIN — ASPIRIN 81 MG CHEWABLE TABLET 81 MG: 81 TABLET CHEWABLE at 09:33

## 2020-07-16 RX ADMIN — FLUOXETINE 20 MG: 20 CAPSULE ORAL at 09:34

## 2020-07-16 RX ADMIN — OLANZAPINE 2.5 MG: 2.5 TABLET, FILM COATED ORAL at 21:51

## 2020-07-16 RX ADMIN — FINASTERIDE 5 MG: 5 TABLET, FILM COATED ORAL at 07:36

## 2020-07-16 RX ADMIN — INSULIN LISPRO 2 UNITS: 100 INJECTION, SOLUTION INTRAVENOUS; SUBCUTANEOUS at 12:53

## 2020-07-16 RX ADMIN — PREGABALIN 50 MG: 50 CAPSULE ORAL at 21:52

## 2020-07-16 RX ADMIN — Medication 10 ML: at 04:04

## 2020-07-16 RX ADMIN — HEPARIN SODIUM 5000 UNITS: 5000 INJECTION INTRAVENOUS; SUBCUTANEOUS at 04:03
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
## 2020-07-16 NOTE — PROGRESS NOTES
Pt just left the unit for a test. PT will defer, follow, and see as able and appropriate for eval. Ceasar Salgado, PT
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
## 2020-07-16 NOTE — PROGRESS NOTES
Problem: Breathing Pattern - Ineffective  Goal: *Absence of hypoxia  Outcome: Progressing Towards Goal     Problem: Falls - Risk of  Goal: *Absence of Falls  Description: Document Ami Fall Risk and appropriate interventions in the flowsheet. Outcome: Progressing Towards Goal  Note: Fall Risk Interventions:  Mobility Interventions: Communicate number of staff needed for ambulation/transfer, PT Consult for mobility concerns, Patient to call before getting OOB    Mentation Interventions: Adequate sleep, hydration, pain control, More frequent rounding    Medication Interventions: Evaluate medications/consider consulting pharmacy, Patient to call before getting OOB, Teach patient to arise slowly    Elimination Interventions: Call light in reach, Toilet paper/wipes in reach, Toileting schedule/hourly rounds    History of Falls Interventions: Evaluate medications/consider consulting pharmacy, Investigate reason for fall         Problem: Pressure Injury - Risk of  Goal: *Prevention of pressure injury  Description: Document Iván Scale and appropriate interventions in the flowsheet. Outcome: Progressing Towards Goal  Note: Pressure Injury Interventions:  Sensory Interventions: Assess changes in LOC, Assess need for specialty bed, Monitor skin under medical devices, Maintain/enhance activity level, Keep linens dry and wrinkle-free    Moisture Interventions: Absorbent underpads, Apply protective barrier, creams and emollients, Internal/External urinary devices, Limit adult briefs, Minimize layers    Activity Interventions: Assess need for specialty bed, Increase time out of bed, PT/OT evaluation, Pressure redistribution bed/mattress(bed type)    Mobility Interventions: Assess need for specialty bed, HOB 30 degrees or less, Pressure redistribution bed/mattress (bed type), PT/OT evaluation, Turn and reposition approx.  every two hours(pillow and wedges)    Nutrition Interventions: Document food/fluid/supplement intake, Discuss nutritional consult with provider    Friction and Shear Interventions: Apply protective barrier, creams and emollients, HOB 30 degrees or less, Lift sheet, Minimize layers

## 2020-07-16 NOTE — PROGRESS NOTES
Cardiology Progress Note  2020     Admit Date: 2020  Admit Diagnosis: Bacterial pneumonia [J15.9]  CC: none currently    Assessment:   Principal Problem:    Bacterial pneumonia (2020)      Plan:     Agree with holding diuretic, plan to restart home PO dose when Cr improves  BNP improved significantly  Cont other cardiac meds    Subjective:      Fadia Bazan has no c/o. Objective:    Physical Exam:  Overall VSSAF;    Visit Vitals  /68 (BP 1 Location: Right arm, BP Patient Position: Sitting;Post activity)   Pulse 84   Temp 98.2 °F (36.8 °C)   Resp 18   Wt 93.5 kg (206 lb 2.1 oz)   SpO2 97%   BMI 29.58 kg/m²     Temp (24hrs), Av.8 °F (36.6 °C), Min:97.5 °F (36.4 °C), Max:98.2 °F (36.8 °C)    Patient Vitals for the past 8 hrs:   Pulse   20 1357 84   20 1353 86   20 1350 81   20 1347 80   20 1219 80   20 1008 94   20 0947 88   20 0943 84   20 0837 87    Patient Vitals for the past 8 hrs:   Resp   20 1219 18   20 0943 18   20 0837 18    Patient Vitals for the past 8 hrs:   BP   20 1357 114/68   20 1353 99/62   20 1350 114/79   20 1347 127/75   20 1219 142/84   20 1008 147/82   20 0947 133/79   20 0943 144/78   20 0837 139/79       190 -  0700  In: 1170 [P.O.:1070; I.V.:100]  Out: 2525 [Urine:2525]      General Appearance: Well developed, well nourished, no acute distress. Ears/Nose/Mouth/Throat:   Normal MM; anicteric. JVP: WNL   Resp:   Lungs clear to auscultation bilaterally. Nl resp effort. Cardiovascular:  RRR, S1, S2 normal, no new murmur. No gallop or rub. Abdomen:   Soft, non-tender, bowel sounds are present. Extremities: No edema bilaterally. Skin:  Neuro: Warm and dry.   A/O x3, grossly nonfocal                         Data Review:     Telemetry independently reviewed :   normal sinus rhythm         Labs:   Recent Results (from the past 24 hour(s))   GLUCOSE, POC    Collection Time: 07/15/20  5:07 PM   Result Value Ref Range    Glucose (POC) 180 (H) 65 - 100 mg/dL    Performed by Leland Ann    GLUCOSE, POC    Collection Time: 07/15/20  9:35 PM   Result Value Ref Range    Glucose (POC) 199 (H) 65 - 100 mg/dL    Performed by Lo Abbasi    METABOLIC PANEL, BASIC    Collection Time: 07/16/20  4:17 AM   Result Value Ref Range    Sodium 138 136 - 145 mmol/L    Potassium 4.0 3.5 - 5.1 mmol/L    Chloride 106 97 - 108 mmol/L    CO2 23 21 - 32 mmol/L    Anion gap 9 5 - 15 mmol/L    Glucose 150 (H) 65 - 100 mg/dL    BUN 25 (H) 6 - 20 MG/DL    Creatinine 1.72 (H) 0.70 - 1.30 MG/DL    BUN/Creatinine ratio 15 12 - 20      GFR est AA 48 (L) >60 ml/min/1.73m2    GFR est non-AA 40 (L) >60 ml/min/1.73m2    Calcium 8.1 (L) 8.5 - 10.1 MG/DL   NT-PRO BNP    Collection Time: 07/16/20  4:17 AM   Result Value Ref Range    NT pro-BNP 1,182 (H) <125 PG/ML   GLUCOSE, POC    Collection Time: 07/16/20  7:53 AM   Result Value Ref Range    Glucose (POC) 183 (H) 65 - 100 mg/dL    Performed by Lora Chaudhary    GLUCOSE, POC    Collection Time: 07/16/20 11:22 AM   Result Value Ref Range    Glucose (POC) 189 (H) 65 - 100 mg/dL    Performed by Lora Chaudhary       Current medications reviewed       Oliverio Brandt MD

## 2020-07-16 NOTE — PROGRESS NOTES
Patient anxious to go home today, stated he was fine with staying another day to ensure everything was in place to go home and if the doctors wanted him to stay. No complaints of pain. Stated he felt dizzy when he stood up, just feels unsteady. Bed alarm placed on bed and education on calling out for assistance was given. Condom catheter was removed to encourge use of urinal and ambulation with assistance. Ambulated with assistance and a walker to the bathroom.

## 2020-07-16 NOTE — PROGRESS NOTES
[facility]  Hospitalist Group                                                                                          Hospitalist Progress Note  Ngoc Portillo NP  Answering service: [phone] OR [phone] from in house phone        Date of Service:  2020  NAME:  Fabiola Hernandez. :  1955  MRN:  [id number]      Admission Summary:   65M hx of sCHF a/w sob    Interval history / Subjective:    Seen and examined patient. States that he is feeling better and wants to go home today. On room air, has not been having any shortness of breath and feels that he is back at his baseline. No overnight events noted. No acute distress noted. Denies any syncope, dizziness, shortness of breath, chest pain or tightness, nausea, vomiting, or diarrhea       Assessment & Plan:     #. CAP:  - as seen on CXR. - Continue Rocephin and doxycycline   -  COVID test -ve. #. Acute on Chronic systolic CHF: POA  - NYHA 4 on admit  - s/p AICD  - Echo - EF 55-60%   - Stric I&O and daily weights. - Cardiology following     #. Elevated D-dimer:  - V/Q scan low probability for  Pulmonary emboli   -  Duplex LEs pending  - elevated Cr       #. Acute hypoxic Respiratory failure: 2nd to above. -  Resolved. Off oxygen now. - Supplemental O2 PRN,  -  PRN nebs,  -  IV Diuretics-  will hold Lasix today. Cr increasing.   -  monitor and replace lytes. - Trops -ve.       #. CAD:   - stable, home regimen  - s/p CABG x2  - Cardio following  - Monitor      #. HypoKalemia: Acute  - Resolved   - Replace potassium PRN   - Monitor labs       #. Seizure disorder:   - stable, home regimen. #. ROSELIA:   - CPAP at night      #. Ambulatory dysfunction:   - recent falls  - CT head: NAD  - PT/OT eval.      #. Dep: chronic, stable, home regimen  #.  HLD: chronic, Stable, Home regimen      Code status: Full   DVT prophylaxis:  Heparin     Care Plan discussed with: Patient/Family and Nurse  Anticipated Disposition: Home w/Family  Anticipated Discharge: 24 hours to 48 hours     Hospital Problems  Date Reviewed: 7/14/2020          Codes Class Noted POA    * (Principal) Bacterial pneumonia ICD-10-CM: J15.9  ICD-9-CM: 482.9  7/14/2020 Yes                Review of Systems:   A comprehensive review of systems was negative except for that written in the HPI. Vital Signs:    Last 24hrs VS reviewed since prior progress note. Most recent are:  Visit Vitals  /79 (BP 1 Location: Right arm, BP Patient Position: At rest)   Pulse 87   Temp 97.7 °F (36.5 °C)   Resp 18   Wt 93.5 kg (206 lb 2.1 oz)   SpO2 94%   BMI 29.58 kg/m²         Intake/Output Summary (Last 24 hours) at 7/16/2020 0913  Last data filed at 7/16/2020 2026  Gross per 24 hour   Intake 810 ml   Output 2200 ml   Net -1390 ml        Physical Examination:             Constitutional:  No acute distress, cooperative, pleasant, answers all questions appropriately    ENT:  Oral mucosa moist, oropharynx benign. Resp:  Clear to auscultation  No wheezing/rhonchi/rales. No accessory muscle use   CV:  Paced rhythm , normal rate, no murmurs, gallops, rubs    GI:  Soft, non distended, non tender. normoactive bowel sounds, no hepatosplenomegaly     Musculoskeletal:  No edema, warm, 2+ pulses throughout    Neurologic:  Moves all extremities. AAOx3, CN II-XII reviewed, follows commands appropriately      Psych:  Good insight, Not anxious nor agitated.        Data Review:    Review and/or order of clinical lab test  Review and/or order of tests in the radiology section of CPT  Review and/or order of tests in the medicine section of CPT      Labs:     Recent Labs     07/15/20  0400 07/14/20  0415   WBC 7.9 11.1   HGB 11.1* 11.7*   HCT 36.8 38.3    213     Recent Labs     07/16/20 0417 07/15/20  0400 07/14/20  0415    134* 135*   K 4.0 3.1* 3.1*    103 104   CO2 23 23 23   BUN 25* 23* 20   CREA 1.72* 1.48* 1.41*   * 154* 141*   CA 8.1* 7.7* 7.8*   MG  -- --  1.4*   PHOS  --   --  3.1     Recent Labs     07/15/20  0400 07/14/20  0415 07/13/20  2358   ALT 15 18 20   AP 83 90 100   TBILI 0.7 1.1* 1.1*   TP 6.2* 6.6 7.3   ALB 2.4* 2.6* 2.9*   GLOB 3.8 4.0 4.4*     No results for input(s): INR, PTP, APTT, INREXT in the last 72 hours. No results for input(s): FE, TIBC, PSAT, FERR in the last 72 hours. Lab Results   Component Value Date/Time    Folate 11.2 05/13/2020 12:27 AM      No results for input(s): PH, PCO2, PO2 in the last 72 hours.   Recent Labs     07/14/20  1010 07/14/20  0415 07/13/20  2358   TROIQ <0.05 0.06* <0.05     Lab Results   Component Value Date/Time    Cholesterol, total 136 07/15/2020 04:00 AM    HDL Cholesterol 20 07/15/2020 04:00 AM    LDL, calculated 75.2 07/15/2020 04:00 AM    Triglyceride 204 (H) 07/15/2020 04:00 AM    CHOL/HDL Ratio 6.8 (H) 07/15/2020 04:00 AM     Lab Results   Component Value Date/Time    Glucose (POC) 183 (H) 07/16/2020 07:53 AM    Glucose (POC) 199 (H) 07/15/2020 09:35 PM    Glucose (POC) 180 (H) 07/15/2020 05:07 PM    Glucose (POC) 155 (H) 07/15/2020 11:48 AM    Glucose (POC) 125 (H) 07/15/2020 08:30 AM     Lab Results   Component Value Date/Time    Color YELLOW/STRAW 07/14/2020 06:20 AM    Appearance CLEAR 07/14/2020 06:20 AM    Specific gravity 1.025 07/14/2020 06:20 AM    Specific gravity 1.020 04/05/2019 07:25 PM    pH (UA) 6.0 07/14/2020 06:20 AM    Protein >300 (A) 07/14/2020 06:20 AM    Glucose Negative 07/14/2020 06:20 AM    Ketone Negative 07/14/2020 06:20 AM    Bilirubin Negative 07/14/2020 06:20 AM    Urobilinogen >8.0 (H) 07/14/2020 06:20 AM    Nitrites Negative 07/14/2020 06:20 AM    Leukocyte Esterase Negative 07/14/2020 06:20 AM    Epithelial cells FEW 07/14/2020 06:20 AM    Bacteria Negative 07/14/2020 06:20 AM    WBC 0-4 07/14/2020 06:20 AM    RBC 0-5 07/14/2020 06:20 AM         Medications Reviewed:     Current Facility-Administered Medications   Medication Dose Route Frequency    melatonin tablet 3 mg 3 mg Oral QHS PRN    ALPRAZolam (XANAX) tablet 1 mg  1 mg Oral BID    amiodarone (CORDARONE) tablet 200 mg  200 mg Oral DAILY    aspirin chewable tablet 81 mg  81 mg Oral DAILY    atorvastatin (LIPITOR) tablet 80 mg  80 mg Oral DAILY    calcium carbonate (TUMS) chewable tablet 400 mg [elemental]  400 mg Oral TID PRN    clopidogreL (PLAVIX) tablet 75 mg  75 mg Oral DAILY    finasteride (PROSCAR) tablet 5 mg  5 mg Oral 7am    FLUoxetine (PROzac) capsule 20 mg  20 mg Oral DAILY    lactulose (CHRONULAC) 10 gram/15 mL solution 45 mL  30 g Oral TID    levETIRAcetam (KEPPRA) tablet 1,000 mg  1,000 mg Oral Q12H    nitroglycerin (NITROSTAT) tablet 0.4 mg  0.4 mg SubLINGual Q5MIN PRN    pantoprazole (PROTONIX) tablet 40 mg  40 mg Oral ACB&D    pregabalin (LYRICA) capsule 50 mg  50 mg Oral TID    tamsulosin (FLOMAX) capsule 0.4 mg  0.4 mg Oral ACB&D    sodium chloride (NS) flush 5-40 mL  5-40 mL IntraVENous Q8H    sodium chloride (NS) flush 5-40 mL  5-40 mL IntraVENous PRN    acetaminophen (TYLENOL) tablet 650 mg  650 mg Oral Q4H PRN    ondansetron (ZOFRAN) injection 4 mg  4 mg IntraVENous Q4H PRN    heparin (porcine) injection 5,000 Units  5,000 Units SubCUTAneous Q8H    cefTRIAXone (ROCEPHIN) 1 g in 0.9% sodium chloride (MBP/ADV) 50 mL  1 g IntraVENous Q24H    insulin lispro (HUMALOG) injection   SubCUTAneous AC&HS    glucose chewable tablet 16 g  4 Tab Oral PRN    dextrose (D50W) injection syrg 12.5-25 g  12.5-25 g IntraVENous PRN    glucagon (GLUCAGEN) injection 1 mg  1 mg IntraMUSCular PRN    [Held by provider] furosemide (LASIX) injection 40 mg  40 mg IntraVENous DAILY    doxycycline (VIBRAMYCIN) 100 mg in 0.9% sodium chloride (MBP/ADV) 100 mL  100 mg IntraVENous Q12H    potassium chloride SR (KLOR-CON 10) tablet 40 mEq  40 mEq Oral DAILY    metoprolol succinate (TOPROL-XL) XL tablet 12.5 mg  12.5 mg Oral DAILY    OLANZapine (ZyPREXA) tablet 2.5 mg  2.5 mg Oral QHS    venlafaxine-SR (EFFEXOR-XR) capsule 150 mg  150 mg Oral DAILY    venlafaxine-SR (EFFEXOR-XR) capsule 75 mg  75 mg Oral QPM     ______________________________________________________________________  EXPECTED LENGTH OF STAY: 4d 2h  ACTUAL LENGTH OF STAY:          2                 Lubna Perez, NP

## 2020-07-17 ENCOUNTER — HOME HEALTH ADMISSION (OUTPATIENT)
Dept: HOME HEALTH SERVICES | Facility: HOME HEALTH | Age: 65
End: 2020-07-17

## 2020-07-17 VITALS
TEMPERATURE: 97.4 F | BODY MASS INDEX: 29.61 KG/M2 | HEART RATE: 86 BPM | WEIGHT: 206.35 LBS | RESPIRATION RATE: 20 BRPM | SYSTOLIC BLOOD PRESSURE: 145 MMHG | OXYGEN SATURATION: 95 % | DIASTOLIC BLOOD PRESSURE: 88 MMHG

## 2020-07-17 LAB
ANION GAP SERPL CALC-SCNC: 8 MMOL/L (ref 5–15)
BUN SERPL-MCNC: 21 MG/DL (ref 6–20)
BUN/CREAT SERPL: 14 (ref 12–20)
CALCIUM SERPL-MCNC: 7.9 MG/DL (ref 8.5–10.1)
CHLORIDE SERPL-SCNC: 106 MMOL/L (ref 97–108)
CO2 SERPL-SCNC: 23 MMOL/L (ref 21–32)
CREAT SERPL-MCNC: 1.47 MG/DL (ref 0.7–1.3)
GLUCOSE BLD STRIP.AUTO-MCNC: 170 MG/DL (ref 65–100)
GLUCOSE BLD STRIP.AUTO-MCNC: 178 MG/DL (ref 65–100)
GLUCOSE SERPL-MCNC: 143 MG/DL (ref 65–100)
POTASSIUM SERPL-SCNC: 4 MMOL/L (ref 3.5–5.1)
SERVICE CMNT-IMP: ABNORMAL
SERVICE CMNT-IMP: ABNORMAL
SODIUM SERPL-SCNC: 137 MMOL/L (ref 136–145)

## 2020-07-17 PROCEDURE — 97530 THERAPEUTIC ACTIVITIES: CPT

## 2020-07-17 PROCEDURE — 74011250636 HC RX REV CODE- 250/636: Performed by: INTERNAL MEDICINE

## 2020-07-17 PROCEDURE — 94660 CPAP INITIATION&MGMT: CPT

## 2020-07-17 PROCEDURE — 74011000258 HC RX REV CODE- 258: Performed by: INTERNAL MEDICINE

## 2020-07-17 PROCEDURE — 80048 BASIC METABOLIC PNL TOTAL CA: CPT

## 2020-07-17 PROCEDURE — 74011250637 HC RX REV CODE- 250/637: Performed by: INTERNAL MEDICINE

## 2020-07-17 PROCEDURE — 36415 COLL VENOUS BLD VENIPUNCTURE: CPT

## 2020-07-17 PROCEDURE — 97116 GAIT TRAINING THERAPY: CPT

## 2020-07-17 PROCEDURE — 74011636637 HC RX REV CODE- 636/637: Performed by: INTERNAL MEDICINE

## 2020-07-17 PROCEDURE — 82962 GLUCOSE BLOOD TEST: CPT

## 2020-07-17 RX ORDER — CALCIUM CARBONATE 200(500)MG
400 TABLET,CHEWABLE ORAL
Qty: 30 TAB | Refills: 0 | Status: SHIPPED | OUTPATIENT
Start: 2020-07-17

## 2020-07-17 RX ORDER — MIDODRINE HYDROCHLORIDE 5 MG/1
5 TABLET ORAL 2 TIMES DAILY
Qty: 60 TAB | Refills: 0 | Status: SHIPPED | OUTPATIENT
Start: 2020-07-17 | End: 2020-08-11

## 2020-07-17 RX ORDER — AMOXICILLIN AND CLAVULANATE POTASSIUM 500; 125 MG/1; MG/1
1 TABLET, FILM COATED ORAL 3 TIMES DAILY
Qty: 6 TAB | Refills: 0 | Status: SHIPPED | OUTPATIENT
Start: 2020-07-17 | End: 2020-07-19

## 2020-07-17 RX ORDER — DOXYCYCLINE 100 MG/1
100 TABLET ORAL 2 TIMES DAILY
Qty: 8 TAB | Refills: 0 | Status: SHIPPED | OUTPATIENT
Start: 2020-07-17 | End: 2020-07-21

## 2020-07-17 RX ADMIN — CEFTRIAXONE 1 G: 1 INJECTION, POWDER, FOR SOLUTION INTRAMUSCULAR; INTRAVENOUS at 03:22

## 2020-07-17 RX ADMIN — PREGABALIN 50 MG: 50 CAPSULE ORAL at 08:36

## 2020-07-17 RX ADMIN — ALPRAZOLAM 1 MG: 0.5 TABLET ORAL at 08:36

## 2020-07-17 RX ADMIN — CLOPIDOGREL BISULFATE 75 MG: 75 TABLET ORAL at 08:35

## 2020-07-17 RX ADMIN — TAMSULOSIN HYDROCHLORIDE 0.4 MG: 0.4 CAPSULE ORAL at 06:50

## 2020-07-17 RX ADMIN — Medication 10 ML: at 03:23

## 2020-07-17 RX ADMIN — INSULIN LISPRO 2 UNITS: 100 INJECTION, SOLUTION INTRAVENOUS; SUBCUTANEOUS at 12:03

## 2020-07-17 RX ADMIN — INSULIN LISPRO 2 UNITS: 100 INJECTION, SOLUTION INTRAVENOUS; SUBCUTANEOUS at 08:36

## 2020-07-17 RX ADMIN — ASPIRIN 81 MG CHEWABLE TABLET 81 MG: 81 TABLET CHEWABLE at 08:36

## 2020-07-17 RX ADMIN — PANTOPRAZOLE SODIUM 40 MG: 40 TABLET, DELAYED RELEASE ORAL at 06:50

## 2020-07-17 RX ADMIN — METOPROLOL SUCCINATE 12.5 MG: 25 TABLET, EXTENDED RELEASE ORAL at 08:34

## 2020-07-17 RX ADMIN — DOXYCYCLINE 100 MG: 100 INJECTION, POWDER, LYOPHILIZED, FOR SOLUTION INTRAVENOUS at 10:31

## 2020-07-17 RX ADMIN — ATORVASTATIN CALCIUM 80 MG: 40 TABLET, FILM COATED ORAL at 08:34

## 2020-07-17 RX ADMIN — FINASTERIDE 5 MG: 5 TABLET, FILM COATED ORAL at 06:50

## 2020-07-17 RX ADMIN — AMIODARONE HYDROCHLORIDE 200 MG: 200 TABLET ORAL at 08:35

## 2020-07-17 RX ADMIN — FLUOXETINE 20 MG: 20 CAPSULE ORAL at 08:35

## 2020-07-17 RX ADMIN — LEVETIRACETAM 1000 MG: 500 TABLET ORAL at 08:34

## 2020-07-17 RX ADMIN — VENLAFAXINE HYDROCHLORIDE 150 MG: 150 CAPSULE, EXTENDED RELEASE ORAL at 08:35

## 2020-07-17 RX ADMIN — HEPARIN SODIUM 5000 UNITS: 5000 INJECTION INTRAVENOUS; SUBCUTANEOUS at 12:04

## 2020-07-17 RX ADMIN — POTASSIUM CHLORIDE 40 MEQ: 750 TABLET, FILM COATED, EXTENDED RELEASE ORAL at 08:36

## 2020-07-17 RX ADMIN — HEPARIN SODIUM 5000 UNITS: 5000 INJECTION INTRAVENOUS; SUBCUTANEOUS at 03:23

## 2020-07-17 NOTE — DISCHARGE SUMMARY
Discharge Summary       PATIENT ID: Jack Osgood. MRN: [id number]   YOB: 1955    DATE OF ADMISSION: 7/13/2020 11:40 PM    DATE OF DISCHARGE: 07/17/2020   PRIMARY CARE PROVIDER: Karina Moya MD     ATTENDING PHYSICIAN: Marilin Campbell   DISCHARGING PROVIDER: Carol Lam NP    To contact this individual call [phone] and ask the  to page. If unavailable ask to be transferred the Adult Hospitalist Department. CONSULTATIONS: IP CONSULT TO CARDIOLOGY    PROCEDURES/SURGERIES: * No surgery found *    ADMITTING DIAGNOSES & HOSPITAL COURSE:   This is a  77-year-old man with past medical history significant for chronic systolic congestive heart failure with ejection fraction of 16-20%, type 2 diabetes; seizure disorder; coronary artery disease, status post CABG and stent placement; obstructive sleep apnea; hypertension; chronic kidney disease; dyslipidemia; benign prostatic hyperplasia; ventricular tachycardia, status post AICD placement; and anxiety disorder, who was in his usual state of health until about 3 days ago when the patient developed shortness of breath. The shortness of breath was intermittent initially but in the last 24 hours, the shortness of breath has become constant and progressive, worse when the patient is lying down. The patient came to the emergency room because of worsening symptoms. Also the patient stated that he fell a few days ago while rushing to the bathroom at his house and sustained an injury to his right fourth and fifth finger. The patient did not seek medical treatment. The patient denies sick contacts or exposure to any person with COVID-19 virus infection. The patient denies associated chest pain. When the patient arrived at the emergency room, the chest x-ray shows evidence of pneumonia.   The patient was started on antibiotics and was placed on droplet precaution for suspected COVID-19 virus infection and was referred to the hospitalist service for evaluation for admission. He was last admitted to this hospital from 05/12/2020 to 05/24/2020. The patient was admitted with stroke symptoms and subsequent evaluation was negative for stroke. The patient was diagnosed with ocular migraine and was discharged home in a stable condition.           DISCHARGE DIAGNOSES / PLAN:      #. CAP:  - as seen on CXR.   -  COVID test -ve. - Rx Doxycycline 100mg x2 day and Augmentin 500/125mg for 2 days   - Follow up with PCP      #. Acute on Chronic systolic CHF: POA  - NYHA 4 on admit, NYHA class II on discharge  - s/p AICD  - Echo 05/20- EF 55-60%   - Follow up with cardiologist outpatient.      #. Elevated D-dimer:  - V/Q scan low probability for  Pulmonary emboli   -  Duplex LEs - Negative         #. Acute hypoxic Respiratory failure: 2nd to above. -  Resolved. Off oxygen now. - Resume home medications        #. CAD:   - stable, home regimen  - s/p CABG x2        #. HypoKalemia: Acute  - Resolved   - Potassium 4.0 this am   - Continue home potassium medication dose         #. Seizure disorder:   - stable, home regimen.        #. ROSELIA:   - CPAP at night        #. Ambulatory dysfunction:   - recent falls  - CT head: NAD        #. Dep: chronic, stable, home regimen  #. HLD: chronic, Stable, Home regimen       Spoke with Marilin Mendez ([phone]) discussed patient's condition and discharge medications and plan. She verbalized understanding. ADDITIONAL CARE RECOMMENDATIONS:   Take medication as prescribed   Restart taking Furosemide 20mg tomorrow, Saturday 07/18/20  Midodrine dose has been changed. Start taking 5mg by mouth before breakfast and lunch. Follow up with your primary care provider on Monday or Tuesday of this upcoming week.      PENDING TEST RESULTS:   At the time of discharge the following test results are still pending: None     FOLLOW UP APPOINTMENTS:    Follow-up Information     Follow up With Specialties Details Why Contact Ana Fung MD Family Practice Schedule an appointment as soon as possible for a visit Hospital follow up PCP appointment Tuesday, 7/21/20 @ 9:00 a.m. Please wear face covering. [address]      Angela Tyler MD Cardiology On 7/22/2020 at 11:30 am on 7/22/20 2020 59[facility] [address]  [phone]               DIET: Diabetic Diet    ACTIVITY: Activity as tolerated    WOUND CARE: None     EQUIPMENT needed: None       DISCHARGE MEDICATIONS:  Current Discharge Medication List      START taking these medications    Details   doxycycline (ADOXA) 100 mg tablet Take 1 Tab by mouth two (2) times a day for 4 days. Qty: 8 Tab, Refills: 0      amoxicillin-clavulanate (Augmentin) 500-125 mg per tablet Take 1 Tab by mouth three (3) times daily for 2 days. Qty: 6 Tab, Refills: 0         CONTINUE these medications which have CHANGED    Details   lactulose (CHRONULAC) 10 gram/15 mL solution Take 45 mL by mouth three (3) times daily. Qty: 1 Bottle, Refills: 0      calcium carbonate (TUMS) 200 mg calcium (500 mg) chew Take 2 Tabs by mouth three (3) times daily as needed for Pain (abdominal pain). Qty: 30 Tab, Refills: 0      midodrine (PROAMATINE) 5 mg tablet Take 1 Tab by mouth two (2) times a day. Qty: 60 Tab, Refills: 0         CONTINUE these medications which have NOT CHANGED    Details   OLANZapine (ZyPREXA) 2.5 mg tablet Take 2.5 mg by mouth nightly. furosemide (Lasix) 20 mg tablet Take 20 mg by mouth daily as needed. potassium chloride SR (KLOR-CON 10) 10 mEq tablet Take 20 mEq by mouth daily as needed. With lasix      !! venlafaxine-SR (Effexor XR) 75 mg capsule Take 75 mg by mouth every evening. Takes 150 mg in AM and 75 mg in evening      metoprolol succinate (TOPROL-XL) 25 mg XL tablet Take 12.5 mg by mouth daily.       albuterol (PROVENTIL VENTOLIN) 2.5 mg /3 mL (0.083 %) nebu 2.5 mg by Nebulization route every four (4) hours as needed for Wheezing. ALPRAZolam (XANAX) 1 mg tablet Take 1 mg by mouth two (2) times a day. glimepiride (AMARYL) 1 mg tablet Take 1 mg by mouth two (2) times a day. FLUoxetine (PROzac) 20 mg capsule Take 20 mg by mouth daily. naloxone (NARCAN) 4 mg/actuation nasal spray Use 1 spray intranasally, then discard. Repeat with new spray every 2 min as needed for opioid overdose symptoms, alternating nostrils. Qty: 1 Each, Refills: 0      !! venlafaxine-SR (EFFEXOR-XR) 75 mg capsule Take 150 mg by mouth daily. 2 capsules in the morning and 1 capsule in the evening. amiodarone (CORDARONE) 200 mg tablet Take 1 Tab by mouth daily. Qty: 30 Tab, Refills: 0      atorvastatin (LIPITOR) 80 mg tablet Take 80 mg by mouth daily. clonazePAM (KLONOPIN) 1 mg tablet Take 1 mg by mouth nightly as needed for Other (sleep). finasteride (PROSCAR) 5 mg tablet Take 5 mg by mouth every morning. levETIRAcetam 1,000 mg tablet Take 1 Tab by mouth every twelve (12) hours. Qty: 60 Tab, Refills: 1      clopidogrel (PLAVIX) 75 mg tab Take 1 Tab by mouth daily. Qty: 30 Tab, Refills: 2      nitroglycerin (NITROSTAT) 0.4 mg SL tablet 0.4 mg by SubLINGual route every five (5) minutes as needed for Chest Pain. May repeat every 5 minutes for a maximum of 3 doses if chest pain not relieved call MD      aspirin 81 mg chewable tablet Take 1 Tab by mouth daily. Qty: 30 Tab, Refills: 0      pantoprazole (PROTONIX) 40 mg tablet Take 1 Tab by mouth Before breakfast and dinner. Before Breakfast and in the afternoon (also takes Zantac at same time)  Qty: 60 Tab, Refills: 0      pregabalin (LYRICA) 50 mg capsule Take 1 Cap by mouth three (3) times daily. Max Daily Amount: 150 mg.  Qty: 90 Cap, Refills: 0      tamsulosin (FLOMAX) 0.4 mg capsule Take 1 Cap by mouth Before breakfast and dinner.  Before Breakfast and in the afternoon  Qty: 30 Cap, Refills: 0       !! - Potential duplicate medications found. Please discuss with provider. NOTIFY YOUR PHYSICIAN FOR ANY OF THE FOLLOWING:   Fever over 101 degrees for 24 hours. Chest pain, shortness of breath, fever, chills, nausea, vomiting, diarrhea, change in mentation, falling, weakness, bleeding. Severe pain or pain not relieved by medications. Or, any other signs or symptoms that you may have questions about. DISPOSITION:  X  Home With:   OT  PT  HH  RN       Long term SNF/Inpatient Rehab    Independent/assisted living    Hospice    Other:       PATIENT CONDITION AT DISCHARGE:     Functional status    Poor     Deconditioned    X Independent      Cognition     Lucid    X Forgetful     Dementia      Catheters/lines (plus indication)    [place]     PICC     PEG    X None      Code status    X Full code     DNR      PHYSICAL EXAMINATION AT DISCHARGE:  General:          Alert, cooperative, no distress, appears stated age. HEENT:           Atraumatic, anicteric sclerae, pink conjunctivae                          No oral ulcers, mucosa moist, throat clear, dentition fair  Neck:               Supple, symmetrical  Lungs:             Clear to auscultation bilaterally. No Wheezing or Rhonchi. No rales. Chest wall:      No tenderness  No Accessory muscle use. Heart:              Regular  rhythm,  No  murmur   No edema  Abdomen:        Soft, non-tender. Not distended. Bowel sounds normal  Extremities:     No cyanosis. No clubbing,                            Skin turgor normal, Capillary refill normal  Skin:                Not pale. Not Jaundiced  No rashes   Psych:             Not anxious or agitated.   Neurologic:      Alert, moves all extremities, answers questions appropriately and responds to commands       CHRONIC MEDICAL DIAGNOSES:  Problem List as of 7/17/2020 Date Reviewed: 7/14/2020          Codes Class Noted - Resolved    V tach ([facility] ICD-10-CM: I47.2  ICD-9-CM: 427.1  8/20/2019 - Present        * (Principal) Bacterial pneumonia ICD-10-CM: J15.9  ICD-9-CM: 482.9  7/14/2020 - Present        Acute CVA (cerebrovascular accident) [facility]) ICD-10-CM: I63.9  ICD-9-CM: 434.91  5/12/2020 - Present        VT (ventricular tachycardia) ([facility]) ICD-10-CM: I47.2  ICD-9-CM: 427.1  8/20/2019 - Present        CHF exacerbation ([facility].) ICD-10-CM: I50.9  ICD-9-CM: 428.0  6/13/2019 - Present        Fall ICD-10-CM: W19. Jeremy Clear  ICD-9-CM: E888.9  1/10/2019 - Present        TACOS (acute kidney injury) ([facility].) ICD-10-CM: N17.9  ICD-9-CM: 584.9  1/10/2019 - Present        Chest pain ICD-10-CM: R07.9  ICD-9-CM: 786.50  1/11/2018 - Present        Acute chest pain ICD-10-CM: R07.9  ICD-9-CM: 786.50  1/10/2018 - Present        Hepatic encephalopathy ([facility].) ICD-10-CM: K72.90  ICD-9-CM: 572.2  7/17/2017 - Present        Neuropathy ICD-10-CM: G62.9  ICD-9-CM: 355.9  4/14/2017 - Present        Cirrhosis ([facility] ICD-10-CM: K74.60  ICD-9-CM: 571.5  4/14/2017 - Present        CAD (coronary artery disease) ICD-10-CM: I25.10  ICD-9-CM: 414.00  4/14/2017 - Present        S/P coronary artery stent placement ICD-10-CM: Z95.5  ICD-9-CM: V45.82  4/14/2017 - Present        S/P CABG (coronary artery bypass graft) ICD-10-CM: Z95.1  ICD-9-CM: V45.81  4/14/2017 - Present    Overview Signed 4/14/2017 11:27 AM by Ade Kaba MD     2002 and 2013             Thrombocytopenia ([facility].) ICD-10-CM: D69.6  ICD-9-CM: 287.5  4/14/2017 - Present        MRSA infection ICD-10-CM: A49.02  ICD-9-CM: 041.12  4/14/2017 - Present    Overview Signed 4/14/2017 11:28 AM by Ade Kaba MD     2016             S/P cholecystectomy ICD-10-CM: Z90.49  ICD-9-CM: V45.79  4/14/2017 - Present        Metabolic encephalopathy NJW-34-HG: G93.41  ICD-9-CM: 348.31  12/19/2016 - Present        Seizure ([facility] ICD-10-CM: R56.9  ICD-9-CM: 780.39  11/21/2016 - Present        Sinusitis ICD-10-CM: J32.9  ICD-9-CM: 473.9  Unknown - Present        Joint pain ICD-10-CM: M25.50  ICD-9-CM: 719.40  Unknown - Present        Low back pain ICD-10-CM: M54.5  ICD-9-CM: 724.2  Unknown - Present        GERD (gastroesophageal reflux disease) ICD-10-CM: K21.9  ICD-9-CM: 530.81  Unknown - Present        Diabetes mellitus, type 2 ([facility] ICD-10-CM: E11.9  ICD-9-CM: 250.00  Unknown - Present        RESOLVED: Pneumonia ICD-10-CM: J18.9  ICD-9-CM: 290  3/3/2017 - 4/14/2017        RESOLVED: Fever ICD-10-CM: R50.9  ICD-9-CM: 780.60  3/2/2017 - 4/14/2017        RESOLVED: Abscess ICD-10-CM: L02.91  ICD-9-CM: 682.9  1/5/2017 - 4/14/2017        RESOLVED: Altered mental status ICD-10-CM: R41.82  ICD-9-CM: 780.97  12/19/2016 - 4/14/2017        RESOLVED: Debility ICD-10-CM: R53.81  ICD-9-CM: 799.3  11/22/2016 - 4/14/2017        RESOLVED: Cellulitis ICD-10-CM: L03.90  ICD-9-CM: 682.9  11/7/2016 - 4/14/2017        RESOLVED: Snoring ICD-10-CM: R06.83  ICD-9-CM: 786.09  Unknown - 4/14/2017        RESOLVED: Night sweats ICD-10-CM: R61  ICD-9-CM: 780.8  Unknown - 4/14/2017        RESOLVED: Chest pain ICD-10-CM: 786.5  ICD-9-CM: 786.5  Unknown - 4/14/2017        RESOLVED: Sore throat ICD-10-CM: J02.9  ICD-9-CM: 166  Unknown - 4/14/2017        RESOLVED: Dysphagia ICD-10-CM: 787.2  ICD-9-CM: 787.2  Unknown - 4/14/2017        RESOLVED: Tingling sensation ICD-10-CM: R20.2  ICD-9-CM: 782.0  Unknown - 4/14/2017        RESOLVED: Nausea ICD-10-CM: R11.0  ICD-9-CM: 787.02  Unknown - 4/14/2017        RESOLVED: Diarrhea ICD-10-CM: R19.7  ICD-9-CM: 787.91  Unknown - 4/14/2017        RESOLVED: Snoring ICD-10-CM: R06.83  ICD-9-CM: 786.09  Unknown - 4/14/2017        RESOLVED: Abdominal pain, epigastric ICD-10-CM: R10.13  ICD-9-CM: 789.06  9/13/2010 - 4/14/2017              Greater than 37 minutes were spent with the patient on counseling and coordination of care    Signed:   Skyler Cárdenas NP  7/17/2020  8:22 AM

## 2020-07-17 NOTE — PROGRESS NOTES
Problem: Mobility Impaired (Adult and Pediatric)  Goal: *Acute Goals and Plan of Care (Insert Text)  Description: FUNCTIONAL STATUS PRIOR TO ADMISSION: Patient was independent and active without use of DME, reporting that he used a cane prn. When asked he endorsed 5-6 falls in the last 12 months. HOME SUPPORT PRIOR TO ADMISSION: The patient lived with family but did not require assist.    Physical Therapy Goals  Initiated 7/16/2020  1. Patient will move from supine to sit and sit to supine , scoot up and down, and roll side to side in bed with independence within 7 day(s). 2.  Patient will transfer from bed to chair and chair to bed with independence using the least restrictive device within 7 day(s). 3.  Patient will perform sit to stand with independence within 7 day(s). 4.  Patient will ambulate with modified independence for 300 feet with the least restrictive device within 7 day(s). 5.  Patient will ascend/descend 3 stairs with one handrail(s) with modified independence within 7 day(s). 6.  Patient will participate in a six minute walk test within 48 hours prior to discharge per heart failure protocol. Outcome: Progressing Towards Goal   PHYSICAL THERAPY TREATMENT including a six minute walk test  Patient: Yanique Villegas (67 y.o. male)  Date: 7/17/2020  Diagnosis: Bacterial pneumonia [J15.9]   Bacterial pneumonia       Precautions: Fall, Seizure  Chart, physical therapy assessment, plan of care and goals were reviewed. ASSESSMENT  Patient continues with skilled PT services and is progressing towards goals. Today he participated in a six minute walk test, but did not complete, see subjective and chart below. He has a discharge order in his chart. Continue to recommend HHPT. .     Current Level of Function Impacting Discharge (mobility/balance): impaired standing balance, provided contact guard.     Other factors to consider for discharge: fall history, heart failure    For the weekend, recommend patient to complete as able in order to maintain strength, endurance and independence with nursing: OOB to chair 3x/day with assist X 1 and ambulating with assist X 1. PT to follow-up with patient after the weekend. PLAN :  Patient continues to benefit from skilled intervention to address the above impairments. Continue treatment per established plan of care. to address goals. Recommendation for discharge: (in order for the patient to meet his/her long term goals)  Physical therapy at least 2 days/week in the home     This discharge recommendation:  A follow-up discussion with the attending provider and/or case management is planned    IF patient discharges home will need the following DME: none       SUBJECTIVE:   Patient stated I'd like to sit down to rest, my legs are tired, during the six minute walk test    OBJECTIVE DATA SUMMARY:   Chart checked, pt cleared by nursing  Critical Behavior:  Neurologic State: Alert  Orientation Level: Oriented X4  Cognition: Follows commands  Safety/Judgement: Awareness of environment, Good awareness of safety precautions  Functional Mobility Training:  Bed Mobility:     Supine to Sit: Modified independent              Transfers:  Sit to Stand: Stand-by assistance  Stand to Sit: Stand-by assistance                             Balance:  Sitting: Intact; Without support  Standing: Impaired  Standing - Static: Constant support;Good  Standing - Dynamic : Constant support;Good  Ambulation/Gait Training:  Distance (ft): 318 Feet (ft)  Assistive Device: Gait belt;Walker, rolling  Ambulation - Level of Assistance: Contact guard assistance        Gait Abnormalities: Decreased step clearance        Base of Support: Narrowed     Speed/Usha: Fluctuations  Step Length: Left shortened;Right shortened         6 MWT results:on room air  Distance Walked in Feet (ft): 318 ft.(test ended at 4 minutes and 51 seconds, see subjective)    Pre Heart Rate: 87    Pre O2 Saturation: 95        Post Heart Rate: 94    Post O2 Saturation: 98    Assistive device used: Assistive Device: Gait belt;Walker, rolling        Normative data:   Men 39-80 years old = 1889 feet; Women 3680 years jbt=1097 feet  Modified 10 point G[place]na RPE scale utilized:  0 = no breathlessness at all ---> 10 = maximum exertion  Please refer to the flowsheet for any additional vital signs taken during this treatment. Stairs:      Pt declined        Therapeutic Exercises:     Pain Rating:  None rated    Activity Tolerance:   Good, observed SOB with activity, and 02 sats stable on room air  Please refer to the flowsheet for vital signs taken during this treatment. After treatment patient left in no apparent distress:   Sitting in chair and Call bell within reach    COMMUNICATION/COLLABORATION:   The patients plan of care was discussed with: Registered nurse.      Katharina Maki   Time Calculation: 24 mins

## 2020-07-17 NOTE — PROGRESS NOTES
Problem: Breathing Pattern - Ineffective  Goal: *Absence of hypoxia  Outcome: Progressing Towards Goal     Problem: Falls - Risk of  Goal: *Absence of Falls  Description: Document Ami Fall Risk and appropriate interventions in the flowsheet. Outcome: Progressing Towards Goal  Note: Fall Risk Interventions:  Mobility Interventions: Utilize walker, cane, or other assistive device, Communicate number of staff needed for ambulation/transfer, Patient to call before getting OOB, PT Consult for mobility concerns, PT Consult for assist device competence, OT consult for ADLs    Mentation Interventions: Adequate sleep, hydration, pain control    Medication Interventions: Patient to call before getting OOB, Teach patient to arise slowly, Bed/chair exit alarm    Elimination Interventions: Bed/chair exit alarm, Call light in reach, Elevated toilet seat, Patient to call for help with toileting needs, Stay With Me (per policy), Toilet paper/wipes in reach, Toileting schedule/hourly rounds, Urinal in reach    History of Falls Interventions: Room close to nurse's station, Investigate reason for fall, Door open when patient unattended, Bed/chair exit alarm         Problem: Pressure Injury - Risk of  Goal: *Prevention of pressure injury  Description: Document Iván Scale and appropriate interventions in the flowsheet.   Outcome: Progressing Towards Goal  Note: Pressure Injury Interventions:  Sensory Interventions: Assess changes in LOC, Assess need for specialty bed, Monitor skin under medical devices, Maintain/enhance activity level, Keep linens dry and wrinkle-free    Moisture Interventions: Check for incontinence Q2 hours and as needed, Absorbent underpads    Activity Interventions: Increase time out of bed    Mobility Interventions: Float heels, Chair cushion, PT/OT evaluation    Nutrition Interventions: Document food/fluid/supplement intake    Friction and Shear Interventions: Minimize layers, Lift sheet

## 2020-07-17 NOTE — PROGRESS NOTES
7/17/2020 -   TRICIA:  - RUR: 38%  - Disposition is for discharge to own home with [facility] of [facility] with [facility]; info is on AVS  - Family to provide transport  - Patient to follow up with PCP and Cardio    Medicare pt has received, reviewed, and signed 2nd IM letter informing them of their right to appeal the discharge. Signed copy has been placed on pt bedside chart.     CRM: Dwaine Garcia, MPH, [address] [place] [address]; Z: [phone]

## 2020-07-17 NOTE — PROGRESS NOTES
Reviewed discharge instructions with daughter and Monalisa Grijalva" opportunity for questions offered and answered.

## 2020-07-17 NOTE — DISCHARGE INSTRUCTIONS
Discharge Instructions       PATIENT ID: Ken Pulliam MRN: [id number]   YOB: 1955    DATE OF ADMISSION: 7/13/2020 11:40 PM    DATE OF DISCHARGE: 7/17/2020    PRIMARY CARE PROVIDER: Amy Diane MD     ATTENDING PHYSICIAN: Nish Cannon*  DISCHARGING PROVIDER: Yunior Owusu NP    To contact this individual call [phone] and ask the  to page. If unavailable ask to be transferred the Adult Hospitalist Department. DISCHARGE DIAGNOSES Bacterial Pneumonia     CONSULTATIONS: IP CONSULT TO CARDIOLOGY    PROCEDURES/SURGERIES: * No surgery found *    PENDING TEST RESULTS:   At the time of discharge the following test results are still pending: None     FOLLOW UP APPOINTMENTS:   Follow-up Information     Follow up With Specialties Details Why Contact Info    Amy Diane MD [facility] Schedule an appointment as soon as possible for a visit You should follow up with your PCP on Monday or Tuesday of upcoming week  [address] [place] [address]  [phone]             ADDITIONAL CARE RECOMMENDATIONS:   Take medications as prescribed. Restart taking Furosemide 20mg tomorrow, Saturday 07/18/20. Midodrine dose has been changed. Start taking 5mg by mouth before breakfast and lunch. Follow up with your primary care provider on Monday or Tuesday of this coming up week. DIET: Diabetic Diet      ACTIVITY: Activity as tolerated    WOUND CARE: None     EQUIPMENT needed: None       DISCHARGE MEDICATIONS:   See Medication Reconciliation Form    · It is important that you take the medication exactly as they are prescribed. · Keep your medication in the bottles provided by the pharmacist and keep a list of the medication names, dosages, and times to be taken in your wallet. · Do not take other medications without consulting your doctor. NOTIFY YOUR PHYSICIAN FOR ANY OF THE FOLLOWING:   Fever over 101 degrees for 24 hours. Chest pain, shortness of breath, fever, chills, nausea, vomiting, diarrhea, change in mentation, falling, weakness, bleeding. Severe pain or pain not relieved by medications. Or, any other signs or symptoms that you may have questions about.       DISPOSITION:   X Home With:   OT  PT  HH  RN       SNF/Inpatient Rehab/LTAC    Independent/assisted living    Hospice    Other:         Signed:   Carol Lam NP  7/17/2020  8:20 AM

## 2020-07-17 NOTE — PROGRESS NOTES
Hospital follow-up PCP transitional care appointment has been scheduled with Dr. Eric Casas for Tuesday, 7/21/20 at 9:00 a.m. Pending patient discharge.   Jennifer Calix, Care Management Specialist.

## 2020-07-17 NOTE — NURSE NAVIGATOR
Follow up scheduled with [facility] with Dr. Sharon Craig on 7/22/20 at 11:30 am.  Information on After Visit Summary.

## 2020-07-18 ENCOUNTER — HOME CARE VISIT (OUTPATIENT)
Dept: HOME HEALTH SERVICES | Facility: HOME HEALTH | Age: 65
End: 2020-07-18

## 2020-07-18 ENCOUNTER — PATIENT OUTREACH (OUTPATIENT)
Dept: CASE MANAGEMENT | Age: 65
End: 2020-07-18

## 2020-07-18 NOTE — PROGRESS NOTES
Patient contacted regarding recent discharge and COVID-19 risk. Discussed COVID-19 related testing which was available at this time. Test results were negative. Patient informed of results, if available? n/a    Care Transition Nurse/ Ambulatory Care Manager contacted the patient by telephone to perform post discharge assessment. Verified name and  with patient as identifiers. Patient has following risk factors of: CHF, diabetes. CTN/ACM reviewed discharge instructions, medical action plan and red flags related to discharge diagnosis. Reviewed and educated them on any new and changed medications related to discharge diagnosis. Advised obtaining a 90-day supply of all daily and as-needed medications. Education provided regarding infection prevention, and signs and symptoms of COVID-19 and when to seek medical attention with patient who verbalized understanding. Discussed exposure protocols and quarantine from [address] you at higher risk for severe illness  and given an opportunity for questions and concerns. The patient agrees to contact the COVID-19 hotline [phone] or PCP office for questions related to their healthcare. CTN/ACM provided contact information for future reference. From CDC: Are you at higher risk for severe illness?  Wash your hands often.  Avoid close contact (6 feet, which is about two arm lengths) with people who are sick.  Put distance between yourself and other people if COVID-19 is spreading in your community.  Clean and disinfect frequently touched surfaces.  Avoid all cruise travel and non-essential air travel.  Call your healthcare professional if you have concerns about COVID-19 and your underlying condition or if you are sick.     For more information on steps you can take to protect yourself, see CDC's How to Protect Yourself      Patient/family/caregiver given information for Jerilyn Motley and agrees to enroll yes  Patient's preferred e-mail: [email or web address]   Patient's preferred phone number: [phone]   Based on Loop alert triggers, patient will be contacted by nurse care manager for worsening symptoms. Pt will be further monitored by COVID Loop Team based on severity of symptoms and risk factors. Follow up:  1 Steve Parsons MD (Family Practice); Hospital follow up PCP appointment Tuesday, 7/21/20 @ 9:00 a.m.   Please wear face covering. 2 Follow up with Neill Pallas, MD (Cardiology) on 7/22/2020; at 11:30 am on 7/22/20   3 Follow up with [address] [place] [address] ([address] [employer] [facility]);  Home Health Agency that will see you at home

## 2020-07-19 LAB
BACTERIA SPEC CULT: NORMAL
SERVICE CMNT-IMP: NORMAL

## 2020-07-25 ENCOUNTER — HOSPITAL ENCOUNTER (EMERGENCY)
Dept: CT IMAGING | Age: 65
Discharge: HOME | End: 2020-07-25
Attending: PHYSICIAN ASSISTANT
Payer: MEDICARE

## 2020-07-25 ENCOUNTER — HOSPITAL ENCOUNTER (EMERGENCY)
Age: 65
Discharge: HOME | End: 2020-07-25
Attending: EMERGENCY MEDICINE | Admitting: EMERGENCY MEDICINE
Payer: MEDICARE

## 2020-07-25 VITALS
HEIGHT: 70 IN | HEART RATE: 74 BPM | WEIGHT: 206 LBS | BODY MASS INDEX: 29.49 KG/M2 | DIASTOLIC BLOOD PRESSURE: 82 MMHG | OXYGEN SATURATION: 98 % | RESPIRATION RATE: 18 BRPM | SYSTOLIC BLOOD PRESSURE: 142 MMHG | TEMPERATURE: 97.8 F

## 2020-07-25 DIAGNOSIS — S32.000A COMPRESSION FRACTURE OF LUMBAR VERTEBRA, CLOSED, INITIAL ENCOUNTER (HCC): ICD-10-CM

## 2020-07-25 DIAGNOSIS — R20.0 BILATERAL LEG NUMBNESS: Primary | ICD-10-CM

## 2020-07-25 DIAGNOSIS — R32 URINARY INCONTINENCE, UNSPECIFIED TYPE: ICD-10-CM

## 2020-07-25 LAB
ALBUMIN SERPL-MCNC: 3.1 G/DL (ref 3.5–5)
ALBUMIN/GLOB SERPL: 0.8 {RATIO} (ref 1.1–2.2)
ALP SERPL-CCNC: 106 U/L (ref 45–117)
ALT SERPL-CCNC: 24 U/L (ref 12–78)
ANION GAP SERPL CALC-SCNC: 6 MMOL/L (ref 5–15)
APPEARANCE UR: CLEAR
AST SERPL-CCNC: 13 U/L (ref 15–37)
BACTERIA URNS QL MICRO: NEGATIVE /HPF
BASOPHILS # BLD: 0 K/UL (ref 0–0.1)
BASOPHILS NFR BLD: 0 % (ref 0–1)
BILIRUB SERPL-MCNC: 0.7 MG/DL (ref 0.2–1)
BILIRUB UR QL: NEGATIVE
BUN SERPL-MCNC: 24 MG/DL (ref 6–20)
BUN/CREAT SERPL: 16 (ref 12–20)
CALCIUM SERPL-MCNC: 8.7 MG/DL (ref 8.5–10.1)
CHLORIDE SERPL-SCNC: 109 MMOL/L (ref 97–108)
CO2 SERPL-SCNC: 25 MMOL/L (ref 21–32)
COLOR UR: ABNORMAL
COMMENT, HOLDF: NORMAL
CREAT SERPL-MCNC: 1.51 MG/DL (ref 0.7–1.3)
DIFFERENTIAL METHOD BLD: ABNORMAL
EOSINOPHIL # BLD: 0.3 K/UL (ref 0–0.4)
EOSINOPHIL NFR BLD: 4 % (ref 0–7)
EPITH CASTS URNS QL MICRO: ABNORMAL /LPF
ERYTHROCYTE [DISTWIDTH] IN BLOOD BY AUTOMATED COUNT: 17.5 % (ref 11.5–14.5)
GLOBULIN SER CALC-MCNC: 3.9 G/DL (ref 2–4)
GLUCOSE SERPL-MCNC: 143 MG/DL (ref 65–100)
GLUCOSE UR STRIP.AUTO-MCNC: NEGATIVE MG/DL
HCT VFR BLD AUTO: 38.3 % (ref 36.6–50.3)
HGB BLD-MCNC: 11.2 G/DL (ref 12.1–17)
HGB UR QL STRIP: NEGATIVE
HYALINE CASTS URNS QL MICRO: ABNORMAL /LPF (ref 0–5)
IMM GRANULOCYTES # BLD AUTO: 0 K/UL (ref 0–0.04)
IMM GRANULOCYTES NFR BLD AUTO: 0 % (ref 0–0.5)
KETONES UR QL STRIP.AUTO: NEGATIVE MG/DL
LEUKOCYTE ESTERASE UR QL STRIP.AUTO: NEGATIVE
LYMPHOCYTES # BLD: 1.1 K/UL (ref 0.8–3.5)
LYMPHOCYTES NFR BLD: 17 % (ref 12–49)
MCH RBC QN AUTO: 22.2 PG (ref 26–34)
MCHC RBC AUTO-ENTMCNC: 29.2 G/DL (ref 30–36.5)
MCV RBC AUTO: 76 FL (ref 80–99)
MONOCYTES # BLD: 0.4 K/UL (ref 0–1)
MONOCYTES NFR BLD: 5 % (ref 5–13)
NEUTS SEG # BLD: 5.1 K/UL (ref 1.8–8)
NEUTS SEG NFR BLD: 74 % (ref 32–75)
NITRITE UR QL STRIP.AUTO: NEGATIVE
NRBC # BLD: 0 K/UL (ref 0–0.01)
NRBC BLD-RTO: 0 PER 100 WBC
PH UR STRIP: 5 [PH] (ref 5–8)
PLATELET # BLD AUTO: 251 K/UL (ref 150–400)
PMV BLD AUTO: 10.7 FL (ref 8.9–12.9)
POTASSIUM SERPL-SCNC: 4 MMOL/L (ref 3.5–5.1)
PROT SERPL-MCNC: 7 G/DL (ref 6.4–8.2)
PROT UR STRIP-MCNC: 100 MG/DL
RBC # BLD AUTO: 5.04 M/UL (ref 4.1–5.7)
RBC #/AREA URNS HPF: ABNORMAL /HPF (ref 0–5)
SAMPLES BEING HELD,HOLD: NORMAL
SODIUM SERPL-SCNC: 140 MMOL/L (ref 136–145)
SP GR UR REFRACTOMETRY: 1.01 (ref 1–1.03)
UR CULT HOLD, URHOLD: NORMAL
UROBILINOGEN UR QL STRIP.AUTO: 0.2 EU/DL (ref 0.2–1)
WBC # BLD AUTO: 6.8 K/UL (ref 4.1–11.1)
WBC URNS QL MICRO: ABNORMAL /HPF (ref 0–4)

## 2020-07-25 PROCEDURE — 36415 COLL VENOUS BLD VENIPUNCTURE: CPT

## 2020-07-25 PROCEDURE — 72131 CT LUMBAR SPINE W/O DYE: CPT

## 2020-07-25 PROCEDURE — 70450 CT HEAD/BRAIN W/O DYE: CPT

## 2020-07-25 PROCEDURE — 80053 COMPREHEN METABOLIC PANEL: CPT

## 2020-07-25 PROCEDURE — 72128 CT CHEST SPINE W/O DYE: CPT

## 2020-07-25 PROCEDURE — 99284 EMERGENCY DEPT VISIT MOD MDM: CPT

## 2020-07-25 PROCEDURE — 81001 URINALYSIS AUTO W/SCOPE: CPT

## 2020-07-25 PROCEDURE — 85025 COMPLETE CBC W/AUTO DIFF WBC: CPT

## 2020-07-26 NOTE — DISCHARGE INSTRUCTIONS
Patient Education        Numbness and Tingling: Care Instructions  Your Care Instructions     Many things can cause numbness or tingling. Swelling may put pressure on a nerve. This could cause you to lose feeling or have a pins-and-needles sensation on part of your body. Nerves may be damaged from trauma, toxins, or diseases, such as diabetes or multiple sclerosis (MS). Sometimes, though, the cause is not clear. If there is no clear reason for your symptoms, and you are not having any other symptoms, your doctor may suggest watching and waiting for a while to see if the numbness or tingling goes away on its own. Your doctor may want you to have blood or nerve tests to find the cause of your symptoms. Follow-up care is a key part of your treatment and safety. Be sure to make and go to all appointments, and call your doctor if you are having problems. It's also a good idea to know your test results and keep a list of the medicines you take. How can you care for yourself at home? · If your doctor prescribes medicine, take it exactly as directed. Call your doctor if you think you are having a problem with your medicine. · If you have any swelling, put ice or a cold pack on the area for 10 to 20 minutes at a time. Put a thin cloth between the ice and your skin. When should you call for help? [id number] anytime you think you may need emergency care. For example, call if:  · You have weakness, numbness, or tingling in both legs. · You lose bowel or bladder control. · You have symptoms of a stroke. These may include:  ? Sudden numbness, tingling, weakness, or loss of movement in your face, arm, or leg, especially on only one side of your body. ? Sudden vision changes. ? Sudden trouble speaking. ? Sudden confusion or trouble understanding simple statements. ? Sudden problems with walking or balance. ? A sudden, severe headache that is different from past headaches.   Watch closely for changes in your health, and be sure to contact your doctor if you have any problems, or if:  · You do not get better as expected. Where can you learn more? Go to http://www.[employer].com/  Enter U128 in the search box to learn more about \"Numbness and Tingling: Care Instructions. \"  Current as of: November 20, 2019               Content Version: 12.5  © 8771-9290 [employer]. Care instructions adapted under license by [employer] (which disclaims liability or warranty for this information). If you have questions about a medical condition or this instruction, always ask your healthcare professional. [place] [address] any warranty or liability for your use of this information.

## 2020-07-26 NOTE — ED NOTES
Pt urinated 325 mL of dark yellow urine prior to going to CT. States he felt like he completely emptied out his bladder. Bladder scanner revealed approx 62 mLs of volume in bladder post void. PA notified.
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
## 2020-07-26 NOTE — ED NOTES
Discharge instructions given to pt by RN in teach back method and verbalizes understanding. Opportunity for questions provided.  Pt ambulatory out of unit, in no acute distress and taken home by female

## 2020-07-27 ENCOUNTER — PATIENT OUTREACH (OUTPATIENT)
Dept: CASE MANAGEMENT | Age: 65
End: 2020-07-27

## 2020-07-27 NOTE — PROGRESS NOTES
Message to Rosendo Queen: This patient is currently enrolled in the congestive heart failure bundle at [place] [address] start date: 7/17/2020  Bundle end date: 10/17/2020. No outreacg done by this AC.

## 2020-07-28 ENCOUNTER — PATIENT OUTREACH (OUTPATIENT)
Dept: CASE MANAGEMENT | Age: 65
End: 2020-07-28

## 2020-07-28 NOTE — PROGRESS NOTES
7/28/20 - Care transitions nurse  Attempt to reach pt - no answer. 7/29/20 - Call to and reached pt's daughters - Padilla Cardona and Indigo [facility] [facility] [place] ILDEFONSO Gray RN, [facility], [facility]  Care transitions nurse [phone]  [facility] Coordination Team    Mr. Asher Barfield was evaluated in the ER at [facility] on 7/25 for weakness in his lower legs with history of neuropathy - He was evaluated - MRI was not performed d/t non compatible Pacer. He was evaluated and advised to follow up with pcp and with neurology/ spine physician. Reached pt's daughter - Daniela Mares - who said Ms. Asher Barfield is doing better - He had follow up with Dr. Vijay Jiang last Friday - post discharge - and she called Dr. Vijay Jiang after this ER visit - Lyrica was increased from  mg - for evaluation. Top Discharge Challenges to be reviewed by the provider   Additional needs identified to be addressed with provider no    Discussed COVID-19 related testing which was available at this time. Test results were negative. Patient informed of results, if available? yes   Method of communication with provider : phone call    The specimen is NEGATIVE for SARS-CoV2, the novel coronavirus associated with COVID-19. A negative result does not rule out COVID-19. Advance Care Planning:   Does patient have an Advance Directive:  not on file; education provided   Current Designated Health Care Decision Maker:   Primary Decision Maker: Dong Torstenjayne - Daughter - [phone]    Secondary Decision Maker: Berry Lipscomb - Daughter - [phone]    Inpatient Readmission Risk score: 21  Was this a readmission?  no   Patient stated reason for the admission: weakness and tingling in legs - worsened  Patients top risk factors for readmission: functional physical ability, lack of knowledge about disease and medical condition  Interventions to address risk factors: Review of ER visit and follow up with daughter, [facility] Transition Nurse (CTN) contacted the family by telephone to perform post hospital discharge assessment. Verified name and  with family as identifiers. Provided introduction to self, and explanation of the CTN role. CTN reviewed discharge instructions, medical action plan and red flags with family who verbalized understanding. Family given an opportunity to ask questions and does not have any further questions or concerns at this time. The family agrees to contact the PCP office for questions related to their healthcare. Medication reconciliation was performed with family, who verbalizes understanding of administration of home medications. Advised obtaining a 90-day supply of all daily and as-needed medications. Referral to Pharm D needed: no     Home Health/Outpatient orders at discharge: [address]: n/a  Date of initial visit: none    Durable Medical Equipment ordered at discharge: none  [address]: [address] received: none    Covid Risk Education    Patient has following risk factors of: heart failure, pneumonia, acute respiratory failure and immunocompromised. Education provided regarding infection prevention, and signs and symptoms of COVID-19 and when to seek medical attention with family who verbalized understanding. Discussed exposure protocols and quarantine From CDC: Are you at higher risk for severe illness?  and given an opportunity for questions and concerns. The family agrees to contact the COVID-19 hotline [phone] or PCP office for questions related to COVID-19. For more information on steps you can take to protect yourself, see CDC's How to Protect Yourself     Patient/family/caregiver given information for Jerilyn Motley and agrees to enroll yes  Patient's preferred e-mail: [email or web address]  Patient's preferred phone number: [phone]    Discussed follow-up appointments.  If no appointment was previously scheduled, appointment scheduling offered: yes  [facility] follow up appointment(s): No future appointments. Non-[facility] follow up appointment(s): Pt saw Dr. Eduarda Dillon on 7/24; daughter call to provider MOnday 7/27. Pt saw  Cardiology last week, as well - per daughter. Plan for follow-up call in 7-10 days based on severity of symptoms and risk factors. CTN provided contact information for future needs. Goals      Returns to baseline activity level. 7/29/20  Pt with CHF/ and CKD - Pt on 1500 ml fluid allotment/ day -   Pt to do daily am weights - and record - call cardiology if weight gain - rapid gain. Per daughter - his feet and hands do not swell - he 'gets the fluid around his heart every so often. '. Request to daughter to check temperature daily - pt did not have temp through his pneumonia tx episode - per daughter. Monitor bp and pulse/ record - for CHF medication regulation. ttk       Understands red flags post discharge. 7/29/20    Please call cardiology with weight gain - rapid or consistent and not relenting. Call pcp if continued neuropathy issues - (Lyrica was increased by pcp )    Call pcp if fever / cough/ dyspnea - Covid monitoring. ttk          ________________________________________________________________________  IMAGING RESULTS:  Ct Head Wo Cont   Result Date: 7/25/2020  IMPRESSION: No acute findings.      Ct Spine Thorac Wo Cont   Result Date: 7/25/2020  IMPRESSION: No acute findings.      Ct Spine Lumb Wo Cont     Result Date: 7/25/2020  IMPRESSION: 1. Multilevel mild degenerative changes as described above. 2. Multiple mild compression deformities. DEXA scan recommended. 3. Renal cysts.

## 2020-08-05 ENCOUNTER — HOSPITAL ENCOUNTER (INPATIENT)
Age: 65
LOS: 5 days | Discharge: HOME | DRG: 293 | End: 2020-08-11
Attending: EMERGENCY MEDICINE | Admitting: INTERNAL MEDICINE
Payer: MEDICARE

## 2020-08-05 ENCOUNTER — APPOINTMENT (OUTPATIENT)
Dept: GENERAL RADIOLOGY | Age: 65
DRG: 293 | End: 2020-08-05
Attending: EMERGENCY MEDICINE
Payer: MEDICARE

## 2020-08-05 DIAGNOSIS — F41.9 ANXIETY: ICD-10-CM

## 2020-08-05 DIAGNOSIS — Z45.02 DEFIBRILLATOR DISCHARGE: ICD-10-CM

## 2020-08-05 DIAGNOSIS — I50.9 ACUTE ON CHRONIC CONGESTIVE HEART FAILURE, UNSPECIFIED HEART FAILURE TYPE (HCC): Primary | ICD-10-CM

## 2020-08-05 LAB
BASOPHILS # BLD: 0 K/UL (ref 0–0.1)
BASOPHILS NFR BLD: 0 % (ref 0–1)
COMMENT, HOLDF: NORMAL
DIFFERENTIAL METHOD BLD: ABNORMAL
EOSINOPHIL # BLD: 0.2 K/UL (ref 0–0.4)
EOSINOPHIL NFR BLD: 1 % (ref 0–7)
ERYTHROCYTE [DISTWIDTH] IN BLOOD BY AUTOMATED COUNT: 18.8 % (ref 11.5–14.5)
HCT VFR BLD AUTO: 36.1 % (ref 36.6–50.3)
HGB BLD-MCNC: 10.5 G/DL (ref 12.1–17)
IMM GRANULOCYTES # BLD AUTO: 0.2 K/UL (ref 0–0.04)
IMM GRANULOCYTES NFR BLD AUTO: 1 % (ref 0–0.5)
LYMPHOCYTES # BLD: 0.5 K/UL (ref 0.8–3.5)
LYMPHOCYTES NFR BLD: 3 % (ref 12–49)
MCH RBC QN AUTO: 21.9 PG (ref 26–34)
MCHC RBC AUTO-ENTMCNC: 29.1 G/DL (ref 30–36.5)
MCV RBC AUTO: 75.4 FL (ref 80–99)
MONOCYTES # BLD: 0.6 K/UL (ref 0–1)
MONOCYTES NFR BLD: 4 % (ref 5–13)
NEUTS SEG # BLD: 14.4 K/UL (ref 1.8–8)
NEUTS SEG NFR BLD: 91 % (ref 32–75)
NRBC # BLD: 0 K/UL (ref 0–0.01)
NRBC BLD-RTO: 0 PER 100 WBC
PLATELET # BLD AUTO: 170 K/UL (ref 150–400)
PLATELET COMMENTS,PCOM: ABNORMAL
RBC # BLD AUTO: 4.79 M/UL (ref 4.1–5.7)
RBC MORPH BLD: ABNORMAL
SAMPLES BEING HELD,HOLD: NORMAL
WBC # BLD AUTO: 15.9 K/UL (ref 4.1–11.1)

## 2020-08-05 PROCEDURE — 80053 COMPREHEN METABOLIC PANEL: CPT

## 2020-08-05 PROCEDURE — 93005 ELECTROCARDIOGRAM TRACING: CPT

## 2020-08-05 PROCEDURE — 71045 X-RAY EXAM CHEST 1 VIEW: CPT

## 2020-08-05 PROCEDURE — 83735 ASSAY OF MAGNESIUM: CPT

## 2020-08-05 PROCEDURE — 85025 COMPLETE CBC W/AUTO DIFF WBC: CPT

## 2020-08-05 PROCEDURE — 84484 ASSAY OF TROPONIN QUANT: CPT

## 2020-08-05 PROCEDURE — 99285 EMERGENCY DEPT VISIT HI MDM: CPT

## 2020-08-05 PROCEDURE — 83880 ASSAY OF NATRIURETIC PEPTIDE: CPT

## 2020-08-06 PROBLEM — I50.23 ACUTE ON CHRONIC SYSTOLIC CHF (CONGESTIVE HEART FAILURE) (HCC): Status: ACTIVE | Noted: 2020-08-06

## 2020-08-06 LAB
ALBUMIN SERPL-MCNC: 3.2 G/DL (ref 3.5–5)
ALBUMIN/GLOB SERPL: 1 {RATIO} (ref 1.1–2.2)
ALP SERPL-CCNC: 114 U/L (ref 45–117)
ALT SERPL-CCNC: 20 U/L (ref 12–78)
ANION GAP SERPL CALC-SCNC: 11 MMOL/L (ref 5–15)
APTT PPP: 32.6 SEC (ref 22.1–32)
AST SERPL-CCNC: 14 U/L (ref 15–37)
ATRIAL RATE: 109 BPM
BILIRUB SERPL-MCNC: 1.5 MG/DL (ref 0.2–1)
BNP SERPL-MCNC: 2744 PG/ML
BUN SERPL-MCNC: 18 MG/DL (ref 6–20)
BUN/CREAT SERPL: 11 (ref 12–20)
CALCIUM SERPL-MCNC: 8.1 MG/DL (ref 8.5–10.1)
CALCULATED P AXIS, ECG09: 39 DEGREES
CALCULATED R AXIS, ECG10: 104 DEGREES
CALCULATED T AXIS, ECG11: 84 DEGREES
CHLORIDE SERPL-SCNC: 101 MMOL/L (ref 97–108)
CO2 SERPL-SCNC: 23 MMOL/L (ref 21–32)
CREAT SERPL-MCNC: 1.64 MG/DL (ref 0.7–1.3)
D DIMER PPP FEU-MCNC: 1.34 MG/L FEU (ref 0–0.65)
DIAGNOSIS, 93000: NORMAL
EST. AVERAGE GLUCOSE BLD GHB EST-MCNC: 143 MG/DL
FERRITIN SERPL-MCNC: 34 NG/ML (ref 26–388)
FIBRINOGEN PPP-MCNC: 508 MG/DL (ref 200–475)
GLOBULIN SER CALC-MCNC: 3.3 G/DL (ref 2–4)
GLUCOSE BLD STRIP.AUTO-MCNC: 121 MG/DL (ref 65–100)
GLUCOSE BLD STRIP.AUTO-MCNC: 176 MG/DL (ref 65–100)
GLUCOSE BLD STRIP.AUTO-MCNC: 206 MG/DL (ref 65–100)
GLUCOSE BLD STRIP.AUTO-MCNC: 213 MG/DL (ref 65–100)
GLUCOSE SERPL-MCNC: 246 MG/DL (ref 65–100)
HBA1C MFR BLD: 6.6 % (ref 4–5.6)
HEALTH STATUS, XMCV2T: NORMAL
INR PPP: 1.1 (ref 0.9–1.1)
LACTATE SERPL-SCNC: 1.8 MMOL/L (ref 0.4–2)
LDH SERPL L TO P-CCNC: 179 U/L (ref 85–241)
MAGNESIUM SERPL-MCNC: 1.2 MG/DL (ref 1.6–2.4)
MAGNESIUM SERPL-MCNC: 2 MG/DL (ref 1.6–2.4)
P-R INTERVAL, ECG05: 168 MS
PHOSPHATE SERPL-MCNC: 3.4 MG/DL (ref 2.6–4.7)
POTASSIUM SERPL-SCNC: 4.1 MMOL/L (ref 3.5–5.1)
PROT SERPL-MCNC: 6.5 G/DL (ref 6.4–8.2)
PROTHROMBIN TIME: 11 SEC (ref 9–11.1)
Q-T INTERVAL, ECG07: 400 MS
QRS DURATION, ECG06: 178 MS
QTC CALCULATION (BEZET), ECG08: 538 MS
SARS-COV-2, COV2: NOT DETECTED
SERVICE CMNT-IMP: ABNORMAL
SODIUM SERPL-SCNC: 135 MMOL/L (ref 136–145)
SOURCE, COVRS: NORMAL
SPECIMEN SOURCE, FCOV2M: NORMAL
SPECIMEN TYPE, XMCV1T: NORMAL
THERAPEUTIC RANGE,PTTT: ABNORMAL SECS (ref 58–77)
TROPONIN I SERPL-MCNC: 0.24 NG/ML
TROPONIN I SERPL-MCNC: 0.31 NG/ML
TROPONIN I SERPL-MCNC: <0.05 NG/ML
TSH SERPL DL<=0.05 MIU/L-ACNC: 2.19 UIU/ML (ref 0.36–3.74)
VENTRICULAR RATE, ECG03: 109 BPM

## 2020-08-06 PROCEDURE — 84443 ASSAY THYROID STIM HORMONE: CPT

## 2020-08-06 PROCEDURE — 84484 ASSAY OF TROPONIN QUANT: CPT

## 2020-08-06 PROCEDURE — 87635 SARS-COV-2 COVID-19 AMP PRB: CPT

## 2020-08-06 PROCEDURE — 83615 LACTATE (LD) (LDH) ENZYME: CPT

## 2020-08-06 PROCEDURE — 82728 ASSAY OF FERRITIN: CPT

## 2020-08-06 PROCEDURE — 74011636637 HC RX REV CODE- 636/637: Performed by: INTERNAL MEDICINE

## 2020-08-06 PROCEDURE — 85610 PROTHROMBIN TIME: CPT

## 2020-08-06 PROCEDURE — 74011250636 HC RX REV CODE- 250/636: Performed by: INTERNAL MEDICINE

## 2020-08-06 PROCEDURE — 74011250636 HC RX REV CODE- 250/636: Performed by: EMERGENCY MEDICINE

## 2020-08-06 PROCEDURE — 85379 FIBRIN DEGRADATION QUANT: CPT

## 2020-08-06 PROCEDURE — 87040 BLOOD CULTURE FOR BACTERIA: CPT

## 2020-08-06 PROCEDURE — 96366 THER/PROPH/DIAG IV INF ADDON: CPT

## 2020-08-06 PROCEDURE — 94760 N-INVAS EAR/PLS OXIMETRY 1: CPT

## 2020-08-06 PROCEDURE — 94660 CPAP INITIATION&MGMT: CPT

## 2020-08-06 PROCEDURE — 96365 THER/PROPH/DIAG IV INF INIT: CPT

## 2020-08-06 PROCEDURE — 74011250637 HC RX REV CODE- 250/637: Performed by: INTERNAL MEDICINE

## 2020-08-06 PROCEDURE — 84100 ASSAY OF PHOSPHORUS: CPT

## 2020-08-06 PROCEDURE — 82962 GLUCOSE BLOOD TEST: CPT

## 2020-08-06 PROCEDURE — 83735 ASSAY OF MAGNESIUM: CPT

## 2020-08-06 PROCEDURE — 83036 HEMOGLOBIN GLYCOSYLATED A1C: CPT

## 2020-08-06 PROCEDURE — 85730 THROMBOPLASTIN TIME PARTIAL: CPT

## 2020-08-06 PROCEDURE — 65660000000 HC RM CCU STEPDOWN

## 2020-08-06 PROCEDURE — 77010033678 HC OXYGEN DAILY

## 2020-08-06 PROCEDURE — 83605 ASSAY OF LACTIC ACID: CPT

## 2020-08-06 PROCEDURE — 36415 COLL VENOUS BLD VENIPUNCTURE: CPT

## 2020-08-06 PROCEDURE — 85384 FIBRINOGEN ACTIVITY: CPT

## 2020-08-06 RX ORDER — ALPRAZOLAM 0.5 MG/1
1 TABLET ORAL EVERY 12 HOURS
Status: DISCONTINUED | OUTPATIENT
Start: 2020-08-06 | End: 2020-08-11 | Stop reason: HOSPADM

## 2020-08-06 RX ORDER — SODIUM CHLORIDE 0.9 % (FLUSH) 0.9 %
5-40 SYRINGE (ML) INJECTION AS NEEDED
Status: DISCONTINUED | OUTPATIENT
Start: 2020-08-06 | End: 2020-08-11 | Stop reason: HOSPADM

## 2020-08-06 RX ORDER — AMIODARONE HYDROCHLORIDE 200 MG/1
200 TABLET ORAL DAILY
Status: DISCONTINUED | OUTPATIENT
Start: 2020-08-06 | End: 2020-08-06

## 2020-08-06 RX ORDER — FUROSEMIDE 10 MG/ML
40 INJECTION INTRAMUSCULAR; INTRAVENOUS DAILY
Status: DISCONTINUED | OUTPATIENT
Start: 2020-08-06 | End: 2020-08-07

## 2020-08-06 RX ORDER — FLUOXETINE HYDROCHLORIDE 20 MG/1
20 CAPSULE ORAL DAILY
Status: DISCONTINUED | OUTPATIENT
Start: 2020-08-06 | End: 2020-08-11 | Stop reason: HOSPADM

## 2020-08-06 RX ORDER — ACETAMINOPHEN 325 MG/1
650 TABLET ORAL
Status: DISCONTINUED | OUTPATIENT
Start: 2020-08-06 | End: 2020-08-11 | Stop reason: HOSPADM

## 2020-08-06 RX ORDER — ACETAMINOPHEN 650 MG/1
650 SUPPOSITORY RECTAL
Status: DISCONTINUED | OUTPATIENT
Start: 2020-08-06 | End: 2020-08-11 | Stop reason: HOSPADM

## 2020-08-06 RX ORDER — PREGABALIN 50 MG/1
50 CAPSULE ORAL 3 TIMES DAILY
Status: DISCONTINUED | OUTPATIENT
Start: 2020-08-06 | End: 2020-08-11 | Stop reason: HOSPADM

## 2020-08-06 RX ORDER — MIDODRINE HYDROCHLORIDE 5 MG/1
5 TABLET ORAL 2 TIMES DAILY
Status: DISCONTINUED | OUTPATIENT
Start: 2020-08-06 | End: 2020-08-08

## 2020-08-06 RX ORDER — PROMETHAZINE HYDROCHLORIDE 25 MG/1
12.5 TABLET ORAL
Status: DISCONTINUED | OUTPATIENT
Start: 2020-08-06 | End: 2020-08-11 | Stop reason: HOSPADM

## 2020-08-06 RX ORDER — MAGNESIUM SULFATE 1 G/100ML
1 INJECTION INTRAVENOUS ONCE
Status: COMPLETED | OUTPATIENT
Start: 2020-08-06 | End: 2020-08-06

## 2020-08-06 RX ORDER — LEVETIRACETAM 500 MG/1
1000 TABLET ORAL EVERY 12 HOURS
Status: DISCONTINUED | OUTPATIENT
Start: 2020-08-06 | End: 2020-08-11 | Stop reason: HOSPADM

## 2020-08-06 RX ORDER — ATORVASTATIN CALCIUM 20 MG/1
80 TABLET, FILM COATED ORAL DAILY
Status: DISCONTINUED | OUTPATIENT
Start: 2020-08-06 | End: 2020-08-11 | Stop reason: HOSPADM

## 2020-08-06 RX ORDER — GUAIFENESIN 100 MG/5ML
81 LIQUID (ML) ORAL DAILY
Status: DISCONTINUED | OUTPATIENT
Start: 2020-08-06 | End: 2020-08-11 | Stop reason: HOSPADM

## 2020-08-06 RX ORDER — POTASSIUM CHLORIDE 750 MG/1
20 TABLET, FILM COATED, EXTENDED RELEASE ORAL
Status: DISCONTINUED | OUTPATIENT
Start: 2020-08-06 | End: 2020-08-11 | Stop reason: HOSPADM

## 2020-08-06 RX ORDER — SODIUM CHLORIDE 0.9 % (FLUSH) 0.9 %
5-40 SYRINGE (ML) INJECTION EVERY 8 HOURS
Status: DISCONTINUED | OUTPATIENT
Start: 2020-08-06 | End: 2020-08-11 | Stop reason: HOSPADM

## 2020-08-06 RX ORDER — DEXTROSE 50 % IN WATER (D50W) INTRAVENOUS SYRINGE
12.5-25 AS NEEDED
Status: DISCONTINUED | OUTPATIENT
Start: 2020-08-06 | End: 2020-08-06 | Stop reason: RX

## 2020-08-06 RX ORDER — FINASTERIDE 5 MG/1
5 TABLET, FILM COATED ORAL
Status: DISCONTINUED | OUTPATIENT
Start: 2020-08-06 | End: 2020-08-11 | Stop reason: HOSPADM

## 2020-08-06 RX ORDER — AMIODARONE HYDROCHLORIDE 200 MG/1
200 TABLET ORAL 2 TIMES DAILY
Status: DISCONTINUED | OUTPATIENT
Start: 2020-08-06 | End: 2020-08-11 | Stop reason: HOSPADM

## 2020-08-06 RX ORDER — PREGABALIN 100 MG/1
100 CAPSULE ORAL 3 TIMES DAILY
Status: ON HOLD | COMMUNITY
End: 2021-01-15 | Stop reason: SDUPTHER

## 2020-08-06 RX ORDER — ONDANSETRON 2 MG/ML
4 INJECTION INTRAMUSCULAR; INTRAVENOUS
Status: DISCONTINUED | OUTPATIENT
Start: 2020-08-06 | End: 2020-08-11 | Stop reason: HOSPADM

## 2020-08-06 RX ORDER — VENLAFAXINE HYDROCHLORIDE 37.5 MG/1
75 CAPSULE, EXTENDED RELEASE ORAL EVERY EVENING
Status: DISCONTINUED | OUTPATIENT
Start: 2020-08-06 | End: 2020-08-11 | Stop reason: HOSPADM

## 2020-08-06 RX ORDER — HEPARIN SODIUM 5000 [USP'U]/ML
5000 INJECTION, SOLUTION INTRAVENOUS; SUBCUTANEOUS EVERY 8 HOURS
Status: DISCONTINUED | OUTPATIENT
Start: 2020-08-06 | End: 2020-08-11 | Stop reason: HOSPADM

## 2020-08-06 RX ORDER — INSULIN LISPRO 100 [IU]/ML
INJECTION, SOLUTION INTRAVENOUS; SUBCUTANEOUS
Status: DISCONTINUED | OUTPATIENT
Start: 2020-08-06 | End: 2020-08-11 | Stop reason: HOSPADM

## 2020-08-06 RX ORDER — CLONAZEPAM 0.5 MG/1
1 TABLET ORAL
Status: DISCONTINUED | OUTPATIENT
Start: 2020-08-06 | End: 2020-08-08

## 2020-08-06 RX ORDER — VENLAFAXINE HYDROCHLORIDE 37.5 MG/1
150 CAPSULE, EXTENDED RELEASE ORAL DAILY
Status: DISCONTINUED | OUTPATIENT
Start: 2020-08-06 | End: 2020-08-09

## 2020-08-06 RX ORDER — NITROGLYCERIN 0.4 MG/1
0.4 TABLET SUBLINGUAL
Status: DISCONTINUED | OUTPATIENT
Start: 2020-08-06 | End: 2020-08-11 | Stop reason: HOSPADM

## 2020-08-06 RX ORDER — METOPROLOL SUCCINATE 25 MG/1
12.5 TABLET, EXTENDED RELEASE ORAL DAILY
Status: DISCONTINUED | OUTPATIENT
Start: 2020-08-06 | End: 2020-08-06

## 2020-08-06 RX ORDER — PANTOPRAZOLE SODIUM 40 MG/1
40 TABLET, DELAYED RELEASE ORAL
Status: DISCONTINUED | OUTPATIENT
Start: 2020-08-06 | End: 2020-08-11 | Stop reason: HOSPADM

## 2020-08-06 RX ORDER — CLOPIDOGREL BISULFATE 75 MG/1
75 TABLET ORAL DAILY
Status: DISCONTINUED | OUTPATIENT
Start: 2020-08-06 | End: 2020-08-11 | Stop reason: HOSPADM

## 2020-08-06 RX ORDER — METOPROLOL SUCCINATE 25 MG/1
25 TABLET, EXTENDED RELEASE ORAL DAILY
Status: DISCONTINUED | OUTPATIENT
Start: 2020-08-07 | End: 2020-08-07

## 2020-08-06 RX ORDER — POLYETHYLENE GLYCOL 3350 17 G/17G
17 POWDER, FOR SOLUTION ORAL DAILY PRN
Status: DISCONTINUED | OUTPATIENT
Start: 2020-08-06 | End: 2020-08-11 | Stop reason: HOSPADM

## 2020-08-06 RX ORDER — PANTOPRAZOLE SODIUM 40 MG/1
40 TABLET, DELAYED RELEASE ORAL 2 TIMES DAILY
COMMUNITY

## 2020-08-06 RX ORDER — MAGNESIUM SULFATE 100 %
4 CRYSTALS MISCELLANEOUS AS NEEDED
Status: DISCONTINUED | OUTPATIENT
Start: 2020-08-06 | End: 2020-08-11 | Stop reason: HOSPADM

## 2020-08-06 RX ORDER — OLANZAPINE 2.5 MG/1
2.5 TABLET ORAL
Status: DISCONTINUED | OUTPATIENT
Start: 2020-08-06 | End: 2020-08-09

## 2020-08-06 RX ADMIN — ALPRAZOLAM 1 MG: 0.5 TABLET ORAL at 10:01

## 2020-08-06 RX ADMIN — PREGABALIN 50 MG: 25 CAPSULE ORAL at 21:50

## 2020-08-06 RX ADMIN — LEVETIRACETAM 1000 MG: 500 TABLET ORAL at 10:01

## 2020-08-06 RX ADMIN — AMIODARONE HYDROCHLORIDE 200 MG: 200 TABLET ORAL at 10:02

## 2020-08-06 RX ADMIN — ASPIRIN 81 MG CHEWABLE TABLET 81 MG: 81 TABLET CHEWABLE at 10:02

## 2020-08-06 RX ADMIN — MAGNESIUM SULFATE HEPTAHYDRATE 1 G: 1 INJECTION, SOLUTION INTRAVENOUS at 05:06

## 2020-08-06 RX ADMIN — FLUOXETINE 20 MG: 20 CAPSULE ORAL at 10:02

## 2020-08-06 RX ADMIN — FINASTERIDE 5 MG: 5 TABLET, FILM COATED ORAL at 10:02

## 2020-08-06 RX ADMIN — INSULIN LISPRO 3 UNITS: 100 INJECTION, SOLUTION INTRAVENOUS; SUBCUTANEOUS at 19:25

## 2020-08-06 RX ADMIN — ATORVASTATIN CALCIUM 80 MG: 20 TABLET, FILM COATED ORAL at 10:02

## 2020-08-06 RX ADMIN — VENLAFAXINE HYDROCHLORIDE 75 MG: 37.5 CAPSULE, EXTENDED RELEASE ORAL at 19:15

## 2020-08-06 RX ADMIN — PREGABALIN 50 MG: 25 CAPSULE ORAL at 10:01

## 2020-08-06 RX ADMIN — MIDODRINE HYDROCHLORIDE 5 MG: 5 TABLET ORAL at 19:15

## 2020-08-06 RX ADMIN — CLOPIDOGREL BISULFATE 75 MG: 75 TABLET ORAL at 10:01

## 2020-08-06 RX ADMIN — PANTOPRAZOLE SODIUM 40 MG: 40 TABLET, DELAYED RELEASE ORAL at 10:01

## 2020-08-06 RX ADMIN — INSULIN LISPRO 2 UNITS: 100 INJECTION, SOLUTION INTRAVENOUS; SUBCUTANEOUS at 22:00

## 2020-08-06 RX ADMIN — METOPROLOL SUCCINATE 12.5 MG: 25 TABLET, EXTENDED RELEASE ORAL at 10:06

## 2020-08-06 RX ADMIN — ALPRAZOLAM 1 MG: 0.5 TABLET ORAL at 21:50

## 2020-08-06 RX ADMIN — PREGABALIN 50 MG: 25 CAPSULE ORAL at 17:04

## 2020-08-06 RX ADMIN — HEPARIN SODIUM 5000 UNITS: 5000 INJECTION INTRAVENOUS; SUBCUTANEOUS at 19:17

## 2020-08-06 RX ADMIN — LACTULOSE 30 ML: 20 SOLUTION ORAL at 19:19

## 2020-08-06 RX ADMIN — MAGNESIUM SULFATE HEPTAHYDRATE 1 G: 1 INJECTION, SOLUTION INTRAVENOUS at 01:35

## 2020-08-06 RX ADMIN — OLANZAPINE 2.5 MG: 2.5 TABLET, FILM COATED ORAL at 21:50

## 2020-08-06 RX ADMIN — LEVETIRACETAM 1000 MG: 500 TABLET ORAL at 21:50

## 2020-08-06 RX ADMIN — HEPARIN SODIUM 5000 UNITS: 5000 INJECTION INTRAVENOUS; SUBCUTANEOUS at 11:03

## 2020-08-06 RX ADMIN — PANTOPRAZOLE SODIUM 40 MG: 40 TABLET, DELAYED RELEASE ORAL at 17:04

## 2020-08-06 RX ADMIN — VENLAFAXINE HYDROCHLORIDE 150 MG: 150 CAPSULE, EXTENDED RELEASE ORAL at 10:03

## 2020-08-06 RX ADMIN — Medication 10 ML: at 21:51

## 2020-08-06 RX ADMIN — AMIODARONE HYDROCHLORIDE 200 MG: 200 TABLET ORAL at 19:15

## 2020-08-06 RX ADMIN — Medication 10 ML: at 17:05

## 2020-08-06 NOTE — NURSE NAVIGATOR
Chart reviewed by Heart Failure Nurse Navigator. Heart Failure database completed. EF:  55/60 (echo 5/13/20)    ACEi/ARB/ARNi: not currently indicated    BB: toprol xl 12.5 mg daily    Aldosterone Antagonist: not currently indicated    Obstructive Sleep Apnea Screening: has dx ROSELIA, on cpap   STOP-BANG score:   Referred to Sleep Medicine:     CRT: ICD. NYHA Functional Class documentation requested. Heart Failure Teach Back in Patient Education. Heart Failure Avoiding Triggers on Discharge Instructions. Cardiologist: Dr. Madison Brown ([facility])      Post discharge follow up phone call to be made within 48-72 hours of discharge. MEDICARE HF BUNDLE    READMISSION:    Prior admission 7/13 - 7/17/20; final primary diagnosis hypertensive heart disease with heart failure. During the admission, atient was treated for bilateral bacterial pneumonia (with IV antibiotics as inpatient and discharged with po antibiotics). Patient was diuresed with IV diuretics as inpatient. Patient was discharged with scheduled follow up appointments with PCP - Dr. Karmen Zhong on 7/21/20 and with cardiologist - Dr. Madison Brown on 7/22/20. Patient attended both of these appointments. According to CM note, patient was set up for [facility]; however, this HF NN is unable to find any [facility] encounters more recently than 5/25/20. HF NN will attempt to communicate with [facility] to determine if patient was seen. Patient received follow up phone calls from care transition nurses on 7/18;  and on 7/28 after ED visit for leg numbness. Patient was brought to ED via EMS 8/5/20 for complaints of shortness of breath, AICD shock x 1 en route. Patient found to be in atrial fibrillation with rapid ventricular rate. COVID-19 testing pending.
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
## 2020-08-06 NOTE — PROGRESS NOTES
TRANSFER - OUT REPORT:    Verbal report given to Shayla Enrique RN(name) [facility]  being transferred to [facility]) for routine progression of care       Report consisted of patients Situation, Background, Assessment and   Recommendations(SBAR). Information from the following report(s) SBAR, Kardex, ED Summary, Intake/Output, MAR and Cardiac Rhythm paced was reviewed with the receiving nurse. Lines:   Peripheral IV 08/05/20 Left Antecubital (Active)   Site Assessment Clean, dry, & intact 08/06/20 1000   Phlebitis Assessment 0 08/06/20 1000   Infiltration Assessment 0 08/06/20 1000   Dressing Status Clean, dry, & intact 08/06/20 1000   Dressing Type Transparent 08/06/20 1000   Hub Color/Line Status Pink 08/06/20 1000   Alcohol Cap Used Yes 08/06/20 1000       Peripheral IV 08/06/20 Right Forearm (Active)   Site Assessment Clean, dry, & intact 08/06/20 1000   Phlebitis Assessment 0 08/06/20 1000   Infiltration Assessment 0 08/06/20 1000   Dressing Status Clean, dry, & intact 08/06/20 1000   Dressing Type Transparent 08/06/20 1000   Hub Color/Line Status Pink 08/06/20 1000   Alcohol Cap Used Yes 08/06/20 1000       Peripheral IV Left Antecubital (Active)        Opportunity for questions and clarification was provided.       Patient transported with:   Monitor  O2 @ 2 liters  Tech

## 2020-08-06 NOTE — PROGRESS NOTES
TRICIA Plan:  · Anticipated discharge: TBD; Disposition pending medical progress and care recommendations. Reason for Admission: Acute on Chronic Systolic CHF                 RUR Score: 41%- HIGH        PCP: First and Last name: Braulio Puentes MD   Name of Practice:    Are you a current patient: Yes/No: Yes   Approximate date of last visit: 2 weeks ago   Can you do a virtual visit with your PCP:              Resources/supports as identified by patient/family: Pt's daughter, Johnathan Land, is pt's primary source of support. Top Challenges facing patient (as identified by patient/family and CM): Finances/Medication cost? Pt is insured with [employer] Medicare Part A & B and [employer]. Pt uses [facility] at [address]  (ph#: [phone]) and daughter reports no concerns with getting medications. Transportation? Daughter reports she will provide transportation home after discharge. Support system or lack thereof? See above. Living arrangements? Pt lives with daughters in a private residence; there are 4-5 steps to enter home. Pt's resides on the first floor of home. Self-care/ADLs/Cognition? Pt uses a BiPap, rollator, and cane at home for DME and daughter reports IADLs prior to admission. Current Advanced Directive/Advance Care Plan: Pt does not have an AMD on file at this time. Plan for utilizing home health: Pt has previously used [facility]. Daughter declined [facility] once home with intention of continuing with OP PT for pt. Daughter reports she was unable to arrange OP PT due to pt's current condition. Transition of Care Plan: CM contacted pt's daughter, Johnathan Land (ph#: [phone]), to discuss CM role and to assess pt needs. CM verified demographics including insurance and emergency contact information.   Daughter reports no concerns for discharge at this time. CM to follow and assist with disposition needs as they arise. Care Management Interventions  PCP Verified by CM: No  Palliative Care Criteria Met (RRAT>21 & CHF Dx)?: Yes  Mode of Transport at Discharge: Other (see comment)(Daughter, Mp)  Transition of Care Consult (CM Consult): Discharge Planning(Initial Assessment)  MyChart Signup: No(MyChart Active)  Discharge Durable Medical Equipment: No  Physical Therapy Consult: No  Occupational Therapy Consult: No  Speech Therapy Consult: No  Current Support Network:  Other  Confirm Follow Up Transport: Family  Discharge Location  Discharge Placement: Home(Disposition TBD/subject to change pending care recommendations)    Readmission Assessment  Number of days since last admission?: 8-30 days  Previous disposition: Home with Home Health  Who is being interviewed?: Caregiver  What was the patient's/caregiver's perception as to why they think they needed to return back to the hospital?: Other (Comment)(None Noted)  Did you visit your Primary Care Physician after you left the hospital, before you returned this time?: Yes  Did you see a specialist, such as Cardiac, Pulmonary, Orthopedic Physician, etc. after you left the hospital?: No  Who advised the patient to return to the hospital?: Self-referral  Does the patient report anything that got in the way of taking their medications?: No  In our efforts to provide the best possible care to you and others like you, can you think of anything that we could have done to help you after you left the hospital the first time, so that you might not have needed to return so soon?: Other (Comment)(None Noted)    Opal Knight RN, BSN  Care Management Department

## 2020-08-06 NOTE — ED TRIAGE NOTES
Pt BIB EMS from home for SOB and chest pain. Pt was in a-fib with RVR on the monitor with EMS. Pt was shocked by defibrillator in route and converted to NSR after. Pt states he has only been shocked once. Pt endorses total relief from chest pain, still states some mild shortness of breath.    Given 325 aspirin

## 2020-08-06 NOTE — H&P
[address]  HISTORY AND PHYSICAL    Name:  Marc Rios  MR#:  [id number]  :  1955  ACCOUNT #:  [de-identified]  ADMIT DATE:  2020      The patient was seen, evaluated, and admitted by me on 2020. PRIMARY CARE PHYSICIAN:  Ramiro Godfrey MD    SOURCE OF INFORMATION:  The patient. CHIEF COMPLAINT:  Shortness of breath. HISTORY OF PRESENT ILLNESS:  This is a 67-year-old man with a past medical history significant for chronic systolic congestive heart failure, coronary artery disease status post CABG, obstructive sleep apnea, seizure disorder, dyslipidemia, type 2 diabetes, hypertension, dyslipidemia, status post AICD placement, anxiety/depression, who was in his usual state of health until a few days ago when the patient developed shortness of breath. The shortness of breath got worse on the day of presentation at the emergency room. The shortness of breath is worse when the patient is lying down and also with very mild exertion. The shortness of breath is associated with chest pain. The chest pain is located at the left side of the chest, 6/10 in severity, constant, dull ache. No known aggravating or relieving factors. The patient was brought to the emergency room for further evaluation. It was reported that the patient's AICD discharge on the way to the emergency room. The patient was last admitted to this hospital from 2020 to 2020. The patient was admitted and treated for congestive heart failure. When the patient arrived at the emergency room, the patient was found to have elevated BNP level. The chest x-ray shows persistent, but improved left perihilar airspace opacity. He was referred to the hospitalist service for evaluation for admission.     PAST MEDICAL HISTORY:  Coronary artery disease status post CABG, chronic systolic congestive heart failure, obstructive sleep apnea, seizure disorder, type 2 diabetes, hypertension, dyslipidemia, status post AICD placement, anxiety/depression. ALLERGIES:  THE PATIENT IS ALLERGIC TO LATEX, CODEINE, DEMEROL, MUSHROOM, WELLBUTRIN, METFORMIN. MEDICATIONS:  1. Albuterol nebulizer every 4 hours as needed for wheezing. 2.  Xanax 1 mg twice daily. 3.  Amiodarone 200 mg daily. 4.  Aspirin 81 mg daily. 5.  Lipitor 80 mg daily. 6.  Klonopin 1 mg daily at bedtime as needed for sleep. 7.  Plavix 75 mg daily. 8.  Proscar 5 mg daily in the morning. 9.  Prozac 20 mg daily. 10.  Lasix 20 mg daily as needed. 11.  Amaryl 1 mg twice daily. 12.  Lactulose 45 mL 3 times daily. 13.  Keppra 1000 mg every 12 hours. 14.  Toprol-XL 12.5 mg daily. 15.  Midodrine 5 mg twice daily. 16.  Nitrostat 0.4 mg sublingual every 5 minutes as needed for chest pain. 17.  Zyprexa 2.5 mg daily at bedtime. 18.  Protonix 40 mg daily. 19.  Potassium chloride 10 mEq daily. 20.  Lyrica 5 mg 3 times daily. 21.  Flomax 0.4 mg twice daily. 22. Effexor XR 75 mg every evening. FAMILY HISTORY:  This was reviewed. His father had heart disease and pancreatic cancer. His mother had a stroke. PAST SURGICAL HISTORY:  This is significant for CABG, cholecystectomy, AICD placement. SOCIAL HISTORY:  The patient is a former smoker. No history of alcohol abuse. REVIEW OF SYSTEMS:  HEAD, EYES, EARS, NOSE, AND THROAT:  No headache, no dizziness, no blurring of vision, no photophobia. RESPIRATORY SYSTEM:  This is positive for cough and shortness of breath. No hemoptysis. CARDIOVASCULAR SYSTEM:  This is positive for orthopnea, chest pain, and palpitation. GASTROINTESTINAL SYSTEM:  This is positive for nausea. No diarrhea. No constipation. No abdominal pain. GENITOURINARY SYSTEM:  No dysuria, no urgency, and no frequency. All other systems are reviewed and they are negative. PHYSICAL EXAMINATION:  GENERAL APPEARANCE:  The patient appeared ill, in moderate distress.   VITAL SIGNS:  On arrival at the emergency room, temperature 98.4, pulse 109, respiratory rate 23, blood pressure 146/69, oxygen saturation 96% on room air. HEENT:  Head:  Normocephalic, atraumatic. Eyes:  Normal eye movements. No redness, no drainage, no discharge. Ears:  Normal external ears with no evidence of drainage. Nose:  No deformity and no drainage. Mouth and Throat:  No visible oral lesion. NECK:  Neck is supple. Mild JVD. No thyromegaly. CHEST:  Bilateral basilar crackles and few expiratory wheezing. HEART:  Normal S1 and S2.  Regular. No clinically appreciable murmur. ABDOMEN:  Soft and nontender. Normal bowel sounds. CNS:  Alert and oriented x3. No gross focal neurological deficit. EXTREMITIES:  Edema 2+. Pulses 2+ bilaterally. MUSCULOSKELETAL SYSTEM:  No evidence of joint deformity or swelling. SKIN:  No active skin lesions seen in the exposed part of the body. PSYCHIATRY:  Normal mood and affect. LYMPHATIC SYSTEM:  No cervical lymphadenopathy. DIAGNOSTIC DATA:  EKG shows pacemaker rhythm and nonspecific ST and T-waves abnormalities. Chest x-ray shows persistent, though improved left perihilar airspace opacity, small left pleural effusion, not changed. LABORATORY DATA:  Hematology:  WBC 15.9, hemoglobin 10.5, hematocrit 36.1, platelets 086. Magnesium 1.2. Pro-BNP level 2744. Cardiac Profile:  Troponin less than 0.05. Chemistry:  Sodium 135, potassium 4.1, chloride 101, CO2 23, glucose 246, BUN 18, creatinine 1.64, calcium 8.1, total bilirubin 1.5, ALT 20, AST 14, alkaline phosphatase 114, total protein at 6.5, albumin level 3.2, globulin at 3.3. Lactic acid level 1.8. ASSESSMENT:  1. Acute-on-chronic systolic congestive heart failure. 2.  Coronary artery disease, status post coronary artery bypass grafting. 3.  Obstructive sleep apnea. 4.  Seizure disorder. 5.  Dyslipidemia. 6.  Type 2 diabetes with hyperglycemia. 7.  Hypertension. 8.  Dyslipidemia.   9.  Status post automatic implantable cardioverter defibrillator placement. 10.  Hypomagnesemia. 11. Anxiety/depression. 12  Bacterial pneumonia. PLAN:  1. Acute-on-chronic systolic congestive heart failure. We will admit the patient for further evaluation and treatment. This is contributing to the patient's shortness of breath. We will start the patient on Lasix. We will check a serial cardiac markers to rule out acute myocardial infarction. We will check D-dimer to evaluate the patient for thromboembolism as another possible cause of shortness of breath. We will continue with supplemental oxygen. 2.  Coronary artery disease, status post CABG. We will continue with preadmission cardiac medication. We will check serial cardiac markers to rule out acute myocardial infarction. 3.  Obstructive sleep apnea. We will place the patient on CPAP with home setting. 4.  Seizure disorder. We will continue with Keppra. 5.  Dyslipidemia. We will resume preadmission medication. 6.  Type 2 diabetes with hyperglycemia. The patient will be placed on sliding scale with insulin coverage. We will check hemoglobin A1c level, if one has not been checked recently. 7.  Hypertension. We will resume home medication. 8.  Dyslipidemia. We will continue with preadmission medication. 9.  Status post AICD placement. We will await further recommendation from the patient's cardiologist.  10.  Hypomagnesemia. We will replace magnesium and repeat magnesium level. 11. Anxiety/depression. We will continue with home medication. 12.  Bacterial pneumonia. We will start the patient on Rocephin and Zithromax for suspected bacterial pneumonia. 13.  Other Issues:  Code Status: The patient is a full code. We will place the patient on heparin for DVT prophylaxis. COVID PRECAUTION:  The patient was wearing a face mask. I was wearing a face mask, cap, and gloves for this patient's encounter.   Because of the patient's shortness of breath, the patient will be tested for COVID-19 virus infection.         Petty Escobar MD      [id number]  D:  08/06/2020 8:55  T:  08/06/2020 10:30  JOB #:  [id number]  CC:  Eunice Sandifer, MD
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
## 2020-08-06 NOTE — PROGRESS NOTES
BCPI-A letter regarding Heart Failure has been delivered to RN for delivery the patient/caregiver. Pt currently on isolation precautions.      Angela Chow RN, BSN  Care Management Department

## 2020-08-07 LAB
ALBUMIN SERPL-MCNC: 2.8 G/DL (ref 3.5–5)
ALBUMIN/GLOB SERPL: 0.8 {RATIO} (ref 1.1–2.2)
ALP SERPL-CCNC: 96 U/L (ref 45–117)
ALT SERPL-CCNC: 19 U/L (ref 12–78)
ANION GAP SERPL CALC-SCNC: 9 MMOL/L (ref 5–15)
APTT PPP: 32.5 SEC (ref 22.1–32)
AST SERPL-CCNC: 13 U/L (ref 15–37)
BACTERIA SPEC CULT: NORMAL
BACTERIA SPEC CULT: NORMAL
BASOPHILS # BLD: 0 K/UL (ref 0–0.1)
BASOPHILS NFR BLD: 0 % (ref 0–1)
BILIRUB SERPL-MCNC: 1.1 MG/DL (ref 0.2–1)
BUN SERPL-MCNC: 26 MG/DL (ref 6–20)
BUN/CREAT SERPL: 16 (ref 12–20)
CALCIUM SERPL-MCNC: 8.6 MG/DL (ref 8.5–10.1)
CHLORIDE SERPL-SCNC: 104 MMOL/L (ref 97–108)
CO2 SERPL-SCNC: 23 MMOL/L (ref 21–32)
CREAT SERPL-MCNC: 1.65 MG/DL (ref 0.7–1.3)
DIFFERENTIAL METHOD BLD: ABNORMAL
EOSINOPHIL # BLD: 0.4 K/UL (ref 0–0.4)
EOSINOPHIL NFR BLD: 4 % (ref 0–7)
ERYTHROCYTE [DISTWIDTH] IN BLOOD BY AUTOMATED COUNT: 19.2 % (ref 11.5–14.5)
FIBRINOGEN PPP-MCNC: 480 MG/DL (ref 200–475)
GLOBULIN SER CALC-MCNC: 3.6 G/DL (ref 2–4)
GLUCOSE BLD STRIP.AUTO-MCNC: 138 MG/DL (ref 65–100)
GLUCOSE BLD STRIP.AUTO-MCNC: 154 MG/DL (ref 65–100)
GLUCOSE BLD STRIP.AUTO-MCNC: 181 MG/DL (ref 65–100)
GLUCOSE BLD STRIP.AUTO-MCNC: 195 MG/DL (ref 65–100)
GLUCOSE SERPL-MCNC: 152 MG/DL (ref 65–100)
HCT VFR BLD AUTO: 32.9 % (ref 36.6–50.3)
HGB BLD-MCNC: 9.7 G/DL (ref 12.1–17)
IMM GRANULOCYTES # BLD AUTO: 0 K/UL (ref 0–0.04)
IMM GRANULOCYTES NFR BLD AUTO: 0 % (ref 0–0.5)
INR PPP: 1 (ref 0.9–1.1)
LYMPHOCYTES # BLD: 1.2 K/UL (ref 0.8–3.5)
LYMPHOCYTES NFR BLD: 13 % (ref 12–49)
MCH RBC QN AUTO: 22.3 PG (ref 26–34)
MCHC RBC AUTO-ENTMCNC: 29.5 G/DL (ref 30–36.5)
MCV RBC AUTO: 75.6 FL (ref 80–99)
MONOCYTES # BLD: 0.5 K/UL (ref 0–1)
MONOCYTES NFR BLD: 6 % (ref 5–13)
NEUTS SEG # BLD: 7.4 K/UL (ref 1.8–8)
NEUTS SEG NFR BLD: 77 % (ref 32–75)
NRBC # BLD: 0 K/UL (ref 0–0.01)
NRBC BLD-RTO: 0 PER 100 WBC
PLATELET # BLD AUTO: 170 K/UL (ref 150–400)
PMV BLD AUTO: 11.5 FL (ref 8.9–12.9)
POTASSIUM SERPL-SCNC: 4 MMOL/L (ref 3.5–5.1)
PROT SERPL-MCNC: 6.4 G/DL (ref 6.4–8.2)
PROTHROMBIN TIME: 10.7 SEC (ref 9–11.1)
RBC # BLD AUTO: 4.35 M/UL (ref 4.1–5.7)
SERVICE CMNT-IMP: ABNORMAL
SERVICE CMNT-IMP: NORMAL
SODIUM SERPL-SCNC: 136 MMOL/L (ref 136–145)
THERAPEUTIC RANGE,PTTT: ABNORMAL SECS (ref 58–77)
WBC # BLD AUTO: 9.6 K/UL (ref 4.1–11.1)

## 2020-08-07 PROCEDURE — 94760 N-INVAS EAR/PLS OXIMETRY 1: CPT

## 2020-08-07 PROCEDURE — 36415 COLL VENOUS BLD VENIPUNCTURE: CPT

## 2020-08-07 PROCEDURE — 65660000000 HC RM CCU STEPDOWN

## 2020-08-07 PROCEDURE — 94660 CPAP INITIATION&MGMT: CPT

## 2020-08-07 PROCEDURE — 85730 THROMBOPLASTIN TIME PARTIAL: CPT

## 2020-08-07 PROCEDURE — 74011636637 HC RX REV CODE- 636/637: Performed by: INTERNAL MEDICINE

## 2020-08-07 PROCEDURE — 74011250637 HC RX REV CODE- 250/637: Performed by: INTERNAL MEDICINE

## 2020-08-07 PROCEDURE — 85025 COMPLETE CBC W/AUTO DIFF WBC: CPT

## 2020-08-07 PROCEDURE — 82962 GLUCOSE BLOOD TEST: CPT

## 2020-08-07 PROCEDURE — 74011250636 HC RX REV CODE- 250/636: Performed by: INTERNAL MEDICINE

## 2020-08-07 PROCEDURE — 80053 COMPREHEN METABOLIC PANEL: CPT

## 2020-08-07 PROCEDURE — 85610 PROTHROMBIN TIME: CPT

## 2020-08-07 PROCEDURE — 85384 FIBRINOGEN ACTIVITY: CPT

## 2020-08-07 RX ORDER — METOPROLOL SUCCINATE 25 MG/1
37.5 TABLET, EXTENDED RELEASE ORAL DAILY
Status: DISCONTINUED | OUTPATIENT
Start: 2020-08-08 | End: 2020-08-11 | Stop reason: HOSPADM

## 2020-08-07 RX ORDER — FUROSEMIDE 10 MG/ML
20 INJECTION INTRAMUSCULAR; INTRAVENOUS DAILY
Status: DISCONTINUED | OUTPATIENT
Start: 2020-08-08 | End: 2020-08-08

## 2020-08-07 RX ADMIN — FUROSEMIDE 40 MG: 10 INJECTION, SOLUTION INTRAMUSCULAR; INTRAVENOUS at 09:46

## 2020-08-07 RX ADMIN — ATORVASTATIN CALCIUM 80 MG: 20 TABLET, FILM COATED ORAL at 09:45

## 2020-08-07 RX ADMIN — Medication 10 ML: at 22:21

## 2020-08-07 RX ADMIN — LACTULOSE 30 ML: 20 SOLUTION ORAL at 17:26

## 2020-08-07 RX ADMIN — METOPROLOL SUCCINATE 25 MG: 25 TABLET, EXTENDED RELEASE ORAL at 09:45

## 2020-08-07 RX ADMIN — LACTULOSE 30 ML: 20 SOLUTION ORAL at 09:43

## 2020-08-07 RX ADMIN — PREGABALIN 50 MG: 25 CAPSULE ORAL at 09:45

## 2020-08-07 RX ADMIN — VENLAFAXINE HYDROCHLORIDE 150 MG: 150 CAPSULE, EXTENDED RELEASE ORAL at 09:45

## 2020-08-07 RX ADMIN — OLANZAPINE 2.5 MG: 2.5 TABLET, FILM COATED ORAL at 22:20

## 2020-08-07 RX ADMIN — HEPARIN SODIUM 5000 UNITS: 5000 INJECTION INTRAVENOUS; SUBCUTANEOUS at 12:16

## 2020-08-07 RX ADMIN — INSULIN LISPRO 2 UNITS: 100 INJECTION, SOLUTION INTRAVENOUS; SUBCUTANEOUS at 13:17

## 2020-08-07 RX ADMIN — PREGABALIN 50 MG: 25 CAPSULE ORAL at 17:25

## 2020-08-07 RX ADMIN — INSULIN LISPRO 2 UNITS: 100 INJECTION, SOLUTION INTRAVENOUS; SUBCUTANEOUS at 18:15

## 2020-08-07 RX ADMIN — AMIODARONE HYDROCHLORIDE 200 MG: 200 TABLET ORAL at 17:25

## 2020-08-07 RX ADMIN — CLOPIDOGREL BISULFATE 75 MG: 75 TABLET ORAL at 09:45

## 2020-08-07 RX ADMIN — PANTOPRAZOLE SODIUM 40 MG: 40 TABLET, DELAYED RELEASE ORAL at 06:53

## 2020-08-07 RX ADMIN — Medication 10 ML: at 14:00

## 2020-08-07 RX ADMIN — FINASTERIDE 5 MG: 5 TABLET, FILM COATED ORAL at 06:53

## 2020-08-07 RX ADMIN — PANTOPRAZOLE SODIUM 40 MG: 40 TABLET, DELAYED RELEASE ORAL at 17:25

## 2020-08-07 RX ADMIN — ALPRAZOLAM 1 MG: 0.5 TABLET ORAL at 22:20

## 2020-08-07 RX ADMIN — MIDODRINE HYDROCHLORIDE 5 MG: 5 TABLET ORAL at 17:26

## 2020-08-07 RX ADMIN — FLUOXETINE 20 MG: 20 CAPSULE ORAL at 09:44

## 2020-08-07 RX ADMIN — VENLAFAXINE HYDROCHLORIDE 75 MG: 37.5 CAPSULE, EXTENDED RELEASE ORAL at 17:26

## 2020-08-07 RX ADMIN — LEVETIRACETAM 1000 MG: 500 TABLET ORAL at 22:20

## 2020-08-07 RX ADMIN — HEPARIN SODIUM 5000 UNITS: 5000 INJECTION INTRAVENOUS; SUBCUTANEOUS at 18:15

## 2020-08-07 RX ADMIN — PREGABALIN 50 MG: 25 CAPSULE ORAL at 22:21

## 2020-08-07 RX ADMIN — ASPIRIN 81 MG CHEWABLE TABLET 81 MG: 81 TABLET CHEWABLE at 09:44

## 2020-08-07 RX ADMIN — ALPRAZOLAM 1 MG: 0.5 TABLET ORAL at 09:43

## 2020-08-07 RX ADMIN — MIDODRINE HYDROCHLORIDE 5 MG: 5 TABLET ORAL at 09:44

## 2020-08-07 RX ADMIN — AMIODARONE HYDROCHLORIDE 200 MG: 200 TABLET ORAL at 09:45

## 2020-08-07 RX ADMIN — LEVETIRACETAM 1000 MG: 500 TABLET ORAL at 09:45

## 2020-08-07 RX ADMIN — HEPARIN SODIUM 5000 UNITS: 5000 INJECTION INTRAVENOUS; SUBCUTANEOUS at 02:58

## 2020-08-07 NOTE — PROGRESS NOTES
Problem: Falls - Risk of  Goal: *Absence of Falls  Description: Document Flora Perez Fall Risk and appropriate interventions in the flowsheet. Outcome: Progressing Towards Goal  Note: Fall Risk Interventions:  Mobility Interventions: Communicate number of staff needed for ambulation/transfer, Patient to call before getting OOB    Mentation Interventions: Adequate sleep, hydration, pain control, Door open when patient unattended    Medication Interventions: Evaluate medications/consider consulting pharmacy    Elimination Interventions: Call light in reach, Patient to call for help with toileting needs    History of Falls Interventions: Consult care management for discharge planning, Investigate reason for fall, Evaluate medications/consider consulting pharmacy    Problem: Diabetes Self-Management  Goal: *Using medications safely  Description: State effect of diabetes medications on diabetes; name diabetes medication taking, action and side effects. Outcome: Progressing Towards Goal     Problem: Heart Failure: Day 1  Goal: Medications  Outcome: Progressing Towards Goal  Note: Furosemide given as ordered     TRANSFER - OUT REPORT:    Verbal report given to Jennifer(name) on [facility].  being transferred to (unit) for routine progression of care       Report consisted of patients Situation, Background, Assessment and   Recommendations(SBAR). Information from the following report(s) SBAR, Kardex and Quality Measures was reviewed with the receiving nurse. Opportunity for questions and clarification was provided.       Patient transported with:   O2 @ 2 liters  Tech

## 2020-08-07 NOTE — CDMP QUERY
Patient admitted with acute on chronic systolic CHF, noted to have atrial fibrillation with RVR with subsequent ICD discharge prior to arrival to the hospital. 
 
 If possible, please document in progress notes and discharge summary further specificity regarding the type of atrial fibrillation: 
 
? Paroxysmal Atrial Fibrillation ? Longstanding Persistent Atrial Fibrillation ? Permanent Atrial Fibrillation ? Persistent Atrial Fibrillation ? Chronic Atrial Fibrillation, unspecified ? Other, please specify ? Clinically unable to determine The medical record reflects the following: 
   Risk Factors: 76 M hx/o HTN, DMII, CAD, hypomagnesia, A/C SCHF Clinical Indicators:  
 
Katiana RAMIREZ H&P 8/6 \"It was reported that the patient's AICD discharge on the way to the emergency room\" 8/6 EKG \"Atrial-sensed ventricular-paced rhythm Biventricular pacemaker detected \" 
 
8/6-current nursing documentation reflects that the patient is in a paced rhythm Marcel Eugene MD Cardiology Consult 7/1 \"This is a 76 yom with acute on chronic diastolic CHF and ICD discharge due to rapid AF. Symptoms were likely worsening CHF with led to AF which led to significant symptoms and ICD discharge was due to AF with RVR which did not respond to ATP\" Treatment: admission to telemetry unit, recommendations per Cardiology note \"Cont with diuresis Increased metoprolol, his treatment as been complicated by debilitating orthostatic hypotension at higher than expected BP likely due to carotid and intracranial disease Increased amiodarone as became very symptomatic with AF Not on anticoagulation due to fall risk Daily BMP, replete lytes'\" Chronic: nonspecific term that could be referring to paroxysmal, persistent, or permanent Longstanding persistent: persistent and continuous, lasting > 1 year.   
Paroxysmal - self-terminating or intermittent; resolves with or without intervention within 7 days of onset; may recur with various frequency. Persistent - Fails to terminate within 7 days; Often requires meds or cardioversion to restore to NSR. Permanent - longstanding & persistent; Medication has been ineffective in restoring NSR &/or cardioversion is contraindicated Definitions per MS-DRG Training Guide and Quick Reference Guide, Wayne Gill 112 5 Diseases and Disorders of the Circulatory System; 2019; [employer].  Software content from the [employer]

## 2020-08-07 NOTE — PROGRESS NOTES
Cardiology Progress Note  2020     Admit Date: 2020  Admit Diagnosis: Acute on chronic systolic CHF (congestive heart failure) ([facility]) [I50.23]  CC: none currently    Assessment:   Principal Problem:    Acute on chronic systolic CHF (congestive heart failure) ([facility] [address] (2020)      Plan:     Decreased IV lasix dose  Increased metoprolol, limited by orthostasis  Cont other cardiac meds    Subjective:      Monica Bring. has had JAVED      Objective:    Physical Exam:  Overall VSSAF;    Visit Vitals  /83 (BP 1 Location: Right arm, BP Patient Position: At rest)   Pulse 84   Temp 97.8 °F (36.6 °C)   Resp 16   Wt 96 kg (211 lb 10.3 oz)   SpO2 96%   BMI 30.81 kg/m²     Temp (24hrs), Av.7 °F (36.5 °C), Min:97.2 °F (36.2 °C), Max:98.1 °F (36.7 °C)    Patient Vitals for the past 8 hrs:   Pulse   20 1215 84   20 0943 85   20 0700 83    Patient Vitals for the past 8 hrs:   Resp   20 1215 16   20 0700 18    Patient Vitals for the past 8 hrs:   BP   20 1215 135/83   20 0943 126/77   20 0700 120/80      08/05 1901 -  0700  In: -   Out: 7785 [Urine:1825]      General Appearance: Well developed, well nourished, no acute distress. Ears/Nose/Mouth/Throat:   Normal MM; anicteric. JVP: WNL   Resp:   Lungs clear to auscultation bilaterally. Nl resp effort. Cardiovascular:  RRR, S1, S2 normal, no new murmur. No gallop or rub. Abdomen:   Soft, non-tender, bowel sounds are present. Extremities: No edema bilaterally. Skin:  Neuro: Warm and dry.   A/O x3, grossly nonfocal                         Data Review:     Telemetry independently reviewed :   normal sinus rhythm         Labs:   Recent Results (from the past 24 hour(s))   TROPONIN I    Collection Time: 20  4:04 PM   Result Value Ref Range    Troponin-I, Qt. 0.24 (H) <0.05 ng/mL   GLUCOSE, POC    Collection Time: 20  5:20 PM   Result Value Ref Range    Glucose (POC) 176 (H) 65 - 100 mg/dL    Performed by Bette Simmons    GLUCOSE, POC    Collection Time: 08/06/20  7:21 PM   Result Value Ref Range    Glucose (POC) 213 (H) 65 - 100 mg/dL    Performed by Trish Barnhart RN    CULTURE, MRSA    Collection Time: 08/06/20  8:41 PM    Specimen: Nares; Nasal   Result Value Ref Range    Special Requests: NO SPECIAL REQUESTS      Culture result: MRSA NOT PRESENT  AT 16 HOURS     GLUCOSE, POC    Collection Time: 08/06/20  9:19 PM   Result Value Ref Range    Glucose (POC) 206 (H) 65 - 100 mg/dL    Performed by Solomon Lawson    METABOLIC PANEL, COMPREHENSIVE    Collection Time: 08/07/20  2:28 AM   Result Value Ref Range    Sodium 136 136 - 145 mmol/L    Potassium 4.0 3.5 - 5.1 mmol/L    Chloride 104 97 - 108 mmol/L    CO2 23 21 - 32 mmol/L    Anion gap 9 5 - 15 mmol/L    Glucose 152 (H) 65 - 100 mg/dL    BUN 26 (H) 6 - 20 MG/DL    Creatinine 1.65 (H) 0.70 - 1.30 MG/DL    BUN/Creatinine ratio 16 12 - 20      GFR est AA 51 (L) >60 ml/min/1.73m2    GFR est non-AA 42 (L) >60 ml/min/1.73m2    Calcium 8.6 8.5 - 10.1 MG/DL    Bilirubin, total 1.1 (H) 0.2 - 1.0 MG/DL    ALT (SGPT) 19 12 - 78 U/L    AST (SGOT) 13 (L) 15 - 37 U/L    Alk. phosphatase 96 45 - 117 U/L    Protein, total 6.4 6.4 - 8.2 g/dL    Albumin 2.8 (L) 3.5 - 5.0 g/dL    Globulin 3.6 2.0 - 4.0 g/dL    A-G Ratio 0.8 (L) 1.1 - 2.2     CBC WITH AUTOMATED DIFF    Collection Time: 08/07/20  2:28 AM   Result Value Ref Range    WBC 9.6 4.1 - 11.1 K/uL    RBC 4.35 4.10 - 5.70 M/uL    HGB 9.7 (L) 12.1 - 17.0 g/dL    HCT 32.9 (L) 36.6 - 50.3 %    MCV 75.6 (L) 80.0 - 99.0 FL    MCH 22.3 (L) 26.0 - 34.0 PG    MCHC 29.5 (L) 30.0 - 36.5 g/dL    RDW 19.2 (H) 11.5 - 14.5 %    PLATELET 866 776 - 817 K/uL    MPV 11.5 8.9 - 12.9 FL    NRBC 0.0 0  WBC    ABSOLUTE NRBC 0.00 0.00 - 0.01 K/uL    NEUTROPHILS 77 (H) 32 - 75 %    LYMPHOCYTES 13 12 - 49 %    MONOCYTES 6 5 - 13 %    EOSINOPHILS 4 0 - 7 %    BASOPHILS 0 0 - 1 %    IMMATURE GRANULOCYTES 0 0.0 - 0.5 %    ABS. NEUTROPHILS 7.4 1.8 - 8.0 K/UL    ABS. LYMPHOCYTES 1.2 0.8 - 3.5 K/UL    ABS. MONOCYTES 0.5 0.0 - 1.0 K/UL    ABS. EOSINOPHILS 0.4 0.0 - 0.4 K/UL    ABS. BASOPHILS 0.0 0.0 - 0.1 K/UL    ABS. IMM.  GRANS. 0.0 0.00 - 0.04 K/UL    DF AUTOMATED     PROTHROMBIN TIME + INR    Collection Time: 08/07/20  2:28 AM   Result Value Ref Range    INR 1.0 0.9 - 1.1      Prothrombin time 10.7 9.0 - 11.1 sec   PTT    Collection Time: 08/07/20  2:28 AM   Result Value Ref Range    aPTT 32.5 (H) 22.1 - 32.0 sec    aPTT, therapeutic range     58.0 - 77.0 SECS   FIBRINOGEN    Collection Time: 08/07/20  2:28 AM   Result Value Ref Range    Fibrinogen 480 (H) 200 - 475 mg/dL   GLUCOSE, POC    Collection Time: 08/07/20  8:17 AM   Result Value Ref Range    Glucose (POC) 138 (H) 65 - 100 mg/dL    Performed by Luigi Granda    GLUCOSE, POC    Collection Time: 08/07/20 12:37 PM   Result Value Ref Range    Glucose (POC) 195 (H) 65 - 100 mg/dL    Performed by Luigi Granda       Current medications reviewed       Justyn Fan MD

## 2020-08-07 NOTE — PROGRESS NOTES
Hospitalist Progress Note  Jyothi Mac MD  Answering service: [phone] from in house phone        Date of Service:  2020  NAME:  Ken Paredes. :  1955  MRN:  [id number]      Admission Summary:   As per initial admission summary  This is a 58-year-old man with a past medical history significant for chronic systolic congestive heart failure, coronary artery disease status post CABG, obstructive sleep apnea, seizure disorder, dyslipidemia, type 2 diabetes, hypertension, dyslipidemia, status post AICD placement, anxiety/depression, who was in his usual state of health until a few days ago when the patient developed shortness of breath. The shortness of breath got worse on the day of presentation at the emergency room. The shortness of breath is worse when the patient is lying down and also with very mild exertion. The shortness of breath is associated with chest pain. The chest pain is located at the left side of the chest, 6/10 in severity, constant, dull ache. No known aggravating or relieving factors. The patient was brought to the emergency room for further evaluation. It was reported that the patient's AICD discharge on the way to the emergency room. The patient was last admitted to this hospital from 2020 to 2020. The patient was admitted and treated for congestive heart failure. When the patient arrived at the emergency room, the patient was found to have elevated BNP level. The chest x-ray shows persistent, but improved left perihilar airspace opacity. He was referred to the hospitalist service for evaluation for admission. Interval history / Subjective:     Patient seen for Follow up of SOB    Patient seen and examined by the bedside, Labs, images and notes reviewed  Patient is comfortable, denies any chest pain, SOB, abdominal pain, fevers, chills.  No N/V/D  Discussed with nursing staff, no acute issues overnight, orders reviewed. Patient feeling somewhat better, cardiology consulted      Assessment & Plan:     1. Acute-on-chronic systolic congestive heart failure. Paroxysmal Atrial Fibrillation  (CDMP)  Cardiology following  Lasix dose reduced. Metoprolol increased     2. Coronary artery disease, status post CABG. Stable     3. Obstructive sleep apnea. We will place the patient on CPAP with home setting. 4.  Seizure disorder. We will continue with Keppra. 5.  Dyslipidemia. We will resume preadmission medication. 6.  Type 2 diabetes with hyperglycemia. The patient will be placed on sliding scale with insulin coverage. We will check hemoglobin A1c level, if one has not been checked recently. 7.  Hypertension. We will resume home medication. 8.  Dyslipidemia. We will continue with preadmission medication. 9.  Status post AICD placement. We will await further recommendation from the patient's cardiologist.    10.  Hypomagnesemia. We will replace magnesium and repeat magnesium level. 11. Anxiety/depression. We will continue with home medication. 12.  Bacterial pneumonia. We will start the patient on Rocephin and Zithromax for suspected bacterial pneumonia. 13.  Other Issues:  Code Status: The patient is a full code. We will place the patient on heparin for DVT prophylaxis. Code status: full   DVT prophylaxis: heparin Sc     Care Plan discussed with: Patient/Family  Disposition: Home w/Family and TBD     Hospital Problems  Date Reviewed: 8/6/2020          Codes Class Noted POA    * (Principal) Acute on chronic systolic CHF (congestive heart failure) ([facility] [address] ICD-10-CM: I50.23  ICD-9-CM: 428.23, 428.0  8/6/2020 Yes                Review of Systems:   A comprehensive review of systems was negative except for that written in the HPI. Vital Signs:    Last 24hrs VS reviewed since prior progress note.  Most recent are:  Visit Vitals  /83 (BP 1 Location: Right arm, BP Patient Position: At rest)   Pulse 84   Temp 97.8 °F (36.6 °C)   Resp 16   Wt 96 kg (211 lb 10.3 oz)   SpO2 96%   BMI 30.81 kg/m²         Intake/Output Summary (Last 24 hours) at 8/7/2020 1622  Last data filed at 8/7/2020 1215  Gross per 24 hour   Intake    Output 1450 ml   Net -1450 ml        Physical Examination:             Constitutional:  No acute distress, cooperative, pleasant    ENT:  Oral mucous moist, oropharynx benign. Neck supple,    Resp:  CTA bilaterally. No wheezing/rhonchi/rales. No accessory muscle use   CV:  Regular rhythm, normal rate, no murmurs, gallops, rubs    GI:  Soft, non distended, non tender. normoactive bowel sounds, no hepatosplenomegaly     Musculoskeletal:  No edema, warm, 2+ pulses throughout    Neurologic:  Moves all extremities. AAOx3, CN II-XII reviewed     Psych:  Good insight, Not anxious nor agitated. Skin:  Good turgor, no rashes or ulcers  Hematologic/Lymphatic/Immunlogic:  No jaundice nor lymph node swelling  Eyes:  EOMI. Anicteric sclerae, PERRL.        Data Review:    Review and/or order of clinical lab test  Review and/or order of tests in the radiology section of CPT  Review and/or order of tests in the medicine section of CPT      Labs:     Recent Labs     08/07/20 0228 08/05/20 2149   WBC 9.6 15.9*   HGB 9.7* 10.5*   HCT 32.9* 36.1*    170     Recent Labs     08/07/20 0228 08/06/20  1015 08/05/20 2149     --  135*   K 4.0  --  4.1     --  101   CO2 23  --  23   BUN 26*  --  18   CREA 1.65*  --  1.64*   *  --  246*   CA 8.6  --  8.1*   MG  --  2.0 1.2*   PHOS  --  3.4  --      Recent Labs     08/07/20 0228 08/05/20 2149   ALT 19 20   AP 96 114   TBILI 1.1* 1.5*   TP 6.4 6.5   ALB 2.8* 3.2*   GLOB 3.6 3.3     Recent Labs     08/07/20 0228 08/06/20  1015   INR 1.0 1.1   PTP 10.7 11.0   APTT 32.5* 32.6*      Recent Labs     08/06/20  1015   FERR 34      Lab Results   Component Value Date/Time    Folate 11.2 05/13/2020 12:27 AM      No results for input(s): PH, PCO2, PO2 in the last 72 hours.   Recent Labs     08/06/20  1604 08/06/20  1015 08/05/20  2149   TROIQ 0.24* 0.31* <0.05     Lab Results   Component Value Date/Time    Cholesterol, total 136 07/15/2020 04:00 AM    HDL Cholesterol 20 07/15/2020 04:00 AM    LDL, calculated 75.2 07/15/2020 04:00 AM    Triglyceride 204 (H) 07/15/2020 04:00 AM    CHOL/HDL Ratio 6.8 (H) 07/15/2020 04:00 AM     Lab Results   Component Value Date/Time    Glucose (POC) 195 (H) 08/07/2020 12:37 PM    Glucose (POC) 138 (H) 08/07/2020 08:17 AM    Glucose (POC) 206 (H) 08/06/2020 09:19 PM    Glucose (POC) 213 (H) 08/06/2020 07:21 PM    Glucose (POC) 176 (H) 08/06/2020 05:20 PM     Lab Results   Component Value Date/Time    Color YELLOW/STRAW 07/25/2020 09:46 PM    Appearance CLEAR 07/25/2020 09:46 PM    Specific gravity 1.015 07/25/2020 09:46 PM    Specific gravity 1.025 07/14/2020 06:20 AM    pH (UA) 5.0 07/25/2020 09:46 PM    Protein 100 (A) 07/25/2020 09:46 PM    Glucose Negative 07/25/2020 09:46 PM    Ketone Negative 07/25/2020 09:46 PM    Bilirubin Negative 07/25/2020 09:46 PM    Urobilinogen 0.2 07/25/2020 09:46 PM    Nitrites Negative 07/25/2020 09:46 PM    Leukocyte Esterase Negative 07/25/2020 09:46 PM    Epithelial cells FEW 07/25/2020 09:46 PM    Bacteria Negative 07/25/2020 09:46 PM    WBC 0-4 07/25/2020 09:46 PM    RBC 0-5 07/25/2020 09:46 PM         Medications Reviewed:     Current Facility-Administered Medications   Medication Dose Route Frequency    [START ON 8/8/2020] furosemide (LASIX) injection 20 mg  20 mg IntraVENous DAILY    [START ON 8/8/2020] metoprolol succinate (TOPROL-XL) XL tablet 37.5 mg  37.5 mg Oral DAILY    ALPRAZolam (XANAX) tablet 1 mg  1 mg Oral Q12H    aspirin chewable tablet 81 mg  81 mg Oral DAILY    atorvastatin (LIPITOR) tablet 80 mg  80 mg Oral DAILY    clonazePAM (KlonoPIN) tablet 1 mg  1 mg Oral QHS PRN    clopidogreL (PLAVIX) tablet 75 mg  75 mg Oral DAILY    finasteride (PROSCAR) tablet 5 mg  5 mg Oral 7am    FLUoxetine (PROzac) capsule 20 mg  20 mg Oral DAILY    levETIRAcetam (KEPPRA) tablet 1,000 mg  1,000 mg Oral Q12H    midodrine (PROAMATINE) tablet 5 mg  5 mg Oral BID    OLANZapine (ZyPREXA) tablet 2.5 mg  2.5 mg Oral QHS    nitroglycerin (NITROSTAT) tablet 0.4 mg  0.4 mg SubLINGual Q5MIN PRN    pantoprazole (PROTONIX) tablet 40 mg  40 mg Oral ACB&D    pregabalin (LYRICA) capsule 50 mg  50 mg Oral TID    potassium chloride SR (KLOR-CON 10) tablet 20 mEq  20 mEq Oral DAILY PRN    venlafaxine-SR (EFFEXOR-XR) capsule 75 mg  75 mg Oral QPM    venlafaxine-SR (EFFEXOR-XR) capsule 150 mg  150 mg Oral DAILY    sodium chloride (NS) flush 5-40 mL  5-40 mL IntraVENous Q8H    sodium chloride (NS) flush 5-40 mL  5-40 mL IntraVENous PRN    acetaminophen (TYLENOL) tablet 650 mg  650 mg Oral Q6H PRN    Or    acetaminophen (TYLENOL) suppository 650 mg  650 mg Rectal Q6H PRN    polyethylene glycol (MIRALAX) packet 17 g  17 g Oral DAILY PRN    promethazine (PHENERGAN) tablet 12.5 mg  12.5 mg Oral Q6H PRN    Or    ondansetron (ZOFRAN) injection 4 mg  4 mg IntraVENous Q6H PRN    heparin (porcine) injection 5,000 Units  5,000 Units SubCUTAneous Q8H    insulin lispro (HUMALOG) injection   SubCUTAneous AC&HS    glucose chewable tablet 16 g  4 Tab Oral PRN    glucagon (GLUCAGEN) injection 1 mg  1 mg IntraMUSCular PRN    dextrose 10 % infusion 125-250 mL  125-250 mL IntraVENous PRN    amiodarone (CORDARONE) tablet 200 mg  200 mg Oral BID    lactulose (CHRONULAC) 10 gram/15 mL solution 30 mL  20 g Oral BID     ______________________________________________________________________  EXPECTED LENGTH OF STAY: 4d 2h  ACTUAL LENGTH OF STAY:          1                 Frankie Yu MD

## 2020-08-07 NOTE — PROGRESS NOTES
TRANSFER - IN REPORT:    Verbal report received from Krzysztof Ryan (name) on Claudean Beaver.  being received from [facility] (unit) for routine progression of care      Report consisted of patients Situation, Background, Assessment and   Recommendations(SBAR). Information from the following report(s) SBAR, Kardex and MAR was reviewed with the receiving nurse. Opportunity for questions and clarification was provided. Assessment completed upon patients arrival to unit and care assumed.

## 2020-08-07 NOTE — PROGRESS NOTES
.  Last 3 Recorded Weights in this Encounter    08/06/20 1534 08/07/20 0302   Weight: 98 kg (216 lb 0.8 oz) 96 kg (211 lb 10.3 oz)   Pt voided 900ml during overnight        Bedside shift change report given to Edna Tello RN (oncoming nurse) by Deysi Lopez RN (offgoing nurse). Report included the following information SBAR, Kardex, Intake/Output, MAR, Recent Results, Med Rec Status and Cardiac Rhythm Paced. Problem: Falls - Risk of  Goal: *Absence of Falls  Description: Document Peri Patches Fall Risk and appropriate interventions in the flowsheet. Outcome: Progressing Towards Goal  Note: Fall Risk Interventions:  Mobility Interventions: Patient to call before getting OOB    Mentation Interventions: Adequate sleep, hydration, pain control    Medication Interventions: Teach patient to arise slowly, Patient to call before getting OOB    Elimination Interventions: Patient to call for help with toileting needs    History of Falls Interventions: Consult care management for discharge planning         Problem: Patient Education: Go to Patient Education Activity  Goal: Patient/Family Education  Outcome: Progressing Towards Goal     Problem: Diabetes Self-Management  Goal: *Monitoring blood glucose, interpreting and using results  Description: Identify recommended blood glucose targets  and personal targets. Outcome: Progressing Towards Goal     Problem: Patient Education: Go to Patient Education Activity  Goal: Patient/Family Education  Outcome: Progressing Towards Goal     Problem: Pressure Injury - Risk of  Goal: *Prevention of pressure injury  Description: Document Iván Scale and appropriate interventions in the flowsheet.   Outcome: Progressing Towards Goal  Note: Pressure Injury Interventions:  Sensory Interventions: Assess need for specialty bed    Moisture Interventions: Absorbent underpads, Offer toileting Q_hr    Activity Interventions: Assess need for specialty bed    Mobility Interventions: Assess need for specialty bed    Nutrition Interventions: Document food/fluid/supplement intake    Friction and Shear Interventions: Apply protective barrier, creams and emollients

## 2020-08-07 NOTE — PROGRESS NOTES
Last 3 Recorded Weights in this Encounter    08/06/20 1534   Weight: 98 kg (216 lb 0.8 oz)     Per family, pt lost feeling below the knees ~3weeks ago when he simultaneously had an episode of loss of bladder control. Pt did visit the [address] [place] [address] during the episode - pt was not admitted. Problem: Falls - Risk of  Goal: *Absence of Falls  Description: Document Paco Campa Fall Risk and appropriate interventions in the flowsheet. Outcome: Progressing Towards Goal  Note: Fall Risk Interventions:  Mobility Interventions: Patient to call before getting OOB    Mentation Interventions: Adequate sleep, hydration, pain control    Medication Interventions: Teach patient to arise slowly, Patient to call before getting OOB    Elimination Interventions: Patient to call for help with toileting needs    History of Falls Interventions: Consult care management for discharge planning       Bedside shift change report given to Sajan Bailey  (oncoming nurse) by Jh Ortega RN (offgoing nurse). Report included the following information SBAR, Kardex, ED Summary, Procedure Summary, Intake/Output, MAR, Recent Results, and Cardiac Rhythm Paced .

## 2020-08-08 ENCOUNTER — APPOINTMENT (OUTPATIENT)
Dept: GENERAL RADIOLOGY | Age: 65
DRG: 293 | End: 2020-08-08
Attending: INTERNAL MEDICINE
Payer: MEDICARE

## 2020-08-08 LAB
ANION GAP SERPL CALC-SCNC: 9 MMOL/L (ref 5–15)
BASOPHILS # BLD: 0 K/UL (ref 0–0.1)
BASOPHILS NFR BLD: 0 % (ref 0–1)
BUN SERPL-MCNC: 30 MG/DL (ref 6–20)
BUN/CREAT SERPL: 17 (ref 12–20)
CALCIUM SERPL-MCNC: 8.5 MG/DL (ref 8.5–10.1)
CHLORIDE SERPL-SCNC: 104 MMOL/L (ref 97–108)
CO2 SERPL-SCNC: 23 MMOL/L (ref 21–32)
CREAT SERPL-MCNC: 1.81 MG/DL (ref 0.7–1.3)
DIFFERENTIAL METHOD BLD: ABNORMAL
EOSINOPHIL # BLD: 0.2 K/UL (ref 0–0.4)
EOSINOPHIL NFR BLD: 2 % (ref 0–7)
ERYTHROCYTE [DISTWIDTH] IN BLOOD BY AUTOMATED COUNT: 19.1 % (ref 11.5–14.5)
GLUCOSE BLD STRIP.AUTO-MCNC: 127 MG/DL (ref 65–100)
GLUCOSE BLD STRIP.AUTO-MCNC: 163 MG/DL (ref 65–100)
GLUCOSE BLD STRIP.AUTO-MCNC: 171 MG/DL (ref 65–100)
GLUCOSE BLD STRIP.AUTO-MCNC: 174 MG/DL (ref 65–100)
GLUCOSE SERPL-MCNC: 193 MG/DL (ref 65–100)
HCT VFR BLD AUTO: 38.6 % (ref 36.6–50.3)
HGB BLD-MCNC: 11.4 G/DL (ref 12.1–17)
IMM GRANULOCYTES # BLD AUTO: 0.1 K/UL (ref 0–0.04)
IMM GRANULOCYTES NFR BLD AUTO: 0 % (ref 0–0.5)
LYMPHOCYTES # BLD: 0.8 K/UL (ref 0.8–3.5)
LYMPHOCYTES NFR BLD: 6 % (ref 12–49)
MCH RBC QN AUTO: 21.9 PG (ref 26–34)
MCHC RBC AUTO-ENTMCNC: 29.5 G/DL (ref 30–36.5)
MCV RBC AUTO: 74.1 FL (ref 80–99)
MONOCYTES # BLD: 0.8 K/UL (ref 0–1)
MONOCYTES NFR BLD: 6 % (ref 5–13)
NEUTS SEG # BLD: 11.4 K/UL (ref 1.8–8)
NEUTS SEG NFR BLD: 85 % (ref 32–75)
NRBC # BLD: 0 K/UL (ref 0–0.01)
NRBC BLD-RTO: 0 PER 100 WBC
PLATELET # BLD AUTO: 198 K/UL (ref 150–400)
POTASSIUM SERPL-SCNC: 3.9 MMOL/L (ref 3.5–5.1)
PROCALCITONIN SERPL-MCNC: 0.11 NG/ML
RBC # BLD AUTO: 5.21 M/UL (ref 4.1–5.7)
SERVICE CMNT-IMP: ABNORMAL
SODIUM SERPL-SCNC: 136 MMOL/L (ref 136–145)
WBC # BLD AUTO: 13.4 K/UL (ref 4.1–11.1)

## 2020-08-08 PROCEDURE — 74011000258 HC RX REV CODE- 258: Performed by: INTERNAL MEDICINE

## 2020-08-08 PROCEDURE — 74011250636 HC RX REV CODE- 250/636: Performed by: INTERNAL MEDICINE

## 2020-08-08 PROCEDURE — 94760 N-INVAS EAR/PLS OXIMETRY 1: CPT

## 2020-08-08 PROCEDURE — 36415 COLL VENOUS BLD VENIPUNCTURE: CPT

## 2020-08-08 PROCEDURE — 74011250637 HC RX REV CODE- 250/637: Performed by: INTERNAL MEDICINE

## 2020-08-08 PROCEDURE — 74011636637 HC RX REV CODE- 636/637: Performed by: INTERNAL MEDICINE

## 2020-08-08 PROCEDURE — 85025 COMPLETE CBC W/AUTO DIFF WBC: CPT

## 2020-08-08 PROCEDURE — 80048 BASIC METABOLIC PNL TOTAL CA: CPT

## 2020-08-08 PROCEDURE — 65660000000 HC RM CCU STEPDOWN

## 2020-08-08 PROCEDURE — 82962 GLUCOSE BLOOD TEST: CPT

## 2020-08-08 PROCEDURE — 74018 RADEX ABDOMEN 1 VIEW: CPT

## 2020-08-08 PROCEDURE — 84145 PROCALCITONIN (PCT): CPT

## 2020-08-08 PROCEDURE — 71045 X-RAY EXAM CHEST 1 VIEW: CPT

## 2020-08-08 RX ORDER — FUROSEMIDE 10 MG/ML
40 INJECTION INTRAMUSCULAR; INTRAVENOUS 2 TIMES DAILY
Status: DISCONTINUED | OUTPATIENT
Start: 2020-08-08 | End: 2020-08-09

## 2020-08-08 RX ORDER — FUROSEMIDE 10 MG/ML
20 INJECTION INTRAMUSCULAR; INTRAVENOUS ONCE
Status: COMPLETED | OUTPATIENT
Start: 2020-08-08 | End: 2020-08-08

## 2020-08-08 RX ADMIN — HEPARIN SODIUM 5000 UNITS: 5000 INJECTION INTRAVENOUS; SUBCUTANEOUS at 18:56

## 2020-08-08 RX ADMIN — PANTOPRAZOLE SODIUM 40 MG: 40 TABLET, DELAYED RELEASE ORAL at 08:15

## 2020-08-08 RX ADMIN — PREGABALIN 50 MG: 25 CAPSULE ORAL at 17:13

## 2020-08-08 RX ADMIN — CLOPIDOGREL BISULFATE 75 MG: 75 TABLET ORAL at 08:45

## 2020-08-08 RX ADMIN — AMIODARONE HYDROCHLORIDE 200 MG: 200 TABLET ORAL at 17:13

## 2020-08-08 RX ADMIN — VENLAFAXINE HYDROCHLORIDE 75 MG: 37.5 CAPSULE, EXTENDED RELEASE ORAL at 17:13

## 2020-08-08 RX ADMIN — Medication 10 ML: at 22:35

## 2020-08-08 RX ADMIN — AZITHROMYCIN 500 MG: 500 INJECTION, POWDER, LYOPHILIZED, FOR SOLUTION INTRAVENOUS at 13:18

## 2020-08-08 RX ADMIN — FUROSEMIDE 20 MG: 10 INJECTION, SOLUTION INTRAMUSCULAR; INTRAVENOUS at 08:45

## 2020-08-08 RX ADMIN — INSULIN LISPRO 2 UNITS: 100 INJECTION, SOLUTION INTRAVENOUS; SUBCUTANEOUS at 08:51

## 2020-08-08 RX ADMIN — ATORVASTATIN CALCIUM 80 MG: 20 TABLET, FILM COATED ORAL at 08:45

## 2020-08-08 RX ADMIN — ALPRAZOLAM 1 MG: 0.5 TABLET ORAL at 08:43

## 2020-08-08 RX ADMIN — HEPARIN SODIUM 5000 UNITS: 5000 INJECTION INTRAVENOUS; SUBCUTANEOUS at 11:21

## 2020-08-08 RX ADMIN — LEVETIRACETAM 1000 MG: 500 TABLET ORAL at 08:44

## 2020-08-08 RX ADMIN — INSULIN LISPRO 2 UNITS: 100 INJECTION, SOLUTION INTRAVENOUS; SUBCUTANEOUS at 13:19

## 2020-08-08 RX ADMIN — METOPROLOL SUCCINATE 37.5 MG: 25 TABLET, EXTENDED RELEASE ORAL at 08:44

## 2020-08-08 RX ADMIN — LACTULOSE 30 ML: 20 SOLUTION ORAL at 08:51

## 2020-08-08 RX ADMIN — PANTOPRAZOLE SODIUM 40 MG: 40 TABLET, DELAYED RELEASE ORAL at 17:13

## 2020-08-08 RX ADMIN — PREGABALIN 50 MG: 25 CAPSULE ORAL at 08:44

## 2020-08-08 RX ADMIN — Medication 10 ML: at 08:16

## 2020-08-08 RX ADMIN — PREGABALIN 50 MG: 25 CAPSULE ORAL at 22:35

## 2020-08-08 RX ADMIN — ASPIRIN 81 MG CHEWABLE TABLET 81 MG: 81 TABLET CHEWABLE at 08:45

## 2020-08-08 RX ADMIN — LACTULOSE 30 ML: 20 SOLUTION ORAL at 17:17

## 2020-08-08 RX ADMIN — FUROSEMIDE 40 MG: 10 INJECTION, SOLUTION INTRAMUSCULAR; INTRAVENOUS at 17:13

## 2020-08-08 RX ADMIN — FUROSEMIDE 20 MG: 10 INJECTION, SOLUTION INTRAMUSCULAR; INTRAVENOUS at 11:19

## 2020-08-08 RX ADMIN — AMIODARONE HYDROCHLORIDE 200 MG: 200 TABLET ORAL at 08:45

## 2020-08-08 RX ADMIN — LEVETIRACETAM 1000 MG: 500 TABLET ORAL at 22:34

## 2020-08-08 RX ADMIN — FINASTERIDE 5 MG: 5 TABLET, FILM COATED ORAL at 08:15

## 2020-08-08 RX ADMIN — INSULIN LISPRO 2 UNITS: 100 INJECTION, SOLUTION INTRAVENOUS; SUBCUTANEOUS at 17:13

## 2020-08-08 RX ADMIN — HEPARIN SODIUM 5000 UNITS: 5000 INJECTION INTRAVENOUS; SUBCUTANEOUS at 03:49

## 2020-08-08 RX ADMIN — OLANZAPINE 2.5 MG: 2.5 TABLET, FILM COATED ORAL at 22:34

## 2020-08-08 RX ADMIN — CEFTRIAXONE 1 G: 1 INJECTION, POWDER, FOR SOLUTION INTRAMUSCULAR; INTRAVENOUS at 11:21

## 2020-08-08 RX ADMIN — FLUOXETINE 20 MG: 20 CAPSULE ORAL at 08:44

## 2020-08-08 RX ADMIN — VENLAFAXINE HYDROCHLORIDE 150 MG: 150 CAPSULE, EXTENDED RELEASE ORAL at 08:44

## 2020-08-08 NOTE — PROGRESS NOTES
Paged Dr Loyola Fuelling on call and let him know pts RR is 24-30s and labored. Pt reporting feeling short of breath. IV lasix 20mg administered but per Dr Tereza Orellana give and extra 20mg IV now and change pt to 20mg IV BID. PCXR put in for pt per Dr Loyola Fuelshira as well. Per Dr Tereza Orellana, midodrine discontinued .

## 2020-08-08 NOTE — PROGRESS NOTES
Bedside shift change report given to Joyce Prader (oncoming nurse) by Deena Slade (offgoing nurse). Report included the following information SBAR, Kardex and MAR.

## 2020-08-08 NOTE — PROGRESS NOTES
Problem: Falls - Risk of  Goal: *Absence of Falls  Description: Document Devere Clarksdale Fall Risk and appropriate interventions in the flowsheet. Outcome: Progressing Towards Goal  Note: Fall Risk Interventions:  Mobility Interventions: Communicate number of staff needed for ambulation/transfer    Mentation Interventions: Adequate sleep, hydration, pain control    Medication Interventions: Evaluate medications/consider consulting pharmacy    Elimination Interventions: Call light in reach    History of Falls Interventions: Consult care management for discharge planning       Patient is alert and oriented X 4.    O2 sat at 97% at 2L NC. Patient refuses CPAP at night. Bedside and Verbal shift change report given to Anjali (oncoming nurse) by Biaml Alexander (offgoing nurse). Report included the following information SBAR, Kardex, Procedure Summary, Intake/Output, MAR, Recent Results, Med Rec Status and Cardiac Rhythm paced.

## 2020-08-08 NOTE — PROGRESS NOTES
Bedside shift change report given to Bertram (oncoming nurse) by Florecita Boateng (offgoing nurse). Report included the following information SBAR, Kardex and MAR.

## 2020-08-08 NOTE — PROGRESS NOTES
Cardiology Progress Note                                        Admit Date: 8/5/2020    Assessment/Plan:     CHF; acute on chronic now; will bump up his Lasix some  Afib; no recurrence; he is not a candidate for [facility] due to fall risk  H/o VT storm; stable  Cardiomyopathy; severe and ischemic; on appropriate therapy; will stop Midodrine    Mirela Chung. is a 76 y.o. male with     PROBLEM LIST:  Patient Active Problem List    Diagnosis Date Noted    V tach ([facility] [address] 08/20/2019     Priority: 1 - One    Acute on chronic systolic CHF (congestive heart failure) ([address] 08/06/2020    Bacterial pneumonia 07/14/2020    Acute CVA (cerebrovascular accident) ([facility] [address] 05/12/2020    VT (ventricular tachycardia) ([facility] [address] 08/20/2019    CHF exacerbation ([facility] [address] 06/13/2019    Fall 01/10/2019    TACOS (acute kidney injury) ([facility] [address] 01/10/2019    Chest pain 01/11/2018    Acute chest pain 01/10/2018    Hepatic encephalopathy ([address] 07/17/2017    Neuropathy 04/14/2017    Cirrhosis ([facility] [address] 04/14/2017    CAD (coronary artery disease) 04/14/2017    S/P coronary artery stent placement 04/14/2017    S/P CABG (coronary artery bypass graft) 04/14/2017    Thrombocytopenia ([facility] [address] 04/14/2017    MRSA infection 04/14/2017    S/P cholecystectomy 46/21/3306    Metabolic encephalopathy 00/19/5822    Seizure ([facility] [address] 11/21/2016    Sinusitis     Joint pain     Low back pain     GERD (gastroesophageal reflux disease)     Diabetes mellitus, type 2 ([address]          Subjective:     Mirela Chung. reports dyspnea.     Visit Vitals  BP (!) 157/92 (BP 1 Location: Right arm, BP Patient Position: At rest)   Pulse 93   Temp 97.9 °F (36.6 °C)   Resp 24   Wt 96.3 kg (212 lb 4.9 oz)   SpO2 95%   BMI 30.90 kg/m²       Intake/Output Summary (Last 24 hours) at 8/8/2020 1047  Last data filed at 8/7/2020 2303  Gross per 24 hour   Intake    Output 1250 ml   Net -1250 ml       Objective:      Physical Exam:  HEENT: MEETA Cole  Neck: No JVD,  No thyroidmegaly  Resp: some rales  CV: RRR s1s2 No murmur no s3  Abd:Soft, Nontender  Ext: No edema  Neuro: Alert and oriented; Nonfocal  Skin: Warm, Dry, Intact  Pulses: 2+ DP/PT/Rad      Telemetry: normal sinus rhythm, AV paced    Current Facility-Administered Medications   Medication Dose Route Frequency    furosemide (LASIX) injection 20 mg  20 mg IntraVENous DAILY    metoprolol succinate (TOPROL-XL) XL tablet 37.5 mg  37.5 mg Oral DAILY    ALPRAZolam (XANAX) tablet 1 mg  1 mg Oral Q12H    aspirin chewable tablet 81 mg  81 mg Oral DAILY    atorvastatin (LIPITOR) tablet 80 mg  80 mg Oral DAILY    clonazePAM (KlonoPIN) tablet 1 mg  1 mg Oral QHS PRN    clopidogreL (PLAVIX) tablet 75 mg  75 mg Oral DAILY    finasteride (PROSCAR) tablet 5 mg  5 mg Oral 7am    FLUoxetine (PROzac) capsule 20 mg  20 mg Oral DAILY    levETIRAcetam (KEPPRA) tablet 1,000 mg  1,000 mg Oral Q12H    midodrine (PROAMATINE) tablet 5 mg  5 mg Oral BID    OLANZapine (ZyPREXA) tablet 2.5 mg  2.5 mg Oral QHS    nitroglycerin (NITROSTAT) tablet 0.4 mg  0.4 mg SubLINGual Q5MIN PRN    pantoprazole (PROTONIX) tablet 40 mg  40 mg Oral ACB&D    pregabalin (LYRICA) capsule 50 mg  50 mg Oral TID    potassium chloride SR (KLOR-CON 10) tablet 20 mEq  20 mEq Oral DAILY PRN    venlafaxine-SR (EFFEXOR-XR) capsule 75 mg  75 mg Oral QPM    venlafaxine-SR (EFFEXOR-XR) capsule 150 mg  150 mg Oral DAILY    sodium chloride (NS) flush 5-40 mL  5-40 mL IntraVENous Q8H    sodium chloride (NS) flush 5-40 mL  5-40 mL IntraVENous PRN    acetaminophen (TYLENOL) tablet 650 mg  650 mg Oral Q6H PRN    Or    acetaminophen (TYLENOL) suppository 650 mg  650 mg Rectal Q6H PRN    polyethylene glycol (MIRALAX) packet 17 g  17 g Oral DAILY PRN    promethazine (PHENERGAN) tablet 12.5 mg  12.5 mg Oral Q6H PRN    Or    ondansetron (ZOFRAN) injection 4 mg  4 mg IntraVENous Q6H PRN    heparin (porcine) injection 5,000 Units  5,000 Units SubCUTAneous Q8H    insulin lispro (HUMALOG) injection   SubCUTAneous AC&HS    glucose chewable tablet 16 g  4 Tab Oral PRN    glucagon (GLUCAGEN) injection 1 mg  1 mg IntraMUSCular PRN    dextrose 10 % infusion 125-250 mL  125-250 mL IntraVENous PRN    amiodarone (CORDARONE) tablet 200 mg  200 mg Oral BID    lactulose (CHRONULAC) 10 gram/15 mL solution 30 mL  20 g Oral BID         Data Review:   Labs:    Recent Results (from the past 24 hour(s))   GLUCOSE, POC    Collection Time: 08/07/20 12:37 PM   Result Value Ref Range    Glucose (POC) 195 (H) 65 - 100 mg/dL    Performed by Mertha Moritz    GLUCOSE, POC    Collection Time: 08/07/20  6:05 PM   Result Value Ref Range    Glucose (POC) 154 (H) 65 - 100 mg/dL    Performed by Kai Powers, POC    Collection Time: 08/07/20  9:48 PM   Result Value Ref Range    Glucose (POC) 181 (H) 65 - 100 mg/dL    Performed by Edilma Johnson, POC    Collection Time: 08/08/20  8:50 AM   Result Value Ref Range    Glucose (POC) 171 (H) 65 - 100 mg/dL    Performed by Mati Miles

## 2020-08-08 NOTE — PROGRESS NOTES
Problem: Falls - Risk of  Goal: *Absence of Falls  Description: Document Gume Lynch Fall Risk and appropriate interventions in the flowsheet.   Outcome: Progressing Towards Goal  Note: Fall Risk Interventions:  Mobility Interventions: Patient to call before getting OOB, Strengthening exercises (ROM-active/passive)    Mentation Interventions: Increase mobility, More frequent rounding    Medication Interventions: Patient to call before getting OOB, Teach patient to arise slowly, Bed/chair exit alarm    Elimination Interventions: Call light in reach    History of Falls Interventions: Assess for delayed presentation/identification of injury for 48 hrs (comment for end date)

## 2020-08-08 NOTE — PROGRESS NOTES
Hospitalist Progress Note  Luis Garza MD  Answering service: [phone] from in house phone        Date of Service:  2020  NAME:  Fadia Lynch. :  1955  MRN:  [id number]      Admission Summary:   As per initial admission summary  This is a 68-year-old man with a past medical history significant for chronic systolic congestive heart failure, coronary artery disease status post CABG, obstructive sleep apnea, seizure disorder, dyslipidemia, type 2 diabetes, hypertension, dyslipidemia, status post AICD placement, anxiety/depression, who was in his usual state of health until a few days ago when the patient developed shortness of breath. The shortness of breath got worse on the day of presentation at the emergency room. The shortness of breath is worse when the patient is lying down and also with very mild exertion. The shortness of breath is associated with chest pain. The chest pain is located at the left side of the chest, 6/10 in severity, constant, dull ache. No known aggravating or relieving factors. The patient was brought to the emergency room for further evaluation. It was reported that the patient's AICD discharge on the way to the emergency room. The patient was last admitted to this hospital from 2020 to 2020. The patient was admitted and treated for congestive heart failure. When the patient arrived at the emergency room, the patient was found to have elevated BNP level. The chest x-ray shows persistent, but improved left perihilar airspace opacity. He was referred to the hospitalist service for evaluation for admission. Interval history / Subjective:     Patient seen for Follow up of SOB    Patient seen and examined by the bedside, Labs, images and notes reviewed  Patient is comfortable, denies any chest pain, SOB, abdominal pain, fevers, chills.  No N/V/D  Discussed with nursing staff, no acute issues overnight, orders reviewed. Patient feeling somewhat better, cardiology consulted   Still on 2 liters of oxygen   No aute issues overnight      Assessment & Plan:     1. Acute-on-chronic systolic congestive heart failure. Paroxysmal Atrial Fibrillation  (CDMP)  Cardiology following  Lasix dose reduced. Metoprolol increased     2. Coronary artery disease, status post CABG. Stable     3. Obstructive sleep apnea. We will place the patient on CPAP with home setting. 4.  Seizure disorder. We will continue with Keppra. 5.  Dyslipidemia. We will resume preadmission medication. 6.  Type 2 diabetes with hyperglycemia. The patient will be placed on sliding scale with insulin coverage. 7.  Hypertension. We will resume home medication. 8.  Dyslipidemia. We will continue with preadmission medication. 9.  Status post AICD placement. We will await further recommendation from the patient's cardiologist.    10.  Hypomagnesemia. We will replace magnesium and repeat magnesium level. 11. Anxiety/depression. We will continue with home medication. 12.  Bacterial pneumonia? ? .  Low suspicion of pna. White count resolved. No fever. Will order procalcitonin. 13.  Other Issues:  Code Status: The patient is a full code. We will place the patient on heparin for DVT prophylaxis. Code status: full   DVT prophylaxis: heparin Sc     Care Plan discussed with: Patient/Family  Disposition: Home w/Family and TBD     Hospital Problems  Date Reviewed: 8/6/2020          Codes Class Noted POA    * (Principal) Acute on chronic systolic CHF (congestive heart failure) ([facility] [address] ICD-10-CM: I50.23  ICD-9-CM: 428.23, 428.0  8/6/2020 Yes                Review of Systems:   A comprehensive review of systems was negative except for that written in the HPI. Vital Signs:    Last 24hrs VS reviewed since prior progress note.  Most recent are:  Visit Vitals  BP (!) 157/92 (BP 1 Location: Right arm, BP Patient Position: At rest)   Pulse 93   Temp 97.9 °F (36.6 °C)   Resp 24   Wt 96.3 kg (212 lb 4.9 oz)   SpO2 95%   BMI 30.90 kg/m²         Intake/Output Summary (Last 24 hours) at 8/8/2020 0858  Last data filed at 8/7/2020 2303  Gross per 24 hour   Intake    Output 1250 ml   Net -1250 ml        Physical Examination:             Constitutional:  No acute distress, cooperative, pleasant    ENT:  Oral mucous moist, oropharynx benign. Neck supple,    Resp:  CTA bilaterally. No wheezing/rhonchi/rales. No accessory muscle use   CV:  Regular rhythm, normal rate, no murmurs, gallops, rubs    GI:  Soft, non distended, non tender. normoactive bowel sounds, no hepatosplenomegaly     Musculoskeletal:  No edema, warm, 2+ pulses throughout    Neurologic:  Moves all extremities. AAOx3, CN II-XII reviewed     Psych:  Good insight, Not anxious nor agitated. Skin:  Good turgor, no rashes or ulcers  Hematologic/Lymphatic/Immunlogic:  No jaundice nor lymph node swelling  Eyes:  EOMI. Anicteric sclerae, PERRL.        Data Review:    Review and/or order of clinical lab test  Review and/or order of tests in the radiology section of CPT  Review and/or order of tests in the medicine section of CPT      Labs:     Recent Labs     08/07/20 0228 08/05/20 2149   WBC 9.6 15.9*   HGB 9.7* 10.5*   HCT 32.9* 36.1*    170     Recent Labs     08/07/20 0228 08/06/20  1015 08/05/20 2149     --  135*   K 4.0  --  4.1     --  101   CO2 23  --  23   BUN 26*  --  18   CREA 1.65*  --  1.64*   *  --  246*   CA 8.6  --  8.1*   MG  --  2.0 1.2*   PHOS  --  3.4  --      Recent Labs     08/07/20 0228 08/05/20 2149   ALT 19 20   AP 96 114   TBILI 1.1* 1.5*   TP 6.4 6.5   ALB 2.8* 3.2*   GLOB 3.6 3.3     Recent Labs     08/07/20 0228 08/06/20  1015   INR 1.0 1.1   PTP 10.7 11.0   APTT 32.5* 32.6*      Recent Labs     08/06/20  1015   FERR 34      Lab Results   Component Value Date/Time    Folate 11.2 05/13/2020 12:27 AM      No results for input(s): PH, PCO2, PO2 in the last 72 hours.   Recent Labs     08/06/20  1604 08/06/20  1015 08/05/20  2149   TROIQ 0.24* 0.31* <0.05     Lab Results   Component Value Date/Time    Cholesterol, total 136 07/15/2020 04:00 AM    HDL Cholesterol 20 07/15/2020 04:00 AM    LDL, calculated 75.2 07/15/2020 04:00 AM    Triglyceride 204 (H) 07/15/2020 04:00 AM    CHOL/HDL Ratio 6.8 (H) 07/15/2020 04:00 AM     Lab Results   Component Value Date/Time    Glucose (POC) 171 (H) 08/08/2020 08:50 AM    Glucose (POC) 181 (H) 08/07/2020 09:48 PM    Glucose (POC) 154 (H) 08/07/2020 06:05 PM    Glucose (POC) 195 (H) 08/07/2020 12:37 PM    Glucose (POC) 138 (H) 08/07/2020 08:17 AM     Lab Results   Component Value Date/Time    Color YELLOW/STRAW 07/25/2020 09:46 PM    Appearance CLEAR 07/25/2020 09:46 PM    Specific gravity 1.015 07/25/2020 09:46 PM    Specific gravity 1.025 07/14/2020 06:20 AM    pH (UA) 5.0 07/25/2020 09:46 PM    Protein 100 (A) 07/25/2020 09:46 PM    Glucose Negative 07/25/2020 09:46 PM    Ketone Negative 07/25/2020 09:46 PM    Bilirubin Negative 07/25/2020 09:46 PM    Urobilinogen 0.2 07/25/2020 09:46 PM    Nitrites Negative 07/25/2020 09:46 PM    Leukocyte Esterase Negative 07/25/2020 09:46 PM    Epithelial cells FEW 07/25/2020 09:46 PM    Bacteria Negative 07/25/2020 09:46 PM    WBC 0-4 07/25/2020 09:46 PM    RBC 0-5 07/25/2020 09:46 PM         Medications Reviewed:     Current Facility-Administered Medications   Medication Dose Route Frequency    furosemide (LASIX) injection 20 mg  20 mg IntraVENous DAILY    metoprolol succinate (TOPROL-XL) XL tablet 37.5 mg  37.5 mg Oral DAILY    ALPRAZolam (XANAX) tablet 1 mg  1 mg Oral Q12H    aspirin chewable tablet 81 mg  81 mg Oral DAILY    atorvastatin (LIPITOR) tablet 80 mg  80 mg Oral DAILY    clonazePAM (KlonoPIN) tablet 1 mg  1 mg Oral QHS PRN    clopidogreL (PLAVIX) tablet 75 mg  75 mg Oral DAILY    finasteride (PROSCAR) tablet 5 mg  5 mg Oral 7am    FLUoxetine (PROzac) capsule 20 mg  20 mg Oral DAILY    levETIRAcetam (KEPPRA) tablet 1,000 mg  1,000 mg Oral Q12H    midodrine (PROAMATINE) tablet 5 mg  5 mg Oral BID    OLANZapine (ZyPREXA) tablet 2.5 mg  2.5 mg Oral QHS    nitroglycerin (NITROSTAT) tablet 0.4 mg  0.4 mg SubLINGual Q5MIN PRN    pantoprazole (PROTONIX) tablet 40 mg  40 mg Oral ACB&D    pregabalin (LYRICA) capsule 50 mg  50 mg Oral TID    potassium chloride SR (KLOR-CON 10) tablet 20 mEq  20 mEq Oral DAILY PRN    venlafaxine-SR (EFFEXOR-XR) capsule 75 mg  75 mg Oral QPM    venlafaxine-SR (EFFEXOR-XR) capsule 150 mg  150 mg Oral DAILY    sodium chloride (NS) flush 5-40 mL  5-40 mL IntraVENous Q8H    sodium chloride (NS) flush 5-40 mL  5-40 mL IntraVENous PRN    acetaminophen (TYLENOL) tablet 650 mg  650 mg Oral Q6H PRN    Or    acetaminophen (TYLENOL) suppository 650 mg  650 mg Rectal Q6H PRN    polyethylene glycol (MIRALAX) packet 17 g  17 g Oral DAILY PRN    promethazine (PHENERGAN) tablet 12.5 mg  12.5 mg Oral Q6H PRN    Or    ondansetron (ZOFRAN) injection 4 mg  4 mg IntraVENous Q6H PRN    heparin (porcine) injection 5,000 Units  5,000 Units SubCUTAneous Q8H    insulin lispro (HUMALOG) injection   SubCUTAneous AC&HS    glucose chewable tablet 16 g  4 Tab Oral PRN    glucagon (GLUCAGEN) injection 1 mg  1 mg IntraMUSCular PRN    dextrose 10 % infusion 125-250 mL  125-250 mL IntraVENous PRN    amiodarone (CORDARONE) tablet 200 mg  200 mg Oral BID    lactulose (CHRONULAC) 10 gram/15 mL solution 30 mL  20 g Oral BID     ______________________________________________________________________  EXPECTED LENGTH OF STAY: 4d 2h  ACTUAL LENGTH OF STAY:          2                 Buelah Meeter, MD

## 2020-08-09 LAB
ANION GAP SERPL CALC-SCNC: 10 MMOL/L (ref 5–15)
BASOPHILS # BLD: 0 K/UL (ref 0–0.1)
BASOPHILS NFR BLD: 0 % (ref 0–1)
BUN SERPL-MCNC: 38 MG/DL (ref 6–20)
BUN/CREAT SERPL: 19 (ref 12–20)
CALCIUM SERPL-MCNC: 8 MG/DL (ref 8.5–10.1)
CHLORIDE SERPL-SCNC: 106 MMOL/L (ref 97–108)
CO2 SERPL-SCNC: 22 MMOL/L (ref 21–32)
CREAT SERPL-MCNC: 1.96 MG/DL (ref 0.7–1.3)
DIFFERENTIAL METHOD BLD: ABNORMAL
EOSINOPHIL # BLD: 0.3 K/UL (ref 0–0.4)
EOSINOPHIL NFR BLD: 4 % (ref 0–7)
ERYTHROCYTE [DISTWIDTH] IN BLOOD BY AUTOMATED COUNT: 19.1 % (ref 11.5–14.5)
GLUCOSE BLD STRIP.AUTO-MCNC: 124 MG/DL (ref 65–100)
GLUCOSE BLD STRIP.AUTO-MCNC: 155 MG/DL (ref 65–100)
GLUCOSE BLD STRIP.AUTO-MCNC: 159 MG/DL (ref 65–100)
GLUCOSE BLD STRIP.AUTO-MCNC: 251 MG/DL (ref 65–100)
GLUCOSE SERPL-MCNC: 113 MG/DL (ref 65–100)
HCT VFR BLD AUTO: 36.1 % (ref 36.6–50.3)
HGB BLD-MCNC: 10.6 G/DL (ref 12.1–17)
IMM GRANULOCYTES # BLD AUTO: 0.1 K/UL (ref 0–0.04)
IMM GRANULOCYTES NFR BLD AUTO: 1 % (ref 0–0.5)
LYMPHOCYTES # BLD: 1.4 K/UL (ref 0.8–3.5)
LYMPHOCYTES NFR BLD: 14 % (ref 12–49)
MCH RBC QN AUTO: 22 PG (ref 26–34)
MCHC RBC AUTO-ENTMCNC: 29.4 G/DL (ref 30–36.5)
MCV RBC AUTO: 74.9 FL (ref 80–99)
MONOCYTES # BLD: 0.6 K/UL (ref 0–1)
MONOCYTES NFR BLD: 6 % (ref 5–13)
NEUTS SEG # BLD: 7.4 K/UL (ref 1.8–8)
NEUTS SEG NFR BLD: 76 % (ref 32–75)
NRBC # BLD: 0 K/UL (ref 0–0.01)
NRBC BLD-RTO: 0 PER 100 WBC
PLATELET # BLD AUTO: 180 K/UL (ref 150–400)
PMV BLD AUTO: 11.1 FL (ref 8.9–12.9)
POTASSIUM SERPL-SCNC: 3.8 MMOL/L (ref 3.5–5.1)
RBC # BLD AUTO: 4.82 M/UL (ref 4.1–5.7)
SERVICE CMNT-IMP: ABNORMAL
SODIUM SERPL-SCNC: 138 MMOL/L (ref 136–145)
WBC # BLD AUTO: 9.8 K/UL (ref 4.1–11.1)

## 2020-08-09 PROCEDURE — 77010033678 HC OXYGEN DAILY

## 2020-08-09 PROCEDURE — 36415 COLL VENOUS BLD VENIPUNCTURE: CPT

## 2020-08-09 PROCEDURE — 74011250636 HC RX REV CODE- 250/636: Performed by: INTERNAL MEDICINE

## 2020-08-09 PROCEDURE — 74011636637 HC RX REV CODE- 636/637: Performed by: INTERNAL MEDICINE

## 2020-08-09 PROCEDURE — 94760 N-INVAS EAR/PLS OXIMETRY 1: CPT

## 2020-08-09 PROCEDURE — 97116 GAIT TRAINING THERAPY: CPT

## 2020-08-09 PROCEDURE — 85025 COMPLETE CBC W/AUTO DIFF WBC: CPT

## 2020-08-09 PROCEDURE — 97530 THERAPEUTIC ACTIVITIES: CPT

## 2020-08-09 PROCEDURE — 74011250637 HC RX REV CODE- 250/637: Performed by: INTERNAL MEDICINE

## 2020-08-09 PROCEDURE — 65660000000 HC RM CCU STEPDOWN

## 2020-08-09 PROCEDURE — 97161 PT EVAL LOW COMPLEX 20 MIN: CPT

## 2020-08-09 PROCEDURE — 74011000258 HC RX REV CODE- 258: Performed by: INTERNAL MEDICINE

## 2020-08-09 PROCEDURE — 82962 GLUCOSE BLOOD TEST: CPT

## 2020-08-09 PROCEDURE — 80048 BASIC METABOLIC PNL TOTAL CA: CPT

## 2020-08-09 RX ORDER — FUROSEMIDE 40 MG/1
80 TABLET ORAL DAILY
Status: DISCONTINUED | OUTPATIENT
Start: 2020-08-10 | End: 2020-08-11 | Stop reason: HOSPADM

## 2020-08-09 RX ORDER — FUROSEMIDE 40 MG/1
80 TABLET ORAL ONCE
Status: COMPLETED | OUTPATIENT
Start: 2020-08-09 | End: 2020-08-09

## 2020-08-09 RX ADMIN — INSULIN LISPRO 5 UNITS: 100 INJECTION, SOLUTION INTRAVENOUS; SUBCUTANEOUS at 12:05

## 2020-08-09 RX ADMIN — Medication 10 ML: at 21:48

## 2020-08-09 RX ADMIN — FINASTERIDE 5 MG: 5 TABLET, FILM COATED ORAL at 07:10

## 2020-08-09 RX ADMIN — LEVETIRACETAM 1000 MG: 500 TABLET ORAL at 09:51

## 2020-08-09 RX ADMIN — AMIODARONE HYDROCHLORIDE 200 MG: 200 TABLET ORAL at 09:51

## 2020-08-09 RX ADMIN — METOPROLOL SUCCINATE 37.5 MG: 25 TABLET, EXTENDED RELEASE ORAL at 09:50

## 2020-08-09 RX ADMIN — HEPARIN SODIUM 5000 UNITS: 5000 INJECTION INTRAVENOUS; SUBCUTANEOUS at 18:05

## 2020-08-09 RX ADMIN — Medication 10 ML: at 07:10

## 2020-08-09 RX ADMIN — PANTOPRAZOLE SODIUM 40 MG: 40 TABLET, DELAYED RELEASE ORAL at 17:19

## 2020-08-09 RX ADMIN — LACTULOSE 30 ML: 20 SOLUTION ORAL at 09:53

## 2020-08-09 RX ADMIN — VENLAFAXINE HYDROCHLORIDE 75 MG: 37.5 CAPSULE, EXTENDED RELEASE ORAL at 17:18

## 2020-08-09 RX ADMIN — LEVETIRACETAM 1000 MG: 500 TABLET ORAL at 21:47

## 2020-08-09 RX ADMIN — HEPARIN SODIUM 5000 UNITS: 5000 INJECTION INTRAVENOUS; SUBCUTANEOUS at 03:14

## 2020-08-09 RX ADMIN — HEPARIN SODIUM 5000 UNITS: 5000 INJECTION INTRAVENOUS; SUBCUTANEOUS at 12:05

## 2020-08-09 RX ADMIN — Medication 10 ML: at 17:19

## 2020-08-09 RX ADMIN — FUROSEMIDE 80 MG: 40 TABLET ORAL at 09:51

## 2020-08-09 RX ADMIN — PREGABALIN 50 MG: 25 CAPSULE ORAL at 17:19

## 2020-08-09 RX ADMIN — ATORVASTATIN CALCIUM 80 MG: 20 TABLET, FILM COATED ORAL at 09:50

## 2020-08-09 RX ADMIN — Medication 10 ML: at 12:06

## 2020-08-09 RX ADMIN — PREGABALIN 50 MG: 25 CAPSULE ORAL at 21:47

## 2020-08-09 RX ADMIN — FLUOXETINE 20 MG: 20 CAPSULE ORAL at 09:51

## 2020-08-09 RX ADMIN — LACTULOSE 30 ML: 20 SOLUTION ORAL at 17:18

## 2020-08-09 RX ADMIN — PANTOPRAZOLE SODIUM 40 MG: 40 TABLET, DELAYED RELEASE ORAL at 09:56

## 2020-08-09 RX ADMIN — AMIODARONE HYDROCHLORIDE 200 MG: 200 TABLET ORAL at 17:18

## 2020-08-09 RX ADMIN — CEFTRIAXONE 1 G: 1 INJECTION, POWDER, FOR SOLUTION INTRAMUSCULAR; INTRAVENOUS at 10:00

## 2020-08-09 RX ADMIN — CLOPIDOGREL BISULFATE 75 MG: 75 TABLET ORAL at 09:51

## 2020-08-09 RX ADMIN — AZITHROMYCIN 500 MG: 500 INJECTION, POWDER, LYOPHILIZED, FOR SOLUTION INTRAVENOUS at 10:53

## 2020-08-09 RX ADMIN — ASPIRIN 81 MG CHEWABLE TABLET 81 MG: 81 TABLET CHEWABLE at 09:51

## 2020-08-09 RX ADMIN — PREGABALIN 50 MG: 25 CAPSULE ORAL at 09:51

## 2020-08-09 RX ADMIN — INSULIN LISPRO 2 UNITS: 100 INJECTION, SOLUTION INTRAVENOUS; SUBCUTANEOUS at 17:17

## 2020-08-09 NOTE — PROGRESS NOTES
Bedside and Verbal shift change report given to JANELL Weller (oncoming nurse) by Eduardo Khanna (offgoing nurse). Report included the following information SBAR, Kardex, ED Summary, Procedure Summary, Intake/Output, MAR, Recent Results and Cardiac Rhythm Paced.

## 2020-08-09 NOTE — PROGRESS NOTES
Cardiology Progress Note                                        Admit Date: 8/5/2020    Assessment/Plan:     CHF;acute on chronic systolic HF; better on increased diuretics; will decrease Lasix some  Cardiomyopathy; severe; continue current regimen  PAF; no recurrence on Amiodarone  S/p ICD; working well. Yisel Ren is a 76 y.o. male with     PROBLEM LIST:  Patient Active Problem List    Diagnosis Date Noted    V tach ([facility] 08/20/2019     Priority: 1 - One    Acute on chronic systolic CHF (congestive heart failure) ([facility] 08/06/2020    Bacterial pneumonia 07/14/2020    Acute CVA (cerebrovascular accident) ([facility] 05/12/2020    VT (ventricular tachycardia) ([facility] [address] 08/20/2019    CHF exacerbation ([facility] [address] 06/13/2019    Fall 01/10/2019    TACOS (acute kidney injury) ([facility] [address] 01/10/2019    Chest pain 01/11/2018    Acute chest pain 01/10/2018    Hepatic encephalopathy ([address] 07/17/2017    Neuropathy 04/14/2017    Cirrhosis ([facility] [address] 04/14/2017    CAD (coronary artery disease) 04/14/2017    S/P coronary artery stent placement 04/14/2017    S/P CABG (coronary artery bypass graft) 04/14/2017    Thrombocytopenia ([facility] [address] 04/14/2017    MRSA infection 04/14/2017    S/P cholecystectomy 02/40/5503    Metabolic encephalopathy 48/28/6342    Seizure ([facility] [address] 11/21/2016    Sinusitis     Joint pain     Low back pain     GERD (gastroesophageal reflux disease)     Diabetes mellitus, type 2 (HCC)          Subjective:     Yisel Vincent. reports none.     Visit Vitals  /66 (BP 1 Location: Right arm, BP Patient Position: At rest)   Pulse 76   Temp 97.9 °F (36.6 °C)   Resp 20   Wt 93.7 kg (206 lb 9.1 oz)   SpO2 96%   BMI 30.07 kg/m²       Intake/Output Summary (Last 24 hours) at 8/9/2020 0913  Last data filed at 8/9/2020 0442  Gross per 24 hour   Intake 340 ml   Output 650 ml   Net -310 ml       Objective:      Physical Exam:  HEENT: Perrla, EOMI  Neck: No JVD,  No thyroidmegaly  Resp: rales  CV: RRR s1s2 No murmur, +s3  Abd:Soft, Nontender  Ext: No edema  Neuro: Alert and oriented; Nonfocal  Skin: Warm, Dry, Intact  Pulses: 2+ DP/PT/Rad      Telemetry: normal sinus rhythm, V paced    Current Facility-Administered Medications   Medication Dose Route Frequency    furosemide (LASIX) injection 40 mg  40 mg IntraVENous BID    cefTRIAXone (ROCEPHIN) 1 g in 0.9% sodium chloride (MBP/ADV) 50 mL  1 g IntraVENous Q24H    azithromycin (ZITHROMAX) 500 mg in 0.9% sodium chloride (MBP/ADV) 250 mL  500 mg IntraVENous Q24H    metoprolol succinate (TOPROL-XL) XL tablet 37.5 mg  37.5 mg Oral DAILY    [Held by provider] ALPRAZolam (XANAX) tablet 1 mg  1 mg Oral Q12H    aspirin chewable tablet 81 mg  81 mg Oral DAILY    atorvastatin (LIPITOR) tablet 80 mg  80 mg Oral DAILY    clopidogreL (PLAVIX) tablet 75 mg  75 mg Oral DAILY    finasteride (PROSCAR) tablet 5 mg  5 mg Oral 7am    FLUoxetine (PROzac) capsule 20 mg  20 mg Oral DAILY    levETIRAcetam (KEPPRA) tablet 1,000 mg  1,000 mg Oral Q12H    OLANZapine (ZyPREXA) tablet 2.5 mg  2.5 mg Oral QHS    nitroglycerin (NITROSTAT) tablet 0.4 mg  0.4 mg SubLINGual Q5MIN PRN    pantoprazole (PROTONIX) tablet 40 mg  40 mg Oral ACB&D    pregabalin (LYRICA) capsule 50 mg  50 mg Oral TID    potassium chloride SR (KLOR-CON 10) tablet 20 mEq  20 mEq Oral DAILY PRN    venlafaxine-SR (EFFEXOR-XR) capsule 75 mg  75 mg Oral QPM    [Held by provider] venlafaxine-SR (EFFEXOR-XR) capsule 150 mg  150 mg Oral DAILY    sodium chloride (NS) flush 5-40 mL  5-40 mL IntraVENous Q8H    sodium chloride (NS) flush 5-40 mL  5-40 mL IntraVENous PRN    acetaminophen (TYLENOL) tablet 650 mg  650 mg Oral Q6H PRN    Or    acetaminophen (TYLENOL) suppository 650 mg  650 mg Rectal Q6H PRN    polyethylene glycol (MIRALAX) packet 17 g  17 g Oral DAILY PRN    promethazine (PHENERGAN) tablet 12.5 mg  12.5 mg Oral Q6H PRN    Or    ondansetron (ZOFRAN) injection 4 mg  4 mg IntraVENous Q6H PRN    heparin (porcine) injection 5,000 Units  5,000 Units SubCUTAneous Q8H    insulin lispro (HUMALOG) injection   SubCUTAneous AC&HS    glucose chewable tablet 16 g  4 Tab Oral PRN    glucagon (GLUCAGEN) injection 1 mg  1 mg IntraMUSCular PRN    dextrose 10 % infusion 125-250 mL  125-250 mL IntraVENous PRN    amiodarone (CORDARONE) tablet 200 mg  200 mg Oral BID    lactulose (CHRONULAC) 10 gram/15 mL solution 30 mL  20 g Oral BID         Data Review:   Labs:    Recent Results (from the past 24 hour(s))   CBC WITH AUTOMATED DIFF    Collection Time: 08/08/20 10:25 AM   Result Value Ref Range    WBC 13.4 (H) 4.1 - 11.1 K/uL    RBC 5.21 4.10 - 5.70 M/uL    HGB 11.4 (L) 12.1 - 17.0 g/dL    HCT 38.6 36.6 - 50.3 %    MCV 74.1 (L) 80.0 - 99.0 FL    MCH 21.9 (L) 26.0 - 34.0 PG    MCHC 29.5 (L) 30.0 - 36.5 g/dL    RDW 19.1 (H) 11.5 - 14.5 %    PLATELET 348 749 - 611 K/uL    NRBC 0.0 0  WBC    ABSOLUTE NRBC 0.00 0.00 - 0.01 K/uL    NEUTROPHILS 85 (H) 32 - 75 %    LYMPHOCYTES 6 (L) 12 - 49 %    MONOCYTES 6 5 - 13 %    EOSINOPHILS 2 0 - 7 %    BASOPHILS 0 0 - 1 %    IMMATURE GRANULOCYTES 0 0.0 - 0.5 %    ABS. NEUTROPHILS 11.4 (H) 1.8 - 8.0 K/UL    ABS. LYMPHOCYTES 0.8 0.8 - 3.5 K/UL    ABS. MONOCYTES 0.8 0.0 - 1.0 K/UL    ABS. EOSINOPHILS 0.2 0.0 - 0.4 K/UL    ABS. BASOPHILS 0.0 0.0 - 0.1 K/UL    ABS. IMM.  GRANS. 0.1 (H) 0.00 - 0.04 K/UL    DF AUTOMATED     METABOLIC PANEL, BASIC    Collection Time: 08/08/20 10:25 AM   Result Value Ref Range    Sodium 136 136 - 145 mmol/L    Potassium 3.9 3.5 - 5.1 mmol/L    Chloride 104 97 - 108 mmol/L    CO2 23 21 - 32 mmol/L    Anion gap 9 5 - 15 mmol/L    Glucose 193 (H) 65 - 100 mg/dL    BUN 30 (H) 6 - 20 MG/DL    Creatinine 1.81 (H) 0.70 - 1.30 MG/DL    BUN/Creatinine ratio 17 12 - 20      GFR est AA 46 (L) >60 ml/min/1.73m2    GFR est non-AA 38 (L) >60 ml/min/1.73m2    Calcium 8.5 8.5 - 10.1 MG/DL   PROCALCITONIN    Collection Time: 08/08/20 10:25 AM   Result Value Ref Range Procalcitonin 0.11 ng/mL   GLUCOSE, POC    Collection Time: 08/08/20 12:42 PM   Result Value Ref Range    Glucose (POC) 163 (H) 65 - 100 mg/dL    Performed by Silvestre Edmonds  PCT    GLUCOSE, POC    Collection Time: 08/08/20  4:24 PM   Result Value Ref Range    Glucose (POC) 174 (H) 65 - 100 mg/dL    Performed by Malena Ruiz    GLUCOSE, POC    Collection Time: 08/08/20  9:50 PM   Result Value Ref Range    Glucose (POC) 127 (H) 65 - 100 mg/dL    Performed by Jossie Anderson PCT    CBC WITH AUTOMATED DIFF    Collection Time: 08/09/20  5:02 AM   Result Value Ref Range    WBC 9.8 4.1 - 11.1 K/uL    RBC 4.82 4.10 - 5.70 M/uL    HGB 10.6 (L) 12.1 - 17.0 g/dL    HCT 36.1 (L) 36.6 - 50.3 %    MCV 74.9 (L) 80.0 - 99.0 FL    MCH 22.0 (L) 26.0 - 34.0 PG    MCHC 29.4 (L) 30.0 - 36.5 g/dL    RDW 19.1 (H) 11.5 - 14.5 %    PLATELET 600 902 - 958 K/uL    MPV 11.1 8.9 - 12.9 FL    NRBC 0.0 0  WBC    ABSOLUTE NRBC 0.00 0.00 - 0.01 K/uL    NEUTROPHILS 76 (H) 32 - 75 %    LYMPHOCYTES 14 12 - 49 %    MONOCYTES 6 5 - 13 %    EOSINOPHILS 4 0 - 7 %    BASOPHILS 0 0 - 1 %    IMMATURE GRANULOCYTES 1 (H) 0.0 - 0.5 %    ABS. NEUTROPHILS 7.4 1.8 - 8.0 K/UL    ABS. LYMPHOCYTES 1.4 0.8 - 3.5 K/UL    ABS. MONOCYTES 0.6 0.0 - 1.0 K/UL    ABS. EOSINOPHILS 0.3 0.0 - 0.4 K/UL    ABS. BASOPHILS 0.0 0.0 - 0.1 K/UL    ABS. IMM.  GRANS. 0.1 (H) 0.00 - 0.04 K/UL    DF AUTOMATED     METABOLIC PANEL, BASIC    Collection Time: 08/09/20  5:02 AM   Result Value Ref Range    Sodium 138 136 - 145 mmol/L    Potassium 3.8 3.5 - 5.1 mmol/L    Chloride 106 97 - 108 mmol/L    CO2 22 21 - 32 mmol/L    Anion gap 10 5 - 15 mmol/L    Glucose 113 (H) 65 - 100 mg/dL    BUN 38 (H) 6 - 20 MG/DL    Creatinine 1.96 (H) 0.70 - 1.30 MG/DL    BUN/Creatinine ratio 19 12 - 20      GFR est AA 42 (L) >60 ml/min/1.73m2    GFR est non-AA 34 (L) >60 ml/min/1.73m2    Calcium 8.0 (L) 8.5 - 10.1 MG/DL   GLUCOSE, POC    Collection Time: 08/09/20  8:22 AM   Result Value Ref Range    Glucose (POC) 124 (H) 65 - 100 mg/dL    Performed by Eduar Laguna Right arm;
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
## 2020-08-09 NOTE — PROGRESS NOTES
Hospitalist Progress Note  Praneeth Correa MD  Answering service: [phone] from in house phone        Date of Service:  2020  NAME:  Alycia Gastelum :  1955  MRN:  [id number]      Admission Summary:   As per initial admission summary  This is a 21-year-old man with a past medical history significant for chronic systolic congestive heart failure, coronary artery disease status post CABG, obstructive sleep apnea, seizure disorder, dyslipidemia, type 2 diabetes, hypertension, dyslipidemia, status post AICD placement, anxiety/depression, who was in his usual state of health until a few days ago when the patient developed shortness of breath. The shortness of breath got worse on the day of presentation at the emergency room. The shortness of breath is worse when the patient is lying down and also with very mild exertion. The shortness of breath is associated with chest pain. The chest pain is located at the left side of the chest, 6/10 in severity, constant, dull ache. No known aggravating or relieving factors. The patient was brought to the emergency room for further evaluation. It was reported that the patient's AICD discharge on the way to the emergency room. The patient was last admitted to this hospital from 2020 to 2020. The patient was admitted and treated for congestive heart failure. When the patient arrived at the emergency room, the patient was found to have elevated BNP level. The chest x-ray shows persistent, but improved left perihilar airspace opacity. He was referred to the hospitalist service for evaluation for admission. Interval history / Subjective:     Patient seen for Follow up of SOB    Patient seen and examined by the bedside, Labs, images and notes reviewed  Patient is comfortable, denies any chest pain, SOB, abdominal pain, fevers, chills.  No N/V/D  Discussed with nursing staff, no acute issues overnight, orders reviewed. Still on 2 liters of oxygen . Yesterday patient was more lethargic. Cardiology increased lasix. Bump in the serum creatinine . No acute issues overnight     Assessment & Plan:     1. Acute-on-chronic systolic congestive heart failure. Paroxysmal Atrial Fibrillation  (CDMP)  Cardiology following  Lasix dose increased yesterday as per cardiology   Metoprolol increased     2. Coronary artery disease, status post CABG. Stable     3. Obstructive sleep apnea. CPAP with home setting. 4.  Seizure disorder. We will continue with Keppra. 5.  Dyslipidemia. We will resume preadmission medication. 6.  Type 2 diabetes with hyperglycemia. The patient will be placed on sliding scale with insulin coverage. 7.  Hypertension. We will resume home medication. 8.  Dyslipidemia. We will continue with preadmission medication. 9.  Status post AICD placement. Cardiology following     10. Hypomagnesemia. resolved     11. Anxiety/depression. We will continue with home medication. 12.  Bacterial pneumonia? ? .  Low suspicion of pna. White count resolved. No fever. Will order procalcitonin. #Multiple psych meds,   He is on multiple psych medications. Yesterday he was somewhat somnolent. I d/w nursing staff. Many of his psych medications were given in the morning time. I held some of his medications and consulted psych for medication readjustment. 13.  Other Issues:  Code Status: The patient is a full code. We will place the patient on heparin for DVT prophylaxis.     Code status: full   DVT prophylaxis: heparin Sc     Care Plan discussed with: Patient/Family  Disposition: Home w/Family and TBD     Hospital Problems  Date Reviewed: 8/6/2020          Codes Class Noted POA    * (Principal) Acute on chronic systolic CHF (congestive heart failure) ([facility] [address] ICD-10-CM: I50.23  ICD-9-CM: 428.23, 428.0  8/6/2020 Yes                Review of Systems:   A comprehensive review of systems was negative except for that written in the HPI. Vital Signs:    Last 24hrs VS reviewed since prior progress note. Most recent are:  Visit Vitals  /66 (BP 1 Location: Right arm, BP Patient Position: At rest)   Pulse 76   Temp 97.9 °F (36.6 °C)   Resp 20   Wt 93.7 kg (206 lb 9.1 oz)   SpO2 96%   BMI 30.07 kg/m²         Intake/Output Summary (Last 24 hours) at 8/9/2020 0802  Last data filed at 8/9/2020 0442  Gross per 24 hour   Intake 340 ml   Output 650 ml   Net -310 ml        Physical Examination:             Constitutional:  No acute distress, \   ENT:  Oral mucous moist, oropharynx benign. Neck supple,    Resp:  CTA bilaterally. No wheezing/rhonchi/rales. No accessory muscle use   CV:  Regular rhythm, normal rate, no murmurs, gallops, rubs    GI:  Soft, non distended, non tender. normoactive bowel sounds, no hepatosplenomegaly     Musculoskeletal:  No edema, warm, 2+ pulses throughout    Neurologic:  Moves all extremities. AAOx3, CN II-XII reviewed     Psych:  Good insight, Not anxious nor agitated.          Data Review:    Review and/or order of clinical lab test  Review and/or order of tests in the radiology section of CPT  Review and/or order of tests in the medicine section of CPT      Labs:     Recent Labs     08/09/20  0502 08/08/20  1025   WBC 9.8 13.4*   HGB 10.6* 11.4*   HCT 36.1* 38.6    198     Recent Labs     08/09/20  0502 08/08/20  1025 08/07/20  0228 08/06/20  1015    136 136  --    K 3.8 3.9 4.0  --     104 104  --    CO2 22 23 23  --    BUN 38* 30* 26*  --    CREA 1.96* 1.81* 1.65*  --    * 193* 152*  --    CA 8.0* 8.5 8.6  --    MG  --   --   --  2.0   PHOS  --   --   --  3.4     Recent Labs     08/07/20 0228   ALT 19   AP 96   TBILI 1.1*   TP 6.4   ALB 2.8*   GLOB 3.6     Recent Labs     08/07/20  0228 08/06/20  1015   INR 1.0 1.1   PTP 10.7 11.0   APTT 32.5* 32.6*      Recent Labs     08/06/20  1015   FERR 34      Lab Results   Component Value Date/Time    Folate 11.2 05/13/2020 12:27 AM      No results for input(s): PH, PCO2, PO2 in the last 72 hours.   Recent Labs     08/06/20  1604 08/06/20  1015   TROIQ 0.24* 0.31*     Lab Results   Component Value Date/Time    Cholesterol, total 136 07/15/2020 04:00 AM    HDL Cholesterol 20 07/15/2020 04:00 AM    LDL, calculated 75.2 07/15/2020 04:00 AM    Triglyceride 204 (H) 07/15/2020 04:00 AM    CHOL/HDL Ratio 6.8 (H) 07/15/2020 04:00 AM     Lab Results   Component Value Date/Time    Glucose (POC) 127 (H) 08/08/2020 09:50 PM    Glucose (POC) 174 (H) 08/08/2020 04:24 PM    Glucose (POC) 163 (H) 08/08/2020 12:42 PM    Glucose (POC) 171 (H) 08/08/2020 08:50 AM    Glucose (POC) 181 (H) 08/07/2020 09:48 PM     Lab Results   Component Value Date/Time    Color YELLOW/STRAW 07/25/2020 09:46 PM    Appearance CLEAR 07/25/2020 09:46 PM    Specific gravity 1.015 07/25/2020 09:46 PM    Specific gravity 1.025 07/14/2020 06:20 AM    pH (UA) 5.0 07/25/2020 09:46 PM    Protein 100 (A) 07/25/2020 09:46 PM    Glucose Negative 07/25/2020 09:46 PM    Ketone Negative 07/25/2020 09:46 PM    Bilirubin Negative 07/25/2020 09:46 PM    Urobilinogen 0.2 07/25/2020 09:46 PM    Nitrites Negative 07/25/2020 09:46 PM    Leukocyte Esterase Negative 07/25/2020 09:46 PM    Epithelial cells FEW 07/25/2020 09:46 PM    Bacteria Negative 07/25/2020 09:46 PM    WBC 0-4 07/25/2020 09:46 PM    RBC 0-5 07/25/2020 09:46 PM         Medications Reviewed:     Current Facility-Administered Medications   Medication Dose Route Frequency    furosemide (LASIX) injection 40 mg  40 mg IntraVENous BID    cefTRIAXone (ROCEPHIN) 1 g in 0.9% sodium chloride (MBP/ADV) 50 mL  1 g IntraVENous Q24H    azithromycin (ZITHROMAX) 500 mg in 0.9% sodium chloride (MBP/ADV) 250 mL  500 mg IntraVENous Q24H    metoprolol succinate (TOPROL-XL) XL tablet 37.5 mg  37.5 mg Oral DAILY    [Held by provider] ALPRAZolam (XANAX) tablet 1 mg  1 mg Oral Q12H    aspirin chewable tablet 81 mg  81 mg Oral DAILY    atorvastatin (LIPITOR) tablet 80 mg  80 mg Oral DAILY    clopidogreL (PLAVIX) tablet 75 mg  75 mg Oral DAILY    finasteride (PROSCAR) tablet 5 mg  5 mg Oral 7am    FLUoxetine (PROzac) capsule 20 mg  20 mg Oral DAILY    levETIRAcetam (KEPPRA) tablet 1,000 mg  1,000 mg Oral Q12H    OLANZapine (ZyPREXA) tablet 2.5 mg  2.5 mg Oral QHS    nitroglycerin (NITROSTAT) tablet 0.4 mg  0.4 mg SubLINGual Q5MIN PRN    pantoprazole (PROTONIX) tablet 40 mg  40 mg Oral ACB&D    pregabalin (LYRICA) capsule 50 mg  50 mg Oral TID    potassium chloride SR (KLOR-CON 10) tablet 20 mEq  20 mEq Oral DAILY PRN    venlafaxine-SR (EFFEXOR-XR) capsule 75 mg  75 mg Oral QPM    [Held by provider] venlafaxine-SR (EFFEXOR-XR) capsule 150 mg  150 mg Oral DAILY    sodium chloride (NS) flush 5-40 mL  5-40 mL IntraVENous Q8H    sodium chloride (NS) flush 5-40 mL  5-40 mL IntraVENous PRN    acetaminophen (TYLENOL) tablet 650 mg  650 mg Oral Q6H PRN    Or    acetaminophen (TYLENOL) suppository 650 mg  650 mg Rectal Q6H PRN    polyethylene glycol (MIRALAX) packet 17 g  17 g Oral DAILY PRN    promethazine (PHENERGAN) tablet 12.5 mg  12.5 mg Oral Q6H PRN    Or    ondansetron (ZOFRAN) injection 4 mg  4 mg IntraVENous Q6H PRN    heparin (porcine) injection 5,000 Units  5,000 Units SubCUTAneous Q8H    insulin lispro (HUMALOG) injection   SubCUTAneous AC&HS    glucose chewable tablet 16 g  4 Tab Oral PRN    glucagon (GLUCAGEN) injection 1 mg  1 mg IntraMUSCular PRN    dextrose 10 % infusion 125-250 mL  125-250 mL IntraVENous PRN    amiodarone (CORDARONE) tablet 200 mg  200 mg Oral BID    lactulose (CHRONULAC) 10 gram/15 mL solution 30 mL  20 g Oral BID     ______________________________________________________________________  EXPECTED LENGTH OF STAY: 4d 2h  ACTUAL LENGTH OF STAY:          3                 Jyothi Mac MD

## 2020-08-09 NOTE — PROGRESS NOTES
Problem: Falls - Risk of  Goal: *Absence of Falls  Description: Document Karyn [place] Fall Risk and appropriate interventions in the flowsheet.   Outcome: Progressing Towards Goal  Note: Fall Risk Interventions:  Mobility Interventions: Patient to call before getting OOB    Mentation Interventions: Evaluate medications/consider consulting pharmacy    Medication Interventions: Teach patient to arise slowly, Patient to call before getting OOB    Elimination Interventions: Call light in reach, Toileting schedule/hourly rounds, Bed/chair exit alarm    History of Falls Interventions: Evaluate medications/consider consulting pharmacy

## 2020-08-09 NOTE — PROGRESS NOTES
Bedside shift change report given to Bertram (oncoming nurse) by Michael Shoemaker (offgoing nurse). Report included the following information SBAR.

## 2020-08-09 NOTE — PROGRESS NOTES
Pt daughter updated on patients progress and her concern is that the patients anxiety may be the cause of his SOB. She is fearful that if he is not able to do his normal activity he will be come more depressed.

## 2020-08-09 NOTE — PROGRESS NOTES
Problem: Falls - Risk of  Goal: *Absence of Falls  Description: Document Davon Aubrey Fall Risk and appropriate interventions in the flowsheet. Outcome: Progressing Towards Goal  Note: Fall Risk Interventions:  Mobility Interventions: Communicate number of staff needed for ambulation/transfer     Mentation Interventions: Adequate sleep, hydration, pain control     Medication Interventions: Evaluate medications/consider consulting pharmacy     Elimination Interventions: Call light in reach     History of Falls Interventions: Consult care management for discharge planning         Patient is alert and oriented X 4.    O2 sat at 97% at 2L NC. Patient refuses CPAP at night.       Bedside and Verbal shift change report given to [address] Brannon (oncoming nurse) by Dawit Mathews (offgoing nurse).  Report included the following information SBAR, Kardex, Procedure Summary, Intake/Output, MAR, Accordion, Recent Results, Med Rec Status and Cardiac Rhythm

## 2020-08-10 LAB
ANION GAP SERPL CALC-SCNC: 8 MMOL/L (ref 5–15)
BASOPHILS # BLD: 0 K/UL (ref 0–0.1)
BASOPHILS NFR BLD: 0 % (ref 0–1)
BUN SERPL-MCNC: 40 MG/DL (ref 6–20)
BUN/CREAT SERPL: 19 (ref 12–20)
CALCIUM SERPL-MCNC: 7.9 MG/DL (ref 8.5–10.1)
CHLORIDE SERPL-SCNC: 104 MMOL/L (ref 97–108)
CO2 SERPL-SCNC: 23 MMOL/L (ref 21–32)
CREAT SERPL-MCNC: 2.08 MG/DL (ref 0.7–1.3)
DIFFERENTIAL METHOD BLD: ABNORMAL
EOSINOPHIL # BLD: 0.4 K/UL (ref 0–0.4)
EOSINOPHIL NFR BLD: 4 % (ref 0–7)
ERYTHROCYTE [DISTWIDTH] IN BLOOD BY AUTOMATED COUNT: 20.2 % (ref 11.5–14.5)
GLUCOSE BLD STRIP.AUTO-MCNC: 158 MG/DL (ref 65–100)
GLUCOSE BLD STRIP.AUTO-MCNC: 162 MG/DL (ref 65–100)
GLUCOSE BLD STRIP.AUTO-MCNC: 175 MG/DL (ref 65–100)
GLUCOSE BLD STRIP.AUTO-MCNC: 205 MG/DL (ref 65–100)
GLUCOSE SERPL-MCNC: 161 MG/DL (ref 65–100)
HCT VFR BLD AUTO: 36 % (ref 36.6–50.3)
HGB BLD-MCNC: 10.6 G/DL (ref 12.1–17)
IMM GRANULOCYTES # BLD AUTO: 0 K/UL (ref 0–0.04)
IMM GRANULOCYTES NFR BLD AUTO: 0 % (ref 0–0.5)
LYMPHOCYTES # BLD: 1.2 K/UL (ref 0.8–3.5)
LYMPHOCYTES NFR BLD: 13 % (ref 12–49)
MCH RBC QN AUTO: 21.9 PG (ref 26–34)
MCHC RBC AUTO-ENTMCNC: 29.4 G/DL (ref 30–36.5)
MCV RBC AUTO: 74.2 FL (ref 80–99)
MONOCYTES # BLD: 0.6 K/UL (ref 0–1)
MONOCYTES NFR BLD: 6 % (ref 5–13)
NEUTS SEG # BLD: 7.1 K/UL (ref 1.8–8)
NEUTS SEG NFR BLD: 77 % (ref 32–75)
NRBC # BLD: 0 K/UL (ref 0–0.01)
NRBC BLD-RTO: 0 PER 100 WBC
PLATELET # BLD AUTO: 181 K/UL (ref 150–400)
PLATELET COMMENTS,PCOM: ABNORMAL
PMV BLD AUTO: 10.5 FL (ref 8.9–12.9)
POTASSIUM SERPL-SCNC: 3.7 MMOL/L (ref 3.5–5.1)
RBC # BLD AUTO: 4.85 M/UL (ref 4.1–5.7)
RBC MORPH BLD: ABNORMAL
SERVICE CMNT-IMP: ABNORMAL
SODIUM SERPL-SCNC: 135 MMOL/L (ref 136–145)
WBC # BLD AUTO: 9.3 K/UL (ref 4.1–11.1)

## 2020-08-10 PROCEDURE — 80048 BASIC METABOLIC PNL TOTAL CA: CPT

## 2020-08-10 PROCEDURE — 74011250637 HC RX REV CODE- 250/637: Performed by: INTERNAL MEDICINE

## 2020-08-10 PROCEDURE — 74011250636 HC RX REV CODE- 250/636: Performed by: INTERNAL MEDICINE

## 2020-08-10 PROCEDURE — 74011636637 HC RX REV CODE- 636/637: Performed by: INTERNAL MEDICINE

## 2020-08-10 PROCEDURE — 97165 OT EVAL LOW COMPLEX 30 MIN: CPT

## 2020-08-10 PROCEDURE — 36415 COLL VENOUS BLD VENIPUNCTURE: CPT

## 2020-08-10 PROCEDURE — 65660000000 HC RM CCU STEPDOWN

## 2020-08-10 PROCEDURE — 74011000258 HC RX REV CODE- 258: Performed by: INTERNAL MEDICINE

## 2020-08-10 PROCEDURE — 85025 COMPLETE CBC W/AUTO DIFF WBC: CPT

## 2020-08-10 PROCEDURE — 94760 N-INVAS EAR/PLS OXIMETRY 1: CPT

## 2020-08-10 PROCEDURE — 82962 GLUCOSE BLOOD TEST: CPT

## 2020-08-10 PROCEDURE — 97535 SELF CARE MNGMENT TRAINING: CPT

## 2020-08-10 RX ADMIN — ASPIRIN 81 MG CHEWABLE TABLET 81 MG: 81 TABLET CHEWABLE at 10:44

## 2020-08-10 RX ADMIN — FINASTERIDE 5 MG: 5 TABLET, FILM COATED ORAL at 08:10

## 2020-08-10 RX ADMIN — HEPARIN SODIUM 5000 UNITS: 5000 INJECTION INTRAVENOUS; SUBCUTANEOUS at 04:37

## 2020-08-10 RX ADMIN — Medication 10 ML: at 21:34

## 2020-08-10 RX ADMIN — PREGABALIN 50 MG: 25 CAPSULE ORAL at 10:44

## 2020-08-10 RX ADMIN — FUROSEMIDE 80 MG: 40 TABLET ORAL at 10:44

## 2020-08-10 RX ADMIN — INSULIN LISPRO 3 UNITS: 100 INJECTION, SOLUTION INTRAVENOUS; SUBCUTANEOUS at 12:23

## 2020-08-10 RX ADMIN — AMIODARONE HYDROCHLORIDE 200 MG: 200 TABLET ORAL at 17:22

## 2020-08-10 RX ADMIN — METOPROLOL SUCCINATE 37.5 MG: 25 TABLET, EXTENDED RELEASE ORAL at 10:43

## 2020-08-10 RX ADMIN — AZITHROMYCIN 500 MG: 500 INJECTION, POWDER, LYOPHILIZED, FOR SOLUTION INTRAVENOUS at 11:35

## 2020-08-10 RX ADMIN — ATORVASTATIN CALCIUM 80 MG: 20 TABLET, FILM COATED ORAL at 10:43

## 2020-08-10 RX ADMIN — CEFTRIAXONE 1 G: 1 INJECTION, POWDER, FOR SOLUTION INTRAMUSCULAR; INTRAVENOUS at 11:34

## 2020-08-10 RX ADMIN — FLUOXETINE 20 MG: 20 CAPSULE ORAL at 10:43

## 2020-08-10 RX ADMIN — AMIODARONE HYDROCHLORIDE 200 MG: 200 TABLET ORAL at 10:43

## 2020-08-10 RX ADMIN — PREGABALIN 50 MG: 25 CAPSULE ORAL at 21:32

## 2020-08-10 RX ADMIN — Medication 10 ML: at 10:48

## 2020-08-10 RX ADMIN — PREGABALIN 50 MG: 25 CAPSULE ORAL at 17:22

## 2020-08-10 RX ADMIN — PANTOPRAZOLE SODIUM 40 MG: 40 TABLET, DELAYED RELEASE ORAL at 08:10

## 2020-08-10 RX ADMIN — LEVETIRACETAM 1000 MG: 500 TABLET ORAL at 10:44

## 2020-08-10 RX ADMIN — PANTOPRAZOLE SODIUM 40 MG: 40 TABLET, DELAYED RELEASE ORAL at 17:22

## 2020-08-10 RX ADMIN — LACTULOSE 30 ML: 20 SOLUTION ORAL at 10:44

## 2020-08-10 RX ADMIN — LACTULOSE 30 ML: 20 SOLUTION ORAL at 18:33

## 2020-08-10 RX ADMIN — VENLAFAXINE HYDROCHLORIDE 75 MG: 37.5 CAPSULE, EXTENDED RELEASE ORAL at 17:22

## 2020-08-10 RX ADMIN — HEPARIN SODIUM 5000 UNITS: 5000 INJECTION INTRAVENOUS; SUBCUTANEOUS at 19:14

## 2020-08-10 RX ADMIN — INSULIN LISPRO 2 UNITS: 100 INJECTION, SOLUTION INTRAVENOUS; SUBCUTANEOUS at 08:09

## 2020-08-10 RX ADMIN — Medication 10 ML: at 17:23

## 2020-08-10 RX ADMIN — INSULIN LISPRO 2 UNITS: 100 INJECTION, SOLUTION INTRAVENOUS; SUBCUTANEOUS at 17:22

## 2020-08-10 RX ADMIN — CLOPIDOGREL BISULFATE 75 MG: 75 TABLET ORAL at 10:43

## 2020-08-10 RX ADMIN — LEVETIRACETAM 1000 MG: 500 TABLET ORAL at 21:32

## 2020-08-10 RX ADMIN — HEPARIN SODIUM 5000 UNITS: 5000 INJECTION INTRAVENOUS; SUBCUTANEOUS at 11:35

## 2020-08-10 NOTE — CONSULTS
[address]    Name:  Dalton Landers  MR#:  [id number]  :  1955  ACCOUNT #:  [de-identified]  DATE OF SERVICE:  2020    BEHAVIORAL HEALTH CONSULTATION    REASON FOR CONSULTATION:  Confusion. HISTORY OF PRESENT ILLNESS:  The patient is a 28-year-old male who is currently being seen on the medical unit for psychiatric consultation for complaints mentioned above. His admission to the medical unit is well documented on his chart. His past medical history is significant for chronic systolic CHF, CAD, status post CABG, obstructive sleep apnea, seizure disorder, dyslipidemia, diabetes, hypertension, status post AICD placement. He has a significant cardiac history, Cardiology is following him. The patient presented to the emergency room with shortness of breath. The patient was lethargic yesterday, but during the interview, he was pleasant and cheerful. He does not appear lethargic or confused. He is able to provide a meaningful history. He tells me that he did not have any history of anxiety or depression until his wife passed away many years ago. He states that recently, he has been having short-term memory loss and has been getting more confused. He tells me that he is a little bit depressed due to his medical issues and his children, but that is the point that he is feeling hopeless or helpless. He denies history of prior suicide attempt. He denies suicidal ideation, homicidal ideation, auditory or visual hallucination.     PAST MEDICAL HISTORY:  See H and P.    Labs: (reviewed/updated 8/10/2020)  Patient Vitals for the past 8 hrs:   BP Temp Pulse Resp SpO2 Weight   08/10/20 0838 121/75 97.3 °F (36.3 °C) 72 13 95 %    08/10/20 0652      93.7 kg (206 lb 9.1 oz)   08/10/20 0443 119/73 97.9 °F (36.6 °C) 72 18 97 %    08/10/20 0211 (!) 147/92 98.4 °F (36.9 °C) 78 22 94 %      Labs Reviewed   CBC WITH AUTOMATED DIFF - Abnormal; Notable for the following components:       Result Value    WBC 15.9 (*)     HGB 10.5 (*)     HCT 36.1 (*)     MCV 75.4 (*)     MCH 21.9 (*)     MCHC 29.1 (*)     RDW 18.8 (*)     NEUTROPHILS 91 (*)     LYMPHOCYTES 3 (*)     MONOCYTES 4 (*)     IMMATURE GRANULOCYTES 1 (*)     ABS. NEUTROPHILS 14.4 (*)     ABS. LYMPHOCYTES 0.5 (*)     ABS. IMM.  GRANS. 0.2 (*)     All other components within normal limits   METABOLIC PANEL, COMPREHENSIVE - Abnormal; Notable for the following components:    Sodium 135 (*)     Glucose 246 (*)     Creatinine 1.64 (*)     BUN/Creatinine ratio 11 (*)     GFR est AA 51 (*)     GFR est non-AA 42 (*)     Calcium 8.1 (*)     Bilirubin, total 1.5 (*)     AST (SGOT) 14 (*)     Albumin 3.2 (*)     A-G Ratio 1.0 (*)     All other components within normal limits   MAGNESIUM - Abnormal; Notable for the following components:    Magnesium 1.2 (*)     All other components within normal limits   NT-PRO BNP - Abnormal; Notable for the following components:    NT pro-BNP 2,744 (*)     All other components within normal limits   TROPONIN I - Abnormal; Notable for the following components:    Troponin-I, Qt. 0.31 (*)     All other components within normal limits   D DIMER - Abnormal; Notable for the following components:    D-dimer 1.34 (*)     All other components within normal limits   HEMOGLOBIN A1C WITH EAG - Abnormal; Notable for the following components:    Hemoglobin A1c 6.6 (*)     All other components within normal limits   PTT - Abnormal; Notable for the following components:    aPTT 32.6 (*)     All other components within normal limits   FIBRINOGEN - Abnormal; Notable for the following components:    Fibrinogen 508 (*)     All other components within normal limits   TROPONIN I - Abnormal; Notable for the following components:    Troponin-I, Qt. 0.24 (*)     All other components within normal limits   METABOLIC PANEL, COMPREHENSIVE - Abnormal; Notable for the following components:    Glucose 152 (*)     BUN 26 (*)     Creatinine 1.65 (*)     GFR est AA 51 (*)     GFR est non-AA 42 (*)     Bilirubin, total 1.1 (*)     AST (SGOT) 13 (*)     Albumin 2.8 (*)     A-G Ratio 0.8 (*)     All other components within normal limits   CBC WITH AUTOMATED DIFF - Abnormal; Notable for the following components:    HGB 9.7 (*)     HCT 32.9 (*)     MCV 75.6 (*)     MCH 22.3 (*)     MCHC 29.5 (*)     RDW 19.2 (*)     NEUTROPHILS 77 (*)     All other components within normal limits   PTT - Abnormal; Notable for the following components:    aPTT 32.5 (*)     All other components within normal limits   FIBRINOGEN - Abnormal; Notable for the following components:    Fibrinogen 480 (*)     All other components within normal limits   CBC WITH AUTOMATED DIFF - Abnormal; Notable for the following components:    WBC 13.4 (*)     HGB 11.4 (*)     MCV 74.1 (*)     MCH 21.9 (*)     MCHC 29.5 (*)     RDW 19.1 (*)     NEUTROPHILS 85 (*)     LYMPHOCYTES 6 (*)     ABS. NEUTROPHILS 11.4 (*)     ABS. IMM. GRANS. 0.1 (*)     All other components within normal limits   METABOLIC PANEL, BASIC - Abnormal; Notable for the following components:    Glucose 193 (*)     BUN 30 (*)     Creatinine 1.81 (*)     GFR est AA 46 (*)     GFR est non-AA 38 (*)     All other components within normal limits   CBC WITH AUTOMATED DIFF - Abnormal; Notable for the following components:    HGB 10.6 (*)     HCT 36.1 (*)     MCV 74.9 (*)     MCH 22.0 (*)     MCHC 29.4 (*)     RDW 19.1 (*)     NEUTROPHILS 76 (*)     IMMATURE GRANULOCYTES 1 (*)     ABS. IMM.  GRANS. 0.1 (*)     All other components within normal limits   METABOLIC PANEL, BASIC - Abnormal; Notable for the following components:    Glucose 113 (*)     BUN 38 (*)     Creatinine 1.96 (*)     GFR est AA 42 (*)     GFR est non-AA 34 (*)     Calcium 8.0 (*)     All other components within normal limits   CBC WITH AUTOMATED DIFF - Abnormal; Notable for the following components:    HGB 10.6 (*)     HCT 36.0 (*)     MCV 74.2 (*) MCH 21.9 (*)     MCHC 29.4 (*)     RDW 20.2 (*)     NEUTROPHILS 77 (*)     All other components within normal limits   METABOLIC PANEL, BASIC - Abnormal; Notable for the following components:    Sodium 135 (*)     Glucose 161 (*)     BUN 40 (*)     Creatinine 2.08 (*)     GFR est AA 39 (*)     GFR est non-AA 32 (*)     Calcium 7.9 (*)     All other components within normal limits   GLUCOSE, POC - Abnormal; Notable for the following components:    Glucose (POC) 121 (*)     All other components within normal limits   GLUCOSE, POC - Abnormal; Notable for the following components:    Glucose (POC) 176 (*)     All other components within normal limits   GLUCOSE, POC - Abnormal; Notable for the following components:    Glucose (POC) 213 (*)     All other components within normal limits   GLUCOSE, POC - Abnormal; Notable for the following components:    Glucose (POC) 206 (*)     All other components within normal limits   GLUCOSE, POC - Abnormal; Notable for the following components:    Glucose (POC) 138 (*)     All other components within normal limits   GLUCOSE, POC - Abnormal; Notable for the following components:    Glucose (POC) 195 (*)     All other components within normal limits   GLUCOSE, POC - Abnormal; Notable for the following components:    Glucose (POC) 154 (*)     All other components within normal limits   GLUCOSE, POC - Abnormal; Notable for the following components:    Glucose (POC) 181 (*)     All other components within normal limits   GLUCOSE, POC - Abnormal; Notable for the following components:    Glucose (POC) 171 (*)     All other components within normal limits   GLUCOSE, POC - Abnormal; Notable for the following components:    Glucose (POC) 163 (*)     All other components within normal limits   GLUCOSE, POC - Abnormal; Notable for the following components:    Glucose (POC) 174 (*)     All other components within normal limits   GLUCOSE, POC - Abnormal; Notable for the following components: Glucose (POC) 127 (*)     All other components within normal limits   GLUCOSE, POC - Abnormal; Notable for the following components:    Glucose (POC) 124 (*)     All other components within normal limits   GLUCOSE, POC - Abnormal; Notable for the following components:    Glucose (POC) 251 (*)     All other components within normal limits   GLUCOSE, POC - Abnormal; Notable for the following components:    Glucose (POC) 155 (*)     All other components within normal limits   GLUCOSE, POC - Abnormal; Notable for the following components:    Glucose (POC) 159 (*)     All other components within normal limits   GLUCOSE, POC - Abnormal; Notable for the following components:    Glucose (POC) 175 (*)     All other components within normal limits   CULTURE, BLOOD, PAIRED   CULTURE, MRSA   SAMPLES BEING HELD   TROPONIN I   LACTIC ACID   TSH 3RD GENERATION   MAGNESIUM   PHOSPHORUS   PROTHROMBIN TIME + INR   LD   FERRITIN   SARS-COV-2   PROTHROMBIN TIME + INR   PROCALCITONIN     Lab Results   Component Value Date/Time    Sodium 135 (L) 08/10/2020 05:21 AM    Potassium 3.7 08/10/2020 05:21 AM    Chloride 104 08/10/2020 05:21 AM    CO2 23 08/10/2020 05:21 AM    Anion gap 8 08/10/2020 05:21 AM    Glucose 161 (H) 08/10/2020 05:21 AM    BUN 40 (H) 08/10/2020 05:21 AM    Creatinine 2.08 (H) 08/10/2020 05:21 AM    BUN/Creatinine ratio 19 08/10/2020 05:21 AM    GFR est AA 39 (L) 08/10/2020 05:21 AM    GFR est non-AA 32 (L) 08/10/2020 05:21 AM    Calcium 7.9 (L) 08/10/2020 05:21 AM    Bilirubin, total 1.1 (H) 08/07/2020 02:28 AM    Alk. phosphatase 96 08/07/2020 02:28 AM    Protein, total 6.4 08/07/2020 02:28 AM    Albumin 2.8 (L) 08/07/2020 02:28 AM    Globulin 3.6 08/07/2020 02:28 AM    A-G Ratio 0.8 (L) 08/07/2020 02:28 AM    ALT (SGPT) 19 08/07/2020 02:28 AM     No results displayed because visit has over 200 results.         Vitals:    08/10/20 0211 08/10/20 0443 08/10/20 0652 08/10/20 0838   BP: (!) 147/92 119/73  121/75   Pulse: 78 72  72   Resp: 22 18  13   Temp: 98.4 °F (36.9 °C) 97.9 °F (36.6 °C)  97.3 °F (36.3 °C)   SpO2: 94% 97%  95%   Weight:   93.7 kg (206 lb 9.1 oz)      Recent Results (from the past 24 hour(s))   GLUCOSE, POC    Collection Time: 08/09/20 11:34 AM   Result Value Ref Range    Glucose (POC) 251 (H) 65 - 100 mg/dL    Performed by Eneida Jaeger    GLUCOSE, POC    Collection Time: 08/09/20  4:43 PM   Result Value Ref Range    Glucose (POC) 155 (H) 65 - 100 mg/dL    Performed by Eneida Jaeger    GLUCOSE, POC    Collection Time: 08/09/20  9:52 PM   Result Value Ref Range    Glucose (POC) 159 (H) 65 - 100 mg/dL    Performed by Chanda Manzano    CBC WITH AUTOMATED DIFF    Collection Time: 08/10/20  5:21 AM   Result Value Ref Range    WBC 9.3 4.1 - 11.1 K/uL    RBC 4.85 4.10 - 5.70 M/uL    HGB 10.6 (L) 12.1 - 17.0 g/dL    HCT 36.0 (L) 36.6 - 50.3 %    MCV 74.2 (L) 80.0 - 99.0 FL    MCH 21.9 (L) 26.0 - 34.0 PG    MCHC 29.4 (L) 30.0 - 36.5 g/dL    RDW 20.2 (H) 11.5 - 14.5 %    PLATELET 668 426 - 463 K/uL    MPV 10.5 8.9 - 12.9 FL    NRBC 0.0 0  WBC    ABSOLUTE NRBC 0.00 0.00 - 0.01 K/uL    NEUTROPHILS 77 (H) 32 - 75 %    LYMPHOCYTES 13 12 - 49 %    MONOCYTES 6 5 - 13 %    EOSINOPHILS 4 0 - 7 %    BASOPHILS 0 0 - 1 %    IMMATURE GRANULOCYTES 0 0.0 - 0.5 %    ABS. NEUTROPHILS 7.1 1.8 - 8.0 K/UL    ABS. LYMPHOCYTES 1.2 0.8 - 3.5 K/UL    ABS. MONOCYTES 0.6 0.0 - 1.0 K/UL    ABS. EOSINOPHILS 0.4 0.0 - 0.4 K/UL    ABS. BASOPHILS 0.0 0.0 - 0.1 K/UL    ABS. IMM.  GRANS. 0.0 0.00 - 0.04 K/UL    DF SMEAR SCANNED      PLATELET COMMENTS Large Platelets      RBC COMMENTS OVALOCYTES  PRESENT        RBC COMMENTS POLYCHROMASIA  1+        RBC COMMENTS ANISOCYTOSIS  2+       METABOLIC PANEL, BASIC    Collection Time: 08/10/20  5:21 AM   Result Value Ref Range    Sodium 135 (L) 136 - 145 mmol/L    Potassium 3.7 3.5 - 5.1 mmol/L    Chloride 104 97 - 108 mmol/L    CO2 23 21 - 32 mmol/L    Anion gap 8 5 - 15 mmol/L    Glucose 161 (H) 65 - 100 mg/dL    BUN 40 (H) 6 - 20 MG/DL    Creatinine 2.08 (H) 0.70 - 1.30 MG/DL    BUN/Creatinine ratio 19 12 - 20      GFR est AA 39 (L) >60 ml/min/1.73m2    GFR est non-AA 32 (L) >60 ml/min/1.73m2    Calcium 7.9 (L) 8.5 - 10.1 MG/DL   GLUCOSE, POC    Collection Time: 08/10/20  7:52 AM   Result Value Ref Range    Glucose (POC) 175 (H) 65 - 100 mg/dL    Performed by [facility]:  Results from Hospital Encounter encounter on 08/05/20   XR ABD (KUB)    Narrative EXAM: XR ABD (KUB)    INDICATION: respiratory status    COMPARISON: Abdominal CT 5/30/2020. FINDINGS: A supine radiograph of the abdomen shows no dilated loops of large  small bowel with mild pancolonic fecal retention. No pneumatosis. Cholecystectomy clips are noted. No soft tissue masses or pathologic  calcifications are identified. The bones and soft tissues are within normal  limits. Impression IMPRESSION: Mild pancolonic fecal retention. No acute findings. Nm Lung Vent/perf    Result Date: 7/15/2020  Indication: Shortness of breath. A V/Q scan was performed with 13.9 mCi xenon 133 inhaled and 4.4 mCi Tc MAA injected intravenously. Ventilation demonstratesslight delayed washout. Perfusion images demonstrates no segmental abnormalities. There is a perfusion defect related to the battery pack. .    IMPRESSION: 1. Very low probability for pulmonary emboli. Avelina Landau Hand Rt Min 3 V    Result Date: 7/14/2020  INDICATION: fall/[pain COMPARISON: None FINDINGS: 3 views of the right hand demonstrate no acute fracture or subluxation. There is no significant soft tissue swelling. There is no radiopaque foreign body. IMPRESSION: No acute fracture. Xr Abd (kub)    Result Date: 8/8/2020  EXAM: XR ABD (KUB) INDICATION: respiratory status COMPARISON: Abdominal CT 5/30/2020. FINDINGS: A supine radiograph of the abdomen shows no dilated loops of large small bowel with mild pancolonic fecal retention. No pneumatosis. Cholecystectomy clips are noted. No soft tissue masses or pathologic calcifications are identified. The bones and soft tissues are within normal limits. IMPRESSION: Mild pancolonic fecal retention. No acute findings. Ct Head Wo Cont    Result Date: 7/25/2020  EXAM: CT HEAD WO CONT INDICATION: confusion COMPARISON: July 14. CONTRAST: None. TECHNIQUE: Unenhanced CT of the head was performed using 5 mm images. Brain and bone windows were generated. Coronal and sagittal reformats. CT dose reduction was achieved through use of a standardized protocol tailored for this examination and automatic exposure control for dose modulation. FINDINGS: The ventricles and sulci are normal in size, shape and configuration. . There is no significant white matter disease. There is no intracranial hemorrhage, extra-axial collection, or mass effect. The basilar cisterns are open. No CT evidence of acute infarct. The bone windows demonstrate no abnormalities. The coastal thickening maxillary sinuses bilaterally. IMPRESSION: No acute findings. Ct Head Wo Cont    Result Date: 7/14/2020  EXAM: CT HEAD WO CONT INDICATION: fall at home COMPARISON: 5/24/2020. CONTRAST: None. TECHNIQUE: Unenhanced CT of the head was performed using 5 mm images. Brain and bone windows were generated. Coronal and sagittal reformats. CT dose reduction was achieved through use of a standardized protocol tailored for this examination and automatic exposure control for dose modulation. FINDINGS: The ventricles and sulci are normal in size, shape and configuration. . There is no significant white matter disease. There is no intracranial hemorrhage, extra-axial collection, or mass effect. The basilar cisterns are open. No CT evidence of acute infarct. The bone windows demonstrate no abnormalities. The visualized portions of the paranasal sinuses and mastoid air cells are clear. Vascular calcification is noted.      IMPRESSION: No acute process or change compared to the prior exam.     Ct Head Wo Cont    Result Date: 5/24/2020 5/23/2020 CLINICAL HISTORY: headache, transient loss of vision, hx of migraines INDICATION: headache, transient loss of vision, hx of migraines COMPARISON: 5/20/2020. CT dose reduction was achieved through use of a standardized protocol tailored for this examination and automatic exposure control for dose modulation. TECHNIQUE: Serial axial images with a collimation of 5 mm were obtained from the skull base through the vertex  FINDINGS: The sulci and ventricles are within normal limits for patient age. There is no evidence of an acute infarction, hemorrhage, or mass-effect. There is no evidence of midline shift or hydrocephalus. Posterior fossa structures are unremarkable. No extra-axial collections are seen. Mastoid air cells are well pneumatized and clear. Minimal periventricular hypodensity likely related to mild chronic microvascular ischemic change. Vertebral vascular calcifications. IMPRESSION: No acute intracranial process. Ct Head Wo Cont    Result Date: 5/20/2020  Indication:  Blurred vision and headache Comparison: CT 5/12/2020 Findings: 5 mm axial images were obtained from the skull base through the vertex. CT dose reduction was achieved through the use of a standardized protocol tailored for this examination and automatic exposure control for dose modulation. The ventricles and cortical sulci are prominent, compatible with age related volume loss. There is no evidence of intracranial hemorrhage, mass, mass effect, or acute infarct. There is periventricular white matter disease. No extra-axial fluid collections are seen. The visualized paranasal sinuses and mastoid air cells are clear. The orbital structures are unremarkable. No osseous abnormalities are seen. Impression: 1. No evidence of acute infarct or intracranial hemorrhage.  2. Mild periventricular white matter disease is likely secondary to chronic small vessel ischemic changes. Ct Spine Thorac Wo Cont    Result Date: 7/25/2020  EXAM:  CT SPINE [facility] WO CONT INDICATION: Urinary incontinence. COMPARISON: None. TECHNIQUE: Multislice helical CT of the thoracic spine was performed. Sagittal and coronal reformations were generated. CT dose reduction was achieved through use of a standardized protocol tailored for this examination and automatic exposure control for dose modulation. FINDINGS: The alignment of the thoracic spine is normal. There are minimal compression deformities of the spine which appear chronic. There is no fracture or other acute abnormality. There is no spinal canal stenosis. There are bilateral pleural effusions. There are shotty nodes in the mediastinum. IMPRESSION: No acute findings. Ct Spine Lumb Wo Cont    Result Date: 7/25/2020  EXAM: CT SPINE LUMB WO CONT INDICATION: urine incontinence, bilateral leg numbness; unable to get MRI due to pacemaker COMPARISON: None. TECHNIQUE:   Unenhanced multislice helical CT of the lumbar spine was performed in the axial plane. Coronal and sagittal reconstructions were obtained. CT dose reduction was achieved through use of a standardized protocol tailored for this examination and automatic exposure control for dose modulation. FINDINGS: There are renal cysts. There are mild compression deformities of L1-L4. T12-L1: The spinal canal and neural foramina are widely patent. L1-L2: The spinal canal and neural foramina are widely patent. L2-L3: Diffuse disc space narrowing and ligamentum flavum hypertrophy. L3-L4: Diffuse disc bulge resulting in mild central spinal stenosis L4-L5: Diffuse disc bulge and ligamentum flavum hypertrophy resulting in mild central spinal stenosis and bilateral foraminal stenosis L5-S1: Broad-based disc bulge, paracentral to the left, resulting in left foraminal stenosis     IMPRESSION: 1. Multilevel mild degenerative changes as described above.  2. Multiple mild compression deformities. DEXA scan recommended. 3. Renal cysts. Ct Abd Pelv Wo Cont    Result Date: 5/13/2020  EXAM: CT ABD PELV WO CONT INDICATION: Fall. Hospitalization for acute CVA and carotid artery stenosis. Cholecystectomy. COMPARISON: CT abdomen/pelvis on 3/31/2019. CONTRAST:  None. TECHNIQUE: Thin axial images were obtained through the abdomen and pelvis. Coronal and sagittal reformats were generated. Oral contrast was not administered. CT dose reduction was achieved through use of a standardized protocol tailored for this examination and automatic exposure control for dose modulation. Adaptive statistical iterative reconstruction (ASIR) was utilized. The absence of intravenous contrast material reduces the sensitivity for evaluation of the vasculature and solid organs. FINDINGS: LOWER THORAX: Subsegmental opacity in the right lower lobe may be atelectasis. Left lower lobe and lingular left upper lobe scarring is unchanged. LIVER: No mass. BILIARY TREE: Cholecystectomy. CBD is not dilated. SPLEEN: Upper normal spleen is unchanged. PANCREAS: No inflammation. ADRENALS: Unremarkable. KIDNEYS/URETERS: No hydronephrosis. There is excreted contrast from recent iodine administration. STOMACH: Partial distention with food products. SMALL BOWEL: No dilatation or wall thickening. COLON: No dilatation or wall thickening. APPENDIX: Unremarkable. PERITONEUM: No ascites or pneumoperitoneum. RETROPERITONEUM: Aortic atherosclerosis without aneurysm. No lymphadenopathy or hemorrhage. REPRODUCTIVE ORGANS: Prostatomegaly is unchanged. URINARY BLADDER: Contains extruded contrast. BONES: Multiple chronic partial compression fractures are unchanged. No acute fracture ABDOMINAL WALL: No mass or hernia. ADDITIONAL COMMENTS: N/A     IMPRESSION: No acute process on noncontrast CT. No significant change since 6 weeks ago. Xr Chest Port    Result Date: 8/8/2020  EXAM: XR CHEST PORT INDICATION: SOB, r/o PNA COMPARISON: Chest x-ray 5/20/2020. FINDINGS: A portable AP radiograph of the chest was obtained at 11:54 hours. The patient is on a cardiac monitor. Pacemaker generator body projects over the left chest wall with intact appearing leads traversing in expected course. There is pulmonary vascular congestion and diffuse mild interstitial prominence. No consolidative infiltrate. Trace left pleural effusion. No pneumothorax or right pleural effusion. There are median sternotomy wires and surgical clips compatible with prior CABG. The cardiac and mediastinal contours and pulmonary vascularity are normal.  The chest wall structures and visualized upper abdomen show no acute findings with incidental note of degenerative spine and shoulder changes as well as diffuse osteopenia. IMPRESSION: Congestive failure with interstitial edema and trace left pleural effusion. Xr Chest Port    Result Date: 8/5/2020  Clinical history: short of breath INDICATION:   short of breath COMPARISON: 7/14/2020 FINDINGS: AP portable upright view of the chest demonstrates a stable  cardiopericardial silhouette. Left lung airspace disease is less significant similar to prior examination. Post sternotomy. Cardiac pacer. .Patient is on a cardiac monitor. Small left effusion not changed. IMPRESSION: Persistent though improved left perihilar airspace opacity. Small left effusion not changed. Allen Esquivel Chest Port    Result Date: 7/14/2020  INDICATION: Chest pain EXAM:  AP CHEST RADIOGRAPH COMPARISON: August 26, 2019 FINDINGS: AP portable view of the chest demonstrates prior median sternotomy, left subclavian pacemaker and cardiomegaly. Left perihilar airspace disease and small left pleural effusion. Right lung is clear. No pneumothorax. The osseous structures are unremarkable. IMPRESSION: Left perihilar airspace disease and small left pleural effusion.     Cta Code Neuro Head And Neck W Cont    Result Date: 5/12/2020  INDICATION: visual field cut/ EXAMINATION:  CT ANGIOGRAPHY HEAD AND NECK COMPARISON: None TECHNIQUE:  Following the uneventful administration of iodinated contrast material, axial CT angiography of the head and neck was performed. Delayed axial images through the head were also obtained. Coronal and sagittal reconstructions were obtained. Manual postprocessing of images was performed. 3-D  Sagittal maximal intensity projection images were obtained. 3-D Coronal maximal intensity projections were obtained. CT dose reduction was achieved through use of a standardized protocol tailored for this examination and automatic exposure control for dose modulation. FINDINGS: CTA NECK: Aortic Arch: No significant abnormality. Right Common Carotid Artery: Mild noncalcified plaque throughout the right common carotid artery. Right Internal Carotid Artery: Partly calcified, severe plaque at the internal carotid artery origin, narrowing the lumen to 1.2 mm relative to the cervical segment of 4.8 mm. NASCET Right: 75%. Left Common Carotid Artery: No significant abnormality. Left Internal Carotid Artery: Heavily calcified plaque at the origin narrowing the lumen to 0.6 mm relative to the cervical segment of 3.8 mm. NASCET Left: 85-90%. Carotid stenosis determined using NASCET criteria. Right Vertebral Artery: No significant abnormality. Left Vertebral Artery: No significant abnormality. Cervical Soft Tissues: No significant abnormality. Lung Apices: No significant abnormality. Bones: No destructive bone lesion. Additional Comments: N/A. CTA HEAD: Posterior Circulation: Moderate multifocal stenosis right V4 vertebral artery segment. Mild stenosis mid left V4 vertebral artery segment. As a artery and posterior cerebral arteries are patent. Partially visualized right posterior communicating artery may be stenosed or partially thrombosed. Anterior Circulation: Moderate to severe stenosis left proximal petrous internal carotid artery as it enters the skull base.  Moderate to severe calcific atherosclerosis bilateral cavernous and supraclinoid segments. Middle and anterior cerebral arteries are patent. Additional Comments: No evidence of aneurysm or vascular malformation. IMPRESSION: 1. No acute large vessel occlusion. 2. Severe stenosis bilateral internal carotid arteries left greater than right, as above. [place] [employer] Technologist Service    Result Date: 6/9/2020  Fluoroscopy was utilized. IMPRESSION:  FLUOROSCOPY WAS USED. Fluoro Dose:  358.4 mGy vk    Ct Code Neuro Head Wo Contrast    Result Date: 5/12/2020  EXAM: CT CODE NEURO HEAD WO CONTRAST INDICATION: headache loss of vision COMPARISON: 1/13/2019. CONTRAST: None. TECHNIQUE: Unenhanced CT of the head was performed using 5 mm images. Brain and bone windows were generated. Coronal and sagittal reformats. CT dose reduction was achieved through use of a standardized protocol tailored for this examination and automatic exposure control for dose modulation. FINDINGS: Generalized prominence of cerebral sulci and ventricles is mildly increased but commensurate with age. . Periventricular white matter low-density is mild and diffuse. Focal small low densities in the left caudate nucleus and in the right anterior thalamus are stable. . There is no intracranial hemorrhage, extra-axial collection, or mass effect. The basilar cisterns are open. No CT evidence of acute infarct. The bone windows demonstrate no abnormalities. The visualized portions of the paranasal sinuses and mastoid air cells are clear. IMPRESSION: 1. No acute intracranial abnormality is detected. 2. Stable microvascular ischemic, old ischemic and age-related change. Selam Vasquez Imaging Guidance    Result Date: 5/19/2020  Automatic Administrative Result. Imaging Guidance used in the [facility]. See the Patient Chart for specific details.     : Imaging guidance utilized in [facility]. Zachary Palafox           PAST PSYCHIATRIC HISTORY:  He says he has never been admitted in any psychiatric facility. He states that his primary care physician and at times his psychiatrist, who he could not recall their names, are sometimes prescribing him Effexor and Xanax. PSYCHOSOCIAL HISTORY:  He is a . He has two children. MENTAL STATUS EXAM:  The patient is currently sitting up in chair, dressed in hospital apparel. He is pleasant and cheerful. He reports his mood is good. Affect is reactive. Speech, normal rate and rhythm. Thought process, logical and goal directed. He denies suicidal ideation, homicidal ideation, auditory or visual hallucination. Cognitively he is alert and oriented. Insight is partial.  Judgment is fair. ASSESSMENT AND PLAN:  The patient has a history of anxiety and depression. The patient also has significant history of cardiac history. Due to his cardiac history, I would be inclined to taper his Effexor since Effexor is not the best medication for people who have cardiac history. I will take the liberty to stop the 150 mg and continue the 75 mg for now, to prevent discontinuation syndrome. I do recommend for him to see his outpatient psychiatrist and have him taper Effexor and look into a more cardiac-friendly antidepressant. We will continue Prozac at the moment and this can be increased to 40mg. I recommend to decrease the Xanax to 0.5 mg twice a day. I will also take the liberty to stop the Zyprexa at bedtime and if at any point he starts having difficulty sleeping, please utilize mirtazapine 7.5 mg at bedtime. There is no indication for inpatient psychiatric admission. Please discharge to home or to an appropriate facility once medically stable. Thank you for this kind consult. Please call with questions.       RAJENDRA ORZOCO NP      [id number]  D:  08/09/2020 18:57  T:  08/10/2020 0:08  JOB #:  [id number]

## 2020-08-10 NOTE — ROUTINE PROCESS
Bedside shift change report given to Kimberly Kirk RN (oncoming nurse) by Migel Valdez RN (offgoing nurse). Report included the following information SBAR, Kardex, Intake/Output, MAR, Accordion, Recent Results, Med Rec Status and Cardiac Rhythm Paced.
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
## 2020-08-10 NOTE — PROGRESS NOTES
Hospitalist Progress Note  Chandler Hedrick MD  Answering service: [phone] from in house phone        Date of Service:  8/10/2020  NAME:  Kaye Marroquin. :  1955  MRN:  [id number]      Admission Summary:   As per initial admission summary  This is a 50-year-old man with a past medical history significant for chronic systolic congestive heart failure, coronary artery disease status post CABG, obstructive sleep apnea, seizure disorder, dyslipidemia, type 2 diabetes, hypertension, dyslipidemia, status post AICD placement, anxiety/depression, who was in his usual state of health until a few days ago when the patient developed shortness of breath. The shortness of breath got worse on the day of presentation at the emergency room. The shortness of breath is worse when the patient is lying down and also with very mild exertion. The shortness of breath is associated with chest pain. The chest pain is located at the left side of the chest, 6/10 in severity, constant, dull ache. No known aggravating or relieving factors. The patient was brought to the emergency room for further evaluation. It was reported that the patient's AICD discharge on the way to the emergency room. The patient was last admitted to this hospital from 2020 to 2020. The patient was admitted and treated for congestive heart failure. When the patient arrived at the emergency room, the patient was found to have elevated BNP level. The chest x-ray shows persistent, but improved left perihilar airspace opacity. He was referred to the hospitalist service for evaluation for admission. Interval history / Subjective:     Patient seen for Follow up of SOB    Patient seen and examined by the bedside, Labs, images and notes reviewed  Patient is comfortable, denies any chest pain, SOB, abdominal pain, fevers, chills.  No N/V/D  Discussed with nursing staff, no acute issues overnight, orders reviewed. Still on 2 liters of oxygen . Yesterday patient was more lethargic. Cardiology increased lasix. Bump in the serum creatinine . No acute issues overnight  Today on room air. Overall feeling better. PT/OT following      Assessment & Plan:     1. Acute-on-chronic systolic congestive heart failure. Paroxysmal Atrial Fibrillation  (CDMP)  Cardiology following  Lasix dose increased yesterday as per cardiology   Metoprolol increased   On oral lasix    2. Coronary artery disease, status post CABG. Stable     3. Obstructive sleep apnea. CPAP with home setting. 4.  Seizure disorder. We will continue with Keppra. 5.  Dyslipidemia. We will resume preadmission medication. 6.  Type 2 diabetes with hyperglycemia. The patient will be placed on sliding scale with insulin coverage. 7.  Hypertension. We will resume home medication. 8.  Dyslipidemia. We will continue with preadmission medication. 9.  Status post AICD placement. Cardiology following     10. Hypomagnesemia. resolved     11. Anxiety/depression. We will continue with home medication. 12.  Bacterial pneumonia? ? .  Low suspicion of pna. White count resolved. No fever. Will order procalcitonin. #Multiple psych meds,   He is on multiple psych medications. 4/10 he was somewhat somnolent. I d/w nursing staff. Many of his psych medications were given in the morning time. I held some of his medications and consulted psych for medication readjustment. Stopped some of his psych medications   Currently on prozac and Effexor     13. Other Issues:  Code Status: The patient is a full code. We will place the patient on heparin for DVT prophylaxis.     Code status: full   DVT prophylaxis: heparin Sc     Care Plan discussed with: Patient/Family  Disposition: Home w/Family and TBD     Hospital Problems  Date Reviewed: 8/6/2020          Codes Class Noted POA    * (Principal) Acute on chronic systolic CHF (congestive heart failure) (HCC) ICD-10-CM: I50.23  ICD-9-CM: 428.23, 428.0  8/6/2020 Yes                Review of Systems:   A comprehensive review of systems was negative except for that written in the HPI. Vital Signs:    Last 24hrs VS reviewed since prior progress note. Most recent are:  Visit Vitals  /78 (BP Patient Position: Standing)   Pulse 76   Temp 97.7 °F (36.5 °C)   Resp 14   Wt 93.7 kg (206 lb 9.1 oz)   SpO2 95%   BMI 30.07 kg/m²         Intake/Output Summary (Last 24 hours) at 8/10/2020 1335  Last data filed at 8/10/2020 0032  Gross per 24 hour   Intake 480 ml   Output 850 ml   Net -370 ml        Physical Examination:             Constitutional:  No acute distress, alert   ENT:  Oral mucous moist, oropharynx benign. Neck supple,    Resp:  CTA bilaterally. No wheezing/rhonchi/rales. No accessory muscle use   CV:  Regular rhythm, normal rate, no murmurs, gallops, rubs    GI:  Soft, non distended, non tender. normoactive bowel sounds, no hepatosplenomegaly     Musculoskeletal:  No edema, warm, 2+ pulses throughout    Neurologic:  Moves all extremities. AAOx3, CN II-XII reviewed     Psych:  Good insight, Not anxious nor agitated. Data Review:    Review and/or order of clinical lab test  Review and/or order of tests in the radiology section of CPT  Review and/or order of tests in the medicine section of CPT      Labs:     Recent Labs     08/10/20  0521 08/09/20  0502   WBC 9.3 9.8   HGB 10.6* 10.6*   HCT 36.0* 36.1*    180     Recent Labs     08/10/20  0521 08/09/20  0502 08/08/20  1025   * 138 136   K 3.7 3.8 3.9    106 104   CO2 23 22 23   BUN 40* 38* 30*   CREA 2.08* 1.96* 1.81*   * 113* 193*   CA 7.9* 8.0* 8.5     No results for input(s): ALT, AP, TBIL, TBILI, TP, ALB, GLOB, GGT, AML, LPSE in the last 72 hours.     No lab exists for component: SGOT, GPT, AMYP, HLPSE  No results for input(s): INR, PTP, APTT, INREXT, INREXT in the last 72 hours. No results for input(s): FE, TIBC, PSAT, FERR in the last 72 hours. Lab Results   Component Value Date/Time    Folate 11.2 05/13/2020 12:27 AM      No results for input(s): PH, PCO2, PO2 in the last 72 hours. No results for input(s): CPK, CKNDX, TROIQ in the last 72 hours.     No lab exists for component: CPKMB  Lab Results   Component Value Date/Time    Cholesterol, total 136 07/15/2020 04:00 AM    HDL Cholesterol 20 07/15/2020 04:00 AM    LDL, calculated 75.2 07/15/2020 04:00 AM    Triglyceride 204 (H) 07/15/2020 04:00 AM    CHOL/HDL Ratio 6.8 (H) 07/15/2020 04:00 AM     Lab Results   Component Value Date/Time    Glucose (POC) 205 (H) 08/10/2020 11:30 AM    Glucose (POC) 175 (H) 08/10/2020 07:52 AM    Glucose (POC) 159 (H) 08/09/2020 09:52 PM    Glucose (POC) 155 (H) 08/09/2020 04:43 PM    Glucose (POC) 251 (H) 08/09/2020 11:34 AM     Lab Results   Component Value Date/Time    Color YELLOW/STRAW 07/25/2020 09:46 PM    Appearance CLEAR 07/25/2020 09:46 PM    Specific gravity 1.015 07/25/2020 09:46 PM    Specific gravity 1.025 07/14/2020 06:20 AM    pH (UA) 5.0 07/25/2020 09:46 PM    Protein 100 (A) 07/25/2020 09:46 PM    Glucose Negative 07/25/2020 09:46 PM    Ketone Negative 07/25/2020 09:46 PM    Bilirubin Negative 07/25/2020 09:46 PM    Urobilinogen 0.2 07/25/2020 09:46 PM    Nitrites Negative 07/25/2020 09:46 PM    Leukocyte Esterase Negative 07/25/2020 09:46 PM    Epithelial cells FEW 07/25/2020 09:46 PM    Bacteria Negative 07/25/2020 09:46 PM    WBC 0-4 07/25/2020 09:46 PM    RBC 0-5 07/25/2020 09:46 PM         Medications Reviewed:     Current Facility-Administered Medications   Medication Dose Route Frequency    furosemide (LASIX) tablet 80 mg  80 mg Oral DAILY    cefTRIAXone (ROCEPHIN) 1 g in 0.9% sodium chloride (MBP/ADV) 50 mL  1 g IntraVENous Q24H    azithromycin (ZITHROMAX) 500 mg in 0.9% sodium chloride (MBP/ADV) 250 mL  500 mg IntraVENous Q24H    metoprolol succinate (TOPROL-XL) XL tablet 37.5 mg  37.5 mg Oral DAILY    [Held by provider] ALPRAZolam (XANAX) tablet 1 mg  1 mg Oral Q12H    aspirin chewable tablet 81 mg  81 mg Oral DAILY    atorvastatin (LIPITOR) tablet 80 mg  80 mg Oral DAILY    clopidogreL (PLAVIX) tablet 75 mg  75 mg Oral DAILY    finasteride (PROSCAR) tablet 5 mg  5 mg Oral 7am    FLUoxetine (PROzac) capsule 20 mg  20 mg Oral DAILY    levETIRAcetam (KEPPRA) tablet 1,000 mg  1,000 mg Oral Q12H    nitroglycerin (NITROSTAT) tablet 0.4 mg  0.4 mg SubLINGual Q5MIN PRN    pantoprazole (PROTONIX) tablet 40 mg  40 mg Oral ACB&D    pregabalin (LYRICA) capsule 50 mg  50 mg Oral TID    potassium chloride SR (KLOR-CON 10) tablet 20 mEq  20 mEq Oral DAILY PRN    venlafaxine-SR (EFFEXOR-XR) capsule 75 mg  75 mg Oral QPM    sodium chloride (NS) flush 5-40 mL  5-40 mL IntraVENous Q8H    sodium chloride (NS) flush 5-40 mL  5-40 mL IntraVENous PRN    acetaminophen (TYLENOL) tablet 650 mg  650 mg Oral Q6H PRN    Or    acetaminophen (TYLENOL) suppository 650 mg  650 mg Rectal Q6H PRN    polyethylene glycol (MIRALAX) packet 17 g  17 g Oral DAILY PRN    promethazine (PHENERGAN) tablet 12.5 mg  12.5 mg Oral Q6H PRN    Or    ondansetron (ZOFRAN) injection 4 mg  4 mg IntraVENous Q6H PRN    heparin (porcine) injection 5,000 Units  5,000 Units SubCUTAneous Q8H    insulin lispro (HUMALOG) injection   SubCUTAneous AC&HS    glucose chewable tablet 16 g  4 Tab Oral PRN    glucagon (GLUCAGEN) injection 1 mg  1 mg IntraMUSCular PRN    dextrose 10 % infusion 125-250 mL  125-250 mL IntraVENous PRN    amiodarone (CORDARONE) tablet 200 mg  200 mg Oral BID    lactulose (CHRONULAC) 10 gram/15 mL solution 30 mL  20 g Oral BID     ______________________________________________________________________  EXPECTED LENGTH OF STAY: 4d 2h  ACTUAL LENGTH OF STAY:          4                 Chandler Hedrick MD

## 2020-08-10 NOTE — PROGRESS NOTES
8/10/2020 -   TRICIA:  - RUR: 45%  - Disposition is TBD pending OT Eval  - Patient will need 2nd IM Letter prior to discharge  - NOTE: patient's family has hx of refusing [facility] once patient is discharged to home    - Cr has been trending up  - Patient has weaned O2 Support to 1L O2 via NC; patient does not use O2 at baseline  CRM: Lima Klein, MPH, [address] [place] [address]; Z: [phone]

## 2020-08-10 NOTE — PROGRESS NOTES
Cardiology Progress Note                                        Admit Date: 8/5/2020    Assessment/Plan:     CHF;acute on chronic systolic HF; on PO lasix, watching Cr which has been increasing  Cardiomyopathy; severe; continue current regimen  PAF; no recurrence on Amiodarone  S/p ICD; working well. Lynda Garcia is a 76 y.o. male with     PROBLEM LIST:  Patient Active Problem List    Diagnosis Date Noted    [facility] 08/20/2019     Priority: 1 - One    Acute on chronic systolic CHF (congestive heart failure) ([facility] 08/06/2020    Bacterial pneumonia 07/14/2020    Acute CVA (cerebrovascular accident) ([facility] 05/12/2020    VT (ventricular tachycardia) ([facility] 08/20/2019    CHF exacerbation ([facility] 06/13/2019    Fall 01/10/2019    TACOS (acute kidney injury) ([facility] 01/10/2019    Chest pain 01/11/2018    Acute chest pain 01/10/2018    Hepatic encephalopathy ([facility] 07/17/2017    Neuropathy 04/14/2017    Cirrhosis ([facility] 04/14/2017    CAD (coronary artery disease) 04/14/2017    S/P coronary artery stent placement 04/14/2017    S/P CABG (coronary artery bypass graft) 04/14/2017    Thrombocytopenia ([facility] 04/14/2017    MRSA infection 04/14/2017    S/P cholecystectomy 39/48/1542    Metabolic encephalopathy 96/55/6445    Seizure ([facility] 11/21/2016    Sinusitis     Joint pain     Low back pain     GERD (gastroesophageal reflux disease)     Diabetes mellitus, type 2 (HCC)          Subjective:     Lynda Garcia reports none.     Visit Vitals  /75 (BP 1 Location: Left arm, BP Patient Position: At rest)   Pulse 72   Temp 97.3 °F (36.3 °C)   Resp 13   Wt 93.7 kg (206 lb 9.1 oz)   SpO2 95%   BMI 30.07 kg/m²       Intake/Output Summary (Last 24 hours) at 8/10/2020 0927  Last data filed at 8/10/2020 0032  Gross per 24 hour   Intake 720 ml   Output 1050 ml   Net -330 ml       Objective:      Physical Exam:  HEENT: Perrla, EOMI  Neck: No JVD,  No thyroidmegaly  Resp: rales  CV: RRR s1s2 No murmur, +s3  Abd:Soft, Nontender  Ext: No edema  Neuro: Alert and oriented; Nonfocal  Skin: Warm, Dry, Intact  Pulses: 2+ DP/PT/Rad      Telemetry: normal sinus rhythm, V paced    Current Facility-Administered Medications   Medication Dose Route Frequency    furosemide (LASIX) tablet 80 mg  80 mg Oral DAILY    cefTRIAXone (ROCEPHIN) 1 g in 0.9% sodium chloride (MBP/ADV) 50 mL  1 g IntraVENous Q24H    azithromycin (ZITHROMAX) 500 mg in 0.9% sodium chloride (MBP/ADV) 250 mL  500 mg IntraVENous Q24H    metoprolol succinate (TOPROL-XL) XL tablet 37.5 mg  37.5 mg Oral DAILY    [Held by provider] ALPRAZolam (XANAX) tablet 1 mg  1 mg Oral Q12H    aspirin chewable tablet 81 mg  81 mg Oral DAILY    atorvastatin (LIPITOR) tablet 80 mg  80 mg Oral DAILY    clopidogreL (PLAVIX) tablet 75 mg  75 mg Oral DAILY    finasteride (PROSCAR) tablet 5 mg  5 mg Oral 7am    FLUoxetine (PROzac) capsule 20 mg  20 mg Oral DAILY    levETIRAcetam (KEPPRA) tablet 1,000 mg  1,000 mg Oral Q12H    nitroglycerin (NITROSTAT) tablet 0.4 mg  0.4 mg SubLINGual Q5MIN PRN    pantoprazole (PROTONIX) tablet 40 mg  40 mg Oral ACB&D    pregabalin (LYRICA) capsule 50 mg  50 mg Oral TID    potassium chloride SR (KLOR-CON 10) tablet 20 mEq  20 mEq Oral DAILY PRN    venlafaxine-SR (EFFEXOR-XR) capsule 75 mg  75 mg Oral QPM    sodium chloride (NS) flush 5-40 mL  5-40 mL IntraVENous Q8H    sodium chloride (NS) flush 5-40 mL  5-40 mL IntraVENous PRN    acetaminophen (TYLENOL) tablet 650 mg  650 mg Oral Q6H PRN    Or    acetaminophen (TYLENOL) suppository 650 mg  650 mg Rectal Q6H PRN    polyethylene glycol (MIRALAX) packet 17 g  17 g Oral DAILY PRN    promethazine (PHENERGAN) tablet 12.5 mg  12.5 mg Oral Q6H PRN    Or    ondansetron (ZOFRAN) injection 4 mg  4 mg IntraVENous Q6H PRN    heparin (porcine) injection 5,000 Units  5,000 Units SubCUTAneous Q8H    insulin lispro (HUMALOG) injection   SubCUTAneous AC&HS    glucose chewable tablet 16 g  4 Tab Oral PRN    glucagon (GLUCAGEN) injection 1 mg  1 mg IntraMUSCular PRN    dextrose 10 % infusion 125-250 mL  125-250 mL IntraVENous PRN    amiodarone (CORDARONE) tablet 200 mg  200 mg Oral BID    lactulose (CHRONULAC) 10 gram/15 mL solution 30 mL  20 g Oral BID         Data Review:   Labs:    Recent Results (from the past 24 hour(s))   GLUCOSE, POC    Collection Time: 08/09/20 11:34 AM   Result Value Ref Range    Glucose (POC) 251 (H) 65 - 100 mg/dL    Performed by Cj Garcia    GLUCOSE, POC    Collection Time: 08/09/20  4:43 PM   Result Value Ref Range    Glucose (POC) 155 (H) 65 - 100 mg/dL    Performed by Cj Garcia    GLUCOSE, POC    Collection Time: 08/09/20  9:52 PM   Result Value Ref Range    Glucose (POC) 159 (H) 65 - 100 mg/dL    Performed by Donovan Orlando    CBC WITH AUTOMATED DIFF    Collection Time: 08/10/20  5:21 AM   Result Value Ref Range    WBC 9.3 4.1 - 11.1 K/uL    RBC 4.85 4.10 - 5.70 M/uL    HGB 10.6 (L) 12.1 - 17.0 g/dL    HCT 36.0 (L) 36.6 - 50.3 %    MCV 74.2 (L) 80.0 - 99.0 FL    MCH 21.9 (L) 26.0 - 34.0 PG    MCHC 29.4 (L) 30.0 - 36.5 g/dL    RDW 20.2 (H) 11.5 - 14.5 %    PLATELET 188 568 - 707 K/uL    MPV 10.5 8.9 - 12.9 FL    NRBC 0.0 0  WBC    ABSOLUTE NRBC 0.00 0.00 - 0.01 K/uL    NEUTROPHILS 77 (H) 32 - 75 %    LYMPHOCYTES 13 12 - 49 %    MONOCYTES 6 5 - 13 %    EOSINOPHILS 4 0 - 7 %    BASOPHILS 0 0 - 1 %    IMMATURE GRANULOCYTES 0 0.0 - 0.5 %    ABS. NEUTROPHILS 7.1 1.8 - 8.0 K/UL    ABS. LYMPHOCYTES 1.2 0.8 - 3.5 K/UL    ABS. MONOCYTES 0.6 0.0 - 1.0 K/UL    ABS. EOSINOPHILS 0.4 0.0 - 0.4 K/UL    ABS. BASOPHILS 0.0 0.0 - 0.1 K/UL    ABS. IMM.  GRANS. 0.0 0.00 - 0.04 K/UL    DF SMEAR SCANNED      PLATELET COMMENTS Large Platelets      RBC COMMENTS OVALOCYTES  PRESENT        RBC COMMENTS POLYCHROMASIA  1+        RBC COMMENTS ANISOCYTOSIS  2+       METABOLIC PANEL, BASIC    Collection Time: 08/10/20  5:21 AM   Result Value Ref Range    Sodium 135 (L) 136 - 145 mmol/L    Potassium 3.7 3.5 - 5.1 mmol/L    Chloride 104 97 - 108 mmol/L    CO2 23 21 - 32 mmol/L    Anion gap 8 5 - 15 mmol/L    Glucose 161 (H) 65 - 100 mg/dL    BUN 40 (H) 6 - 20 MG/DL    Creatinine 2.08 (H) 0.70 - 1.30 MG/DL    BUN/Creatinine ratio 19 12 - 20      GFR est AA 39 (L) >60 ml/min/1.73m2    GFR est non-AA 32 (L) >60 ml/min/1.73m2    Calcium 7.9 (L) 8.5 - 10.1 MG/DL   GLUCOSE, POC    Collection Time: 08/10/20  7:52 AM   Result Value Ref Range    Glucose (POC) 175 (H) 65 - 100 mg/dL    Performed by Austyn Suggs

## 2020-08-10 NOTE — PROGRESS NOTES
Problem: Falls - Risk of  Goal: *Absence of Falls  Description: Document Inessa Lomeli Fall Risk and appropriate interventions in the flowsheet. Outcome: Progressing Towards Goal  Note: Fall Risk Interventions:  Mobility Interventions: Bed/chair exit alarm    Mentation Interventions: Evaluate medications/consider consulting pharmacy    Medication Interventions: Patient to call before getting OOB, Teach patient to arise slowly    Elimination Interventions: Bed/chair exit alarm, Call light in reach    History of Falls Interventions: Evaluate medications/consider consulting pharmacy         Problem: Pressure Injury - Risk of  Goal: *Prevention of pressure injury  Description: Document Iván Scale and appropriate interventions in the flowsheet. Outcome: Progressing Towards Goal  Note: Pressure Injury Interventions:  Sensory Interventions: Assess changes in LOC    Moisture Interventions: Absorbent underpads    Activity Interventions: Increase time out of bed    Mobility Interventions: Assess need for specialty bed    Nutrition Interventions: Document food/fluid/supplement intake    Friction and Shear Interventions: Minimize layers                Problem: Risk for Spread of Infection  Goal: Prevent transmission of infectious organism to others  Description: Prevent the transmission of infectious organisms to other patients, staff members, and visitors.   Outcome: Progressing Towards Goal

## 2020-08-11 ENCOUNTER — HOME HEALTH ADMISSION (OUTPATIENT)
Dept: HOME HEALTH SERVICES | Facility: HOME HEALTH | Age: 65
End: 2020-08-11
Payer: MEDICARE

## 2020-08-11 VITALS
SYSTOLIC BLOOD PRESSURE: 122 MMHG | OXYGEN SATURATION: 98 % | BODY MASS INDEX: 30.74 KG/M2 | RESPIRATION RATE: 12 BRPM | HEART RATE: 70 BPM | TEMPERATURE: 97.7 F | WEIGHT: 211.2 LBS | DIASTOLIC BLOOD PRESSURE: 73 MMHG

## 2020-08-11 LAB
ANION GAP SERPL CALC-SCNC: 10 MMOL/L (ref 5–15)
BACTERIA SPEC CULT: NORMAL
BASOPHILS # BLD: 0 K/UL (ref 0–0.1)
BASOPHILS NFR BLD: 0 % (ref 0–1)
BUN SERPL-MCNC: 40 MG/DL (ref 6–20)
BUN/CREAT SERPL: 21 (ref 12–20)
CALCIUM SERPL-MCNC: 8.1 MG/DL (ref 8.5–10.1)
CHLORIDE SERPL-SCNC: 106 MMOL/L (ref 97–108)
CO2 SERPL-SCNC: 20 MMOL/L (ref 21–32)
CREAT SERPL-MCNC: 1.9 MG/DL (ref 0.7–1.3)
DIFFERENTIAL METHOD BLD: ABNORMAL
EOSINOPHIL # BLD: 0.4 K/UL (ref 0–0.4)
EOSINOPHIL NFR BLD: 4 % (ref 0–7)
ERYTHROCYTE [DISTWIDTH] IN BLOOD BY AUTOMATED COUNT: 19.7 % (ref 11.5–14.5)
GLUCOSE BLD STRIP.AUTO-MCNC: 145 MG/DL (ref 65–100)
GLUCOSE BLD STRIP.AUTO-MCNC: 201 MG/DL (ref 65–100)
GLUCOSE SERPL-MCNC: 149 MG/DL (ref 65–100)
HCT VFR BLD AUTO: 37.2 % (ref 36.6–50.3)
HGB BLD-MCNC: 10.8 G/DL (ref 12.1–17)
IMM GRANULOCYTES # BLD AUTO: 0 K/UL (ref 0–0.04)
IMM GRANULOCYTES NFR BLD AUTO: 0 % (ref 0–0.5)
LYMPHOCYTES # BLD: 1.2 K/UL (ref 0.8–3.5)
LYMPHOCYTES NFR BLD: 13 % (ref 12–49)
MCH RBC QN AUTO: 22.1 PG (ref 26–34)
MCHC RBC AUTO-ENTMCNC: 29 G/DL (ref 30–36.5)
MCV RBC AUTO: 76.1 FL (ref 80–99)
MONOCYTES # BLD: 0.5 K/UL (ref 0–1)
MONOCYTES NFR BLD: 6 % (ref 5–13)
NEUTS SEG # BLD: 7.1 K/UL (ref 1.8–8)
NEUTS SEG NFR BLD: 77 % (ref 32–75)
NRBC # BLD: 0 K/UL (ref 0–0.01)
NRBC BLD-RTO: 0 PER 100 WBC
PLATELET # BLD AUTO: 191 K/UL (ref 150–400)
PMV BLD AUTO: 11 FL (ref 8.9–12.9)
POTASSIUM SERPL-SCNC: 3.6 MMOL/L (ref 3.5–5.1)
RBC # BLD AUTO: 4.89 M/UL (ref 4.1–5.7)
SERVICE CMNT-IMP: ABNORMAL
SERVICE CMNT-IMP: ABNORMAL
SERVICE CMNT-IMP: NORMAL
SODIUM SERPL-SCNC: 136 MMOL/L (ref 136–145)
WBC # BLD AUTO: 9.2 K/UL (ref 4.1–11.1)

## 2020-08-11 PROCEDURE — 97530 THERAPEUTIC ACTIVITIES: CPT

## 2020-08-11 PROCEDURE — 82962 GLUCOSE BLOOD TEST: CPT

## 2020-08-11 PROCEDURE — 85025 COMPLETE CBC W/AUTO DIFF WBC: CPT

## 2020-08-11 PROCEDURE — 74011250636 HC RX REV CODE- 250/636: Performed by: INTERNAL MEDICINE

## 2020-08-11 PROCEDURE — 74011000258 HC RX REV CODE- 258: Performed by: INTERNAL MEDICINE

## 2020-08-11 PROCEDURE — 74011250637 HC RX REV CODE- 250/637: Performed by: INTERNAL MEDICINE

## 2020-08-11 PROCEDURE — 97116 GAIT TRAINING THERAPY: CPT

## 2020-08-11 PROCEDURE — 74011636637 HC RX REV CODE- 636/637: Performed by: INTERNAL MEDICINE

## 2020-08-11 PROCEDURE — 36415 COLL VENOUS BLD VENIPUNCTURE: CPT

## 2020-08-11 PROCEDURE — 80048 BASIC METABOLIC PNL TOTAL CA: CPT

## 2020-08-11 PROCEDURE — 97535 SELF CARE MNGMENT TRAINING: CPT

## 2020-08-11 RX ORDER — FUROSEMIDE 40 MG/1
80 TABLET ORAL DAILY
Qty: 30 TAB | Refills: 0 | Status: SHIPPED | OUTPATIENT
Start: 2020-08-11

## 2020-08-11 RX ORDER — VENLAFAXINE HYDROCHLORIDE 75 MG/1
75 CAPSULE, EXTENDED RELEASE ORAL EVERY EVENING
Qty: 30 CAP | Refills: 0 | Status: SHIPPED | OUTPATIENT
Start: 2020-08-11

## 2020-08-11 RX ORDER — AMIODARONE HYDROCHLORIDE 200 MG/1
200 TABLET ORAL 2 TIMES DAILY
Qty: 60 TAB | Refills: 0 | Status: SHIPPED | OUTPATIENT
Start: 2020-08-11

## 2020-08-11 RX ORDER — ALPRAZOLAM 0.5 MG/1
0.5 TABLET ORAL
Qty: 10 TAB | Refills: 0 | Status: ON HOLD | OUTPATIENT
Start: 2020-08-11 | End: 2021-01-15 | Stop reason: SDUPTHER

## 2020-08-11 RX ORDER — METOPROLOL SUCCINATE 25 MG/1
37.5 TABLET, EXTENDED RELEASE ORAL DAILY
Qty: 30 TAB | Refills: 0 | Status: SHIPPED | OUTPATIENT
Start: 2020-08-12

## 2020-08-11 RX ADMIN — Medication 10 ML: at 06:59

## 2020-08-11 RX ADMIN — HEPARIN SODIUM 5000 UNITS: 5000 INJECTION INTRAVENOUS; SUBCUTANEOUS at 03:58

## 2020-08-11 RX ADMIN — LACTULOSE 30 ML: 20 SOLUTION ORAL at 08:37

## 2020-08-11 RX ADMIN — CEFTRIAXONE 1 G: 1 INJECTION, POWDER, FOR SOLUTION INTRAMUSCULAR; INTRAVENOUS at 11:44

## 2020-08-11 RX ADMIN — ATORVASTATIN CALCIUM 80 MG: 20 TABLET, FILM COATED ORAL at 08:38

## 2020-08-11 RX ADMIN — ASPIRIN 81 MG CHEWABLE TABLET 81 MG: 81 TABLET CHEWABLE at 08:37

## 2020-08-11 RX ADMIN — LEVETIRACETAM 1000 MG: 500 TABLET ORAL at 08:38

## 2020-08-11 RX ADMIN — AMIODARONE HYDROCHLORIDE 200 MG: 200 TABLET ORAL at 08:38

## 2020-08-11 RX ADMIN — FLUOXETINE 20 MG: 20 CAPSULE ORAL at 08:38

## 2020-08-11 RX ADMIN — CLOPIDOGREL BISULFATE 75 MG: 75 TABLET ORAL at 08:37

## 2020-08-11 RX ADMIN — PANTOPRAZOLE SODIUM 40 MG: 40 TABLET, DELAYED RELEASE ORAL at 06:59

## 2020-08-11 RX ADMIN — METOPROLOL SUCCINATE 37.5 MG: 25 TABLET, EXTENDED RELEASE ORAL at 08:38

## 2020-08-11 RX ADMIN — FUROSEMIDE 80 MG: 40 TABLET ORAL at 08:38

## 2020-08-11 RX ADMIN — INSULIN LISPRO 2 UNITS: 100 INJECTION, SOLUTION INTRAVENOUS; SUBCUTANEOUS at 08:36

## 2020-08-11 RX ADMIN — INSULIN LISPRO 3 UNITS: 100 INJECTION, SOLUTION INTRAVENOUS; SUBCUTANEOUS at 12:26

## 2020-08-11 RX ADMIN — AZITHROMYCIN 500 MG: 500 INJECTION, POWDER, LYOPHILIZED, FOR SOLUTION INTRAVENOUS at 11:44

## 2020-08-11 RX ADMIN — HEPARIN SODIUM 5000 UNITS: 5000 INJECTION INTRAVENOUS; SUBCUTANEOUS at 11:44

## 2020-08-11 RX ADMIN — PREGABALIN 50 MG: 25 CAPSULE ORAL at 08:37

## 2020-08-11 RX ADMIN — FINASTERIDE 5 MG: 5 TABLET, FILM COATED ORAL at 06:58

## 2020-08-11 NOTE — PROGRESS NOTES
Problem: Falls - Risk of  Goal: *Absence of Falls  Description: Document Joby Fallon Fall Risk and appropriate interventions in the flowsheet. Outcome: Resolved/Met     Problem: Patient Education: Go to Patient Education Activity  Goal: Patient/Family Education  Outcome: Resolved/Met     Problem: Diabetes Self-Management  Goal: *Disease process and treatment process  Description: Define diabetes and identify own type of diabetes; list 3 options for treating diabetes. Outcome: Resolved/Met  Goal: *Incorporating nutritional management into lifestyle  Description: Describe effect of type, amount and timing of food on blood glucose; list 3 methods for planning meals. Outcome: Resolved/Met  Goal: *Incorporating physical activity into lifestyle  Description: State effect of exercise on blood glucose levels. Outcome: Resolved/Met  Goal: *Developing strategies to promote health/change behavior  Description: Define the ABC's of diabetes; identify appropriate screenings, schedule and personal plan for screenings. Outcome: Resolved/Met  Goal: *Using medications safely  Description: State effect of diabetes medications on diabetes; name diabetes medication taking, action and side effects. Outcome: Resolved/Met  Goal: *Monitoring blood glucose, interpreting and using results  Description: Identify recommended blood glucose targets  and personal targets. Outcome: Resolved/Met  Goal: *Prevention, detection, treatment of acute complications  Description: List symptoms of hyper- and hypoglycemia; describe how to treat low blood sugar and actions for lowering  high blood glucose level. Outcome: Resolved/Met  Goal: *Prevention, detection and treatment of chronic complications  Description: Define the natural course of diabetes and describe the relationship of blood glucose levels to long term complications of diabetes.   Outcome: Resolved/Met  Goal: *Developing strategies to address psychosocial issues  Description: Describe feelings about living with diabetes; identify support needed and support network  Outcome: Resolved/Met  Goal: *Insulin pump training  Outcome: Resolved/Met  Goal: *Sick day guidelines  Outcome: Resolved/Met  Goal: *Patient Specific Goal (EDIT GOAL, INSERT TEXT)  Outcome: Resolved/Met     Problem: Patient Education: Go to Patient Education Activity  Goal: Patient/Family Education  Outcome: Resolved/Met     Problem: Pressure Injury - Risk of  Goal: *Prevention of pressure injury  Description: Document Iván Scale and appropriate interventions in the flowsheet.   Outcome: Resolved/Met     Problem: Patient Education: Go to Patient Education Activity  Goal: Patient/Family Education  Outcome: Resolved/Met     Problem: Patient Education: Go to Patient Education Activity  Goal: Patient/Family Education  Outcome: Resolved/Met     Problem: Heart Failure: Day 1  Goal: Off Pathway (Use only if patient is Off Pathway)  Outcome: Resolved/Met  Goal: Activity/Safety  Outcome: Resolved/Met  Goal: Consults, if ordered  Outcome: Resolved/Met  Goal: Diagnostic Test/Procedures  Outcome: Resolved/Met  Goal: Nutrition/Diet  Outcome: Resolved/Met  Goal: Discharge Planning  Outcome: Resolved/Met  Goal: Medications  Outcome: Resolved/Met  Goal: Respiratory  Outcome: Resolved/Met  Goal: Treatments/Interventions/Procedures  Outcome: Resolved/Met  Goal: Psychosocial  Outcome: Resolved/Met  Goal: *Oxygen saturation within defined limits  Outcome: Resolved/Met  Goal: *Hemodynamically stable  Outcome: Resolved/Met  Goal: *Optimal pain control at patient's stated goal  Outcome: Resolved/Met  Goal: *Anxiety reduced or absent  Outcome: Resolved/Met     Problem: Heart Failure: Day 2  Goal: Off Pathway (Use only if patient is Off Pathway)  Outcome: Resolved/Met  Goal: Activity/Safety  Outcome: Resolved/Met  Goal: Consults, if ordered  Outcome: Resolved/Met  Goal: Diagnostic Test/Procedures  Outcome: Resolved/Met  Goal: Nutrition/Diet  Outcome: Resolved/Met  Goal: Discharge Planning  Outcome: Resolved/Met  Goal: Medications  Outcome: Resolved/Met  Goal: Respiratory  Outcome: Resolved/Met  Goal: Treatments/Interventions/Procedures  Outcome: Resolved/Met  Goal: Psychosocial  Outcome: Resolved/Met  Goal: *Oxygen saturation within defined limits  Outcome: Resolved/Met  Goal: *Hemodynamically stable  Outcome: Resolved/Met  Goal: *Optimal pain control at patient's stated goal  Outcome: Resolved/Met  Goal: *Anxiety reduced or absent  Outcome: Resolved/Met  Goal: *Demonstrates progressive activity  Outcome: Resolved/Met     Problem: Heart Failure: Day 3  Goal: Off Pathway (Use only if patient is Off Pathway)  Outcome: Resolved/Met  Goal: Activity/Safety  Outcome: Resolved/Met  Goal: Diagnostic Test/Procedures  Outcome: Resolved/Met  Goal: Nutrition/Diet  Outcome: Resolved/Met  Goal: Discharge Planning  Outcome: Resolved/Met  Goal: Medications  Outcome: Resolved/Met  Goal: Respiratory  Outcome: Resolved/Met  Goal: Treatments/Interventions/Procedures  Outcome: Resolved/Met  Goal: Psychosocial  Outcome: Resolved/Met  Goal: *Oxygen saturation within defined limits  Outcome: Resolved/Met  Goal: *Hemodynamically stable  Outcome: Resolved/Met  Goal: *Optimal pain control at patient's stated goal  Outcome: Resolved/Met  Goal: *Anxiety reduced or absent  Outcome: Resolved/Met  Goal: *Demonstrates progressive activity  Outcome: Resolved/Met     Problem: Heart Failure: Day 4  Goal: Off Pathway (Use only if patient is Off Pathway)  Outcome: Resolved/Met  Goal: Activity/Safety  Outcome: Resolved/Met  Goal: Diagnostic Test/Procedures  Outcome: Resolved/Met  Goal: Nutrition/Diet  Outcome: Resolved/Met  Goal: Discharge Planning  Outcome: Resolved/Met  Goal: Medications  Outcome: Resolved/Met  Goal: Respiratory  Outcome: Resolved/Met  Goal: Treatments/Interventions/Procedures  Outcome: Resolved/Met  Goal: Psychosocial  Outcome: Resolved/Met  Goal: *Oxygen saturation within defined limits  Outcome: Resolved/Met  Goal: *Hemodynamically stable  Outcome: Resolved/Met  Goal: *Optimal pain control at patient's stated goal  Outcome: Resolved/Met  Goal: *Anxiety reduced or absent  Outcome: Resolved/Met  Goal: *Demonstrates progressive activity  Outcome: Resolved/Met     Problem: Heart Failure: Day 5  Goal: Off Pathway (Use only if patient is Off Pathway)  Outcome: Resolved/Met  Goal: Activity/Safety  Outcome: Resolved/Met  Goal: Diagnostic Test/Procedures  Outcome: Resolved/Met  Goal: Nutrition/Diet  Outcome: Resolved/Met  Goal: Discharge Planning  Outcome: Resolved/Met  Goal: Medications  Outcome: Resolved/Met  Goal: Respiratory  Outcome: Resolved/Met  Goal: Treatments/Interventions/Procedures  Outcome: Resolved/Met  Goal: Psychosocial  Outcome: Resolved/Met     Problem: Heart Failure: Discharge Outcomes  Goal: *Demonstrates ability to perform prescribed activity without shortness of breath or discomfort  Outcome: Resolved/Met  Goal: *Left ventricular function assessment completed prior to or during stay, or planned for post-discharge  Outcome: Resolved/Met  Goal: *ACEI prescribed if LVEF less than 40% and no contraindications or ARB prescribed  Outcome: Resolved/Met  Goal: *Verbalizes understanding and describes prescribed diet  Outcome: Resolved/Met  Goal: *Verbalizes understanding/describes prescribed medications  Outcome: Resolved/Met  Goal: *Describes available resources and support systems  Description: (eg: Home Health, Palliative Care, Advanced Medical Directive)  Outcome: Resolved/Met  Goal: *Describes smoking cessation resources  Outcome: Resolved/Met  Goal: *Understands and describes signs and symptoms to report to providers(Stroke Metric)  Outcome: Resolved/Met  Goal: *Describes/verbalizes understanding of follow-up/return appt  Description: (eg: to physicians, diabetes treatment coordinator, and other resources  Outcome: Resolved/Met  Goal: *Describes importance of continuing daily weights and changes to report to physician  Outcome: Resolved/Met     Problem: Patient Education: Go to Patient Education Activity  Goal: Patient/Family Education  Outcome: Resolved/Met     Problem: Patient Education: Go to Patient Education Activity  Goal: Patient/Family Education  Outcome: Resolved/Met     Problem: Falls - Risk of  Goal: *Absence of Falls  Description: Document Karyn Thompsons Fall Risk and appropriate interventions in the flowsheet.   Outcome: Resolved/Met     Problem: Patient Education: Go to Patient Education Activity  Goal: Patient/Family Education  Outcome: Resolved/Met

## 2020-08-11 NOTE — PROGRESS NOTES
Hospital follow-up PCP transitional care appointment has been scheduled with Dr. Serafin Gu for Friday, 8/14/20 at 11:00 a.m. Pending patient discharge.   Solomon Lawson, Care Management Specialist.

## 2020-08-11 NOTE — PROGRESS NOTES
Problem: Falls - Risk of  Goal: *Absence of Falls  Description: Document Nabeel Rodrigez Fall Risk and appropriate interventions in the flowsheet. Outcome: Resolved/Met     Problem: Patient Education: Go to Patient Education Activity  Goal: Patient/Family Education  Outcome: Resolved/Met     Problem: Diabetes Self-Management  Goal: *Disease process and treatment process  Description: Define diabetes and identify own type of diabetes; list 3 options for treating diabetes. Outcome: Resolved/Met  Goal: *Incorporating nutritional management into lifestyle  Description: Describe effect of type, amount and timing of food on blood glucose; list 3 methods for planning meals. Outcome: Resolved/Met  Goal: *Incorporating physical activity into lifestyle  Description: State effect of exercise on blood glucose levels. Outcome: Resolved/Met  Goal: *Developing strategies to promote health/change behavior  Description: Define the ABC's of diabetes; identify appropriate screenings, schedule and personal plan for screenings. Outcome: Resolved/Met  Goal: *Using medications safely  Description: State effect of diabetes medications on diabetes; name diabetes medication taking, action and side effects. Outcome: Resolved/Met  Goal: *Monitoring blood glucose, interpreting and using results  Description: Identify recommended blood glucose targets  and personal targets. Outcome: Resolved/Met  Goal: *Prevention, detection, treatment of acute complications  Description: List symptoms of hyper- and hypoglycemia; describe how to treat low blood sugar and actions for lowering  high blood glucose level. Outcome: Resolved/Met  Goal: *Prevention, detection and treatment of chronic complications  Description: Define the natural course of diabetes and describe the relationship of blood glucose levels to long term complications of diabetes.   Outcome: Resolved/Met  Goal: *Developing strategies to address psychosocial issues  Description: Describe feelings about living with diabetes; identify support needed and support network  Outcome: Resolved/Met  Goal: *Insulin pump training  Outcome: Resolved/Met  Goal: *Sick day guidelines  Outcome: Resolved/Met  Goal: *Patient Specific Goal (EDIT GOAL, INSERT TEXT)  Outcome: Resolved/Met     Problem: Patient Education: Go to Patient Education Activity  Goal: Patient/Family Education  Outcome: Resolved/Met     Problem: Pressure Injury - Risk of  Goal: *Prevention of pressure injury  Description: Document Iván Scale and appropriate interventions in the flowsheet.   Outcome: Resolved/Met     Problem: Patient Education: Go to Patient Education Activity  Goal: Patient/Family Education  Outcome: Resolved/Met     Problem: Patient Education: Go to Patient Education Activity  Goal: Patient/Family Education  Outcome: Resolved/Met     Problem: Heart Failure: Day 1  Goal: Off Pathway (Use only if patient is Off Pathway)  Outcome: Resolved/Met  Goal: Activity/Safety  Outcome: Resolved/Met  Goal: Consults, if ordered  Outcome: Resolved/Met  Goal: Diagnostic Test/Procedures  Outcome: Resolved/Met  Goal: Nutrition/Diet  Outcome: Resolved/Met  Goal: Discharge Planning  Outcome: Resolved/Met  Goal: Medications  Outcome: Resolved/Met  Goal: Respiratory  Outcome: Resolved/Met  Goal: Treatments/Interventions/Procedures  Outcome: Resolved/Met  Goal: Psychosocial  Outcome: Resolved/Met  Goal: *Oxygen saturation within defined limits  Outcome: Resolved/Met  Goal: *Hemodynamically stable  Outcome: Resolved/Met  Goal: *Optimal pain control at patient's stated goal  Outcome: Resolved/Met  Goal: *Anxiety reduced or absent  Outcome: Resolved/Met     Problem: Heart Failure: Day 2  Goal: Off Pathway (Use only if patient is Off Pathway)  Outcome: Resolved/Met  Goal: Activity/Safety  Outcome: Resolved/Met  Goal: Consults, if ordered  Outcome: Resolved/Met  Goal: Diagnostic Test/Procedures  Outcome: Resolved/Met  Goal: Nutrition/Diet  Outcome: Resolved/Met  Goal: Discharge Planning  Outcome: Resolved/Met  Goal: Medications  Outcome: Resolved/Met  Goal: Respiratory  Outcome: Resolved/Met  Goal: Treatments/Interventions/Procedures  Outcome: Resolved/Met  Goal: Psychosocial  Outcome: Resolved/Met  Goal: *Oxygen saturation within defined limits  Outcome: Resolved/Met  Goal: *Hemodynamically stable  Outcome: Resolved/Met  Goal: *Optimal pain control at patient's stated goal  Outcome: Resolved/Met  Goal: *Anxiety reduced or absent  Outcome: Resolved/Met  Goal: *Demonstrates progressive activity  Outcome: Resolved/Met     Problem: Heart Failure: Day 3  Goal: Off Pathway (Use only if patient is Off Pathway)  Outcome: Resolved/Met  Goal: Activity/Safety  Outcome: Resolved/Met  Goal: Diagnostic Test/Procedures  Outcome: Resolved/Met  Goal: Nutrition/Diet  Outcome: Resolved/Met  Goal: Discharge Planning  Outcome: Resolved/Met  Goal: Medications  Outcome: Resolved/Met  Goal: Respiratory  Outcome: Resolved/Met  Goal: Treatments/Interventions/Procedures  Outcome: Resolved/Met  Goal: Psychosocial  Outcome: Resolved/Met  Goal: *Oxygen saturation within defined limits  Outcome: Resolved/Met  Goal: *Hemodynamically stable  Outcome: Resolved/Met  Goal: *Optimal pain control at patient's stated goal  Outcome: Resolved/Met  Goal: *Anxiety reduced or absent  Outcome: Resolved/Met  Goal: *Demonstrates progressive activity  Outcome: Resolved/Met     Problem: Heart Failure: Day 4  Goal: Off Pathway (Use only if patient is Off Pathway)  Outcome: Resolved/Met  Goal: Activity/Safety  Outcome: Resolved/Met  Goal: Diagnostic Test/Procedures  Outcome: Resolved/Met  Goal: Nutrition/Diet  Outcome: Resolved/Met  Goal: Discharge Planning  Outcome: Resolved/Met  Goal: Medications  Outcome: Resolved/Met  Goal: Respiratory  Outcome: Resolved/Met  Goal: Treatments/Interventions/Procedures  Outcome: Resolved/Met  Goal: Psychosocial  Outcome: Resolved/Met  Goal: *Oxygen saturation within defined limits  Outcome: Resolved/Met  Goal: *Hemodynamically stable  Outcome: Resolved/Met  Goal: *Optimal pain control at patient's stated goal  Outcome: Resolved/Met  Goal: *Anxiety reduced or absent  Outcome: Resolved/Met  Goal: *Demonstrates progressive activity  Outcome: Resolved/Met     Problem: Heart Failure: Day 5  Goal: Off Pathway (Use only if patient is Off Pathway)  Outcome: Resolved/Met  Goal: Activity/Safety  Outcome: Resolved/Met  Goal: Diagnostic Test/Procedures  Outcome: Resolved/Met  Goal: Nutrition/Diet  Outcome: Resolved/Met  Goal: Discharge Planning  Outcome: Resolved/Met  Goal: Medications  Outcome: Resolved/Met  Goal: Respiratory  Outcome: Resolved/Met  Goal: Treatments/Interventions/Procedures  Outcome: Resolved/Met  Goal: Psychosocial  Outcome: Resolved/Met     Problem: Heart Failure: Discharge Outcomes  Goal: *Demonstrates ability to perform prescribed activity without shortness of breath or discomfort  Outcome: Resolved/Met  Goal: *Left ventricular function assessment completed prior to or during stay, or planned for post-discharge  Outcome: Resolved/Met  Goal: *ACEI prescribed if LVEF less than 40% and no contraindications or ARB prescribed  Outcome: Resolved/Met  Goal: *Verbalizes understanding and describes prescribed diet  Outcome: Resolved/Met  Goal: *Verbalizes understanding/describes prescribed medications  Outcome: Resolved/Met  Goal: *Describes available resources and support systems  Description: (eg: Home Health, Palliative Care, Advanced Medical Directive)  Outcome: Resolved/Met  Goal: *Describes smoking cessation resources  Outcome: Resolved/Met  Goal: *Understands and describes signs and symptoms to report to providers(Stroke Metric)  Outcome: Resolved/Met  Goal: *Describes/verbalizes understanding of follow-up/return appt  Description: (eg: to physicians, diabetes treatment coordinator, and other resources  Outcome: Resolved/Met  Goal: *Describes importance of continuing daily weights and changes to report to physician  Outcome: Resolved/Met     Problem: Patient Education: Go to Patient Education Activity  Goal: Patient/Family Education  Outcome: Resolved/Met     Problem: Patient Education: Go to Patient Education Activity  Goal: Patient/Family Education  Outcome: Resolved/Met     Problem: Falls - Risk of  Goal: *Absence of Falls  Description: Document Nahid Salmeron Fall Risk and appropriate interventions in the flowsheet.   Outcome: Resolved/Met     Problem: Patient Education: Go to Patient Education Activity  Goal: Patient/Family Education  Outcome: Resolved/Met

## 2020-08-11 NOTE — PROGRESS NOTES
8/11/2020 -   TRICIA:  - RUR: 46%  - Disposition is for discharge to home with [facility]; referral submitted to [address] via [facility]  - Family to be on site likely around 15:00    - Patient is now on room air  - Patient was cleared by cardio    10:00 -   CM met with patient at bedside to discuss disposition planning. Patient requested for CM to contact his daughter. CM contacted daughter Dakota Watson: [phone]) and left a VM requesting a return call. CRM: Angelica Parsons, MPH, [facility]; Z: [phone]    10:50 -   CM received return call from patient's daughter Dakota Watson). Daughter is in agreement to [facility] via [address]. CM to submit referral via CC. Patient is anticipated to discharge today, 8/11. Patient's other daughter has an emergency dental appointment at 13:30, and family will be on site after the appointment. The Plan for Transition of Care is related to the following treatment goals: Home with [facility]    The Patient and/or patient representative demi Bravo was provided with a choice of provider and agrees   with the discharge plan. [x] Yes [] No    Freedom of choice list was provided with basic dialogue that supports the patient's individualized plan of care/goals, treatment preferences and shares the quality data associated with the providers. [x] Yes [] No    Medicare pt's has verbally by phone received, reviewed, and verbally consented signed 2nd IM letter informing them of their right to appeal the discharge. Signed copy has been placed on pt bedside chart.   CRM: Angelica Parsons, MPH, [address] [place] [address]; Z: [phone]

## 2020-08-11 NOTE — DISCHARGE INSTRUCTIONS
Discharge Instructions       PATIENT ID: Kole Dao MRN: [id number]   YOB: 1955    DATE OF ADMISSION: 8/5/2020  9:35 PM    DATE OF DISCHARGE: 8/11/2020    PRIMARY CARE PROVIDER: Stephane Watson MD     ATTENDING PHYSICIAN: Saranya Banks MD  DISCHARGING PROVIDER: Eyal Guthrie MD    To contact this individual call [phone] and ask the  to page. If unavailable ask to be transferred the Adult Hospitalist Department. DISCHARGE DIAGNOSES CHF    CONSULTATIONS: IP CONSULT TO CARDIOLOGY  IP CONSULT TO PSYCHIATRY    PROCEDURES/SURGERIES: * No surgery found *      FOLLOW UP APPOINTMENTS:   Follow-up Information     Follow up With Specialties Details Why Contact Info    Stephane Watson MD Family Medicine In 1 week you need blood work CBC and  [facility] [facility]      Primitivo Crawford MD Cardiology In 1 week  [address] [facility]  [address] [facility] [address]      Cristina Carcamo MD Psychiatry In 1 week  [address] [place] [address]  [phone]             ADDITIONAL CARE RECOMMENDATIONS:   Follow up with cardiology and psychiatry as scheduled  Follow up with your PCP in a week for blood work     DIET: Cardiac Diet    ACTIVITY: Activity as tolerated        DISCHARGE MEDICATIONS:   See Medication Reconciliation Form    · It is important that you take the medication exactly as they are prescribed. · Keep your medication in the bottles provided by the pharmacist and keep a list of the medication names, dosages, and times to be taken in your wallet. · Do not take other medications without consulting your doctor. NOTIFY YOUR PHYSICIAN FOR ANY OF THE FOLLOWING:   Fever over 101 degrees for 24 hours. Chest pain, shortness of breath, fever, chills, nausea, vomiting, diarrhea, change in mentation, falling, weakness, bleeding. Severe pain or pain not relieved by medications.   Or, any other signs or symptoms that you may have questions about. DISPOSITION:    Home With:   OT  PT  HH  RN       SNF/Inpatient Rehab/LTAC    Independent/assisted living    Hospice    Other:     CDMP Checked:   Yes ***     PROBLEM LIST Updated:  Yes ***       Information obtained by :   I understand that if any problems occur once I am at home I am to contact my physician. I understand and acknowledge receipt of the instructions indicated above.                                                                                                                                              Physician's or R.N.'s Signature                                                                  Date/Time                                                                                                                                              Patient or Representative Signature                                                          Date/Time          Signed:   Sabrina Garza MD  8/11/2020  11:31 AM

## 2020-08-11 NOTE — PROGRESS NOTES
Problem: Falls - Risk of  Goal: *Absence of Falls  Description: Document Ada Hawkins Fall Risk and appropriate interventions in the flowsheet. Outcome: Progressing Towards Goal  Note: Fall Risk Interventions:  Mobility Interventions: Utilize walker, cane, or other assistive device, Bed/chair exit alarm    Mentation Interventions: Adequate sleep, hydration, pain control    Medication Interventions: Evaluate medications/consider consulting pharmacy, Bed/chair exit alarm    Elimination Interventions: Call light in reach, Bed/chair exit alarm, Patient to call for help with toileting needs    History of Falls Interventions: Bed/chair exit alarm         Problem: Patient Education: Go to Patient Education Activity  Goal: Patient/Family Education  Outcome: Progressing Towards Goal     Problem: Diabetes Self-Management  Goal: *Disease process and treatment process  Description: Define diabetes and identify own type of diabetes; list 3 options for treating diabetes. Outcome: Progressing Towards Goal  Goal: *Incorporating nutritional management into lifestyle  Description: Describe effect of type, amount and timing of food on blood glucose; list 3 methods for planning meals. Outcome: Progressing Towards Goal     Problem: Patient Education: Go to Patient Education Activity  Goal: Patient/Family Education  Outcome: Progressing Towards Goal     Problem: Pressure Injury - Risk of  Goal: *Prevention of pressure injury  Description: Document Iván Scale and appropriate interventions in the flowsheet.   Outcome: Progressing Towards Goal  Note: Pressure Injury Interventions:  Sensory Interventions: Assess changes in LOC    Moisture Interventions: Check for incontinence Q2 hours and as needed    Activity Interventions: Increase time out of bed    Mobility Interventions: HOB 30 degrees or less    Nutrition Interventions: Document food/fluid/supplement intake    Friction and Shear Interventions: Minimize layers                Problem: Heart Failure: Day 5  Goal: Activity/Safety  Outcome: Progressing Towards Goal  Goal: Diagnostic Test/Procedures  Outcome: Progressing Towards Goal  Goal: Nutrition/Diet  Outcome: Progressing Towards Goal  Goal: Discharge Planning  Outcome: Progressing Towards Goal  Goal: Medications  Outcome: Progressing Towards Goal  Goal: Respiratory  Outcome: Progressing Towards Goal  Goal: Treatments/Interventions/Procedures  Outcome: Progressing Towards Goal  Goal: Psychosocial  Outcome: Progressing Towards Goal

## 2020-08-11 NOTE — PROGRESS NOTES
Problem: Falls - Risk of  Goal: *Absence of Falls  Description: Document Ada Hawkins Fall Risk and appropriate interventions in the flowsheet. Outcome: Resolved/Met     Problem: Patient Education: Go to Patient Education Activity  Goal: Patient/Family Education  Outcome: Resolved/Met     Problem: Diabetes Self-Management  Goal: *Disease process and treatment process  Description: Define diabetes and identify own type of diabetes; list 3 options for treating diabetes. Outcome: Resolved/Met  Goal: *Incorporating nutritional management into lifestyle  Description: Describe effect of type, amount and timing of food on blood glucose; list 3 methods for planning meals. Outcome: Resolved/Met  Goal: *Incorporating physical activity into lifestyle  Description: State effect of exercise on blood glucose levels. Outcome: Resolved/Met  Goal: *Developing strategies to promote health/change behavior  Description: Define the ABC's of diabetes; identify appropriate screenings, schedule and personal plan for screenings. Outcome: Resolved/Met  Goal: *Using medications safely  Description: State effect of diabetes medications on diabetes; name diabetes medication taking, action and side effects. Outcome: Resolved/Met  Goal: *Monitoring blood glucose, interpreting and using results  Description: Identify recommended blood glucose targets  and personal targets. Outcome: Resolved/Met  Goal: *Prevention, detection, treatment of acute complications  Description: List symptoms of hyper- and hypoglycemia; describe how to treat low blood sugar and actions for lowering  high blood glucose level. Outcome: Resolved/Met  Goal: *Prevention, detection and treatment of chronic complications  Description: Define the natural course of diabetes and describe the relationship of blood glucose levels to long term complications of diabetes.   Outcome: Resolved/Met  Goal: *Developing strategies to address psychosocial issues  Description: Describe feelings about living with diabetes; identify support needed and support network  Outcome: Resolved/Met  Goal: *Insulin pump training  Outcome: Resolved/Met  Goal: *Sick day guidelines  Outcome: Resolved/Met  Goal: *Patient Specific Goal (EDIT GOAL, INSERT TEXT)  Outcome: Resolved/Met     Problem: Patient Education: Go to Patient Education Activity  Goal: Patient/Family Education  Outcome: Resolved/Met     Problem: Pressure Injury - Risk of  Goal: *Prevention of pressure injury  Description: Document Iván Scale and appropriate interventions in the flowsheet.   Outcome: Resolved/Met     Problem: Patient Education: Go to Patient Education Activity  Goal: Patient/Family Education  Outcome: Resolved/Met     Problem: Patient Education: Go to Patient Education Activity  Goal: Patient/Family Education  Outcome: Resolved/Met     Problem: Heart Failure: Day 1  Goal: Off Pathway (Use only if patient is Off Pathway)  Outcome: Resolved/Met  Goal: Activity/Safety  Outcome: Resolved/Met  Goal: Consults, if ordered  Outcome: Resolved/Met  Goal: Diagnostic Test/Procedures  Outcome: Resolved/Met  Goal: Nutrition/Diet  Outcome: Resolved/Met  Goal: Discharge Planning  Outcome: Resolved/Met  Goal: Medications  Outcome: Resolved/Met  Goal: Respiratory  Outcome: Resolved/Met  Goal: Treatments/Interventions/Procedures  Outcome: Resolved/Met  Goal: Psychosocial  Outcome: Resolved/Met  Goal: *Oxygen saturation within defined limits  Outcome: Resolved/Met  Goal: *Hemodynamically stable  Outcome: Resolved/Met  Goal: *Optimal pain control at patient's stated goal  Outcome: Resolved/Met  Goal: *Anxiety reduced or absent  Outcome: Resolved/Met     Problem: Heart Failure: Day 2  Goal: Off Pathway (Use only if patient is Off Pathway)  Outcome: Resolved/Met  Goal: Activity/Safety  Outcome: Resolved/Met  Goal: Consults, if ordered  Outcome: Resolved/Met  Goal: Diagnostic Test/Procedures  Outcome: Resolved/Met  Goal: Nutrition/Diet  Outcome: Resolved/Met  Goal: Discharge Planning  Outcome: Resolved/Met  Goal: Medications  Outcome: Resolved/Met  Goal: Respiratory  Outcome: Resolved/Met  Goal: Treatments/Interventions/Procedures  Outcome: Resolved/Met  Goal: Psychosocial  Outcome: Resolved/Met  Goal: *Oxygen saturation within defined limits  Outcome: Resolved/Met  Goal: *Hemodynamically stable  Outcome: Resolved/Met  Goal: *Optimal pain control at patient's stated goal  Outcome: Resolved/Met  Goal: *Anxiety reduced or absent  Outcome: Resolved/Met  Goal: *Demonstrates progressive activity  Outcome: Resolved/Met     Problem: Heart Failure: Day 3  Goal: Off Pathway (Use only if patient is Off Pathway)  Outcome: Resolved/Met  Goal: Activity/Safety  Outcome: Resolved/Met  Goal: Diagnostic Test/Procedures  Outcome: Resolved/Met  Goal: Nutrition/Diet  Outcome: Resolved/Met  Goal: Discharge Planning  Outcome: Resolved/Met  Goal: Medications  Outcome: Resolved/Met  Goal: Respiratory  Outcome: Resolved/Met  Goal: Treatments/Interventions/Procedures  Outcome: Resolved/Met  Goal: Psychosocial  Outcome: Resolved/Met  Goal: *Oxygen saturation within defined limits  Outcome: Resolved/Met  Goal: *Hemodynamically stable  Outcome: Resolved/Met  Goal: *Optimal pain control at patient's stated goal  Outcome: Resolved/Met  Goal: *Anxiety reduced or absent  Outcome: Resolved/Met  Goal: *Demonstrates progressive activity  Outcome: Resolved/Met     Problem: Heart Failure: Day 4  Goal: Off Pathway (Use only if patient is Off Pathway)  Outcome: Resolved/Met  Goal: Activity/Safety  Outcome: Resolved/Met  Goal: Diagnostic Test/Procedures  Outcome: Resolved/Met  Goal: Nutrition/Diet  Outcome: Resolved/Met  Goal: Discharge Planning  Outcome: Resolved/Met  Goal: Medications  Outcome: Resolved/Met  Goal: Respiratory  Outcome: Resolved/Met  Goal: Treatments/Interventions/Procedures  Outcome: Resolved/Met  Goal: Psychosocial  Outcome: Resolved/Met  Goal: *Oxygen saturation within defined limits  Outcome: Resolved/Met  Goal: *Hemodynamically stable  Outcome: Resolved/Met  Goal: *Optimal pain control at patient's stated goal  Outcome: Resolved/Met  Goal: *Anxiety reduced or absent  Outcome: Resolved/Met  Goal: *Demonstrates progressive activity  Outcome: Resolved/Met     Problem: Heart Failure: Day 5  Goal: Off Pathway (Use only if patient is Off Pathway)  Outcome: Resolved/Met  Goal: Activity/Safety  Outcome: Resolved/Met  Goal: Diagnostic Test/Procedures  Outcome: Resolved/Met  Goal: Nutrition/Diet  Outcome: Resolved/Met  Goal: Discharge Planning  Outcome: Resolved/Met  Goal: Medications  Outcome: Resolved/Met  Goal: Respiratory  Outcome: Resolved/Met  Goal: Treatments/Interventions/Procedures  Outcome: Resolved/Met  Goal: Psychosocial  Outcome: Resolved/Met     Problem: Heart Failure: Discharge Outcomes  Goal: *Demonstrates ability to perform prescribed activity without shortness of breath or discomfort  Outcome: Resolved/Met  Goal: *Left ventricular function assessment completed prior to or during stay, or planned for post-discharge  Outcome: Resolved/Met  Goal: *ACEI prescribed if LVEF less than 40% and no contraindications or ARB prescribed  Outcome: Resolved/Met  Goal: *Verbalizes understanding and describes prescribed diet  Outcome: Resolved/Met  Goal: *Verbalizes understanding/describes prescribed medications  Outcome: Resolved/Met  Goal: *Describes available resources and support systems  Description: (eg: Home Health, Palliative Care, Advanced Medical Directive)  Outcome: Resolved/Met  Goal: *Describes smoking cessation resources  Outcome: Resolved/Met  Goal: *Understands and describes signs and symptoms to report to providers(Stroke Metric)  Outcome: Resolved/Met  Goal: *Describes/verbalizes understanding of follow-up/return appt  Description: (eg: to physicians, diabetes treatment coordinator, and other resources  Outcome: Resolved/Met  Goal: *Describes importance of continuing daily weights and changes to report to physician  Outcome: Resolved/Met

## 2020-08-11 NOTE — PROGRESS NOTES
Bedside and Verbal shift change report given to Torey Hudson (oncoming nurse) by Joo Baeza (offgoing nurse). Report included the following information SBAR, Kardex, Intake/Output, MAR, Recent Results and Cardiac Rhythm paced.

## 2020-08-11 NOTE — DISCHARGE SUMMARY
Inpatient hospitalist discharge summary                Brief Overview    PATIENT ID: Humberto Cruz MRN: [id number]     YOB: 1955    Admitting Provider: Aron Dodson MD    Discharging Provider: Beryl Trejo MD   To contact this individual call [phone] and ask the  to page. If unavailable ask to be transferred the Adult Hospitalist Department. PCP at discharge: Jr Palm [phone]   [address] [place] [id number]    Admission date: 8/5/2020  Date of Discharge: 08/11/20    Chief complaint:   Chief Complaint   Patient presents with    Irregular Heart Beat     Patient Active Problem List   Diagnosis Code    Sinusitis J32.9    Joint pain M25.50    Low back pain M54.5    GERD (gastroesophageal reflux disease) K21.9    Diabetes mellitus, type 2 ([facility] E11.9    Seizure ([facility] L48.0    Metabolic encephalopathy O48.87    Neuropathy G62.9    Cirrhosis ([facility] K74.60    CAD (coronary artery disease) I25.10    S/P coronary artery stent placement Z95.5    S/P CABG (coronary artery bypass graft) Z95.1    Thrombocytopenia ([facility] D69.6    MRSA infection A49.02    S/P cholecystectomy Z90.49    Hepatic encephalopathy ([facility] K72.90    Acute chest pain R07.9    Chest pain R07.9    Fall W19. XXXA    TACOS (acute kidney injury) ([facility] N17.9    CHF exacerbation ([facility]) I50.9    V tach ([facility]) I47.2    VT (ventricular tachycardia) ([facility]) I47.2    Acute CVA (cerebrovascular accident) ([facility] I63.9    Bacterial pneumonia J15.9    Acute on chronic systolic CHF (congestive heart failure) ([facility]) I50.23         Discharge diagnosis, hospital course/plan:  As per initial admission summary  As per initial admission summary  This is a 62-year-old man with a past medical history significant for chronic systolic congestive heart failure, coronary artery disease status post CABG, obstructive sleep apnea, seizure disorder, dyslipidemia, type 2 diabetes, hypertension, dyslipidemia, status post AICD placement, anxiety/depression, who was in his usual state of health until a few days ago when the patient developed shortness of breath.  The shortness of breath got worse on the day of presentation at the emergency room.  The shortness of breath is worse when the patient is lying down and also with very mild exertion.  The shortness of breath is associated with chest pain.  The chest pain is located at the left side of the chest, 6/10 in severity, constant, dull ache.  No known aggravating or relieving factors.  The patient was brought to the emergency room for further evaluation. Osmar Agosto was reported that the patient's AICD discharge on the way to the emergency room.  The patient was last admitted to this hospital from 07/13/2020 to 07/17/2020.  The patient was admitted and treated for congestive heart failure.  When the patient arrived at the emergency room, the patient was found to have elevated BNP level.  The chest x-ray shows persistent, but improved left perihilar airspace opacity.  He was referred to the hospitalist service for evaluation for admission    1.  Acute-on-chronic systolic congestive heart failure. Improved  NYHA 2-3 on admission (CDMP)  NYHA 2 on discharge (CDMP)  Paroxysmal Atrial Fibrillation  (CDMP)  Cardiology following  Metoprolol increased   On oral lasix, cardiology ok discharging the patient   Need to follow up with cardiology as scheduled  discussed with daughter in detail. I told her that she needs to follow up with PCP and cardiologist as outpatient. She told me that he has upcoming appointment with PCP in 3 days. For blood work. I told her that based on serum creatinine levels lasix dosing might need to be changed as well. Also advised to follow up with cardiologist and discuss regarding lasix dosing. She understood  And agreed with plan.      2.  Coronary artery disease, status post CABG.    Stable      3.  Obstructive sleep apnea.   CPAP with home setting.     4.  Seizure disorder.  We will continue with Kelakhwinderra.     5. Marye Kehr will resume preadmission medication.     6.  Type 2 diabetes with hyperglycemia.  The patient will be placed on sliding scale with insulin coverage.       7.  Hypertension.  We will resume home medication.     8.  Dyslipidemia.  We will continue with preadmission medication.     9.  Status post AICD placement. Cardiology following      10.  Hypomagnesemia.  resolved      11.  Anxiety/depression.  We will continue with home medication.     12.  Bacterial pneumonia? ? .  Low suspicion of pna. White count resolved. No fever. S/p abx. . Procalcitonin. Will stop abx today after getting today dose.      #Multiple psych meds,   Currently on prozac and Effexor . effexor 150 stopped by psych also zyprexa  Need to follow up with outpatient psychiatrist  Her daughter does not want to stop any of the psych meds. I told her that these are the recommendations from psychiatrist but she wants to follow up with outpatient psychiatrist.      13.  Other Issues:  Code Status:  The patient is a full code. Galdino Buchanan will place the patient on heparin for DVT prophylaxis. Talked to patients sister. She is in agreement with plan discharging patient home today. She is going to pick him up at 3 PM. Also d/w case management. As per CM Family agreed on taking patient home . On the date of discharge, diagnostic face to face encounter was performed. Patient was hemodynamically stable, offering no new complaints. Denies any shortness of breath at rest, no fevers or chills, no diarrhea or constipation. Patient is agreeable for discharge. Patient understood and verbalized the understanding of the discharge plan. Patient was advised to seek medical help/ care or return to ED, if symptoms recur, worsen or new symptoms develop.       Discharge Disposition:  Home or Self Care    Discharge activity:  Activity as tolerated    Code status at discharge:  Full Code     Outpatient follow up:  Please follow up with your PCP in one week  In addition follow up with cardiology and psychiatry as scheduled       Future appointments-  No future appointments. Follow-up Information     Follow up With Specialties Details Why Contact Info    Heidi Sacks, MD Family Medicine On 8/14/2020 you need blood work CBC and [address] follow up PCP appointment Friday, 8/14/20 @ 11:00 a.m.  [address] [place] [address] [address] [place] [address]      Maria Isabel Son MD Psychiatry In 1 week  [address]       Richelle Rashid MD Cardiology On 8/18/2020 at 11:30 am on 8/18/20 [facility]  [phone]            Operative procedures performed:      Consults: IP CONSULT TO CARDIOLOGY  IP CONSULT TO PSYCHIATRY    Procedures:  * No surgery found *    Diet:  DIET DIABETIC CONSISTENT CARB  DIET ONE TIME MESSAGE  DIET ONE TIME MESSAGE    Pertinent test results:  Nm Lung Vent/perf    Result Date: 7/15/2020  Indication: Shortness of breath. A V/Q scan was performed with 13.9 mCi xenon 133 inhaled and 4.4 mCi Tc MAA injected intravenously. Ventilation demonstratesslight delayed washout. Perfusion images demonstrates no segmental abnormalities. There is a perfusion defect related to the battery pack. .    IMPRESSION: 1. Very low probability for pulmonary emboli. Raymona Shaggy Hand Rt Min 3 V    Result Date: 7/14/2020  INDICATION: fall/[pain COMPARISON: None FINDINGS: 3 views of the right hand demonstrate no acute fracture or subluxation. There is no significant soft tissue swelling. There is no radiopaque foreign body. IMPRESSION: No acute fracture. Xr Abd (kub)    Result Date: 8/8/2020  EXAM: XR ABD (KUB) INDICATION: respiratory status COMPARISON: Abdominal CT 5/30/2020.  FINDINGS: A supine radiograph of the abdomen shows no dilated loops of large small bowel with mild pancolonic fecal retention. No pneumatosis. Cholecystectomy clips are noted. No soft tissue masses or pathologic calcifications are identified. The bones and soft tissues are within normal limits. IMPRESSION: Mild pancolonic fecal retention. No acute findings. Ct Head Wo Cont    Result Date: 7/25/2020  EXAM: CT HEAD WO CONT INDICATION: confusion COMPARISON: July 14. CONTRAST: None. TECHNIQUE: Unenhanced CT of the head was performed using 5 mm images. Brain and bone windows were generated. Coronal and sagittal reformats. CT dose reduction was achieved through use of a standardized protocol tailored for this examination and automatic exposure control for dose modulation. FINDINGS: The ventricles and sulci are normal in size, shape and configuration. . There is no significant white matter disease. There is no intracranial hemorrhage, extra-axial collection, or mass effect. The basilar cisterns are open. No CT evidence of acute infarct. The bone windows demonstrate no abnormalities. The coastal thickening maxillary sinuses bilaterally. IMPRESSION: No acute findings. Ct Head Wo Cont    Result Date: 7/14/2020  EXAM: CT HEAD WO CONT INDICATION: fall at home COMPARISON: 5/24/2020. CONTRAST: None. TECHNIQUE: Unenhanced CT of the head was performed using 5 mm images. Brain and bone windows were generated. Coronal and sagittal reformats. CT dose reduction was achieved through use of a standardized protocol tailored for this examination and automatic exposure control for dose modulation. FINDINGS: The ventricles and sulci are normal in size, shape and configuration. . There is no significant white matter disease. There is no intracranial hemorrhage, extra-axial collection, or mass effect. The basilar cisterns are open. No CT evidence of acute infarct. The bone windows demonstrate no abnormalities. The visualized portions of the paranasal sinuses and mastoid air cells are clear. Vascular calcification is noted. IMPRESSION: No acute process or change compared to the prior exam.     Ct Spine Thorac Wo Cont    Result Date: 7/25/2020  EXAM:  CT SPINE [facility] WO CONT INDICATION: Urinary incontinence. COMPARISON: None. TECHNIQUE: Multislice helical CT of the thoracic spine was performed. Sagittal and coronal reformations were generated. CT dose reduction was achieved through use of a standardized protocol tailored for this examination and automatic exposure control for dose modulation. FINDINGS: The alignment of the thoracic spine is normal. There are minimal compression deformities of the spine which appear chronic. There is no fracture or other acute abnormality. There is no spinal canal stenosis. There are bilateral pleural effusions. There are shotty nodes in the mediastinum. IMPRESSION: No acute findings. Ct Spine Lumb Wo Cont    Result Date: 7/25/2020  EXAM: CT SPINE LUMB WO CONT INDICATION: urine incontinence, bilateral leg numbness; unable to get MRI due to pacemaker COMPARISON: None. TECHNIQUE:   Unenhanced multislice helical CT of the lumbar spine was performed in the axial plane. Coronal and sagittal reconstructions were obtained. CT dose reduction was achieved through use of a standardized protocol tailored for this examination and automatic exposure control for dose modulation. FINDINGS: There are renal cysts. There are mild compression deformities of L1-L4. T12-L1: The spinal canal and neural foramina are widely patent. L1-L2: The spinal canal and neural foramina are widely patent. L2-L3: Diffuse disc space narrowing and ligamentum flavum hypertrophy.  L3-L4: Diffuse disc bulge resulting in mild central spinal stenosis L4-L5: Diffuse disc bulge and ligamentum flavum hypertrophy resulting in mild central spinal stenosis and bilateral foraminal stenosis L5-S1: Broad-based disc bulge, paracentral to the left, resulting in left foraminal stenosis     IMPRESSION: 1. Multilevel mild degenerative changes as described above. 2. Multiple mild compression deformities. DEXA scan recommended. 3. Renal cysts. Xr Chest Port    Result Date: 8/8/2020  EXAM: XR CHEST PORT INDICATION: SOB, r/o PNA COMPARISON: Chest x-ray 5/20/2020. FINDINGS: A portable AP radiograph of the chest was obtained at 11:54 hours. The patient is on a cardiac monitor. Pacemaker generator body projects over the left chest wall with intact appearing leads traversing in expected course. There is pulmonary vascular congestion and diffuse mild interstitial prominence. No consolidative infiltrate. Trace left pleural effusion. No pneumothorax or right pleural effusion. There are median sternotomy wires and surgical clips compatible with prior CABG. The cardiac and mediastinal contours and pulmonary vascularity are normal.  The chest wall structures and visualized upper abdomen show no acute findings with incidental note of degenerative spine and shoulder changes as well as diffuse osteopenia. IMPRESSION: Congestive failure with interstitial edema and trace left pleural effusion. Xr Chest Port    Result Date: 8/5/2020  Clinical history: short of breath INDICATION:   short of breath COMPARISON: 7/14/2020 FINDINGS: AP portable upright view of the chest demonstrates a stable  cardiopericardial silhouette. Left lung airspace disease is less significant similar to prior examination. Post sternotomy. Cardiac pacer. .Patient is on a cardiac monitor. Small left effusion not changed. IMPRESSION: Persistent though improved left perihilar airspace opacity. Small left effusion not changed. Jey Aliciaer Chest Port    Result Date: 7/14/2020  INDICATION: Chest pain EXAM:  AP CHEST RADIOGRAPH COMPARISON: August 26, 2019 FINDINGS: AP portable view of the chest demonstrates prior median sternotomy, left subclavian pacemaker and cardiomegaly. Left perihilar airspace disease and small left pleural effusion. Right lung is clear. No pneumothorax.  The osseous structures are unremarkable. IMPRESSION: Left perihilar airspace disease and small left pleural effusion. Recent Results (from the past 168 hour(s))   EKG, 12 LEAD, INITIAL    Collection Time: 08/05/20  9:47 PM   Result Value Ref Range    Ventricular Rate 109 BPM    Atrial Rate 109 BPM    P-R Interval 168 ms    QRS Duration 178 ms    Q-T Interval 400 ms    QTC Calculation (Bezet) 538 ms    Calculated P Axis 39 degrees    Calculated R Axis 104 degrees    Calculated T Axis 84 degrees    Diagnosis       Atrial-sensed ventricular-paced rhythm  Biventricular pacemaker detected  When compared with ECG of 13-JUL-2020 23:49,  Vent. rate has increased BY  15 BPM  Confirmed by Kaden Loja M.D., Cecy Goldman ([id number]) on 8/6/2020 7:22:36 AM     SAMPLES BEING HELD    Collection Time: 08/05/20  9:49 PM   Result Value Ref Range    SAMPLES BEING HELD 1RED, 1LAV, 1PST, 1BLU     COMMENT        Add-on orders for these samples will be processed based on acceptable specimen integrity and analyte stability, which may vary by analyte. CBC WITH AUTOMATED DIFF    Collection Time: 08/05/20  9:49 PM   Result Value Ref Range    WBC 15.9 (H) 4.1 - 11.1 K/uL    RBC 4.79 4.10 - 5.70 M/uL    HGB 10.5 (L) 12.1 - 17.0 g/dL    HCT 36.1 (L) 36.6 - 50.3 %    MCV 75.4 (L) 80.0 - 99.0 FL    MCH 21.9 (L) 26.0 - 34.0 PG    MCHC 29.1 (L) 30.0 - 36.5 g/dL    RDW 18.8 (H) 11.5 - 14.5 %    PLATELET 565 167 - 119 K/uL    NRBC 0.0 0  WBC    ABSOLUTE NRBC 0.00 0.00 - 0.01 K/uL    NEUTROPHILS 91 (H) 32 - 75 %    LYMPHOCYTES 3 (L) 12 - 49 %    MONOCYTES 4 (L) 5 - 13 %    EOSINOPHILS 1 0 - 7 %    BASOPHILS 0 0 - 1 %    IMMATURE GRANULOCYTES 1 (H) 0.0 - 0.5 %    ABS. NEUTROPHILS 14.4 (H) 1.8 - 8.0 K/UL    ABS. LYMPHOCYTES 0.5 (L) 0.8 - 3.5 K/UL    ABS. MONOCYTES 0.6 0.0 - 1.0 K/UL    ABS. EOSINOPHILS 0.2 0.0 - 0.4 K/UL    ABS. BASOPHILS 0.0 0.0 - 0.1 K/UL    ABS. IMM.  GRANS. 0.2 (H) 0.00 - 0.04 K/UL    DF SMEAR SCANNED      PLATELET COMMENTS Large Platelets      RBC COMMENTS ANISOCYTOSIS  1+        RBC COMMENTS MICROCYTOSIS  1+        RBC COMMENTS OVALOCYTES  1+        RBC COMMENTS SCHISTOCYTES  1+        RBC COMMENTS SAMANTHA CELLS  1+        RBC COMMENTS POLYCHROMASIA  1+       METABOLIC PANEL, COMPREHENSIVE    Collection Time: 08/05/20  9:49 PM   Result Value Ref Range    Sodium 135 (L) 136 - 145 mmol/L    Potassium 4.1 3.5 - 5.1 mmol/L    Chloride 101 97 - 108 mmol/L    CO2 23 21 - 32 mmol/L    Anion gap 11 5 - 15 mmol/L    Glucose 246 (H) 65 - 100 mg/dL    BUN 18 6 - 20 MG/DL    Creatinine 1.64 (H) 0.70 - 1.30 MG/DL    BUN/Creatinine ratio 11 (L) 12 - 20      GFR est AA 51 (L) >60 ml/min/1.73m2    GFR est non-AA 42 (L) >60 ml/min/1.73m2    Calcium 8.1 (L) 8.5 - 10.1 MG/DL    Bilirubin, total 1.5 (H) 0.2 - 1.0 MG/DL    ALT (SGPT) 20 12 - 78 U/L    AST (SGOT) 14 (L) 15 - 37 U/L    Alk.  phosphatase 114 45 - 117 U/L    Protein, total 6.5 6.4 - 8.2 g/dL    Albumin 3.2 (L) 3.5 - 5.0 g/dL    Globulin 3.3 2.0 - 4.0 g/dL    A-G Ratio 1.0 (L) 1.1 - 2.2     TROPONIN I    Collection Time: 08/05/20  9:49 PM   Result Value Ref Range    Troponin-I, Qt. <0.05 <0.05 ng/mL   MAGNESIUM    Collection Time: 08/05/20  9:49 PM   Result Value Ref Range    Magnesium 1.2 (L) 1.6 - 2.4 mg/dL   NT-PRO BNP    Collection Time: 08/05/20  9:49 PM   Result Value Ref Range    NT pro-BNP 2,744 (H) <125 PG/ML   CULTURE, BLOOD, PAIRED    Collection Time: 08/06/20  1:26 AM    Specimen: Blood   Result Value Ref Range    Special Requests: NO SPECIAL REQUESTS      Culture result: NO GROWTH 5 DAYS     LACTIC ACID    Collection Time: 08/06/20  1:32 AM   Result Value Ref Range    Lactic acid 1.8 0.4 - 2.0 MMOL/L   TSH 3RD GENERATION    Collection Time: 08/06/20 10:15 AM   Result Value Ref Range    TSH 2.19 0.36 - 3.74 uIU/mL   MAGNESIUM    Collection Time: 08/06/20 10:15 AM   Result Value Ref Range    Magnesium 2.0 1.6 - 2.4 mg/dL   PHOSPHORUS    Collection Time: 08/06/20 10:15 AM   Result Value Ref Range    Phosphorus 3.4 2.6 - 4.7 MG/DL   TROPONIN I    Collection Time: 08/06/20 10:15 AM   Result Value Ref Range    Troponin-I, Qt. 0.31 (H) <0.05 ng/mL   D DIMER    Collection Time: 08/06/20 10:15 AM   Result Value Ref Range    D-dimer 1.34 (H) 0.00 - 0.65 mg/L FEU   HEMOGLOBIN A1C WITH EAG    Collection Time: 08/06/20 10:15 AM   Result Value Ref Range    Hemoglobin A1c 6.6 (H) 4.0 - 5.6 %    Est. average glucose 143 mg/dL   PROTHROMBIN TIME + INR    Collection Time: 08/06/20 10:15 AM   Result Value Ref Range    INR 1.1 0.9 - 1.1      Prothrombin time 11.0 9.0 - 11.1 sec   PTT    Collection Time: 08/06/20 10:15 AM   Result Value Ref Range    aPTT 32.6 (H) 22.1 - 32.0 sec    aPTT, therapeutic range     58.0 - 77.0 SECS   FIBRINOGEN    Collection Time: 08/06/20 10:15 AM   Result Value Ref Range    Fibrinogen 508 (H) 200 - 475 mg/dL   LD    Collection Time: 08/06/20 10:15 AM   Result Value Ref Range     85 - 241 U/L   FERRITIN    Collection Time: 08/06/20 10:15 AM   Result Value Ref Range    Ferritin 34 26 - 388 NG/ML   GLUCOSE, POC    Collection Time: 08/06/20 10:19 AM   Result Value Ref Range    Glucose (POC) 121 (H) 65 - 100 mg/dL    Performed by Dru Patient    SARS-COV-2    Collection Time: 08/06/20 11:07 AM   Result Value Ref Range    Specimen source Nasopharyngeal      SARS-CoV-2 Not detected NOTD      Specimen source Nasopharyngeal      Specimen type NP Swab      Health status Symptomatic Testing     TROPONIN I    Collection Time: 08/06/20  4:04 PM   Result Value Ref Range    Troponin-I, Qt. 0.24 (H) <0.05 ng/mL   GLUCOSE, POC    Collection Time: 08/06/20  5:20 PM   Result Value Ref Range    Glucose (POC) 176 (H) 65 - 100 mg/dL    Performed by Ginger Chi    GLUCOSE, POC    Collection Time: 08/06/20  7:21 PM   Result Value Ref Range    Glucose (POC) 213 (H) 65 - 100 mg/dL    Performed by Perez Dumont RN    CULTURE, MRSA    Collection Time: 08/06/20  8:41 PM    Specimen: Nares; Nasal   Result Value Ref Range Special Requests: NO SPECIAL REQUESTS      Culture result: MRSA NOT PRESENT      Culture result:            Screening of patient nares for MRSA is for surveillance purposes and, if positive, to facilitate isolation considerations in high risk settings. It is not intended for automatic decolonization interventions per se as regimens are not sufficiently effective to warrant routine use. GLUCOSE, POC    Collection Time: 08/06/20  9:19 PM   Result Value Ref Range    Glucose (POC) 206 (H) 65 - 100 mg/dL    Performed by Ana Rosa Wu    METABOLIC PANEL, COMPREHENSIVE    Collection Time: 08/07/20  2:28 AM   Result Value Ref Range    Sodium 136 136 - 145 mmol/L    Potassium 4.0 3.5 - 5.1 mmol/L    Chloride 104 97 - 108 mmol/L    CO2 23 21 - 32 mmol/L    Anion gap 9 5 - 15 mmol/L    Glucose 152 (H) 65 - 100 mg/dL    BUN 26 (H) 6 - 20 MG/DL    Creatinine 1.65 (H) 0.70 - 1.30 MG/DL    BUN/Creatinine ratio 16 12 - 20      GFR est AA 51 (L) >60 ml/min/1.73m2    GFR est non-AA 42 (L) >60 ml/min/1.73m2    Calcium 8.6 8.5 - 10.1 MG/DL    Bilirubin, total 1.1 (H) 0.2 - 1.0 MG/DL    ALT (SGPT) 19 12 - 78 U/L    AST (SGOT) 13 (L) 15 - 37 U/L    Alk. phosphatase 96 45 - 117 U/L    Protein, total 6.4 6.4 - 8.2 g/dL    Albumin 2.8 (L) 3.5 - 5.0 g/dL    Globulin 3.6 2.0 - 4.0 g/dL    A-G Ratio 0.8 (L) 1.1 - 2.2     CBC WITH AUTOMATED DIFF    Collection Time: 08/07/20  2:28 AM   Result Value Ref Range    WBC 9.6 4.1 - 11.1 K/uL    RBC 4.35 4.10 - 5.70 M/uL    HGB 9.7 (L) 12.1 - 17.0 g/dL    HCT 32.9 (L) 36.6 - 50.3 %    MCV 75.6 (L) 80.0 - 99.0 FL    MCH 22.3 (L) 26.0 - 34.0 PG    MCHC 29.5 (L) 30.0 - 36.5 g/dL    RDW 19.2 (H) 11.5 - 14.5 %    PLATELET 950 936 - 077 K/uL    MPV 11.5 8.9 - 12.9 FL    NRBC 0.0 0  WBC    ABSOLUTE NRBC 0.00 0.00 - 0.01 K/uL    NEUTROPHILS 77 (H) 32 - 75 %    LYMPHOCYTES 13 12 - 49 %    MONOCYTES 6 5 - 13 %    EOSINOPHILS 4 0 - 7 %    BASOPHILS 0 0 - 1 %    IMMATURE GRANULOCYTES 0 0.0 - 0.5 %    ABS. NEUTROPHILS 7.4 1.8 - 8.0 K/UL    ABS. LYMPHOCYTES 1.2 0.8 - 3.5 K/UL    ABS. MONOCYTES 0.5 0.0 - 1.0 K/UL    ABS. EOSINOPHILS 0.4 0.0 - 0.4 K/UL    ABS. BASOPHILS 0.0 0.0 - 0.1 K/UL    ABS. IMM.  GRANS. 0.0 0.00 - 0.04 K/UL    DF AUTOMATED     PROTHROMBIN TIME + INR    Collection Time: 08/07/20  2:28 AM   Result Value Ref Range    INR 1.0 0.9 - 1.1      Prothrombin time 10.7 9.0 - 11.1 sec   PTT    Collection Time: 08/07/20  2:28 AM   Result Value Ref Range    aPTT 32.5 (H) 22.1 - 32.0 sec    aPTT, therapeutic range     58.0 - 77.0 SECS   FIBRINOGEN    Collection Time: 08/07/20  2:28 AM   Result Value Ref Range    Fibrinogen 480 (H) 200 - 475 mg/dL   GLUCOSE, POC    Collection Time: 08/07/20  8:17 AM   Result Value Ref Range    Glucose (POC) 138 (H) 65 - 100 mg/dL    Performed by Benita Qustodians    GLUCOSE, POC    Collection Time: 08/07/20 12:37 PM   Result Value Ref Range    Glucose (POC) 195 (H) 65 - 100 mg/dL    Performed by Benita Royal    GLUCOSE, POC    Collection Time: 08/07/20  6:05 PM   Result Value Ref Range    Glucose (POC) 154 (H) 65 - 100 mg/dL    Performed by Hali Odell    GLUCOSE, POC    Collection Time: 08/07/20  9:48 PM   Result Value Ref Range    Glucose (POC) 181 (H) 65 - 100 mg/dL    Performed by Tiffanie Beyer    GLUCOSE, POC    Collection Time: 08/08/20  8:50 AM   Result Value Ref Range    Glucose (POC) 171 (H) 65 - 100 mg/dL    Performed by Hali Odell    CBC WITH AUTOMATED DIFF    Collection Time: 08/08/20 10:25 AM   Result Value Ref Range    WBC 13.4 (H) 4.1 - 11.1 K/uL    RBC 5.21 4.10 - 5.70 M/uL    HGB 11.4 (L) 12.1 - 17.0 g/dL    HCT 38.6 36.6 - 50.3 %    MCV 74.1 (L) 80.0 - 99.0 FL    MCH 21.9 (L) 26.0 - 34.0 PG    MCHC 29.5 (L) 30.0 - 36.5 g/dL    RDW 19.1 (H) 11.5 - 14.5 %    PLATELET 315 922 - 468 K/uL    NRBC 0.0 0  WBC    ABSOLUTE NRBC 0.00 0.00 - 0.01 K/uL    NEUTROPHILS 85 (H) 32 - 75 %    LYMPHOCYTES 6 (L) 12 - 49 %    MONOCYTES 6 5 - 13 %    EOSINOPHILS 2 0 - 7 % BASOPHILS 0 0 - 1 %    IMMATURE GRANULOCYTES 0 0.0 - 0.5 %    ABS. NEUTROPHILS 11.4 (H) 1.8 - 8.0 K/UL    ABS. LYMPHOCYTES 0.8 0.8 - 3.5 K/UL    ABS. MONOCYTES 0.8 0.0 - 1.0 K/UL    ABS. EOSINOPHILS 0.2 0.0 - 0.4 K/UL    ABS. BASOPHILS 0.0 0.0 - 0.1 K/UL    ABS. IMM.  GRANS. 0.1 (H) 0.00 - 0.04 K/UL    DF AUTOMATED     METABOLIC PANEL, BASIC    Collection Time: 08/08/20 10:25 AM   Result Value Ref Range    Sodium 136 136 - 145 mmol/L    Potassium 3.9 3.5 - 5.1 mmol/L    Chloride 104 97 - 108 mmol/L    CO2 23 21 - 32 mmol/L    Anion gap 9 5 - 15 mmol/L    Glucose 193 (H) 65 - 100 mg/dL    BUN 30 (H) 6 - 20 MG/DL    Creatinine 1.81 (H) 0.70 - 1.30 MG/DL    BUN/Creatinine ratio 17 12 - 20      GFR est AA 46 (L) >60 ml/min/1.73m2    GFR est non-AA 38 (L) >60 ml/min/1.73m2    Calcium 8.5 8.5 - 10.1 MG/DL   PROCALCITONIN    Collection Time: 08/08/20 10:25 AM   Result Value Ref Range    Procalcitonin 0.11 ng/mL   GLUCOSE, POC    Collection Time: 08/08/20 12:42 PM   Result Value Ref Range    Glucose (POC) 163 (H) 65 - 100 mg/dL    Performed by Fraser Gowers  PCT    GLUCOSE, POC    Collection Time: 08/08/20  4:24 PM   Result Value Ref Range    Glucose (POC) 174 (H) 65 - 100 mg/dL    Performed by Hali Odell    GLUCOSE, POC    Collection Time: 08/08/20  9:50 PM   Result Value Ref Range    Glucose (POC) 127 (H) 65 - 100 mg/dL    Performed by Dave Felty PCT    CBC WITH AUTOMATED DIFF    Collection Time: 08/09/20  5:02 AM   Result Value Ref Range    WBC 9.8 4.1 - 11.1 K/uL    RBC 4.82 4.10 - 5.70 M/uL    HGB 10.6 (L) 12.1 - 17.0 g/dL    HCT 36.1 (L) 36.6 - 50.3 %    MCV 74.9 (L) 80.0 - 99.0 FL    MCH 22.0 (L) 26.0 - 34.0 PG    MCHC 29.4 (L) 30.0 - 36.5 g/dL    RDW 19.1 (H) 11.5 - 14.5 %    PLATELET 278 825 - 157 K/uL    MPV 11.1 8.9 - 12.9 FL    NRBC 0.0 0  WBC    ABSOLUTE NRBC 0.00 0.00 - 0.01 K/uL    NEUTROPHILS 76 (H) 32 - 75 %    LYMPHOCYTES 14 12 - 49 %    MONOCYTES 6 5 - 13 %    EOSINOPHILS 4 0 - 7 % BASOPHILS 0 0 - 1 %    IMMATURE GRANULOCYTES 1 (H) 0.0 - 0.5 %    ABS. NEUTROPHILS 7.4 1.8 - 8.0 K/UL    ABS. LYMPHOCYTES 1.4 0.8 - 3.5 K/UL    ABS. MONOCYTES 0.6 0.0 - 1.0 K/UL    ABS. EOSINOPHILS 0.3 0.0 - 0.4 K/UL    ABS. BASOPHILS 0.0 0.0 - 0.1 K/UL    ABS. IMM.  GRANS. 0.1 (H) 0.00 - 0.04 K/UL    DF AUTOMATED     METABOLIC PANEL, BASIC    Collection Time: 08/09/20  5:02 AM   Result Value Ref Range    Sodium 138 136 - 145 mmol/L    Potassium 3.8 3.5 - 5.1 mmol/L    Chloride 106 97 - 108 mmol/L    CO2 22 21 - 32 mmol/L    Anion gap 10 5 - 15 mmol/L    Glucose 113 (H) 65 - 100 mg/dL    BUN 38 (H) 6 - 20 MG/DL    Creatinine 1.96 (H) 0.70 - 1.30 MG/DL    BUN/Creatinine ratio 19 12 - 20      GFR est AA 42 (L) >60 ml/min/1.73m2    GFR est non-AA 34 (L) >60 ml/min/1.73m2    Calcium 8.0 (L) 8.5 - 10.1 MG/DL   GLUCOSE, POC    Collection Time: 08/09/20  8:22 AM   Result Value Ref Range    Glucose (POC) 124 (H) 65 - 100 mg/dL    Performed by Louise Portillo    GLUCOSE, POC    Collection Time: 08/09/20 11:34 AM   Result Value Ref Range    Glucose (POC) 251 (H) 65 - 100 mg/dL    Performed by Kem Fay    GLUCOSE, POC    Collection Time: 08/09/20  4:43 PM   Result Value Ref Range    Glucose (POC) 155 (H) 65 - 100 mg/dL    Performed by Kem Fay    GLUCOSE, POC    Collection Time: 08/09/20  9:52 PM   Result Value Ref Range    Glucose (POC) 159 (H) 65 - 100 mg/dL    Performed by Tressa Kenny    CBC WITH AUTOMATED DIFF    Collection Time: 08/10/20  5:21 AM   Result Value Ref Range    WBC 9.3 4.1 - 11.1 K/uL    RBC 4.85 4.10 - 5.70 M/uL    HGB 10.6 (L) 12.1 - 17.0 g/dL    HCT 36.0 (L) 36.6 - 50.3 %    MCV 74.2 (L) 80.0 - 99.0 FL    MCH 21.9 (L) 26.0 - 34.0 PG    MCHC 29.4 (L) 30.0 - 36.5 g/dL    RDW 20.2 (H) 11.5 - 14.5 %    PLATELET 219 505 - 870 K/uL    MPV 10.5 8.9 - 12.9 FL    NRBC 0.0 0  WBC    ABSOLUTE NRBC 0.00 0.00 - 0.01 K/uL    NEUTROPHILS 77 (H) 32 - 75 %    LYMPHOCYTES 13 12 - 49 % MONOCYTES 6 5 - 13 %    EOSINOPHILS 4 0 - 7 %    BASOPHILS 0 0 - 1 %    IMMATURE GRANULOCYTES 0 0.0 - 0.5 %    ABS. NEUTROPHILS 7.1 1.8 - 8.0 K/UL    ABS. LYMPHOCYTES 1.2 0.8 - 3.5 K/UL    ABS. MONOCYTES 0.6 0.0 - 1.0 K/UL    ABS. EOSINOPHILS 0.4 0.0 - 0.4 K/UL    ABS. BASOPHILS 0.0 0.0 - 0.1 K/UL    ABS. IMM.  GRANS. 0.0 0.00 - 0.04 K/UL    DF SMEAR SCANNED      PLATELET COMMENTS Large Platelets      RBC COMMENTS OVALOCYTES  PRESENT        RBC COMMENTS POLYCHROMASIA  1+        RBC COMMENTS ANISOCYTOSIS  2+       METABOLIC PANEL, BASIC    Collection Time: 08/10/20  5:21 AM   Result Value Ref Range    Sodium 135 (L) 136 - 145 mmol/L    Potassium 3.7 3.5 - 5.1 mmol/L    Chloride 104 97 - 108 mmol/L    CO2 23 21 - 32 mmol/L    Anion gap 8 5 - 15 mmol/L    Glucose 161 (H) 65 - 100 mg/dL    BUN 40 (H) 6 - 20 MG/DL    Creatinine 2.08 (H) 0.70 - 1.30 MG/DL    BUN/Creatinine ratio 19 12 - 20      GFR est AA 39 (L) >60 ml/min/1.73m2    GFR est non-AA 32 (L) >60 ml/min/1.73m2    Calcium 7.9 (L) 8.5 - 10.1 MG/DL   GLUCOSE, POC    Collection Time: 08/10/20  7:52 AM   Result Value Ref Range    Glucose (POC) 175 (H) 65 - 100 mg/dL    Performed by MarkWebflowRajiemanuel Walker, POC    Collection Time: 08/10/20 11:30 AM   Result Value Ref Range    Glucose (POC) 205 (H) 65 - 100 mg/dL    Performed by [employer], POC    Collection Time: 08/10/20  4:25 PM   Result Value Ref Range    Glucose (POC) 158 (H) 65 - 100 mg/dL    Performed by Lidia Parra    GLUCOSE, POC    Collection Time: 08/10/20  9:45 PM   Result Value Ref Range    Glucose (POC) 162 (H) 65 - 100 mg/dL    Performed by [address] [place] [address], BASIC    Collection Time: 08/11/20  4:12 AM   Result Value Ref Range    Sodium 136 136 - 145 mmol/L    Potassium 3.6 3.5 - 5.1 mmol/L    Chloride 106 97 - 108 mmol/L    CO2 20 (L) 21 - 32 mmol/L    Anion gap 10 5 - 15 mmol/L    Glucose 149 (H) 65 - 100 mg/dL    BUN 40 (H) 6 - 20 MG/DL    Creatinine 1.90 (H) 0.70 - 1.30 MG/DL    BUN/Creatinine ratio 21 (H) 12 - 20      GFR est AA 43 (L) >60 ml/min/1.73m2    GFR est non-AA 36 (L) >60 ml/min/1.73m2    Calcium 8.1 (L) 8.5 - 10.1 MG/DL   CBC WITH AUTOMATED DIFF    Collection Time: 08/11/20  4:38 AM   Result Value Ref Range    WBC 9.2 4.1 - 11.1 K/uL    RBC 4.89 4. 10 - 5.70 M/uL    HGB 10.8 (L) 12.1 - 17.0 g/dL    HCT 37.2 36.6 - 50.3 %    MCV 76.1 (L) 80.0 - 99.0 FL    MCH 22.1 (L) 26.0 - 34.0 PG    MCHC 29.0 (L) 30.0 - 36.5 g/dL    RDW 19.7 (H) 11.5 - 14.5 %    PLATELET 703 967 - 951 K/uL    MPV 11.0 8.9 - 12.9 FL    NRBC 0.0 0  WBC    ABSOLUTE NRBC 0.00 0.00 - 0.01 K/uL    NEUTROPHILS 77 (H) 32 - 75 %    LYMPHOCYTES 13 12 - 49 %    MONOCYTES 6 5 - 13 %    EOSINOPHILS 4 0 - 7 %    BASOPHILS 0 0 - 1 %    IMMATURE GRANULOCYTES 0 0.0 - 0.5 %    ABS. NEUTROPHILS 7.1 1.8 - 8.0 K/UL    ABS. LYMPHOCYTES 1.2 0.8 - 3.5 K/UL    ABS. MONOCYTES 0.5 0.0 - 1.0 K/UL    ABS. EOSINOPHILS 0.4 0.0 - 0.4 K/UL    ABS. BASOPHILS 0.0 0.0 - 0.1 K/UL    ABS. IMM.  GRANS. 0.0 0.00 - 0.04 K/UL    DF AUTOMATED     GLUCOSE, POC    Collection Time: 08/11/20  7:55 AM   Result Value Ref Range    Glucose (POC) 145 (H) 65 - 100 mg/dL    Performed by [employer] [facility]    GLUCOSE, POC    Collection Time: 08/11/20 12:07 PM   Result Value Ref Range    Glucose (POC) 201 (H) 65 - 100 mg/dL    Performed by [facility]            Physical Exam on Discharge:    Discharge condition: good    Vital signs:   Patient Vitals for the past 12 hrs:   Temp Pulse Resp BP SpO2   08/11/20 1137 97.7 °F (36.5 °C) 70 12 122/73 98 %   08/11/20 0947  74  122/71 97 %   08/11/20 0940  79  108/73    08/11/20 0935  71  136/80 94 %   08/11/20 0816 97.5 °F (36.4 °C) 67 14 124/68 97 %       Visit Vitals  /73 (BP 1 Location: Right arm, BP Patient Position: Sitting)   Pulse 70   Temp 97.7 °F (36.5 °C)   Resp 12   Wt 95.8 kg (211 lb 3.2 oz)   SpO2 98%   BMI 30.74 kg/m²     General:  Alert, cooperative, no distress, appears stated age. Head:  Normocephalic, without obvious abnormality, atraumatic. Lungs:   Clear to auscultation bilaterally. Chest wall:  No tenderness or deformity. Heart:  Regular rate and rhythm, S1, S2 normal, no murmur, click, rub or gallop. Abdomen:   Soft, non-tender. Bowel sounds normal. No masses,  No organomegaly. Current Discharge Medication List      CONTINUE these medications which have CHANGED    Details   amiodarone (CORDARONE) 200 mg tablet Take 1 Tab by mouth two (2) times a day. Qty: 60 Tab, Refills: 0      furosemide (LASIX) 40 mg tablet Take 2 Tabs by mouth daily. Qty: 30 Tab, Refills: 0      metoprolol succinate (TOPROL-XL) 25 mg XL tablet Take 1.5 Tabs by mouth daily. Qty: 30 Tab, Refills: 0      venlafaxine-SR (Effexor XR) 75 mg capsule Take 1 Cap by mouth every evening. Take 75 mg in evening  Qty: 30 Cap, Refills: 0      ALPRAZolam (XANAX) 0.5 mg tablet Take 1 Tab by mouth two (2) times daily as needed for Anxiety. Max Daily Amount: 1 mg. Qty: 10 Tab, Refills: 0    Associated Diagnoses: Anxiety         CONTINUE these medications which have NOT CHANGED    Details   pregabalin (Lyrica) 100 mg capsule Take 100 mg by mouth three (3) times daily. lactulose (CHRONULAC) 10 gram/15 mL solution Take 20 g by mouth two (2) times a day. Pt does not take it every day - only takes it \"if pt hasn't pooped in over 18 hours\"      pantoprazole (PROTONIX) 40 mg tablet Take 40 mg by mouth two (2) times a day. calcium carbonate (TUMS) 200 mg calcium (500 mg) chew Take 2 Tabs by mouth three (3) times daily as needed for Pain (abdominal pain). Qty: 30 Tab, Refills: 0      potassium chloride SR (KLOR-CON 10) 10 mEq tablet Take 20 mEq by mouth daily as needed. With lasix      albuterol (PROVENTIL VENTOLIN) 2.5 mg /3 mL (0.083 %) nebu 2.5 mg by Nebulization route every four (4) hours as needed for Wheezing.       glimepiride (AMARYL) 1 mg tablet Take 1 mg by mouth two (2) times a day. FLUoxetine (PROzac) 20 mg capsule Take 20 mg by mouth daily. atorvastatin (LIPITOR) 80 mg tablet Take 80 mg by mouth daily. finasteride (PROSCAR) 5 mg tablet Take 5 mg by mouth every morning. levETIRAcetam 1,000 mg tablet Take 1 Tab by mouth every twelve (12) hours. Qty: 60 Tab, Refills: 1      clopidogrel (PLAVIX) 75 mg tab Take 1 Tab by mouth daily. Qty: 30 Tab, Refills: 2      aspirin 81 mg chewable tablet Take 1 Tab by mouth daily. Qty: 30 Tab, Refills: 0      tamsulosin (FLOMAX) 0.4 mg capsule Take 1 Cap by mouth Before breakfast and dinner. Before Breakfast and in the afternoon  Qty: 30 Cap, Refills: 0      naloxone (NARCAN) 4 mg/actuation nasal spray Use 1 spray intranasally, then discard. Repeat with new spray every 2 min as needed for opioid overdose symptoms, alternating nostrils. Qty: 1 Each, Refills: 0      nitroglycerin (NITROSTAT) 0.4 mg SL tablet 0.4 mg by SubLINGual route every five (5) minutes as needed for Chest Pain. May repeat every 5 minutes for a maximum of 3 doses if chest pain not relieved call MD         STOP taking these medications       midodrine (PROAMATINE) 5 mg tablet Comments:   Reason for Stopping:         OLANZapine (ZyPREXA) 5 mg tablet Comments:   Reason for Stopping:         clonazePAM (KLONOPIN) 1 mg tablet Comments:   Reason for Stopping:                  Total time spent on discharge planning, counseling and co-ordination of care:   35 minutes    Edvin Jon MD  08/11/20  11:18 AM

## 2020-08-11 NOTE — PROGRESS NOTES
Problem: Falls - Risk of  Goal: *Absence of Falls  Description: Document Jimmy Flores Fall Risk and appropriate interventions in the flowsheet. Outcome: Resolved/Met     Problem: Patient Education: Go to Patient Education Activity  Goal: Patient/Family Education  Outcome: Resolved/Met     Problem: Diabetes Self-Management  Goal: *Disease process and treatment process  Description: Define diabetes and identify own type of diabetes; list 3 options for treating diabetes. Outcome: Resolved/Met  Goal: *Incorporating nutritional management into lifestyle  Description: Describe effect of type, amount and timing of food on blood glucose; list 3 methods for planning meals. Outcome: Resolved/Met  Goal: *Incorporating physical activity into lifestyle  Description: State effect of exercise on blood glucose levels. Outcome: Resolved/Met  Goal: *Developing strategies to promote health/change behavior  Description: Define the ABC's of diabetes; identify appropriate screenings, schedule and personal plan for screenings. Outcome: Resolved/Met  Goal: *Using medications safely  Description: State effect of diabetes medications on diabetes; name diabetes medication taking, action and side effects. Outcome: Resolved/Met  Goal: *Monitoring blood glucose, interpreting and using results  Description: Identify recommended blood glucose targets  and personal targets. Outcome: Resolved/Met  Goal: *Prevention, detection, treatment of acute complications  Description: List symptoms of hyper- and hypoglycemia; describe how to treat low blood sugar and actions for lowering  high blood glucose level. Outcome: Resolved/Met  Goal: *Prevention, detection and treatment of chronic complications  Description: Define the natural course of diabetes and describe the relationship of blood glucose levels to long term complications of diabetes.   Outcome: Resolved/Met  Goal: *Developing strategies to address psychosocial issues  Description: Describe feelings about living with diabetes; identify support needed and support network  Outcome: Resolved/Met  Goal: *Insulin pump training  Outcome: Resolved/Met  Goal: *Sick day guidelines  Outcome: Resolved/Met  Goal: *Patient Specific Goal (EDIT GOAL, INSERT TEXT)  Outcome: Resolved/Met     Problem: Patient Education: Go to Patient Education Activity  Goal: Patient/Family Education  Outcome: Resolved/Met     Problem: Pressure Injury - Risk of  Goal: *Prevention of pressure injury  Description: Document Iván Scale and appropriate interventions in the flowsheet.   Outcome: Resolved/Met     Problem: Patient Education: Go to Patient Education Activity  Goal: Patient/Family Education  Outcome: Resolved/Met     Problem: Patient Education: Go to Patient Education Activity  Goal: Patient/Family Education  Outcome: Resolved/Met     Problem: Heart Failure: Day 1  Goal: Off Pathway (Use only if patient is Off Pathway)  Outcome: Resolved/Met  Goal: Activity/Safety  Outcome: Resolved/Met  Goal: Consults, if ordered  Outcome: Resolved/Met  Goal: Diagnostic Test/Procedures  Outcome: Resolved/Met  Goal: Nutrition/Diet  Outcome: Resolved/Met  Goal: Discharge Planning  Outcome: Resolved/Met  Goal: Medications  Outcome: Resolved/Met  Goal: Respiratory  Outcome: Resolved/Met  Goal: Treatments/Interventions/Procedures  Outcome: Resolved/Met  Goal: Psychosocial  Outcome: Resolved/Met  Goal: *Oxygen saturation within defined limits  Outcome: Resolved/Met  Goal: *Hemodynamically stable  Outcome: Resolved/Met  Goal: *Optimal pain control at patient's stated goal  Outcome: Resolved/Met  Goal: *Anxiety reduced or absent  Outcome: Resolved/Met     Problem: Heart Failure: Day 2  Goal: Off Pathway (Use only if patient is Off Pathway)  Outcome: Resolved/Met  Goal: Activity/Safety  Outcome: Resolved/Met  Goal: Consults, if ordered  Outcome: Resolved/Met  Goal: Diagnostic Test/Procedures  Outcome: Resolved/Met  Goal: Nutrition/Diet  Outcome: Resolved/Met  Goal: Discharge Planning  Outcome: Resolved/Met  Goal: Medications  Outcome: Resolved/Met  Goal: Respiratory  Outcome: Resolved/Met  Goal: Treatments/Interventions/Procedures  Outcome: Resolved/Met  Goal: Psychosocial  Outcome: Resolved/Met  Goal: *Oxygen saturation within defined limits  Outcome: Resolved/Met  Goal: *Hemodynamically stable  Outcome: Resolved/Met  Goal: *Optimal pain control at patient's stated goal  Outcome: Resolved/Met  Goal: *Anxiety reduced or absent  Outcome: Resolved/Met  Goal: *Demonstrates progressive activity  Outcome: Resolved/Met     Problem: Heart Failure: Day 3  Goal: Off Pathway (Use only if patient is Off Pathway)  Outcome: Resolved/Met  Goal: Activity/Safety  Outcome: Resolved/Met  Goal: Diagnostic Test/Procedures  Outcome: Resolved/Met  Goal: Nutrition/Diet  Outcome: Resolved/Met  Goal: Discharge Planning  Outcome: Resolved/Met  Goal: Medications  Outcome: Resolved/Met  Goal: Respiratory  Outcome: Resolved/Met  Goal: Treatments/Interventions/Procedures  Outcome: Resolved/Met  Goal: Psychosocial  Outcome: Resolved/Met  Goal: *Oxygen saturation within defined limits  Outcome: Resolved/Met  Goal: *Hemodynamically stable  Outcome: Resolved/Met  Goal: *Optimal pain control at patient's stated goal  Outcome: Resolved/Met  Goal: *Anxiety reduced or absent  Outcome: Resolved/Met  Goal: *Demonstrates progressive activity  Outcome: Resolved/Met     Problem: Heart Failure: Day 4  Goal: Off Pathway (Use only if patient is Off Pathway)  Outcome: Resolved/Met  Goal: Activity/Safety  Outcome: Resolved/Met  Goal: Diagnostic Test/Procedures  Outcome: Resolved/Met  Goal: Nutrition/Diet  Outcome: Resolved/Met  Goal: Discharge Planning  Outcome: Resolved/Met  Goal: Medications  Outcome: Resolved/Met  Goal: Respiratory  Outcome: Resolved/Met  Goal: Treatments/Interventions/Procedures  Outcome: Resolved/Met  Goal: Psychosocial  Outcome: Resolved/Met  Goal: *Oxygen saturation within defined limits  Outcome: Resolved/Met  Goal: *Hemodynamically stable  Outcome: Resolved/Met  Goal: *Optimal pain control at patient's stated goal  Outcome: Resolved/Met  Goal: *Anxiety reduced or absent  Outcome: Resolved/Met  Goal: *Demonstrates progressive activity  Outcome: Resolved/Met     Problem: Heart Failure: Day 5  Goal: Off Pathway (Use only if patient is Off Pathway)  Outcome: Resolved/Met  Goal: Activity/Safety  Outcome: Resolved/Met  Goal: Diagnostic Test/Procedures  Outcome: Resolved/Met  Goal: Nutrition/Diet  Outcome: Resolved/Met  Goal: Discharge Planning  Outcome: Resolved/Met  Goal: Medications  Outcome: Resolved/Met  Goal: Respiratory  Outcome: Resolved/Met  Goal: Treatments/Interventions/Procedures  Outcome: Resolved/Met  Goal: Psychosocial  Outcome: Resolved/Met     Problem: Heart Failure: Discharge Outcomes  Goal: *Demonstrates ability to perform prescribed activity without shortness of breath or discomfort  Outcome: Resolved/Met  Goal: *Left ventricular function assessment completed prior to or during stay, or planned for post-discharge  Outcome: Resolved/Met  Goal: *ACEI prescribed if LVEF less than 40% and no contraindications or ARB prescribed  Outcome: Resolved/Met  Goal: *Verbalizes understanding and describes prescribed diet  Outcome: Resolved/Met  Goal: *Verbalizes understanding/describes prescribed medications  Outcome: Resolved/Met  Goal: *Describes available resources and support systems  Description: (eg: Home Health, Palliative Care, Advanced Medical Directive)  Outcome: Resolved/Met  Goal: *Describes smoking cessation resources  Outcome: Resolved/Met  Goal: *Understands and describes signs and symptoms to report to providers(Stroke Metric)  Outcome: Resolved/Met  Goal: *Describes/verbalizes understanding of follow-up/return appt  Description: (eg: to physicians, diabetes treatment coordinator, and other resources  Outcome: Resolved/Met  Goal: *Describes importance of continuing daily weights and changes to report to physician  Outcome: Resolved/Met     Problem: Patient Education: Go to Patient Education Activity  Goal: Patient/Family Education  Outcome: Resolved/Met     Problem: Patient Education: Go to Patient Education Activity  Goal: Patient/Family Education  Outcome: Resolved/Met     Problem: Falls - Risk of  Goal: *Absence of Falls  Description: Document [place] Fall Risk and appropriate interventions in the flowsheet.   Outcome: Resolved/Met     Problem: Patient Education: Go to Patient Education Activity  Goal: Patient/Family Education  Outcome: Resolved/Met

## 2020-08-11 NOTE — PROGRESS NOTES
Cardiology Progress Note                                        Admit Date: 8/5/2020    Assessment/Plan:     CHF;acute on chronic systolic HF; on PO lasix, Cr improved; can be discharged from cardiac perpsective  Cardiomyopathy; severe; continue current regimen  PAF; no recurrence on Amiodarone  S/p ICD; working well. OH; trial off of midodrine as possibly contributing psychiatric medications have been adjusted    Marine Washington is a 76 y.o. male with     PROBLEM LIST:  Patient Active Problem List    Diagnosis Date Noted    V tach ([facility] 08/20/2019     Priority: 1 - One    Acute on chronic systolic CHF (congestive heart failure) ([address] 08/06/2020    Bacterial pneumonia 07/14/2020    Acute CVA (cerebrovascular accident) ([facility] 05/12/2020    VT (ventricular tachycardia) ([facility] 08/20/2019    CHF exacerbation ([facility] 06/13/2019    Fall 01/10/2019    TACOS (acute kidney injury) ([facility] 01/10/2019    Chest pain 01/11/2018    Acute chest pain 01/10/2018    Hepatic encephalopathy ([address] 07/17/2017    Neuropathy 04/14/2017    Cirrhosis ([facility] 04/14/2017    CAD (coronary artery disease) 04/14/2017    S/P coronary artery stent placement 04/14/2017    S/P CABG (coronary artery bypass graft) 04/14/2017    Thrombocytopenia ([facility] 04/14/2017    MRSA infection 04/14/2017    S/P cholecystectomy 80/70/5419    Metabolic encephalopathy 22/21/3099    Seizure ([facility] 11/21/2016    Sinusitis     Joint pain     Low back pain     GERD (gastroesophageal reflux disease)     Diabetes mellitus, type 2 (HCC)          Subjective:     Marine Calhoun. reports none.     Visit Vitals  /71 (BP 1 Location: Right arm, BP Patient Position: Sitting)   Pulse 74   Temp 97.5 °F (36.4 °C)   Resp 14   Wt 95.8 kg (211 lb 3.2 oz)   SpO2 97%   BMI 30.74 kg/m²       Intake/Output Summary (Last 24 hours) at 8/11/2020 1017  Last data filed at 8/11/2020 0710  Gross per 24 hour   Intake 480 ml   Output 2250 ml   Net -1770 ml Objective:      Physical Exam:  HEENT: Perrla, EOMI  Neck: No JVD,  No thyroidmegaly  Resp: rales  CV: RRR s1s2 No murmur, +s3  Abd:Soft, Nontender  Ext: No edema  Neuro: Alert and oriented; Nonfocal  Skin: Warm, Dry, Intact  Pulses: 2+ DP/PT/Rad      Telemetry: normal sinus rhythm, V paced    Current Facility-Administered Medications   Medication Dose Route Frequency    furosemide (LASIX) tablet 80 mg  80 mg Oral DAILY    cefTRIAXone (ROCEPHIN) 1 g in 0.9% sodium chloride (MBP/ADV) 50 mL  1 g IntraVENous Q24H    azithromycin (ZITHROMAX) 500 mg in 0.9% sodium chloride (MBP/ADV) 250 mL  500 mg IntraVENous Q24H    metoprolol succinate (TOPROL-XL) XL tablet 37.5 mg  37.5 mg Oral DAILY    [Held by provider] ALPRAZolam (XANAX) tablet 1 mg  1 mg Oral Q12H    aspirin chewable tablet 81 mg  81 mg Oral DAILY    atorvastatin (LIPITOR) tablet 80 mg  80 mg Oral DAILY    clopidogreL (PLAVIX) tablet 75 mg  75 mg Oral DAILY    finasteride (PROSCAR) tablet 5 mg  5 mg Oral 7am    FLUoxetine (PROzac) capsule 20 mg  20 mg Oral DAILY    levETIRAcetam (KEPPRA) tablet 1,000 mg  1,000 mg Oral Q12H    nitroglycerin (NITROSTAT) tablet 0.4 mg  0.4 mg SubLINGual Q5MIN PRN    pantoprazole (PROTONIX) tablet 40 mg  40 mg Oral ACB&D    pregabalin (LYRICA) capsule 50 mg  50 mg Oral TID    potassium chloride SR (KLOR-CON 10) tablet 20 mEq  20 mEq Oral DAILY PRN    venlafaxine-SR (EFFEXOR-XR) capsule 75 mg  75 mg Oral QPM    sodium chloride (NS) flush 5-40 mL  5-40 mL IntraVENous Q8H    sodium chloride (NS) flush 5-40 mL  5-40 mL IntraVENous PRN    acetaminophen (TYLENOL) tablet 650 mg  650 mg Oral Q6H PRN    Or    acetaminophen (TYLENOL) suppository 650 mg  650 mg Rectal Q6H PRN    polyethylene glycol (MIRALAX) packet 17 g  17 g Oral DAILY PRN    promethazine (PHENERGAN) tablet 12.5 mg  12.5 mg Oral Q6H PRN    Or    ondansetron (ZOFRAN) injection 4 mg  4 mg IntraVENous Q6H PRN    heparin (porcine) injection 5,000 Units 5,000 Units SubCUTAneous Q8H    insulin lispro (HUMALOG) injection   SubCUTAneous AC&HS    glucose chewable tablet 16 g  4 Tab Oral PRN    glucagon (GLUCAGEN) injection 1 mg  1 mg IntraMUSCular PRN    dextrose 10 % infusion 125-250 mL  125-250 mL IntraVENous PRN    amiodarone (CORDARONE) tablet 200 mg  200 mg Oral BID    lactulose (CHRONULAC) 10 gram/15 mL solution 30 mL  20 g Oral BID         Data Review:   Labs:    Recent Results (from the past 24 hour(s))   GLUCOSE, POC    Collection Time: 08/10/20 11:30 AM   Result Value Ref Range    Glucose (POC) 205 (H) 65 - 100 mg/dL    Performed by Tate Walker, POC    Collection Time: 08/10/20  4:25 PM   Result Value Ref Range    Glucose (POC) 158 (H) 65 - 100 mg/dL    Performed by Gregg Dubin    GLUCOSE, POC    Collection Time: 08/10/20  9:45 PM   Result Value Ref Range    Glucose (POC) 162 (H) 65 - 100 mg/dL    Performed by [address] [place] [address], BASIC    Collection Time: 08/11/20  4:12 AM   Result Value Ref Range    Sodium 136 136 - 145 mmol/L    Potassium 3.6 3.5 - 5.1 mmol/L    Chloride 106 97 - 108 mmol/L    CO2 20 (L) 21 - 32 mmol/L    Anion gap 10 5 - 15 mmol/L    Glucose 149 (H) 65 - 100 mg/dL    BUN 40 (H) 6 - 20 MG/DL    Creatinine 1.90 (H) 0.70 - 1.30 MG/DL    BUN/Creatinine ratio 21 (H) 12 - 20      GFR est AA 43 (L) >60 ml/min/1.73m2    GFR est non-AA 36 (L) >60 ml/min/1.73m2    Calcium 8.1 (L) 8.5 - 10.1 MG/DL   CBC WITH AUTOMATED DIFF    Collection Time: 08/11/20  4:38 AM   Result Value Ref Range    WBC 9.2 4.1 - 11.1 K/uL    RBC 4.89 4. 10 - 5.70 M/uL    HGB 10.8 (L) 12.1 - 17.0 g/dL    HCT 37.2 36.6 - 50.3 %    MCV 76.1 (L) 80.0 - 99.0 FL    MCH 22.1 (L) 26.0 - 34.0 PG    MCHC 29.0 (L) 30.0 - 36.5 g/dL    RDW 19.7 (H) 11.5 - 14.5 %    PLATELET 114 352 - 052 K/uL    MPV 11.0 8.9 - 12.9 FL    NRBC 0.0 0  WBC    ABSOLUTE NRBC 0.00 0.00 - 0.01 K/uL    NEUTROPHILS 77 (H) 32 - 75 %    LYMPHOCYTES 13 12 - 49 %    MONOCYTES 6 5 - 13 %    EOSINOPHILS 4 0 - 7 %    BASOPHILS 0 0 - 1 %    IMMATURE GRANULOCYTES 0 0.0 - 0.5 %    ABS. NEUTROPHILS 7.1 1.8 - 8.0 K/UL    ABS. LYMPHOCYTES 1.2 0.8 - 3.5 K/UL    ABS. MONOCYTES 0.5 0.0 - 1.0 K/UL    ABS. EOSINOPHILS 0.4 0.0 - 0.4 K/UL    ABS. BASOPHILS 0.0 0.0 - 0.1 K/UL    ABS. IMM.  GRANS. 0.0 0.00 - 0.04 K/UL    DF AUTOMATED     GLUCOSE, POC    Collection Time: 08/11/20  7:55 AM   Result Value Ref Range    Glucose (POC) 145 (H) 65 - 100 mg/dL    Performed by Colleen Check

## 2020-08-11 NOTE — PROGRESS NOTES
Problem: Mobility Impaired (Adult and Pediatric)  Goal: *Acute Goals and Plan of Care (Insert Text)  Description: FUNCTIONAL STATUS PRIOR TO ADMISSION: Patient was independent but using a single point cane as needed for gait. HOME SUPPORT PRIOR TO ADMISSION: The patient lived with two daughters but did not require assist.    Physical Therapy Goals  Initiated 8/9/2020  1. Patient will move from supine to sit and sit to supine  in bed with modified independence within 7 day(s). 2.  Patient will transfer from bed to chair and chair to bed with supervision/set-up using the least restrictive device within 7 day(s). 3.  Patient will perform sit to stand with modified independence within 7 day(s). 4.  Patient will ambulate with modified independence for 150 feet with the least restrictive device within 7 day(s). 5.  Patient will ascend/descend 3 stairs with 1 handrail(s) with modified independence within 7 day(s). Outcome: Progressing Towards Goal   PHYSICAL THERAPY TREATMENT including a six minute walk test  Patient: Kaye Silva (67 y.o. male)  Date: 8/11/2020  Diagnosis: Acute on chronic systolic CHF (congestive heart failure) ([facility] [I50.23]   Acute on chronic systolic CHF (congestive heart failure) ([facility]       Precautions: Bed Alarm, Fall, Seizure  Chart, physical therapy assessment, plan of care and goals were reviewed. ASSESSMENT  Patient continues with skilled PT services and is progressing towards goals. PT required encouragement to get OOB, see subjective. With education and encouragement he was agreeable to participating in a six minute walk test, results below. His balance is improved over previous PT note, no losses of balance with walker support. Reminded him to use his rollator upon discharge and he verbalized understanding. .     Current Level of Function Impacting Discharge (mobility/balance): up to contact guard provided.      Other factors to consider for discharge: increased fall risk, multiple hospitalizations for heart failure. Nabeel Jhon PLAN :  Patient continues to benefit from skilled intervention to address the above impairments. Continue treatment per established plan of care. to address goals. Recommendation for discharge: (in order for the patient to meet his/her long term goals)  Physical therapy at least 2 days/week in the home AND ensure assist and/or supervision for safety with mobilitiy. This discharge recommendation:  A follow-up discussion with the attending provider and/or case management is planned    IF patient discharges home will need the following DME: none       SUBJECTIVE:   Patient stated I want to stay in the bed and rest.    OBJECTIVE DATA SUMMARY:   Chart checked, pt cleared by nursing  Critical Behavior:  Neurologic State: Alert, Appropriate for age  Orientation Level: Oriented X4  Cognition: Appropriate for age attention/concentration, Follows commands  Safety/Judgement: Awareness of environment  Functional Mobility Training:  Bed Mobility:     Supine to Sit: Modified independent              Transfers:  Sit to Stand: Contact guard assistance  Stand to Sit: Contact guard assistance                            Balance:  Sitting: Intact; Without support  Standing: Impaired  Standing - Static: Constant support;Good  Standing - Dynamic : Constant support; Fair  Ambulation/Gait Training:  Distance (ft): 184 Feet (ft)  Assistive Device: Gait belt;Walker, rolling  Ambulation - Level of Assistance: Contact guard assistance        Gait Abnormalities: Decreased step clearance        Base of Support: Narrowed     Speed/Usha: Slow  Step Length: Left shortened;Right shortened                  6 MWT results: on room air  Distance Walked in Feet (ft): 184 ft.(only amb for 4 of the 6 minutes)    Pre Heart Rate: 65    Pre O2 Saturation: 97        Post Heart Rate: 70    Post O2 Saturation: 98    Assistive device used: Assistive Device: Gait belt;Walker, rolling        Normative data:   Men 39-80 years old = 1889 feet; Women 3680 years nbd=0173 feet  Modified 10 point [place] RPE scale utilized:  0 = no breathlessness at all ---> 10 = maximum exertion  Please refer to the flowsheet for any additional vital signs taken during this treatment. Stairs: Therapeutic Exercises:   Pursed lip breathing  Pain Rating:  None rated    Activity Tolerance:   Good and but limited by fatigue  Please refer to the flowsheet for vital signs taken during this treatment. After treatment patient left in no apparent distress:   Sitting in chair, Call bell within reach, and Bed / chair alarm activated    COMMUNICATION/COLLABORATION:   The patients plan of care was discussed with: Registered nurse.      Marina Bustamante   Time Calculation: 25 mins

## 2020-08-11 NOTE — NURSE NAVIGATOR
Follow up appointment scheduled with [facility] with Dr. Jayce Davis for 8/18/20 at 11:30 am. Information on After Visit Summary.

## 2020-08-11 NOTE — PROGRESS NOTES
Problem: Self Care Deficits Care Plan (Adult)  Goal: *Acute Goals and Plan of Care (Insert Text)  Description:   FUNCTIONAL STATUS PRIOR TO ADMISSION: home from defib placement since mid July 2020 with fatigue, SOB but no assist for self care and functional mobility from family; no DME used per patient report  HOME SUPPORT: 2 adult daughters live with patient, neither are currently working and are able to assist PRN    Occupational Therapy Goals  Initiated 8/10/2020  1. Patient will perform grooming in standing VSS on RA with supervision/set-up within 7 day(s). 2.  Patient will perform upper body dressing with mod I VSS RA within 7 day(s). 3.  Patient will perform lower body dressing with modified independence VSS RA within 7 day(s). 4.  Patient will perform toilet transfers with supervision/set-up within 7 day(s). 5.  Patient will perform all aspects of toileting with supervision/set-up within 7 day(s). 6.  Patient will participate in upper extremity therapeutic exercise/activities with yellow theraband with supervision/set-up for 5 minutes within 7 day(s). 7.  Patient will utilize energy conservation, PLB, fall prevention techniques during functional activities with verbal cues within 7 day(s). 8/11/2020 0955 by BRANDEN Dumont  Outcome: Progressing Towards Goal   OCCUPATIONAL THERAPY TREATMENT  Patient: Farideh Jackson (67 y.o. male)  Date: 8/11/2020  Diagnosis: Acute on chronic systolic CHF (congestive heart failure) ([facility]) [I50.23]   Acute on chronic systolic CHF (congestive heart failure) ([facility]       Precautions: Bed Alarm, Fall, Seizure  Chart, occupational therapy assessment, plan of care, and goals were reviewed. ASSESSMENT  Patient continues with skilled OT services and is progressing towards goals. Participation impacted by impaired endurance for functional activity, impaired static and dynamic standing balance, impaired insight and safety judgement.  Patient requiring assist for ambulation and transfers. Reports dizziness with postural changes. Instructed in stopping between postural changes to allow dizziness to subside. BP checked and noted in flow sheets and below. Current Level of Function Impacting Discharge (ADLs): min assist for mobility to bathroom for toilet transfer and grooming at sink, UE and LE self care with set up to min assist    Other factors to consider for discharge: none         PLAN :  Patient continues to benefit from skilled intervention to address the above impairments. Continue treatment per established plan of care. to address goals. Recommend with staff: Tammie Landing in chair for meals and self care, set up to min assist for self care, toileting in bathroom with RW and min assist    Recommend next OT session: standing endurance, functional activity tolerance    Recommendation for discharge: (in order for the patient to meet his/her long term goals)5 days a week in SNF versus   Occupational therapy at least 2 days/week in the home AND ensure assist and/or supervision for safety with mobility and self care    This discharge recommendation:  Has been made in collaboration with the attending provider and/or case management    IF patient discharges home will need the following DME: walker: rolling       SUBJECTIVE:   Patient stated I didn't sleep last night.     OBJECTIVE DATA SUMMARY:   Cognitive/Behavioral Status:  Neurologic State: Alert; Appropriate for age  Orientation Level: Oriented X4  Cognition: Appropriate for age attention/concentration; Follows commands  Perception: Appears intact  Perseveration: No perseveration noted  Safety/Judgement: Awareness of environment    Functional Mobility and Transfers for ADLs:  Bed Mobility:  Supine to Sit: Modified independent    Transfers:  Sit to Stand: Contact guard assistance;Assist x1;Adaptive equipment  Functional Transfers  Bathroom Mobility: Minimum assistance  Adaptive Equipment: Sharda Fry (comment)  Bed to Chair: Minimum assistance;Assist x1;Adaptive equipment; Additional time    Balance:  Sitting: Intact; Without support  Standing: Intact; Without support  Standing - Static: Fair;Constant support  Standing - Dynamic : Fair;Constant support    ADL Intervention:       Grooming  Position Performed: Standing  Washing Face: Contact guard assistance  Adaptive Equipment: Other (comment)(RW)         Lower Body Bathing  Lower Body : Set-up(in simulation)  Position Performed: Seated edge of bed         Lower Body Dressing Assistance  Pants With Elastic Waist: Minimum assistance(in simulation)  Socks: Set-up  Leg Crossed Method Used: Yes  Position Performed: Seated edge of bed;Standing         Cognitive Retraining  Safety/Judgement: Awareness of environment    Vitals:    08/11/20 0816 08/11/20 0935 08/11/20 0940 08/11/20 0947   BP: 124/68 136/80 108/73 122/71   BP 1 Location: Right arm Right arm Right arm Right arm   BP Patient Position: At rest Sitting Standing Sitting   Pulse: 67 71 79 74   Resp: 14      Temp: 97.5 °F (36.4 °C)      SpO2: 97% 94%  97%   Weight:          Activity Tolerance:   Fair  Please refer to the flowsheet for vital signs taken during this treatment. After treatment patient left in no apparent distress:   Sitting in chair, Call bell within reach, and Bed / chair alarm activated    COMMUNICATION/COLLABORATION:   The patients plan of care was discussed with: Registered nurse.      BRANDEN Irizarry  Time Calculation: 35 mins

## 2020-08-12 ENCOUNTER — PATIENT OUTREACH (OUTPATIENT)
Dept: CASE MANAGEMENT | Age: 65
End: 2020-08-12

## 2020-08-12 NOTE — PROGRESS NOTES
Patient was admitted to [facility] on 20 and discharged on 20 for HFpEF with AFib w/RVR s/p ICD shock. Patient was contacted within one business days of discharge. Left VM asking for return call. Top Discharge Challenges to be reviewed by the provider   Additional needs identified to be addressed with provider yes  medications- psychiatry consult made recommendations to adjust meds to improve sleep and other symptoms including orthostatic BP, family stated that they were reluctant to make changes without seeing MD and CTN has not been able to reach patient. Discussed COVID-19 related testing which was available at this time. Test results were negative. Patient informed of results, if available? NA   Method of communication with provider : none       Advance Care Planning:   Does patient have an Advance Directive:  not on file     Inpatient Readmission Risk score: NA  Was this a readmission? yes   Patient stated reason for the admission: increasing SOB and CP  Patients top risk factors for readmission: ineffective coping, lack of knowledge about disease, level of motivation, medical condition, medication management, polypharmacy and utilization of services  Interventions to address risk factors: to be determined. Care Transition Nurse (CTN) attempted to contact the patient by telephone to perform post hospital discharge assessment. The Plan will be to use identifiers- name and  to verify patient. Plan will be to assess if patient received hospital discharge instructions. CTN will review discharge instructions and red flags with patient and assess understanding. Patient will be given an opportunity to ask questions. Plan will including assessing patient understanding of when to contact the PCP office for questions related to their healthcare. CTN will provide introduction to self, explanation of the CTN role and provide contact information for future reference.     Medication reconciliation was not able to be performed with patient at this time. Plan will include assessing understanding of administration of home medications and any barriers to obtaining medications at that time. Referral to Pharm D needed: to be determined     Home Health/Outpatient orders at discharge: PT, OT and [address]: [address] [place] [address]  Date of initial visit: scheduled for 8/13    Durable Medical Equipment ordered at discharge: none    [address] [place] [address] Blvd will be to provide education regarding infection prevention, and signs and symptoms of COVID-19 and when to seek medical attention and assess understanding and to discuss exposure protocols and quarantine From CDC: Are you at higher risk for severe illness?  and give an opportunity for questions and concerns provide contact information for the COVID-19 hotline [phone] or PCP office for questions related to COVID-19. Plan will include providing information for [facility] and assess willingness to enroll. Plan will include discussing follow-up appointments and determine and provide assistance if needed. [facility] follow up appointment(s):   Future Appointments   Date Time Provider Josemanuel Eldridge   8/13/2020  1:00 PM Jane Fry RN [address] [facility]   8/14/2020 To Be Determined Sudhakarata [facility] [address]   8/17/2020 To Be Determined Jersey Rodriguez OT [facility]     Non-[facility] follow up appointment(s):   Cardiology- Dr. Favian Nelson  PCP- Dr. Sia Nguyen- 8/14 at 11 am     CTN will determine follow up needed based on severity of symptoms and risk factors. CTN provided contact information for future needs.     Goals Addressed    None

## 2020-08-13 ENCOUNTER — HOME CARE VISIT (OUTPATIENT)
Dept: SCHEDULING | Facility: HOME HEALTH | Age: 65
End: 2020-08-13
Payer: MEDICARE

## 2020-08-13 PROCEDURE — 400013 HH SOC

## 2020-08-13 PROCEDURE — 3331090002 HH PPS REVENUE DEBIT

## 2020-08-13 PROCEDURE — 3331090001 HH PPS REVENUE CREDIT

## 2020-08-13 PROCEDURE — G0299 HHS/HOSPICE OF RN EA 15 MIN: HCPCS

## 2020-08-14 VITALS
HEART RATE: 63 BPM | TEMPERATURE: 97.1 F | DIASTOLIC BLOOD PRESSURE: 64 MMHG | SYSTOLIC BLOOD PRESSURE: 126 MMHG | OXYGEN SATURATION: 100 % | RESPIRATION RATE: 18 BRPM

## 2020-08-14 PROCEDURE — 3331090001 HH PPS REVENUE CREDIT

## 2020-08-14 PROCEDURE — 3331090002 HH PPS REVENUE DEBIT

## 2020-08-15 ENCOUNTER — HOME CARE VISIT (OUTPATIENT)
Dept: HOME HEALTH SERVICES | Facility: HOME HEALTH | Age: 65
End: 2020-08-15
Payer: MEDICARE

## 2020-08-15 PROCEDURE — 3331090002 HH PPS REVENUE DEBIT

## 2020-08-15 PROCEDURE — 3331090001 HH PPS REVENUE CREDIT

## 2020-08-16 ENCOUNTER — HOME CARE VISIT (OUTPATIENT)
Dept: HOME HEALTH SERVICES | Facility: HOME HEALTH | Age: 65
End: 2020-08-16
Payer: MEDICARE

## 2020-08-16 PROCEDURE — 3331090001 HH PPS REVENUE CREDIT

## 2020-08-16 PROCEDURE — 3331090002 HH PPS REVENUE DEBIT

## 2020-08-17 ENCOUNTER — HOME CARE VISIT (OUTPATIENT)
Dept: SCHEDULING | Facility: HOME HEALTH | Age: 65
End: 2020-08-17
Payer: MEDICARE

## 2020-08-17 VITALS
RESPIRATION RATE: 16 BRPM | OXYGEN SATURATION: 98 % | DIASTOLIC BLOOD PRESSURE: 70 MMHG | SYSTOLIC BLOOD PRESSURE: 102 MMHG | HEART RATE: 68 BPM | TEMPERATURE: 97.6 F

## 2020-08-17 PROCEDURE — G0299 HHS/HOSPICE OF RN EA 15 MIN: HCPCS

## 2020-08-17 PROCEDURE — 3331090002 HH PPS REVENUE DEBIT

## 2020-08-17 PROCEDURE — G0151 HHCP-SERV OF PT,EA 15 MIN: HCPCS

## 2020-08-17 PROCEDURE — 3331090001 HH PPS REVENUE CREDIT

## 2020-08-18 VITALS — RESPIRATION RATE: 16 BRPM | HEART RATE: 78 BPM | TEMPERATURE: 98.2 F | OXYGEN SATURATION: 97 %

## 2020-08-18 PROCEDURE — 3331090001 HH PPS REVENUE CREDIT

## 2020-08-18 PROCEDURE — 3331090002 HH PPS REVENUE DEBIT

## 2020-08-19 ENCOUNTER — HOME CARE VISIT (OUTPATIENT)
Dept: SCHEDULING | Facility: HOME HEALTH | Age: 65
End: 2020-08-19
Payer: MEDICARE

## 2020-08-19 ENCOUNTER — HOME CARE VISIT (OUTPATIENT)
Dept: HOME HEALTH SERVICES | Facility: HOME HEALTH | Age: 65
End: 2020-08-19
Payer: MEDICARE

## 2020-08-19 PROCEDURE — G0157 HHC PT ASSISTANT EA 15: HCPCS

## 2020-08-19 PROCEDURE — 3331090002 HH PPS REVENUE DEBIT

## 2020-08-19 PROCEDURE — 3331090001 HH PPS REVENUE CREDIT

## 2020-08-20 ENCOUNTER — HOME CARE VISIT (OUTPATIENT)
Dept: SCHEDULING | Facility: HOME HEALTH | Age: 65
End: 2020-08-20
Payer: MEDICARE

## 2020-08-20 ENCOUNTER — HOME CARE VISIT (OUTPATIENT)
Dept: HOME HEALTH SERVICES | Facility: HOME HEALTH | Age: 65
End: 2020-08-20
Payer: MEDICARE

## 2020-08-20 VITALS
RESPIRATION RATE: 16 BRPM | OXYGEN SATURATION: 99 % | HEART RATE: 71 BPM | SYSTOLIC BLOOD PRESSURE: 112 MMHG | DIASTOLIC BLOOD PRESSURE: 70 MMHG | TEMPERATURE: 97.6 F

## 2020-08-20 VITALS
SYSTOLIC BLOOD PRESSURE: 120 MMHG | HEART RATE: 66 BPM | TEMPERATURE: 96.8 F | OXYGEN SATURATION: 99 % | DIASTOLIC BLOOD PRESSURE: 60 MMHG

## 2020-08-20 PROCEDURE — 3331090001 HH PPS REVENUE CREDIT

## 2020-08-20 PROCEDURE — G0152 HHCP-SERV OF OT,EA 15 MIN: HCPCS

## 2020-08-20 PROCEDURE — 3331090002 HH PPS REVENUE DEBIT

## 2020-08-20 PROCEDURE — G0299 HHS/HOSPICE OF RN EA 15 MIN: HCPCS

## 2020-08-21 ENCOUNTER — HOME CARE VISIT (OUTPATIENT)
Dept: HOME HEALTH SERVICES | Facility: HOME HEALTH | Age: 65
End: 2020-08-21
Payer: MEDICARE

## 2020-08-21 ENCOUNTER — HOME CARE VISIT (OUTPATIENT)
Dept: SCHEDULING | Facility: HOME HEALTH | Age: 65
End: 2020-08-21
Payer: MEDICARE

## 2020-08-21 PROCEDURE — 3331090002 HH PPS REVENUE DEBIT

## 2020-08-21 PROCEDURE — 3331090001 HH PPS REVENUE CREDIT

## 2020-08-22 PROCEDURE — 3331090002 HH PPS REVENUE DEBIT

## 2020-08-22 PROCEDURE — 3331090001 HH PPS REVENUE CREDIT

## 2020-08-23 PROCEDURE — 3331090001 HH PPS REVENUE CREDIT

## 2020-08-23 PROCEDURE — 3331090002 HH PPS REVENUE DEBIT

## 2020-08-24 PROCEDURE — 3331090001 HH PPS REVENUE CREDIT

## 2020-08-24 PROCEDURE — 3331090002 HH PPS REVENUE DEBIT

## 2020-08-25 ENCOUNTER — HOME CARE VISIT (OUTPATIENT)
Dept: SCHEDULING | Facility: HOME HEALTH | Age: 65
End: 2020-08-25
Payer: MEDICARE

## 2020-08-25 VITALS
HEART RATE: 92 BPM | DIASTOLIC BLOOD PRESSURE: 60 MMHG | SYSTOLIC BLOOD PRESSURE: 100 MMHG | OXYGEN SATURATION: 98 % | RESPIRATION RATE: 16 BRPM | TEMPERATURE: 97.4 F

## 2020-08-25 VITALS
OXYGEN SATURATION: 98 % | TEMPERATURE: 96.8 F | SYSTOLIC BLOOD PRESSURE: 110 MMHG | HEART RATE: 64 BPM | DIASTOLIC BLOOD PRESSURE: 60 MMHG

## 2020-08-25 PROCEDURE — G0157 HHC PT ASSISTANT EA 15: HCPCS

## 2020-08-25 PROCEDURE — 3331090002 HH PPS REVENUE DEBIT

## 2020-08-25 PROCEDURE — G0299 HHS/HOSPICE OF RN EA 15 MIN: HCPCS

## 2020-08-25 PROCEDURE — G0152 HHCP-SERV OF OT,EA 15 MIN: HCPCS

## 2020-08-25 PROCEDURE — 3331090001 HH PPS REVENUE CREDIT

## 2020-08-26 PROCEDURE — 3331090002 HH PPS REVENUE DEBIT

## 2020-08-26 PROCEDURE — 3331090001 HH PPS REVENUE CREDIT

## 2020-08-27 ENCOUNTER — HOME CARE VISIT (OUTPATIENT)
Dept: HOME HEALTH SERVICES | Facility: HOME HEALTH | Age: 65
End: 2020-08-27
Payer: MEDICARE

## 2020-08-27 ENCOUNTER — HOME CARE VISIT (OUTPATIENT)
Dept: SCHEDULING | Facility: HOME HEALTH | Age: 65
End: 2020-08-27
Payer: MEDICARE

## 2020-08-27 VITALS
DIASTOLIC BLOOD PRESSURE: 70 MMHG | OXYGEN SATURATION: 99 % | TEMPERATURE: 96.8 F | HEART RATE: 63 BPM | SYSTOLIC BLOOD PRESSURE: 112 MMHG

## 2020-08-27 PROCEDURE — 3331090002 HH PPS REVENUE DEBIT

## 2020-08-27 PROCEDURE — G0157 HHC PT ASSISTANT EA 15: HCPCS

## 2020-08-27 PROCEDURE — 3331090001 HH PPS REVENUE CREDIT

## 2020-08-27 PROCEDURE — G0152 HHCP-SERV OF OT,EA 15 MIN: HCPCS

## 2020-08-28 PROCEDURE — 3331090002 HH PPS REVENUE DEBIT

## 2020-08-28 PROCEDURE — 3331090001 HH PPS REVENUE CREDIT

## 2020-08-29 PROCEDURE — 3331090002 HH PPS REVENUE DEBIT

## 2020-08-29 PROCEDURE — 3331090001 HH PPS REVENUE CREDIT

## 2020-08-30 PROCEDURE — 3331090001 HH PPS REVENUE CREDIT

## 2020-08-30 PROCEDURE — 3331090002 HH PPS REVENUE DEBIT

## 2020-08-31 ENCOUNTER — HOME CARE VISIT (OUTPATIENT)
Dept: SCHEDULING | Facility: HOME HEALTH | Age: 65
End: 2020-08-31
Payer: MEDICARE

## 2020-08-31 ENCOUNTER — HOME CARE VISIT (OUTPATIENT)
Dept: HOME HEALTH SERVICES | Facility: HOME HEALTH | Age: 65
End: 2020-08-31
Payer: MEDICARE

## 2020-08-31 VITALS
TEMPERATURE: 96 F | DIASTOLIC BLOOD PRESSURE: 74 MMHG | SYSTOLIC BLOOD PRESSURE: 120 MMHG | OXYGEN SATURATION: 98 % | HEART RATE: 65 BPM

## 2020-08-31 PROCEDURE — 3331090002 HH PPS REVENUE DEBIT

## 2020-08-31 PROCEDURE — G0152 HHCP-SERV OF OT,EA 15 MIN: HCPCS

## 2020-08-31 PROCEDURE — 3331090001 HH PPS REVENUE CREDIT

## 2020-09-01 ENCOUNTER — HOME CARE VISIT (OUTPATIENT)
Dept: HOME HEALTH SERVICES | Facility: HOME HEALTH | Age: 65
End: 2020-09-01
Payer: MEDICARE

## 2020-09-01 PROCEDURE — 3331090002 HH PPS REVENUE DEBIT

## 2020-09-01 PROCEDURE — 3331090001 HH PPS REVENUE CREDIT

## 2020-09-02 ENCOUNTER — HOME CARE VISIT (OUTPATIENT)
Dept: HOME HEALTH SERVICES | Facility: HOME HEALTH | Age: 65
End: 2020-09-02
Payer: MEDICARE

## 2020-09-02 PROCEDURE — 3331090001 HH PPS REVENUE CREDIT

## 2020-09-02 PROCEDURE — 3331090002 HH PPS REVENUE DEBIT

## 2020-09-03 ENCOUNTER — HOME CARE VISIT (OUTPATIENT)
Dept: SCHEDULING | Facility: HOME HEALTH | Age: 65
End: 2020-09-03
Payer: MEDICARE

## 2020-09-03 PROCEDURE — 3331090002 HH PPS REVENUE DEBIT

## 2020-09-03 PROCEDURE — 3331090001 HH PPS REVENUE CREDIT

## 2020-09-03 PROCEDURE — G0157 HHC PT ASSISTANT EA 15: HCPCS

## 2020-09-04 ENCOUNTER — HOME CARE VISIT (OUTPATIENT)
Dept: SCHEDULING | Facility: HOME HEALTH | Age: 65
End: 2020-09-04
Payer: MEDICARE

## 2020-09-04 ENCOUNTER — HOME CARE VISIT (OUTPATIENT)
Dept: HOME HEALTH SERVICES | Facility: HOME HEALTH | Age: 65
End: 2020-09-04
Payer: MEDICARE

## 2020-09-04 PROCEDURE — 3331090001 HH PPS REVENUE CREDIT

## 2020-09-04 PROCEDURE — G0152 HHCP-SERV OF OT,EA 15 MIN: HCPCS

## 2020-09-04 PROCEDURE — 3331090002 HH PPS REVENUE DEBIT

## 2020-09-05 ENCOUNTER — HOME CARE VISIT (OUTPATIENT)
Dept: HOME HEALTH SERVICES | Facility: HOME HEALTH | Age: 65
End: 2020-09-05
Payer: MEDICARE

## 2020-09-05 PROCEDURE — 3331090001 HH PPS REVENUE CREDIT

## 2020-09-05 PROCEDURE — 3331090002 HH PPS REVENUE DEBIT

## 2020-09-06 PROCEDURE — 3331090001 HH PPS REVENUE CREDIT

## 2020-09-06 PROCEDURE — 3331090002 HH PPS REVENUE DEBIT

## 2020-09-07 PROCEDURE — 3331090002 HH PPS REVENUE DEBIT

## 2020-09-07 PROCEDURE — 3331090001 HH PPS REVENUE CREDIT

## 2020-09-08 ENCOUNTER — HOME CARE VISIT (OUTPATIENT)
Dept: HOME HEALTH SERVICES | Facility: HOME HEALTH | Age: 65
End: 2020-09-08
Payer: MEDICARE

## 2020-09-08 VITALS
TEMPERATURE: 96.5 F | HEART RATE: 60 BPM | OXYGEN SATURATION: 99 % | DIASTOLIC BLOOD PRESSURE: 80 MMHG | SYSTOLIC BLOOD PRESSURE: 120 MMHG

## 2020-09-08 PROCEDURE — 3331090002 HH PPS REVENUE DEBIT

## 2020-09-08 PROCEDURE — 3331090001 HH PPS REVENUE CREDIT

## 2020-09-09 ENCOUNTER — HOME CARE VISIT (OUTPATIENT)
Dept: HOME HEALTH SERVICES | Facility: HOME HEALTH | Age: 65
End: 2020-09-09
Payer: MEDICARE

## 2020-09-09 PROCEDURE — 3331090002 HH PPS REVENUE DEBIT

## 2020-09-09 PROCEDURE — 3331090001 HH PPS REVENUE CREDIT

## 2020-09-10 ENCOUNTER — HOME CARE VISIT (OUTPATIENT)
Dept: SCHEDULING | Facility: HOME HEALTH | Age: 65
End: 2020-09-10
Payer: MEDICARE

## 2020-09-10 ENCOUNTER — HOME CARE VISIT (OUTPATIENT)
Dept: HOME HEALTH SERVICES | Facility: HOME HEALTH | Age: 65
End: 2020-09-10
Payer: MEDICARE

## 2020-09-10 PROCEDURE — 3331090001 HH PPS REVENUE CREDIT

## 2020-09-10 PROCEDURE — 3331090002 HH PPS REVENUE DEBIT

## 2020-09-10 PROCEDURE — G0151 HHCP-SERV OF PT,EA 15 MIN: HCPCS

## 2020-09-11 ENCOUNTER — HOME CARE VISIT (OUTPATIENT)
Dept: SCHEDULING | Facility: HOME HEALTH | Age: 65
End: 2020-09-11
Payer: MEDICARE

## 2020-09-11 ENCOUNTER — HOME CARE VISIT (OUTPATIENT)
Dept: HOME HEALTH SERVICES | Facility: HOME HEALTH | Age: 65
End: 2020-09-11
Payer: MEDICARE

## 2020-09-11 VITALS
OXYGEN SATURATION: 94 % | SYSTOLIC BLOOD PRESSURE: 118 MMHG | DIASTOLIC BLOOD PRESSURE: 64 MMHG | HEART RATE: 65 BPM | TEMPERATURE: 97.5 F

## 2020-09-11 PROCEDURE — G0299 HHS/HOSPICE OF RN EA 15 MIN: HCPCS

## 2020-09-11 PROCEDURE — G0152 HHCP-SERV OF OT,EA 15 MIN: HCPCS

## 2020-09-11 PROCEDURE — 3331090002 HH PPS REVENUE DEBIT

## 2020-09-11 PROCEDURE — 3331090001 HH PPS REVENUE CREDIT

## 2020-09-12 VITALS
BODY MASS INDEX: 30.42 KG/M2 | HEART RATE: 82 BPM | WEIGHT: 209 LBS | TEMPERATURE: 97.4 F | DIASTOLIC BLOOD PRESSURE: 78 MMHG | OXYGEN SATURATION: 94 % | RESPIRATION RATE: 18 BRPM | SYSTOLIC BLOOD PRESSURE: 126 MMHG

## 2020-09-12 PROCEDURE — 3331090002 HH PPS REVENUE DEBIT

## 2020-09-12 PROCEDURE — 3331090001 HH PPS REVENUE CREDIT

## 2020-09-13 PROCEDURE — 3331090001 HH PPS REVENUE CREDIT

## 2020-09-13 PROCEDURE — 3331090002 HH PPS REVENUE DEBIT

## 2020-09-14 PROCEDURE — 3331090001 HH PPS REVENUE CREDIT

## 2020-09-14 PROCEDURE — 3331090002 HH PPS REVENUE DEBIT

## 2020-09-15 PROCEDURE — 3331090001 HH PPS REVENUE CREDIT

## 2020-09-15 PROCEDURE — 3331090002 HH PPS REVENUE DEBIT

## 2020-09-16 PROCEDURE — 3331090002 HH PPS REVENUE DEBIT

## 2020-09-16 PROCEDURE — 3331090001 HH PPS REVENUE CREDIT

## 2020-09-17 ENCOUNTER — HOME CARE VISIT (OUTPATIENT)
Dept: SCHEDULING | Facility: HOME HEALTH | Age: 65
End: 2020-09-17
Payer: MEDICARE

## 2020-09-17 PROCEDURE — 3331090001 HH PPS REVENUE CREDIT

## 2020-09-17 PROCEDURE — 3331090002 HH PPS REVENUE DEBIT

## 2020-09-18 PROCEDURE — 3331090001 HH PPS REVENUE CREDIT

## 2020-09-18 PROCEDURE — 3331090002 HH PPS REVENUE DEBIT

## 2020-09-19 PROCEDURE — 3331090001 HH PPS REVENUE CREDIT

## 2020-09-19 PROCEDURE — 3331090002 HH PPS REVENUE DEBIT

## 2020-09-20 PROCEDURE — 3331090001 HH PPS REVENUE CREDIT

## 2020-09-20 PROCEDURE — 3331090002 HH PPS REVENUE DEBIT

## 2020-09-21 PROCEDURE — 3331090001 HH PPS REVENUE CREDIT

## 2020-09-21 PROCEDURE — 3331090002 HH PPS REVENUE DEBIT

## 2020-09-22 PROCEDURE — 3331090001 HH PPS REVENUE CREDIT

## 2020-09-22 PROCEDURE — 3331090002 HH PPS REVENUE DEBIT

## 2020-09-23 PROCEDURE — 3331090001 HH PPS REVENUE CREDIT

## 2020-09-23 PROCEDURE — 3331090002 HH PPS REVENUE DEBIT

## 2020-09-24 ENCOUNTER — HOME CARE VISIT (OUTPATIENT)
Dept: SCHEDULING | Facility: HOME HEALTH | Age: 65
End: 2020-09-24
Payer: MEDICARE

## 2020-09-24 PROCEDURE — 3331090002 HH PPS REVENUE DEBIT

## 2020-09-24 PROCEDURE — 3331090001 HH PPS REVENUE CREDIT

## 2020-09-24 PROCEDURE — G0299 HHS/HOSPICE OF RN EA 15 MIN: HCPCS

## 2020-09-24 PROCEDURE — 400013 HH SOC

## 2020-09-25 VITALS
RESPIRATION RATE: 18 BRPM | DIASTOLIC BLOOD PRESSURE: 80 MMHG | SYSTOLIC BLOOD PRESSURE: 128 MMHG | HEART RATE: 68 BPM | OXYGEN SATURATION: 98 %

## 2020-09-25 PROCEDURE — 3331090002 HH PPS REVENUE DEBIT

## 2020-09-25 PROCEDURE — 3331090001 HH PPS REVENUE CREDIT

## 2020-09-26 PROCEDURE — 3331090002 HH PPS REVENUE DEBIT

## 2020-09-26 PROCEDURE — 3331090001 HH PPS REVENUE CREDIT

## 2020-09-27 PROCEDURE — 3331090001 HH PPS REVENUE CREDIT

## 2020-09-27 PROCEDURE — 3331090002 HH PPS REVENUE DEBIT

## 2020-09-28 PROCEDURE — 3331090002 HH PPS REVENUE DEBIT

## 2020-09-28 PROCEDURE — 3331090001 HH PPS REVENUE CREDIT

## 2020-09-29 PROCEDURE — 3331090001 HH PPS REVENUE CREDIT

## 2020-09-29 PROCEDURE — 3331090002 HH PPS REVENUE DEBIT

## 2020-09-30 ENCOUNTER — HOME CARE VISIT (OUTPATIENT)
Dept: HOME HEALTH SERVICES | Facility: HOME HEALTH | Age: 65
End: 2020-09-30
Payer: MEDICARE

## 2020-09-30 PROCEDURE — 3331090002 HH PPS REVENUE DEBIT

## 2020-09-30 PROCEDURE — 3331090001 HH PPS REVENUE CREDIT

## 2020-10-01 ENCOUNTER — HOME CARE VISIT (OUTPATIENT)
Dept: HOME HEALTH SERVICES | Facility: HOME HEALTH | Age: 65
End: 2020-10-01
Payer: MEDICARE

## 2020-10-01 PROCEDURE — 3331090001 HH PPS REVENUE CREDIT

## 2020-10-01 PROCEDURE — 3331090002 HH PPS REVENUE DEBIT

## 2020-10-02 ENCOUNTER — HOME CARE VISIT (OUTPATIENT)
Dept: SCHEDULING | Facility: HOME HEALTH | Age: 65
End: 2020-10-02
Payer: MEDICARE

## 2020-10-02 VITALS
SYSTOLIC BLOOD PRESSURE: 124 MMHG | TEMPERATURE: 98.1 F | OXYGEN SATURATION: 99 % | WEIGHT: 209 LBS | RESPIRATION RATE: 16 BRPM | HEART RATE: 68 BPM | BODY MASS INDEX: 30.42 KG/M2 | DIASTOLIC BLOOD PRESSURE: 78 MMHG

## 2020-10-02 PROCEDURE — 3331090001 HH PPS REVENUE CREDIT

## 2020-10-02 PROCEDURE — 3331090002 HH PPS REVENUE DEBIT

## 2020-10-02 PROCEDURE — G0299 HHS/HOSPICE OF RN EA 15 MIN: HCPCS

## 2020-10-03 PROCEDURE — 3331090001 HH PPS REVENUE CREDIT

## 2020-10-03 PROCEDURE — 3331090002 HH PPS REVENUE DEBIT

## 2020-10-04 PROCEDURE — 3331090001 HH PPS REVENUE CREDIT

## 2020-10-04 PROCEDURE — 3331090002 HH PPS REVENUE DEBIT

## 2020-10-05 PROCEDURE — 3331090001 HH PPS REVENUE CREDIT

## 2020-10-05 PROCEDURE — 3331090002 HH PPS REVENUE DEBIT

## 2020-10-06 PROCEDURE — 3331090002 HH PPS REVENUE DEBIT

## 2020-10-06 PROCEDURE — 3331090001 HH PPS REVENUE CREDIT

## 2020-10-07 ENCOUNTER — HOME CARE VISIT (OUTPATIENT)
Dept: HOME HEALTH SERVICES | Facility: HOME HEALTH | Age: 65
End: 2020-10-07
Payer: MEDICARE

## 2020-10-07 PROCEDURE — 3331090001 HH PPS REVENUE CREDIT

## 2020-10-07 PROCEDURE — 3331090002 HH PPS REVENUE DEBIT

## 2020-10-08 PROCEDURE — 3331090001 HH PPS REVENUE CREDIT

## 2020-10-08 PROCEDURE — 3331090002 HH PPS REVENUE DEBIT

## 2020-10-09 ENCOUNTER — HOME CARE VISIT (OUTPATIENT)
Dept: SCHEDULING | Facility: HOME HEALTH | Age: 65
End: 2020-10-09
Payer: MEDICARE

## 2020-10-09 PROCEDURE — 3331090002 HH PPS REVENUE DEBIT

## 2020-10-09 PROCEDURE — G0299 HHS/HOSPICE OF RN EA 15 MIN: HCPCS

## 2020-10-09 PROCEDURE — 3331090001 HH PPS REVENUE CREDIT

## 2020-10-10 PROCEDURE — 3331090001 HH PPS REVENUE CREDIT

## 2020-10-10 PROCEDURE — 3331090002 HH PPS REVENUE DEBIT

## 2020-10-11 PROCEDURE — 3331090001 HH PPS REVENUE CREDIT

## 2020-10-11 PROCEDURE — 3331090002 HH PPS REVENUE DEBIT

## 2020-10-11 PROCEDURE — 3331090003 HH PPS REVENUE ADJ

## 2020-10-12 VITALS
TEMPERATURE: 97.9 F | SYSTOLIC BLOOD PRESSURE: 130 MMHG | HEART RATE: 66 BPM | RESPIRATION RATE: 18 BRPM | OXYGEN SATURATION: 98 % | DIASTOLIC BLOOD PRESSURE: 80 MMHG

## 2021-01-09 ENCOUNTER — HOSPITAL ENCOUNTER (INPATIENT)
Age: 66
LOS: 4 days | Discharge: NURSING, REHAB OR LONG-TERM CARE | DRG: 101 | End: 2021-01-15
Attending: EMERGENCY MEDICINE | Admitting: INTERNAL MEDICINE
Payer: MEDICARE

## 2021-01-09 DIAGNOSIS — R56.9 SEIZURE (HCC): Primary | ICD-10-CM

## 2021-01-09 DIAGNOSIS — G40.919 BREAKTHROUGH SEIZURE (HCC): ICD-10-CM

## 2021-01-09 DIAGNOSIS — F41.9 ANXIETY: ICD-10-CM

## 2021-01-09 LAB
ALBUMIN SERPL-MCNC: 3.1 G/DL (ref 3.5–5)
ALBUMIN/GLOB SERPL: 0.8 {RATIO} (ref 1.1–2.2)
ALP SERPL-CCNC: 84 U/L (ref 45–117)
ALT SERPL-CCNC: 32 U/L (ref 12–78)
ANION GAP SERPL CALC-SCNC: 7 MMOL/L (ref 5–15)
AST SERPL-CCNC: 19 U/L (ref 15–37)
BASOPHILS # BLD: 0 K/UL (ref 0–0.1)
BASOPHILS NFR BLD: 0 % (ref 0–1)
BILIRUB SERPL-MCNC: 0.5 MG/DL (ref 0.2–1)
BUN SERPL-MCNC: 33 MG/DL (ref 6–20)
BUN/CREAT SERPL: 16 (ref 12–20)
CALCIUM SERPL-MCNC: 8 MG/DL (ref 8.5–10.1)
CHLORIDE SERPL-SCNC: 104 MMOL/L (ref 97–108)
CO2 SERPL-SCNC: 25 MMOL/L (ref 21–32)
CREAT SERPL-MCNC: 2.04 MG/DL (ref 0.7–1.3)
DIFFERENTIAL METHOD BLD: ABNORMAL
EOSINOPHIL # BLD: 0.2 K/UL (ref 0–0.4)
EOSINOPHIL NFR BLD: 2 % (ref 0–7)
ERYTHROCYTE [DISTWIDTH] IN BLOOD BY AUTOMATED COUNT: 21.4 % (ref 11.5–14.5)
GLOBULIN SER CALC-MCNC: 3.9 G/DL (ref 2–4)
GLUCOSE SERPL-MCNC: 183 MG/DL (ref 65–100)
HCT VFR BLD AUTO: 38.6 % (ref 36.6–50.3)
HGB BLD-MCNC: 11.9 G/DL (ref 12.1–17)
IMM GRANULOCYTES # BLD AUTO: 0.1 K/UL (ref 0–0.04)
IMM GRANULOCYTES NFR BLD AUTO: 1 % (ref 0–0.5)
LYMPHOCYTES # BLD: 1 K/UL (ref 0.8–3.5)
LYMPHOCYTES NFR BLD: 10 % (ref 12–49)
MCH RBC QN AUTO: 20.8 PG (ref 26–34)
MCHC RBC AUTO-ENTMCNC: 30.8 G/DL (ref 30–36.5)
MCV RBC AUTO: 67.5 FL (ref 80–99)
MONOCYTES # BLD: 0.5 K/UL (ref 0–1)
MONOCYTES NFR BLD: 5 % (ref 5–13)
NEUTS SEG # BLD: 8 K/UL (ref 1.8–8)
NEUTS SEG NFR BLD: 82 % (ref 32–75)
NRBC # BLD: 0 K/UL (ref 0–0.01)
NRBC BLD-RTO: 0 PER 100 WBC
PLATELET # BLD AUTO: 167 K/UL (ref 150–400)
POTASSIUM SERPL-SCNC: 3.2 MMOL/L (ref 3.5–5.1)
PROT SERPL-MCNC: 7 G/DL (ref 6.4–8.2)
RBC # BLD AUTO: 5.72 M/UL (ref 4.1–5.7)
RBC MORPH BLD: ABNORMAL
SODIUM SERPL-SCNC: 136 MMOL/L (ref 136–145)
WBC # BLD AUTO: 9.8 K/UL (ref 4.1–11.1)

## 2021-01-09 PROCEDURE — 85025 COMPLETE CBC W/AUTO DIFF WBC: CPT

## 2021-01-09 PROCEDURE — 81001 URINALYSIS AUTO W/SCOPE: CPT

## 2021-01-09 PROCEDURE — 99285 EMERGENCY DEPT VISIT HI MDM: CPT

## 2021-01-09 PROCEDURE — 96374 THER/PROPH/DIAG INJ IV PUSH: CPT

## 2021-01-09 PROCEDURE — 36415 COLL VENOUS BLD VENIPUNCTURE: CPT

## 2021-01-09 PROCEDURE — 80053 COMPREHEN METABOLIC PANEL: CPT

## 2021-01-09 PROCEDURE — 80177 DRUG SCRN QUAN LEVETIRACETAM: CPT

## 2021-01-09 PROCEDURE — 74011000258 HC RX REV CODE- 258: Performed by: EMERGENCY MEDICINE

## 2021-01-09 PROCEDURE — 74011250636 HC RX REV CODE- 250/636: Performed by: EMERGENCY MEDICINE

## 2021-01-09 RX ADMIN — LEVETIRACETAM 1000 MG: 100 INJECTION, SOLUTION INTRAVENOUS at 23:18

## 2021-01-10 ENCOUNTER — APPOINTMENT (OUTPATIENT)
Dept: CT IMAGING | Age: 66
DRG: 101 | End: 2021-01-10
Attending: EMERGENCY MEDICINE
Payer: MEDICARE

## 2021-01-10 LAB
APPEARANCE UR: CLEAR
BACTERIA URNS QL MICRO: NEGATIVE /HPF
BILIRUB UR QL: NEGATIVE
COLOR UR: NORMAL
EPITH CASTS URNS QL MICRO: NORMAL /LPF
GLUCOSE BLD STRIP.AUTO-MCNC: 138 MG/DL (ref 65–100)
GLUCOSE UR STRIP.AUTO-MCNC: NEGATIVE MG/DL
HGB UR QL STRIP: NEGATIVE
HYALINE CASTS URNS QL MICRO: NORMAL /LPF (ref 0–5)
KETONES UR QL STRIP.AUTO: NEGATIVE MG/DL
LEUKOCYTE ESTERASE UR QL STRIP.AUTO: NEGATIVE
NITRITE UR QL STRIP.AUTO: NEGATIVE
PH UR STRIP: 5.5 [PH] (ref 5–8)
PROT UR STRIP-MCNC: NEGATIVE MG/DL
RBC #/AREA URNS HPF: NORMAL /HPF (ref 0–5)
SERVICE CMNT-IMP: ABNORMAL
SP GR UR REFRACTOMETRY: 1.01 (ref 1–1.03)
UA: UC IF INDICATED,UAUC: NORMAL
UROBILINOGEN UR QL STRIP.AUTO: 1 EU/DL (ref 0.2–1)
WBC URNS QL MICRO: NORMAL /HPF (ref 0–4)

## 2021-01-10 PROCEDURE — 74011250636 HC RX REV CODE- 250/636: Performed by: EMERGENCY MEDICINE

## 2021-01-10 PROCEDURE — 82962 GLUCOSE BLOOD TEST: CPT

## 2021-01-10 PROCEDURE — 99218 HC RM OBSERVATION: CPT

## 2021-01-10 PROCEDURE — 70450 CT HEAD/BRAIN W/O DYE: CPT

## 2021-01-10 PROCEDURE — 74011250637 HC RX REV CODE- 250/637: Performed by: EMERGENCY MEDICINE

## 2021-01-10 RX ORDER — MIDODRINE HYDROCHLORIDE 2.5 MG/1
2.5 TABLET ORAL
COMMUNITY
End: 2021-01-15

## 2021-01-10 RX ORDER — POTASSIUM CHLORIDE 750 MG/1
40 TABLET, FILM COATED, EXTENDED RELEASE ORAL
Status: COMPLETED | OUTPATIENT
Start: 2021-01-10 | End: 2021-01-10

## 2021-01-10 RX ADMIN — POTASSIUM CHLORIDE 40 MEQ: 750 TABLET, FILM COATED, EXTENDED RELEASE ORAL at 01:00

## 2021-01-10 RX ADMIN — SODIUM CHLORIDE 1000 ML: 9 INJECTION, SOLUTION INTRAVENOUS at 01:22
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
## 2021-01-10 NOTE — ED TRIAGE NOTES
Pt BIBA for witnessed seizure. Family states pt was walking back from the bathroom and entered the kitchen, voided, then h\"had a seizure. Family stated that seizure lasted 15-20 secs. Last seizure was 1 year ago. Pt states has been taking all his medications.   Seizure medication at home is keppra

## 2021-01-10 NOTE — ED NOTES
0046: Pt is lethargic but easily arousal when name is called, and follow commands. RN at bedside assist pt to the side of bed then to standing position for ambulation. Pt took one step, started to feel dizzy, lightheaded, and unsteady. Pt assist back on stretcher, MD made aware.    0110: Orthostatic BP completed on pt with RN at bedside. (Supine 139/70, Sitting 116/65, Standing 98/60). While standing with RN pt was slowly drifting and can't seem to keep his eyes open, but opens on command. MD made aware    0430: Orthostatic BP completed on pt with RN at bedside. (Supine 116/74, Sitting 100/61, standing 93/63) While changing position pt stated he is feeling dizzy, in standing position slowing drifting, pt can keep him eyes open a bit longer than before, but able to full open on command.Ppt was able to take 3 step forward and 3 step back to stretcher,  MD made aware.   Per Pt daughter (Tiffany) pt takes midodrine 2.5mg 3x per/day. MD made aware.

## 2021-01-11 LAB
AMMONIA PLAS-SCNC: 45 UMOL/L
COMMENT, HOLDF: NORMAL
GLUCOSE BLD STRIP.AUTO-MCNC: 124 MG/DL (ref 65–100)
GLUCOSE BLD STRIP.AUTO-MCNC: 145 MG/DL (ref 65–100)
GLUCOSE BLD STRIP.AUTO-MCNC: 224 MG/DL (ref 65–100)
SAMPLES BEING HELD,HOLD: NORMAL
SERVICE CMNT-IMP: ABNORMAL

## 2021-01-11 PROCEDURE — 97161 PT EVAL LOW COMPLEX 20 MIN: CPT

## 2021-01-11 PROCEDURE — 82140 ASSAY OF AMMONIA: CPT

## 2021-01-11 PROCEDURE — 99218 HC RM OBSERVATION: CPT

## 2021-01-11 PROCEDURE — 74011250637 HC RX REV CODE- 250/637: Performed by: INTERNAL MEDICINE

## 2021-01-11 PROCEDURE — 80177 DRUG SCRN QUAN LEVETIRACETAM: CPT

## 2021-01-11 PROCEDURE — 97166 OT EVAL MOD COMPLEX 45 MIN: CPT

## 2021-01-11 PROCEDURE — 74011636637 HC RX REV CODE- 636/637: Performed by: INTERNAL MEDICINE

## 2021-01-11 PROCEDURE — 74011250636 HC RX REV CODE- 250/636: Performed by: INTERNAL MEDICINE

## 2021-01-11 PROCEDURE — 51798 US URINE CAPACITY MEASURE: CPT

## 2021-01-11 PROCEDURE — 82962 GLUCOSE BLOOD TEST: CPT

## 2021-01-11 PROCEDURE — 97530 THERAPEUTIC ACTIVITIES: CPT

## 2021-01-11 PROCEDURE — 74011000258 HC RX REV CODE- 258: Performed by: INTERNAL MEDICINE

## 2021-01-11 PROCEDURE — 36415 COLL VENOUS BLD VENIPUNCTURE: CPT

## 2021-01-11 PROCEDURE — 96372 THER/PROPH/DIAG INJ SC/IM: CPT

## 2021-01-11 PROCEDURE — 65660000000 HC RM CCU STEPDOWN

## 2021-01-11 RX ORDER — POLYETHYLENE GLYCOL 3350 17 G/17G
17 POWDER, FOR SOLUTION ORAL DAILY PRN
Status: DISCONTINUED | OUTPATIENT
Start: 2021-01-11 | End: 2021-01-15 | Stop reason: HOSPADM

## 2021-01-11 RX ORDER — GUAIFENESIN 100 MG/5ML
81 LIQUID (ML) ORAL DAILY
Status: DISCONTINUED | OUTPATIENT
Start: 2021-01-11 | End: 2021-01-15 | Stop reason: HOSPADM

## 2021-01-11 RX ORDER — AMIODARONE HYDROCHLORIDE 200 MG/1
200 TABLET ORAL DAILY
Status: DISCONTINUED | OUTPATIENT
Start: 2021-01-11 | End: 2021-01-15 | Stop reason: HOSPADM

## 2021-01-11 RX ORDER — ALBUTEROL SULFATE 0.83 MG/ML
2.5 SOLUTION RESPIRATORY (INHALATION)
Status: DISCONTINUED | OUTPATIENT
Start: 2021-01-11 | End: 2021-01-15 | Stop reason: HOSPADM

## 2021-01-11 RX ORDER — PREGABALIN 50 MG/1
100 CAPSULE ORAL 3 TIMES DAILY
Status: DISCONTINUED | OUTPATIENT
Start: 2021-01-11 | End: 2021-01-15 | Stop reason: HOSPADM

## 2021-01-11 RX ORDER — ALPRAZOLAM 0.5 MG/1
0.5 TABLET ORAL
Status: DISCONTINUED | OUTPATIENT
Start: 2021-01-11 | End: 2021-01-11

## 2021-01-11 RX ORDER — POTASSIUM CHLORIDE 750 MG/1
20 TABLET, FILM COATED, EXTENDED RELEASE ORAL DAILY
Status: DISCONTINUED | OUTPATIENT
Start: 2021-01-11 | End: 2021-01-15 | Stop reason: HOSPADM

## 2021-01-11 RX ORDER — CLOPIDOGREL BISULFATE 75 MG/1
75 TABLET ORAL DAILY
Status: DISCONTINUED | OUTPATIENT
Start: 2021-01-11 | End: 2021-01-15 | Stop reason: HOSPADM

## 2021-01-11 RX ORDER — SODIUM CHLORIDE 0.9 % (FLUSH) 0.9 %
5-40 SYRINGE (ML) INJECTION AS NEEDED
Status: DISCONTINUED | OUTPATIENT
Start: 2021-01-11 | End: 2021-01-15 | Stop reason: HOSPADM

## 2021-01-11 RX ORDER — AMLODIPINE BESYLATE 5 MG/1
5 TABLET ORAL DAILY
Status: DISCONTINUED | OUTPATIENT
Start: 2021-01-11 | End: 2021-01-15 | Stop reason: HOSPADM

## 2021-01-11 RX ORDER — MIDODRINE HYDROCHLORIDE 5 MG/1
2.5 TABLET ORAL
Status: DISCONTINUED | OUTPATIENT
Start: 2021-01-11 | End: 2021-01-12

## 2021-01-11 RX ORDER — TAMSULOSIN HYDROCHLORIDE 0.4 MG/1
0.4 CAPSULE ORAL
Status: DISCONTINUED | OUTPATIENT
Start: 2021-01-11 | End: 2021-01-15 | Stop reason: HOSPADM

## 2021-01-11 RX ORDER — FUROSEMIDE 40 MG/1
80 TABLET ORAL DAILY
Status: DISCONTINUED | OUTPATIENT
Start: 2021-01-12 | End: 2021-01-13

## 2021-01-11 RX ORDER — FLUOXETINE HYDROCHLORIDE 20 MG/1
20 CAPSULE ORAL DAILY
Status: DISCONTINUED | OUTPATIENT
Start: 2021-01-12 | End: 2021-01-15 | Stop reason: HOSPADM

## 2021-01-11 RX ORDER — ENOXAPARIN SODIUM 100 MG/ML
40 INJECTION SUBCUTANEOUS DAILY
Status: DISCONTINUED | OUTPATIENT
Start: 2021-01-11 | End: 2021-01-15 | Stop reason: HOSPADM

## 2021-01-11 RX ORDER — ONDANSETRON 2 MG/ML
4 INJECTION INTRAMUSCULAR; INTRAVENOUS
Status: DISCONTINUED | OUTPATIENT
Start: 2021-01-11 | End: 2021-01-15 | Stop reason: HOSPADM

## 2021-01-11 RX ORDER — FINASTERIDE 5 MG/1
5 TABLET, FILM COATED ORAL
Status: DISCONTINUED | OUTPATIENT
Start: 2021-01-12 | End: 2021-01-15 | Stop reason: HOSPADM

## 2021-01-11 RX ORDER — ATORVASTATIN CALCIUM 40 MG/1
80 TABLET, FILM COATED ORAL DAILY
Status: DISCONTINUED | OUTPATIENT
Start: 2021-01-11 | End: 2021-01-15 | Stop reason: HOSPADM

## 2021-01-11 RX ORDER — ALBUTEROL SULFATE 0.83 MG/ML
2.5 SOLUTION RESPIRATORY (INHALATION)
Status: DISCONTINUED | OUTPATIENT
Start: 2021-01-11 | End: 2021-01-11 | Stop reason: SDUPTHER

## 2021-01-11 RX ORDER — PANTOPRAZOLE SODIUM 40 MG/1
40 TABLET, DELAYED RELEASE ORAL
Status: DISCONTINUED | OUTPATIENT
Start: 2021-01-11 | End: 2021-01-15 | Stop reason: HOSPADM

## 2021-01-11 RX ORDER — METOPROLOL SUCCINATE 25 MG/1
25 TABLET, EXTENDED RELEASE ORAL DAILY
Status: DISCONTINUED | OUTPATIENT
Start: 2021-01-11 | End: 2021-01-15 | Stop reason: HOSPADM

## 2021-01-11 RX ORDER — CALCIUM CARBONATE 200(500)MG
400 TABLET,CHEWABLE ORAL
Status: DISCONTINUED | OUTPATIENT
Start: 2021-01-11 | End: 2021-01-15 | Stop reason: HOSPADM

## 2021-01-11 RX ORDER — ACETAMINOPHEN 650 MG/1
650 SUPPOSITORY RECTAL
Status: DISCONTINUED | OUTPATIENT
Start: 2021-01-11 | End: 2021-01-15 | Stop reason: HOSPADM

## 2021-01-11 RX ORDER — MAGNESIUM SULFATE 100 %
4 CRYSTALS MISCELLANEOUS AS NEEDED
Status: DISCONTINUED | OUTPATIENT
Start: 2021-01-11 | End: 2021-01-15 | Stop reason: HOSPADM

## 2021-01-11 RX ORDER — CLONAZEPAM 1 MG/1
1 TABLET ORAL
Status: DISCONTINUED | OUTPATIENT
Start: 2021-01-11 | End: 2021-01-15 | Stop reason: HOSPADM

## 2021-01-11 RX ORDER — ACETAMINOPHEN 325 MG/1
650 TABLET ORAL
Status: DISCONTINUED | OUTPATIENT
Start: 2021-01-11 | End: 2021-01-15 | Stop reason: HOSPADM

## 2021-01-11 RX ORDER — INSULIN LISPRO 100 [IU]/ML
INJECTION, SOLUTION INTRAVENOUS; SUBCUTANEOUS
Status: DISCONTINUED | OUTPATIENT
Start: 2021-01-11 | End: 2021-01-15 | Stop reason: HOSPADM

## 2021-01-11 RX ORDER — NITROGLYCERIN 0.4 MG/1
0.4 TABLET SUBLINGUAL
Status: DISCONTINUED | OUTPATIENT
Start: 2021-01-11 | End: 2021-01-15 | Stop reason: HOSPADM

## 2021-01-11 RX ORDER — SODIUM CHLORIDE 0.9 % (FLUSH) 0.9 %
5-40 SYRINGE (ML) INJECTION EVERY 8 HOURS
Status: DISCONTINUED | OUTPATIENT
Start: 2021-01-11 | End: 2021-01-15 | Stop reason: HOSPADM

## 2021-01-11 RX ORDER — VENLAFAXINE HYDROCHLORIDE 37.5 MG/1
75 CAPSULE, EXTENDED RELEASE ORAL 2 TIMES DAILY
Status: DISCONTINUED | OUTPATIENT
Start: 2021-01-11 | End: 2021-01-15 | Stop reason: HOSPADM

## 2021-01-11 RX ORDER — OLANZAPINE 2.5 MG/1
5 TABLET ORAL
Status: DISCONTINUED | OUTPATIENT
Start: 2021-01-11 | End: 2021-01-15 | Stop reason: HOSPADM

## 2021-01-11 RX ORDER — DEXTROSE 50 % IN WATER (D50W) INTRAVENOUS SYRINGE
12.5-25 AS NEEDED
Status: DISCONTINUED | OUTPATIENT
Start: 2021-01-11 | End: 2021-01-15 | Stop reason: HOSPADM

## 2021-01-11 RX ORDER — PROMETHAZINE HYDROCHLORIDE 25 MG/1
12.5 TABLET ORAL
Status: DISCONTINUED | OUTPATIENT
Start: 2021-01-11 | End: 2021-01-15 | Stop reason: HOSPADM

## 2021-01-11 RX ADMIN — PREGABALIN 100 MG: 50 CAPSULE ORAL at 21:35

## 2021-01-11 RX ADMIN — LEVETIRACETAM 1000 MG: 100 INJECTION, SOLUTION INTRAVENOUS at 05:41

## 2021-01-11 RX ADMIN — INSULIN LISPRO 3 UNITS: 100 INJECTION, SOLUTION INTRAVENOUS; SUBCUTANEOUS at 16:59

## 2021-01-11 RX ADMIN — AMLODIPINE BESYLATE 5 MG: 5 TABLET ORAL at 11:00

## 2021-01-11 RX ADMIN — ASPIRIN 81 MG: 81 TABLET, CHEWABLE ORAL at 08:35

## 2021-01-11 RX ADMIN — CLONAZEPAM 1 MG: 1 TABLET ORAL at 21:35

## 2021-01-11 RX ADMIN — POTASSIUM CHLORIDE 20 MEQ: 750 TABLET, FILM COATED, EXTENDED RELEASE ORAL at 08:35

## 2021-01-11 RX ADMIN — ENOXAPARIN SODIUM 40 MG: 40 INJECTION SUBCUTANEOUS at 08:35

## 2021-01-11 RX ADMIN — TAMSULOSIN HYDROCHLORIDE 0.4 MG: 0.4 CAPSULE ORAL at 08:35

## 2021-01-11 RX ADMIN — AMIODARONE HYDROCHLORIDE 200 MG: 200 TABLET ORAL at 08:36

## 2021-01-11 RX ADMIN — MIDODRINE HYDROCHLORIDE 2.5 MG: 5 TABLET ORAL at 18:09

## 2021-01-11 RX ADMIN — INSULIN LISPRO 2 UNITS: 100 INJECTION, SOLUTION INTRAVENOUS; SUBCUTANEOUS at 12:42

## 2021-01-11 RX ADMIN — PREGABALIN 100 MG: 50 CAPSULE ORAL at 08:35

## 2021-01-11 RX ADMIN — TAMSULOSIN HYDROCHLORIDE 0.4 MG: 0.4 CAPSULE ORAL at 16:01

## 2021-01-11 RX ADMIN — OLANZAPINE 5 MG: 2.5 TABLET, FILM COATED ORAL at 21:35

## 2021-01-11 RX ADMIN — PREGABALIN 100 MG: 50 CAPSULE ORAL at 16:49

## 2021-01-11 RX ADMIN — METOPROLOL SUCCINATE 25 MG: 25 TABLET, EXTENDED RELEASE ORAL at 08:36

## 2021-01-11 RX ADMIN — VENLAFAXINE HYDROCHLORIDE 75 MG: 37.5 CAPSULE, EXTENDED RELEASE ORAL at 18:09

## 2021-01-11 RX ADMIN — Medication 10 ML: at 05:42

## 2021-01-11 RX ADMIN — LACTULOSE 30 ML: 20 SOLUTION ORAL at 16:01

## 2021-01-11 RX ADMIN — PANTOPRAZOLE SODIUM 40 MG: 40 TABLET, DELAYED RELEASE ORAL at 08:35

## 2021-01-11 RX ADMIN — LEVETIRACETAM 1000 MG: 100 INJECTION, SOLUTION INTRAVENOUS at 18:09

## 2021-01-11 RX ADMIN — Medication 10 ML: at 16:01

## 2021-01-11 RX ADMIN — Medication 10 ML: at 21:35

## 2021-01-11 NOTE — PROGRESS NOTES
TRANSFER - IN REPORT:    Verbal report received from Ruthie Jorgensen RN (name) on Elpidio Knowles.  being received from [facility] [place] [facility] (unit) for routine progression of care      Report consisted of patients Situation, Background, Assessment and   Recommendations(SBAR). Information from the following report(s) SBAR, Kardex, Intake/Output, MAR, Recent Results, Cardiac Rhythm paced and Dual Neuro Assessment was reviewed with the receiving nurse. Opportunity for questions and clarification was provided. Assessment completed upon patients arrival to unit and care assumed.

## 2021-01-11 NOTE — PROGRESS NOTES
MRI safety note:    Per chart, Mr. Golden Howard has a [employer] [place] device, model # 1743-20C with lead model #'s 2088TC/52cm, [id number], and STE884W/58cm placed by Dr. Lennox Josephs on 8/26/2019. This system is MR-conditional to 1.5T. MRI for patients with cardiac rhythm management devices requires a set process, based on  policy. It is not possible to complete immediately but will be performed as soon as is possible, when all necessary steps are completed. Device settings order form was faxed to Dr. Wilfredo Silverman office. When this signed form is returned, I will contact [employer] rep and coordinate a time for pt's MRI. The earliest this MRI could possibly take place is tomorrow, but is more likely that it can be expected later in the week based on when the order form is returned and when a rep is available. MRI RN's will continue to follow up with Dr. Wilfredo Silverman office until order form is returned. Left message for pt's RN to notify her of the above, and to request that information is verified as current with patient and he has not had this device or leads replaced or removed since 8/26/2019.
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
## 2021-01-11 NOTE — ED NOTES
TRANSFER - OUT REPORT:    Verbal report given to JANELL Lopez(name) on Elizabet Lutz.  being transferred to [facility](unit) for routine progression of care       Report consisted of patients Situation, Background, Assessment and   Recommendations(SBAR). Information from the following report(s) SBAR, MAR and Recent Results was reviewed with the receiving nurse. Lines:   Peripheral IV 01/10/21 Right Forearm (Active)   Site Assessment Clean, dry, & intact 01/10/21 1600   Phlebitis Assessment 0 01/10/21 1600   Infiltration Assessment 0 01/10/21 1600   Dressing Status Clean, dry, & intact 01/10/21 1600   Dressing Type Transparent 01/10/21 1600   Hub Color/Line Status Pink;Patent; Flushed 01/10/21 1600        Opportunity for questions and clarification was provided.       Patient transported with:   Monitor  Registered Nurse
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
## 2021-01-11 NOTE — PROGRESS NOTES
Bedside shift change report given to Bal Belle RN (oncoming nurse) by Miki Perea RN (offgoing nurse). Report included the following information SBAR, Kardex, Intake/Output, MAR, Recent Results, Cardiac Rhythm V Paced and Dual Neuro Assessment.

## 2021-01-11 NOTE — PROGRESS NOTES
Problem: Falls - Risk of  Goal: *Absence of Falls  Description: Document Jess Yates Fall Risk and appropriate interventions in the flowsheet.   Outcome: Progressing Towards Goal  Note: Fall Risk Interventions:  Mobility Interventions: Bed/chair exit alarm, Patient to call before getting OOB, OT consult for ADLs    Mentation Interventions: Adequate sleep, hydration, pain control, Bed/chair exit alarm, Door open when patient unattended, Familiar objects from home, Family/sitter at bedside, Reorient patient, Room close to nurse's station, Toileting rounds, Update white board    Medication Interventions: Bed/chair exit alarm, Patient to call before getting OOB    Elimination Interventions: Bed/chair exit alarm, Call light in reach, Patient to call for help with toileting needs    History of Falls Interventions: Bed/chair exit alarm, Door open when patient unattended, Investigate reason for fall, Room close to nurse's station, Assess for delayed presentation/identification of injury for 48 hrs (comment for end date), Vital signs minimum Q4HRs X 24 hrs (comment for end date)         Problem: Patient Education: Go to Patient Education Activity  Goal: Patient/Family Education  Outcome: Progressing Towards Goal

## 2021-01-12 LAB
ALBUMIN SERPL-MCNC: 3.2 G/DL (ref 3.5–5)
ALBUMIN/GLOB SERPL: 0.8 {RATIO} (ref 1.1–2.2)
ALP SERPL-CCNC: 89 U/L (ref 45–117)
ALT SERPL-CCNC: 43 U/L (ref 12–78)
AMMONIA PLAS-SCNC: 35 UMOL/L
ANION GAP SERPL CALC-SCNC: 3 MMOL/L (ref 5–15)
AST SERPL-CCNC: 32 U/L (ref 15–37)
BASOPHILS # BLD: 0 K/UL (ref 0–0.1)
BASOPHILS NFR BLD: 0 % (ref 0–1)
BILIRUB SERPL-MCNC: 0.9 MG/DL (ref 0.2–1)
BUN SERPL-MCNC: 18 MG/DL (ref 6–20)
BUN/CREAT SERPL: 11 (ref 12–20)
CALCIUM SERPL-MCNC: 8.5 MG/DL (ref 8.5–10.1)
CHLORIDE SERPL-SCNC: 104 MMOL/L (ref 97–108)
CO2 SERPL-SCNC: 29 MMOL/L (ref 21–32)
CREAT SERPL-MCNC: 1.6 MG/DL (ref 0.7–1.3)
DIFFERENTIAL METHOD BLD: ABNORMAL
EOSINOPHIL # BLD: 0.1 K/UL (ref 0–0.4)
EOSINOPHIL NFR BLD: 2 % (ref 0–7)
ERYTHROCYTE [DISTWIDTH] IN BLOOD BY AUTOMATED COUNT: 22.2 % (ref 11.5–14.5)
GLOBULIN SER CALC-MCNC: 3.9 G/DL (ref 2–4)
GLUCOSE BLD STRIP.AUTO-MCNC: 114 MG/DL (ref 65–100)
GLUCOSE BLD STRIP.AUTO-MCNC: 133 MG/DL (ref 65–100)
GLUCOSE BLD STRIP.AUTO-MCNC: 177 MG/DL (ref 65–100)
GLUCOSE BLD STRIP.AUTO-MCNC: 94 MG/DL (ref 65–100)
GLUCOSE SERPL-MCNC: 129 MG/DL (ref 65–100)
HCT VFR BLD AUTO: 43.2 % (ref 36.6–50.3)
HGB BLD-MCNC: 12.9 G/DL (ref 12.1–17)
IMM GRANULOCYTES # BLD AUTO: 0 K/UL (ref 0–0.04)
IMM GRANULOCYTES NFR BLD AUTO: 0 % (ref 0–0.5)
LEVETIRACETAM SERPL-MCNC: 50.2 UG/ML (ref 10–40)
LYMPHOCYTES # BLD: 0.8 K/UL (ref 0.8–3.5)
LYMPHOCYTES NFR BLD: 11 % (ref 12–49)
MCH RBC QN AUTO: 20.9 PG (ref 26–34)
MCHC RBC AUTO-ENTMCNC: 29.9 G/DL (ref 30–36.5)
MCV RBC AUTO: 69.9 FL (ref 80–99)
MONOCYTES # BLD: 0.4 K/UL (ref 0–1)
MONOCYTES NFR BLD: 5 % (ref 5–13)
NEUTS SEG # BLD: 5.8 K/UL (ref 1.8–8)
NEUTS SEG NFR BLD: 82 % (ref 32–75)
NRBC # BLD: 0 K/UL (ref 0–0.01)
NRBC BLD-RTO: 0 PER 100 WBC
PLATELET # BLD AUTO: 150 K/UL (ref 150–400)
POTASSIUM SERPL-SCNC: 4 MMOL/L (ref 3.5–5.1)
PROT SERPL-MCNC: 7.1 G/DL (ref 6.4–8.2)
RBC # BLD AUTO: 6.18 M/UL (ref 4.1–5.7)
RBC MORPH BLD: ABNORMAL
SERVICE CMNT-IMP: ABNORMAL
SERVICE CMNT-IMP: NORMAL
SODIUM SERPL-SCNC: 136 MMOL/L (ref 136–145)
WBC # BLD AUTO: 7.1 K/UL (ref 4.1–11.1)

## 2021-01-12 PROCEDURE — 74011000258 HC RX REV CODE- 258: Performed by: INTERNAL MEDICINE

## 2021-01-12 PROCEDURE — 97535 SELF CARE MNGMENT TRAINING: CPT

## 2021-01-12 PROCEDURE — 65660000000 HC RM CCU STEPDOWN

## 2021-01-12 PROCEDURE — 80053 COMPREHEN METABOLIC PANEL: CPT

## 2021-01-12 PROCEDURE — 82962 GLUCOSE BLOOD TEST: CPT

## 2021-01-12 PROCEDURE — 82140 ASSAY OF AMMONIA: CPT

## 2021-01-12 PROCEDURE — 77030005513 HC CATH URETH FOL11 MDII -B

## 2021-01-12 PROCEDURE — 36415 COLL VENOUS BLD VENIPUNCTURE: CPT

## 2021-01-12 PROCEDURE — 51798 US URINE CAPACITY MEASURE: CPT

## 2021-01-12 PROCEDURE — 74011250637 HC RX REV CODE- 250/637: Performed by: INTERNAL MEDICINE

## 2021-01-12 PROCEDURE — 74011250636 HC RX REV CODE- 250/636: Performed by: INTERNAL MEDICINE

## 2021-01-12 PROCEDURE — 85025 COMPLETE CBC W/AUTO DIFF WBC: CPT

## 2021-01-12 RX ADMIN — AMIODARONE HYDROCHLORIDE 200 MG: 200 TABLET ORAL at 08:14

## 2021-01-12 RX ADMIN — TAMSULOSIN HYDROCHLORIDE 0.4 MG: 0.4 CAPSULE ORAL at 08:27

## 2021-01-12 RX ADMIN — Medication 10 ML: at 17:27

## 2021-01-12 RX ADMIN — ATORVASTATIN CALCIUM 80 MG: 40 TABLET, FILM COATED ORAL at 08:13

## 2021-01-12 RX ADMIN — PREGABALIN 100 MG: 50 CAPSULE ORAL at 17:27

## 2021-01-12 RX ADMIN — LEVETIRACETAM 1000 MG: 100 INJECTION, SOLUTION INTRAVENOUS at 17:27

## 2021-01-12 RX ADMIN — FLUOXETINE 20 MG: 20 CAPSULE ORAL at 08:13

## 2021-01-12 RX ADMIN — POTASSIUM CHLORIDE 20 MEQ: 750 TABLET, FILM COATED, EXTENDED RELEASE ORAL at 08:13

## 2021-01-12 RX ADMIN — FINASTERIDE 5 MG: 5 TABLET, FILM COATED ORAL at 08:13

## 2021-01-12 RX ADMIN — CLONAZEPAM 1 MG: 1 TABLET ORAL at 22:45

## 2021-01-12 RX ADMIN — ENOXAPARIN SODIUM 40 MG: 40 INJECTION SUBCUTANEOUS at 08:12

## 2021-01-12 RX ADMIN — PREGABALIN 100 MG: 50 CAPSULE ORAL at 22:45

## 2021-01-12 RX ADMIN — LEVETIRACETAM 1000 MG: 100 INJECTION, SOLUTION INTRAVENOUS at 06:04

## 2021-01-12 RX ADMIN — FUROSEMIDE 80 MG: 40 TABLET ORAL at 08:13

## 2021-01-12 RX ADMIN — VENLAFAXINE HYDROCHLORIDE 75 MG: 37.5 CAPSULE, EXTENDED RELEASE ORAL at 17:27

## 2021-01-12 RX ADMIN — CLOPIDOGREL BISULFATE 75 MG: 75 TABLET ORAL at 08:13

## 2021-01-12 RX ADMIN — PANTOPRAZOLE SODIUM 40 MG: 40 TABLET, DELAYED RELEASE ORAL at 08:21

## 2021-01-12 RX ADMIN — OLANZAPINE 5 MG: 2.5 TABLET, FILM COATED ORAL at 22:45

## 2021-01-12 RX ADMIN — LACTULOSE 30 ML: 20 SOLUTION ORAL at 17:27

## 2021-01-12 RX ADMIN — VENLAFAXINE HYDROCHLORIDE 75 MG: 37.5 CAPSULE, EXTENDED RELEASE ORAL at 08:13

## 2021-01-12 RX ADMIN — ASPIRIN 81 MG: 81 TABLET, CHEWABLE ORAL at 08:12

## 2021-01-12 RX ADMIN — PREGABALIN 100 MG: 50 CAPSULE ORAL at 08:13

## 2021-01-12 RX ADMIN — TAMSULOSIN HYDROCHLORIDE 0.4 MG: 0.4 CAPSULE ORAL at 17:27

## 2021-01-12 RX ADMIN — MIDODRINE HYDROCHLORIDE 2.5 MG: 5 TABLET ORAL at 08:13

## 2021-01-12 RX ADMIN — LACTULOSE 30 ML: 20 SOLUTION ORAL at 08:13

## 2021-01-12 NOTE — PROGRESS NOTES
MRI update:    Cardiology form received back from Dr. Laverne Mcpherson. [employer] rep coordinated for scan tomorrow morning (Wed Jan 13) at 10:30AM for MRI. Discussed with pt's JANELL Ellison.

## 2021-01-12 NOTE — PROGRESS NOTES
TRICIA- D/c to home with [facility]. CM noted that [facility] has accepted this pt for home health.  CM placed this information on pt's AVS. Jennifer Gone

## 2021-01-12 NOTE — ROUTINE PROCESS
Bedside shift change report given to Alexandra Brito (oncoming nurse) by Ramu Kolb RN (offgoing nurse). Report included the following information SBAR, Kardex, ED Summary, Intake/Output, MAR, Cardiac Rhythm V paced and Dual Neuro Assessment.
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
## 2021-01-12 NOTE — PROGRESS NOTES
Problem: Falls - Risk of  Goal: *Absence of Falls  Description: Document Kris Pichardo Fall Risk and appropriate interventions in the flowsheet.   Outcome: Progressing Towards Goal  Note: Fall Risk Interventions:  Mobility Interventions: Communicate number of staff needed for ambulation/transfer, Patient to call before getting OOB, PT Consult for mobility concerns, OT consult for ADLs    Mentation Interventions: Adequate sleep, hydration, pain control, Bed/chair exit alarm, Door open when patient unattended, Increase mobility, More frequent rounding, Reorient patient, Room close to nurse's station, Toileting rounds, Update white board    Medication Interventions: Evaluate medications/consider consulting pharmacy, Patient to call before getting OOB    Elimination Interventions: Patient to call for help with toileting needs, Toileting schedule/hourly rounds    History of Falls Interventions: Consult care management for discharge planning, Investigate reason for fall, Vital signs minimum Q4HRs X 24 hrs (comment for end date), Room close to nurse's station         Problem: Patient Education: Go to Patient Education Activity  Goal: Patient/Family Education  Outcome: Progressing Towards Goal

## 2021-01-12 NOTE — PROGRESS NOTES
MRI update:    Called Dr. Elaina Munson office as we have not received [employer] MRI settings form back as of yet, verified that this form was received yesterday and given to Dr. Candi Shannon. Message left for nurse that we are awaiting this form to complete MRI and to have it sent back ASAP. Will continue to follow up. See my prior note from yesterday for more information on MRI coordination for this patient.
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
## 2021-01-12 NOTE — PROGRESS NOTES
Problem: Self Care Deficits Care Plan (Adult)  Goal: *Acute Goals and Plan of Care (Insert Text)  Description:   FUNCTIONAL STATUS PRIOR TO ADMISSION: Patient was modified independent using a single point cane for functional mobility. Pt also has rollator which he sometimes uses. Lives with family, reports frequent falls at home, needing EMS to get up. Reports mod I for ADLs    HOME SUPPORT: lives with family    Occupational Therapy Goals  Initiated 1/11/2021  1. Patient will perform standing ADLs at sink with modified independence within 7 day(s). 2.  Patient will perform upper body dressing and lower body dressing with modified independence within 7 day(s). 3.  Patient will perform bathing with modified independence within 77 day(s). 4.  Patient will perform toilet transfers using least restrictive DME with modified independence within 7 day(s). 5.  Patient will perform all aspects of toileting with modified independence within 7 day(s). Outcome: Progressing Towards Goal    OCCUPATIONAL THERAPY TREATMENT  Patient: Lilia Patterson. (77 y.o. male)  Date: 1/12/2021  Diagnosis: Seizure ([address] [R56.9] Seizure ([address]       Precautions: Fall, Seizure  Chart, occupational therapy assessment, plan of care, and goals were reviewed. ASSESSMENT  Patient continues with skilled OT services and is progressing towards goals. Pt remains limited by drowsiness, impaired static and dynamic standing balance, and urinary retention. Pt was SBA for bed mobility, mod A to manage brief EOB and setup of urinal EOB. Pt reported urge to urinate but not able to urinate. CGA to stand with fair static and dynamic standing balance with HHA on R with pt still unable to urinate. RN present in room and assisted pt back to supine for bladder scan. Continue to recommend discharge home with HHOT and PT. Session limited this date by above.     Current Level of Function Impacting Discharge (ADLs): CGA to stand, may need DME due to balance deficits, fall hx    Other factors to consider for discharge: from home, hx of TBI and NSTEMI         PLAN :  Patient continues to benefit from skilled intervention to address the above impairments. Continue treatment per established plan of care. to address goals. Recommend with staff: OOB to chair and commode with A x 1 and gait belt    Recommend next OT session: standing ADLs at sink    Recommendation for discharge: (in order for the patient to meet his/her long term goals)  Occupational therapy at least 2 days/week in the home AND ensure assist and/or supervision for safety with IADLs    This discharge recommendation:  Has not yet been discussed the attending provider and/or case management    IF patient discharges home will need the following DME: none       SUBJECTIVE:   Patient stated I feel like I have to go but I can't.     OBJECTIVE DATA SUMMARY:   Cognitive/Behavioral Status:  Neurologic State: Alert        Perception: Appears intact  Perseveration: No perseveration noted       Functional Mobility and Transfers for ADLs:  Bed Mobility:  Supine to Sit: Stand-by assistance  Sit to Supine: Stand-by assistance    Transfers:  Sit to Stand: Contact guard assistance          Balance:  Sitting: Intact; Without support  Standing: Impaired; Without support  Standing - Static: Fair  Standing - Dynamic : Fair    ADL Intervention:                           Lower Body Dressing Assistance  Protective Undergarmet: Moderate assistance- while EOB    Completed sit to stand with CGA, pt immediately holding onto OT's shoulder, encouraged to hold OT's hand for balance while OT managed urinal in standing. Pt with urge and discomfort, bladder distended.             Pain:  C/o sacral pain, chronic    Activity Tolerance:   Good    After treatment patient left in no apparent distress:   Supine in bed and Call bell within reach    COMMUNICATION/COLLABORATION:   The patients plan of care was discussed with: Shae nurse.     Nguyen Crum, OT  Time Calculation: 14 mins

## 2021-01-12 NOTE — PROGRESS NOTES
TRICIA- Pending referral to [facility]    Reason for Admission:   Seizure               RUR Score:     43%    PCP: First and Last name: [facility]   Name of Practice:   Are you a current patient: Yes/No:Yes   Approximate date of last visit: Cannot remember   Can you do a virtual visit with your PCP: no             Resources/supports as identified by patient/family: This pt has supportive daughters. Top Challenges facing patient (as identified by patient/family and CM): Finances/Medication cost?   No                 Transportation? No. Daughters provide transportation. Support system or lack thereof? Daughters are supportive. Living arrangements? Pt lives with his two daughters           Self-care/ADLs/Cognition? Pt was independent with ADL's prior to admission. Current Advanced Directive/Advance Care Plan:  Not on file. Plan for utilizing home health: Yes                 Transition of Care Plan:     CM met with pt (and spoke with his daughter, Melita Escamilla) to introduce them to the role of CM and transition of care. This pt lives at home with his two daughters. He was independent with ADL's and IADL's prior to admission. CM informed pt that he has home health orders and offered him freedom of choice. He asked that this referral be sent to [facility]. CM sent a referral to [facility] via nap- [employer]. [facility] [place]    Care Management Interventions  PCP Verified by CM: Yes  Palliative Care Criteria Met (RRAT>21 & CHF Dx)?: No  Transition of Care Consult (CM Consult): Discharge Planning  MyChart Signup: No  Discharge Durable Medical Equipment: No  Physical Therapy Consult: Yes  Occupational Therapy Consult: Yes  Speech Therapy Consult: No  Current Support Network:  Other(Pt lives with his two daughters.)  Confirm Follow Up Transport: Family  The Plan for Transition of Care is Related to the Following Treatment Goals : safe d/c  The Patient and/or Patient Representative was Provided with a Choice of Provider and Agrees with the Discharge Plan?: Yes  [employer] Information Provided?: No

## 2021-01-13 ENCOUNTER — APPOINTMENT (OUTPATIENT)
Dept: MRI IMAGING | Age: 66
DRG: 101 | End: 2021-01-13
Attending: INTERNAL MEDICINE
Payer: MEDICARE

## 2021-01-13 LAB
COVID-19 RAPID TEST, COVR: NOT DETECTED
GLUCOSE BLD STRIP.AUTO-MCNC: 109 MG/DL (ref 65–100)
GLUCOSE BLD STRIP.AUTO-MCNC: 113 MG/DL (ref 65–100)
GLUCOSE BLD STRIP.AUTO-MCNC: 183 MG/DL (ref 65–100)
LEVETIRACETAM SERPL-MCNC: 22.5 UG/ML (ref 10–40)
SERVICE CMNT-IMP: ABNORMAL
SOURCE, COVRS: NORMAL
SPECIMEN SOURCE, FCOV2M: NORMAL
SPECIMEN TYPE, XMCV1T: NORMAL

## 2021-01-13 PROCEDURE — 65660000000 HC RM CCU STEPDOWN

## 2021-01-13 PROCEDURE — 97530 THERAPEUTIC ACTIVITIES: CPT

## 2021-01-13 PROCEDURE — 74011250637 HC RX REV CODE- 250/637: Performed by: INTERNAL MEDICINE

## 2021-01-13 PROCEDURE — 74011250636 HC RX REV CODE- 250/636: Performed by: INTERNAL MEDICINE

## 2021-01-13 PROCEDURE — 97535 SELF CARE MNGMENT TRAINING: CPT

## 2021-01-13 PROCEDURE — 74011636637 HC RX REV CODE- 636/637: Performed by: INTERNAL MEDICINE

## 2021-01-13 PROCEDURE — 97116 GAIT TRAINING THERAPY: CPT

## 2021-01-13 PROCEDURE — 82962 GLUCOSE BLOOD TEST: CPT

## 2021-01-13 PROCEDURE — 74011000258 HC RX REV CODE- 258: Performed by: INTERNAL MEDICINE

## 2021-01-13 PROCEDURE — 87635 SARS-COV-2 COVID-19 AMP PRB: CPT

## 2021-01-13 RX ORDER — FUROSEMIDE 40 MG/1
80 TABLET ORAL
Status: DISCONTINUED | OUTPATIENT
Start: 2021-01-14 | End: 2021-01-15 | Stop reason: HOSPADM

## 2021-01-13 RX ORDER — LEVETIRACETAM 500 MG/1
1000 TABLET ORAL EVERY 12 HOURS
Status: DISCONTINUED | OUTPATIENT
Start: 2021-01-13 | End: 2021-01-15 | Stop reason: HOSPADM

## 2021-01-13 RX ADMIN — LEVETIRACETAM 1000 MG: 500 TABLET ORAL at 18:19

## 2021-01-13 RX ADMIN — FINASTERIDE 5 MG: 5 TABLET, FILM COATED ORAL at 06:44

## 2021-01-13 RX ADMIN — METOPROLOL SUCCINATE 25 MG: 25 TABLET, EXTENDED RELEASE ORAL at 09:31

## 2021-01-13 RX ADMIN — PREGABALIN 100 MG: 50 CAPSULE ORAL at 17:17

## 2021-01-13 RX ADMIN — Medication 10 ML: at 06:44

## 2021-01-13 RX ADMIN — POTASSIUM CHLORIDE 20 MEQ: 750 TABLET, FILM COATED, EXTENDED RELEASE ORAL at 09:30

## 2021-01-13 RX ADMIN — TAMSULOSIN HYDROCHLORIDE 0.4 MG: 0.4 CAPSULE ORAL at 06:44

## 2021-01-13 RX ADMIN — CLOPIDOGREL BISULFATE 75 MG: 75 TABLET ORAL at 09:32

## 2021-01-13 RX ADMIN — LACTULOSE 30 ML: 20 SOLUTION ORAL at 09:32

## 2021-01-13 RX ADMIN — Medication 10 ML: at 22:00

## 2021-01-13 RX ADMIN — AMIODARONE HYDROCHLORIDE 200 MG: 200 TABLET ORAL at 09:30

## 2021-01-13 RX ADMIN — VENLAFAXINE HYDROCHLORIDE 75 MG: 37.5 CAPSULE, EXTENDED RELEASE ORAL at 09:31

## 2021-01-13 RX ADMIN — PREGABALIN 100 MG: 50 CAPSULE ORAL at 09:31

## 2021-01-13 RX ADMIN — VENLAFAXINE HYDROCHLORIDE 75 MG: 37.5 CAPSULE, EXTENDED RELEASE ORAL at 17:17

## 2021-01-13 RX ADMIN — ATORVASTATIN CALCIUM 80 MG: 40 TABLET, FILM COATED ORAL at 09:31

## 2021-01-13 RX ADMIN — INSULIN LISPRO 2 UNITS: 100 INJECTION, SOLUTION INTRAVENOUS; SUBCUTANEOUS at 12:37

## 2021-01-13 RX ADMIN — LACTULOSE 30 ML: 20 SOLUTION ORAL at 17:16

## 2021-01-13 RX ADMIN — PANTOPRAZOLE SODIUM 40 MG: 40 TABLET, DELAYED RELEASE ORAL at 06:44

## 2021-01-13 RX ADMIN — Medication 10 ML: at 17:17

## 2021-01-13 RX ADMIN — ASPIRIN 81 MG: 81 TABLET, CHEWABLE ORAL at 09:31

## 2021-01-13 RX ADMIN — TAMSULOSIN HYDROCHLORIDE 0.4 MG: 0.4 CAPSULE ORAL at 17:17

## 2021-01-13 RX ADMIN — ENOXAPARIN SODIUM 40 MG: 40 INJECTION SUBCUTANEOUS at 09:32

## 2021-01-13 RX ADMIN — LEVETIRACETAM 1000 MG: 100 INJECTION, SOLUTION INTRAVENOUS at 06:43

## 2021-01-13 RX ADMIN — AMLODIPINE BESYLATE 5 MG: 5 TABLET ORAL at 09:32

## 2021-01-13 RX ADMIN — FLUOXETINE 20 MG: 20 CAPSULE ORAL at 09:31
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
## 2021-01-13 NOTE — PROGRESS NOTES
Problem: Mobility Impaired (Adult and Pediatric)  Goal: *Acute Goals and Plan of Care (Insert Text)  Description: FUNCTIONAL STATUS PRIOR TO ADMISSION: Patient was modified independent using a single point cane for functional mobility. HOME SUPPORT PRIOR TO ADMISSION: The patient lived with family but did not require assist.    Physical Therapy Goals  Initiated 1/11/2021    1. Patient will move from supine to sit and sit to supine  in bed with modified independence within 4 days. 2. Patient will perform sit to stand with modified independence within 4 days. 3. Patient will ambulate with modified independence for 100 feet with the least restrictive device within 4 days. 4. Patient will ascend/descend 3 stairs with single handrail(s) with supervision/set-up within 4 days. Outcome: Progressing Towards Goal   PHYSICAL THERAPY TREATMENT  Patient: Herson Ness. (77 y.o. male)  Date: 1/13/2021  Diagnosis: Seizure ([facility] [address] [R56.9] Seizure ([facility] [address]       Precautions: Fall, Seizure  Chart, physical therapy assessment, plan of care and goals were reviewed. ASSESSMENT  Patient continues with skilled PT services and is progressing towards goals. Pt received supine in bed sleeping soundly. Pt agreeable to therapy and tolerated session fairly well. Pt continues to be limited by generalized weakness, decreased functional mobility,  baseline impaired sensation in bilateral feet impacting balance and gait placing him at increased risk for recurrent falls. Pt tolerated gait training with RW with constant min A x 35 ft. Pt demonstrated shuffle steps and lateral trunk sway. Pt returned to seated in the chair and encouraged to sit up for majority of the day. Pt may benefit from SNF placement at this time given his continued risk for falls.  However, if family+pt prefer home would recommend HHPT and 24 hour assist and physical assistance for balance and fall prevention    Current Level of Function Impacting Discharge (mobility/balance): min A gait training x 35 ft with RW    Other factors to consider for discharge: history of falls, remains at risk for falls         PLAN :  Patient continues to benefit from skilled intervention to address the above impairments. Continue treatment per established plan of care. to address goals. Recommendation for discharge: (in order for the patient to meet his/her long term goals)  Therapy up to 5 days/week in SNF setting versus home with HHPT and family assist (physical assist for balance and walking with RW)    This discharge recommendation:  Has been made in collaboration with the attending provider and/or case management    IF patient discharges home will need the following DME: patient owns DME required for discharge       SUBJECTIVE:   Patient stated My bottom is sore laying in bed.     OBJECTIVE DATA SUMMARY:   Critical Behavior:  Neurologic State: Alert  Orientation Level: Disoriented to place, Disoriented to situation, Disoriented to time, Oriented to person  Cognition: Follows commands     Functional Mobility Training:  Bed Mobility:  Rolling: Adaptive equipment; Additional time  Supine to Sit: Contact guard assistance              Transfers:  Sit to Stand: Contact guard assistance  Stand to Sit: Contact guard assistance                             Balance:  Sitting: Intact  Standing: Impaired; With support; Without support  Standing - Static: Good  Standing - Dynamic : Fair  Ambulation/Gait Training:  Distance (ft): 35 Feet (ft)  Assistive Device: Gait belt;Walker, rolling  Ambulation - Level of Assistance: Minimal assistance        Gait Abnormalities: Decreased step clearance;Trendelenburg;Path deviations        Base of Support: Widened     Speed/Usha: Pace decreased (<100 feet/min)  Step Length: Right shortened;Left shortened                    Stairs:               Therapeutic Exercises:   LAQ, seated marching  Pain Rating:  Pt c/o buttock soreness    Activity Tolerance:   Good and Fair    After treatment patient left in no apparent distress:   Sitting in chair, Call bell within reach, Bed / chair alarm activated, and Side rails x 3    COMMUNICATION/COLLABORATION:   The patients plan of care was discussed with: Occupational therapist and Registered nurse.      Lilian Valles, PT, DPT   Time Calculation: 23 mins

## 2021-01-13 NOTE — PROGRESS NOTES
Problem: Self Care Deficits Care Plan (Adult)  Goal: *Acute Goals and Plan of Care (Insert Text)  Description:   FUNCTIONAL STATUS PRIOR TO ADMISSION: Patient was modified independent using a single point cane for functional mobility. Pt also has rollator which he sometimes uses. Lives with family, reports frequent falls at home, needing EMS to get up. Reports mod I for ADLs    HOME SUPPORT: lives with family    Occupational Therapy Goals  Initiated 1/11/2021  1. Patient will perform standing ADLs at sink with modified independence within 7 day(s). 2.  Patient will perform upper body dressing and lower body dressing with modified independence within 7 day(s). 3.  Patient will perform bathing with modified independence within 77 day(s). 4.  Patient will perform toilet transfers using least restrictive DME with modified independence within 7 day(s). 5.  Patient will perform all aspects of toileting with modified independence within 7 day(s). Outcome: Progressing Towards Goal     OCCUPATIONAL THERAPY TREATMENT  Patient: Natalia Smith. (77 y.o. male)  Date: 1/13/2021  Diagnosis: Seizure ([address] [R56.9] Seizure ([address]       Precautions: Fall, Seizure  Chart, occupational therapy assessment, plan of care, and goals were reviewed. ASSESSMENT  Patient continues with skilled OT services and is progressing towards goals. Pt received supine in bed, mildly confused, oriented x3. Came to sitting EOB with CGA. He was able to tailor sit to adjust socks on feet, up to min A for balance due to posterior lean. Using RW pt ambulated short distance into hallway and transferred to chair, pt unsteady and requiring frequent cueing for safe transfer technique and RW management. In sitting pt performed grooming ADL with setup and verbal cueing. He reports some numbness/tingling B hands but not as significant as in his feet, which is impairing his functional mobility and increasing risk of falls.  Pending progress recommend SNF rehab at discharge vs. [facility] therapy with 24/7 physical assistance available. Current Level of Function Impacting Discharge (ADLs): CGA-min A transfers, up to mod A ADLs    Other factors to consider for discharge: fall risk         PLAN :  Patient continues to benefit from skilled intervention to address the above impairments. Continue treatment per established plan of care. to address goals. Recommendation for discharge: (in order for the patient to meet his/her long term goals)  Therapy up to 5 days/week in SNF setting vs.  therapy with 24/7 assistance    This discharge recommendation:  A follow-up discussion with the attending provider and/or case management is planned    IF patient discharges home will need the following DME: TBD       SUBJECTIVE:   Patient stated I'll sit up in the chair.     OBJECTIVE DATA SUMMARY:   Cognitive/Behavioral Status:  Neurologic State: Alert;Drowsy  Orientation Level: Oriented to person;Oriented to place; Disoriented to situation;Oriented to time  Cognition: Follows commands;Decreased attention/concentration  Perception: Appears intact  Perseveration: No perseveration noted  Safety/Judgement: Fall prevention;Decreased insight into deficits    Functional Mobility and Transfers for ADLs:  Bed Mobility:  Rolling: Adaptive equipment; Additional time  Supine to Sit: Contact guard assistance    Transfers:  Sit to Stand: Contact guard assistance          Balance:  Sitting: Intact  Standing: Impaired; With support; Without support  Standing - Static: Good  Standing - Dynamic : Fair    ADL Intervention:       Grooming  Position Performed: Seated in chair  Washing Face: Set-up         Lower Body Dressing Assistance  Socks: Minimum assistance(pt able to tailor sit to reach feet and adjust socks)  Leg Crossed Method Used: Yes  Position Performed: Seated edge of bed         Cognitive Retraining  Safety/Judgement: Fall prevention;Decreased insight into deficits    Pain:  0/10    Activity Tolerance:   Good    After treatment patient left in no apparent distress:   Sitting in chair, Call bell within reach, and Bed / chair alarm activated    COMMUNICATION/COLLABORATION:   The patients plan of care was discussed with: Physical therapist and Registered nurse.      Sade Ragland OT  Time Calculation: 23 mins

## 2021-01-13 NOTE — ROUTINE PROCESS
Bedside shift change report given to Alexandra Brito (oncoming nurse) by Tom Bryant RN (offgoing nurse). Report included the following information SBAR, Kardex, ED Summary, Intake/Output, MAR, Cardiac Rhythm Paced and Dual Neuro Assessment.

## 2021-01-13 NOTE — PROGRESS NOTES
Transition of Care Plan   RUR- 34 % High Risks    DISPOSITION: SNF- pending referral at . [address] Transport: [facility] - on WILL CALL     Reviewed chart for transitions of care,and discussed in rounds. Discussed the case with Dr. Veena Mares. Received a call from daughter Marina Adorno [phone], wants to pursue SNF. Transition of Care Plan:     The Plan for Transition of Care is related to the following treatment goals: SNF     The Patient and/or patient representative daughter Marina Adorno [phone] was provided with a choice of provider and agrees  with the discharge plan. Yes [x] No []    A Freedom of choice list was provided with basic dialogue that supports the patient's individualized plan of care/goals and shares the quality data associated with the providers. Yes [x] No []    A referral has been sent to [facility] via cc link, pending review and acceptance. A COVID test is needed. Attending perfect served.      CHRISTIANE Dong

## 2021-01-13 NOTE — PROGRESS NOTES
MRI update:    Mr Rosibel Chong arrived in department for MRI, [employer] rep present to program device. While reviewing screening form with pt, he was unable to tell me where his stents are located, but stated he has \"about 16\". Carotid stent was known and researched in advance and is MR-conditional. Pt known to have \"multiple coronary stents\" per screening sheet, pt believes these are all coronary stents other than the one in his carotid. Per [facility] notes scanned in to media from prior encounters, Mr. Rosibel Chong has coronary stents in his native vessels, stents to his initial grafts from 2002 CABG, and at least one stent in his subsequent grafts from 2012 CABG. Discussed with radiologist (Dr. Tolu Carver), who states unable to proceed with MRI due to safety concerns regarding unknown number of stents, stent locations, stent composition since some were placed >20 years ago, and potential that stents are overlapping as he has so many. Notified pt's RN, Ashely Vaughn.

## 2021-01-14 LAB
AMMONIA PLAS-SCNC: 22 UMOL/L
GLUCOSE BLD STRIP.AUTO-MCNC: 126 MG/DL (ref 65–100)
GLUCOSE BLD STRIP.AUTO-MCNC: 130 MG/DL (ref 65–100)
GLUCOSE BLD STRIP.AUTO-MCNC: 153 MG/DL (ref 65–100)
SERVICE CMNT-IMP: ABNORMAL

## 2021-01-14 PROCEDURE — 82140 ASSAY OF AMMONIA: CPT

## 2021-01-14 PROCEDURE — 82962 GLUCOSE BLOOD TEST: CPT

## 2021-01-14 PROCEDURE — 95816 EEG AWAKE AND DROWSY: CPT | Performed by: INTERNAL MEDICINE

## 2021-01-14 PROCEDURE — 74011250637 HC RX REV CODE- 250/637: Performed by: INTERNAL MEDICINE

## 2021-01-14 PROCEDURE — 74011636637 HC RX REV CODE- 636/637: Performed by: INTERNAL MEDICINE

## 2021-01-14 PROCEDURE — 74011250636 HC RX REV CODE- 250/636: Performed by: PSYCHIATRY & NEUROLOGY

## 2021-01-14 PROCEDURE — 95816 EEG AWAKE AND DROWSY: CPT | Performed by: PSYCHIATRY & NEUROLOGY

## 2021-01-14 PROCEDURE — 74011250636 HC RX REV CODE- 250/636: Performed by: INTERNAL MEDICINE

## 2021-01-14 PROCEDURE — 36415 COLL VENOUS BLD VENIPUNCTURE: CPT

## 2021-01-14 PROCEDURE — 65660000000 HC RM CCU STEPDOWN

## 2021-01-14 PROCEDURE — 74011250637 HC RX REV CODE- 250/637: Performed by: PSYCHIATRY & NEUROLOGY

## 2021-01-14 PROCEDURE — 99223 1ST HOSP IP/OBS HIGH 75: CPT | Performed by: PSYCHIATRY & NEUROLOGY

## 2021-01-14 RX ORDER — LORAZEPAM 2 MG/ML
INJECTION INTRAMUSCULAR
Status: DISPENSED
Start: 2021-01-14 | End: 2021-01-15

## 2021-01-14 RX ORDER — LORAZEPAM 2 MG/ML
1 INJECTION INTRAMUSCULAR ONCE
Status: DISCONTINUED | OUTPATIENT
Start: 2021-01-14 | End: 2021-01-14

## 2021-01-14 RX ORDER — ALPRAZOLAM 1 MG/1
1 TABLET ORAL 2 TIMES DAILY
Status: DISCONTINUED | OUTPATIENT
Start: 2021-01-14 | End: 2021-01-15 | Stop reason: HOSPADM

## 2021-01-14 RX ORDER — AMLODIPINE BESYLATE 5 MG/1
5 TABLET ORAL DAILY
Qty: 30 TAB | Refills: 0 | Status: SHIPPED
Start: 2021-01-15

## 2021-01-14 RX ORDER — LORAZEPAM 2 MG/ML
1 INJECTION INTRAMUSCULAR
Status: DISCONTINUED | OUTPATIENT
Start: 2021-01-14 | End: 2021-01-15 | Stop reason: HOSPADM

## 2021-01-14 RX ORDER — LORAZEPAM 2 MG/ML
1 INJECTION INTRAMUSCULAR
Status: COMPLETED | OUTPATIENT
Start: 2021-01-14 | End: 2021-01-14

## 2021-01-14 RX ADMIN — VENLAFAXINE HYDROCHLORIDE 75 MG: 37.5 CAPSULE, EXTENDED RELEASE ORAL at 08:18

## 2021-01-14 RX ADMIN — FLUOXETINE 20 MG: 20 CAPSULE ORAL at 08:19

## 2021-01-14 RX ADMIN — Medication 10 ML: at 14:00

## 2021-01-14 RX ADMIN — CLOPIDOGREL BISULFATE 75 MG: 75 TABLET ORAL at 08:18

## 2021-01-14 RX ADMIN — ATORVASTATIN CALCIUM 80 MG: 40 TABLET, FILM COATED ORAL at 08:18

## 2021-01-14 RX ADMIN — ALPRAZOLAM 1 MG: 1 TABLET ORAL at 18:22

## 2021-01-14 RX ADMIN — METOPROLOL SUCCINATE 25 MG: 25 TABLET, EXTENDED RELEASE ORAL at 08:18

## 2021-01-14 RX ADMIN — AMLODIPINE BESYLATE 5 MG: 5 TABLET ORAL at 08:18

## 2021-01-14 RX ADMIN — FINASTERIDE 5 MG: 5 TABLET, FILM COATED ORAL at 08:18

## 2021-01-14 RX ADMIN — TAMSULOSIN HYDROCHLORIDE 0.4 MG: 0.4 CAPSULE ORAL at 16:58

## 2021-01-14 RX ADMIN — LEVETIRACETAM 1000 MG: 500 TABLET ORAL at 18:22

## 2021-01-14 RX ADMIN — PREGABALIN 100 MG: 50 CAPSULE ORAL at 21:38

## 2021-01-14 RX ADMIN — PREGABALIN 100 MG: 50 CAPSULE ORAL at 08:18

## 2021-01-14 RX ADMIN — PREGABALIN 100 MG: 50 CAPSULE ORAL at 00:20

## 2021-01-14 RX ADMIN — OLANZAPINE 5 MG: 2.5 TABLET, FILM COATED ORAL at 00:20

## 2021-01-14 RX ADMIN — PREGABALIN 100 MG: 50 CAPSULE ORAL at 16:58

## 2021-01-14 RX ADMIN — LACTULOSE 30 ML: 20 SOLUTION ORAL at 08:18

## 2021-01-14 RX ADMIN — FUROSEMIDE 80 MG: 40 TABLET ORAL at 08:18

## 2021-01-14 RX ADMIN — LEVETIRACETAM 1000 MG: 500 TABLET ORAL at 08:18

## 2021-01-14 RX ADMIN — ASPIRIN 81 MG: 81 TABLET, CHEWABLE ORAL at 08:18

## 2021-01-14 RX ADMIN — AMIODARONE HYDROCHLORIDE 200 MG: 200 TABLET ORAL at 08:18

## 2021-01-14 RX ADMIN — OLANZAPINE 5 MG: 2.5 TABLET, FILM COATED ORAL at 21:38

## 2021-01-14 RX ADMIN — LACTULOSE 30 ML: 20 SOLUTION ORAL at 18:22

## 2021-01-14 RX ADMIN — FUROSEMIDE 80 MG: 40 TABLET ORAL at 16:58

## 2021-01-14 RX ADMIN — LORAZEPAM 1 MG: 2 INJECTION INTRAMUSCULAR; INTRAVENOUS at 13:40

## 2021-01-14 RX ADMIN — INSULIN LISPRO 2 UNITS: 100 INJECTION, SOLUTION INTRAVENOUS; SUBCUTANEOUS at 13:01

## 2021-01-14 RX ADMIN — POTASSIUM CHLORIDE 20 MEQ: 750 TABLET, FILM COATED, EXTENDED RELEASE ORAL at 08:18

## 2021-01-14 RX ADMIN — PANTOPRAZOLE SODIUM 40 MG: 40 TABLET, DELAYED RELEASE ORAL at 08:18

## 2021-01-14 RX ADMIN — TAMSULOSIN HYDROCHLORIDE 0.4 MG: 0.4 CAPSULE ORAL at 08:18

## 2021-01-14 RX ADMIN — ENOXAPARIN SODIUM 40 MG: 40 INJECTION SUBCUTANEOUS at 08:17

## 2021-01-14 RX ADMIN — Medication 10 ML: at 21:38

## 2021-01-14 RX ADMIN — VENLAFAXINE HYDROCHLORIDE 75 MG: 37.5 CAPSULE, EXTENDED RELEASE ORAL at 18:22

## 2021-01-14 NOTE — PROGRESS NOTES
Bedside and Verbal shift change report given to [address] (oncoming nurse) by Kuldeep Mendez (offgoing nurse). Report included the following information SBAR, Kardex, MAR, Recent Results and Cardiac Rhythm NSR.

## 2021-01-14 NOTE — PROGRESS NOTES
1330- RN called to the bedside for seizure activity noted by EEG tech. Patient with visible jerking in all 4 extremities, looking to the left, tachycardic on the monitor. EEG tech reported patient conversant during EEG, then suddenly looking to the right and having jerking in all 4 extremities. Neuro NP Ej at the bedside, orders received for 1mg Ativan IVP. 1340- 1mg IV Ativan given, patient back in NSR, conversant, able to answer orientation questions at hospital baseline and follow commands.

## 2021-01-14 NOTE — PROGRESS NOTES
2:58 PM: Discharge on hold due to another seizure. Called daughter Saritha Sarabiaster [phone] and notified. Transition of Care Plan to SNF/Rehab    SNF/Rehab Transition:  Patient has been accepted to [facility] and meets criteria for admission. Patient will transported by [facility] and expected to leave at 4 PM.    Communication to Patient/Family:  Met with patient and spoke with patient's daughter Saritha Ortizer [phone] (identified care giver) and they are agreeable to the transition plan. Communication to SNF/Rehab:  Bedside RN, Padilla Velazquez, has been notified to update the transition plan to the facility and call report (phone number [phone]). Discharge information has been updated on the AVS.     Discharge instructions to be fax'd to facility at (Fax # CC Link). SNF/Rehab Transition:  Patient to follow-up with Home Health: [facility], other  ,none)  PCP/Specialist:     Reviewed and confirmed with facility, Lesley at admissions they can manage the patient care needs for the following:     Joel Palomo with (X) only those applicable:    Medication:  [x]  Medications will be available at the facility  []  IV Antibiotics   []  Controlled Substance - hard copy to be sent with patient   []  Weekly Labs   Documents:  [x] Hard RX  [x] MAR  [x] Kardex  [x] AVS  []Transfer Summary  [x]Discharge   Equipment:  []  CPAP/BiPAP  []  Wound Vacuum  []  Enamorado or Urinary Device  []  PICC/Central Line  []  Nebulizer  []  Ventilator   Treatment:  []Isolation (for MRSA, VRE, etc.)  []Surgical Drain Management  []Tracheostomy Care  []Dressing Changes  []Dialysis with transportation and chair time . []PEG Care  []Oxygen  []Daily Weights for Heart Failure   Dietary:  []Any diet limitations  []Tube Feedings   []Total Parenteral Management (TPN)   Eligible for Medicaid Long Term Services and Supports  Yes:  [] Eligible for medical assistance or will become eligible within 180 days and UAI completed.    [] Provider/Patient and/or support system has requested screening. [] UAI copy provided to patient or responsible party, .  [] UAI unavailable at discharge will send once processed to SNF provider. [] UAI unavailable at discharged mailed to patient  No:   [x] Private pay and is not financially eligible for Medicaid within the next 180 days. [] Reside out-of-state.   [] A residents of a state owned/operated facility that is licensed  by [address] [place] [facility] or [facility]  [] Enrollment in [facility] hospice services  [] [address] [place] [address]  [] Patient /Family declines to have screening completed or provide financial information for screening     Financial Resources:  Medicaid    [] Initiated and application pending   [] Full coverage     Advanced Care Plan:  []Surrogate Decision Maker of Care  []POA  [x]Communicated Code Status  (\"Full\")    Other  01/10/21 4168  INITIAL PHYSICIAN ORDER: OBSERVATION/OUTPATIENT IN A BED OBSERVATION; Remote Telemetry; seizue f/u eval ONE TIME     Authorizing Provider: MD Aline King MSW

## 2021-01-14 NOTE — PROGRESS NOTES
Bedside and Verbal shift change report given to Doctor Dawood Adler (oncoming nurse) by Montse Porter (offgoing nurse). Report included the following information SBAR, Kardex, Recent Results and Dual Neuro Assessment.

## 2021-01-14 NOTE — PROGRESS NOTES
Anticipated discharge to [facility] today at 2 pm, pending medical clearance from the attending. Called and updated patient's daughter Janet Moon [phone], agreeable. Attending and primary nurse aware. [facility] confirmed for 2 PM pick-up. Full note to follow, if discharged.      CHRISTIANE Gandhi

## 2021-01-14 NOTE — PROCEDURES
[address]   EEG    Name:  Jessica Luna  MR#:  [id number]  :  1955  ACCOUNT #:  [de-identified]  DATE OF SERVICE:  2021      REQUESTING PHYSICIAN:  Dr. John Vuong    HISTORY:  The patient is a 79-year-old male who is being evaluated for breakthrough seizures. DESCRIPTION:  This is an 18-channel EEG performed on an awake patient. There is no clear dominant background rhythm. The background activity consists of medium voltage rhythms ranging from 6-8 Hz frequency range. Superimposed faster frequencies are seen in the frontal head region. Polymorphic, rhythmic appearing waveforms were seen intermittently in a generalized fashion. Drowsiness in sleep architecture was not noted. Photic stimulation and hyperventilation was not performed. EEG SUMMARY:  Abnormal EEG due to the presence of mixed slow and fast frequencies. CLINICAL INTERPRETATION:  This EEG is suggestive of mild generalized encephalopathic process, nonspecific in type. The finding is likely due to being in a postictal state and benzodiazepine use. No convincingly epileptiform activity or seizure was recorded.       Meg Monroe MD      [id number]  D:  2021 15:15  T:  2021 16:33  JOB #:  [id number]

## 2021-01-14 NOTE — PROGRESS NOTES
Problem: Falls - Risk of  Goal: *Absence of Falls  Description: Document Velma Arriaga Fall Risk and appropriate interventions in the flowsheet.   Outcome: Progressing Towards Goal  Note: Fall Risk Interventions:  Mobility Interventions: Bed/chair exit alarm, Communicate number of staff needed for ambulation/transfer, OT consult for ADLs, Patient to call before getting OOB, PT Consult for mobility concerns    Mentation Interventions: Adequate sleep, hydration, pain control, Bed/chair exit alarm, Door open when patient unattended, More frequent rounding, Reorient patient, Room close to nurse's station, Toileting rounds, Update white board    Medication Interventions: Evaluate medications/consider consulting pharmacy, Patient to call before getting OOB    Elimination Interventions: Bed/chair exit alarm, Call light in reach, Patient to call for help with toileting needs, Toileting schedule/hourly rounds    History of Falls Interventions: Bed/chair exit alarm, Consult care management for discharge planning, Evaluate medications/consider consulting pharmacy, Investigate reason for fall, Room close to nurse's station, Vital signs minimum Q4HRs X 24 hrs (comment for end date)         Problem: Patient Education: Go to Patient Education Activity  Goal: Patient/Family Education  Outcome: Progressing Towards Goal

## 2021-01-14 NOTE — PROGRESS NOTES
Physical Therapy 1/14/2021    Chart reviewed up to date. Pt with EEG bedside. Will follow-up as appropriate. Thank you.   Maryam Self, PT, DPT

## 2021-01-14 NOTE — PROGRESS NOTES
Patient ended up having another seizure, a witnessed generalized tonic-clonic type lasting about a minute. I called the patient's daughter, Tama Goldmann who tells me that the patient takes alprazolam 1 mg twice daily and takes clonazepam 1 mg at bedtime as needed if he cannot sleep    He has not received any alprazolam since admission and I suspect that benzo withdrawal may have been the reason for his seizure. I will restart.    Continue other medications  Hold discharge for today    Sita Mayes MD

## 2021-01-14 NOTE — PROGRESS NOTES
Bedside shift change report given to [address] [place] [place] [address] (oncoming nurse) by Avani Gillis (offgoing nurse). Report included the following information SBAR, Kardex, Quality Measures and Dual Neuro Assessment.
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
## 2021-01-14 NOTE — PROGRESS NOTES
Attempted to see patient, pt with EEG at bedside due to witnessed seizure. Will follow up tomorrow as available and appropriate.      Thank you  Rahul Levine OTR/L

## 2021-01-15 VITALS
OXYGEN SATURATION: 95 % | SYSTOLIC BLOOD PRESSURE: 107 MMHG | DIASTOLIC BLOOD PRESSURE: 62 MMHG | HEART RATE: 67 BPM | WEIGHT: 199.74 LBS | BODY MASS INDEX: 28.59 KG/M2 | HEIGHT: 70 IN | RESPIRATION RATE: 19 BRPM | TEMPERATURE: 98.1 F

## 2021-01-15 LAB
GLUCOSE BLD STRIP.AUTO-MCNC: 123 MG/DL (ref 65–100)
GLUCOSE BLD STRIP.AUTO-MCNC: 149 MG/DL (ref 65–100)
GLUCOSE BLD STRIP.AUTO-MCNC: 170 MG/DL (ref 65–100)
SERVICE CMNT-IMP: ABNORMAL

## 2021-01-15 PROCEDURE — 74011250637 HC RX REV CODE- 250/637: Performed by: INTERNAL MEDICINE

## 2021-01-15 PROCEDURE — 74011636637 HC RX REV CODE- 636/637: Performed by: INTERNAL MEDICINE

## 2021-01-15 PROCEDURE — 99232 SBSQ HOSP IP/OBS MODERATE 35: CPT | Performed by: NURSE PRACTITIONER

## 2021-01-15 PROCEDURE — 51798 US URINE CAPACITY MEASURE: CPT

## 2021-01-15 PROCEDURE — 74011250636 HC RX REV CODE- 250/636: Performed by: INTERNAL MEDICINE

## 2021-01-15 PROCEDURE — 74011250637 HC RX REV CODE- 250/637: Performed by: PSYCHIATRY & NEUROLOGY

## 2021-01-15 PROCEDURE — 82962 GLUCOSE BLOOD TEST: CPT

## 2021-01-15 RX ORDER — CLONAZEPAM 1 MG/1
1 TABLET ORAL
Qty: 5 TAB | Refills: 0 | Status: SHIPPED | OUTPATIENT
Start: 2021-01-15

## 2021-01-15 RX ORDER — PREGABALIN 100 MG/1
100 CAPSULE ORAL 3 TIMES DAILY
Qty: 15 CAP | Refills: 0 | Status: SHIPPED | OUTPATIENT
Start: 2021-01-15

## 2021-01-15 RX ORDER — ALPRAZOLAM 0.5 MG/1
1 TABLET ORAL 2 TIMES DAILY
Qty: 10 TAB | Refills: 0 | Status: SHIPPED | OUTPATIENT
Start: 2021-01-15

## 2021-01-15 RX ADMIN — LEVETIRACETAM 1000 MG: 500 TABLET ORAL at 06:47

## 2021-01-15 RX ADMIN — INSULIN LISPRO 2 UNITS: 100 INJECTION, SOLUTION INTRAVENOUS; SUBCUTANEOUS at 12:30

## 2021-01-15 RX ADMIN — FUROSEMIDE 80 MG: 40 TABLET ORAL at 06:46

## 2021-01-15 RX ADMIN — FINASTERIDE 5 MG: 5 TABLET, FILM COATED ORAL at 06:46

## 2021-01-15 RX ADMIN — INSULIN LISPRO 2 UNITS: 100 INJECTION, SOLUTION INTRAVENOUS; SUBCUTANEOUS at 18:05

## 2021-01-15 RX ADMIN — TAMSULOSIN HYDROCHLORIDE 0.4 MG: 0.4 CAPSULE ORAL at 06:47

## 2021-01-15 RX ADMIN — LACTULOSE 30 ML: 20 SOLUTION ORAL at 10:44

## 2021-01-15 RX ADMIN — ENOXAPARIN SODIUM 40 MG: 40 INJECTION SUBCUTANEOUS at 10:44

## 2021-01-15 RX ADMIN — POTASSIUM CHLORIDE 20 MEQ: 750 TABLET, FILM COATED, EXTENDED RELEASE ORAL at 10:41

## 2021-01-15 RX ADMIN — Medication 10 ML: at 06:47

## 2021-01-15 RX ADMIN — LACTULOSE 30 ML: 20 SOLUTION ORAL at 18:00

## 2021-01-15 RX ADMIN — VENLAFAXINE HYDROCHLORIDE 75 MG: 37.5 CAPSULE, EXTENDED RELEASE ORAL at 18:05

## 2021-01-15 RX ADMIN — FLUOXETINE 20 MG: 20 CAPSULE ORAL at 10:43

## 2021-01-15 RX ADMIN — VENLAFAXINE HYDROCHLORIDE 75 MG: 37.5 CAPSULE, EXTENDED RELEASE ORAL at 10:43

## 2021-01-15 RX ADMIN — AMIODARONE HYDROCHLORIDE 200 MG: 200 TABLET ORAL at 10:44

## 2021-01-15 RX ADMIN — PREGABALIN 100 MG: 50 CAPSULE ORAL at 18:05

## 2021-01-15 RX ADMIN — PANTOPRAZOLE SODIUM 40 MG: 40 TABLET, DELAYED RELEASE ORAL at 06:47

## 2021-01-15 RX ADMIN — ALPRAZOLAM 1 MG: 1 TABLET ORAL at 10:43

## 2021-01-15 RX ADMIN — TAMSULOSIN HYDROCHLORIDE 0.4 MG: 0.4 CAPSULE ORAL at 18:05

## 2021-01-15 RX ADMIN — ASPIRIN 81 MG: 81 TABLET, CHEWABLE ORAL at 10:41

## 2021-01-15 RX ADMIN — Medication 10 ML: at 14:30

## 2021-01-15 RX ADMIN — LEVETIRACETAM 1000 MG: 500 TABLET ORAL at 18:05

## 2021-01-15 RX ADMIN — ALPRAZOLAM 1 MG: 1 TABLET ORAL at 18:05

## 2021-01-15 RX ADMIN — PREGABALIN 100 MG: 50 CAPSULE ORAL at 10:42

## 2021-01-15 RX ADMIN — AMLODIPINE BESYLATE 5 MG: 5 TABLET ORAL at 10:44

## 2021-01-15 RX ADMIN — METOPROLOL SUCCINATE 25 MG: 25 TABLET, EXTENDED RELEASE ORAL at 10:43

## 2021-01-15 RX ADMIN — ATORVASTATIN CALCIUM 80 MG: 40 TABLET, FILM COATED ORAL at 10:41

## 2021-01-15 RX ADMIN — CLOPIDOGREL BISULFATE 75 MG: 75 TABLET ORAL at 10:41

## 2021-01-15 RX ADMIN — FUROSEMIDE 80 MG: 40 TABLET ORAL at 18:05

## 2021-01-15 NOTE — ROUTINE PROCESS
Bedside shift change report given to [address] (oncoming nurse) by Alejandro Anne RN (offgoing nurse). Report included the following information SBAR, Kardex, ED Summary, Intake/Output, MAR, Cardiac Rhythm Paced and Dual Neuro Assessment.

## 2021-01-15 NOTE — PROGRESS NOTES
Bedside and Verbal shift change report given to Avinash Ruano RN (oncoming nurse) by Davie Barahona RN (offgoing nurse). Report included the following information SBAR, Kardex, Intake/Output, MAR, Cardiac Rhythm Paced and Quality Measures.

## 2021-01-15 NOTE — DISCHARGE INSTRUCTIONS
Discharge Instructions       PATIENT ID: Natalia Smith. MRN: [id number]   YOB: 1955    DATE OF ADMISSION: 1/9/2021 10:20 PM    DATE OF DISCHARGE: 1/15/2021    PRIMARY CARE PROVIDER: Todd Crooks MD     ATTENDING PHYSICIAN: Naun Davey MD  DISCHARGING PROVIDER: Ryan Damico MD    To contact this individual call [phone] and ask the  to page. If unavailable ask to be transferred the Adult Hospitalist Department. DISCHARGE DIAGNOSES   # Recurrent seizure. Likely breakthrough seizures as well as benzodiazepine withdrawal seizures. Pt was using Alprazolam scheduled BID  # Dizziness and worsened memory issues per family  # Elevated ammonia level, better with lactulose  # DM2, controlled  # HTN controled  # H/o arrhythmia on Amiodarone  # GARCIA in setting of inactivity and BPH. S/p bhandari 1/12/2021. Out pt  Urology appointment with voiding trial scheduled 1/27/2021  # CHF; chronic systolic HF, compensated  # CKD 3. No ACE/ARB/Spironolactone  # Cardiomyopathy; severe; continue current regimen. Last EF 50 % on ECHO   # Paroxysmal AF. on Amiodarone. S/p ICD; working well. # Orthostatic hypotension. On midodrine    CONSULTATIONS: IP CONSULT TO HOSPITALIST  IP CONSULT TO UROLOGY  IP CONSULT TO NEUROLOGY    PROCEDURES/SURGERIES: * No surgery found *    PENDING TEST RESULTS:   At the time of discharge the following test results are still pending: none    FOLLOW UP APPOINTMENTS:   Follow-up Information     Follow up With Specialties Details Why Contact Info     [facility]/Please call this agency when you get home. [address] [place] [address] [address]    Todd Crooks MD Family Medicine In 1 week As needed, If symptoms worsen and post hospital discharge follow up.  Please arrange for getting sleep study outpatient. [facility]      Annabel Andre MD Neurology In 4 weeks As needed, If symptoms worsen and for follow up related to seizure disorder [facility] [address]  [phone]             ADDITIONAL CARE RECOMMENDATIONS:   Follow up with PCP in 1 week for post hospital discharge follow up on discharge from rehab. Recommended by neurologist to get outpatient sleep study for concerns of ROSELIA/ sleep apnea. Follow up with neurology as needed for seizures  Follow up with cardiology as scheduled  · It is important that you take the medication exactly as they are prescribed. · Keep your medication in the bottles provided by the pharmacist and keep a list of the medication names, dosages, and times to be taken in your wallet. · Do not take other medications without consulting your doctor. · No drinking alcohol or driving car or operating machinery if you are on narcotic pain medications. Donot take sedating mediations if you are sleepy or confused. · Fall Precautions  · Keep Well Hydrated  · Report to your medical provider if you feel you have  developed allergies to medications  · Follow up with your PCP or Consultant for medication adjustments and refills  · Monitor for signs of fevers,chills,bleeding,chest pain and seek medical attention if you do so. DIET: Cardiac Diet and Diabetic Diet    ACTIVITY: Activity as tolerated    WOUND CARE: NA    EQUIPMENT needed: NA      Radiology:  Ct Head Wo Cont    Result Date: 1/10/2021  IMPRESSION: No acute process. DISCHARGE MEDICATIONS:   See Medication Reconciliation Form    · It is important that you take the medication exactly as they are prescribed. · Keep your medication in the bottles provided by the pharmacist and keep a list of the medication names, dosages, and times to be taken in your wallet.    · Do not take other medications without consulting your doctor. NOTIFY YOUR PHYSICIAN FOR ANY OF THE FOLLOWING:   Fever over 101 degrees for 24 hours. Chest pain, shortness of breath, fever, chills, nausea, vomiting, diarrhea, change in mentation, falling, weakness, bleeding. Severe pain or pain not relieved by medications. Or, any other signs or symptoms that you may have questions about. DISPOSITION:    Home With:   OT  PT  HH  RN      x SNF/Inpatient Rehab/LTAC    Independent/assisted living    Hospice    Other:     CDMP Checked:   Yes x     PROBLEM LIST Updated:  Yes x        My Medications      START taking these medications      Instructions Each Dose to Equal Morning Noon Evening Bedtime   amLODIPine 5 mg tablet  Commonly known as: NORVASC    Your last dose was: Your next dose is: Take 1 Tab by mouth daily. 5 mg                    CHANGE how you take these medications      Instructions Each Dose to Equal Morning Noon Evening Bedtime   ALPRAZolam 0.5 mg tablet  Commonly known as: Ples Gula  What changed:   · how much to take  · when to take this  · reasons to take this  · additional instructions  · Another medication with the same name was removed. Continue taking this medication, and follow the directions you see here. Your last dose was: Your next dose is: Take 2 Tabs by mouth two (2) times a day. Max Daily Amount: 2 mg. Pt takes it regularly and neurology advised to continue the same as scheduled medication for withdrawal seizures   1 mg                 amiodarone 200 mg tablet  Commonly known as: CORDARONE  What changed: when to take this    Your last dose was: Your next dose is: Take 1 Tab by mouth two (2) times a day. 200 mg                 metoprolol succinate 25 mg XL tablet  Commonly known as: TOPROL-XL  What changed: how much to take    Your last dose was: Your next dose is: Take 1.5 Tabs by mouth daily.    37.5 mg                 venlafaxine-SR 75 mg capsule  Commonly known as: Effexor XR  What changed:   · when to take this  · additional instructions    Your last dose was: Your next dose is: Take 1 Cap by mouth every evening. Take 75 mg in evening   75 mg                    CONTINUE taking these medications      Instructions Each Dose to Equal Morning Noon Evening Bedtime   * albuterol 2.5 mg /3 mL (0.083 %) Nebu  Commonly known as: PROVENTIL VENTOLIN    Your last dose was: Your next dose is:         2.5 mg by Nebulization route every four (4) hours as needed for Wheezing. 2.5 mg                 * albuterol 90 mcg/actuation inhaler  Commonly known as: PROVENTIL HFA, VENTOLIN HFA, PROAIR HFA    Your last dose was: Your next dose is: Take 1 Puff by inhalation every six (6) hours as needed for Shortness of Breath or Wheezing. 1 Puff                 aspirin 81 mg chewable tablet    Your last dose was: Your next dose is: Take 1 Tab by mouth daily. 81 mg                 atorvastatin 80 mg tablet  Commonly known as: LIPITOR    Your last dose was: Your next dose is: Take 80 mg by mouth daily. 80 mg                 calcium carbonate 200 mg calcium (500 mg) Chew  Commonly known as: TUMS    Your last dose was: Your next dose is: Take 2 Tabs by mouth three (3) times daily as needed for Pain (abdominal pain). 400 mg                 clonazePAM 1 mg tablet  Commonly known as: KlonoPIN    Your last dose was: Your next dose is: Take 1 Tab by mouth nightly as needed for Anxiety or Sleep. Max Daily Amount: 1 mg.   1 mg                 clopidogreL 75 mg Tab  Commonly known as: PLAVIX    Your last dose was: Your next dose is: Take 1 Tab by mouth daily. 75 mg                 finasteride 5 mg tablet  Commonly known as: PROSCAR    Your last dose was: Your next dose is: Take 5 mg by mouth every morning. 5 mg                 FLUoxetine 20 mg capsule  Commonly known as: PROzac    Your last dose was:      Your next dose is: Take 30 mg by mouth daily. 30 mg                 furosemide 40 mg tablet  Commonly known as: LASIX    Your last dose was: Your next dose is: Take 2 Tabs by mouth daily. 80 mg                 lactulose 10 gram/15 mL solution  Commonly known as: [employer] [employer]    Your last dose was: Your next dose is: Take 20 g by mouth two (2) times daily as needed for Other (constipation). Pt does not take it every day - only takes it \"if pt hasn't pooped in over 18 hours\"   20 g                 levETIRAcetam 1,000 mg tablet    Your last dose was: Your next dose is: Take 1 Tab by mouth every twelve (12) hours. 1,000 mg                 naloxone 4 mg/actuation nasal spray  Commonly known as: NARCAN    Your last dose was: Your next dose is:         Use 1 spray intranasally, then discard. Repeat with new spray every 2 min as needed for opioid overdose symptoms, alternating nostrils. nitroglycerin 0.4 mg SL tablet  Commonly known as: NITROSTAT    Your last dose was: Your next dose is:         0.4 mg by SubLINGual route every five (5) minutes as needed for Chest Pain. May repeat every 5 minutes for a maximum of 3 doses if chest pain not relieved call MD   0.4 mg                 OLANZapine 2.5 mg tablet  Commonly known as: ZyPREXA    Your last dose was: Your next dose is: Take 5 mg by mouth nightly. per Dr Phuc Bautista daughter spoke to him 8/14/20 that patient is to remain on this medication   5 mg                 pantoprazole 40 mg tablet  Commonly known as: [place] [address]    Your last dose was: Your next dose is: Take 40 mg by mouth two (2) times a day. 40 mg                 potassium chloride SR 10 mEq tablet  Commonly known as: KLOR-CON 10    Your last dose was: Your next dose is: Take 20 mEq by mouth daily.  Concurrent with lasix   20 mEq                 pregabalin 100 mg capsule  Commonly known as: Lyrica    Your last dose was:     Your next dose is: Take 1 Cap by mouth three (3) times daily. Max Daily Amount: 300 mg.   100 mg                 SITagliptin 25 mg tablet  Commonly known as: Chela [place]    Your last dose was: Your next dose is: Take 25 mg by mouth daily. 25 mg                 tamsulosin 0.4 mg capsule  Commonly known as: FLOMAX    Your last dose was: Your next dose is: Take 1 Cap by mouth Before breakfast and dinner. Before Breakfast and in the afternoon   0.4 mg                     * This list has 2 medication(s) that are the same as other medications prescribed for you. Read the directions carefully, and ask your doctor or other care provider to review them with you. STOP taking these medications    midodrine 2.5 mg tablet  Commonly known as: PROAMATINE              Where to Get Your Medications      Information on where to get these meds will be given to you by the nurse or doctor.     Ask your nurse or doctor about these medications  · ALPRAZolam 0.5 mg tablet  · amLODIPine 5 mg tablet  · clonazePAM 1 mg tablet  · pregabalin 100 mg capsule         Signed:   Pineda Castro MD  1/15/2021  11:16 AM

## 2021-01-15 NOTE — PROGRESS NOTES
I have reviewed,editied, and agree with the documentation of Dillon Hollingsworth RN as her preceptor.

## 2021-01-15 NOTE — PROGRESS NOTES
Transition of Care Plan to SNF/Rehab    SNF/Rehab Transition:  Patient has been accepted to [facility] and meets criteria for admission. Patient will transported by [facility] and expected to leave at 5:30 PM.    Communication to Patient/Family:  Met with patient and called and spoke with patient's daughter Elizabeth Cannon [phone]  (identified care giver) and they are agreeable to the transition plan. Communication to SNF/Rehab:  Bedside RN, [place], has been notified to update the transition plan to the facility and call report (phone number [phone] x wing 2 (room 2)). Discharge information has been updated on the AVS.     Discharge instructions to be fax'd to facility at (Fax # cc link). SNF/Rehab Transition:  Patient to follow-up with Home Health: [facility], other  ,none)  PCP/Specialist:     Reviewed and confirmed with facility, Lesley at admissions they can manage the patient care needs for the following:     Lyly Diaz with (X) only those applicable:    Medication:  [x]  Medications will be available at the facility  []  IV Antibiotics   []  Controlled Substance - hard copy to be sent with patient   []  Weekly Labs   Documents:  [x] Hard RX  [x] MAR  [x] Kardex  [] AVS  [x]Transfer Summary  [x]Discharge   Equipment:  []  CPAP/BiPAP  []  Wound Vacuum  []  Enamorado or Urinary Device  []  PICC/Central Line  []  Nebulizer  []  Ventilator   Treatment:  []Isolation (for MRSA, VRE, etc.)  []Surgical Drain Management  []Tracheostomy Care  []Dressing Changes  []Dialysis with transportation and chair time . []PEG Care  []Oxygen  []Daily Weights for Heart Failure   Dietary:  []Any diet limitations  []Tube Feedings   []Total Parenteral Management (TPN)   Eligible for Medicaid Long Term Services and Supports  Yes:  [] Eligible for medical assistance or will become eligible within 180 days and UAI completed. [] Provider/Patient and/or support system has requested screening.   [] UAI copy provided to patient or responsible party, .  [] UAI unavailable at discharge will send once processed to SNF provider.  [] UAI unavailable at discharged mailed to patient  No:   [x] Private pay and is not financially eligible for Medicaid within the next 180 days.  [] Reside out-of-state.  [] A residents of a state owned/operated facility that is licensed  by Department of Behavioral Health and Developmental Services or [facility]  [] Enrollment in Medicaid hospice services  [] Non [place] citizen  [] Patient /Family declines to have screening completed or provide financial information for screening     Financial Resources:  Medicaid    [] Initiated and application pending   [] Full coverage     Advanced Care Plan:  []Surrogate Decision Maker of Care  []POA  [x]Communicated Code Status ( \"Full\")    Other  01/10/21 2122  INITIAL PHYSICIAN ORDER: OBSERVATION/OUTPATIENT IN A BED OBSERVATION; Remote Telemetry; seizue f/u eval ONE TIME     Authorizing Provider: Ceasar Samaniego MD Dadi R. Neopaney, MSW
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
## 2021-01-15 NOTE — PROGRESS NOTES
Overnight tremors noted in all extremities while alert and oriented to person & place (POC on time & situation). Pt complaining of \"jerking motion. \" States he feels like he needs to urinate but has bhandari catheter. Bladder scan was 9mL.

## 2021-01-15 NOTE — PROGRESS NOTES
I have reviewed discharge instructions with the patient and daughter. The patient and daughter verbalized understanding. TRANSFER - OUT REPORT:    Verbal report given to [place] (name) on Oralia Nielson.  being transferred to [facility]) for routine progression of care       Report consisted of patients Situation, Background, Assessment and   Recommendations(SBAR). Information from the following report(s) SBAR, Kardex, Intake/Output, MAR, Recent Results, Cardiac Rhythm Paced and Dual Neuro Assessment was reviewed with the receiving nurse. Opportunity for questions and clarification was provided. Patient transported with:   EMS via [address], and discharge instructions.
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
## 2021-01-15 NOTE — PROGRESS NOTES
Problem: Falls - Risk of  Goal: *Absence of Falls  Description: Document Marylou Kinsey Fall Risk and appropriate interventions in the flowsheet.   Outcome: Resolved/Met  Note: Fall Risk Interventions:  Mobility Interventions: Communicate number of staff needed for ambulation/transfer, Patient to call before getting OOB, Bed/chair exit alarm, Utilize walker, cane, or other assistive device, OT consult for ADLs, PT Consult for mobility concerns, PT Consult for assist device competence, Strengthening exercises (ROM-active/passive), Utilize gait belt for transfers/ambulation    Mentation Interventions: Adequate sleep, hydration, pain control, More frequent rounding, Room close to nurse's station, Evaluate medications/consider consulting pharmacy, Bed/chair exit alarm, Door open when patient unattended, Gait belt with transfers/ambulation, Increase mobility, Update white board, Toileting rounds    Medication Interventions: Evaluate medications/consider consulting pharmacy, Patient to call before getting OOB, Teach patient to arise slowly, Bed/chair exit alarm, Utilize gait belt for transfers/ambulation    Elimination Interventions: Patient to call for help with toileting needs, Bed/chair exit alarm, Call light in reach, Elevated toilet seat, Stay With Me (per policy), Toilet paper/wipes in reach, Toileting schedule/hourly rounds    History of Falls Interventions: Door open when patient unattended, Evaluate medications/consider consulting pharmacy, Bed/chair exit alarm, Consult care management for discharge planning, Investigate reason for fall, Room close to nurse's station, Utilize gait belt for transfer/ambulation, Assess for delayed presentation/identification of injury for 48 hrs (comment for end date), Vital signs minimum Q4HRs X 24 hrs (comment for end date)         Problem: Patient Education: Go to Patient Education Activity  Goal: Patient/Family Education  Outcome: Resolved/Met     Problem: Patient Education: Go to Patient Education Activity  Goal: Patient/Family Education  Outcome: Resolved/Met     Problem: Patient Education: Go to Patient Education Activity  Goal: Patient/Family Education  Outcome: Resolved/Met     Problem: Discharge Planning  Goal: *Discharge to safe environment  Outcome: Resolved/Met     Problem: Pressure Injury - Risk of  Goal: *Prevention of pressure injury  Description: Document Iván Scale and appropriate interventions in the flowsheet. Outcome: Resolved/Met  Note: Pressure Injury Interventions:  Sensory Interventions: Assess changes in LOC, Discuss PT/OT consult with provider, Maintain/enhance activity level, Monitor skin under medical devices    Moisture Interventions: Internal/External urinary devices, Absorbent underpads, Assess need for specialty bed, Minimize layers    Activity Interventions: Increase time out of bed, Pressure redistribution bed/mattress(bed type), PT/OT evaluation    Mobility Interventions: PT/OT evaluation, Turn and reposition approx.  every two hours(pillow and wedges), HOB 30 degrees or less, Float heels    Nutrition Interventions: Document food/fluid/supplement intake    Friction and Shear Interventions: Lift team/patient mobility team, Minimize layers, Apply protective barrier, creams and emollients                Problem: Patient Education: Go to Patient Education Activity  Goal: Patient/Family Education  Outcome: Resolved/Met
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
## 2021-01-16 NOTE — PROGRESS NOTES
Bedside and Verbal shift change report given to JANELL Morton (oncoming nurse) by Gloria Marsh (offgoing nurse). Report included the following information SBAR, Kardex, Intake/Output, MAR, Recent Results, Cardiac Rhythm Paced and Dual Neuro Assessment.

## 2021-01-20 ENCOUNTER — PATIENT OUTREACH (OUTPATIENT)
Dept: CASE MANAGEMENT | Age: 66
End: 2021-01-20

## 2021-01-25 NOTE — PROGRESS NOTES
Late chart. Post Acute Facility Update     *The information contained in this note was received during a weekly care coordination call with the post acute facility*    Current Facility: [address] [place] [address] ([facility])  Anticipated Discharge Date: TBD    Facility Nursing Update: none  Facility Social Work Update: none  ADL's: ranges  Current Level of Assistance: ranges  Bed Mobility: Minimal Assistance  Transfers: Contact Guard Assist - hands on patient for balance  Ambulation: Contact Guard Assist - hands on patient for balance  How far (in feet) is the patient ambulating?  Few steps  Does patient use an assistive device: no  If yes, device used: no  Barriers to Discharge: TBD  Other: uses  eob min to mod assist seconday to posterior lean unsteady unsafe to leave pt  in chair,  2 min stand tolerace

## 2021-01-27 ENCOUNTER — PATIENT OUTREACH (OUTPATIENT)
Dept: CASE MANAGEMENT | Age: 66
End: 2021-01-27

## 2021-02-01 NOTE — PROGRESS NOTES
Post Acute Facility Update     *The information contained in this note was received during a weekly care coordination call with the post acute facility*    Current Facility: [address] [place] [address] ([facility])  Anticipated Discharge Date: 1/25/21- re-admission to [address] [place] [address] Update: Patient was admitted to [facility] on 1/25/21 due to low O2 sats.

## 2021-05-16 NOTE — H&P
[address] [facility]  HISTORY AND PHYSICAL    Name:  Sandra Vanegas  MR#:  [id number]  :  1955  ACCOUNT #:  [de-identified]  ADMIT DATE:  2020      The patient was seen, evaluated and admitted by me on 2020. PRIMARY CARE PHYSICIAN:  Mandie Keith MD    SOURCE OF INFORMATION:  Patient. CHIEF COMPLAINT:  Shortness of breath    HISTORY OF PRESENT ILLNESS:  This is a  70-year-old man with past medical history significant for chronic systolic congestive heart failure with ejection fraction of 16-20%, type 2 diabetes; seizure disorder; coronary artery disease, status post CABG and stent placement; obstructive sleep apnea; hypertension; chronic kidney disease; dyslipidemia; benign prostatic hyperplasia; ventricular tachycardia, status post AICD placement; and anxiety disorder, who was in his usual state of health until about 3 days ago when the patient developed shortness of breath. The shortness of breath was intermittent initially but in the last 24 hours, the shortness of breath has become constant and progressive, worse when the patient is lying down. The patient came to the emergency room because of worsening symptoms. Also the patient stated that he fell a few days ago while rushing to the bathroom at his house and sustained an injury to his right fourth and fifth finger. The patient did not seek medical treatment. The patient denies sick contacts or exposure to any person with COVID-19 virus infection. The patient denies associated chest pain. When the patient arrived at the emergency room, the chest x-ray shows evidence of pneumonia. The patient was started on antibiotics and was placed on droplet precaution for suspected COVID-19 virus infection and was referred to the hospitalist service for evaluation for admission. He was last admitted to this hospital from 2020 to 2020.   The patient was admitted with stroke symptoms and subsequent evaluation was negative for stroke. The patient was diagnosed with ocular migraine and was discharged home in a stable condition. PAST MEDICAL HISTORY:  1. Chronic systolic congestive heart failure. 2.  Type 2 diabetes. 3.  Seizure disorder. 4.  Coronary artery disease, status post CABG and stent placement. 5.  Obstructive sleep apnea. 6.  Hypertension. 7.  Chronic kidney disease. 8.  Dyslipidemia. 9.  Benign prostatic hyperplasia. 10.  Ventricular tachycardia status post AICD placement. 11. Anxiety disorder. ALLERGIES:  THE PATIENT IS ALLERGIC TO LATEX, CODEINE, DEMEROL, MUSHROOM, GLUCOPHAGE, WELLBUTRIN. MEDICATIONS:  1. Xanax 1 mg twice daily. 2.  Amiodarone 200 mg daily. 3.  Aspirin 81 mg daily. 4.  Lipitor 80 mg daily. 5.  Coreg 3.125 mg twice daily. 6.  Klonopin 1 mg daily at bedtime as needed. 7.  Plavix 75 mg daily. 8.  Proscar 5 mg daily in the morning. 9.  Prozac 20 mg daily. 10.  Amaryl 1 mg twice daily. 11.  Lactulose 45 mL 3 times daily as needed. 12.  Keppra 1000 mg every 12 hours. 13.  Nitrostat 0.4 mg sublingual every 5 minutes as needed for chest pain. 14.  Oxycodone IR 5 mg every 8 hours as needed for pain. 15.  Protonix 40 mg daily. 16.  Lyrica  mg 3 times daily. 17.  Seroquel 25 mg daily as needed for sleep. 18.  Flomax 0.4 mg daily. 19.  Effexor XR 75 mg 2 capsules daily in the morning and 1 capsule daily in the evening. FAMILY HISTORY:  This was reviewed. His father had heart disease and pancreatic cancer. His mother had a stroke. PAST SURGICAL HISTORY:  This is significant for:  1. CABG. 2.  Cholecystectomy. 3.  AICD placement. SOCIAL HISTORY:  The patient is a former smoker, quit tobacco abuse in October 2016. No history of alcohol abuse. REVIEW OF SYSTEMS:  HEAD, EYES, EARS, NOSE, AND THROAT:  This is positive for dizziness. No headache, no blurring of vision, no photophobia. RESPIRATORY SYSTEM:  This is positive for shortness of breath and cough. 11-May-2021 01:46 No hemoptysis. CARDIOVASCULAR SYSTEM:  This is positive for orthopnea. No chest pain, no palpitations. GASTROINTESTINAL SYSTEM:  No nausea or vomiting. No diarrhea, no constipation. GENITOURINARY SYSTEM:  No dysuria, no urgency, no frequency. All other systems are reviewed and they are negative. PHYSICAL EXAMINATION:  GENERAL APPEARANCE:  The patient appeared ill, in moderate distress. VITAL SIGNS:  On arrival at the emergency room; temperature 98.4, pulse 95, respiratory rate 18, blood pressure 156/85, oxygen saturation 100% on room air. HEENT:  Head:  Normocephalic, atraumatic. Eyes:  Normal eye movement. No redness, no drainage, no discharge. Ears:  Normal external ears with no evidence of drainage. Nose:  No deformity, no drainage. Mouth and Throat:  No visible oral lesion. NECK:  Neck is supple. Mild JVD. No thyromegaly. CHEST:  Bilateral basilar crackles. HEART:  Normal S1 and S2, regular. No clinically appreciable murmur. ABDOMEN:  Soft, nontender. Normal bowel sounds. CNS:  Alert and oriented x3. No gross focal neurological deficit. EXTREMITIES:  Edema 2+ and pulses 2+ bilaterally. MUSCULOSKELETAL SYSTEM:  No obvious joint deformity or swelling. SKIN:  No active skin lesions seen in the exposed part of the body. PSYCHIATRY:  Normal mood and affect. LYMPHATIC SYSTEM:  No cervical lymphadenopathy. DIAGNOSTIC DATA:  EKG shows pacemaker rhythm, and nonspecific ST and T-waves abnormalities. X-ray of the right hand, no acute fracture. Chest x-ray shows left perihilar airspace disease and small pleural effusion. LABORATORY DATA:  Hematology:  WBC 12.3, hemoglobin 12.6, hematocrit 41.2, platelets 834. Cardiac profile:  Troponin less than 0.05. Chemistry:  Sodium 135, potassium 3.3, chloride 101, CO2 of 25, glucose 183, BUN 20, creatinine 1.63, calcium 7.8, total bilirubin 1.1, ALT 20, AST 18, alkaline phosphatase 100, total protein 7.3, albumin level 2.9, globulin 4.44. Pro-BNP level 4446, D-dimer 2.18. Lactic acid level 1.4. ASSESSMENT:  1.  Bacterial pneumonia: We will admit the patient for further evaluation and treatment. We will start the patient on Rocephin and doxycycline. This is contributing to the patient's shortness of breath. We will monitor the patient closely. The patient may require CT scan of the chest if there is no significant improvement in the next 24 to 48 hours for further evaluation of the pneumonia. 2.  Acute-on-chronic systolic congestive heart failure: This is also contributing to the patient's shortness of breath. We will start the patient on Lasix. We will check serial cardiac markers to rule out acute myocardial infarction. The patient's cardiologist will be consulted to assist in further evaluation and treatment. According to record the last echocardiogram shows ejection fraction of 16-20%. 3.  Type 2 diabetes with hyperglycemia:  The patient will be placed on sliding scale with insulin coverage. We will check hemoglobin A1c level. We will hold oral agents till the time of discharge from the hospital.  4.  Seizure disorder: We will continue with preadmission medication. The patient will also be placed on seizure precaution. 5.  Coronary artery disease, status post CABG and stent placement: We will check serial cardiac markers to rule out acute myocardial infarction as stated above. We will continue with cardiac medication. 6.  Obstructive sleep apnea: The patient will be placed on CPAP with home setting. 7.  Hypertension: We will resume home medication and monitor the patient's blood pressure closely. 8.  Elevated D-dimer: The patient presented with elevated D-dimer in the emergency room in the setting of acute shortness of breath. We will obtain V/Q scan of the chest to evaluate the patient for pulmonary embolism. We will also check ultrasound of the lower extremity to evaluate the patient for DVT.   9.  Chronic kidney disease stage III:  We will continue to monitor the patient's renal function, the patient may require Nephrology consult if there is worsening renal function. 10.  Hypokalemia: We will replace potassium and repeat potassium level. We will also check magnesium level. 11.  Hyponatremia: This is mild. It is most likely as a result of the systolic congestive heart failure. We will monitor the patient's sodium level. 12.  Dyslipidemia. We will continue with preadmission medication. We will check a lipid profile. 13.  Benign prostatic hyperplasia: This is without urinary obstruction. We will continue with preadmission medication. 14.  Ventricular tachycardia, status post AICD placement: We will continue with amiodarone and monitor the patient closely. We will await further recommendation from the cardiologist.  15.  Anxiety/depression. We will resume home medications. 12.  Fall at home: We will obtain a CT scan of the head to evaluate the patient for acute pathology. OTHER ISSUES:  Code status: The patient is a full code. We will place the patient on heparin for DVT prophylaxis. FUNCTIONAL STATUS PRIOR TO ADMISSION:  The patient came from home. The patient is ambulatory with a cane. COVID PRECAUTION:  The patient was wearing a face mask. I was wearing a face mask, gloves, and cap for this patient's encounter. The patient is being tested for COVID-19 virus infection because of his clinical presentation and multiple comorbid conditions.         Betsy Vance MD      [id number]  D:  07/14/2020 5:02  T:  07/14/2020 7:00  JOB #:  [id number]  CC:  Eric Casas MD

## 2021-08-03 PROBLEM — R07.9 CHEST PAIN: Status: RESOLVED | Noted: 2018-01-11 | Resolved: 2021-08-03

## 2022-03-18 PROBLEM — I50.9 CHF EXACERBATION (HCC): Status: ACTIVE | Noted: 2019-06-13

## 2022-03-18 PROBLEM — I63.9 ACUTE CVA (CEREBROVASCULAR ACCIDENT) (HCC): Status: ACTIVE | Noted: 2020-05-12

## 2022-03-18 PROBLEM — A49.02 MRSA INFECTION: Status: ACTIVE | Noted: 2017-04-14

## 2022-03-18 PROBLEM — K76.82 HEPATIC ENCEPHALOPATHY (HCC): Status: ACTIVE | Noted: 2017-07-17

## 2022-03-18 PROBLEM — Z95.5 S/P CORONARY ARTERY STENT PLACEMENT: Status: ACTIVE | Noted: 2017-04-14

## 2022-03-18 PROBLEM — K74.60 CIRRHOSIS (HCC): Status: ACTIVE | Noted: 2017-04-14

## 2022-03-18 PROBLEM — I50.23 ACUTE ON CHRONIC SYSTOLIC CHF (CONGESTIVE HEART FAILURE) (HCC): Status: ACTIVE | Noted: 2020-08-06

## 2022-03-19 PROBLEM — N17.9 AKI (ACUTE KIDNEY INJURY) (HCC): Status: ACTIVE | Noted: 2019-01-10

## 2022-03-19 PROBLEM — D69.6 THROMBOCYTOPENIA (HCC): Status: ACTIVE | Noted: 2017-04-14

## 2022-03-19 PROBLEM — Z90.49 S/P CHOLECYSTECTOMY: Status: ACTIVE | Noted: 2017-04-14
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
## 2022-03-19 PROBLEM — I25.10 CAD (CORONARY ARTERY DISEASE): Status: ACTIVE | Noted: 2017-04-14

## 2022-03-19 PROBLEM — I47.20 VT (VENTRICULAR TACHYCARDIA) (HCC): Status: ACTIVE | Noted: 2019-08-20

## 2022-03-19 PROBLEM — Z95.1 S/P CABG (CORONARY ARTERY BYPASS GRAFT): Status: ACTIVE | Noted: 2017-04-14

## 2022-03-20 PROBLEM — J15.9 BACTERIAL PNEUMONIA: Status: ACTIVE | Noted: 2020-07-14

## 2022-03-20 PROBLEM — G62.9 NEUROPATHY: Status: ACTIVE | Noted: 2017-04-14

## 2022-03-20 PROBLEM — I47.20 V TACH (HCC): Status: ACTIVE | Noted: 2019-08-20

## 2022-03-20 PROBLEM — R07.9 ACUTE CHEST PAIN: Status: ACTIVE | Noted: 2018-01-10
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
## 2023-05-07 NOTE — TELEPHONE ENCOUNTER
1/15/2018 Cardiac Rehab: Called . Rima Childs.  to discuss participation in the Cardiac Rehab Program following NSTEMI on 1/10/18. Spoke with daughter, Philip Jackson. She is currently at [address] [place] [address] and needs to call us back today or tomorrow.  Tim Tinoco RN
denies pain/discomfort (Rating = 0)

## 2023-05-09 NOTE — TELEPHONE ENCOUNTER
Able to contact daughter. Mailing $0 copay for Xifaxan. Daughter stated she received my previous messages, stated \"they had a lot going on. \" Patient has been able to get prescription for $80, which has been manageable. Continue Regimen: Cleanse with BPO wash alternating with Hibiclens   Patient was advised when feeling more dry take a break from the BPO wash. \\n\\nclindamycin phosphate 1 % topical solution.  Apply to affected areas once daily after cleansing skin\\n\\nDoxycycline for flares only Detail Level: Detailed Plan: Discussed Humira, Spironolactone risk and benefits. Patient continues to decline other options and would like to remain on oral antibiotics for flares and topical treatment. Render In Strict Bullet Format?: No Initiate Treatment: hydrocortisone 2.5 % topical cream BID\\nQuantity: 30.0 g  Days Supply: 30\\nSig: Apply to affected areas twice a day for one week then stop. Avoid the eyes. Take a two week break.  Repeat as needed for flares

## 2023-05-11 RX ORDER — UREA 10 %
LOTION (ML) TOPICAL 3 TIMES DAILY PRN
COMMUNITY
Start: 2020-07-17

## 2023-05-11 RX ORDER — VENLAFAXINE HYDROCHLORIDE 75 MG/1
CAPSULE, EXTENDED RELEASE ORAL EVERY EVENING
COMMUNITY
Start: 2020-08-11

## 2023-05-11 RX ORDER — ATORVASTATIN CALCIUM 80 MG/1
80 TABLET, FILM COATED ORAL DAILY
COMMUNITY

## 2023-05-11 RX ORDER — PREGABALIN 100 MG/1
CAPSULE ORAL 3 TIMES DAILY
COMMUNITY
Start: 2021-01-15

## 2023-05-11 RX ORDER — CLOPIDOGREL BISULFATE 75 MG/1
TABLET ORAL DAILY
COMMUNITY
Start: 2018-01-13

## 2023-05-11 RX ORDER — METOPROLOL SUCCINATE 25 MG/1
TABLET, EXTENDED RELEASE ORAL DAILY
COMMUNITY
Start: 2020-08-12

## 2023-05-11 RX ORDER — NITROGLYCERIN 0.4 MG/1
TABLET SUBLINGUAL
COMMUNITY

## 2023-05-11 RX ORDER — TAMSULOSIN HYDROCHLORIDE 0.4 MG/1
CAPSULE ORAL
COMMUNITY
Start: 2016-11-24

## 2023-05-11 RX ORDER — LACTULOSE 10 G/15ML
SOLUTION ORAL 2 TIMES DAILY PRN
COMMUNITY

## 2023-05-11 RX ORDER — POTASSIUM CHLORIDE 750 MG/1
TABLET, FILM COATED, EXTENDED RELEASE ORAL DAILY
COMMUNITY

## 2023-05-11 RX ORDER — AMIODARONE HYDROCHLORIDE 200 MG/1
TABLET ORAL 2 TIMES DAILY
COMMUNITY
Start: 2020-08-11

## 2023-05-11 RX ORDER — FLUOXETINE HYDROCHLORIDE 20 MG/1
30 CAPSULE ORAL DAILY
COMMUNITY

## 2023-05-11 RX ORDER — CLONAZEPAM 1 MG/1
TABLET ORAL
COMMUNITY
Start: 2021-01-15

## 2023-05-11 RX ORDER — ASPIRIN 81 MG/1
TABLET, CHEWABLE ORAL DAILY
COMMUNITY
Start: 2016-11-24

## 2023-05-11 RX ORDER — ALPRAZOLAM 0.5 MG/1
TABLET ORAL 2 TIMES DAILY
COMMUNITY
Start: 2021-01-15

## 2023-05-11 RX ORDER — PANTOPRAZOLE SODIUM 40 MG/1
TABLET, DELAYED RELEASE ORAL 2 TIMES DAILY
COMMUNITY

## 2023-05-11 RX ORDER — FUROSEMIDE 40 MG/1
TABLET ORAL DAILY
COMMUNITY
Start: 2020-08-11

## 2023-05-11 RX ORDER — AMLODIPINE BESYLATE 5 MG/1
TABLET ORAL DAILY
COMMUNITY
Start: 2021-01-15

## 2023-05-11 RX ORDER — LEVETIRACETAM 1000 MG/1
TABLET ORAL EVERY 12 HOURS
COMMUNITY
Start: 2019-01-16

## 2023-05-11 RX ORDER — ALBUTEROL SULFATE 90 UG/1
1 AEROSOL, METERED RESPIRATORY (INHALATION) EVERY 6 HOURS PRN
COMMUNITY

## 2023-05-11 RX ORDER — NALOXONE HYDROCHLORIDE 4 MG/.1ML
SPRAY NASAL
COMMUNITY
Start: 2020-05-24

## 2023-05-11 RX ORDER — OLANZAPINE 2.5 MG/1
TABLET ORAL
COMMUNITY

## 2023-05-11 RX ORDER — ALBUTEROL SULFATE 2.5 MG/3ML
SOLUTION RESPIRATORY (INHALATION) EVERY 4 HOURS PRN
COMMUNITY

## 2023-05-11 RX ORDER — FINASTERIDE 5 MG/1
TABLET, FILM COATED ORAL
COMMUNITY

## 2024-03-12 NOTE — ED TRIAGE NOTES
Pt arrives to ED via wheelchair accompanied by daughter with c/o increased numbness and tingling to bilateral hands and feet onset today. D/C from [facility] 7/17/20 after being dx with pneumonia.  Pt reports a hx of peripheral neuropathy but increased today \"after a nap\" Spoke with the pharmacy.

## 2024-04-01 NOTE — PROGRESS NOTES
Problem: Falls - Risk of  Goal: *Absence of Falls  Description  Document Kamilah Gifford Fall Risk and appropriate interventions in the flowsheet. Outcome: Progressing Towards Goal  Note:   Fall Risk Interventions:  Mobility Interventions: Communicate number of staff needed for ambulation/transfer, OT consult for ADLs, Patient to call before getting OOB, PT Consult for mobility concerns, PT Consult for assist device competence    Mentation Interventions: Adequate sleep, hydration, pain control, Evaluate medications/consider consulting pharmacy    Medication Interventions: Evaluate medications/consider consulting pharmacy, Teach patient to arise slowly, Patient to call before getting OOB    Elimination Interventions: Call light in reach, Patient to call for help with toileting needs, Urinal in reach              Problem: Pressure Injury - Risk of  Goal: *Prevention of pressure injury  Description  Document Iván Scale and appropriate interventions in the flowsheet.   Outcome: Progressing Towards Goal  Note:   Pressure Injury Interventions:  Sensory Interventions: Assess changes in LOC    Moisture Interventions: Absorbent underpads    Activity Interventions: Increase time out of bed, Pressure redistribution bed/mattress(bed type), PT/OT evaluation    Mobility Interventions: HOB 30 degrees or less, Pressure redistribution bed/mattress (bed type), PT/OT evaluation    Nutrition Interventions: Document food/fluid/supplement intake    Friction and Shear Interventions: Lift sheet, HOB 30 degrees or less                Problem: Pacer/ICD: Day 1  Goal: Activity/Safety  Outcome: Progressing Towards Goal  Goal: Diagnostic Test/Procedures  Outcome: Progressing Towards Goal  Goal: Nutrition/Diet  Outcome: Progressing Towards Goal  Goal: Discharge Planning  Outcome: Progressing Towards Goal  Goal: Medications  Outcome: Progressing Towards Goal  Goal: Respiratory  Outcome: Progressing Towards Goal  Goal: Treatments/Interventions/Procedures  Outcome: Progressing Towards Goal  Goal: Psychosocial  Outcome: Progressing Towards Goal Patient/Caregiver requests family/friend to interpret.

## 2024-08-02 NOTE — PROCEDURES
[address]  EEG    Tyrone Landeros  MR#: [id number]  : 1955  ACCOUNT #: [de-identified]   DATE OF SERVICE: 2019    REQUESTING PHYSICIAN:  Krish Em MD    HISTORY:  The patient is a 65-year-old male, who is being evaluated after an unwitnessed fall, followed by confusion. DESCRIPTION:  This is an 18-channel EEG performed on an awake patient. The dominant posterior background rhythm consists of medium voltage rhythms in the 8 Hz frequency range out of the posterior head region. Drowsiness and sleep architecture was not noted. Photic stimulation elicits a symmetric driving response. Hyperventilation was not performed. EEG SUMMARY:  Normal study. CLINICAL INTERPRETATION:  This was a normal EEG during wakeful state of the patient. No lateralizing or epileptiform features were noted.       Jesus Pina MD       ASS / CHATO  D: 2019 13:31     T: 2019 15:44  JOB #: [id number] Communicated to Miley per Dr Lee, patient to go to ED for clotted line.

## (undated) DEVICE — REM POLYHESIVE ADULT PATIENT RETURN ELECTRODE: Brand: VALLEYLAB

## (undated) DEVICE — STERILE POLYISOPRENE POWDER-FREE SURGICAL GLOVES WITH EMOLLIENT COATING: Brand: PROTEXIS

## (undated) DEVICE — INTRO SHTH 8FR 13X20CM -- TEARAWAY

## (undated) DEVICE — NEEDLE HYPO 25GA L1.5IN BVL ORIENTED ECLIPSE

## (undated) DEVICE — AGENT HEMSTAT W2XL3IN OXIDIZED REGENERATED CELOS ABSRB

## (undated) DEVICE — ANGIOGRAPHY KIT

## (undated) DEVICE — (D)PREP SKN CHLRAPRP APPL 26ML -- CONVERT TO ITEM 371833

## (undated) DEVICE — VASCULAR-RICHMOND-LF: Brand: MEDLINE INDUSTRIES, INC.

## (undated) DEVICE — STRAP,POSITIONING,KNEE/BODY,FOAM,4X60": Brand: MEDLINE

## (undated) DEVICE — Device

## (undated) DEVICE — NEEDLE HYPO 18GA L1.5IN PNK S STL HUB POLYPR SHLD REG BVL

## (undated) DEVICE — SUT SLK 0 30IN CT1 BLK --

## (undated) DEVICE — DRAPE PRB US TRNSDCR 6X96IN --

## (undated) DEVICE — ENDO CARRY-ON PROCEDURE KIT INCLUDES ENZYMATIC SPONGE, GAUZE, BIOHAZARD LABEL, TRAY, LUBRICANT, DIRTY SCOPE LABEL, WATER LABEL, TRAY, DRAWSTRING PAD, AND DEFENDO 4-PIECE KIT.: Brand: ENDO CARRY-ON PROCEDURE KIT

## (undated) DEVICE — SET ADMIN 16ML TBNG L100IN 2 Y INJ SITE IV PIGGY BK DISP

## (undated) DEVICE — SYR 10ML LUER LOK 1/5ML GRAD --

## (undated) DEVICE — CABLE PACE ALGTR CLP SAF 12FT --

## (undated) DEVICE — DRAPE,FEM ANGIO,2 WIN,STERILE: Brand: MEDLINE

## (undated) DEVICE — INFECTION CONTROL KIT SYS

## (undated) DEVICE — CATH IV AUTOGRD BC BLU 22GA 25 -- INSYTE

## (undated) DEVICE — WRAP SURG W1.31XL1.34M CARD FOR PT 165-172CM THERMOWRP

## (undated) DEVICE — KENDALL RADIOLUCENT FOAM MONITORING ELECTRODE RECTANGULAR SHAPE: Brand: KENDALL

## (undated) DEVICE — SUTURE MCRYL SZ 4-0 L27IN ABSRB UD L19MM PS-2 1/2 CIR PRIM Y426H

## (undated) DEVICE — CATHETER INTRO 9X7FR L50.7CM WRK L47CM 135DEG CRV PEBAX

## (undated) DEVICE — 40418 TRENDELENBURG ONE-STEP ARM PROTECTORS LARGE (1 PAIR): Brand: 40418 TRENDELENBURG ONE-STEP ARM PROTECTORS LARGE (1 PAIR)

## (undated) DEVICE — Device: Brand: PADPRO

## (undated) DEVICE — MARKER SKN REG TIP W/RULER -- STRL

## (undated) DEVICE — DEVICE INFL 20ML L13IN 30ATM CLR POLYCARB TB PRTBL DGT

## (undated) DEVICE — SYSTEM INTRO 9.5FR L13CM STD WHT CAP HEMSTAT SPLITTABLE

## (undated) DEVICE — BW-412T DISP COMBO CLEANING BRUSH: Brand: SINGLE USE COMBINATION CLEANING BRUSH

## (undated) DEVICE — MICROPUNCTURE INTRODUCER SET SILHOUETTE TRANSITIONLESS WITH STAINLESS STEEL WIRE GUIDE: Brand: MICROPUNCTURE

## (undated) DEVICE — CATHETER DIAG 6FR L110CM INTRO 6FR BLLN DIA9MM 1CC PULM ART

## (undated) DEVICE — SOLIDIFIER FLUID 3000 CC ABSORB

## (undated) DEVICE — PACK PROCEDURE SURG HRT CATH

## (undated) DEVICE — SUTURE VCRL SZ 2-0 L27IN ABSRB UD L26MM SH 1/2 CIR J417H

## (undated) DEVICE — KENDALL RADIOLUCENT FOAM MONITORING ELECTRODE -RECTANGULAR SHAPE: Brand: KENDALL

## (undated) DEVICE — CATH DIAGIMPLS MP 5FRX110CM -- IMPULSE 16391-300

## (undated) DEVICE — 3M™ IOBAN™ 2 ANTIMICROBIAL INCISE DRAPE 6650EZ: Brand: IOBAN™ 2

## (undated) DEVICE — KIT IV STRT W CHLORAPREP PD 1ML

## (undated) DEVICE — GUIDEWIRE VASC L80CM DIA0.018IN TIP L5CM 15DEG ANG NIT

## (undated) DEVICE — ANGLED-TIP ARTERIAL SHEATH CONFIGURATION: Brand: ENROUTE TRANSCAROTID NEUROPROTECTION SYSTEM

## (undated) DEVICE — KIT MFLD ISOLATN NACL CNTRST PRT TBNG SPIK W/ PRSS TRNSDUC

## (undated) DEVICE — INTRODUCER SHTH 0.018 IN 21 GAX7 CM TRANSCAROTID ACCS KT

## (undated) DEVICE — DRAPE,UTILTY,TAPE,15X26, 4EA/PK: Brand: MEDLINE

## (undated) DEVICE — DRSG AQUACEL SURG 3.5X6IN -- CONVERT TO ITEM 369227

## (undated) DEVICE — NEONATAL-ADULT SPO2 SENSOR: Brand: NELLCOR

## (undated) DEVICE — SET EXTN TBNG L BOR 4 W STPCOCK ST 32IN PRIMING VOL 6ML

## (undated) DEVICE — SUTURE COAT VCRL SZ 4-0 L18IN ABSRB UD L19MM PS-2 1/2 CIR J496G

## (undated) DEVICE — SYRINGE MED 20ML STD CLR PLAS LUERLOCK TIP N CTRL DISP

## (undated) DEVICE — KIT MED IMAG CNTRST AGNT W/ IOPAMIDOL REUSE

## (undated) DEVICE — HANDLE LT SNAP ON ULT DURABLE LENS FOR TRUMPF ALC DISPOSABLE

## (undated) DEVICE — DRAPE SURG W25XL50IN E OPN CIR BND BG

## (undated) DEVICE — INTRO SHTH 7FR 13X20CM -- TEARAWAY

## (undated) DEVICE — GUIDEWIRE VASCULAR L95CM DIA0.014IN ENROUTE

## (undated) DEVICE — SUTURE VCRL SZ 3-0 L27IN ABSRB UD L26MM SH 1/2 CIR J416H

## (undated) DEVICE — PLASMABLADE PS200-040 4.0: Brand: PLASMABLADE™

## (undated) DEVICE — DEVICE VASC CLSR 6FR 7FR 10ML LOK SYR W BLLN CATHETER INTEGR

## (undated) DEVICE — CUSTOM KT PTCA INFL DEV K05 00052M

## (undated) DEVICE — PINNACLE INTRODUCER SHEATH: Brand: PINNACLE

## (undated) DEVICE — SUTURE PERMAHAND SZ 2-0 L18IN NONABSORBABLE BLK L26MM SH C012D

## (undated) DEVICE — SUTURE PROL 5-0 L18IN NONABSORBABLE BLU RB-2 L13MM 1/2 CIR 8713H

## (undated) DEVICE — 1200 GUARD II KIT W/5MM TUBE W/O VAC TUBE: Brand: GUARDIAN

## (undated) DEVICE — PENCIL ES L3M BTTN SWCH S STL HEX LOK BLDE ELECTRD HOLSTER

## (undated) DEVICE — 3M™ IOBAN™ 2 ANTIMICROBIAL INCISE DRAPE 6640EZ: Brand: IOBAN™ 2

## (undated) DEVICE — BITE BLK ENDOSCP AD 54FR GRN POLYETH ENDOSCP W STRP SLD

## (undated) DEVICE — BAG BELONG PT PERS CLEAR HANDL

## (undated) DEVICE — SOLUTION IV 500ML 0.9% SOD CHL FLX CONT

## (undated) DEVICE — CANN NASAL O2 CAPNOGRAPHY AD -- FILTERLINE

## (undated) DEVICE — HI-TORQUE FLOPPY II EXTRA SUPPORT GUIDEWIRE .014 STRAIGHT TIP 2.0 CM X 300 CM: Brand: HI-TORQUE FLOPPY

## (undated) DEVICE — DRAPE XR C ARM 41X74IN LF --

## (undated) DEVICE — KIT HND CTRL 3 W STPCOCK ROT END 54IN PREM HI PRSS TBNG AT

## (undated) DEVICE — DERMABOND SKIN ADH 0.7ML -- DERMABOND ADVANCED 12/BX

## (undated) DEVICE — SUTURE COAT VCRL PC 5 PRECIS COSM CONVENTIONAL CUT PRIM 3 8 J823H

## (undated) DEVICE — RADIFOCUS GLIDEWIRE: Brand: GLIDEWIRE

## (undated) DEVICE — SOLUTION IRRIG 1000ML H2O STRL BLT